# Patient Record
Sex: FEMALE | Race: WHITE | NOT HISPANIC OR LATINO | Employment: OTHER | URBAN - METROPOLITAN AREA
[De-identification: names, ages, dates, MRNs, and addresses within clinical notes are randomized per-mention and may not be internally consistent; named-entity substitution may affect disease eponyms.]

---

## 2017-02-04 ENCOUNTER — HOSPITAL ENCOUNTER (INPATIENT)
Facility: HOSPITAL | Age: 57
LOS: 6 days | Discharge: HOME | End: 2017-02-10
Attending: EMERGENCY MEDICINE | Admitting: HOSPITALIST

## 2017-02-04 ENCOUNTER — APPOINTMENT (OUTPATIENT)
Dept: GENERAL RADIOLOGY | Facility: HOSPITAL | Age: 57
End: 2017-02-04

## 2017-02-04 ENCOUNTER — APPOINTMENT (OUTPATIENT)
Dept: CARDIOLOGY | Facility: HOSPITAL | Age: 57
End: 2017-02-04

## 2017-02-04 DIAGNOSIS — R79.1 ELEVATED INR: ICD-10-CM

## 2017-02-04 DIAGNOSIS — R07.9 CHEST PAIN, UNSPECIFIED TYPE: Primary | ICD-10-CM

## 2017-02-04 DIAGNOSIS — E78.5 HYPERLIPEMIA: ICD-10-CM

## 2017-02-04 DIAGNOSIS — Z95.2 H/O MITRAL VALVE REPLACEMENT: ICD-10-CM

## 2017-02-04 DIAGNOSIS — Z79.01 CHRONIC ANTICOAGULATION: ICD-10-CM

## 2017-02-04 LAB
ALBUMIN SERPL-MCNC: 4 G/DL (ref 3.2–4.8)
ALBUMIN/GLOB SERPL: 1.2 G/DL (ref 1.5–2.5)
ALP SERPL-CCNC: 156 U/L (ref 25–100)
ALT SERPL W P-5'-P-CCNC: 107 U/L (ref 7–40)
AMYLASE SERPL-CCNC: 136 U/L (ref 30–118)
ANION GAP SERPL CALCULATED.3IONS-SCNC: 14 MMOL/L (ref 3–11)
APTT PPP: 45.8 SECONDS (ref 24–31)
ARTICHOKE IGE QN: 56 MG/DL (ref 0–130)
AST SERPL-CCNC: 226 U/L (ref 0–33)
BASOPHILS # BLD AUTO: 0.01 10*3/MM3 (ref 0–0.2)
BASOPHILS NFR BLD AUTO: 0.1 % (ref 0–1)
BH CV ECHO MEAS - AI DEC SLOPE: 286 CM/SEC^2
BH CV ECHO MEAS - AI MAX PG: 55.5 MMHG
BH CV ECHO MEAS - AI MAX VEL: 372.5 CM/SEC
BH CV ECHO MEAS - AI P1/2T: 381.5 MSEC
BH CV ECHO MEAS - AO ROOT AREA (BSA CORRECTED): 1.8
BH CV ECHO MEAS - AO ROOT AREA: 6.6 CM^2
BH CV ECHO MEAS - AO ROOT DIAM: 2.9 CM
BH CV ECHO MEAS - BSA(HAYCOCK): 1.6 M^2
BH CV ECHO MEAS - BSA: 1.6 M^2
BH CV ECHO MEAS - BZI_BMI: 17.6 KILOGRAMS/M^2
BH CV ECHO MEAS - BZI_METRIC_HEIGHT: 172.7 CM
BH CV ECHO MEAS - BZI_METRIC_WEIGHT: 52.6 KG
BH CV ECHO MEAS - CONTRAST EF (2CH): 13.5 ML/M^2
BH CV ECHO MEAS - CONTRAST EF 4CH: 21.7 ML/M^2
BH CV ECHO MEAS - EDV(CUBED): 160.1 ML
BH CV ECHO MEAS - EDV(MOD-SP2): 126 ML
BH CV ECHO MEAS - EDV(MOD-SP4): 143 ML
BH CV ECHO MEAS - EDV(TEICH): 143.1 ML
BH CV ECHO MEAS - EF(CUBED): 16.7 %
BH CV ECHO MEAS - EF(MOD-SP2): 13.5 %
BH CV ECHO MEAS - EF(MOD-SP4): 21.7 %
BH CV ECHO MEAS - EF(TEICH): 13.1 %
BH CV ECHO MEAS - ESV(CUBED): 133.4 ML
BH CV ECHO MEAS - ESV(MOD-SP2): 109 ML
BH CV ECHO MEAS - ESV(MOD-SP4): 112 ML
BH CV ECHO MEAS - ESV(TEICH): 124.4 ML
BH CV ECHO MEAS - FS: 5.9 %
BH CV ECHO MEAS - IVS/LVPW: 1.8
BH CV ECHO MEAS - IVSD: 1.2 CM
BH CV ECHO MEAS - LA DIMENSION: 4.6 CM
BH CV ECHO MEAS - LA/AO: 1.6
BH CV ECHO MEAS - LAT PEAK E' VEL: 6.3 CM/SEC
BH CV ECHO MEAS - LV DIASTOLIC VOL/BSA (35-75): 88.2 ML/M^2
BH CV ECHO MEAS - LV MASS(C)D: 198.4 GRAMS
BH CV ECHO MEAS - LV MASS(C)DI: 122.4 GRAMS/M^2
BH CV ECHO MEAS - LV MAX PG: 4.9 MMHG
BH CV ECHO MEAS - LV MEAN PG: 2 MMHG
BH CV ECHO MEAS - LV SYSTOLIC VOL/BSA (12-30): 69.1 ML/M^2
BH CV ECHO MEAS - LV V1 MAX: 111 CM/SEC
BH CV ECHO MEAS - LV V1 MEAN: 61.7 CM/SEC
BH CV ECHO MEAS - LV V1 VTI: 15.3 CM
BH CV ECHO MEAS - LVIDD: 5.4 CM
BH CV ECHO MEAS - LVIDS: 5 CM
BH CV ECHO MEAS - LVLD AP2: 8.6 CM
BH CV ECHO MEAS - LVLD AP4: 8.4 CM
BH CV ECHO MEAS - LVLS AP2: 8.1 CM
BH CV ECHO MEAS - LVLS AP4: 8.2 CM
BH CV ECHO MEAS - LVOT AREA (M): 1.3 CM^2
BH CV ECHO MEAS - LVOT AREA: 1.3 CM^2
BH CV ECHO MEAS - LVOT DIAM: 1.3 CM
BH CV ECHO MEAS - LVPWD: 1.2 CM
BH CV ECHO MEAS - MED PEAK E' VEL: 4.39 CM/SEC
BH CV ECHO MEAS - MV A MAX VEL: 62.7 CM/SEC
BH CV ECHO MEAS - MV E MAX VEL: 102 CM/SEC
BH CV ECHO MEAS - MV E/A: 1.6
BH CV ECHO MEAS - MV MAX PG: 5.4 MMHG
BH CV ECHO MEAS - MV MEAN PG: 2 MMHG
BH CV ECHO MEAS - MV V2 MAX: 116 CM/SEC
BH CV ECHO MEAS - MV V2 MEAN: 53.3 CM/SEC
BH CV ECHO MEAS - MV V2 VTI: 19.9 CM
BH CV ECHO MEAS - MVA(VTI): 1 CM^2
BH CV ECHO MEAS - PA ACC SLOPE: 423 CM/SEC^2
BH CV ECHO MEAS - PA ACC TIME: 0.12 SEC
BH CV ECHO MEAS - PA PR(ACCEL): 24.6 MMHG
BH CV ECHO MEAS - RAP SYSTOLE: 8 MMHG
BH CV ECHO MEAS - RVDD: 2.3 CM
BH CV ECHO MEAS - RVSP: 32.7 MMHG
BH CV ECHO MEAS - SI(CUBED): 16.4 ML/M^2
BH CV ECHO MEAS - SI(LVOT): 12.5 ML/M^2
BH CV ECHO MEAS - SI(MOD-SP2): 10.5 ML/M^2
BH CV ECHO MEAS - SI(MOD-SP4): 19.1 ML/M^2
BH CV ECHO MEAS - SI(TEICH): 11.6 ML/M^2
BH CV ECHO MEAS - SV(CUBED): 26.7 ML
BH CV ECHO MEAS - SV(LVOT): 20.3 ML
BH CV ECHO MEAS - SV(MOD-SP2): 17 ML
BH CV ECHO MEAS - SV(MOD-SP4): 31 ML
BH CV ECHO MEAS - SV(TEICH): 18.8 ML
BH CV ECHO MEAS - TAPSE (>1.6): 1.8 CM2
BH CV ECHO MEAS - TR MAX VEL: 247.8 CM/SEC
BH CV XLRA - RV BASE: 3.7 CM
BH CV XLRA - RV LENGTH: 6.6 CM
BH CV XLRA - RV MID: 3.9 CM
BH CV XLRA - TDI S': 16.8 CM/SEC
BILIRUB SERPL-MCNC: 0.5 MG/DL (ref 0.3–1.2)
BNP SERPL-MCNC: 2950 PG/ML (ref 0–100)
BUN BLD-MCNC: 32 MG/DL (ref 9–23)
BUN/CREAT SERPL: 17.8 (ref 7–25)
CALCIUM SPEC-SCNC: 9.9 MG/DL (ref 8.7–10.4)
CHLORIDE SERPL-SCNC: 100 MMOL/L (ref 99–109)
CHOLEST SERPL-MCNC: 157 MG/DL (ref 0–200)
CO2 SERPL-SCNC: 21 MMOL/L (ref 20–31)
CREAT BLD-MCNC: 1.8 MG/DL (ref 0.6–1.3)
DEPRECATED RDW RBC AUTO: 52.9 FL (ref 37–54)
E/E' RATIO: 16.3
EOSINOPHIL # BLD AUTO: 0.06 10*3/MM3 (ref 0.1–0.3)
EOSINOPHIL NFR BLD AUTO: 0.8 % (ref 0–3)
ERYTHROCYTE [DISTWIDTH] IN BLOOD BY AUTOMATED COUNT: 14.5 % (ref 11.3–14.5)
GFR SERPL CREATININE-BSD FRML MDRD: 29 ML/MIN/1.73
GLOBULIN UR ELPH-MCNC: 3.3 GM/DL
GLUCOSE BLD-MCNC: 104 MG/DL (ref 70–100)
HBA1C MFR BLD: 5 % (ref 4.8–5.6)
HCT VFR BLD AUTO: 30.7 % (ref 34.5–44)
HDLC SERPL-MCNC: 58 MG/DL (ref 40–60)
HGB BLD-MCNC: 10 G/DL (ref 11.5–15.5)
HOLD SPECIMEN: NORMAL
HOLD SPECIMEN: NORMAL
IMM GRANULOCYTES # BLD: 0.01 10*3/MM3 (ref 0–0.03)
IMM GRANULOCYTES NFR BLD: 0.1 % (ref 0–0.6)
INR PPP: 5.59
LIPASE SERPL-CCNC: 38 U/L (ref 6–51)
LV EF 2D ECHO EST: 18 %
LYMPHOCYTES # BLD AUTO: 2.37 10*3/MM3 (ref 0.6–4.8)
LYMPHOCYTES NFR BLD AUTO: 31.1 % (ref 24–44)
MCH RBC QN AUTO: 32.7 PG (ref 27–31)
MCHC RBC AUTO-ENTMCNC: 32.6 G/DL (ref 32–36)
MCV RBC AUTO: 100.3 FL (ref 80–99)
MONOCYTES # BLD AUTO: 0.56 10*3/MM3 (ref 0–1)
MONOCYTES NFR BLD AUTO: 7.3 % (ref 0–12)
NEUTROPHILS # BLD AUTO: 4.61 10*3/MM3 (ref 1.5–8.3)
NEUTROPHILS NFR BLD AUTO: 60.6 % (ref 41–71)
PLATELET # BLD AUTO: 245 10*3/MM3 (ref 150–450)
PMV BLD AUTO: 10.4 FL (ref 6–12)
POTASSIUM BLD-SCNC: 4.6 MMOL/L (ref 3.5–5.5)
PROT SERPL-MCNC: 7.3 G/DL (ref 5.7–8.2)
PROTHROMBIN TIME: 60.9 SECONDS (ref 9.6–11.5)
RBC # BLD AUTO: 3.06 10*6/MM3 (ref 3.89–5.14)
SODIUM BLD-SCNC: 135 MMOL/L (ref 132–146)
TRIGL SERPL-MCNC: 141 MG/DL (ref 0–150)
TROPONIN I SERPL-MCNC: 0.13 NG/ML (ref 0–0.07)
TROPONIN I SERPL-MCNC: 0.23 NG/ML
WBC NRBC COR # BLD: 7.62 10*3/MM3 (ref 3.5–10.8)
WHOLE BLOOD HOLD SPECIMEN: NORMAL
WHOLE BLOOD HOLD SPECIMEN: NORMAL

## 2017-02-04 PROCEDURE — 93005 ELECTROCARDIOGRAM TRACING: CPT | Performed by: EMERGENCY MEDICINE

## 2017-02-04 PROCEDURE — 25010000002 ONDANSETRON PER 1 MG: Performed by: INTERNAL MEDICINE

## 2017-02-04 PROCEDURE — 85730 THROMBOPLASTIN TIME PARTIAL: CPT | Performed by: EMERGENCY MEDICINE

## 2017-02-04 PROCEDURE — 83690 ASSAY OF LIPASE: CPT | Performed by: EMERGENCY MEDICINE

## 2017-02-04 PROCEDURE — 99232 SBSQ HOSP IP/OBS MODERATE 35: CPT | Performed by: INTERNAL MEDICINE

## 2017-02-04 PROCEDURE — 25010000002 SULFUR HEXAFLUORIDE MICROSPH 60.7-25 MG RECONSTITUTED SUSPENSION

## 2017-02-04 PROCEDURE — 25010000002 ONDANSETRON PER 1 MG: Performed by: EMERGENCY MEDICINE

## 2017-02-04 PROCEDURE — 99284 EMERGENCY DEPT VISIT MOD MDM: CPT

## 2017-02-04 PROCEDURE — 84484 ASSAY OF TROPONIN QUANT: CPT

## 2017-02-04 PROCEDURE — 82150 ASSAY OF AMYLASE: CPT | Performed by: PHYSICIAN ASSISTANT

## 2017-02-04 PROCEDURE — 25010000002 FUROSEMIDE PER 20 MG: Performed by: PHYSICIAN ASSISTANT

## 2017-02-04 PROCEDURE — 99223 1ST HOSP IP/OBS HIGH 75: CPT | Performed by: HOSPITALIST

## 2017-02-04 PROCEDURE — 25010000002 SULFUR HEXAFLUORIDE MICROSPH 60.7-25 MG RECONSTITUTED SUSPENSION: Performed by: FAMILY MEDICINE

## 2017-02-04 PROCEDURE — 94640 AIRWAY INHALATION TREATMENT: CPT

## 2017-02-04 PROCEDURE — 85610 PROTHROMBIN TIME: CPT | Performed by: EMERGENCY MEDICINE

## 2017-02-04 PROCEDURE — 71010 HC CHEST PA OR AP: CPT

## 2017-02-04 PROCEDURE — 25010000002 HYDROMORPHONE PER 4 MG: Performed by: FAMILY MEDICINE

## 2017-02-04 PROCEDURE — 80061 LIPID PANEL: CPT | Performed by: PHYSICIAN ASSISTANT

## 2017-02-04 PROCEDURE — 85025 COMPLETE CBC W/AUTO DIFF WBC: CPT | Performed by: EMERGENCY MEDICINE

## 2017-02-04 PROCEDURE — 83880 ASSAY OF NATRIURETIC PEPTIDE: CPT | Performed by: EMERGENCY MEDICINE

## 2017-02-04 PROCEDURE — C8929 TTE W OR WO FOL WCON,DOPPLER: HCPCS

## 2017-02-04 PROCEDURE — 83036 HEMOGLOBIN GLYCOSYLATED A1C: CPT | Performed by: PHYSICIAN ASSISTANT

## 2017-02-04 PROCEDURE — 93306 TTE W/DOPPLER COMPLETE: CPT | Performed by: INTERNAL MEDICINE

## 2017-02-04 PROCEDURE — 80053 COMPREHEN METABOLIC PANEL: CPT | Performed by: EMERGENCY MEDICINE

## 2017-02-04 PROCEDURE — 84484 ASSAY OF TROPONIN QUANT: CPT | Performed by: EMERGENCY MEDICINE

## 2017-02-04 PROCEDURE — 25010000002 HYDROMORPHONE PER 4 MG: Performed by: EMERGENCY MEDICINE

## 2017-02-04 RX ORDER — CARVEDILOL 3.12 MG/1
3.12 TABLET ORAL 2 TIMES DAILY WITH MEALS
Status: DISCONTINUED | OUTPATIENT
Start: 2017-02-04 | End: 2017-02-06

## 2017-02-04 RX ORDER — CLONAZEPAM 1 MG/1
2 TABLET ORAL 3 TIMES DAILY PRN
Status: DISCONTINUED | OUTPATIENT
Start: 2017-02-04 | End: 2017-02-06

## 2017-02-04 RX ORDER — ATORVASTATIN CALCIUM 40 MG/1
80 TABLET, FILM COATED ORAL NIGHTLY
Status: DISCONTINUED | OUTPATIENT
Start: 2017-02-04 | End: 2017-02-09

## 2017-02-04 RX ORDER — HYDROMORPHONE HYDROCHLORIDE 1 MG/ML
0.5 INJECTION, SOLUTION INTRAMUSCULAR; INTRAVENOUS; SUBCUTANEOUS ONCE
Status: COMPLETED | OUTPATIENT
Start: 2017-02-04 | End: 2017-02-04

## 2017-02-04 RX ORDER — ALUMINA, MAGNESIA, AND SIMETHICONE 2400; 2400; 240 MG/30ML; MG/30ML; MG/30ML
15 SUSPENSION ORAL EVERY 6 HOURS PRN
Status: DISCONTINUED | OUTPATIENT
Start: 2017-02-04 | End: 2017-02-10 | Stop reason: HOSPADM

## 2017-02-04 RX ORDER — CARVEDILOL 6.25 MG/1
6.25 TABLET ORAL 2 TIMES DAILY WITH MEALS
Status: DISCONTINUED | OUTPATIENT
Start: 2017-02-04 | End: 2017-02-04

## 2017-02-04 RX ORDER — ONDANSETRON 2 MG/ML
4 INJECTION INTRAMUSCULAR; INTRAVENOUS EVERY 6 HOURS PRN
Status: DISCONTINUED | OUTPATIENT
Start: 2017-02-04 | End: 2017-02-10 | Stop reason: HOSPADM

## 2017-02-04 RX ORDER — ONDANSETRON 2 MG/ML
4 INJECTION INTRAMUSCULAR; INTRAVENOUS ONCE
Status: COMPLETED | OUTPATIENT
Start: 2017-02-04 | End: 2017-02-04

## 2017-02-04 RX ORDER — HYDROXYCHLOROQUINE SULFATE 200 MG/1
200 TABLET, FILM COATED ORAL
Status: DISCONTINUED | OUTPATIENT
Start: 2017-02-04 | End: 2017-02-10 | Stop reason: HOSPADM

## 2017-02-04 RX ORDER — DIPHENHYDRAMINE HCL 25 MG
25 CAPSULE ORAL NIGHTLY PRN
Status: DISCONTINUED | OUTPATIENT
Start: 2017-02-04 | End: 2017-02-10 | Stop reason: HOSPADM

## 2017-02-04 RX ORDER — TIZANIDINE 4 MG/1
4 TABLET ORAL EVERY 12 HOURS PRN
Status: DISCONTINUED | OUTPATIENT
Start: 2017-02-04 | End: 2017-02-10 | Stop reason: HOSPADM

## 2017-02-04 RX ORDER — PANTOPRAZOLE SODIUM 40 MG/1
40 TABLET, DELAYED RELEASE ORAL
Status: DISCONTINUED | OUTPATIENT
Start: 2017-02-04 | End: 2017-02-10 | Stop reason: HOSPADM

## 2017-02-04 RX ORDER — FUROSEMIDE 10 MG/ML
40 INJECTION INTRAMUSCULAR; INTRAVENOUS 2 TIMES DAILY
Status: DISCONTINUED | OUTPATIENT
Start: 2017-02-04 | End: 2017-02-05

## 2017-02-04 RX ORDER — HYDROMORPHONE HYDROCHLORIDE 1 MG/ML
0.5 INJECTION, SOLUTION INTRAMUSCULAR; INTRAVENOUS; SUBCUTANEOUS EVERY 4 HOURS PRN
Status: DISCONTINUED | OUTPATIENT
Start: 2017-02-04 | End: 2017-02-10 | Stop reason: HOSPADM

## 2017-02-04 RX ORDER — TRAMADOL HYDROCHLORIDE 50 MG/1
50 TABLET ORAL EVERY 6 HOURS PRN
Status: DISCONTINUED | OUTPATIENT
Start: 2017-02-04 | End: 2017-02-10 | Stop reason: HOSPADM

## 2017-02-04 RX ORDER — BUDESONIDE AND FORMOTEROL FUMARATE DIHYDRATE 160; 4.5 UG/1; UG/1
2 AEROSOL RESPIRATORY (INHALATION)
Status: DISCONTINUED | OUTPATIENT
Start: 2017-02-04 | End: 2017-02-10 | Stop reason: HOSPADM

## 2017-02-04 RX ORDER — FUROSEMIDE 40 MG/1
40 TABLET ORAL DAILY
Status: DISCONTINUED | OUTPATIENT
Start: 2017-02-04 | End: 2017-02-04

## 2017-02-04 RX ORDER — CALCIUM CARBONATE 200(500)MG
1 TABLET,CHEWABLE ORAL 3 TIMES DAILY PRN
Status: ON HOLD | COMMUNITY
End: 2017-03-03

## 2017-02-04 RX ORDER — ALUMINA, MAGNESIA, AND SIMETHICONE 2400; 2400; 240 MG/30ML; MG/30ML; MG/30ML
30 SUSPENSION ORAL ONCE
Status: COMPLETED | OUTPATIENT
Start: 2017-02-04 | End: 2017-02-04

## 2017-02-04 RX ORDER — SIMETHICONE 80 MG
80 TABLET,CHEWABLE ORAL ONCE
Status: COMPLETED | OUTPATIENT
Start: 2017-02-04 | End: 2017-02-04

## 2017-02-04 RX ORDER — ASPIRIN 325 MG
325 TABLET, DELAYED RELEASE (ENTERIC COATED) ORAL DAILY
Status: DISCONTINUED | OUTPATIENT
Start: 2017-02-04 | End: 2017-02-10 | Stop reason: HOSPADM

## 2017-02-04 RX ORDER — SODIUM CHLORIDE 0.9 % (FLUSH) 0.9 %
10 SYRINGE (ML) INJECTION AS NEEDED
Status: DISCONTINUED | OUTPATIENT
Start: 2017-02-04 | End: 2017-02-10 | Stop reason: HOSPADM

## 2017-02-04 RX ADMIN — HYDROMORPHONE HYDROCHLORIDE 0.5 MG: 1 INJECTION, SOLUTION INTRAMUSCULAR; INTRAVENOUS; SUBCUTANEOUS at 12:43

## 2017-02-04 RX ADMIN — ONDANSETRON 4 MG: 2 INJECTION INTRAMUSCULAR; INTRAVENOUS at 03:25

## 2017-02-04 RX ADMIN — ONDANSETRON 4 MG: 2 INJECTION INTRAMUSCULAR; INTRAVENOUS at 07:01

## 2017-02-04 RX ADMIN — CLONAZEPAM 2 MG: 1 TABLET ORAL at 21:50

## 2017-02-04 RX ADMIN — HYDROMORPHONE HYDROCHLORIDE 0.5 MG: 1 INJECTION, SOLUTION INTRAMUSCULAR; INTRAVENOUS; SUBCUTANEOUS at 03:10

## 2017-02-04 RX ADMIN — FUROSEMIDE 40 MG: 10 INJECTION, SOLUTION INTRAMUSCULAR; INTRAVENOUS at 18:09

## 2017-02-04 RX ADMIN — HYDROXYCHLOROQUINE SULFATE 200 MG: 200 TABLET, FILM COATED ORAL at 08:47

## 2017-02-04 RX ADMIN — BUDESONIDE AND FORMOTEROL FUMARATE DIHYDRATE 2 PUFF: 160; 4.5 AEROSOL RESPIRATORY (INHALATION) at 08:57

## 2017-02-04 RX ADMIN — LIDOCAINE HYDROCHLORIDE 10 ML: 20 SOLUTION ORAL; TOPICAL at 12:44

## 2017-02-04 RX ADMIN — ONDANSETRON 4 MG: 2 INJECTION INTRAMUSCULAR; INTRAVENOUS at 20:01

## 2017-02-04 RX ADMIN — ATORVASTATIN CALCIUM 80 MG: 40 TABLET, FILM COATED ORAL at 20:01

## 2017-02-04 RX ADMIN — TRAMADOL HYDROCHLORIDE 50 MG: 50 TABLET, COATED ORAL at 06:30

## 2017-02-04 RX ADMIN — FUROSEMIDE 40 MG: 40 TABLET ORAL at 08:47

## 2017-02-04 RX ADMIN — CARVEDILOL 3.12 MG: 6.25 TABLET, FILM COATED ORAL at 08:56

## 2017-02-04 RX ADMIN — BUDESONIDE AND FORMOTEROL FUMARATE DIHYDRATE 2 PUFF: 160; 4.5 AEROSOL RESPIRATORY (INHALATION) at 20:58

## 2017-02-04 RX ADMIN — PANTOPRAZOLE SODIUM 40 MG: 40 TABLET, DELAYED RELEASE ORAL at 11:18

## 2017-02-04 RX ADMIN — SIMETHICONE CHEW TAB 80 MG 80 MG: 80 TABLET ORAL at 11:18

## 2017-02-04 RX ADMIN — ALUMINUM HYDROXIDE, MAGNESIUM HYDROXIDE, AND DIMETHICONE 30 ML: 400; 400; 40 SUSPENSION ORAL at 11:18

## 2017-02-04 RX ADMIN — HYDROMORPHONE HYDROCHLORIDE 0.5 MG: 1 INJECTION, SOLUTION INTRAMUSCULAR; INTRAVENOUS; SUBCUTANEOUS at 20:01

## 2017-02-04 RX ADMIN — CARVEDILOL 3.12 MG: 6.25 TABLET, FILM COATED ORAL at 18:09

## 2017-02-04 RX ADMIN — SULFUR HEXAFLUORIDE 3 ML: KIT at 14:20

## 2017-02-04 RX ADMIN — SERTRALINE 50 MG: 50 TABLET, FILM COATED ORAL at 08:47

## 2017-02-04 RX ADMIN — CLONAZEPAM 2 MG: 1 TABLET ORAL at 14:55

## 2017-02-04 RX ADMIN — CLONAZEPAM 2 MG: 1 TABLET ORAL at 09:08

## 2017-02-04 RX ADMIN — ASPIRIN 325 MG: 325 TABLET, DELAYED RELEASE ORAL at 08:46

## 2017-02-04 NOTE — PLAN OF CARE
Problem: Patient Care Overview (Adult)  Goal: Plan of Care Review    02/04/17 0551   Coping/Psychosocial Response Interventions   Plan Of Care Reviewed With patient   Patient Care Overview   Progress no change   Outcome Evaluation   Outcome Summary/Follow up Plan VSS, awaiting w/u

## 2017-02-04 NOTE — ED NOTES
Pt informed of hospital policy regarding removal and replacing EMS started IVs. Pt refused removal of EMS IV's.     Rylan Lopez, JESUS  02/04/17 1962

## 2017-02-04 NOTE — ED PROVIDER NOTES
Subjective   HPI Comments: Ashely Roldan is a 56 y.o. female with a hx of CABG who presents to the ED with c/o CP and SOA. One hour ago, she began to have severe tightness and pressure in her chest with associated SOA. She took 1 Nitroglycerin with no relief, prompting her to call EMS. On EMS arrival pt was tripoding and tachycardic. EMS gave her 3 NTG and 324 mg ASA without relief of sx. They performed a 12-lead EKG which they called a field STEMI.    Pt has a hx COPD, HTN, HLD, CHF, and AICD placement. She is a smoker.    Patient is a 56 y.o. female presenting with chest pain.   History provided by:  Patient  Chest Pain   Pain quality: pressure and tightness    Pain radiates to:  Does not radiate  Pain severity:  Severe  Onset quality:  Sudden  Duration:  1 hour  Timing:  Constant  Progression:  Improving  Chronicity:  New  Relieved by:  Nothing  Worsened by:  Nothing  Ineffective treatments:  Nitroglycerin and aspirin  Associated symptoms: shortness of breath    Associated symptoms: no diaphoresis, no fever, no headache, no vomiting and no weakness    Risk factors: high cholesterol, hypertension and smoking        Review of Systems   Constitutional: Negative for diaphoresis and fever.   Respiratory: Positive for shortness of breath.    Cardiovascular: Positive for chest pain.   Gastrointestinal: Negative for vomiting.   Neurological: Negative for weakness and headaches.   All other systems reviewed and are negative.      Past Medical History   Diagnosis Date   • Allergic rhinitis 08/01/2014   • Anemia    • Anxiety    • Arthritis    • CAD (coronary artery disease)    • CHF (congestive heart failure)    • CKD (chronic kidney disease)    • Colitis, Clostridium difficile    • COPD (chronic obstructive pulmonary disease)    • Coronary atherosclerosis    • Depression    • Emphysema of lung    • GERD (gastroesophageal reflux disease)    • Heart attack    • Heart murmur    • Hematoma of hip    • Hip fracture    •  Hyperlipidemia    • Hypertension      benign essential   • Ischemic cardiomyopathy 08/01/2014     ef 29% s/p ICD   • Knee injury    • Lung disease    • Mitral valve disorder 03/28/2013   • Mitral valve insufficiency      s/p prosthetic ATS valve replacement   • Osteoporosis    • Postmenopausal      natural   • PVD (peripheral vascular disease)    • Pyelonephritis    • Renal failure    • Renal failure    • Renal failure, acute    • Syncope    • Systemic lupus erythematosus    • TIA (transient ischemic attack) 04/04/2012   • UTI (urinary tract infection)    • Valvular heart disease    • Wrist fracture        Allergies   Allergen Reactions   • Morphine And Related GI Intolerance and Irritability   • Sulfa Antibiotics        Past Surgical History   Procedure Laterality Date   • Endoscopy  10/23/2014     Dr. Brand; retained food in stomach; he dilated her esophagus; 5-30-14 hiatus hernia, gastritis   • Cholecystectomy  2010   • Joint replacement Right 06/25/2015     Dr. Fitzgerald; first surgery 1-29-14; redo 5-4-15   • Coronary artery bypass graft  2011     4 aortic bypasses, abdominal aorta; 2011-mitral valve; 2010-triple bypass   • Cardiac defibrillator placement     • Total hip arthroplasty Right      3 times within 8 months   • Mitral valve replacement       MECHANICAL       Family History   Problem Relation Age of Onset   • Arthritis Mother      rheumatoid   • Heart attack Father    • Alcohol abuse Brother    • Heart disease Other      uncle   • Diabetes Other      uncle-type 2   • Hypertension Other      uncle   • Stroke Other      uncle   • Cancer Other      grandfather-lung    • Heart disease Maternal Uncle    • Lung cancer Paternal Grandfather        Social History     Social History   • Marital status:      Spouse name: N/A   • Number of children: N/A   • Years of education: N/A     Social History Main Topics   • Smoking status: Current Every Day Smoker     Packs/day: 1.00     Types: Cigarettes   •  Smokeless tobacco: None   • Alcohol use Yes      Comment: once a week   • Drug use: No   • Sexual activity: Defer     Other Topics Concern   • None     Social History Narrative    Pt recently  after 30 years. Just moved from [facility] into her son's home in [place].          Objective   Physical Exam   Constitutional: She is oriented to person, place, and time. She appears well-developed. She appears distressed (moderate discomfort).   HENT:   Head: Normocephalic and atraumatic.   Eyes: Conjunctivae are normal. Pupils are equal, round, and reactive to light.   Neck: Normal range of motion. Neck supple.   Cardiovascular: Normal rate and normal heart sounds.    Pulmonary/Chest: Effort normal. Tachypnea noted. She has wheezes (minimal expiratory wheezing). She exhibits no edema.   Midline sternal incision secondary to CABG.    Abdominal: Soft. Bowel sounds are normal.   Musculoskeletal: Normal range of motion. She exhibits no edema.   Neurological: She is alert and oriented to person, place, and time.   Skin: Skin is warm and dry.   Psychiatric: She has a normal mood and affect. Her behavior is normal.   Nursing note and vitals reviewed.      Procedures         ED Course  ED Course   Comment By Time   0243 Dr. Olmstead spoke with Dr. Ramirez who will review EKG. Tripp Krause 02/04 0244   Dr. Olmstead spoke with Dr. Ramirez who reviewed pt's EKG. Pt is not having a STEMI, cath lab is not indicated at this time.  Wally Olmstead DO 02/04 0247   Heparin was not immediately initiated as the patient is currently on Coumadin -CP Tripp Krause 02/04 0316       Recent Results (from the past 24 hour(s))   Light Blue Top    Collection Time: 02/04/17  2:46 AM   Result Value Ref Range    Extra Tube hold for add-on    Comprehensive Metabolic Panel    Collection Time: 02/04/17  2:47 AM   Result Value Ref Range    Glucose 104 (H) 70 - 100 mg/dL    BUN 32 (H) 9 - 23 mg/dL    Creatinine 1.80 (H) 0.60 - 1.30 mg/dL    Sodium 135 132 - 146  mmol/L    Potassium 4.6 3.5 - 5.5 mmol/L    Chloride 100 99 - 109 mmol/L    CO2 21.0 20.0 - 31.0 mmol/L    Calcium 9.9 8.7 - 10.4 mg/dL    Total Protein 7.3 5.7 - 8.2 g/dL    Albumin 4.00 3.20 - 4.80 g/dL    ALT (SGPT) 107 (H) 7 - 40 U/L    AST (SGOT) 226 (H) 0 - 33 U/L    Alkaline Phosphatase 156 (H) 25 - 100 U/L    Total Bilirubin 0.5 0.3 - 1.2 mg/dL    eGFR Non African Amer 29 (L) >60 mL/min/1.73    Globulin 3.3 gm/dL    A/G Ratio 1.2 (L) 1.5 - 2.5 g/dL    BUN/Creatinine Ratio 17.8 7.0 - 25.0    Anion Gap 14.0 (H) 3.0 - 11.0 mmol/L   Lipase    Collection Time: 02/04/17  2:47 AM   Result Value Ref Range    Lipase 38 6 - 51 U/L   BNP    Collection Time: 02/04/17  2:47 AM   Result Value Ref Range    BNP 2950.0 (H) 0.0 - 100.0 pg/mL   Green Top (Gel)    Collection Time: 02/04/17  2:47 AM   Result Value Ref Range    Extra Tube Hold for add-ons.    Lavender Top    Collection Time: 02/04/17  2:47 AM   Result Value Ref Range    Extra Tube hold for add-on    Gold Top - SST    Collection Time: 02/04/17  2:47 AM   Result Value Ref Range    Extra Tube Hold for add-ons.    CBC Auto Differential    Collection Time: 02/04/17  2:47 AM   Result Value Ref Range    WBC 7.62 3.50 - 10.80 10*3/mm3    RBC 3.06 (L) 3.89 - 5.14 10*6/mm3    Hemoglobin 10.0 (L) 11.5 - 15.5 g/dL    Hematocrit 30.7 (L) 34.5 - 44.0 %    .3 (H) 80.0 - 99.0 fL    MCH 32.7 (H) 27.0 - 31.0 pg    MCHC 32.6 32.0 - 36.0 g/dL    RDW 14.5 11.3 - 14.5 %    RDW-SD 52.9 37.0 - 54.0 fl    MPV 10.4 6.0 - 12.0 fL    Platelets 245 150 - 450 10*3/mm3    Neutrophil % 60.6 41.0 - 71.0 %    Lymphocyte % 31.1 24.0 - 44.0 %    Monocyte % 7.3 0.0 - 12.0 %    Eosinophil % 0.8 0.0 - 3.0 %    Basophil % 0.1 0.0 - 1.0 %    Immature Grans % 0.1 0.0 - 0.6 %    Neutrophils, Absolute 4.61 1.50 - 8.30 10*3/mm3    Lymphocytes, Absolute 2.37 0.60 - 4.80 10*3/mm3    Monocytes, Absolute 0.56 0.00 - 1.00 10*3/mm3    Eosinophils, Absolute 0.06 (L) 0.10 - 0.30 10*3/mm3    Basophils,  "Absolute 0.01 0.00 - 0.20 10*3/mm3    Immature Grans, Absolute 0.01 0.00 - 0.03 10*3/mm3   POC Troponin, Rapid    Collection Time: 02/04/17  2:48 AM   Result Value Ref Range    Troponin I 0.13 (H) 0.00 - 0.07 ng/mL   Protime-INR    Collection Time: 02/04/17  3:21 AM   Result Value Ref Range    Protime 60.9 (C) 9.6 - 11.5 Seconds    INR 5.59    aPTT    Collection Time: 02/04/17  3:21 AM   Result Value Ref Range    PTT 45.8 (H) 24.0 - 31.0 seconds   Troponin    Collection Time: 02/04/17  4:55 AM   Result Value Ref Range    Troponin I 0.228 (H) <=0.040 ng/mL     Note: In addition to lab results from this visit, the labs listed above may include labs taken at another facility or during a different encounter within the last 24 hours. Please correlate lab times with ED admission and discharge times for further clarification of the services performed during this visit.    XR Chest 1 View   Final Result   Abnormal   1. Moderate diffuse bilateral interstitial opacities. These may represent any    combination of pulmonary vascular congestion, chronic scarring, and    atypical/viral pneumonia.      THIS DOCUMENT HAS BEEN ELECTRONICALLY SIGNED BY MICHELET DOMINGUEZ MD        Vitals:    02/04/17 0340 02/04/17 0341 02/04/17 0529 02/04/17 0530   BP: 138/87  146/94 134/54   BP Location:   Right arm Left arm   Patient Position:   Lying Lying   Pulse:  106 104    Resp:  20 18    Temp:   96.9 °F (36.1 °C)    TempSrc:   Axillary    SpO2:  97%     Weight:   116 lb 3.2 oz (52.7 kg)    Height:   68\" (172.7 cm)      Medications   sodium chloride 0.9 % flush 10 mL (not administered)   traMADol (ULTRAM) tablet 50 mg (50 mg Oral Given 2/4/17 0630)   albuterol (PROVENTIL) nebulizer solution 0.5% 2.5 mg/0.5mL (not administered)   budesonide-formoterol (SYMBICORT) 160-4.5 MCG/ACT inhaler 2 puff (not administered)   carvedilol (COREG) tablet 3.125 mg (not administered)   clonazePAM (KlonoPIN) tablet 2 mg (not administered)   diphenhydrAMINE (BENADRYL) " capsule 25 mg (not administered)   hydroxychloroquine (PLAQUENIL) tablet 200 mg (not administered)   atorvastatin (LIPITOR) tablet 80 mg (not administered)   sertraline (ZOLOFT) tablet 50 mg (not administered)   tiZANidine (ZANAFLEX) tablet 4 mg (not administered)   Pharmacy to dose warfarin (not administered)   warfarin (COUMADIN) (dosing per levels) (not administered)   aspirin EC tablet 325 mg (not administered)   furosemide (LASIX) tablet 40 mg (not administered)   carvedilol (COREG) tablet 6.25 mg (not administered)   ondansetron (ZOFRAN) injection 4 mg (4 mg Intravenous Given 2/4/17 0701)   HYDROmorphone (DILAUDID) injection 0.5 mg (0.5 mg Intravenous Given 2/4/17 0310)   ondansetron (ZOFRAN) injection 4 mg (4 mg Intravenous Given 2/4/17 0325)     ECG/EMG Results (last 24 hours)     Procedure Component Value Units Date/Time    ECG 12 Lead [[id number]] Collected:  02/04/17 0236     Updated:  02/04/17 0238                      MDM  Number of Diagnoses or Management Options  Chest pain, unspecified type:   Elevated INR:      Amount and/or Complexity of Data Reviewed  Decide to obtain previous medical records or to obtain history from someone other than the patient: yes        Final diagnoses:   Chest pain, unspecified type   Elevated INR       Documentation assistance provided by brigid Krause.  Information recorded by the scribe was done at my direction and has been verified and validated by me.     Tripp Krause  02/04/17 0349       Wally Olmstead DO  02/04/17 0772

## 2017-02-04 NOTE — PROGRESS NOTES
Pharmacy Warfarin Note    Patient is a 57 yo F on whom pharmacy has been consulted to dose warfarin.    Indication:  Mechanical Mitral Valve replacement  Goal INR:  2.5-3.5  Consulting Provider:  Dr. Philip (Hospitalist Group)      Current Bridge Therapy:  None    Labs    Results from last 7 days     Lab Units 02/04/17  0247   SODIUM mmol/L 135   POTASSIUM mmol/L 4.6   CHLORIDE mmol/L 100   TOTAL CO2 mmol/L 21.0   BUN mg/dL 32*   CREATININE mg/dL 1.80*   CALCIUM mg/dL 9.9   BILIRUBIN mg/dL 0.5   ALK PHOS U/L 156*   ALT (SGPT) U/L 107*   AST (SGOT) U/L 226*   GLUCOSE mg/dL 104*     Results from last 7 days     Lab Units 02/04/17  0247   WBC 10*3/mm3 7.62   HEMOGLOBIN g/dL 10.0*   HEMATOCRIT % 30.7*   PLATELETS 10*3/mm3 245       Results from last 7 days     Lab Units 02/04/17  0321   INR  5.59     Other Clinical Information    Current Drug-Drug Interaction With Warfarin  1.  None currently    Date 2/4           INR 5.59           Dose HOLD             Plan  1.  INR supratherapeutic.  Hold warfarin for now.  2.  Daily PT/INR.  Will confirm home dose of warfarin with patient in the AM when able.  3.  Pharmacy will continue to follow and adjust dose based on renal function, drug levels, and clinical status.    Thanks,  Titi Lo, PharmD  02/04/17  4:45 AM

## 2017-02-04 NOTE — PROGRESS NOTES
[facility] Medicine Services    LOS- Day 0  Patient seen, chart reviewed, labs reviewed.  Continue current care.   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  Cp started later in njight , atyupical pain reproducible with lower chest mod tenderness, improved with dilaudid in er, will give low dose dilaudid, gi cocktail and follow cardio recs, trop mild elevated, significant cardiac hiostory , nebs spiriva at home did not help, probably may need angiography, will discuss with cardiology  Carlos A Quiroz MD02/04/1710:11 AM

## 2017-02-04 NOTE — CONSULTS
"[facility]  CARDIOLOGY CONSULTATION NOTE    Ashely Roldan  : 1960  MRN:[id number]  Home Phone:[phone]    Date of Admission:2017  Date of Consultation: 17    PCP: Peter Rdz MD    IDENTIFICATION: A 56 y.o. female resident of [place], [place]    Chief Complaint   Patient presents with   • Chest Pain       PROBLEM LIST:   1. Structural heart disease of mixed etiology:   A. CABG , IDB   B. [place] ICD placement,   C. Echo 2014: Severe global hypokinesis with EF 12%, moderate AI, mechanical mitral valve, moderate to severe TR, RVSP 43mmHg  2. VHD:   A. Mitral valve replacement with ATS mechanical valve , IDB   3. COPD with ongoing tobacco abuse   4. Systemic Lupus Erythematosus  5. Hypertension  6. Hyperlipidemia   7. CKD  8. PVD       ALLERGIES:   Allergies   Allergen Reactions   • Morphine And Related GI Intolerance and Irritability   • Sulfa Antibiotics        HPI: Patient is a pleasant 55 y/o WF with history of SHF s/p ICD placement in , VHD s/p mechanical mitral valve replacement, CAD with prior CABG, and Lupus who is seen in consultation today regarding chest pain. 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 Later that night she experienced severe chest pain and felt like \"a brick was laying on her chest.\" She states she took Nitro with no relief and presented to the ED for further evaluation. She was given Nitro here as well with no relief, and was admitted for further evaluation. She states she now has a \"band-like\" pain around her upper abdomen that goes around to her back, and feels as if \"someone is squeezing a rope around her abdomen.\" She states this does not feel like her prior cardiac pain. She states at the time of her CABG she did not really have pain, but was in heart failure and had a CABG and mitral valve replacement at the time. She does not " know any details about her grafts. She also reports peripheral stents in bilateral legs. She continues to smoke. She sleeps in a recliner for the past 2-3 years, denies PND or lower extremity edema. She states she was told to stop ASA as she is on Coumadin for her MV, and this was making her INR hard to control. Denies syncope, history of stroke or DVT/PE. Currently she is still in pain, as this has never gone away. Troponins are marginal, BNP >2000, EKG with no significant changes. Last known EF was 12% in 2014.     ROS: All systems have been reviewed and are negative with the exception of those mentioned in the HPI and problem list above.    Past Surgical History   Procedure Laterality Date   • Endoscopy  10/23/2014     Dr. Brand; retained food in stomach; he dilated her esophagus; 5-30-14 hiatus hernia, gastritis   • Cholecystectomy  2010   • Joint replacement Right 06/25/2015     Dr. Fitzgerald; first surgery 1-29-14; redo 5-4-15   • Coronary artery bypass graft  2011     4 aortic bypasses, abdominal aorta; 2011-mitral valve; 2010-triple bypass   • Cardiac defibrillator placement     • Total hip arthroplasty Right      3 times within 8 months   • Mitral valve replacement       MECHANICAL       Social History     Social History   • Marital status:      Spouse name: N/A   • Number of children: N/A   • Years of education: N/A     Occupational History   • Not on file.     Social History Main Topics   • Smoking status: Current Every Day Smoker     Packs/day: 1.00     Types: Cigarettes   • Smokeless tobacco: Not on file   • Alcohol use Yes      Comment: once a week   • Drug use: No   • Sexual activity: Defer     Other Topics Concern   • Not on file     Social History Narrative    Pt recently  after 30 years. [place] moved from [place] into her son's home in [place].        Family History   Problem Relation Age of Onset   • Arthritis Mother      rheumatoid   • Heart attack Father    • Alcohol abuse  "Brother    • Heart disease Other      uncle   • Diabetes Other      uncle-type 2   • Hypertension Other      uncle   • Stroke Other      uncle   • Cancer Other      grandfather-lung    • Heart disease Maternal Uncle    • Lung cancer Paternal Grandfather        Objective     Visit Vitals   • /54 (BP Location: Left arm, Patient Position: Lying)   • Pulse 104   • Temp 96.9 °F (36.1 °C) (Axillary)   • Resp 18   • Ht 68\" (172.7 cm)   • Wt 116 lb 3.2 oz (52.7 kg)   • SpO2 97%   • BMI 17.67 kg/m2     No intake or output data in the 24 hours ending 02/04/17 0826    PHYSICAL EXAM:  Constitutional:  Well-nourished, cooperative, in no acute distress.   Head:  Normocephalic, without obvious abnormality, atraumatic.   Neck: No adenopathy, supple, trachea midline, no thyromegaly, no    carotid bruit, no JVD.   Respiratory:   Clear to auscultation bilaterally; respirations regular, even and unlabored. No wheezes, rales or ronchi.    Cardiovascular:  Regular rhythm and normal rate, mechanical valve sounds, no murmur, or rub. Good prosthetic valve sounds.    Pulses: Peripheral pulses are faint   GI:   Soft, non-distended. Bowel sounds heard throughout. No organomegaly or masses. There is epigastric tenderness and \"pleuretic\" epigastric pain with respiration.    Extremities: No edema, clubbing or cyanosis.   Skin: Skin is warm and dry. No bleeding, bruising or rash.   Neurological: Alert, oriented to time, person and place. No focal deficits.     Labs/Diagnostic Data    Results from last 7 days  Lab Units 02/04/17  0247   SODIUM mmol/L 135   POTASSIUM mmol/L 4.6   CHLORIDE mmol/L 100   TOTAL CO2 mmol/L 21.0   BUN mg/dL 32*   CREATININE mg/dL 1.80*   GLUCOSE mg/dL 104*   CALCIUM mg/dL 9.9       Results from last 7 days  Lab Units 02/04/17  0455   TROPONIN I ng/mL 0.228*       Results from last 7 days  Lab Units 02/04/17  0247   WBC 10*3/mm3 7.62   HEMOGLOBIN g/dL 10.0*   HEMATOCRIT % 30.7*   PLATELETS 10*3/mm3 245       Results " from last 7 days  Lab Units 02/04/17  0321   PROTIME Seconds 60.9*   INR  5.59   APTT seconds 45.8*       I personally viewed and interpreted the patient's EKG/Telemetry data    Radiology Data:   CXR 2/4/17:  FINDINGS:     Median sternotomy wires are present. Heart size upper limits of normal. No   airspace consolidation identified. No visible pneumothorax or pleural effusion.   Moderate diffuse bilateral interstitial opacities. These may represent any   combination of pulmonary vascular congestion, chronic scarring, and   atypical/viral pneumonia.     IMPRESSION:  1. Moderate diffuse bilateral interstitial opacities. These may represent any   combination of pulmonary vascular congestion, chronic scarring, and   atypical/viral pneumonia.    Current Medications:      aspirin 325 mg Oral Daily   atorvastatin 80 mg Oral Nightly   budesonide-formoterol 2 puff Inhalation BID - RT   carvedilol 3.125 mg Oral BID With Meals   carvedilol 6.25 mg Oral BID With Meals   furosemide 40 mg Oral Daily   hydroxychloroquine 200 mg Oral Daily With Breakfast   sertraline 50 mg Oral Daily   warfarin (COUMADIN) (dosing per levels)  Does not apply Daily       Pharmacy to dose warfarin        Assessment and Plan:     1. CAD, s/p CABG in 2010 - patient does not know details    - ASA, Lipitor 80mg   - BNP 2950, CXR with increased vascularity   - troponins marginal, EKG with no significant changes   - 14 beat run of VT this am    2. ICM s/p [place] ICD placement in 2011  3. Mechanical mitral valve, on Coumadin   - INR supratherapeutic, Pharmacy following    4. COPD with ongoing tobacco abuse   5. Elevated LFT's, minor.  6. For now, need to treat heart failure that may be causitive of some of her epigastric and abdominal SX via liver capsule distention AS WELL AS obtain old records, check mildly elevated troponins for course, obtain ECHO and OSH records. All discussed in detail with patient.    IYomi MD, personally performed the  services described as documented by the above named individual. I have made any necessary edits and it is both accurate and complete 2/4/2017  11:27 AM    Thank you for allowing me to participate in the care of Ashely CLAU Roldan. Feel free to contact me directly with any further questions or concerns.    Sandhya Lynch PA-C   02/04/17   8:26 AM

## 2017-02-04 NOTE — PROGRESS NOTES
"Adult Nutrition  Assessment/PES    Patient Name:  Ashely Roldan  YOB: 1960  MRN: [id number]  Admit Date:  2/4/2017    Assessment Date:  2/4/2017        Reason for Assessment       02/04/17 1502    Reason for Assessment    Reason For Assessment/Visit identified at risk by screening criteria    Identified At Risk By Screening Criteria unintentional loss of 10 lbs or more in the past 2 mos;BMI   uninted weight loss per nsg screen; pt denied any weight loss.     Time Spent (min) 20    Diagnosis Diagnosis    Cardiac CHF              Nutrition/Diet History       02/04/17 1502    Nutrition/Diet History    Factors Affecting Nutritional Intake Factors    Reported/Observed By Patient    Other Fair appetite, no recent weight loss            Anthropometrics       02/04/17 1503    Anthropometrics    Height 172.7 cm (67.99\")    Weight 52.6 kg (116 lb)    Ideal Body Weight (IBW)    Ideal Body Weight (IBW), Female 64.53    % Ideal Body Weight 81.71    Usual Body Weight (UBW)    Usual Body Weight --   115-120# per pt.     Body Mass Index (BMI)    BMI (kg/m2) 17.68            Labs/Tests/Procedures/Meds       02/04/17 1504    Labs/Tests/Procedures/Meds    Labs/Tests Review Reviewed                Nutrition Prescription Ordered       02/04/17 1504    Nutrition Prescription PO    Current PO Diet Regular            Evaluation of Received Nutrient/Fluid Intake       02/04/17 1504    PO Evaluation    Number of Days PO Intake Evaluated Insufficient Data              Problem/Interventions:        Problem 1       02/04/17 1504    Nutrition Diagnoses Problem 1    Problem 1 No Nutrition Diagnosis at this Time                    Intervention Goal       02/04/17 1504    Intervention Goal    General Nutrition support treatment    PO Establish PO            Nutrition Intervention       02/04/17 1504    Nutrition Intervention    RD/Tech Action Interview for preference;Follow Tx progress            Nutrition Prescription       " 02/04/17 1504    Nutrition Prescription PO    PO Prescription Begin/change diet    Begin/Change Diet to Regular    Common Modifiers Cardiac    New PO Prescription Ordered? Yes            Education/Evaluation       02/04/17 1504    Monitor/Evaluation    Monitor Per protocol        Comments:      Electronically signed by:  Paola Moreira RD  02/04/17 3:04 PM

## 2017-02-04 NOTE — H&P
[facility] Medicine Services  HISTORY AND PHYSICAL    Primary Care Physician: Peter Rdz MD    Subjective     Chief Complaint:  Dyspnea    History of Present Illness:     56 year old female with multiple medical problems with 10/10 chest pain, with no relief from NTG presents to ED. Described pain as brick sitting on her chest, progressing to band around chest and easing up, but not responding to NTG. She noted nausea. Has chronic cough. No vomiting or diarrhea. No pleurisy. Pain initially radiated to back. Nothing to arm or jaw.        Review of Systems   Constitutional: Positive for activity change and fatigue.   HENT: Positive for congestion and rhinorrhea.    Respiratory: Positive for cough.    Cardiovascular: Positive for chest pain and leg swelling.   Gastrointestinal: Positive for nausea.   Genitourinary: Negative.    Musculoskeletal: Negative.    Neurological: Positive for weakness.   Hematological: Negative.    Psychiatric/Behavioral: Positive for agitation.          Past Medical History:   Past Medical History   Diagnosis Date   • Allergic rhinitis 08/01/2014   • Anemia    • Anxiety    • Arthritis    • CAD (coronary artery disease)    • CHF (congestive heart failure)    • CKD (chronic kidney disease)    • Colitis, Clostridium difficile    • COPD (chronic obstructive pulmonary disease)    • Coronary atherosclerosis    • Depression    • Emphysema of lung    • GERD (gastroesophageal reflux disease)    • Heart attack    • Heart murmur    • Hematoma of hip    • Hip fracture    • Hyperlipidemia    • Hypertension      benign essential   • Ischemic cardiomyopathy 08/01/2014     ef 29% s/p ICD   • Knee injury    • Lung disease    • Mitral valve disorder 03/28/2013   • Mitral valve insufficiency      s/p prosthetic ATS valve replacement   • Osteoporosis    • Postmenopausal      natural   • PVD (peripheral vascular disease)    • Pyelonephritis    • Renal failure    • Renal failure   "  • Renal failure, acute    • Syncope    • Systemic lupus erythematosus    • TIA (transient ischemic attack) 04/04/2012   • UTI (urinary tract infection)    • Valvular heart disease    • Wrist fracture        Past Surgical History:  Past Surgical History   Procedure Laterality Date   • Endoscopy  10/23/2014     Dr. Brand; retained food in stomach; he dilated her esophagus; 5-30-14 hiatus hernia, gastritis   • Cholecystectomy  2010   • Joint replacement Right 06/25/2015     Dr. Fitzgerald; first surgery 1-29-14; redo 5-4-15   • Coronary artery bypass graft  2011     4 aortic bypasses, abdominal aorta; 2011-mitral valve; 2010-triple bypass   • Cardiac defibrillator placement     • Total hip arthroplasty Right      3 times within 8 months   • Mitral valve replacement       MECHANICAL       Family History: family history includes Alcohol abuse in her brother; Arthritis in her mother; Cancer in her other; Diabetes in her other; Heart attack in her father; Heart disease in her maternal uncle and other; Hypertension in her other; Lung cancer in her paternal grandfather; Stroke in her other.    Social History:  reports that she has been smoking Cigarettes.  She has been smoking about 0.50 packs per day. She does not have any smokeless tobacco history on file. She reports that she drinks alcohol. She reports that she does not use illicit drugs.    Medications:  Reviewed    (Not in a hospital admission)    Allergies:  Allergies   Allergen Reactions   • Morphine And Related GI Intolerance and Irritability   • Sulfa Antibiotics          Objective     Physical Exam:  Vital Signs:   Visit Vitals   • /87   • Pulse 106   • Temp 97.5 °F (36.4 °C) (Oral)   • Resp 20   • Ht 68.5\" (174 cm)   • Wt 117 lb (53.1 kg)   • SpO2 97%   • BMI 17.53 kg/m2     Physical Exam   Constitutional: She is oriented to person, place, and time. She appears well-developed.   HENT:   Head: Normocephalic.   Nose: Nose normal.   Mouth/Throat: Oropharynx is " clear and moist.   Eyes: Conjunctivae and EOM are normal.   Neck: Normal range of motion. Neck supple. No thyromegaly present.   Cardiovascular: Normal rate, regular rhythm and normal heart sounds.    Decreased dorsalis pedis pulses bilaterally with cool LE B   Pulmonary/Chest: Effort normal and breath sounds normal.   Abdominal: Bowel sounds are normal.   Musculoskeletal: Normal range of motion.   Neurological: She is alert and oriented to person, place, and time.   Skin: Skin is dry.   Psychiatric: She has a normal mood and affect.   Vitals reviewed.            Results Reviewed:    Results from last 7 days  Lab Units 02/04/17  0247   WBC 10*3/mm3 7.62   HEMOGLOBIN g/dL 10.0*   PLATELETS 10*3/mm3 245       Results from last 7 days  Lab Units 02/04/17  0247   SODIUM mmol/L 135   POTASSIUM mmol/L 4.6   TOTAL CO2 mmol/L 21.0   CREATININE mg/dL 1.80*   GLUCOSE mg/dL 104*   CALCIUM mg/dL 9.9       I have personally reviewed and interpreted available lab data, radiology studies and ECG obtained at time of admission.     Assessment / Plan     Assessment/Problem List:   Active Problems:    Hyperlipidemia    Hypertension    Ischemic cardiomyopathy    Systolic heart failure    Chest pain      A/C SHF/Severe SHF, with ICD  Possible USA/ACS  Prosthetic mitral valve  HTN  Chest pain  Probable CKD  Anemia  COPD  -  Anticoagulated. ASA/STATIN as tolerated. Continue BB as tolerated  Consider ACE/ARB  Needs [facility]  Trend troponin levels. Doubt PE, but possible NSTEMI, supsecting a/c SHF.            DVT prophylaxis:  Code Status:  Admission Status: Patient will be admitted to (LEXOBS or LEXINPT)     Junior Philip MD 02/04/17 4:39 AM

## 2017-02-05 ENCOUNTER — APPOINTMENT (OUTPATIENT)
Dept: ULTRASOUND IMAGING | Facility: HOSPITAL | Age: 57
End: 2017-02-05

## 2017-02-05 LAB
ALBUMIN SERPL-MCNC: 3.6 G/DL (ref 3.2–4.8)
ALBUMIN/GLOB SERPL: 1.2 G/DL (ref 1.5–2.5)
ALP SERPL-CCNC: 151 U/L (ref 25–100)
ALT SERPL W P-5'-P-CCNC: 162 U/L (ref 7–40)
ANION GAP SERPL CALCULATED.3IONS-SCNC: 7 MMOL/L (ref 3–11)
AST SERPL-CCNC: 271 U/L (ref 0–33)
BACTERIA UR QL AUTO: ABNORMAL /HPF
BILIRUB SERPL-MCNC: 0.3 MG/DL (ref 0.3–1.2)
BILIRUB UR QL STRIP: NEGATIVE
BUN BLD-MCNC: 37 MG/DL (ref 9–23)
BUN/CREAT SERPL: 14.2 (ref 7–25)
CALCIUM SPEC-SCNC: 9.3 MG/DL (ref 8.7–10.4)
CHLORIDE SERPL-SCNC: 96 MMOL/L (ref 99–109)
CLARITY UR: CLEAR
CO2 SERPL-SCNC: 27 MMOL/L (ref 20–31)
COLOR UR: YELLOW
CREAT BLD-MCNC: 2.6 MG/DL (ref 0.6–1.3)
DEPRECATED RDW RBC AUTO: 56.5 FL (ref 37–54)
ERYTHROCYTE [DISTWIDTH] IN BLOOD BY AUTOMATED COUNT: 14.7 % (ref 11.3–14.5)
GFR SERPL CREATININE-BSD FRML MDRD: 19 ML/MIN/1.73
GLOBULIN UR ELPH-MCNC: 3 GM/DL
GLUCOSE BLD-MCNC: 112 MG/DL (ref 70–100)
GLUCOSE UR STRIP-MCNC: NEGATIVE MG/DL
HCT VFR BLD AUTO: 33.4 % (ref 34.5–44)
HGB BLD-MCNC: 10.3 G/DL (ref 11.5–15.5)
HGB UR QL STRIP.AUTO: NEGATIVE
HYALINE CASTS UR QL AUTO: ABNORMAL /LPF
INR PPP: 4.83
KETONES UR QL STRIP: NEGATIVE
LEUKOCYTE ESTERASE UR QL STRIP.AUTO: ABNORMAL
MCH RBC QN AUTO: 32.5 PG (ref 27–31)
MCHC RBC AUTO-ENTMCNC: 30.8 G/DL (ref 32–36)
MCV RBC AUTO: 105.4 FL (ref 80–99)
NITRITE UR QL STRIP: NEGATIVE
PH UR STRIP.AUTO: <=5 [PH] (ref 5–8)
PLATELET # BLD AUTO: 228 10*3/MM3 (ref 150–450)
PMV BLD AUTO: 11 FL (ref 6–12)
POTASSIUM BLD-SCNC: 5.2 MMOL/L (ref 3.5–5.5)
PROT SERPL-MCNC: 6.6 G/DL (ref 5.7–8.2)
PROT UR QL STRIP: ABNORMAL
PROTHROMBIN TIME: 52.7 SECONDS (ref 9.6–11.5)
RBC # BLD AUTO: 3.17 10*6/MM3 (ref 3.89–5.14)
RBC # UR: ABNORMAL /HPF
REF LAB TEST METHOD: ABNORMAL
SODIUM BLD-SCNC: 130 MMOL/L (ref 132–146)
SP GR UR STRIP: 1.01 (ref 1–1.03)
SQUAMOUS #/AREA URNS HPF: ABNORMAL /HPF
TROPONIN I SERPL-MCNC: 0.06 NG/ML
TROPONIN I SERPL-MCNC: 0.08 NG/ML
UROBILINOGEN UR QL STRIP: ABNORMAL
WBC NRBC COR # BLD: 8.09 10*3/MM3 (ref 3.5–10.8)
WBC UR QL AUTO: ABNORMAL /HPF

## 2017-02-05 PROCEDURE — 25010000002 ONDANSETRON PER 1 MG: Performed by: INTERNAL MEDICINE

## 2017-02-05 PROCEDURE — 25010000002 DOPAMINE PER 40 MG

## 2017-02-05 PROCEDURE — 84484 ASSAY OF TROPONIN QUANT: CPT | Performed by: PHYSICIAN ASSISTANT

## 2017-02-05 PROCEDURE — 99232 SBSQ HOSP IP/OBS MODERATE 35: CPT | Performed by: INTERNAL MEDICINE

## 2017-02-05 PROCEDURE — 94640 AIRWAY INHALATION TREATMENT: CPT

## 2017-02-05 PROCEDURE — 81001 URINALYSIS AUTO W/SCOPE: CPT | Performed by: INTERNAL MEDICINE

## 2017-02-05 PROCEDURE — 99291 CRITICAL CARE FIRST HOUR: CPT | Performed by: INTERNAL MEDICINE

## 2017-02-05 PROCEDURE — 85027 COMPLETE CBC AUTOMATED: CPT | Performed by: PHYSICIAN ASSISTANT

## 2017-02-05 PROCEDURE — 93005 ELECTROCARDIOGRAM TRACING: CPT | Performed by: INTERNAL MEDICINE

## 2017-02-05 PROCEDURE — 25010000002 HYDROMORPHONE PER 4 MG: Performed by: FAMILY MEDICINE

## 2017-02-05 PROCEDURE — 80053 COMPREHEN METABOLIC PANEL: CPT

## 2017-02-05 PROCEDURE — 85610 PROTHROMBIN TIME: CPT

## 2017-02-05 PROCEDURE — 93010 ELECTROCARDIOGRAM REPORT: CPT | Performed by: INTERNAL MEDICINE

## 2017-02-05 RX ORDER — DOPAMINE HYDROCHLORIDE 160 MG/100ML
3 INJECTION, SOLUTION INTRAVENOUS
Status: DISCONTINUED | OUTPATIENT
Start: 2017-02-05 | End: 2017-02-08

## 2017-02-05 RX ORDER — DOPAMINE HYDROCHLORIDE 160 MG/100ML
INJECTION, SOLUTION INTRAVENOUS
Status: COMPLETED
Start: 2017-02-05 | End: 2017-02-05

## 2017-02-05 RX ORDER — DOPAMINE HYDROCHLORIDE 160 MG/100ML
2-20 INJECTION, SOLUTION INTRAVENOUS
Status: DISCONTINUED | OUTPATIENT
Start: 2017-02-05 | End: 2017-02-05

## 2017-02-05 RX ADMIN — CLONAZEPAM 2 MG: 1 TABLET ORAL at 09:19

## 2017-02-05 RX ADMIN — DOPAMINE HYDROCHLORIDE 2 MCG/KG/MIN: 160 INJECTION, SOLUTION INTRAVENOUS at 13:20

## 2017-02-05 RX ADMIN — SERTRALINE 50 MG: 50 TABLET, FILM COATED ORAL at 09:19

## 2017-02-05 RX ADMIN — PANTOPRAZOLE SODIUM 40 MG: 40 TABLET, DELAYED RELEASE ORAL at 05:21

## 2017-02-05 RX ADMIN — BUDESONIDE AND FORMOTEROL FUMARATE DIHYDRATE 2 PUFF: 160; 4.5 AEROSOL RESPIRATORY (INHALATION) at 20:31

## 2017-02-05 RX ADMIN — ASPIRIN 325 MG: 325 TABLET, DELAYED RELEASE ORAL at 09:19

## 2017-02-05 RX ADMIN — HYDROXYCHLOROQUINE SULFATE 200 MG: 200 TABLET, FILM COATED ORAL at 09:19

## 2017-02-05 RX ADMIN — ONDANSETRON 4 MG: 2 INJECTION INTRAMUSCULAR; INTRAVENOUS at 05:21

## 2017-02-05 RX ADMIN — ONDANSETRON 4 MG: 2 INJECTION INTRAMUSCULAR; INTRAVENOUS at 18:52

## 2017-02-05 RX ADMIN — ATORVASTATIN CALCIUM 80 MG: 40 TABLET, FILM COATED ORAL at 20:14

## 2017-02-05 RX ADMIN — CARVEDILOL 3.12 MG: 6.25 TABLET, FILM COATED ORAL at 09:19

## 2017-02-05 RX ADMIN — HYDROMORPHONE HYDROCHLORIDE 0.5 MG: 1 INJECTION, SOLUTION INTRAMUSCULAR; INTRAVENOUS; SUBCUTANEOUS at 18:23

## 2017-02-05 RX ADMIN — BUDESONIDE AND FORMOTEROL FUMARATE DIHYDRATE 2 PUFF: 160; 4.5 AEROSOL RESPIRATORY (INHALATION) at 09:40

## 2017-02-05 RX ADMIN — CLONAZEPAM 2 MG: 1 TABLET ORAL at 20:13

## 2017-02-05 RX ADMIN — TIZANIDINE 4 MG: 4 TABLET ORAL at 10:24

## 2017-02-05 NOTE — PROGRESS NOTES
Intensive Care Follow-up     Hospital:  LOS: 1 day   Ms. Ashely Roldan, 56 y.o. female is followed for:   Systolic heart failure        Subjective   Interval History:  This is a very nice 56-year-old woman with a history of chronic obstructive pulmonary disease, systolic heart failure with previous ICD implantation, prior candidal mitral valve replacement with chronic anticoagulation, chronic renal insufficiency of unknown baseline, and systemic lupus erythematosus who has in the past several months moved to [place] to live with her son. The majority of the details of her past medical history are not forthcoming at this time. She was admitted to the hospital for chest pain and increased troponin levels. She was found to have an elevated BNP and an echocardiogram showed an ejection fraction of approximately 16-18%. She received some diuresis overnight. She had minimal urinary output. Her creatinine has increased and her blood pressure has been in the 80s. I been asked to take her to the intensive care unit for pressor and inotropic support.    She is sleepy at this point but answers all questions appropriately. Her blood pressures currently 80 systolic. She denies any chest pain or shortness of breath. She does not have a recent history of lower extremity edema. I do note that she is over anticoagulated and warfarin is being held.    The patient's relevant past medical, surgical and social history were reviewed and updated in Epic as appropriate.        Objective     Infusions:    DOPamine 2-20 mcg/kg/min Last Rate: 2 mcg/kg/min (02/05/17 1320)   Pharmacy to dose warfarin       Medications:    aspirin 325 mg Oral Daily   atorvastatin 80 mg Oral Nightly   budesonide-formoterol 2 puff Inhalation BID - RT   carvedilol 3.125 mg Oral BID With Meals   hydroxychloroquine 200 mg Oral Daily With Breakfast   pantoprazole 40 mg Oral Q AM   sertraline 50 mg Oral Daily   warfarin (COUMADIN) (dosing per levels)  Does not  "apply Daily       Vital Sign Min/Max for last 24 hours  Temp  Min: 95.8 °F (35.4 °C)  Max: 97.6 °F (36.4 °C)   BP  Min: 72/54  Max: 123/87   Pulse  Min: 61  Max: 89   Resp  Min: 14  Max: 18   SpO2  Min: 91 %  Max: 100 %   Flow (L/min)  Min: 1  Max: 3       Input/Output for last 24 hour shift  02/04 0701 - 02/05 0700  In: 1080 [P.O.:1080]  Out: 200 [Urine:200]      Objective:  General Appearance:  Comfortable, ill-appearing, in no acute distress and not in pain.    Vital signs: (most recent): Blood pressure 110/66, pulse 80, temperature 97.5 °F (36.4 °C), temperature source Oral, resp. rate 16, height 67.99\" (172.7 cm), weight 116 lb (52.6 kg), SpO2 96 %, not currently breastfeeding.  No fever.    Output: Producing urine.    HEENT: Normal HEENT exam.    Lungs:  Normal respiratory rate and normal effort.  She is not in respiratory distress.  Breath sounds clear to auscultation.  No stridor.  There are decreased breath sounds.  No wheezes, rales or rhonchi.    Heart: Normal rate.  Regular rhythm.  S1 normal and S2 normal.  No murmur, gallop or friction rub.   Chest: No chest wall tenderness.  Symmetric chest wall expansion.   Abdomen: Abdomen is soft and non-distended.  Hypoactive bowel sounds.   There is no rebound tenderness.   There is no mass.   Extremities: Normal range of motion.  There is no deformity or dependent edema.    Neurological: Patient is alert and oriented to person, place and time.  Normal strength.    Pupils:  Pupils are equal, round, and reactive to light.  Pupils are equal.   Skin:  Warm and dry.  No rash, ecchymosis, cyanosis or ulceration.               Results from last 7 days  Lab Units 02/05/17  0524 02/04/17  0247   WBC 10*3/mm3 8.09 7.62   HEMOGLOBIN g/dL 10.3* 10.0*   PLATELETS 10*3/mm3 228 245       Results from last 7 days  Lab Units 02/05/17  0524 02/04/17  0247   SODIUM mmol/L 130* 135   POTASSIUM mmol/L 5.2 4.6   TOTAL CO2 mmol/L 27.0 21.0   BUN mg/dL 37* 32*   CREATININE mg/dL 2.60* " 1.80*   GLUCOSE mg/dL 112* 104*     Estimated Creatinine Clearance: 19.8 mL/min (by C-G formula based on Cr of 2.6).          I reviewed the patient's results and images.     Assessment/Plan   Impression      Principal Problem:    Systolic heart failure, chronic  Active Problems:    Emphysema of lung    Hyperlipidemia    Hypertension    Ischemic cardiomyopathy    Chronic renal failure    Lupus (systemic lupus erythematosus)    AICD (automatic cardioverter/defibrillator) present    Chest pain       Plan        We are waiting a urinalysis. If there is active sediment I would elect to treat this with steroids.  She has been moved to the intensive care unit and we will place her on noninvasive hemodynamic monitoring.  Her INR is well above 4 and I feel that it would be a bit risky to place a central venous catheter at this point.  We will start dopamine to maintain her blood pressure and increase her cardiac output and titrate based upon our numbers.  Avoid nephrotoxins.  I have explained this to the patient's family and I discussed the case with Dr. Ramirez.    The patient is critically ill.    Plan of care and goals reviewed with mulitdisciplinary team at daily rounds.   I discussed the patient's findings and my recommendations with patient, family, nursing staff and consulting provider     Time spent Critical care 36 min (It does not include procedure time).    Jose Sal MD, [facility]  Pulmonary and Critical Care Medicine  02/05/17 2:21 PM

## 2017-02-05 NOTE — PROGRESS NOTES
NAL Consult Note    Ashely Roldan  1960  [id number]    Date of Admit:  2/4/2017    Date of Consult: 2/5/2017        Requesting Provider: No ref. provider found  Evaluating Physician: Minesh Nichole MD        Reason for Consultation:  FAHEEM ON CKD.  History of present illness:    Patient is a 56 y.o.  Yr old female WITH H/O CKD ( PER PATIENT ? LUPUS NEPHRITIS WITH KIDNEY BX DONE YRS AGO AT [facility] IN [facility]. MAY HAVE BEEN TX'ED WITH CYTOXAN IN THE PAST ) WHO WE ARE ASKED TO SEE FOR ABNORMAL RENAL FXN WHO WAS ADMITTED WITH SOB AND ? CHF. WAS MOST RECENTLY SEEN BY NEPHROLOGIST IN [place] ( SHE DOES NOT KNOW THE NAME ). CREAT 2.6 2/5/17, 1.8 2/4/17, 1.1 4/2010. SEVERE CHF WITH LVEF 12%. DO NOT HAVE ANY OF OLD RECORDS.     Past Medical History   Diagnosis Date   • Allergic rhinitis 08/01/2014   • Anemia    • Anxiety    • Arthritis    • CAD (coronary artery disease)    • CHF (congestive heart failure)    • CKD (chronic kidney disease)    • Colitis, Clostridium difficile    • COPD (chronic obstructive pulmonary disease)    • Coronary atherosclerosis    • Depression    • Emphysema of lung    • GERD (gastroesophageal reflux disease)    • Heart attack    • Heart murmur    • Hematoma of hip    • Hip fracture    • Hyperlipidemia    • Hypertension      benign essential   • Ischemic cardiomyopathy 08/01/2014     ef 29% s/p ICD   • Knee injury    • Lung disease    • Mitral valve disorder 03/28/2013   • Mitral valve insufficiency      s/p prosthetic ATS valve replacement   • Osteoporosis    • Postmenopausal      natural   • PVD (peripheral vascular disease)    • Pyelonephritis    • Renal failure    • Renal failure    • Renal failure, acute    • Syncope    • Systemic lupus erythematosus    • TIA (transient ischemic attack) 04/04/2012   • UTI (urinary tract infection)    • Valvular heart disease    • Wrist fracture        Past Surgical History   Procedure Laterality Date   • Endoscopy  10/23/2014      Dr. Brand; retained food in stomach; he dilated her esophagus; 5-30-14 hiatus hernia, gastritis   • Cholecystectomy  2010   • Joint replacement Right 06/25/2015     Dr. Fitzgerald; first surgery 1-29-14; redo 5-4-15   • Coronary artery bypass graft  2011     4 aortic bypasses, abdominal aorta; 2011-mitral valve; 2010-triple bypass   • Cardiac defibrillator placement     • Total hip arthroplasty Right      3 times within 8 months   • Mitral valve replacement       MECHANICAL       Social History     Social History   • Marital status:      Spouse name: N/A   • Number of children: N/A   • Years of education: N/A     Social History Main Topics   • Smoking status: Current Every Day Smoker     Packs/day: 1.00     Types: Cigarettes   • Smokeless tobacco: None   • Alcohol use Yes      Comment: once a week   • Drug use: No   • Sexual activity: Defer     Other Topics Concern   • None     Social History Narrative    Pt recently  after 30 years. Just moved from [facility] into her son's home in [place].        family history includes Alcohol abuse in her brother; Arthritis in her mother; Cancer in her other; Diabetes in her other; Heart attack in her father; Heart disease in her maternal uncle and other; Hypertension in her other; Lung cancer in her paternal grandfather; Stroke in her other. NO FH OF RENAL DZ.    Allergies   Allergen Reactions   • Morphine And Related GI Intolerance and Irritability   • Sulfa Antibiotics        Medication:    Current Facility-Administered Medications:   •  albuterol (PROVENTIL) nebulizer solution 0.5% 2.5 mg/0.5mL, 2.5 mg, Nebulization, Q4H PRN, Junior Philip MD  •  aluminum-magnesium hydroxide-simethicone (MAALOX MAX) 400-400-40 MG/5ML suspension 15 mL, 15 mL, Oral, Q6H PRN, Carlos A Quiroz MD  •  aspirin EC tablet 325 mg, 325 mg, Oral, Daily, Junior Philip MD, 325 mg at 02/05/17 0919  •  atorvastatin (LIPITOR) tablet 80 mg, 80 mg, Oral, Nightly, Junior Philip MD, 80 mg  at 02/04/17 2001  •  budesonide-formoterol (SYMBICORT) 160-4.5 MCG/ACT inhaler 2 puff, 2 puff, Inhalation, BID - RT, Junior Philip MD, 2 puff at 02/05/17 0940  •  carvedilol (COREG) tablet 3.125 mg, 3.125 mg, Oral, BID With Meals, Junior Philip MD, 3.125 mg at 02/05/17 0919  •  clonazePAM (KlonoPIN) tablet 2 mg, 2 mg, Oral, TID PRN, Junior Philip MD, 2 mg at 02/05/17 0919  •  diphenhydrAMINE (BENADRYL) capsule 25 mg, 25 mg, Oral, Nightly PRN, Junior Philip MD  •  HYDROmorphone (DILAUDID) injection 0.5 mg, 0.5 mg, Intravenous, Q4H PRN, Carlos A Quiroz MD, 0.5 mg at 02/04/17 2001  •  hydroxychloroquine (PLAQUENIL) tablet 200 mg, 200 mg, Oral, Daily With Breakfast, Junior Philip MD, 200 mg at 02/05/17 0919  •  ondansetron (ZOFRAN) injection 4 mg, 4 mg, Intravenous, Q6H PRN, Hector Fregoso MD, 4 mg at 02/05/17 0521  •  pantoprazole (PROTONIX) EC tablet 40 mg, 40 mg, Oral, Q AM, Carlos A Quiroz MD, 40 mg at 02/05/17 0521  •  Pharmacy to dose warfarin, , Does not apply, Continuous PRN, Junior Philip MD  •  sertraline (ZOLOFT) tablet 50 mg, 50 mg, Oral, Daily, Junior Philip MD, 50 mg at 02/05/17 0919  •  sodium chloride 0.9 % flush 10 mL, 10 mL, Intravenous, PRN, Wally Olmstead DO  •  tiZANidine (ZANAFLEX) tablet 4 mg, 4 mg, Oral, Q12H PRN, Junior Philip MD, 4 mg at 02/05/17 1024  •  traMADol (ULTRAM) tablet 50 mg, 50 mg, Oral, Q6H PRN, Junior Philip MD, 50 mg at 02/04/17 0630  •  warfarin (COUMADIN) (dosing per levels), , Does not apply, Daily, Titi Lo, [facility]    Prescriptions Prior to Admission   Medication Sig Dispense Refill Last Dose   • acetaminophen (TYLENOL) 325 MG tablet Take 650 mg by mouth every 6 (six) hours as needed for mild pain (1-3).   Taking   • albuterol (PROVENTIL HFA;VENTOLIN HFA) 108 (90 BASE) MCG/ACT inhaler Inhale 2 puffs every 6 (six) hours as needed.   Taking   • budesonide-formoterol (SYMBICORT) 160-4.5 MCG/ACT inhaler Inhale 2 puffs 2 (two) times a day. In morning and evening  for 90 days   Taking   • Calcium Carbonate (CALCIUM 600) 1500 MG tablet Take 1 tablet by mouth 2 (two) times a day.   Taking   • calcium carbonate (TUMS) 500 MG chewable tablet Chew 1 tablet 3 (Three) Times a Day As Needed for indigestion or heartburn.      • carvedilol (COREG) 3.125 MG tablet Take 3.125 mg by mouth 2 (two) times a day with meals.   Taking   • clonazePAM (KLONOPIN) 2 MG tablet Take 1 tablet by mouth 3 (three) times a day as needed (Tremors). 270 tablet 0    • diphenhydrAMINE (BENADRYL) 25 MG tablet Take 25 mg by mouth as needed.   Taking   • Ferrous Sulfate (IRON) 325 (65 FE) MG tablet Take  by mouth.   Taking   • furosemide (LASIX) 40 MG tablet Take 40 mg by mouth daily as needed (swelling).   Taking   • hydroxychloroquine (PLAQUENIL) 200 MG tablet Take 200 mg by mouth every morning.   Taking   • nitroglycerin (NITROSTAT) 0.4 MG SL tablet 1 under the tongue as needed for angina, may repeat q5mins for up three doses 30 tablet 3 Taking   • Probiotic Product (PROBIOTIC PO) Take 1 capsule by mouth daily. 20 billion cell; for 30 days   Taking   • rosuvastatin (CRESTOR) 10 MG tablet Take 1 tablet (10 mg total) by mouth nightly. For 90 days 90 tablet 3 Taking   • sertraline (ZOLOFT) 50 MG tablet Take 50 mg by mouth daily. For stress for 90 days   Taking   • tiotropium (SPIRIVA) 18 MCG per inhalation capsule Place 2 puffs (1 capsule total) into inhaler and inhale once daily. For 90 days (Patient taking differently: Place 1 capsule into inhaler and inhale daily as needed. For 90 days) 90 capsule 3 Taking   • TiZANidine (ZANAFLEX) 4 MG capsule Take 4 mg by mouth 2 (two) times a day as needed.   Taking   • traZODone (DESYREL) 50 MG tablet Take 1 tablet (50 mg total) by mouth nightly. For insomnia 90 tablet 3 Taking   • warfarin (COUMADIN) 1 MG tablet Take as directed 180 tablet 3    • warfarin (COUMADIN) 2 MG tablet Take 1 tablet by mouth Daily. 180 tablet 3    • warfarin (COUMADIN) 3 MG tablet Take as  "directed 180 tablet 3    • alendronate (FOSAMAX) 70 MG tablet Take 70 mg by mouth every 7 days. Patient takes on Saturday   Taking   • cholestyramine (QUESTRAN) 4 GM/DOSE powder Take 4 g by mouth 2 (two) times a day. Dissolved in 2 to 6 ounces of water or non carbonated beverage   Taking   • esomeprazole (NexIUM) 40 MG capsule Take 40 mg by mouth daily. For 90 days   Taking   • traMADol (ULTRAM) 50 MG tablet Take 1 tablet by mouth every 6 (six) hours as needed for moderate pain (4-6). 40 tablet 1        Review of Systems:    Constitutional-- No Fever, chills or sweats. No significant change in weight. + FATIGUE. DECREASED ACTIVITY.   Eye-- no diplopia, no conjunctivitis  ENT-- No new hearing or throat complaints.  No epistaxis or oral sores. No odynophagia or dysphagia. No headache, photophobia or neck stiffness. + NASAL CONGESTION.  CV-- + chest pain. NO palpitations. + soa. + edema  Resp-- + SOB/cough. NO Hemoptysis  GI- + nausea. NO vomiting or diarrhea.  No hematochezia, melena, or hematemesis.  -- No dysuria, hematuria, or flank pain. No lower tract obstructive symptoms  Skin-- No rash, warm and dry  Lymph- no painful or swollen lymph nodes in neck/axilla or groin.   Heme- No active bruising or bleeding; no Hx of DVT or PE.  MS-- no swelling or pain in the joints  Neuro-- No acute focal weakness or numbness in the arms or legs.  No seizures. + GEN WEAKNESS.  Psych--No anxiety or depression. + AGITATION.  Endo--No cold or heat intolerance.  No polyuria, polydipsia, or polyphagia    Full review of systems reviewed and negative otherwise for acute complaints    Physical Exam:   Vital Signs   Blood pressure 123/87, pulse 89, temperature 97.6 °F (36.4 °C), temperature source Oral, resp. rate 16, height 67.99\" (172.7 cm), weight 116 lb (52.6 kg), SpO2 99 %, not currently breastfeeding.     GENERAL: Awake and alert, in no acute distress. THIN FRAIL APPEARING.   HEENT: Normocephalic, atraumatic.  PER.  No " conjunctivitis. No icterus. Oropharynx clear without evidence of thrush or exudate. No evidence of peridontal disease.    NECK: Supple without nuchal rigidity. No mass.  LYMPH: No painful cervical, axillary or inguinal lymphadenopathy.  HEART: RRR; No murmur, rubs, gallops. No bruits in neck, abdomen, or groins, TR edema  LUNGS: FEW CRACKLES.  ABDOMEN: Soft, nontender, nondistended. Positive bowel sounds. No rebound or guarding. NO mass or HSM.  JOINTS:  Full range of motion, no redness or tenderness.  EXT:  No cyanosis, clubbing.   :  External genitalia without swelling or lesion  SKIN: Warm and dry without rash  NEURO: Oriented to PPT. No focal neurological deficits. Strength equal bilateral  PSYCHIATRIC: Normal insight and judgement. Cooperative with PE    Laboratory Data    Results from last 7 days  Lab Units 02/05/17  0524 02/04/17  0247   HEMOGLOBIN g/dL 10.3* 10.0*   HEMATOCRIT % 33.4* 30.7*       Results from last 7 days  Lab Units 02/05/17  0524 02/04/17  0247   SODIUM mmol/L 130* 135   POTASSIUM mmol/L 5.2 4.6   CHLORIDE mmol/L 96* 100   TOTAL CO2 mmol/L 27.0 21.0   BUN mg/dL 37* 32*   CREATININE mg/dL 2.60* 1.80*   CALCIUM mg/dL 9.3 9.9   ALBUMIN g/dL 3.60 4.00       Results from last 7 days  Lab Units 02/05/17  0524   GLUCOSE mg/dL 112*           Results from last 7 days  Lab Units 02/05/17  0524   ALK PHOS U/L 151*   BILIRUBIN mg/dL 0.3   ALT (SGPT) U/L 162*   AST (SGOT) U/L 271*     Estimated Creatinine Clearance: 19.8 mL/min (by C-G formula based on Cr of 2.6).    Radiology:          Impression: FAHEEM LIKELY FROM CARDIORENAL SYNDROME AND DIURESIS. CKD BY H/O LUPUS NEPHRITIS.  ANEMIA. SEVERE ICM.      PLAN: Thank you for asking us to see Ashely Rlodan, I recommend the following: RENAL U/S. CHECK UA, CPK, RENAL PANEL AND FE STUDIES. TRY TO GET OLD RENAL BX AND RECORDS.        Minesh Nichole MD  2/5/2017  11:20 AM

## 2017-02-05 NOTE — PLAN OF CARE
Problem: Patient Care Overview (Adult)  Goal: Plan of Care Review  Outcome: Ongoing (interventions implemented as appropriate)    02/05/17 0543   Coping/Psychosocial Response Interventions   Plan Of Care Reviewed With patient   Patient Care Overview   Progress progress toward functional goals as expected   Outcome Evaluation   Outcome Summary/Follow up Plan VSS, c/o pain X1 relieved with pain meds         Problem: Acute Coronary Syndrome (ACS) (Adult)  Goal: Signs and Symptoms of Listed Potential Problems Will be Absent or Manageable (Acute Coronary Syndrome)  Outcome: Ongoing (interventions implemented as appropriate)    02/05/17 0543   Acute Coronary Syndrome (ACS)   Problems Assessed (Acute Coronary Syndrome (ACS)) all   Problems Present (Acute Coronary Syndrome (ACS)) none

## 2017-02-05 NOTE — PROGRESS NOTES
Pharmacy Warfarin Note  Patient is a 57 yo F on whom pharmacy has been consulted to dose warfarin.    Indication:  Mechanical Mitral Valve replacement  Goal INR:  2.5-3.5  Consulting Provider:  Dr. Philip (Hospitalist Group)      Current Bridge Therapy:  None    Labs  Results from last 7 days   Lab Units 02/05/17  0524 02/04/17  0247   SODIUM mmol/L 130* 135   POTASSIUM mmol/L 5.2 4.6   CHLORIDE mmol/L 96* 100   TOTAL CO2 mmol/L 27.0 21.0   BUN mg/dL 37* 32*   CREATININE mg/dL 2.60* 1.80*   CALCIUM mg/dL 9.3 9.9   BILIRUBIN mg/dL 0.3 0.5   ALK PHOS U/L 151* 156*   ALT (SGPT) U/L 162* 107*   AST (SGOT) U/L 271* 226*   GLUCOSE mg/dL 112* 104*   Results from last 7 days   Lab Units 02/05/17  0524 02/04/17  0247   WBC 10*3/mm3 8.09 7.62   HEMOGLOBIN g/dL 10.3* 10.0*   HEMATOCRIT % 33.4* 30.7*   PLATELETS 10*3/mm3 228 245     Results from last 7 days   Lab Units 02/05/17  0524 02/04/17  0321   INR  4.83 5.59     Other Clinical Information    Current Drug-Drug Interaction With Warfarin  1.  None currently    Date 2/4 2/5          INR 5.59 4.83          Dose HOLD Hold planned            Assessment/Plan  1.  INR above goal.  Hold warfarin for now.  2.  Daily PT/INR ordered  3.  Monitor for s/sx of bleeding/clotting.   4.  Pharmacy will continue to follow and adjust dose based on renal function, drug levels, and clinical status.    Fantasma Kim, PharmD  2/5/2017  8:46 AM

## 2017-02-05 NOTE — PROGRESS NOTES
"  [facility]  PROGRESS NOTE    Date of Admission: 2/4/2017  Length of Stay: 1  Primary Care Physician: Peter Rdz MD    Chief Complaint: f/u CAD  Problem List:  1. Structural heart disease of mixed etiology:  A. History of MI December 2009: s/p stent to RCA and LAD, EF 40%  B. CABGx2  March, 2010: SVG to OMB-1, SVG to PDA  C. EF 30% post-operatively, s/p [place] ICD placement, 2011  D. Echo July 2014: Severe global hypokinesis with EF 12%, moderate AI, mechanical mitral valve, moderate to severe TR, RVSP 43mmHg   E. Echo 2/4/17: EF 18%, mild to mod AI, mod TR, grossly normal prosthetic mitral valve  2. VHD:  A. Mitral valve replacement with 27mm ATS mechanical valve March 2010    3. COPD with ongoing tobacco abuse   4. Systemic Lupus Erythematosus  5. Hypertension  6. Hyperlipidemia   7. CKD  8. PVD       Subjective      Patient states she had some \"pressure\" across her upper abdomen, associated with nausea. Urine output decreased. Again, she states this is different than her prior cardiac pain. Her main complaint is weakness rather than SOA.    Objective     Visit Vitals   • BP 99/65 (BP Location: Right arm, Patient Position: Lying)   • Pulse 77   • Temp 97.6 °F (36.4 °C) (Oral)   • Resp 16   • Ht 67.99\" (172.7 cm)   • Wt 116 lb (52.6 kg)   • SpO2 92%   • BMI 17.64 kg/m2       Physical Exam:  GENERAL: Alert, cooperative, in no acute distress.   HEENT: Fundiscopic deferred, otherwise unremarkable.  NECK: No Jugular venous distention, adenopathy, or thyromegaly noted.   HEART: No discrete PMI is noted. Mechanical valve sounds present, no murmur or gallop   LUNGS: Bilateral rales and wheezing. No ronchi.   ABDOMEN: Flat without evidence of organomegaly, masses, or tenderness. No abnormalities to palpation.  NEUROLOGIC: No focal abnormalities involving strength or sensation are noted.   EXTREMITIES: No clubbing, cyanosis, or edema noted.     Results:    Results from last 7 " days  Lab Units 02/05/17  0524 02/04/17  0247   WBC 10*3/mm3 8.09 7.62   HEMOGLOBIN g/dL 10.3* 10.0*   HEMATOCRIT % 33.4* 30.7*   PLATELETS 10*3/mm3 228 245       Results from last 7 days  Lab Units 02/05/17  0524 02/04/17  0247   SODIUM mmol/L 130* 135   POTASSIUM mmol/L 5.2 4.6   CHLORIDE mmol/L 96* 100   TOTAL CO2 mmol/L 27.0 21.0   BUN mg/dL 37* 32*   CREATININE mg/dL 2.60* 1.80*   GLUCOSE mg/dL 112* 104*      Lab Results   Component Value Date    CHOL 157 02/04/2017    TRIG 141 02/04/2017    HDL 58 02/04/2017    LDLDIRECT 56 02/04/2017     (H) 02/05/2017     (H) 02/05/2017       Results from last 7 days  Lab Units 02/04/17  0247   HEMOGLOBIN A1C % 5.00       Results from last 7 days  Lab Units 02/04/17  0247   BNP pg/mL 2950.0*       Results from last 7 days  Lab Units 02/05/17  0524 02/04/17  0321   PROTIME Seconds 52.7* 60.9*   INR  4.83 5.59   APTT seconds  --  45.8*       Results from last 7 days  Lab Units 02/05/17  0524 02/04/17  0455   TROPONIN I ng/mL 0.082* 0.228*       Intake/Output Summary (Last 24 hours) at 02/05/17 0815  Last data filed at 02/04/17 1900   Gross per 24 hour   Intake   1080 ml   Output    200 ml   Net    880 ml       I personally viewed and interpreted the patient's EKG/Telemetry data    Radiology Data:   CXR 2/4/17:  FINDINGS:     Median sternotomy wires are present. Heart size upper limits of normal. No   airspace consolidation identified. No visible pneumothorax or pleural effusion.   Moderate diffuse bilateral interstitial opacities. These may represent any   combination of pulmonary vascular congestion, chronic scarring, and   atypical/viral pneumonia.     IMPRESSION:  1. Moderate diffuse bilateral interstitial opacities. These may represent any   combination of pulmonary vascular congestion, chronic scarring, and   atypical/viral pneumonia.    ECHO 2/4/17:  Interpretation Summary      · Left ventricular function is severely decreased. Estimated EF = 18%.  · The  left ventricular cavity is borderline dilated.  · Left atrial cavity size is mild-to-moderately dilated.  · Mild to moderate aortic valve regurgitation is present.  · Moderate tricuspid valve regurgitation is present.  · Left ventricular wall thickness is consistent with mild concentric hypertrophy.  · Left ventricular wall segments contract abnormally. Refer to wall scoring diagram for more information.  · Right ventricular cavity is borderline dilated.  · There is no evidence of pericardial effusion.  · The prosthetic mitral valve is grossly normal.  · Mild pulmonary hypertension is present.       Current Medications:    aspirin 325 mg Oral Daily   atorvastatin 80 mg Oral Nightly   budesonide-formoterol 2 puff Inhalation BID - RT   carvedilol 3.125 mg Oral BID With Meals   furosemide 40 mg Intravenous BID   hydroxychloroquine 200 mg Oral Daily With Breakfast   pantoprazole 40 mg Oral Q AM   sertraline 50 mg Oral Daily   warfarin (COUMADIN) (dosing per levels)  Does not apply Daily       Pharmacy to dose warfarin        Assessment and Plan:   1. CAD/ ICM   - troponins 0.13-->0.228-->0.08 this am   - ASA, Lipitor 80mg   - Device interrogation showed 2 episodes of VF in August 2016, VT January 25th, ATPx1    2. SHF:    - Echo showed EF 18%, normally functioning mechanical mitral valve   - BNP 3000, CXR with increased vascularity   - Lasix 40mg IV last night    3. FAHEEM on CKD   - consult Nephrology     4. VHD s/p mechanical mitral valve, INR still supratherapeutic this am    The formulation is perplexing.  On review of old records this patient has a history of lupus nephritis.  She now presents with atypical and challenging symptoms that are not primarily related to shortness of air but symptoms that are suggestive of a low cardiac output state.  Since admission, she received 140 mg IV bolus of furosemide with only 200 ml UOP and a rise in serum creatinine from 1.8-2.6.  Clearly, her acute on chronic renal  "insufficiency is not \"overdiuresis\" but probably related to low cardiac output.  From a clinical standpoint, while others been mild elevation of troponin, I suspect that the presence or absence of a small NSTEMI is largely irrelevant at this time.  I will talk to the intensivist about transferring her to the unit for the use of more aggressive monitoring and medical treatment.      Sandhya Lynch PA-C   02/05/17   8:15 AM    "

## 2017-02-06 ENCOUNTER — APPOINTMENT (OUTPATIENT)
Dept: ULTRASOUND IMAGING | Facility: HOSPITAL | Age: 57
End: 2017-02-06

## 2017-02-06 PROBLEM — I95.9 HYPOTENSION: Status: ACTIVE | Noted: 2017-02-06

## 2017-02-06 LAB
ALBUMIN SERPL-MCNC: 3.5 G/DL (ref 3.2–4.8)
ANION GAP SERPL CALCULATED.3IONS-SCNC: 8 MMOL/L (ref 3–11)
BASOPHILS # BLD AUTO: 0.01 10*3/MM3 (ref 0–0.2)
BASOPHILS NFR BLD AUTO: 0.1 % (ref 0–1)
BUN BLD-MCNC: 45 MG/DL (ref 9–23)
BUN/CREAT SERPL: 16.1 (ref 7–25)
CALCIUM SPEC-SCNC: 9.2 MG/DL (ref 8.7–10.4)
CHLORIDE SERPL-SCNC: 96 MMOL/L (ref 99–109)
CK SERPL-CCNC: 195 U/L (ref 26–174)
CO2 SERPL-SCNC: 24 MMOL/L (ref 20–31)
CREAT BLD-MCNC: 2.8 MG/DL (ref 0.6–1.3)
DEPRECATED RDW RBC AUTO: 53.2 FL (ref 37–54)
EOSINOPHIL # BLD AUTO: 0.08 10*3/MM3 (ref 0.1–0.3)
EOSINOPHIL NFR BLD AUTO: 0.9 % (ref 0–3)
ERYTHROCYTE [DISTWIDTH] IN BLOOD BY AUTOMATED COUNT: 14.4 % (ref 11.3–14.5)
FERRITIN SERPL-MCNC: >1650 NG/ML (ref 10–291)
GFR SERPL CREATININE-BSD FRML MDRD: 17 ML/MIN/1.73
GLUCOSE BLD-MCNC: 102 MG/DL (ref 70–100)
HCT VFR BLD AUTO: 31.6 % (ref 34.5–44)
HGB BLD-MCNC: 9.9 G/DL (ref 11.5–15.5)
IMM GRANULOCYTES # BLD: 0.06 10*3/MM3 (ref 0–0.03)
IMM GRANULOCYTES NFR BLD: 0.7 % (ref 0–0.6)
INR PPP: 5.11
IRON 24H UR-MRATE: 65 MCG/DL (ref 50–175)
IRON SATN MFR SERPL: 25 % (ref 15–50)
LYMPHOCYTES # BLD AUTO: 1.3 10*3/MM3 (ref 0.6–4.8)
LYMPHOCYTES NFR BLD AUTO: 14.3 % (ref 24–44)
MCH RBC QN AUTO: 32.1 PG (ref 27–31)
MCHC RBC AUTO-ENTMCNC: 31.3 G/DL (ref 32–36)
MCV RBC AUTO: 102.6 FL (ref 80–99)
MONOCYTES # BLD AUTO: 0.8 10*3/MM3 (ref 0–1)
MONOCYTES NFR BLD AUTO: 8.8 % (ref 0–12)
NEUTROPHILS # BLD AUTO: 6.85 10*3/MM3 (ref 1.5–8.3)
NEUTROPHILS NFR BLD AUTO: 75.2 % (ref 41–71)
PHOSPHATE SERPL-MCNC: 5.1 MG/DL (ref 2.4–5.1)
PLATELET # BLD AUTO: 191 10*3/MM3 (ref 150–450)
PMV BLD AUTO: 10.9 FL (ref 6–12)
POTASSIUM BLD-SCNC: 5.8 MMOL/L (ref 3.5–5.5)
PROTHROMBIN TIME: 55.7 SECONDS (ref 9.6–11.5)
RBC # BLD AUTO: 3.08 10*6/MM3 (ref 3.89–5.14)
SODIUM BLD-SCNC: 128 MMOL/L (ref 132–146)
TIBC SERPL-MCNC: 255 MCG/DL (ref 250–450)
WBC NRBC COR # BLD: 9.1 10*3/MM3 (ref 3.5–10.8)

## 2017-02-06 PROCEDURE — 94640 AIRWAY INHALATION TREATMENT: CPT

## 2017-02-06 PROCEDURE — 94799 UNLISTED PULMONARY SVC/PX: CPT

## 2017-02-06 PROCEDURE — 25010000002 ONDANSETRON PER 1 MG: Performed by: INTERNAL MEDICINE

## 2017-02-06 PROCEDURE — 83550 IRON BINDING TEST: CPT | Performed by: INTERNAL MEDICINE

## 2017-02-06 PROCEDURE — 25010000002 DOPAMINE PER 40 MG: Performed by: INTERNAL MEDICINE

## 2017-02-06 PROCEDURE — 82550 ASSAY OF CK (CPK): CPT | Performed by: INTERNAL MEDICINE

## 2017-02-06 PROCEDURE — 99233 SBSQ HOSP IP/OBS HIGH 50: CPT | Performed by: INTERNAL MEDICINE

## 2017-02-06 PROCEDURE — 82728 ASSAY OF FERRITIN: CPT | Performed by: INTERNAL MEDICINE

## 2017-02-06 PROCEDURE — 85610 PROTHROMBIN TIME: CPT

## 2017-02-06 PROCEDURE — 76775 US EXAM ABDO BACK WALL LIM: CPT

## 2017-02-06 PROCEDURE — 83540 ASSAY OF IRON: CPT | Performed by: INTERNAL MEDICINE

## 2017-02-06 PROCEDURE — 80069 RENAL FUNCTION PANEL: CPT | Performed by: INTERNAL MEDICINE

## 2017-02-06 PROCEDURE — 99232 SBSQ HOSP IP/OBS MODERATE 35: CPT | Performed by: INTERNAL MEDICINE

## 2017-02-06 PROCEDURE — 85025 COMPLETE CBC W/AUTO DIFF WBC: CPT | Performed by: INTERNAL MEDICINE

## 2017-02-06 RX ORDER — PHYTONADIONE 5 MG/1
2.5 TABLET ORAL ONCE
Status: COMPLETED | OUTPATIENT
Start: 2017-02-06 | End: 2017-02-06

## 2017-02-06 RX ORDER — CLONAZEPAM 1 MG/1
1 TABLET ORAL 3 TIMES DAILY PRN
Status: DISCONTINUED | OUTPATIENT
Start: 2017-02-06 | End: 2017-02-10 | Stop reason: HOSPADM

## 2017-02-06 RX ORDER — SODIUM POLYSTYRENE SULFONATE 15 G/60ML
15 SUSPENSION ORAL; RECTAL ONCE
Status: COMPLETED | OUTPATIENT
Start: 2017-02-06 | End: 2017-02-06

## 2017-02-06 RX ADMIN — ASPIRIN 325 MG: 325 TABLET, DELAYED RELEASE ORAL at 09:32

## 2017-02-06 RX ADMIN — SERTRALINE 50 MG: 50 TABLET, FILM COATED ORAL at 09:32

## 2017-02-06 RX ADMIN — BUDESONIDE AND FORMOTEROL FUMARATE DIHYDRATE 2 PUFF: 160; 4.5 AEROSOL RESPIRATORY (INHALATION) at 19:50

## 2017-02-06 RX ADMIN — DOPAMINE HYDROCHLORIDE 10 MCG/KG/MIN: 160 INJECTION, SOLUTION INTRAVENOUS at 04:04

## 2017-02-06 RX ADMIN — ONDANSETRON 4 MG: 2 INJECTION INTRAMUSCULAR; INTRAVENOUS at 18:33

## 2017-02-06 RX ADMIN — SODIUM POLYSTYRENE SULFONATE 15 G: 15 SUSPENSION ORAL; RECTAL at 18:34

## 2017-02-06 RX ADMIN — PANTOPRAZOLE SODIUM 40 MG: 40 TABLET, DELAYED RELEASE ORAL at 05:07

## 2017-02-06 RX ADMIN — ONDANSETRON 4 MG: 2 INJECTION INTRAMUSCULAR; INTRAVENOUS at 12:25

## 2017-02-06 RX ADMIN — PHYTONADIONE 2.5 MG: 5 TABLET ORAL at 14:58

## 2017-02-06 RX ADMIN — BUDESONIDE AND FORMOTEROL FUMARATE DIHYDRATE 2 PUFF: 160; 4.5 AEROSOL RESPIRATORY (INHALATION) at 09:36

## 2017-02-06 RX ADMIN — HYDROXYCHLOROQUINE SULFATE 200 MG: 200 TABLET, FILM COATED ORAL at 09:32

## 2017-02-06 RX ADMIN — CLONAZEPAM 1 MG: 1 TABLET ORAL at 12:45

## 2017-02-06 RX ADMIN — TRAMADOL HYDROCHLORIDE 50 MG: 50 TABLET, COATED ORAL at 12:45

## 2017-02-06 RX ADMIN — ONDANSETRON 4 MG: 2 INJECTION INTRAMUSCULAR; INTRAVENOUS at 06:10

## 2017-02-06 NOTE — PROGRESS NOTES
"   LOS: 2 days    Patient Care Team:  Peter Rdz MD as PCP - General (Internal Medicine)  Idalia Ramirez, RN as Care Coordinator (Population Health)    Reason For Visit:  F/U FAHEEM ON CKD  Subjective           Review of Systems:    Pulm: LESS soa   CV:  No CP      Objective       aspirin 325 mg Oral Daily   atorvastatin 80 mg Oral Nightly   budesonide-formoterol 2 puff Inhalation BID - RT   hydroxychloroquine 200 mg Oral Daily With Breakfast   pantoprazole 40 mg Oral Q AM   phytonadione 2.5 mg Oral Once   sertraline 50 mg Oral Daily   sodium polystyrene 15 g Oral Once   warfarin (COUMADIN) (dosing per levels)  Does not apply Daily       DOPamine 2-20 mcg/kg/min Last Rate: 2 mcg/kg/min (02/06/17 1225)   Pharmacy to dose warfarin           Vital Signs:  Blood pressure 119/72, pulse 92, temperature 98.5 °F (36.9 °C), resp. rate 20, height 67.99\" (172.7 cm), weight 116 lb (52.6 kg), SpO2 95 %, not currently breastfeeding.    Flowsheet Rows         First Filed Value    Admission Height  68.5\" (174 cm) Documented at 02/04/2017 0240    Admission Weight  117 lb (53.1 kg) Documented at 02/04/2017 0240          02/05 0701 - 02/06 0700  In: 231.5 [I.V.:231.5]  Out: 816 [Urine:816]    Physical Exam:    General Appearance: NAD, alert and cooperative, Ox3  Eyes: PER, conjunctivae and sclerae normal, no icterus  Lungs: respirations regular and unlabored, no crepitus, clear to auscultation  Heart/CV: regular rhythm & normal rate, no murmur, no gallop, no rub and no edema  Abdomen: not distended, soft, non-tender, no masses,  bowel sounds present  Skin: No rash, Warm and dry    Radiology: RENAL U/S PENDING.            Labs: . INR 5.11. FE SAT 25%. FERRITIN > 1650.     Results from last 7 days  Lab Units 02/06/17  0348 02/05/17  0524 02/04/17  0247   WBC 10*3/mm3 9.10 8.09 7.62   HEMOGLOBIN g/dL 9.9* 10.3* 10.0*   HEMATOCRIT % 31.6* 33.4* 30.7*   PLATELETS 10*3/mm3 191 228 245       Results from last 7 days  Lab Units " 02/06/17  0348 02/05/17  0524 02/04/17  0247   SODIUM mmol/L 128* 130* 135   POTASSIUM mmol/L 5.8* 5.2 4.6   CHLORIDE mmol/L 96* 96* 100   TOTAL CO2 mmol/L 24.0 27.0 21.0   BUN mg/dL 45* 37* 32*   CREATININE mg/dL 2.80* 2.60* 1.80*   CALCIUM mg/dL 9.2 9.3 9.9   PHOSPHORUS mg/dL 5.1  --   --    ALBUMIN g/dL 3.50 3.60 4.00       Results from last 7 days  Lab Units 02/06/17  0348   GLUCOSE mg/dL 102*         Results from last 7 days  Lab Units 02/05/17  0524   ALK PHOS U/L 151*   BILIRUBIN mg/dL 0.3   ALT (SGPT) U/L 162*   AST (SGOT) U/L 271*             Results from last 7 days  Lab Units 02/05/17  1717   COLOR UA  Yellow   CLARITY UA  Clear   PH, URINE  <=5.0   SPECIFIC GRAVITY, URINE  1.012   GLUCOSE UA  Negative   KETONES UA  Negative   BILIRUBIN UA  Negative   PROTEIN UA  100 mg/dL (2+)*   BLOOD UA  Negative   LEUKOCYTES UA  Trace*   NITRITE UA  Negative       Estimated Creatinine Clearance: 18.4 mL/min (by C-G formula based on Cr of 2.8).      Assessment     Principal Problem:    Systolic heart failure, chronic  Active Problems:    Ischemic cardiomyopathy    Lupus (systemic lupus erythematosus)    H/O mitral valve replacement    AICD (automatic cardioverter/defibrillator) present    FAHEEM (acute kidney injury)    Hypotension            Impression: FAHEEM ON CKD ( ? LUPUS NEPHRITIS TRYING TO GET OLD RENAL BX REPORT FROM [place] ). NONOLIGURIC. GFR WORSE. HYPERKALEMIA. HYPONATREMIA. CARDIORENAL SYNDROME WITH SEVERE ISCHEMIC CM. ANEMIA WITH ADEQUATE FE STORES. URINARY RETENTION.            Recommendations: KAYEXALATE. 1000 ML PO FLUID RESTRICTION. GARCIA CATH.  EVALUATION. LAB AM.      Minesh Nichole MD  02/06/17  1:41 PM

## 2017-02-06 NOTE — PROGRESS NOTES
Discharge Planning Assessment  [facility]     Patient Name: Ashely Roldan  MRN: [id number]  Today's Date: 2/6/2017    Admit Date: 2/4/2017          Discharge Needs Assessment       02/06/17 1527    Living Environment    Lives With alone   Pt resides in [place]    Living Arrangements apartment    Provides Primary Care For no one    Quality Of Family Relationships supportive;helpful;involved    Able to Return to Prior Living Arrangements yes    Living Arrangement Comments Pt's son lives nearby in [place]    Discharge Needs Assessment    Concerns To Be Addressed denies needs/concerns at this time;no discharge needs identified    Readmission Within The Last 30 Days no previous admission in last 30 days    Anticipated Changes Related to Illness none    Equipment Currently Used at Home respiratory supplies   pt has a nebulizer machine , uses PRN    Equipment Needed After Discharge none    Transportation Available car;family or friend will provide    Discharge Contact Information if Applicable [phone]            Discharge Plan       02/06/17 1528    Case Management/Social Work Plan    Plan home    Patient/Family In Agreement With Plan yes    Additional Comments Spoke with pt at bedside, pt plans to return home and denies discharge needs at this time. CM will continue to follow.         Discharge Placement     No information found                Demographic Summary       02/06/17 1525    Referral Information    Referral Source admission list    Contact Information    Permission Granted to Share Information With     Primary Care Physician Information    Name Peter Rdz            Functional Status       02/06/17 1525    Functional Status Current    Current Functional Level Comment see previous charting    Functional Status Prior    Ambulation 0-->independent    Transferring 0-->independent    Toileting 0-->independent    Bathing 0-->independent    Dressing 0-->independent    Eating  0-->independent    Communication 0-->understands/communicates without difficulty    Swallowing 0-->swallows foods/liquids without difficulty    IADL    Medications independent   pt confirms she has prescription drug coverage, pt denies issues obtaining or affording currently prescribed meds    Meal Preparation independent    Housekeeping independent    Laundry independent    Shopping independent    Oral Care independent    Activity Tolerance    Usual Activity Tolerance moderate    Current Activity Tolerance fair    Employment/Financial    Financial Concerns none   pt confirms she has [employer] Medicare, pt denies concerns or disruption in coverage            Psychosocial     None            Abuse/Neglect     None            Legal     None            Substance Abuse     None            Patient Forms     None          Juliane Murillo

## 2017-02-06 NOTE — PLAN OF CARE
"Problem: Patient Care Overview (Adult)  Goal: Plan of Care Review    02/06/17 1818   Coping/Psychosocial Response Interventions   Plan Of Care Reviewed With patient   Patient Care Overview   Progress no change   Outcome Evaluation   Outcome Summary/Follow up Plan Orders to use Dopamine to keep systolic > or = to 110. Able to wean off gtt by this afternoon. Dr. Ramirez at bedside this evening and stated he wanted Dopamine back on for \"renal dose\" at 3mcg/kg/hr and a BMP checked in the AM. 2.5mg PO Vit K given for INR 5.11 and Kayexelate given for K 5.8. UOP 225mls. Decreased Klonipin from 2mg to 1mg. Pt rested comfortably througout day.        Goal: Adult Individualization and Mutuality  Outcome: Ongoing (interventions implemented as appropriate)  Goal: Discharge Needs Assessment  Outcome: Ongoing (interventions implemented as appropriate)    Problem: Acute Coronary Syndrome (ACS) (Adult)  Goal: Signs and Symptoms of Listed Potential Problems Will be Absent or Manageable (Acute Coronary Syndrome)  Outcome: Ongoing (interventions implemented as appropriate)      "

## 2017-02-06 NOTE — PROGRESS NOTES
"[facility]  IP Progress Note      Chief Complaint: Systolic Heart Failure    Subjective: Denies Dyspnea at rest. \"About the same\".    Objective:  Blood pressure 109/57, pulse 90, temperature 98.4 °F (36.9 °C), temperature source Oral, resp. rate 14, height 67.99\" (172.7 cm), weight 116 lb (52.6 kg), SpO2 93 %, not currently breastfeeding.     Intake/Output Summary (Last 24 hours) at 02/06/17 0853  Last data filed at 02/06/17 0600   Gross per 24 hour   Intake  231.5 ml   Output    816 ml   Net -584.5 ml       Physical Exam:  General: No acute distress.   Neck: no JVD.  Chest:No respiratory distress, breath sounds are normal. No wheezes,  Rhonchi. Few rales bases  Cardiovascular: Normal S1 and S2, 2/6 ANASTACIA, no gallop or rub.    Extremities: trace edema.    Results Review:     I reviewed the patient's new clinical results.      Results from last 7 days  Lab Units 02/06/17  0348   WBC 10*3/mm3 9.10   HEMOGLOBIN g/dL 9.9*   HEMATOCRIT % 31.6*   PLATELETS 10*3/mm3 191       Results from last 7 days  Lab Units 02/06/17  0348 02/05/17  0524   SODIUM mmol/L 128* 130*   POTASSIUM mmol/L 5.8* 5.2   CHLORIDE mmol/L 96* 96*   TOTAL CO2 mmol/L 24.0 27.0   BUN mg/dL 45* 37*   CREATININE mg/dL 2.80* 2.60*   CALCIUM mg/dL 9.2 9.3   BILIRUBIN mg/dL  --  0.3   ALK PHOS U/L  --  151*   ALT (SGPT) U/L  --  162*   AST (SGOT) U/L  --  271*   GLUCOSE mg/dL 102* 112*       Results from last 7 days  Lab Units 02/06/17  0348   SODIUM mmol/L 128*   POTASSIUM mmol/L 5.8*   CHLORIDE mmol/L 96*   TOTAL CO2 mmol/L 24.0   BUN mg/dL 45*   CREATININE mg/dL 2.80*   GLUCOSE mg/dL 102*   CALCIUM mg/dL 9.2       Results from last 7 days  Lab Units 02/06/17  0348 02/05/17  0524 02/04/17  0321   INR  5.11 4.83 5.59     Lab Results   Component Value Date    CKTOTAL 195 (H) 02/06/2017    CKMB 1.9 06/28/2015    TROPONINI 0.058 (H) 02/05/2017    TROPONINT <0.010 03/13/2016           Results from last 7 days  Lab Units " 02/04/17  0247   CHOLESTEROL mg/dL 157   TRIGLYCERIDES mg/dL 141   HDL CHOL mg/dL 58       Results from last 7 days  Lab Units 02/04/17  0247   BNP pg/mL 2950.0*       Tele: Sinus Rythym    Interpretation Summary   · Left ventricular function is severely decreased. Estimated EF = 18%.  · The left ventricular cavity is borderline dilated.  · Left atrial cavity size is mild-to-moderately dilated.  · Mild to moderate aortic valve regurgitation is present.  · Moderate tricuspid valve regurgitation is present.  · Left ventricular wall thickness is consistent with mild concentric hypertrophy.  · Left ventricular wall segments contract abnormally. Refer to wall scoring diagram for more information.  · Right ventricular cavity is borderline dilated.  · There is no evidence of pericardial effusion.  · The prosthetic mitral valve is grossly normal.  · Mild pulmonary hypertension is present.         Assessment:  Hospital Problem List     * (Principal)Systolic heart failure, chronic    Overview Addendum 2/6/2017  8:44 AM by MAN Quesada             Ischemic cardiomyopathy    Overview Signed 2/6/2017  8:44 AM by MAN Quesada     A. History of MI December 2009: s/p stent to RCA and LAD, EF 40%  B. CABGx2 March, 2010: SVG to OMB-1, SVG to PDA  C. EF 30% post-operatively, s/p [place] ICD placement, 2011  D. Echo July 2014: Severe global hypokinesis with EF 12%, moderate AI, mechanical mitral valve, moderate to severe TR, RVSP 43mmHg   E. Echo 2/4/17: EF 18%, mild to mod AI, mod TR, grossly normal prosthetic mitral valve         H/O mitral valve replacement    Overview Signed 2/6/2017  8:53 AM by MAN Quesada     VHD:  A. Mitral valve replacement with 27mm ATS mechanical valve March 2010            AICD (automatic cardioverter/defibrillator) present    Hypotension    Overview Signed 2/6/2017  8:45 AM by MAN Quesada     On iv pressors         Lupus (systemic lupus erythematosus)    FAHEEM (acute  kidney injury)    Overview Signed 2/6/2017  8:46 AM by MAN Quesada     Nephrology following: Hyperkalemia and Hyponatremia                 Plan:  Support continues on Dopamine with better BP but still increasing serum creatinine.  Options may be quite limited but include referral to [facility] for LVAD evaluation.        Gabriel Acosta PA-C

## 2017-02-06 NOTE — PAYOR COMM NOTE
"Ashely Roldan (56 y.o. Female) Pending ref# [id number]   Initial Inpatient clinicals     ATT: Celena    Date of Birth Social Security Number Address Home Phone MRN    1960  [id number]  [facility] [address] [phone] [id number]    [employer] Marital Status          Congregational        Admission Date Admission Type Admitting Provider Attending Provider Department, Room/Bed    2/4/17 Emergency Yomi Ramirez MD Jones, Michael R, MD [facility] 2A ICU, N212/1    Discharge Date Discharge Disposition Discharge Destination                      Attending Provider: Yomi Ramirez MD     Allergies:  Morphine And Related, Sulfa Antibiotics    Isolation:  None   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                 Active Insurance as of 2/4/2017     Primary Coverage     Payor Plan Insurance Group Employer/Plan Group    ANTHEM MEDICARE REPLACEMENT ANTHEM MEDICARE ADVANTAGE [id number]     Payor Plan Address Payor Plan Phone Number Effective From Effective To    [address] [phone] 12/1/2016     [place] [address]       Subscriber Name Subscriber Birth Date Member ID       ASHELY ROLDAN 1960 [id number]                 Emergency Contacts      (Rel.) Home Phone Work Phone Mobile Phone    Floyd Roldan (Son) -- -- [phone]    Janina Teran (Other) -- -- [phone]    Hussein Melgar (Daughter) -- -- [phone]               History & Physical      Junior Philip MD at 2/4/2017  4:39 AM              Psychiatric Medicine Services  HISTORY AND PHYSICAL    Primary Care Physician: Peter Rdz MD    Subjective     Chief Complaint:  Dyspnea    History of Present Illness:     56 year old female with multiple medical problems with 10/10 chest pain, with no " relief from NTG presents to ED. Described pain as brick sitting on her chest, progressing to band around chest and easing up, but not responding to NTG. She noted nausea. Has chronic cough. No vomiting or diarrhea. No pleurisy. Pain initially radiated to back. Nothing to arm or jaw.        Review of Systems   Constitutional: Positive for activity change and fatigue.   HENT: Positive for congestion and rhinorrhea.    Respiratory: Positive for cough.    Cardiovascular: Positive for chest pain and leg swelling.   Gastrointestinal: Positive for nausea.   Genitourinary: Negative.    Musculoskeletal: Negative.    Neurological: Positive for weakness.   Hematological: Negative.    Psychiatric/Behavioral: Positive for agitation.          Past Medical History:   Past Medical History   Diagnosis Date   • Allergic rhinitis 08/01/2014   • Anemia    • Anxiety    • Arthritis    • CAD (coronary artery disease)    • CHF (congestive heart failure)    • CKD (chronic kidney disease)    • Colitis, Clostridium difficile    • COPD (chronic obstructive pulmonary disease)    • Coronary atherosclerosis    • Depression    • Emphysema of lung    • GERD (gastroesophageal reflux disease)    • Heart attack    • Heart murmur    • Hematoma of hip    • Hip fracture    • Hyperlipidemia    • Hypertension      benign essential   • Ischemic cardiomyopathy 08/01/2014     ef 29% s/p ICD   • Knee injury    • Lung disease    • Mitral valve disorder 03/28/2013   • Mitral valve insufficiency      s/p prosthetic ATS valve replacement   • Osteoporosis    • Postmenopausal      natural   • PVD (peripheral vascular disease)    • Pyelonephritis    • Renal failure    • Renal failure    • Renal failure, acute    • Syncope    • Systemic lupus erythematosus    • TIA (transient ischemic attack) 04/04/2012   • UTI (urinary tract infection)    • Valvular heart disease    • Wrist fracture        Past Surgical History:  Past Surgical History   Procedure Laterality Date   •  "Endoscopy  10/23/2014     Dr. Brand; retained food in stomach; he dilated her esophagus; 5-30-14 hiatus hernia, gastritis   • Cholecystectomy  2010   • Joint replacement Right 06/25/2015     Dr. Fitzgerald; first surgery 1-29-14; redo 5-4-15   • Coronary artery bypass graft  2011     4 aortic bypasses, abdominal aorta; 2011-mitral valve; 2010-triple bypass   • Cardiac defibrillator placement     • Total hip arthroplasty Right      3 times within 8 months   • Mitral valve replacement       MECHANICAL       Family History: family history includes Alcohol abuse in her brother; Arthritis in her mother; Cancer in her other; Diabetes in her other; Heart attack in her father; Heart disease in her maternal uncle and other; Hypertension in her other; Lung cancer in her paternal grandfather; Stroke in her other.    Social History:  reports that she has been smoking Cigarettes.  She has been smoking about 0.50 packs per day. She does not have any smokeless tobacco history on file. She reports that she drinks alcohol. She reports that she does not use illicit drugs.    Medications:  Reviewed    (Not in a hospital admission)    Allergies:  Allergies   Allergen Reactions   • Morphine And Related GI Intolerance and Irritability   • Sulfa Antibiotics          Objective     Physical Exam:  Vital Signs:   Visit Vitals   • /87   • Pulse 106   • Temp 97.5 °F (36.4 °C) (Oral)   • Resp 20   • Ht 68.5\" (174 cm)   • Wt 117 lb (53.1 kg)   • SpO2 97%   • BMI 17.53 kg/m2     Physical Exam   Constitutional: She is oriented to person, place, and time. She appears well-developed.   HENT:   Head: Normocephalic.   Nose: Nose normal.   Mouth/Throat: Oropharynx is clear and moist.   Eyes: Conjunctivae and EOM are normal.   Neck: Normal range of motion. Neck supple. No thyromegaly present.   Cardiovascular: Normal rate, regular rhythm and normal heart sounds.    Decreased dorsalis pedis pulses bilaterally with cool LE B   Pulmonary/Chest: Effort " normal and breath sounds normal.   Abdominal: Bowel sounds are normal.   Musculoskeletal: Normal range of motion.   Neurological: She is alert and oriented to person, place, and time.   Skin: Skin is dry.   Psychiatric: She has a normal mood and affect.   Vitals reviewed.            Results Reviewed:    Results from last 7 days  Lab Units 02/04/17  0247   WBC 10*3/mm3 7.62   HEMOGLOBIN g/dL 10.0*   PLATELETS 10*3/mm3 245       Results from last 7 days  Lab Units 02/04/17  0247   SODIUM mmol/L 135   POTASSIUM mmol/L 4.6   TOTAL CO2 mmol/L 21.0   CREATININE mg/dL 1.80*   GLUCOSE mg/dL 104*   CALCIUM mg/dL 9.9       I have personally reviewed and interpreted available lab data, radiology studies and ECG obtained at time of admission.     Assessment / Plan     Assessment/Problem List:   Active Problems:    Hyperlipidemia    Hypertension    Ischemic cardiomyopathy    Systolic heart failure    Chest pain      A/C SHF/Severe SHF, with ICD  Possible USA/ACS  Prosthetic mitral valve  HTN  Chest pain  Probable CKD  Anemia  COPD  -  Anticoagulated. ASA/STATIN as tolerated. Continue BB as tolerated  Consider ACE/ARB  Needs [facility]  Trend troponin levels. Doubt PE, but possible NSTEMI, supsecting a/c SHF.            DVT prophylaxis:  Code Status:  Admission Status: Patient will be admitted to (LEXOBS or LEXINPT)     Junior Philip MD 02/04/17 4:39 AM           Electronically signed by Junior Philip MD at 2/4/2017  6:14 AM           Emergency Department Notes      Wally Olmstead DO at 2/4/2017  2:39 AM          Subjective   HPI Comments: Ashely Roldan is a 56 y.o. female with a hx of CABG who presents to the ED with c/o CP and SOA. One hour ago, she began to have severe tightness and pressure in her chest with associated SOA. She took 1 Nitroglycerin with no relief, prompting her to call EMS. On EMS arrival pt was tripoding and tachycardic. EMS gave her 3 NTG and 324 mg ASA without relief of sx. They performed a 12-lead EKG  which they called a field STEMI.    Pt has a hx COPD, HTN, HLD, CHF, and AICD placement. She is a smoker.    Patient is a 56 y.o. female presenting with chest pain.   History provided by:  Patient  Chest Pain   Pain quality: pressure and tightness    Pain radiates to:  Does not radiate  Pain severity:  Severe  Onset quality:  Sudden  Duration:  1 hour  Timing:  Constant  Progression:  Improving  Chronicity:  New  Relieved by:  Nothing  Worsened by:  Nothing  Ineffective treatments:  Nitroglycerin and aspirin  Associated symptoms: shortness of breath    Associated symptoms: no diaphoresis, no fever, no headache, no vomiting and no weakness    Risk factors: high cholesterol, hypertension and smoking        Review of Systems   Constitutional: Negative for diaphoresis and fever.   Respiratory: Positive for shortness of breath.    Cardiovascular: Positive for chest pain.   Gastrointestinal: Negative for vomiting.   Neurological: Negative for weakness and headaches. other systems reviewed and are negative.      Past Medical History   Diagnosis Date   • Allergic rhinitis 08/01/2014   • Anemia    • Anxiety    • Arthritis    • CAD (coronary artery disease)    • CHF (congestive heart failure)    • CKD (chronic kidney disease)    • Colitis, Clostridium difficile    • COPD (chronic obstructive pulmonary disease)    • Coronary atherosclerosis    • Depression    • Emphysema of lung    • GERD (gastroesophageal reflux disease)    • Heart attack    • Heart murmur    • Hematoma of hip    • Hip fracture    • Hyperlipidemia    • Hypertension      benign essential   • Ischemic cardiomyopathy 08/01/2014     ef 29% s/p ICD   • Knee injury    • Lung disease    • Mitral valve disorder 03/28/2013   • Mitral valve insufficiency      s/p prosthetic ATS valve replacement   • Osteoporosis    • Postmenopausal      natural   • PVD (peripheral vascular disease)    • Pyelonephritis    • Renal failure    • Renal failure    • Renal failure, acute    •  Syncope    • Systemic lupus erythematosus    • TIA (transient ischemic attack) 04/04/2012   • UTI (urinary tract infection)    • Valvular heart disease    • Wrist fracture        Allergies   Allergen Reactions   • Morphine And Related GI Intolerance and Irritability   • Sulfa Antibiotics        Past Surgical History   Procedure Laterality Date   • Endoscopy  10/23/2014     Dr. Brand; retained food in stomach; he dilated her esophagus; 5-30-14 hiatus hernia, gastritis   • Cholecystectomy  2010   • Joint replacement Right 06/25/2015     Dr. Fitzgerald; first surgery 1-29-14; redo 5-4-15   • Coronary artery bypass graft  2011     4 aortic bypasses, abdominal aorta; 2011-mitral valve; 2010-triple bypass   • Cardiac defibrillator placement     • Total hip arthroplasty Right      3 times within 8 months   • Mitral valve replacement       MECHANICAL       Family History   Problem Relation Age of Onset   • Arthritis Mother      rheumatoid   • Heart attack Father    • Alcohol abuse Brother    • Heart disease Other      uncle   • Diabetes Other      uncle-type 2   • Hypertension Other      uncle   • Stroke Other      uncle   • Cancer Other      grandfather-lung    • Heart disease Maternal Uncle    • Lung cancer Paternal Grandfather        Social History     Social History   • Marital status:      Spouse name: N/A   • Number of children: N/A   • Years of education: N/A     Social History Main Topics   • Smoking status: Current Every Day Smoker     Packs/day: 1.00     Types: Cigarettes   • Smokeless tobacco: None   • Alcohol use Yes      Comment: once a week   • Drug use: No   • Sexual activity: Defer     Other Topics Concern   • None     Social History Narrative    Pt recently  after 30 years. Just moved from [facility] into her son's home in [place].          Objective   Physical Exam   Constitutional: She is oriented to person, place, and time. She appears well-developed. She appears distressed (moderate  discomfort).   HENT:   Head: Normocephalic and atraumatic.   Eyes: Conjunctivae are normal. Pupils are equal, round, and reactive to light.   Neck: Normal range of motion. Neck supple.   Cardiovascular: Normal rate and normal heart sounds.    Pulmonary/Chest: Effort normal. Tachypnea noted. She has wheezes (minimal expiratory wheezing). She exhibits no edema.   Midline sternal incision secondary to CABG.    Abdominal: Soft. Bowel sounds are normal.   Musculoskeletal: Normal range of motion. She exhibits no edema.   Neurological: She is alert and oriented to person, place, and time.   Skin: Skin is warm and dry.   Psychiatric: She has a normal mood and affect. Her behavior is normal. note and vitals reviewed.      Procedures        ED Course  ED Course   Comment By Time   0243 Dr. Olmstead spoke with Dr. Ramirez who will review EKG. Tripp Krause 02/04 0244   Dr. Olmstead spoke with Dr. Ramirez who reviewed pt's EKG. Pt is not having a STEMI, cath lab is not indicated at this time.  Wally Olmstead,  02/04 0247   Heparin was not immediately initiated as the patient is currently on Coumadin -CP Tripp Krause 02/04 0316       Recent Results (from the past 24 hour(s))   Light Blue Top    Collection Time: 02/04/17  2:46 AM   Result Value Ref Range    Extra Tube hold for add-on    Comprehensive Metabolic Panel    Collection Time: 02/04/17  2:47 AM   Result Value Ref Range    Glucose 104 (H) 70 - 100 mg/dL    BUN 32 (H) 9 - 23 mg/dL    Creatinine 1.80 (H) 0.60 - 1.30 mg/dL    Sodium 135 132 - 146 mmol/L    Potassium 4.6 3.5 - 5.5 mmol/L    Chloride 100 99 - 109 mmol/L    CO2 21.0 20.0 - 31.0 mmol/L    Calcium 9.9 8.7 - 10.4 mg/dL    Total Protein 7.3 5.7 - 8.2 g/dL    Albumin 4.00 3.20 - 4.80 g/dL    ALT (SGPT) 107 (H) 7 - 40 U/L    AST (SGOT) 226 (H) 0 - 33 U/L    Alkaline Phosphatase 156 (H) 25 - 100 U/L    Total Bilirubin 0.5 0.3 - 1.2 mg/dL    eGFR Non African Amer 29 (L) >60 mL/min/1.73    Globulin 3.3 gm/dL    A/G Ratio 1.2 (L) 1.5 -  2.5 g/dL    BUN/Creatinine Ratio 17.8 7.0 - 25.0    Anion Gap 14.0 (H) 3.0 - 11.0 mmol/L   Lipase    Collection Time: 02/04/17  2:47 AM   Result Value Ref Range    Lipase 38 6 - 51 U/L   BNP    Collection Time: 02/04/17  2:47 AM   Result Value Ref Range    BNP 2950.0 (H) 0.0 - 100.0 pg/mL   Green Top (Gel)    Collection Time: 02/04/17  2:47 AM   Result Value Ref Range    Extra Tube Hold for add-ons.    Lavender Top    Collection Time: 02/04/17  2:47 AM   Result Value Ref Range    Extra Tube hold for add-on    Gold Top - SST    Collection Time: 02/04/17  2:47 AM   Result Value Ref Range    Extra Tube Hold for add-ons.    CBC Auto Differential    Collection Time: 02/04/17  2:47 AM   Result Value Ref Range    WBC 7.62 3.50 - 10.80 10*3/mm3    RBC 3.06 (L) 3.89 - 5.14 10*6/mm3    Hemoglobin 10.0 (L) 11.5 - 15.5 g/dL    Hematocrit 30.7 (L) 34.5 - 44.0 %    .3 (H) 80.0 - 99.0 fL    MCH 32.7 (H) 27.0 - 31.0 pg    MCHC 32.6 32.0 - 36.0 g/dL    RDW 14.5 11.3 - 14.5 %    RDW-SD 52.9 37.0 - 54.0 fl    MPV 10.4 6.0 - 12.0 fL    Platelets 245 150 - 450 10*3/mm3    Neutrophil % 60.6 41.0 - 71.0 %    Lymphocyte % 31.1 24.0 - 44.0 %    Monocyte % 7.3 0.0 - 12.0 %    Eosinophil % 0.8 0.0 - 3.0 %    Basophil % 0.1 0.0 - 1.0 %    Immature Grans % 0.1 0.0 - 0.6 %    Neutrophils, Absolute 4.61 1.50 - 8.30 10*3/mm3    Lymphocytes, Absolute 2.37 0.60 - 4.80 10*3/mm3    Monocytes, Absolute 0.56 0.00 - 1.00 10*3/mm3    Eosinophils, Absolute 0.06 (L) 0.10 - 0.30 10*3/mm3    Basophils, Absolute 0.01 0.00 - 0.20 10*3/mm3    Immature Grans, Absolute 0.01 0.00 - 0.03 10*3/mm3   POC Troponin, Rapid    Collection Time: 02/04/17  2:48 AM   Result Value Ref Range    Troponin I 0.13 (H) 0.00 - 0.07 ng/mL   Protime-INR    Collection Time: 02/04/17  3:21 AM   Result Value Ref Range    Protime 60.9 (C) 9.6 - 11.5 Seconds    INR 5.59    aPTT    Collection Time: 02/04/17  3:21 AM   Result Value Ref Range    PTT 45.8 (H) 24.0 - 31.0 seconds  "  Troponin    Collection Time: 02/04/17  4:55 AM   Result Value Ref Range    Troponin I 0.228 (H) <=0.040 ng/mL     Note: In addition to lab results from this visit, the labs listed above may include labs taken at another facility or during a different encounter within the last 24 hours. Please correlate lab times with ED admission and discharge times for further clarification of the services performed during this visit.    XR Chest 1 View   Final Result   Abnormal   1. Moderate diffuse bilateral interstitial opacities. These may represent any    combination of pulmonary vascular congestion, chronic scarring, and    atypical/viral pneumonia.      THIS DOCUMENT HAS BEEN ELECTRONICALLY SIGNED BY MICHELET DOMINGUEZ MD        Vitals:    02/04/17 0340 02/04/17 0341 02/04/17 0529 02/04/17 0530   BP: 138/87  146/94 134/54   BP Location:   Right arm Left arm   Patient Position:   Lying Lying   Pulse:  106 104    Resp:  20 18    Temp:   96.9 °F (36.1 °C)    TempSrc:   Axillary    SpO2:  97%     Weight:   116 lb 3.2 oz (52.7 kg)    Height:   68\" (172.7 cm)      Medications   sodium chloride 0.9 % flush 10 mL (not administered)   traMADol (ULTRAM) tablet 50 mg (50 mg Oral Given 2/4/17 0630)   albuterol (PROVENTIL) nebulizer solution 0.5% 2.5 mg/0.5mL (not administered)   budesonide-formoterol (SYMBICORT) 160-4.5 MCG/ACT inhaler 2 puff (not administered)   carvedilol (COREG) tablet 3.125 mg (not administered)   clonazePAM (KlonoPIN) tablet 2 mg (not administered)   diphenhydrAMINE (BENADRYL) capsule 25 mg (not administered)   hydroxychloroquine (PLAQUENIL) tablet 200 mg (not administered)   atorvastatin (LIPITOR) tablet 80 mg (not administered)   sertraline (ZOLOFT) tablet 50 mg (not administered)   tiZANidine (ZANAFLEX) tablet 4 mg (not administered)   Pharmacy to dose warfarin (not administered)   warfarin (COUMADIN) (dosing per levels) (not administered)   aspirin EC tablet 325 mg (not administered)   furosemide (LASIX) tablet 40 " mg (not administered)   carvedilol (COREG) tablet 6.25 mg (not administered)   ondansetron (ZOFRAN) injection 4 mg (4 mg Intravenous Given 2/4/17 0701)   HYDROmorphone (DILAUDID) injection 0.5 mg (0.5 mg Intravenous Given 2/4/17 0310)   ondansetron (ZOFRAN) injection 4 mg (4 mg Intravenous Given 2/4/17 0325)     ECG/EMG Results (last 24 hours)     Procedure Component Value Units Date/Time    ECG 12 Lead [[id number]] Collected:  02/04/17 0236     Updated:  02/04/17 0238                      MDM  Number of Diagnoses or Management Options  Chest pain, unspecified type:   Elevated INR:      Amount and/or Complexity of Data Reviewed  Decide to obtain previous medical records or to obtain history from someone other than the patient: yes        Final diagnoses:   Chest pain, unspecified type   Elevated INR       Documentation assistance provided by brigid Krause.  Information recorded by the scribe was done at my direction and has been verified and validated by me.    Tripp Krause  02/04/17 0349      Wally Olmstead DO  02/04/17 0705       Electronically signed by Wally Olmstead DO at 2/4/2017  7:05 AM      Rylan Lopez RN at 2/4/2017  4:50 AM          Pt informed of hospital policy regarding removal and replacing EMS started IVs. Pt refused removal of EMS IV's.     Rylan Lopez, RN  02/04/17 0452       Electronically signed by Rylan Lopez RN at 2/4/2017  4:52 AM        Hospital Medications (active)       Dose Frequency Start End    albuterol (PROVENTIL) nebulizer solution 0.5% 2.5 mg/0.5mL 2.5 mg Every 4 Hours PRN 2/4/2017     Sig - Route: Take 0.5 mL by nebulization Every 4 (Four) Hours As Needed for shortness of air. - Nebulization    aluminum-magnesium hydroxide-simethicone (MAALOX MAX) 400-400-40 MG/5ML suspension 15 mL 15 mL Every 6 Hours PRN 2/4/2017     Sig - Route: Take 15 mL by mouth Every 6 (Six) Hours As Needed for indigestion or heartburn. - Oral    aspirin EC tablet 325 mg 325 mg Daily 2/4/2017      Sig - Route: Take 1 tablet by mouth Daily. - Oral    atorvastatin (LIPITOR) tablet 80 mg 80 mg Nightly 2/4/2017     Sig - Route: Take 2 tablets by mouth Every Night. - Oral    budesonide-formoterol (SYMBICORT) 160-4.5 MCG/ACT inhaler 2 puff 2 puff 2 Times Daily - RT 2/4/2017     Sig - Route: Inhale 2 puffs 2 (Two) Times a Day. - Inhalation    carvedilol (COREG) tablet 3.125 mg 3.125 mg 2 Times Daily With Meals 2/4/2017     Sig - Route: Take 1 tablet by mouth 2 (Two) Times a Day With Meals. - Oral    clonazePAM (KlonoPIN) tablet 2 mg 2 mg 3 Times Daily PRN 2/4/2017 2/14/2017    Sig - Route: Take 2 tablets by mouth 3 (Three) Times a Day As Needed (Tremors). - Oral    diphenhydrAMINE (BENADRYL) capsule 25 mg 25 mg Nightly PRN 2/4/2017     Sig - Route: Take 1 capsule by mouth At Night As Needed for itching or sleep. - Oral    DOPamine infusion 1600 mcg/mL 2-20 mcg/kg/min × 52.6 kg Titrated 2/5/2017     Sig - Route: Infuse 105.2-1,052 mcg/min into a venous catheter Dose Adjusted By Provider As Needed. - Intravenous    HYDROmorphone (DILAUDID) injection 0.5 mg 0.5 mg Every 4 Hours PRN 2/4/2017 2/14/2017    Sig - Route: Infuse 0.5 mg into a venous catheter Every 4 (Four) Hours As Needed for severe pain (7-10). - Intravenous    hydroxychloroquine (PLAQUENIL) tablet 200 mg 200 mg Daily With Breakfast 2/4/2017     Sig - Route: Take 1 tablet by mouth Daily With Breakfast. - Oral    ondansetron (ZOFRAN) injection 4 mg 4 mg Every 6 Hours PRN 2/4/2017     Sig - Route: Infuse 2 mL into a venous catheter Every 6 (Six) Hours As Needed for nausea or vomiting. - Intravenous    pantoprazole (PROTONIX) EC tablet 40 mg 40 mg Every Early Morning 2/4/2017     Sig - Route: Take 1 tablet by mouth Every Morning. - Oral    Pharmacy to dose warfarin  Continuous PRN 2/4/2017     Sig - Route: Continuous As Needed for consult. - Does not apply    sertraline (ZOLOFT) tablet 50 mg 50 mg Daily 2/4/2017     Sig - Route: Take 1 tablet by mouth Daily. -  Oral    sodium chloride 0.9 % flush 10 mL 10 mL As Needed 2/4/2017     Sig - Route: Infuse 10 mL into a venous catheter As Needed for line care. - Intravenous    Cosign for Ordering: Accepted by Wally Olmstead DO on 2/4/2017  3:14 AM    tiZANidine (ZANAFLEX) tablet 4 mg 4 mg Every 12 Hours PRN 2/4/2017     Sig - Route: Take 1 tablet by mouth Every 12 (Twelve) Hours As Needed for muscle spasms. - Oral    traMADol (ULTRAM) tablet 50 mg 50 mg Every 6 Hours PRN 2/4/2017     Sig - Route: Take 1 tablet by mouth Every 6 (Six) Hours As Needed for moderate pain (4-6). - Oral    warfarin (COUMADIN) (dosing per levels)  Daily Warfarin 2/4/2017     Sig - Route: Daily. - Does not apply    DOPamine infusion 1600 mcg/mL (Discontinued) 2-20 mcg/kg/min × 52.6 kg Titrated 2/5/2017 2/5/2017    Sig - Route: Infuse 105.2-1,052 mcg/min into a venous catheter Dose Adjusted By Provider As Needed. - Intravenous          Lab Results (last 72 hours)     Procedure Component Value Units Date/Time    CBC & Differential [[id number]] Collected:  02/04/17 0247    Specimen:  Blood Updated:  02/04/17 0304    Narrative:       The following orders were created for panel order CBC & Differential.  Procedure                               Abnormality         Status                     ---------                               -----------         ------                     CBC Auto Differential[[id number]]         Abnormal            Final result                 Please view results for these tests on the individual orders.    CBC Auto Differential [[id number]]  (Abnormal) Collected:  02/04/17 0247    Specimen:  Blood Updated:  02/04/17 0304     WBC 7.62 10*3/mm3      RBC 3.06 (L) 10*6/mm3      Hemoglobin 10.0 (L) g/dL      Hematocrit 30.7 (L) %      .3 (H) fL      MCH 32.7 (H) pg      MCHC 32.6 g/dL      RDW 14.5 %      RDW-SD 52.9 fl      MPV 10.4 fL      Platelets 245 10*3/mm3      Neutrophil % 60.6 %      Lymphocyte % 31.1 %      Monocyte % 7.3 %       Eosinophil % 0.8 %      Basophil % 0.1 %      Immature Grans % 0.1 %      Neutrophils, Absolute 4.61 10*3/mm3      Lymphocytes, Absolute 2.37 10*3/mm3      Monocytes, Absolute 0.56 10*3/mm3      Eosinophils, Absolute 0.06 (L) 10*3/mm3      Basophils, Absolute 0.01 10*3/mm3      Immature Grans, Absolute 0.01 10*3/mm3     POC Troponin, Rapid [[id number]]  (Abnormal) Collected:  02/04/17 0248    Specimen:  Blood Updated:  02/04/17 0305     Troponin I 0.13 (H) ng/mL       Serial Number: [id number]    : [id number]       Comprehensive Metabolic Panel [[id number]]  (Abnormal) Collected:  02/04/17 0247    Specimen:  Blood Updated:  02/04/17 0331     Glucose 104 (H) mg/dL      BUN 32 (H) mg/dL      Creatinine 1.80 (H) mg/dL      Sodium 135 mmol/L      Potassium 4.6 mmol/L      Chloride 100 mmol/L      CO2 21.0 mmol/L      Calcium 9.9 mg/dL      Total Protein 7.3 g/dL      Albumin 4.00 g/dL      ALT (SGPT) 107 (H) U/L      AST (SGOT) 226 (H) U/L      Alkaline Phosphatase 156 (H) U/L      Total Bilirubin 0.5 mg/dL      eGFR Non African Amer 29 (L) mL/min/1.73      Globulin 3.3 gm/dL      A/G Ratio 1.2 (L) g/dL      BUN/Creatinine Ratio 17.8      Anion Gap 14.0 (H) mmol/L     Narrative:       National Kid[employer] Guidelines    Stage                           Description                             GFR                      1                               Normal or High                          90+  2                               Mild decrease                            60-89  3                               Moderate decrease                   30-59  4                               Severe decrease                       15-29  5                               Kidney failure                             <15    Lipase [[id number]]  (Normal) Collected:  02/04/17 0247    Specimen:  Blood Updated:  02/04/17 0331     Lipase 38 U/L     BNP [[id number]]  (Abnormal) Collected:  02/04/17 0247    Specimen:  Blood Updated:  02/04/17 0338      BNP 2950.0 (H) pg/mL     Protime-INR [[id number]]  (Abnormal) Collected:  02/04/17 0321    Specimen:  Blood Updated:  02/04/17 0350     Protime 60.9 (C) Seconds      INR 5.59     Narrative:       Therapeutic Ranges for INR: 2.0-3.0 (PT 20-30)                              2.5-3.5 (PT 25-34)    aPTT [[id number]]  (Abnormal) Collected:  02/04/17 0321    Specimen:  Blood Updated:  02/04/17 0350     PTT 45.8 (H) seconds     Narrative:       PTT = The equivalent PTT values for the therapeutic range of heparin levels at 0.3 to 0.5 U/ml are 45 to 60 seconds.    Troponin [[id number]]  (Abnormal) Collected:  02/04/17 0455    Specimen:  Blood Updated:  02/04/17 0521     Troponin I 0.228 (H) ng/mL     Narrative:       Ultra Troponin I Reference Range:         <=0.039 ng/mL: Negative    0.04-0.779 ng/mL: Indeterminate Range. Suspicious of MI.  Clinical correlation required.       >=0.78  ng/mL: Consistent with myocardial injury.  Clinical correlation required.    [place] Draw [[id number]] Collected:  02/04/17 0246    Specimen:  Blood Updated:  02/04/17 0701    Narrative:       The following orders were created for panel order [place] Draw.  Procedure                               Abnormality         Status                     ---------                               -----------         ------                     Light Blue Top[[id number]]                                    Final result               Green Top (Gel)[[id number]]                                   Final result               Lavender Top[[id number]]                                      Final result               Gold Top - SST[[id number]]                                    Final result               Green Top (No Gel)[[id number]]                                                             Please view results for these tests on the individual orders.    Light Blue Top [[id number]] Collected:  02/04/17 0246    Specimen:  Blood Updated:  02/04/17 0701     Extra Tube hold for add-on        Auto resulted       Green Top (Gel) [[id number]] Collected:  02/04/17 0247    Specimen:  Blood Updated:  02/04/17 0701     Extra Tube Hold for add-ons.       Auto resulted.       Lavender Top [[id number]] Collected:  02/04/17 0247    Specimen:  Blood Updated:  02/04/17 0701     Extra Tube hold for add-on       Auto resulted       Gold Top - SST [[id number]] Collected:  02/04/17 0247    Specimen:  Blood Updated:  02/04/17 0701     Extra Tube Hold for add-ons.       Auto resulted.       Lipid Panel [[id number]]  (Normal) Collected:  02/04/17 0247    Specimen:  Blood Updated:  02/04/17 1005     Total Cholesterol 157 mg/dL      Triglycerides 141 mg/dL      HDL Cholesterol 58 mg/dL      LDL Cholesterol  56 mg/dL     Narrative:       Cholesterol Reference Ranges:   Desirable       < 200 mg/dL   Borderline    200-239 mg/dL   High Risk       > 239 mg/dL    Triglyceride Reference Ranges:   Normal          < 150 mg/dL   Borderline    150-199 mg/dL   High          200-499 mg/dL   Very High       > 499 mg/dL    HDL Reference Ranges:   Low              < 40 mg/dL   High             > 59 mg/dL    LDL Reference Ranges:   Optimal         < 100 mg/dL   Near Optimal  100-129 mg/dL   Borderline    130-159 mg/dL   High          160-189 mg/dL   Very High       > 189 mg/dL    Hemoglobin A1c [[id number]]  (Normal) Collected:  02/04/17 0247    Specimen:  Blood Updated:  02/04/17 1101     Hemoglobin A1C 5.00 %     Narrative:       The American Diabetes Association recommends maintenance of Hemoglobin A1C at 7.0% or lower. Goals for Hemoglobin A1C reduction may need to be modified if hypoglycemia is a problem.    Amylase [[id number]]  (Abnormal) Collected:  02/04/17 0455    Specimen:  Blood Updated:  02/04/17 1135     Amylase 136 (H) U/L     CBC (No Diff) [[id number]]  (Abnormal) Collected:  02/05/17 0524    Specimen:  Blood Updated:  02/05/17 0614     WBC 8.09 10*3/mm3      RBC 3.17 (L) 10*6/mm3      Hemoglobin 10.3 (L) g/dL      Hematocrit 33.4 (L) %       .4 (H) fL      MCH 32.5 (H) pg      MCHC 30.8 (L) g/dL      RDW 14.7 (H) %      RDW-SD 56.5 (H) fl      MPV 11.0 fL      Platelets 228 10*3/mm3     Troponin [[id number]]  (Abnormal) Collected:  02/05/17 0524    Specimen:  Blood Updated:  02/05/17 0644     Troponin I 0.082 (H) ng/mL     Narrative:       Ultra Troponin I Reference Range:         <=0.039 ng/mL: Negative    0.04-0.779 ng/mL: Indeterminate Range. Suspicious of MI.  Clinical correlation required.       >=0.78  ng/mL: Consistent with myocardial injury.  Clinical correlation required.    Comprehensive Metabolic Panel [[id number]]  (Abnormal) Collected:  02/05/17 0524    Specimen:  Blood Updated:  02/05/17 0645     Glucose 112 (H) mg/dL      BUN 37 (H) mg/dL      Creatinine 2.60 (H) mg/dL      Sodium 130 (L) mmol/L      Potassium 5.2 mmol/L      Chloride 96 (L) mmol/L      CO2 27.0 mmol/L      Calcium 9.3 mg/dL      Total Protein 6.6 g/dL      Albumin 3.60 g/dL      ALT (SGPT) 162 (H) U/L      AST (SGOT) 271 (H) U/L      Alkaline Phosphatase 151 (H) U/L      Total Bilirubin 0.3 mg/dL      eGFR Non African Amer 19 (L) mL/min/1.73      Globulin 3.0 gm/dL      A/G Ratio 1.2 (L) g/dL      BUN/Creatinine Ratio 14.2      Anion Gap 7.0 mmol/L     Narrative:       National Kidney Foundation Guidelines    Stage                           Description                             GFR                      1                               Normal or High                          90+  2                               Mild decrease                            60-89  3                               Moderate decrease                   30-59  4                               Severe decrease                       15-29  5                               Kidney failure                             <15    Protime-INR [[id number]]  (Abnormal) Collected:  02/05/17 0524    Specimen:  Blood Updated:  02/05/17 0659     Protime 52.7 (C) Seconds      INR 4.83     Narrative:        Therapeutic Ranges for INR: 2.0-3.0 (PT 20-30)                              2.5-3.5 (PT 25-34)    Urinalysis With / Microscopic If Indicated [[id number]]  (Abnormal) Collected:  02/05/17 1717    Specimen:  Urine from Urine, Clean Catch Updated:  02/05/17 1740     Color, UA Yellow      Appearance, UA Clear      pH, UA <=5.0      Specific Gravity, UA 1.012      Glucose, UA Negative      Ketones, UA Negative      Bilirubin, UA Negative      Blood, UA Negative      Protein,  mg/dL (2+) (A)      Leuk Esterase, UA Trace (A)      Nitrite, UA Negative      Urobilinogen, UA 0.2 E.U./dL     Urinalysis, Microscopic Only [[id number]]  (Abnormal) Collected:  02/05/17 1717    Specimen:  Urine from Urine, Clean Catch Updated:  02/05/17 1740     RBC, UA 0-2 /HPF      WBC, UA 6-12 (A) /HPF      Bacteria, UA None Seen /HPF      Squamous Epithelial Cells, UA 0-2 /HPF      Hyaline Casts, UA 0-6 /LPF      Methodology Automated Microscopy     Troponin [[id number]]  (Abnormal) Collected:  02/05/17 1846    Specimen:  Blood Updated:  02/05/17 1953     Troponin I 0.058 (H) ng/mL     Narrative:       Ultra Troponin I Reference Range:         <=0.039 ng/mL: Negative    0.04-0.779 ng/mL: Indeterminate Range. Suspicious of MI.  Clinical correlation required.       >=0.78  ng/mL: Consistent with myocardial injury.  Clinical correlation required.    CBC & Differential [[id number]] Collected:  02/06/17 0348    Specimen:  Blood Updated:  02/06/17 0419    Narrative:       The following orders were created for panel order CBC & Differential.  Procedure                               Abnormality         Status                     ---------                               -----------         ------                     CBC Auto Differential[[id number]]         Abnormal            Final result                 Please view results for these tests on the individual orders.    CBC Auto Differential [[id number]]  (Abnormal) Collected:  02/06/17 0348    Specimen:  Blood  Updated:  02/06/17 0419     WBC 9.10 10*3/mm3      RBC 3.08 (L) 10*6/mm3      Hemoglobin 9.9 (L) g/dL      Hematocrit 31.6 (L) %      .6 (H) fL      MCH 32.1 (H) pg      MCHC 31.3 (L) g/dL      RDW 14.4 %      RDW-SD 53.2 fl      MPV 10.9 fL      Platelets 191 10*3/mm3      Neutrophil % 75.2 (H) %      Lymphocyte % 14.3 (L) %      Monocyte % 8.8 %      Eosinophil % 0.9 %      Basophil % 0.1 %      Immature Grans % 0.7 (H) %      Neutrophils, Absolute 6.85 10*3/mm3      Lymphocytes, Absolute 1.30 10*3/mm3      Monocytes, Absolute 0.80 10*3/mm3      Eosinophils, Absolute 0.08 (L) 10*3/mm3      Basophils, Absolute 0.01 10*3/mm3      Immature Grans, Absolute 0.06 (H) 10*3/mm3     Protime-INR [[id number]]  (Abnormal) Collected:  02/06/17 0348    Specimen:  Blood Updated:  02/06/17 0448     Protime 55.7 (C) Seconds      INR 5.11     Narrative:       Therapeutic Ranges for INR: 2.0-3.0 (PT 20-30)                              2.5-3.5 (PT 25-34)    Renal Function Panel [[id number]]  (Abnormal) Collected:  02/06/17 0348    Specimen:  Blood Updated:  02/06/17 0555     Glucose 102 (H) mg/dL      BUN 45 (H) mg/dL      Creatinine 2.80 (H) mg/dL      Sodium 128 (L) mmol/L      Potassium 5.8 (H) mmol/L      Chloride 96 (L) mmol/L      CO2 24.0 mmol/L      Calcium 9.2 mg/dL      Albumin 3.50 g/dL      Phosphorus 5.1 mg/dL      Anion Gap 8.0 mmol/L      BUN/Creatinine Ratio 16.1      eGFR Non African Amer 17 (L) mL/min/1.73     Narrative:       National Kidney Foundation Guidelines    Stage                           Description                             GFR                      1                               Normal or High                          90+  2                               Mild decrease                            60-89  3                               Moderate decrease                   30-59  4                               Severe decrease                       15-29  5                               Kidney failure                              <15    CK [[id number]]  (Abnormal) Collected:  02/06/17 0348    Specimen:  Blood Updated:  02/06/17 0555     Creatine Kinase 195 (H) U/L     Iron Profile [[id number]]  (Normal) Collected:  02/06/17 0348    Specimen:  Blood Updated:  02/06/17 0555     Iron 65 mcg/dL      TIBC 255 mcg/dL      Iron Saturation 25 %     Ferritin [[id number]]  (Abnormal) Collected:  02/06/17 0348    Specimen:  Blood Updated:  02/06/17 0844     Ferritin >1650.00 (H) ng/mL           Imaging Results (last 72 hours)     Procedure Component Value Units Date/Time    XR Chest 1 View [[id number]]  (Abnormal) Collected:  02/04/17 0240     Updated:  02/04/17 0357    Narrative:       EXAM:    XR Chest, 1 View.    CLINICAL HISTORY:    56 years old, female; Signs and symptoms; Shortness of breath; Additional   info: Chest pain triage protocol    TECHNIQUE:    Frontal view of the chest.    COMPARISON:    No relevant prior studies available.    FINDINGS:    Median sternotomy wires are present. Heart size upper limits of normal. No   airspace consolidation identified. No visible pneumothorax or pleural effusion.   Moderate diffuse bilateral interstitial opacities. These may represent any   combination of pulmonary vascular congestion, chronic scarring, and   atypical/viral pneumonia.      Impression:       1. Moderate diffuse bilateral interstitial opacities. These may represent any   combination of pulmonary vascular congestion, chronic scarring, and   atypical/viral pneumonia.    THIS DOCUMENT HAS BEEN ELECTRONICALLY SIGNED BY MICHELET DOMINGUEZ MD             Physician Progress Notes (last 72 hours) (Notes from 2/3/2017  9:12 AM through 2/6/2017  9:12 AM)      Carlos A Quiroz MD at 2/4/2017 10:11 AM  Version 1 of 1             [facility] Medicine Services    LOS- Day 0  Patient seen, chart reviewed, labs reviewed.  Continue current care.   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  Cp started later in njight , atyupical pain reproducible with lower chest mod tenderness, improved with dilaudid in er, will give low dose dilaudid, gi cocktail and follow cardio recs, trop mild elevated, significant cardiac hiostory , nebs spiriva at home did not help, probably may need angiography, will discuss with cardiology  Carlos A Quiroz MD02/04/1710:11 AM           Electronically signed by Carlos A Quiroz MD at 2/4/2017 10:13 AM      Yomi Ramirez MD at 2/5/2017  8:15 AM  Version 4 of 4           [facility]  PROGRESS NOTE    Date of Admission: 2/4/2017  Length of Stay: 1  Primary Care Physician: Peter Rdz MD    Chief Complaint: f/u CAD  Problem List:  1. Structural heart disease of mixed etiology:  A. History of MI December 2009: s/p stent to RCA and LAD, EF 40%  B. CABGx2  March, 2010: SVG to OMB-1, SVG to PDA  C. EF 30% post-operatively, s/p [place] ICD placement, 2011  D. Echo July 2014: Severe global hypokinesis with EF 12%, moderate AI, mechanical mitral valve, moderate to severe TR, RVSP 43mmHg   E. Echo 2/4/17: EF 18%, mild to mod AI, mod TR, grossly normal prosthetic mitral valve  2. VHD:  A. Mitral valve replacement with 27mm ATS mechanical valve March 2010    3. COPD with ongoing tobacco abuse   4. Systemic Lupus Erythematosus  5. Hypertension  6. Hyperlipidemia   7. CKD  8. PVD       Subjective      Patient states she had some \"pressure\" across her upper abdomen, associated with nausea. Urine output decreased. Again, she states this is different than her prior cardiac pain. Her main complaint is weakness rather than SOA.    Objective     Visit Vitals   • BP 99/65 (BP Location: Right arm, Patient Position: Lying)   • Pulse 77   • Temp 97.6 °F (36.4 °C) (Oral)   • Resp 16   • Ht 67.99\" (172.7 cm)   • Wt 116 lb (52.6 kg)   • SpO2 92%   • " BMI 17.64 kg/m2       Physical Exam:  GENERAL: Alert, cooperative, in no acute distress.   HEENT: Fundiscopic deferred, otherwise unremarkable.  NECK: No Jugular venous distention, adenopathy, or thyromegaly noted.   HEART: No discrete PMI is noted. Mechanical valve sounds present, no murmur or gallop   LUNGS: Bilateral rales and wheezing. No ronchi.   ABDOMEN: Flat without evidence of organomegaly, masses, or tenderness. No abnormalities to palpation.  NEUROLOGIC: No focal abnormalities involving strength or sensation are noted.   EXTREMITIES: No clubbing, cyanosis, or edema noted.     Results:    Results from last 7 days  Lab Units 02/05/17  0524 02/04/17  0247   WBC 10*3/mm3 8.09 7.62   HEMOGLOBIN g/dL 10.3* 10.0*   HEMATOCRIT % 33.4* 30.7*   PLATELETS 10*3/mm3 228 245       Results from last 7 days  Lab Units 02/05/17  0524 02/04/17  0247   SODIUM mmol/L 130* 135   POTASSIUM mmol/L 5.2 4.6   CHLORIDE mmol/L 96* 100   TOTAL CO2 mmol/L 27.0 21.0   BUN mg/dL 37* 32*   CREATININE mg/dL 2.60* 1.80*   GLUCOSE mg/dL 112* 104*      Lab Results   Component Value Date    CHOL 157 02/04/2017    TRIG 141 02/04/2017    HDL 58 02/04/2017    LDLDIRECT 56 02/04/2017     (H) 02/05/2017     (H) 02/05/2017       Results from last 7 days  Lab Units 02/04/17  0247   HEMOGLOBIN A1C % 5.00       Results from last 7 days  Lab Units 02/04/17  0247   BNP pg/mL 2950.0*       Results from last 7 days  Lab Units 02/05/17  0524 02/04/17  0321   PROTIME Seconds 52.7* 60.9*   INR  4.83 5.59   APTT seconds  --  45.8*       Results from last 7 days  Lab Units 02/05/17  0524 02/04/17  0455   TROPONIN I ng/mL 0.082* 0.228*       Intake/Output Summary (Last 24 hours) at 02/05/17 0815  Last data filed at 02/04/17 1900   Gross per 24 hour   Intake   1080 ml   Output    200 ml   Net    880 ml       I personally viewed and interpreted the patient's EKG/Telemetry data    Radiology Data:   CXR 2/4/17:  FINDINGS:     Median sternotomy wires  are present. Heart size upper limits of normal. No   airspace consolidation identified. No visible pneumothorax or pleural effusion.   Moderate diffuse bilateral interstitial opacities. These may represent any   combination of pulmonary vascular congestion, chronic scarring, and   atypical/viral pneumonia.     IMPRESSION:  1. Moderate diffuse bilateral interstitial opacities. These may represent any   combination of pulmonary vascular congestion, chronic scarring, and   atypical/viral pneumonia.    ECHO 2/4/17:  Interpretation Summary      · Left ventricular function is severely decreased. Estimated EF = 18%.  · The left ventricular cavity is borderline dilated.  · Left atrial cavity size is mild-to-moderately dilated.  · Mild to moderate aortic valve regurgitation is present.  · Moderate tricuspid valve regurgitation is present.  · Left ventricular wall thickness is consistent with mild concentric hypertrophy.  · Left ventricular wall segments contract abnormally. Refer to wall scoring diagram for more information.  · Right ventricular cavity is borderline dilated.  · There is no evidence of pericardial effusion.  · The prosthetic mitral valve is grossly normal.  · Mild pulmonary hypertension is present.       Current Medications:    aspirin 325 mg Oral Daily   atorvastatin 80 mg Oral Nightly   budesonide-formoterol 2 puff Inhalation BID - RT   carvedilol 3.125 mg Oral BID With Meals   furosemide 40 mg Intravenous BID   hydroxychloroquine 200 mg Oral Daily With Breakfast   pantoprazole 40 mg Oral Q AM   sertraline 50 mg Oral Daily   warfarin (COUMADIN) (dosing per levels)  Does not apply Daily       Pharmacy to dose warfarin        Assessment and Plan:   1. CAD/ ICM   - troponins 0.13-->0.228-->0.08 this am   - ASA, Lipitor 80mg   - Device interrogation showed 2 episodes of VF in August 2016, VT January 25th, ATPx1    2. SHF:    - Echo showed EF 18%, normally functioning mechanical mitral valve   - BNP 3000, CXR  "with increased vascularity   - Lasix 40mg IV last night    3. FAHEEM on CKD   - consult Nephrology     4. VHD s/p mechanical mitral valve, INR still supratherapeutic this am    The formulation is perplexing.  On review of old records this patient has a history of lupus nephritis.  She now presents with atypical and challenging symptoms that are not primarily related to shortness of air but symptoms that are suggestive of a low cardiac output state.  Since admission, she received 140 mg IV bolus of furosemide with only 200 ml UOP and a rise in serum creatinine from 1.8-2.6.  Clearly, her acute on chronic renal insufficiency is not \"overdiuresis\" but probably related to low cardiac output.  From a clinical standpoint, while others been mild elevation of troponin, I suspect that the presence or absence of a small NSTEMI is largely irrelevant at this time.  I will talk to the intensivist about transferring her to the unit for the use of more aggressive monitoring and medical treatment.      Sandhya Lynch PA-C   02/05/17   8:15 AM       Electronically signed by Yomi Ramirez MD at 2/5/2017 11:56 AM      Sandhya Lynch PA-C at 2/5/2017  8:15 AM  Version 3 of 4           [facility]  PROGRESS NOTE    Date of Admission: 2/4/2017  Length of Stay: 1  Primary Care Physician: Peter Rdz MD    Chief Complaint: f/u CAD  Problem List:  1. Structural heart disease of mixed etiology:  A. History of MI December 2009: s/p stent to RCA and LAD, EF 40%  B. CABGx2  March, 2010: SVG to OMB-1, SVG to PDA  C. EF 30% post-operatively, s/p Blunt ICD placement, 2011  D. Echo July 2014: Severe global hypokinesis with EF 12%, moderate AI, mechanical mitral valve, moderate to severe TR, RVSP 43mmHg   E. Echo 2/4/17: EF 18%, mild to mod AI, mod TR, grossly normal prosthetic mitral valve  2. VHD:  A. Mitral valve replacement with 27mm ATS mechanical valve March 2010    3. COPD with " "ongoing tobacco abuse   4. Systemic Lupus Erythematosus  5. Hypertension  6. Hyperlipidemia   7. CKD  8. PVD       Subjective      Patient states she had some \"pressure\" across her upper abdomen, associated with nausea. Urine output decreased. Again, she states this is different than her prior cardiac pain.     Objective     Visit Vitals   • BP 99/65 (BP Location: Right arm, Patient Position: Lying)   • Pulse 77   • Temp 97.6 °F (36.4 °C) (Oral)   • Resp 16   • Ht 67.99\" (172.7 cm)   • Wt 116 lb (52.6 kg)   • SpO2 92%   • BMI 17.64 kg/m2       Physical Exam:  GENERAL: Alert, cooperative, in no acute distress.   HEENT: Fundiscopic deferred, otherwise unremarkable.  NECK: No Jugular venous distention, adenopathy, or thyromegaly noted.   HEART: No discrete PMI is noted. Mechanical valve sounds present, no murmur or gallop   LUNGS: Bilateral rales and wheezing. No ronchi.   ABDOMEN: Flat without evidence of organomegaly, masses, or tenderness.  NEUROLOGIC: No focal abnormalities involving strength or sensation are noted.   EXTREMITIES: No clubbing, cyanosis, or edema noted.     Results:    Results from last 7 days  Lab Units 02/05/17  0524 02/04/17  0247   WBC 10*3/mm3 8.09 7.62   HEMOGLOBIN g/dL 10.3* 10.0*   HEMATOCRIT % 33.4* 30.7*   PLATELETS 10*3/mm3 228 245       Results from last 7 days  Lab Units 02/05/17  0524 02/04/17  0247   SODIUM mmol/L 130* 135   POTASSIUM mmol/L 5.2 4.6   CHLORIDE mmol/L 96* 100   TOTAL CO2 mmol/L 27.0 21.0   BUN mg/dL 37* 32*   CREATININE mg/dL 2.60* 1.80*   GLUCOSE mg/dL 112* 104*      Lab Results   Component Value Date    CHOL 157 02/04/2017    TRIG 141 02/04/2017    HDL 58 02/04/2017    LDLDIRECT 56 02/04/2017     (H) 02/05/2017     (H) 02/05/2017       Results from last 7 days  Lab Units 02/04/17  0247   HEMOGLOBIN A1C % 5.00       Results from last 7 days  Lab Units 02/04/17  0247   BNP pg/mL 2950.0*       Results from last 7 days  Lab Units 02/05/17  0524 " 02/04/17  0321   PROTIME Seconds 52.7* 60.9*   INR  4.83 5.59   APTT seconds  --  45.8*       Results from last 7 days  Lab Units 02/05/17  0524 02/04/17  0455   TROPONIN I ng/mL 0.082* 0.228*       Intake/Output Summary (Last 24 hours) at 02/05/17 0815  Last data filed at 02/04/17 1900   Gross per 24 hour   Intake   1080 ml   Output    200 ml   Net    880 ml       I personally viewed and interpreted the patient's EKG/Telemetry data    Radiology Data:   CXR 2/4/17:  FINDINGS:     Median sternotomy wires are present. Heart size upper limits of normal. No   airspace consolidation identified. No visible pneumothorax or pleural effusion.   Moderate diffuse bilateral interstitial opacities. These may represent any   combination of pulmonary vascular congestion, chronic scarring, and   atypical/viral pneumonia.     IMPRESSION:  1. Moderate diffuse bilateral interstitial opacities. These may represent any   combination of pulmonary vascular congestion, chronic scarring, and   atypical/viral pneumonia.    ECHO 2/4/17:  Interpretation Summary      · Left ventricular function is severely decreased. Estimated EF = 18%.  · The left ventricular cavity is borderline dilated.  · Left atrial cavity size is mild-to-moderately dilated.  · Mild to moderate aortic valve regurgitation is present.  · Moderate tricuspid valve regurgitation is present.  · Left ventricular wall thickness is consistent with mild concentric hypertrophy.  · Left ventricular wall segments contract abnormally. Refer to wall scoring diagram for more information.  · Right ventricular cavity is borderline dilated.  · There is no evidence of pericardial effusion.  · The prosthetic mitral valve is grossly normal.  · Mild pulmonary hypertension is present.       Current Medications:    aspirin 325 mg Oral Daily   atorvastatin 80 mg Oral Nightly   budesonide-formoterol 2 puff Inhalation BID - RT   carvedilol 3.125 mg Oral BID With Meals   furosemide 40 mg Intravenous  "BID   hydroxychloroquine 200 mg Oral Daily With Breakfast   pantoprazole 40 mg Oral Q AM   sertraline 50 mg Oral Daily   warfarin (COUMADIN) (dosing per levels)  Does not apply Daily       Pharmacy to dose warfarin        Assessment and Plan:   1. CAD/ ICM   - troponins 0.13-->0.228-->0.08 this am   - ASA, Lipitor 80mg   - Device interrogation showed 2 episodes of VF in August 2016, VT January 25th, ATPx1    2. SHF:    - Echo showed EF 18%, normally functioning mechanical mitral valve   - BNP 3000, CXR with increased vascularity   - Lasix 40mg IV last night    3. FAHEEM on CKD   - consult Nephrology     4. VHD s/p mechanical mitral valve, INR still supratherapeutic this am      Sandhya Lynch PA-C   02/05/17   8:15 AM       Electronically signed by Sandhya Lynch PA-C at 2/5/2017 10:15 AM      Sandhya Lynch PA-C at 2/5/2017  8:15 AM  Version 2 of 4           [facility]  PROGRESS NOTE    Date of Admission: 2/4/2017  Length of Stay: 1  Primary Care Physician: Peter Rdz MD    Chief Complaint: f/u CAD  Problem List:  1. Structural heart disease of mixed etiology:  A. History of MI December 2009: s/p stent to RCA and LAD  B. CABGx2  March, 2010: SVG to OMB-1, SVG to PDA  C. EF 30% post-operatively, s/p [place] ICD placement, 2011  D. Echo July 2014: Severe global hypokinesis with EF 12%, moderate AI, mechanical mitral valve, moderate to severe TR, RVSP 43mmHg   E. Echo 2/4/17: EF 18%, mild to mod AI, mod TR, grossly normal prosthetic mitral valve  2. VHD:  A. Mitral valve replacement with 27mm ATS mechanical valve March 2010    3. COPD with ongoing tobacco abuse   4. Systemic Lupus Erythematosus  5. Hypertension  6. Hyperlipidemia   7. CKD  8. PVD       Subjective      Patient states she had some \"pressure\" across her upper abdomen, associated with nausea. Urine output decreased. Again, she states this is different than her prior cardiac pain. " "    Objective     Visit Vitals   • BP 99/65 (BP Location: Right arm, Patient Position: Lying)   • Pulse 77   • Temp 97.6 °F (36.4 °C) (Oral)   • Resp 16   • Ht 67.99\" (172.7 cm)   • Wt 116 lb (52.6 kg)   • SpO2 92%   • BMI 17.64 kg/m2       Physical Exam:  GENERAL: Alert, cooperative, in no acute distress.   HEENT: Fundiscopic deferred, otherwise unremarkable.  NECK: No Jugular venous distention, adenopathy, or thyromegaly noted.   HEART: No discrete PMI is noted. Mechanical valve sounds present, no murmur or gallop   LUNGS: Bilateral rales and wheezing. No ronchi.   ABDOMEN: Flat without evidence of organomegaly, masses, or tenderness.  NEUROLOGIC: No focal abnormalities involving strength or sensation are noted.   EXTREMITIES: No clubbing, cyanosis, or edema noted.     Results:    Results from last 7 days  Lab Units 02/05/17 0524 02/04/17  0247   WBC 10*3/mm3 8.09 7.62   HEMOGLOBIN g/dL 10.3* 10.0*   HEMATOCRIT % 33.4* 30.7*   PLATELETS 10*3/mm3 228 245       Results from last 7 days  Lab Units 02/05/17 0524 02/04/17  0247   SODIUM mmol/L 130* 135   POTASSIUM mmol/L 5.2 4.6   CHLORIDE mmol/L 96* 100   TOTAL CO2 mmol/L 27.0 21.0   BUN mg/dL 37* 32*   CREATININE mg/dL 2.60* 1.80*   GLUCOSE mg/dL 112* 104*      Lab Results   Component Value Date    CHOL 157 02/04/2017    TRIG 141 02/04/2017    HDL 58 02/04/2017    LDLDIRECT 56 02/04/2017     (H) 02/05/2017     (H) 02/05/2017       Results from last 7 days  Lab Units 02/04/17  0247   HEMOGLOBIN A1C % 5.00       Results from last 7 days  Lab Units 02/04/17  0247   BNP pg/mL 2950.0*       Results from last 7 days  Lab Units 02/05/17 0524 02/04/17  0321   PROTIME Seconds 52.7* 60.9*   INR  4.83 5.59   APTT seconds  --  45.8*       Results from last 7 days  Lab Units 02/05/17 0524 02/04/17  0455   TROPONIN I ng/mL 0.082* 0.228*       Intake/Output Summary (Last 24 hours) at 02/05/17 0815  Last data filed at 02/04/17 1900   Gross per 24 hour   Intake   " 1080 ml   Output    200 ml   Net    880 ml       I personally viewed and interpreted the patient's EKG/Telemetry data    Radiology Data:   CXR 2/4/17:  FINDINGS:     Median sternotomy wires are present. Heart size upper limits of normal. No   airspace consolidation identified. No visible pneumothorax or pleural effusion.   Moderate diffuse bilateral interstitial opacities. These may represent any   combination of pulmonary vascular congestion, chronic scarring, and   atypical/viral pneumonia.     IMPRESSION:  1. Moderate diffuse bilateral interstitial opacities. These may represent any   combination of pulmonary vascular congestion, chronic scarring, and   atypical/viral pneumonia.    ECHO 2/4/17:  Interpretation Summary      · Left ventricular function is severely decreased. Estimated EF = 18%.  · The left ventricular cavity is borderline dilated.  · Left atrial cavity size is mild-to-moderately dilated.  · Mild to moderate aortic valve regurgitation is present.  · Moderate tricuspid valve regurgitation is present.  · Left ventricular wall thickness is consistent with mild concentric hypertrophy.  · Left ventricular wall segments contract abnormally. Refer to wall scoring diagram for more information.  · Right ventricular cavity is borderline dilated.  · There is no evidence of pericardial effusion.  · The prosthetic mitral valve is grossly normal.  · Mild pulmonary hypertension is present.       Current Medications:    aspirin 325 mg Oral Daily   atorvastatin 80 mg Oral Nightly   budesonide-formoterol 2 puff Inhalation BID - RT   carvedilol 3.125 mg Oral BID With Meals   furosemide 40 mg Intravenous BID   hydroxychloroquine 200 mg Oral Daily With Breakfast   pantoprazole 40 mg Oral Q AM   sertraline 50 mg Oral Daily   warfarin (COUMADIN) (dosing per levels)  Does not apply Daily       Pharmacy to dose warfarin        Assessment and Plan:   1. CAD/ ICM   - troponins 0.13-->0.228-->0.08 this am   - ASA, Lipitor  "80mg   - Device interrogation showed 2 episodes of VF in August 2016, VT January 25th, ATPx1    2. SHF:    - Echo showed EF 18%, normally functioning mechanical mitral valve   - BNP 3000, CXR with increased vascularity   - Lasix 40mg IV last night    3. FAHEEM on CKD   - consult Nephrology     4. VHD s/p mechanical mitral valve, INR still supratherapeutic this am      Sandhya Lynch PA-C   02/05/17   8:15 AM       Electronically signed by Sandhya Lynch PA-C at 2/5/2017 10:14 AM      Sandhya Lynch PA-C at 2/5/2017  8:15 AM  Version 1 of 4           [place] [facility]  PROGRESS NOTE    Date of Admission: 2/4/2017  Length of Stay: 1  Primary Care Physician: Peter Rdz MD    Chief Complaint: f/u CAD  Problem List:  1. Structural heart disease of mixed etiology:  A. History of MI December 2009: s/p stent to RCA and LAD  B. CABGx2  March, 2010: SVG to OMB-1, SVG to PDA  C. EF 30% post-operatively, s/p [place] ICD placement, 2011  D. Echo July 2014: Severe global hypokinesis with EF 12%, moderate AI, mechanical mitral valve, moderate to severe TR, RVSP 43mmHg   E. Echo 2/4/17: EF 18%, mild to mod AI, mod TR, grossly normal prosthetic mitral valve  2. VHD:  A. Mitral valve replacement with 27mm ATS mechanical valve March 2010    3. COPD with ongoing tobacco abuse   4. Systemic Lupus Erythematosus  5. Hypertension  6. Hyperlipidemia   7. CKD  8. PVD       Subjective      Patient states she had some \"pressure\" across her upper abdomen, associated with nausea. Urine output decreased. Again, she states this is different than her prior cardiac pain.     Objective     Visit Vitals   • BP 99/65 (BP Location: Right arm, Patient Position: Lying)   • Pulse 77   • Temp 97.6 °F (36.4 °C) (Oral)   • Resp 16   • Ht 67.99\" (172.7 cm)   • Wt 116 lb (52.6 kg)   • SpO2 92%   • BMI 17.64 kg/m2       Physical Exam:  GENERAL: Alert, cooperative, in no acute distress.   HEENT: " Fundiscopic deferred, otherwise unremarkable.  NECK: No Jugular venous distention, adenopathy, or thyromegaly noted.   HEART: No discrete PMI is noted. Mechanical valve sounds present, no murmur or gallop   LUNGS: Bilateral rales and wheezing. No ronchi.   ABDOMEN: Flat without evidence of organomegaly, masses, or tenderness.  NEUROLOGIC: No focal abnormalities involving strength or sensation are noted.   EXTREMITIES: No clubbing, cyanosis, or edema noted.     Results:    Results from last 7 days  Lab Units 02/05/17  0524 02/04/17  0247   WBC 10*3/mm3 8.09 7.62   HEMOGLOBIN g/dL 10.3* 10.0*   HEMATOCRIT % 33.4* 30.7*   PLATELETS 10*3/mm3 228 245       Results from last 7 days  Lab Units 02/05/17  0524 02/04/17  0247   SODIUM mmol/L 130* 135   POTASSIUM mmol/L 5.2 4.6   CHLORIDE mmol/L 96* 100   TOTAL CO2 mmol/L 27.0 21.0   BUN mg/dL 37* 32*   CREATININE mg/dL 2.60* 1.80*   GLUCOSE mg/dL 112* 104*      Lab Results   Component Value Date    CHOL 157 02/04/2017    TRIG 141 02/04/2017    HDL 58 02/04/2017    LDLDIRECT 56 02/04/2017     (H) 02/05/2017     (H) 02/05/2017       Results from last 7 days  Lab Units 02/04/17  0247   HEMOGLOBIN A1C % 5.00       Results from last 7 days  Lab Units 02/04/17  0247   BNP pg/mL 2950.0*       Results from last 7 days  Lab Units 02/05/17  0524 02/04/17  0321   PROTIME Seconds 52.7* 60.9*   INR  4.83 5.59   APTT seconds  --  45.8*       Results from last 7 days  Lab Units 02/05/17  0524 02/04/17  0455   TROPONIN I ng/mL 0.082* 0.228*       Intake/Output Summary (Last 24 hours) at 02/05/17 0815  Last data filed at 02/04/17 1900   Gross per 24 hour   Intake   1080 ml   Output    200 ml   Net    880 ml       I personally viewed and interpreted the patient's EKG/Telemetry data    Radiology Data:   CXR 2/4/17:  FINDINGS:     Median sternotomy wires are present. Heart size upper limits of normal. No   airspace consolidation identified. No visible pneumothorax or pleural  effusion.   Moderate diffuse bilateral interstitial opacities. These may represent any   combination of pulmonary vascular congestion, chronic scarring, and   atypical/viral pneumonia.     IMPRESSION:  1. Moderate diffuse bilateral interstitial opacities. These may represent any   combination of pulmonary vascular congestion, chronic scarring, and   atypical/viral pneumonia.    ECHO 2/4/17:  Interpretation Summary      · Left ventricular function is severely decreased. Estimated EF = 18%.  · The left ventricular cavity is borderline dilated.  · Left atrial cavity size is mild-to-moderately dilated.  · Mild to moderate aortic valve regurgitation is present.  · Moderate tricuspid valve regurgitation is present.  · Left ventricular wall thickness is consistent with mild concentric hypertrophy.  · Left ventricular wall segments contract abnormally. Refer to wall scoring diagram for more information.  · Right ventricular cavity is borderline dilated.  · There is no evidence of pericardial effusion.  · The prosthetic mitral valve is grossly normal.  · Mild pulmonary hypertension is present.       Current Medications:    aspirin 325 mg Oral Daily   atorvastatin 80 mg Oral Nightly   budesonide-formoterol 2 puff Inhalation BID - RT   carvedilol 3.125 mg Oral BID With Meals   furosemide 40 mg Intravenous BID   hydroxychloroquine 200 mg Oral Daily With Breakfast   pantoprazole 40 mg Oral Q AM   sertraline 50 mg Oral Daily   warfarin (COUMADIN) (dosing per levels)  Does not apply Daily       Pharmacy to dose warfarin        Assessment and Plan:   1. CAD/ ICM   - troponins 0.13-->0.228-->0.08 this am   - ASA, Lipitor 80mg   - Device interrogation showed 2 episodes of VF in August 2016, VT January 25th, ATPx1    2. SHF:    - Echo showed EF 18%, normally functioning mechanical mitral valve   - BNP >2000, CXR with increased vascularity   - Lasix 40mg IV last night    3. FAHEEM on CKD   - consult Nephrology     4. VHD s/p mechanical  mitral valve, INR still supratherapeutic this am      Sandhya Lynch PA-C   02/05/17   8:15 AM       Electronically signed by Sandhya Lynch PA-C at 2/5/2017  8:40 AM      Minesh Nichole MD at 2/5/2017 11:19 AM  Version 1 of 1         NAL Consult Note    Ashely Roldan  1960  [id number]    Date of Admit:  2/4/2017    Date of Consult: 2/5/2017        Requesting Provider: No ref. provider found  Evaluating Physician: Minesh Nichole MD        Reason for Consultation:  FAHEEM ON CKD.  History of present illness:    Patient is a 56 y.o.  Yr old female WITH H/O CKD ( PER PATIENT ? LUPUS NEPHRITIS WITH KIDNEY BX DONE YRS AGO AT [facility] IN [facility]. MAY HAVE BEEN TX'ED WITH CYTOXAN IN THE PAST ) WHO WE ARE ASKED TO SEE FOR ABNORMAL RENAL FXN WHO WAS ADMITTED WITH SOB AND ? CHF. WAS MOST RECENTLY SEEN BY NEPHROLOGIST IN [place] ( SHE DOES NOT KNOW THE NAME ). CREAT 2.6 2/5/17, 1.8 2/4/17, 1.1 4/2010. SEVERE CHF WITH LVEF 12%. DO NOT HAVE ANY OF OLD RECORDS.     Past Medical History   Diagnosis Date   • Allergic rhinitis 08/01/2014   • Anemia    • Anxiety    • Arthritis    • CAD (coronary artery disease)    • CHF (congestive heart failure)    • CKD (chronic kidney disease)    • Colitis, Clostridium difficile    • COPD (chronic obstructive pulmonary disease)    • Coronary atherosclerosis    • Depression    • Emphysema of lung    • GERD (gastroesophageal reflux disease)    • Heart attack    • Heart murmur    • Hematoma of hip    • Hip fracture    • Hyperlipidemia    • Hypertension      benign essential   • Ischemic cardiomyopathy 08/01/2014     ef 29% s/p ICD   • Knee injury    • Lung disease    • Mitral valve disorder 03/28/2013   • Mitral valve insufficiency      s/p prosthetic ATS valve replacement   • Osteoporosis    • Postmenopausal      natural   • PVD (peripheral vascular disease)    • Pyelonephritis    • Renal failure    • Renal failure    • Renal failure, acute    •  Syncope    • Systemic lupus erythematosus    • TIA (transient ischemic attack) 04/04/2012   • UTI (urinary tract infection)    • Valvular heart disease    • Wrist fracture        Past Surgical History   Procedure Laterality Date   • Endoscopy  10/23/2014     Dr. Brand; retained food in stomach; he dilated her esophagus; 5-30-14 hiatus hernia, gastritis   • Cholecystectomy  2010   • Joint replacement Right 06/25/2015     Dr. Fitzgerald; first surgery 1-29-14; redo 5-4-15   • Coronary artery bypass graft  2011     4 aortic bypasses, abdominal aorta; 2011-mitral valve; 2010-triple bypass   • Cardiac defibrillator placement     • Total hip arthroplasty Right      3 times within 8 months   • Mitral valve replacement       MECHANICAL       Social History     Social History   • Marital status:      Spouse name: N/A   • Number of children: N/A   • Years of education: N/A     Social History Main Topics   • Smoking status: Current Every Day Smoker     Packs/day: 1.00     Types: Cigarettes   • Smokeless tobacco: None   • Alcohol use Yes      Comment: once a week   • Drug use: No   • Sexual activity: Defer     Other Topics Concern   • None     Social History Narrative    Pt recently  after 30 years. Just moved from [facility] into her son's home in [place].        family history includes Alcohol abuse in her brother; Arthritis in her mother; Cancer in her other; Diabetes in her other; Heart attack in her father; Heart disease in her maternal uncle and other; Hypertension in her other; Lung cancer in her paternal grandfather; Stroke in her other. NO FH OF RENAL DZ.    Allergies   Allergen Reactions   • Morphine And Related GI Intolerance and Irritability   • Sulfa Antibiotics        Medication:    Current Facility-Administered Medications:   •  albuterol (PROVENTIL) nebulizer solution 0.5% 2.5 mg/0.5mL, 2.5 mg, Nebulization, Q4H PRN, Junior Philip MD  •  aluminum-magnesium hydroxide-simethicone (MAALOX MAX)  400-400-40 MG/5ML suspension 15 mL, 15 mL, Oral, Q6H PRN, Carlos A Quiroz MD  •  aspirin EC tablet 325 mg, 325 mg, Oral, Daily, Junior Philip MD, 325 mg at 02/05/17 0919  •  atorvastatin (LIPITOR) tablet 80 mg, 80 mg, Oral, Nightly, Junior Philip MD, 80 mg at 02/04/17 2001  •  budesonide-formoterol (SYMBICORT) 160-4.5 MCG/ACT inhaler 2 puff, 2 puff, Inhalation, BID - RT, Junior Philip MD, 2 puff at 02/05/17 0940  •  carvedilol (COREG) tablet 3.125 mg, 3.125 mg, Oral, BID With Meals, Junior Philip MD, 3.125 mg at 02/05/17 0919  •  clonazePAM (KlonoPIN) tablet 2 mg, 2 mg, Oral, TID PRN, Junior Philip MD, 2 mg at 02/05/17 0919  •  diphenhydrAMINE (BENADRYL) capsule 25 mg, 25 mg, Oral, Nightly PRN, Junior Philip MD  •  HYDROmorphone (DILAUDID) injection 0.5 mg, 0.5 mg, Intravenous, Q4H PRN, Carlos A Quiroz MD, 0.5 mg at 02/04/17 2001  •  hydroxychloroquine (PLAQUENIL) tablet 200 mg, 200 mg, Oral, Daily With Breakfast, Junior Philip MD, 200 mg at 02/05/17 0919  •  ondansetron (ZOFRAN) injection 4 mg, 4 mg, Intravenous, Q6H PRN, Hector Fregoso MD, 4 mg at 02/05/17 0521  •  pantoprazole (PROTONIX) EC tablet 40 mg, 40 mg, Oral, Q AM, Carlos A Quiroz MD, 40 mg at 02/05/17 0521  •  Pharmacy to dose warfarin, , Does not apply, Continuous PRN, Junior Philip MD  •  sertraline (ZOLOFT) tablet 50 mg, 50 mg, Oral, Daily, Junior Philip MD, 50 mg at 02/05/17 0919  •  sodium chloride 0.9 % flush 10 mL, 10 mL, Intravenous, PRN, Wally Olmstead,   •  tiZANidine (ZANAFLEX) tablet 4 mg, 4 mg, Oral, Q12H PRN, Junior Philip MD, 4 mg at 02/05/17 1024  •  traMADol (ULTRAM) tablet 50 mg, 50 mg, Oral, Q6H PRN, Junior Philip MD, 50 mg at 02/04/17 0630  •  warfarin (COUMADIN) (dosing per levels), , Does not apply, Daily, Titi Lo, [facility]    Prescriptions Prior to Admission   Medication Sig Dispense Refill Last Dose   • acetaminophen (TYLENOL) 325 MG tablet Take 650 mg by mouth every 6 (six) hours as needed for mild pain  (1-3).   Taking   • albuterol (PROVENTIL HFA;VENTOLIN HFA) 108 (90 BASE) MCG/ACT inhaler Inhale 2 puffs every 6 (six) hours as needed.   Taking   • budesonide-formoterol (SYMBICORT) 160-4.5 MCG/ACT inhaler Inhale 2 puffs 2 (two) times a day. In morning and evening for 90 days   Taking   • Calcium Carbonate (CALCIUM 600) 1500 MG tablet Take 1 tablet by mouth 2 (two) times a day.   Taking   • calcium carbonate (TUMS) 500 MG chewable tablet Chew 1 tablet 3 (Three) Times a Day As Needed for indigestion or heartburn.      • carvedilol (COREG) 3.125 MG tablet Take 3.125 mg by mouth 2 (two) times a day with meals.   Taking   • clonazePAM (KLONOPIN) 2 MG tablet Take 1 tablet by mouth 3 (three) times a day as needed (Tremors). 270 tablet 0    • diphenhydrAMINE (BENADRYL) 25 MG tablet Take 25 mg by mouth as needed.   Taking   • Ferrous Sulfate (IRON) 325 (65 FE) MG tablet Take  by mouth.   Taking   • furosemide (LASIX) 40 MG tablet Take 40 mg by mouth daily as needed (swelling).   Taking   • hydroxychloroquine (PLAQUENIL) 200 MG tablet Take 200 mg by mouth every morning.   Taking   • nitroglycerin (NITROSTAT) 0.4 MG SL tablet 1 under the tongue as needed for angina, may repeat q5mins for up three doses 30 tablet 3 Taking   • Probiotic Product (PROBIOTIC PO) Take 1 capsule by mouth daily. 20 billion cell; for 30 days   Taking   • rosuvastatin (CRESTOR) 10 MG tablet Take 1 tablet (10 mg total) by mouth nightly. For 90 days 90 tablet 3 Taking   • sertraline (ZOLOFT) 50 MG tablet Take 50 mg by mouth daily. For stress for 90 days   Taking   • tiotropium (SPIRIVA) 18 MCG per inhalation capsule Place 2 puffs (1 capsule total) into inhaler and inhale once daily. For 90 days (Patient taking differently: Place 1 capsule into inhaler and inhale daily as needed. For 90 days) 90 capsule 3 Taking   • TiZANidine (ZANAFLEX) 4 MG capsule Take 4 mg by mouth 2 (two) times a day as needed.   Taking   • traZODone (DESYREL) 50 MG tablet Take 1  tablet (50 mg total) by mouth nightly. For insomnia 90 tablet 3 Taking   • warfarin (COUMADIN) 1 MG tablet Take as directed 180 tablet 3    • warfarin (COUMADIN) 2 MG tablet Take 1 tablet by mouth Daily. 180 tablet 3    • warfarin (COUMADIN) 3 MG tablet Take as directed 180 tablet 3    • alendronate (FOSAMAX) 70 MG tablet Take 70 mg by mouth every 7 days. Patient takes on Saturday   Taking   • cholestyramine (QUESTRAN) 4 GM/DOSE powder Take 4 g by mouth 2 (two) times a day. Dissolved in 2 to 6 ounces of water or non carbonated beverage   Taking   • esomeprazole (NexIUM) 40 MG capsule Take 40 mg by mouth daily. For 90 days   Taking   • traMADol (ULTRAM) 50 MG tablet Take 1 tablet by mouth every 6 (six) hours as needed for moderate pain (4-6). 40 tablet 1        Review of Systems:    Constitutional-- No Fever, chills or sweats. No significant change in weight. + FATIGUE. DECREASED ACTIVITY.   Eye-- no diplopia, no conjunctivitis  ENT-- No new hearing or throat complaints.  No epistaxis or oral sores. No odynophagia or dysphagia. No headache, photophobia or neck stiffness. + NASAL CONGESTION.  CV-- + chest pain. NO palpitations. + soa. + edema  Resp-- + SOB/cough. NO Hemoptysis  GI- + nausea. NO vomiting or diarrhea.  No hematochezia, melena, or hematemesis.  -- No dysuria, hematuria, or flank pain. No lower tract obstructive symptoms  Skin-- No rash, warm and dry  Lymph- no painful or swollen lymph nodes in neck/axilla or groin.   Heme- No active bruising or bleeding; no Hx of DVT or PE.  MS-- no swelling or pain in the joints  Neuro-- No acute focal weakness or numbness in the arms or legs.  No seizures. + GEN WEAKNESS.  Psych--No anxiety or depression. + AGITATION.  Endo--No cold or heat intolerance.  No polyuria, polydipsia, or polyphagia    Full review of systems reviewed and negative otherwise for acute complaints    Physical Exam:   Vital Signs   Blood pressure 123/87, pulse 89, temperature 97.6 °F (36.4 °C),  "temperature source Oral, resp. rate 16, height 67.99\" (172.7 cm), weight 116 lb (52.6 kg), SpO2 99 %, not currently breastfeeding.     GENERAL: Awake and alert, in no acute distress. THIN FRAIL APPEARING.   HEENT: Normocephalic, atraumatic.  PER.  No conjunctivitis. No icterus. Oropharynx clear without evidence of thrush or exudate. No evidence of peridontal disease.    NECK: Supple without nuchal rigidity. No mass.  LYMPH: No painful cervical, axillary or inguinal lymphadenopathy.  HEART: RRR; No murmur, rubs, gallops. No bruits in neck, abdomen, or groins, TR edema  LUNGS: FEW CRACKLES.  ABDOMEN: Soft, nontender, nondistended. Positive bowel sounds. No rebound or guarding. NO mass or HSM.  JOINTS:  Full range of motion, no redness or tenderness.  EXT:  No cyanosis, clubbing.   :  External genitalia without swelling or lesion  SKIN: Warm and dry without rash  NEURO: Oriented to PPT. No focal neurological deficits. Strength equal bilateral  PSYCHIATRIC: Normal insight and judgement. Cooperative with PE    Laboratory Data    Results from last 7 days  Lab Units 02/05/17 0524 02/04/17  0247   HEMOGLOBIN g/dL 10.3* 10.0*   HEMATOCRIT % 33.4* 30.7*       Results from last 7 days  Lab Units 02/05/17 0524 02/04/17  0247   SODIUM mmol/L 130* 135   POTASSIUM mmol/L 5.2 4.6   CHLORIDE mmol/L 96* 100   TOTAL CO2 mmol/L 27.0 21.0   BUN mg/dL 37* 32*   CREATININE mg/dL 2.60* 1.80*   CALCIUM mg/dL 9.3 9.9   ALBUMIN g/dL 3.60 4.00       Results from last 7 days  Lab Units 02/05/17  0524   GLUCOSE mg/dL 112*           Results from last 7 days  Lab Units 02/05/17 0524   ALK PHOS U/L 151*   BILIRUBIN mg/dL 0.3   ALT (SGPT) U/L 162*   AST (SGOT) U/L 271*     Estimated Creatinine Clearance: 19.8 mL/min (by C-G formula based on Cr of 2.6).    Radiology:          Impression: FAHEEM LIKELY FROM CARDIORENAL SYNDROME AND DIURESIS. CKD BY H/O LUPUS NEPHRITIS.  ANEMIA. SEVERE ICM.      PLAN: Thank you for asking us to see Ashely Roldan, " I recommend the following: RENAL U/S. CHECK UA, CPK, RENAL PANEL AND FE STUDIES. TRY TO GET OLD RENAL BX AND RECORDS.        Minesh Nichole MD  2/5/2017  11:20 AM                     Electronically signed by Minesh Nichole MD at 2/5/2017 11:38 AM      Jose Sal MD at 2/5/2017  2:21 PM  Version 1 of 1         Intensive Care Follow-up     Hospital:  LOS: 1 day   Ms. Ashely Roldan, 56 y.o. female is followed for:   Systolic heart failure     Subjective  Subjective   Interval History:is a very nice 56-year-old woman with a history of chronic obstructive pulmonary disease, systolic heart failure with previous ICD implantation, prior candidal mitral valve replacement with chronic anticoagulation, chronic renal insufficiency of unknown baseline, and systemic lupus erythematosus who has in the past several months moved to [place] to live with her son. The majority of the details of her past medical history are not forthcoming at this time. She was admitted to the hospital for chest pain and increased troponin levels. She was found to have an elevated BNP and an echocardiogram showed an ejection fraction of approximately 16-18%. She received some diuresis overnight. She had minimal urinary output. Her creatinine has increased and her blood pressure has been in the 80s. I been asked to take her to the intensive care unit for pressor and inotropic support.    She is sleepy at this point but answers all questions appropriately. Her blood pressures currently 80 systolic. She denies any chest pain or shortness of breath. She does not have a recent history of lower extremity edema. I do note that she is over anticoagulated and warfarin is being held.    The patient's relevant past medical, surgical and social history were reviewed and updated in Epic as appropriate.     Objective  Objective     Infusions:    DOPamine 2-20 mcg/kg/min Last Rate: 2 mcg/kg/min (02/05/17 1320)   Pharmacy to dose  "warfarin       Medications:    aspirin 325 mg Oral Daily   atorvastatin 80 mg Oral Nightly   budesonide-formoterol 2 puff Inhalation BID - RT   carvedilol 3.125 mg Oral BID With Meals   hydroxychloroquine 200 mg Oral Daily With Breakfast   pantoprazole 40 mg Oral Q AM   sertraline 50 mg Oral Daily   warfarin (COUMADIN) (dosing per levels)  Does not apply Daily       Vital Sign Min/Max for last 24 hours  Temp  Min: 95.8 °F (35.4 °C)  Max: 97.6 °F (36.4 °C)   BP  Min: 72/54  Max: 123/87   Pulse  Min: 61  Max: 89   Resp  Min: 14  Max: 18   SpO2  Min: 91 %  Max: 100 %   Flow (L/min)  Min: 1  Max: 3       Input/Output for last 24 hour shift  02/04 0701 - 02/05 0700  In: 1080 [P.O.:1080]  Out: 200 [Urine:200]      Objective:  General Appearance:  Comfortable, ill-appearing, in no acute distress and not in pain.    Vital signs: (most recent): Blood pressure 110/66, pulse 80, temperature 97.5 °F (36.4 °C), temperature source Oral, resp. rate 16, height 67.99\" (172.7 cm), weight 116 lb (52.6 kg), SpO2 96 %, not currently breastfeeding.  No fever.    Output: Producing urine.    HEENT: Normal HEENT exam.    Lungs:  Normal respiratory rate and normal effort.  She is not in respiratory distress.  Breath sounds clear to auscultation.  No stridor.  There are decreased breath sounds.  No wheezes, rales or rhonchi.    Heart: Normal rate.  Regular rhythm.  S1 normal and S2 normal.  No murmur, gallop or friction rub.   Chest: No chest wall tenderness.  Symmetric chest wall expansion.   Abdomen: Abdomen is soft and non-distended.  Hypoactive bowel sounds.   There is no rebound tenderness.   There is no mass.   Extremities: Normal range of motion.  There is no deformity or dependent edema.    Neurological: Patient is alert and oriented to person, place and time.  Normal strength.    Pupils:  Pupils are equal, round, and reactive to light.  Pupils are equal.   Skin:  Warm and dry.  No rash, ecchymosis, cyanosis or ulceration. "               Results from last 7 days  Lab Units 02/05/17  0524 02/04/17  0247   WBC 10*3/mm3 8.09 7.62   HEMOGLOBIN g/dL 10.3* 10.0*   PLATELETS 10*3/mm3 228 245       Results from last 7 days  Lab Units 02/05/17  0524 02/04/17  0247   SODIUM mmol/L 130* 135   POTASSIUM mmol/L 5.2 4.6   TOTAL CO2 mmol/L 27.0 21.0   BUN mg/dL 37* 32*   CREATININE mg/dL 2.60* 1.80*   GLUCOSE mg/dL 112* 104*     Estimated Creatinine Clearance: 19.8 mL/min (by C-G formula based on Cr of 2.6).          I reviewed the patient's results and images.     Assessment&#47;Plan   Impression      Principal Problem:    Systolic heart failure, chronic  Active Problems:    Emphysema of lung    Hyperlipidemia    Hypertension    Ischemic cardiomyopathy    Chronic renal failure    Lupus (systemic lupus erythematosus)    AICD (automatic cardioverter/defibrillator) present    Chest pain       Plan      are waiting a urinalysis. If there is active sediment I would elect to treat this with steroids.  She has been moved to the intensive care unit and we will place her on noninvasive hemodynamic monitoring.  Her INR is well above 4 and I feel that it would be a bit risky to place a central venous catheter at this point.  We will start dopamine to maintain her blood pressure and increase her cardiac output and titrate based upon our numbers.  Avoid nephrotoxins.  I have explained this to the patient's family and I discussed the case with Dr. Ramirez.    The patient is critically ill.    Plan of care and goals reviewed with mulitdisciplinary team at daily rounds.   I discussed the patient's findings and my recommendations with patient, family, nursing staff and consulting provider     Time spent Critical care 36 min (It does not include procedure time).    Jose Sal MD, [facility]  Pulmonary and Critical Care Medicine  02/05/17 2:21 PM        Electronically signed by Jose Sal MD at 2/5/2017  2:28 PM      Minesh Nichole MD at 2/5/2017   "5:08 PM  Version 1 of 1         PVR ON BLADDER SCAN > 600 ML. WILL PLACE A GARCIA AND CONSULT UROLOGY.     Electronically signed by Minesh Nichole MD at 2/5/2017  5:09 PM      MAN Quesada at 2/6/2017  8:39 AM  Version 2 of 2         [facility]  IP Progress Note      Chief Complaint: Systolic Heart Failure    Subjective:Denies Dyspnea    Objective:  Blood pressure 109/57, pulse 90, temperature 98.4 °F (36.9 °C), temperature source Oral, resp. rate 14, height 67.99\" (172.7 cm), weight 116 lb (52.6 kg), SpO2 93 %, not currently breastfeeding.     Intake/Output Summary (Last 24 hours) at 02/06/17 0853  Last data filed at 02/06/17 0600   Gross per 24 hour   Intake  231.5 ml   Output    816 ml   Net -584.5 ml       Physical Exam:  General: No acute distress.   Neck: no JVD.  Chest:No respiratory distress, breath sounds are normal. No wheezes,  Rhonchi. Few rales bases  Cardiovascular: Normal S1 and S2, 2/6 ANASTACIA, no gallop or rub.    Extremities: trace edema.    Results Review:     I reviewed the patient's new clinical results.      Results from last 7 days  Lab Units 02/06/17  0348   WBC 10*3/mm3 9.10   HEMOGLOBIN g/dL 9.9*   HEMATOCRIT % 31.6*   PLATELETS 10*3/mm3 191       Results from last 7 days  Lab Units 02/06/17  0348 02/05/17  0524   SODIUM mmol/L 128* 130*   POTASSIUM mmol/L 5.8* 5.2   CHLORIDE mmol/L 96* 96*   TOTAL CO2 mmol/L 24.0 27.0   BUN mg/dL 45* 37*   CREATININE mg/dL 2.80* 2.60*   CALCIUM mg/dL 9.2 9.3   BILIRUBIN mg/dL  --  0.3   ALK PHOS U/L  --  151*   ALT (SGPT) U/L  --  162*   AST (SGOT) U/L  --  271*   GLUCOSE mg/dL 102* 112*       Results from last 7 days  Lab Units 02/06/17  0348   SODIUM mmol/L 128*   POTASSIUM mmol/L 5.8*   CHLORIDE mmol/L 96*   TOTAL CO2 mmol/L 24.0   BUN mg/dL 45*   CREATININE mg/dL 2.80*   GLUCOSE mg/dL 102*   CALCIUM mg/dL 9.2       Results from last 7 days  Lab Units 02/06/17  0348 02/05/17  0524 02/04/17  0321   INR  " 5.11 4.83 5.59     Lab Results   Component Value Date    CKTOTAL 195 (H) 02/06/2017    CKMB 1.9 06/28/2015    TROPONINI 0.058 (H) 02/05/2017    TROPONINT <0.010 03/13/2016           Results from last 7 days  Lab Units 02/04/17  0247   CHOLESTEROL mg/dL 157   TRIGLYCERIDES mg/dL 141   HDL CHOL mg/dL 58       Results from last 7 days  Lab Units 02/04/17  0247   BNP pg/mL 2950.0*       Tele: Sinus Rythym    Interpretation Summary   · Left ventricular function is severely decreased. Estimated EF = 18%.  · The left ventricular cavity is borderline dilated.  · Left atrial cavity size is mild-to-moderately dilated.  · Mild to moderate aortic valve regurgitation is present.  · Moderate tricuspid valve regurgitation is present.  · Left ventricular wall thickness is consistent with mild concentric hypertrophy.  · Left ventricular wall segments contract abnormally. Refer to wall scoring diagram for more information.  · Right ventricular cavity is borderline dilated.  · There is no evidence of pericardial effusion.  · The prosthetic mitral valve is grossly normal.  · Mild pulmonary hypertension is present.         Assessment:  Hospital Problem List     * (Principal)Systolic heart failure, chronic    Overview Addendum 2/6/2017  8:44 AM by MAN Quesada             Ischemic cardiomyopathy    Overview Signed 2/6/2017  8:44 AM by MAN Quesada     A. History of MI December 2009: s/p stent to RCA and LAD, EF 40%  B. CABGx2 March, 2010: SVG to OMB-1, SVG to PDA  C. EF 30% post-operatively, s/p [place] ICD placement, 2011  D. Echo July 2014: Severe global hypokinesis with EF 12%, moderate AI, mechanical mitral valve, moderate to severe TR, RVSP 43mmHg   E. Echo 2/4/17: EF 18%, mild to mod AI, mod TR, grossly normal prosthetic mitral valve         H/O mitral valve replacement    Overview Signed 2/6/2017  8:53 AM by MAN Quesada     VHD:  A. Mitral valve replacement with 27mm ATS mechanical valve March  "2010            AICD (automatic cardioverter/defibrillator) present    Hypotension    Overview Signed 2/6/2017  8:45 AM by MAN Quesada     On iv pressors         Lupus (systemic lupus erythematosus)    FAHEEM (acute kidney injury)    Overview Signed 2/6/2017  8:46 AM by MAN Quesada     Nephrology following: Hyperkalemia and Hyponatremia                 Plan:  ·       Will Dave STRICKLAND     Electronically signed by MAN Quesada at 2/6/2017  8:53 AM      MAN Quesada at 2/6/2017  8:39 AM  Version 1 of 2         [facility]  IP Progress Note      Chief Complaint: Systolic Heart Failure    Subjective:Denies Dyspnea    Objective:  Blood pressure 109/57, pulse 90, temperature 98.4 °F (36.9 °C), temperature source Oral, resp. rate 14, height 67.99\" (172.7 cm), weight 116 lb (52.6 kg), SpO2 93 %, not currently breastfeeding.     Intake/Output Summary (Last 24 hours) at 02/06/17 0846  Last data filed at 02/06/17 0600   Gross per 24 hour   Intake  231.5 ml   Output    816 ml   Net -584.5 ml       Physical Exam:  General: No acute distress.   Neck: no JVD.  Chest:No respiratory distress, breath sounds are normal. No wheezes,  Rhonchi. Few rales bases  Cardiovascular: Normal S1 and S2, 2/6 ANASTACIA, no gallop or rub.    Extremities: trace edema.    Results Review:     I reviewed the patient's new clinical results.      Results from last 7 days  Lab Units 02/06/17  0348   WBC 10*3/mm3 9.10   HEMOGLOBIN g/dL 9.9*   HEMATOCRIT % 31.6*   PLATELETS 10*3/mm3 191       Results from last 7 days  Lab Units 02/06/17  0348 02/05/17  0524   SODIUM mmol/L 128* 130*   POTASSIUM mmol/L 5.8* 5.2   CHLORIDE mmol/L 96* 96*   TOTAL CO2 mmol/L 24.0 27.0   BUN mg/dL 45* 37*   CREATININE mg/dL 2.80* 2.60*   CALCIUM mg/dL 9.2 9.3   BILIRUBIN mg/dL  --  0.3   ALK PHOS U/L  --  151*   ALT (SGPT) U/L  --  162*   AST (SGOT) U/L  --  271*   GLUCOSE mg/dL 102* 112*       Results from last 7 " days  Lab Units 02/06/17  0348   SODIUM mmol/L 128*   POTASSIUM mmol/L 5.8*   CHLORIDE mmol/L 96*   TOTAL CO2 mmol/L 24.0   BUN mg/dL 45*   CREATININE mg/dL 2.80*   GLUCOSE mg/dL 102*   CALCIUM mg/dL 9.2       Results from last 7 days  Lab Units 02/06/17  0348 02/05/17  0524 02/04/17  0321   INR  5.11 4.83 5.59     Lab Results   Component Value Date    CKTOTAL 195 (H) 02/06/2017    CKMB 1.9 06/28/2015    TROPONINI 0.058 (H) 02/05/2017    TROPONINT <0.010 03/13/2016           Results from last 7 days  Lab Units 02/04/17  0247   CHOLESTEROL mg/dL 157   TRIGLYCERIDES mg/dL 141   HDL CHOL mg/dL 58       Results from last 7 days  Lab Units 02/04/17  0247   BNP pg/mL 2950.0*       Tele: Sinus Rythym    Interpretation Summary   · Left ventricular function is severely decreased. Estimated EF = 18%.  · The left ventricular cavity is borderline dilated.  · Left atrial cavity size is mild-to-moderately dilated.  · Mild to moderate aortic valve regurgitation is present.  · Moderate tricuspid valve regurgitation is present.  · Left ventricular wall thickness is consistent with mild concentric hypertrophy.  · Left ventricular wall segments contract abnormally. Refer to wall scoring diagram for more information.  · Right ventricular cavity is borderline dilated.  · There is no evidence of pericardial effusion.  · The prosthetic mitral valve is grossly normal.  · Mild pulmonary hypertension is present.         Assessment:  Hospital Problem List     * (Principal)Systolic heart failure, chronic    Overview Addendum 2/6/2017  8:44 AM by MAN Quesada             Ischemic cardiomyopathy    Overview Signed 2/6/2017  8:44 AM by MAN Quesada     A. History of MI December 2009: s/p stent to RCA and LAD, EF 40%  B. CABGx2 March, 2010: SVG to OMB-1, SVG to PDA  C. EF 30% post-operatively, s/p [place] ICD placement, 2011  D. Echo July 2014: Severe global hypokinesis with EF 12%, moderate AI, mechanical mitral valve,  moderate to severe TR, RVSP 43mmHg   E. Echo 17: EF 18%, mild to mod AI, mod TR, grossly normal prosthetic mitral valve         AICD (automatic cardioverter/defibrillator) present    Hypotension    Overview Signed 2017  8:45 AM by MAN Quesada     On iv pressors         Lupus (systemic lupus erythematosus)    FAHEEM (acute kidney injury)    Overview Signed 2017  8:46 AM by MAN Quesada     Nephrology following: Hyperkalemia and Hyponatremia                 Plan:  ·       Will Dave STRICKLAND     Electronically signed by MAN Quesada at 2017  8:51 AM           Consult Notes (last 72 hours) (Notes from 2/3/2017  9:12 AM through 2017  9:12 AM)      Yomi Ramirez MD at 2017  8:26 AM  Version 1 of 1     Consult Orders:    1. Inpatient Consult to Cardiology [[id number]] ordered by Junior Philip MD at 17 0615                [facility]  CARDIOLOGY CONSULTATION NOTE    Ashely Roldan  : 1960  MRN:[id number]  Home Phone:[phone]    Date of Admission:2017  Date of Consultation: 17    PCP: Peter Rdz MD    IDENTIFICATION: A 56 y.o. female resident of [place], [place]    Chief Complaint   Patient presents with   • Chest Pain       PROBLEM LIST:   1. Structural heart disease of mixed etiology:   A. CABG , IDB   B. [place] ICD placement,   C. Echo 2014: Severe global hypokinesis with EF 12%, moderate AI, mechanical mitral valve, moderate to severe TR, RVSP 43mmHg  2. VHD:   A. Mitral valve replacement with ATS mechanical valve , IDB   3. COPD with ongoing tobacco abuse   4. Systemic Lupus Erythematosus  5. Hypertension  6. Hyperlipidemia   7. CKD  8. PVD       ALLERGIES:   Allergies   Allergen Reactions   • Morphine And Related GI Intolerance and Irritability   • Sulfa Antibiotics        HPI: Patient is a pleasant 55 y/o WF with history of SHF s/p ICD placement in , VHD s/p mechanical  "mitral valve replacement, CAD with prior CABG, and Lupus who is seen in consultation today regarding chest pain. 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 Later that night she experienced severe chest pain and felt like \"a brick was laying on her chest.\" She states she took Nitro with no relief and presented to the ED for further evaluation. She was given Nitro here as well with no relief, and was admitted for further evaluation. She states she now has a \"band-like\" pain around her upper abdomen that goes around to her back, and feels as if \"someone is squeezing a rope around her abdomen.\" She states this does not feel like her prior cardiac pain. She states at the time of her CABG she did not really have pain, but was in heart failure and had a CABG and mitral valve replacement at the time. She does not know any details about her grafts. She also reports peripheral stents in bilateral legs. She continues to smoke. She sleeps in a recliner for the past 2-3 years, denies PND or lower extremity edema. She states she was told to stop ASA as she is on Coumadin for her MV, and this was making her INR hard to control. Denies syncope, history of stroke or DVT/PE. Currently she is still in pain, as this has never gone away. Troponins are marginal, BNP >2000, EKG with no significant changes. Last known EF was 12% in 2014.     ROS: All systems have been reviewed and are negative with the exception of those mentioned in the HPI and problem list above.    Past Surgical History   Procedure Laterality Date   • Endoscopy  10/23/2014     Dr. Brand; retained food in stomach; he dilated her esophagus; 5-30-14 hiatus hernia, gastritis   • Cholecystectomy  2010   • Joint replacement Right 06/25/2015     Dr. Fitzgerald; first surgery 1-29-14; redo 5-4-15   • Coronary artery bypass graft  2011     4 aortic bypasses, abdominal aorta; 2011-mitral valve; 2010-triple bypass   • " "Cardiac defibrillator placement     • Total hip arthroplasty Right      3 times within 8 months   • Mitral valve replacement       MECHANICAL       Social History     Social History   • Marital status:      Spouse name: N/A   • Number of children: N/A   • Years of education: N/A     Occupational History   • Not on file.     Social History Main Topics   • Smoking status: Current Every Day Smoker     Packs/day: 1.00     Types: Cigarettes   • Smokeless tobacco: Not on file   • Alcohol use Yes      Comment: once a week   • Drug use: No   • Sexual activity: Defer     Other Topics Concern   • Not on file     Social History Narrative    Pt recently  after 30 years. Just moved from [facility] into her son's home in [place].        Family History   Problem Relation Age of Onset   • Arthritis Mother      rheumatoid   • Heart attack Father    • Alcohol abuse Brother    • Heart disease Other      uncle   • Diabetes Other      uncle-type 2   • Hypertension Other      uncle   • Stroke Other      uncle   • Cancer Other      grandfather-lung    • Heart disease Maternal Uncle    • Lung cancer Paternal Grandfather        Objective     Visit Vitals   • /54 (BP Location: Left arm, Patient Position: Lying)   • Pulse 104   • Temp 96.9 °F (36.1 °C) (Axillary)   • Resp 18   • Ht 68\" (172.7 cm)   • Wt 116 lb 3.2 oz (52.7 kg)   • SpO2 97%   • BMI 17.67 kg/m2     No intake or output data in the 24 hours ending 02/04/17 0826    PHYSICAL EXAM:  Constitutional:  Well-nourished, cooperative, in no acute distress.   Head:  Normocephalic, without obvious abnormality, atraumatic.   Neck: No adenopathy, supple, trachea midline, no thyromegaly, no    carotid bruit, no JVD.   Respiratory:   Clear to auscultation bilaterally; respirations regular, even and unlabored. No wheezes, rales or ronchi.    Cardiovascular:  Regular rhythm and normal rate, mechanical valve sounds, no murmur, or rub. Good prosthetic valve sounds.  " "  Pulses: Peripheral pulses are faint   GI:   Soft, non-distended. Bowel sounds heard throughout. No organomegaly or masses. There is epigastric tenderness and \"pleuretic\" epigastric pain with respiration.    Extremities: No edema, clubbing or cyanosis.   Skin: Skin is warm and dry. No bleeding, bruising or rash.   Neurological: Alert, oriented to time, person and place. No focal deficits.     Labs/Diagnostic Data    Results from last 7 days  Lab Units 02/04/17  0247   SODIUM mmol/L 135   POTASSIUM mmol/L 4.6   CHLORIDE mmol/L 100   TOTAL CO2 mmol/L 21.0   BUN mg/dL 32*   CREATININE mg/dL 1.80*   GLUCOSE mg/dL 104*   CALCIUM mg/dL 9.9       Results from last 7 days  Lab Units 02/04/17  0455   TROPONIN I ng/mL 0.228*       Results from last 7 days  Lab Units 02/04/17  0247   WBC 10*3/mm3 7.62   HEMOGLOBIN g/dL 10.0*   HEMATOCRIT % 30.7*   PLATELETS 10*3/mm3 245       Results from last 7 days  Lab Units 02/04/17  0321   PROTIME Seconds 60.9*   INR  5.59   APTT seconds 45.8*       I personally viewed and interpreted the patient's EKG/Telemetry data    Radiology Data:   CXR 2/4/17:  FINDINGS:     Median sternotomy wires are present. Heart size upper limits of normal. No   airspace consolidation identified. No visible pneumothorax or pleural effusion.   Moderate diffuse bilateral interstitial opacities. These may represent any   combination of pulmonary vascular congestion, chronic scarring, and   atypical/viral pneumonia.     IMPRESSION:  1. Moderate diffuse bilateral interstitial opacities. These may represent any   combination of pulmonary vascular congestion, chronic scarring, and   atypical/viral pneumonia.    Current Medications:      aspirin 325 mg Oral Daily   atorvastatin 80 mg Oral Nightly   budesonide-formoterol 2 puff Inhalation BID - RT   carvedilol 3.125 mg Oral BID With Meals   carvedilol 6.25 mg Oral BID With Meals   furosemide 40 mg Oral Daily   hydroxychloroquine 200 mg Oral Daily With Breakfast "   sertraline 50 mg Oral Daily   warfarin (COUMADIN) (dosing per levels)  Does not apply Daily       Pharmacy to dose warfarin        Assessment and Plan:     1. CAD, s/p CABG in 2010 - patient does not know details    - ASA, Lipitor 80mg   - BNP 2950, CXR with increased vascularity   - troponins marginal, EKG with no significant changes   - 14 beat run of VT this am    2. ICM s/p [place] ICD placement in 2011  3. Mechanical mitral valve, on Coumadin   - INR supratherapeutic, Pharmacy following    4. COPD with ongoing tobacco abuse   5. Elevated LFT's, minor.  6. For now, need to treat heart failure that may be causitive of some of her epigastric and abdominal SX via liver capsule distention AS WELL AS obtain old records, check mildly elevated troponins for course, obtain ECHO and OSH records. All discussed in detail with patient.    I, Yomi Ramirez MD, personally performed the services described as documented by the above named individual. I have made any necessary edits and it is both accurate and complete 2/4/2017  11:27 AM    Thank you for allowing me to participate in the care of Ashely Roldan. Feel free to contact me directly with any further questions or concerns.    Sandhya Lynch PA-C   02/04/17   8:26 AM       Electronically signed by Yomi Ramirez MD at 2/4/2017 11:27 AM

## 2017-02-06 NOTE — PROGRESS NOTES
"INTENSIVIST NOTE     Hospital Day: 2      Ms. Ashely Roldan, 56 y.o. female is followed for:   Systolic heart failure       SUBJECTIVE     56-year-old woman with a history of chronic obstructive pulmonary disease, systolic heart failure with previous ICD implantation, prior mitral valve replacement with chronic anticoagulation, chronic renal insufficiency of unknown baseline, and systemic lupus erythematosus who has in the past several months moved to [place] to live with her son. She was admitted to the hospital for chest pain and increased troponin levels. She was found to have an elevated BNP and an echocardiogram showed an ejection fraction of approximately 16-18%. She received some diuresis. She had minimal urinary output. Her creatinine was increased and her blood pressure has been in the 80s.  She was moved to the ICU for inotropic support    Interval history:    She continues to deny any chest pain or shortness of breath.  Quite comfortable in bed on 2 L of oxygen.    The patient's relevant past medical, surgical and social history were reviewed and updated in Epic as appropriate.       OBJECTIVE     Vital Sign Min/Max for last 24 hours  Temp  Min: 97.7 °F (36.5 °C)  Max: 98.5 °F (36.9 °C)   BP  Min: 81/57  Max: 134/76   Pulse  Min: 73  Max: 109   Resp  Min: 10  Max: 20   SpO2  Min: 71 %  Max: 100 %   Flow (L/min)  Min: 2  Max: 4   No Data Recorded      Intake/Output Summary (Last 24 hours) at 02/06/17 1645  Last data filed at 02/06/17 1600   Gross per 24 hour   Intake    896 ml   Output   1041 ml   Net   -145 ml      Flowsheet Rows         First Filed Value    Admission Height  68.5\" (174 cm) Documented at 02/04/2017 0240    Admission Weight  117 lb (53.1 kg) Documented at 02/04/2017 0240         Last 3 weights    02/04/17  0240 02/04/17  0529 02/04/17  1503   Weight: 117 lb (53.1 kg) 116 lb 3.2 oz (52.7 kg) 116 lb (52.6 kg)            Objective:  General Appearance:  In no acute distress.    Vital " "signs: (most recent): Blood pressure 124/76, pulse 81, temperature 98.1 °F (36.7 °C), temperature source Oral, resp. rate 18, height 67.99\" (172.7 cm), weight 116 lb (52.6 kg), SpO2 96 %, not currently breastfeeding.    HEENT: Normal HEENT exam.    Lungs:  Normal respiratory rate and normal effort.  She is not in respiratory distress.  There are rales.  No wheezes or rhonchi.    Heart: Normal rate.  Regular rhythm.  S1 normal and S2 normal.  No murmur, gallop or friction rub. (Good valve click)  Chest: Symmetric chest wall expansion.   Abdomen: Abdomen is soft and non-distended.  Bowel sounds are normal.   There is no abdominal tenderness.   There is no mass. There is no splenomegaly. There is no hepatomegaly.   Extremities: There is no deformity or dependent edema.    Neurological: Patient is alert and oriented to person, place and time.    Pupils:  Pupils are equal, round, and reactive to light.    Skin:  Warm and dry.              I reviewed the patient's new clinical results.  I reviewed the patient's new imaging results/reports including actual images and agree with reports.      Chest X-Ray:  Enlarged cardiac silhouette with bibasilar interstitial prominence    INFUSIONS    DOPamine 2-20 mcg/kg/min Last Rate: Stopped (02/06/17 1512)   Pharmacy to dose warfarin           Results from last 7 days  Lab Units 02/06/17 0348 02/05/17  0524 02/04/17  0247   WBC 10*3/mm3 9.10 8.09 7.62   HEMOGLOBIN g/dL 9.9* 10.3* 10.0*   HEMATOCRIT % 31.6* 33.4* 30.7*   PLATELETS 10*3/mm3 191 228 245       Results from last 7 days  Lab Units 02/06/17  0348 02/05/17  0524 02/04/17  0247   SODIUM mmol/L 128* 130* 135   POTASSIUM mmol/L 5.8* 5.2 4.6   CHLORIDE mmol/L 96* 96* 100   TOTAL CO2 mmol/L 24.0 27.0 21.0   BUN mg/dL 45* 37* 32*   GLUCOSE mg/dL 102* 112* 104*   CREATININE mg/dL 2.80* 2.60* 1.80*   CALCIUM mg/dL 9.2 9.3 9.9                 [facility] Ventilation:      I reviewed the patient's medications.    Assessment/Plan "   ASSESSMENT      Hospital Problem List     * (Principal)Systolic heart failure, chronic    Overview Addendum 2/6/2017  8:44 AM by MAN Quesada             Ischemic cardiomyopathy    Overview Signed 2/6/2017  8:44 AM by MAN Quesada     A. History of MI December 2009: s/p stent to RCA and LAD, EF 40%  B. CABGx2 March, 2010: SVG to OMB-1, SVG to PDA  C. EF 30% post-operatively, s/p [place] ICD placement, 2011  D. Echo July 2014: Severe global hypokinesis with EF 12%, moderate AI, mechanical mitral valve, moderate to severe TR, RVSP 43mmHg   E. Echo 2/4/17: EF 18%, mild to mod AI, mod TR, grossly normal prosthetic mitral valve         Lupus (systemic lupus erythematosus)    H/O mitral valve replacement    Overview Signed 2/6/2017  8:53 AM by MAN Quesada     VHD:  A. Mitral valve replacement with 27mm ATS mechanical valve March 2010            AICD (automatic cardioverter/defibrillator) present    FAHEEM (acute kidney injury)    Overview Signed 2/6/2017  8:46 AM by MAN Quesada     Nephrology following: Hyperkalemia and Hyponatremia         Hypotension    Overview Signed 2/6/2017  8:45 AM by MAN Quesada     On iv pressors                 Weaning dopamine based on noninvasive monitoring.  Creatinine appears to be plateauing.          PLAN     I will give her a very low dose of vitamin K to bring her INR into a more reasonable range.  We'll continue to wean dopamine and attempt to discontinue it.  At this point if we were to get more aggressive it would involve placing a [place] catheter or central line to invasively monitor her and administer inotropic agents.  Obstacles to this are her INR and her insistence that she does not want any invasive procedures done.           I discussed the patient's findings and my recommendations with patient, family and nursing staff     Plan of care and goals reviewed with mulitdisciplinary team at daily rounds.    Don RAPHAEL  MD [facility]  02/06/17 4:45 PM

## 2017-02-07 ENCOUNTER — APPOINTMENT (OUTPATIENT)
Dept: GENERAL RADIOLOGY | Facility: HOSPITAL | Age: 57
End: 2017-02-07

## 2017-02-07 LAB
ALBUMIN SERPL-MCNC: 3.3 G/DL (ref 3.2–4.8)
ANION GAP SERPL CALCULATED.3IONS-SCNC: 7 MMOL/L (ref 3–11)
BASOPHILS # BLD AUTO: 0 10*3/MM3 (ref 0–0.2)
BASOPHILS NFR BLD AUTO: 0 % (ref 0–1)
BUN BLD-MCNC: 45 MG/DL (ref 9–23)
BUN/CREAT SERPL: 18 (ref 7–25)
CALCIUM SPEC-SCNC: 9.1 MG/DL (ref 8.7–10.4)
CHLORIDE SERPL-SCNC: 97 MMOL/L (ref 99–109)
CO2 SERPL-SCNC: 27 MMOL/L (ref 20–31)
CREAT BLD-MCNC: 2.5 MG/DL (ref 0.6–1.3)
DEPRECATED RDW RBC AUTO: 51.6 FL (ref 37–54)
EOSINOPHIL # BLD AUTO: 0.02 10*3/MM3 (ref 0.1–0.3)
EOSINOPHIL NFR BLD AUTO: 0.3 % (ref 0–3)
ERYTHROCYTE [DISTWIDTH] IN BLOOD BY AUTOMATED COUNT: 14.6 % (ref 11.3–14.5)
GFR SERPL CREATININE-BSD FRML MDRD: 20 ML/MIN/1.73
GLUCOSE BLD-MCNC: 183 MG/DL (ref 70–100)
HCT VFR BLD AUTO: 29.5 % (ref 34.5–44)
HGB BLD-MCNC: 9.5 G/DL (ref 11.5–15.5)
IMM GRANULOCYTES # BLD: 0.03 10*3/MM3 (ref 0–0.03)
IMM GRANULOCYTES NFR BLD: 0.4 % (ref 0–0.6)
INR PPP: 3.34
LYMPHOCYTES # BLD AUTO: 1.26 10*3/MM3 (ref 0.6–4.8)
LYMPHOCYTES NFR BLD AUTO: 16.5 % (ref 24–44)
MCH RBC QN AUTO: 32.3 PG (ref 27–31)
MCHC RBC AUTO-ENTMCNC: 32.2 G/DL (ref 32–36)
MCV RBC AUTO: 100.3 FL (ref 80–99)
MONOCYTES # BLD AUTO: 0.72 10*3/MM3 (ref 0–1)
MONOCYTES NFR BLD AUTO: 9.4 % (ref 0–12)
NEUTROPHILS # BLD AUTO: 5.6 10*3/MM3 (ref 1.5–8.3)
NEUTROPHILS NFR BLD AUTO: 73.4 % (ref 41–71)
PHOSPHATE SERPL-MCNC: 4.6 MG/DL (ref 2.4–5.1)
PLATELET # BLD AUTO: 145 10*3/MM3 (ref 150–450)
PMV BLD AUTO: 11.2 FL (ref 6–12)
POTASSIUM BLD-SCNC: 5.1 MMOL/L (ref 3.5–5.5)
PROTHROMBIN TIME: 36.4 SECONDS (ref 9.6–11.5)
RBC # BLD AUTO: 2.94 10*6/MM3 (ref 3.89–5.14)
SODIUM BLD-SCNC: 131 MMOL/L (ref 132–146)
WBC NRBC COR # BLD: 7.63 10*3/MM3 (ref 3.5–10.8)

## 2017-02-07 PROCEDURE — 85025 COMPLETE CBC W/AUTO DIFF WBC: CPT | Performed by: INTERNAL MEDICINE

## 2017-02-07 PROCEDURE — 99233 SBSQ HOSP IP/OBS HIGH 50: CPT | Performed by: INTERNAL MEDICINE

## 2017-02-07 PROCEDURE — 25010000002 DOPAMINE PER 40 MG: Performed by: INTERNAL MEDICINE

## 2017-02-07 PROCEDURE — 99232 SBSQ HOSP IP/OBS MODERATE 35: CPT | Performed by: INTERNAL MEDICINE

## 2017-02-07 PROCEDURE — 80069 RENAL FUNCTION PANEL: CPT | Performed by: INTERNAL MEDICINE

## 2017-02-07 PROCEDURE — 81050 URINALYSIS VOLUME MEASURE: CPT | Performed by: INTERNAL MEDICINE

## 2017-02-07 PROCEDURE — 94640 AIRWAY INHALATION TREATMENT: CPT

## 2017-02-07 PROCEDURE — 71010 HC CHEST PA OR AP: CPT

## 2017-02-07 PROCEDURE — 85610 PROTHROMBIN TIME: CPT

## 2017-02-07 PROCEDURE — 25010000002 ONDANSETRON PER 1 MG: Performed by: INTERNAL MEDICINE

## 2017-02-07 PROCEDURE — 84156 ASSAY OF PROTEIN URINE: CPT | Performed by: INTERNAL MEDICINE

## 2017-02-07 RX ORDER — WARFARIN SODIUM 2 MG/1
2 TABLET ORAL
Status: DISCONTINUED | OUTPATIENT
Start: 2017-02-07 | End: 2017-02-08

## 2017-02-07 RX ORDER — DOPAMINE HYDROCHLORIDE 160 MG/100ML
3 INJECTION, SOLUTION INTRAVENOUS
Status: DISCONTINUED | OUTPATIENT
Start: 2017-02-07 | End: 2017-02-07 | Stop reason: SDUPTHER

## 2017-02-07 RX ADMIN — ASPIRIN 325 MG: 325 TABLET, DELAYED RELEASE ORAL at 10:11

## 2017-02-07 RX ADMIN — TRAMADOL HYDROCHLORIDE 50 MG: 50 TABLET, COATED ORAL at 12:25

## 2017-02-07 RX ADMIN — SERTRALINE 50 MG: 50 TABLET, FILM COATED ORAL at 10:11

## 2017-02-07 RX ADMIN — PANTOPRAZOLE SODIUM 40 MG: 40 TABLET, DELAYED RELEASE ORAL at 05:30

## 2017-02-07 RX ADMIN — BUDESONIDE AND FORMOTEROL FUMARATE DIHYDRATE 2 PUFF: 160; 4.5 AEROSOL RESPIRATORY (INHALATION) at 08:20

## 2017-02-07 RX ADMIN — ONDANSETRON 4 MG: 2 INJECTION INTRAMUSCULAR; INTRAVENOUS at 10:16

## 2017-02-07 RX ADMIN — WARFARIN SODIUM 2 MG: 2 TABLET ORAL at 18:37

## 2017-02-07 RX ADMIN — HYDROXYCHLOROQUINE SULFATE 200 MG: 200 TABLET, FILM COATED ORAL at 10:12

## 2017-02-07 RX ADMIN — DOPAMINE HYDROCHLORIDE 3 MCG/KG/MIN: 160 INJECTION, SOLUTION INTRAVENOUS at 20:41

## 2017-02-07 RX ADMIN — ATORVASTATIN CALCIUM 80 MG: 40 TABLET, FILM COATED ORAL at 20:37

## 2017-02-07 RX ADMIN — CLONAZEPAM 1 MG: 1 TABLET ORAL at 09:21

## 2017-02-07 RX ADMIN — CLONAZEPAM 1 MG: 1 TABLET ORAL at 15:57

## 2017-02-07 NOTE — PROGRESS NOTES
"   LOS: 3 days    Patient Care Team:  Peter Rdz MD as PCP - General (Internal Medicine)  Idalia Ramirez, RN as Care Coordinator (Population Health)    Reason For Visit:  F/U FAHEEM ON CKD  Subjective           Review of Systems:    Pulm: No soa   CV:  No CP      Objective       aspirin 325 mg Oral Daily   atorvastatin 80 mg Oral Nightly   budesonide-formoterol 2 puff Inhalation BID - RT   hydroxychloroquine 200 mg Oral Daily With Breakfast   pantoprazole 40 mg Oral Q AM   sertraline 50 mg Oral Daily   warfarin (COUMADIN) (dosing per levels)  Does not apply Daily       DOPamine 3 mcg/kg/min Last Rate: 3 mcg/kg/min (02/06/17 1740)   Pharmacy to dose warfarin           Vital Signs:  Blood pressure 143/83, pulse 93, temperature 98 °F (36.7 °C), temperature source Oral, resp. rate 16, height 67.99\" (172.7 cm), weight 116 lb (52.6 kg), SpO2 (!) 89 %, not currently breastfeeding.    Flowsheet Rows         First Filed Value    Admission Height  68.5\" (174 cm) Documented at 02/04/2017 0240    Admission Weight  117 lb (53.1 kg) Documented at 02/04/2017 0240          02/06 0701 - 02/07 0700  In: 839.5 [P.O.:670; I.V.:169.5]  Out: 810 [Urine:810]    Physical Exam:    General Appearance: NAD, alert and cooperative, Ox3  Eyes: PER, conjunctivae and sclerae normal, no icterus  Lungs: respirations regular and unlabored, no crepitus, clear to auscultation  Heart/CV: regular rhythm & normal rate, II/VI murmur, no gallop, no rub and no edema  Abdomen: not distended, soft, non-tender, no masses,  bowel sounds present  Skin: No rash, Warm and dry    Radiology:            Labs:    Results from last 7 days  Lab Units 02/07/17  0358 02/06/17  0348 02/05/17  0524   WBC 10*3/mm3 7.63 9.10 8.09   HEMOGLOBIN g/dL 9.5* 9.9* 10.3*   HEMATOCRIT % 29.5* 31.6* 33.4*   PLATELETS 10*3/mm3 145* 191 228       Results from last 7 days  Lab Units 02/07/17  0358 02/06/17  0348 02/05/17  0524 02/04/17  0247   SODIUM mmol/L 131* 128* 130* 135 "   POTASSIUM mmol/L 5.1 5.8* 5.2 4.6   CHLORIDE mmol/L 97* 96* 96* 100   TOTAL CO2 mmol/L 27.0 24.0 27.0 21.0   BUN mg/dL 45* 45* 37* 32*   CREATININE mg/dL 2.50* 2.80* 2.60* 1.80*   CALCIUM mg/dL 9.1 9.2 9.3 9.9   PHOSPHORUS mg/dL 4.6 5.1  --   --    ALBUMIN g/dL 3.30 3.50 3.60 4.00       Results from last 7 days  Lab Units 02/07/17  0358   GLUCOSE mg/dL 183*         Results from last 7 days  Lab Units 02/05/17  0524   ALK PHOS U/L 151*   BILIRUBIN mg/dL 0.3   ALT (SGPT) U/L 162*   AST (SGOT) U/L 271*             Results from last 7 days  Lab Units 02/05/17  1717   COLOR UA  Yellow   CLARITY UA  Clear   PH, URINE  <=5.0   SPECIFIC GRAVITY, URINE  1.012   GLUCOSE UA  Negative   KETONES UA  Negative   BILIRUBIN UA  Negative   PROTEIN UA  100 mg/dL (2+)*   BLOOD UA  Negative   LEUKOCYTES UA  Trace*   NITRITE UA  Negative       Estimated Creatinine Clearance: 20.6 mL/min (by C-G formula based on Cr of 2.5).      Assessment     Principal Problem:    Systolic heart failure, chronic  Active Problems:    Ischemic cardiomyopathy    Lupus (systemic lupus erythematosus)    H/O mitral valve replacement    AICD (automatic cardioverter/defibrillator) present    FAHEEM (acute kidney injury)    Hypotension            Impression: FAHEME SECONDARY TO CARDIORENAL SYNDROME. GFR STARTING TO IMPROVE. CKD FROM BX PROVEN FOCAL PROLIFERATIVE LUPUS NEPHRITIS. URINARY RETENTION. HYPOTENSION BETTER. HYPERKALEMIA BETTER. HYPONATREMIA BETTER. ANEMIA.            Recommendations: STOPPING DOPAMINE OK WITH RENAL. GARCIA PER UROLOGY. LAB AM. 24 HR URINE PROTEIN.      Minesh Nichole MD  02/07/17  9:49 AM

## 2017-02-07 NOTE — CONSULTS
"    Consult    55 yo female admitted to the ICU with CHF and need for inotropic support.  She had c/o \"inability to urinate\" but, when questioned, reports that she simply never had the urge to urinate and was producing low volumes of urine over the past few days a/w this current illness.    FC was anchored yesterday upon transfer to ICU.  At that time, bladder scan volume was reported at 500 ml and she has been relegated to bedrest over the past 2 days. She denies any prior difficulty with urination prior to the last few days and has no significant  Hx.    PMHx/PSHx/meds noted  SHx/FHx noncontributory    ROS - negative , no fever, + constipation    PE - alert female in NAD           vss noted, not currently hypotensive           No CVAT           Abd. - soft and nontender   - FC draining clear urine    Impression - CHF/hypotension/COPD in ICU and at bedrest             Transient inability to void - I suspect primarily due to current illness/bedrest state                                and constipation.                          I suspect she will return to her normal voiding status upon recovery    Rec. - voiding trial when upright/ambulatory and constipation well controlled.  Reconsult as needed.   "

## 2017-02-07 NOTE — CONSULTS
"Pharmacy Consult  -  Warfarin    Ashely Roldan is a  56 y.o. female with history of mechanical mitral valve. Pharmacy consulted to assist with management of warfarin.     Height - 67.99\" (172.7 cm)  Weight - 116 lb (52.6 kg)    Consulting Provider: - Intensivist  Indication: - [facility] mitral valve  Goal INR: -  2.5-3.5  Home Regimen: Variable per patient; usually 2mg alternating with 3.5mg, but sometimes 1mg vs 2mg if home INR elevated.      Warfarin Dosing During Admission:    Date 2/4 2/5 2/6 2/7             INR 5.59 4.83  5.1  3.3             Dose HOLD Hold  Hold; vit k 2.5mg  2 mg                 Labs:      Results from last 7 days     Lab Units 02/07/17 0358 02/06/17 0348 02/05/17 0524 02/04/17  0321 02/04/17  0247   INR  3.34 5.11 4.83 5.59 --    APTT seconds --  --  --  45.8* --    HEMOGLOBIN g/dL 9.5* 9.9* 10.3* --  10.0*   HEMATOCRIT % 29.5* 31.6* 33.4* --  30.7*   PLATELETS 10*3/mm3 145* 191 228 --  245       Results from last 7 days     Lab Units 02/07/17 0358 02/06/17 0348 02/05/17  0524 02/04/17  0247   SODIUM mmol/L 131* 128* 130* 135   POTASSIUM mmol/L 5.1 5.8* 5.2 4.6   CHLORIDE mmol/L 97* 96* 96* 100   TOTAL CO2 mmol/L 27.0 24.0 27.0 21.0   BUN mg/dL 45* 45* 37* 32*   CREATININE mg/dL 2.50* 2.80* 2.60* 1.80*   CALCIUM mg/dL 9.1 9.2 9.3 9.9   BILIRUBIN mg/dL --  --  0.3 0.5   ALK PHOS U/L --  --  151* 156*   ALT (SGPT) U/L --  --  162* 107*   AST (SGOT) U/L --  --  271* 226*   GLUCOSE mg/dL 183* 102* 112* 104*         Assessment/Plan:     Patient has had supratherapeutic INRs since admission. Doses have been held throughout stay thus far. Yesterday vitamin K 2.5 mg PO was administered to bring INR back into therapeutic range.  Home dose of warfarin is variable per patient, who reports she adjusts the dose based on the readout from her home INR testing machine.  She is most often taking an alternating dose of 2mg vs 3.5mg, or 1mg vs 2mg. Given recently elevated LFTs, elevated Scr, and recently " supratherapeutic INR (though now in setting of vitamin K), we will dose at 2mg daily and monitor daily INR closely.        Thank you  Minesh Diaz [facility]  2/7/2017  12:51 PM

## 2017-02-07 NOTE — PROGRESS NOTES
"[facility]  PROGRESS NOTE      Date of Admission: 2/4/2017  Length of Stay: 3  Primary Care Physician: Peter Rdz MD    Chief Complaint: Follow-up cardiorenal    Problem List: Principal Problem:    Systolic heart failure, chronic  Active Problems:    Ischemic cardiomyopathy    Lupus (systemic lupus erythematosus)    H/O mitral valve replacement    AICD (automatic cardioverter/defibrillator) present    FAHEEM (acute kidney injury)    Hypotension      Subjective      HPI: She is now asymptomatic at rest and most recently NORMOTENSIVE off Dopamine briefly.      Objective   Vital Signs:  Temp:  [98 °F (36.7 °C)-98.5 °F (36.9 °C)] 98 °F (36.7 °C)  Heart Rate:  [78-98] 90  Resp:  [16-18] 16  BP: (102-150)/(56-95) 138/89  Arterial Line BP: (109-124)/(65-76) 111/75    Intake/Output Summary (Last 24 hours) at 02/07/17 1036  Last data filed at 02/07/17 1000   Gross per 24 hour   Intake    544 ml   Output   1050 ml   Net   -506 ml     Flowsheet Rows         First Filed Value    Admission Height  68.5\" (174 cm) Documented at 02/04/2017 0240    Admission Weight  117 lb (53.1 kg) Documented at 02/04/2017 0240          Physical Exam:   HEENT: Fundiscopic deferred, otherwise unremarkable.  NECK: No Jugular venous distention, adenopathy, or thyromegaly noted.  HEART: No discrete PMI is noted. The 1st and 2nd heart sounds are normal, and no murmurs, gallops, rubs, clicks noted. Prosthetic valve sounds noted.  LUNGS: Clear to auscultation.  ABDOMEN: Flat without evidence of organomegaly, masses, or tenderness.  GENITOURINARY/RECTAL: Deferred.  NEUROLOGIC: No focal abnormalities involving strength or sensation are noted.   EXTREMITIES: No clubbing, cyanosis, or edema noted. Peripheral pulses are present.        Results Review:     Results from last 7 days  Lab Units 02/07/17  0358 02/06/17  0348 02/05/17  0524 02/04/17  0247   SODIUM mmol/L 131* 128* 130* 135   POTASSIUM mmol/L 5.1 5.8* 5.2 " 4.6   CHLORIDE mmol/L 97* 96* 96* 100   TOTAL CO2 mmol/L 27.0 24.0 27.0 21.0   BUN mg/dL 45* 45* 37* 32*   CREATININE mg/dL 2.50* 2.80* 2.60* 1.80*   GLUCOSE mg/dL 183* 102* 112* 104*   CALCIUM mg/dL 9.1 9.2 9.3 9.9       Results from last 7 days  Lab Units 02/06/17  0348 02/05/17  1846 02/05/17  0524 02/04/17  0455   CK TOTAL U/L 195*  --   --   --    TROPONIN I ng/mL  --  0.058* 0.082* 0.228*       Results from last 7 days  Lab Units 02/07/17  0358 02/06/17 0348 02/05/17  0524 02/04/17  0247   WBC 10*3/mm3 7.63 9.10 8.09 7.62   HEMOGLOBIN g/dL 9.5* 9.9* 10.3* 10.0*   HEMATOCRIT % 29.5* 31.6* 33.4* 30.7*   PLATELETS 10*3/mm3 145* 191 228 245       Results from last 7 days  Lab Units 02/07/17  0358 02/06/17  0348 02/05/17  0524 02/04/17  0321   INR  3.34 5.11 4.83 5.59   APTT seconds  --   --   --  45.8*           Results from last 7 days  Lab Units 02/04/17  0247   CHOLESTEROL mg/dL 157   TRIGLYCERIDES mg/dL 141   HDL CHOL mg/dL 58   LDL CHOL mg/dL 56       Results from last 7 days  Lab Units 02/04/17  0247   BNP pg/mL 2950.0*       Results from last 7 days  Lab Units 02/04/17  0247   HEMOGLOBIN A1C % 5.00           Current Medications:    aspirin 325 mg Oral Daily   atorvastatin 80 mg Oral Nightly   budesonide-formoterol 2 puff Inhalation BID - RT   hydroxychloroquine 200 mg Oral Daily With Breakfast   pantoprazole 40 mg Oral Q AM   sertraline 50 mg Oral Daily   warfarin (COUMADIN) (dosing per levels)  Does not apply Daily       DOPamine 3 mcg/kg/min Last Rate: Stopped (02/07/17 8251)   Pharmacy to dose warfarin           I reviewed the patient's new clinical results.  I personally viewed and interpreted the patient's EKG/Telemetry data    Assessment and Plan:     IMPROVEMENT in serum creatinine, UOP, K, BP on low dose dopamine. Talked with NAL and this is cardiorenal, NOT Lupus Nephritis (although this was documented recently). Another 24 hours Rx needed.      Yomi Ramirez MD  02/07/17  10:36 AM

## 2017-02-07 NOTE — PROGRESS NOTES
Adult Nutrition  Assessment/PES    Patient Name:  Ashely Roldan  YOB: 1960  MRN: [id number]  Admit Date:  2/4/2017    Assessment Date:  2/7/2017   Comments:  RD PO follow up. Boost Breeze and Boost pudding added twice daily. 100% supplement intake will provide 512mL H20/day. RD to continue to follow.           Reason for Assessment       02/07/17 1341    Reason for Assessment    Reason For Assessment/Visit multidisciplinary rounds;follow up protocol    Time Spent (min) 30    Diagnosis Diagnosis   Per notes this adm/MD diagnosis list noted    Cardiac Other (comment);SHF   Ischemic cardiomyopathy    Metabolic/ICU Hyponatremia    Renal FAHEEM   ?CKD ?lupus nephritis   Patient Active Problem List   Diagnosis   • Anxiety   • Arthritis   • COPD (chronic obstructive pulmonary disease)   • Depression   • Emphysema of lung   • Gastritis   • Heart attack   • Heart murmur   • Hyperlipidemia   • Hypertension   • Ischemic cardiomyopathy   • VHD (valvular heart disease)   • Osteoporosis   • PVD (peripheral vascular disease)   • Chronic renal failure   • Allergic rhinitis   • Lupus (systemic lupus erythematosus)   • TIA (transient ischemic attack)   • Chronic coronary artery disease   • Cough   • H/O mitral valve replacement   • Anemia of chronic kidney failure   • Insomnia   • COPD exacerbation   • Tobacco abuse   • Systolic heart failure, chronic   • AICD (automatic cardioverter/defibrillator) present   • FAHEEM (acute kidney injury)   • Noncompliance   • Blunt trauma of multiple sites   • Chronic anticoagulation   • Closed fracture of rib with flail chest   • Supratherapeutic INR   • Fracture of left clavicle   • Chest pain   • Hypotension                Nutrition/Diet History       02/07/17 1351    Nutrition/Diet History    Reported/Observed By RN;MD    Other Pt with minimal po intake, pt and pts RN state pt with nausea r/t dopamine. Pt had been eating 83% x 3 prior. Pt states she does not like milk based  nutrition supplements (Boost,ensure).               Labs/Tests/Procedures/Meds       02/07/17 1355    Labs/Tests/Procedures/Meds    Labs/Tests Review Reviewed;Na+;BUN;Creat    Medication Review Reviewed, pertinent   Dopamine 3mcg/kg/min, warfarin                Nutrition Prescription Ordered       02/07/17 1357    Nutrition Prescription PO    Current PO Diet Regular    Common Modifiers Cardiac;Consistent Carbohydrate    Other Modifiers Other (comment)   1000mL H20 restriction/300mL H20/tray            Evaluation of Received Nutrient/Fluid Intake       02/07/17 1359    PO Evaluation    Number of Meals 3    % PO Intake 0              Problem/Interventions:        Problem 1       02/07/17 1402    Nutrition Diagnoses Problem 1    Problem 1 Inadequate Intake/Infusion    Inadequate Intake Type Oral    Etiology (related to) --   nausea, poor appetite    Signs/Symptoms (evidenced by) PO Intake    Percent (%) intake recorded 0 %    Over number of meals 3            Problem 2       02/07/17 1402    Nutrition Diagnoses Problem 2    Problem 2 Altered Nutrition Related to Labs    Etiology (related to) --   fluid status/clinical status    Signs/Symptoms (evidenced by) --   Na+ 131                  Intervention Goal       02/07/17 1405    Intervention Goal    General Nutrition support treatment;Improved nutrition related lab(s)    PO Increase intake;Meet estimated needs            Nutrition Intervention       02/07/17 1406    Nutrition Intervention    RD/Tech Action Follow Tx progress;Care plan reviewd;Encourage intake;Interview for preference;Supplement provided            Nutrition Prescription       02/07/17 1406    Nutrition Prescription PO    PO Prescription Begin/change supplement    Supplement Boost Breeze;Boost Pudding    Supplement Frequency 2 times a day    New PO Prescription Ordered? Yes            Education/Evaluation       02/07/17 1407    Monitor/Evaluation    Monitor Per protocol;I&O;PO intake;Supplement  intake;Pertinent labs;Weight            Electronically signed by:  Celia Barrientos RDN, LD  02/07/17 2:08 PM

## 2017-02-07 NOTE — PLAN OF CARE
Problem: Patient Care Overview (Adult)  Goal: Plan of Care Review  Outcome: Ongoing (interventions implemented as appropriate)    02/07/17 4399   Coping/Psychosocial Response Interventions   Plan Of Care Reviewed With patient   Patient Care Overview   Progress improving   Outcome Evaluation   Outcome Summary/Follow up Plan VSS, still on 2LNC, UOP 830mls. Started a 24 urine at 1600 today. Per Dr. Ramirez, restarted the Dopamine 3mcg/kg/min. Nausea once relieved with Zofran. Dietitian talked with pt and changed food options that pt would eat. Pt egar to go home.        Goal: Adult Individualization and Mutuality  Outcome: Ongoing (interventions implemented as appropriate)  Goal: Discharge Needs Assessment  Outcome: Ongoing (interventions implemented as appropriate)    Problem: Acute Coronary Syndrome (ACS) (Adult)  Goal: Signs and Symptoms of Listed Potential Problems Will be Absent or Manageable (Acute Coronary Syndrome)  Outcome: Ongoing (interventions implemented as appropriate)    Problem: Cardiac: Heart Failure (Adult)  Goal: Signs and Symptoms of Listed Potential Problems Will be Absent or Manageable (Cardiac: Heart Failure)  Outcome: Ongoing (interventions implemented as appropriate)    Problem: Renal Failure/Kidney Injury, Acute (Adult)  Goal: Signs and Symptoms of Listed Potential Problems Will be Absent or Manageable (Renal Failure/Kidney Injury, Acute)  Outcome: Ongoing (interventions implemented as appropriate)    Problem: Skin Integrity Impairment, Risk/Actual (Adult)  Goal: Identify Related Risk Factors and Signs and Symptoms  Outcome: Outcome(s) achieved Date Met:  02/07/17  Goal: Skin Integrity/Wound Healing  Outcome: Ongoing (interventions implemented as appropriate)

## 2017-02-07 NOTE — PLAN OF CARE
Problem: Patient Care Overview (Adult)  Goal: Plan of Care Review  Outcome: Ongoing (interventions implemented as appropriate)    02/06/17 1818 02/07/17 0431   Coping/Psychosocial Response Interventions   Plan Of Care Reviewed With patient --    Patient Care Overview   Progress no change --    Outcome Evaluation   Outcome Summary/Follow up Plan --  on renal dose of dopamine, urine output increased and adequate this am. vitals stable through out the night. nausea at the beginning of tyhe shift but no more nasuea.        Goal: Adult Individualization and Mutuality  Outcome: Ongoing (interventions implemented as appropriate)  Goal: Discharge Needs Assessment  Outcome: Ongoing (interventions implemented as appropriate)    Problem: Acute Coronary Syndrome (ACS) (Adult)  Goal: Signs and Symptoms of Listed Potential Problems Will be Absent or Manageable (Acute Coronary Syndrome)  Outcome: Ongoing (interventions implemented as appropriate)

## 2017-02-07 NOTE — PROGRESS NOTES
"INTENSIVIST NOTE     Hospital Day: 3      Ms. Ashely Roldan, 56 y.o. female is followed for:   Systolic heart failure       SUBJECTIVE     56-year-old woman with a history of chronic obstructive pulmonary disease, systolic heart failure with previous ICD implantation, prior mitral valve replacement with chronic anticoagulation, chronic renal insufficiency of unknown baseline, and systemic lupus erythematosus who has in the past several months moved to [place] to live with her son. She was admitted to the hospital for chest pain and increased troponin levels. She was found to have an elevated BNP and an echocardiogram showed an ejection fraction of approximately 16-18%. She received some diuresis. She had minimal urinary output. Her creatinine was increased and her blood pressure has been in the 80s.  She was moved to the ICU for inotropic support    Interval history:    No chest pain or shortness of breath.    The patient's relevant past medical, surgical and social history were reviewed and updated in Epic as appropriate.       OBJECTIVE     Vital Sign Min/Max for last 24 hours  Temp  Min: 97.6 °F (36.4 °C)  Max: 98.5 °F (36.9 °C)   BP  Min: 122/74  Max: 150/94   Pulse  Min: 79  Max: 103   Resp  Min: 16  Max: 18   SpO2  Min: 79 %  Max: 99 %   Flow (L/min)  Min: 2  Max: 2   No Data Recorded      Intake/Output Summary (Last 24 hours) at 02/07/17 1727  Last data filed at 02/07/17 1600   Gross per 24 hour   Intake  304.9 ml   Output   1415 ml   Net -1110.1 ml      Flowsheet Rows         First Filed Value    Admission Height  68.5\" (174 cm) Documented at 02/04/2017 0240    Admission Weight  117 lb (53.1 kg) Documented at 02/04/2017 0240         Last 3 weights    02/04/17  0240 02/04/17  0529 02/04/17  1503   Weight: 117 lb (53.1 kg) 116 lb 3.2 oz (52.7 kg) 116 lb (52.6 kg)            Objective:  General Appearance:  In no acute distress.    Vital signs: (most recent): Blood pressure 139/83, pulse 93, temperature " "97.6 °F (36.4 °C), temperature source Axillary, resp. rate 18, height 67.99\" (172.7 cm), weight 116 lb (52.6 kg), SpO2 93 %, not currently breastfeeding.    HEENT: Normal HEENT exam.    Lungs:  Normal respiratory rate and normal effort.  She is not in respiratory distress.  There are rales.  No wheezes or rhonchi.    Heart: Normal rate.  Regular rhythm.  S1 normal and S2 normal.  No murmur, gallop or friction rub. (Good valve click)  Chest: Symmetric chest wall expansion.   Abdomen: Abdomen is soft and non-distended.  Bowel sounds are normal.   There is no abdominal tenderness.   There is no mass. There is no splenomegaly. There is no hepatomegaly.   Extremities: There is no deformity or dependent edema.    Neurological: Patient is alert and oriented to person, place and time.    Pupils:  Pupils are equal, round, and reactive to light.    Skin:  Warm and dry.              I reviewed the patient's new clinical results.  I reviewed the patient's new imaging results/reports including actual images and agree with reports.      Chest X-Ray:  Enlarged cardiac silhouette with bibasilar interstitial prominence NSC    INFUSIONS    DOPamine 3 mcg/kg/min Last Rate: 3 mcg/kg/min (02/07/17 1111)   Pharmacy to dose warfarin           Results from last 7 days  Lab Units 02/07/17  0358 02/06/17  0348 02/05/17  0524   WBC 10*3/mm3 7.63 9.10 8.09   HEMOGLOBIN g/dL 9.5* 9.9* 10.3*   HEMATOCRIT % 29.5* 31.6* 33.4*   PLATELETS 10*3/mm3 145* 191 228       Results from last 7 days  Lab Units 02/07/17  0358 02/06/17  0348 02/05/17  0524   SODIUM mmol/L 131* 128* 130*   POTASSIUM mmol/L 5.1 5.8* 5.2   CHLORIDE mmol/L 97* 96* 96*   TOTAL CO2 mmol/L 27.0 24.0 27.0   BUN mg/dL 45* 45* 37*   GLUCOSE mg/dL 183* 102* 112*   CREATININE mg/dL 2.50* 2.80* 2.60*   CALCIUM mg/dL 9.1 9.2 9.3                 [facility] Ventilation:      I reviewed the patient's medications.    Assessment/Plan   ASSESSMENT      Hospital Problem List     * (Principal)Systolic " heart failure, chronic    Overview Addendum 2/6/2017  8:44 AM by MAN Quesada             Ischemic cardiomyopathy    Overview Signed 2/6/2017  8:44 AM by MAN Quesada     A. History of MI December 2009: s/p stent to RCA and LAD, EF 40%  B. CABGx2 March, 2010: SVG to OMB-1, SVG to PDA  C. EF 30% post-operatively, s/p [place] ICD placement, 2011  D. Echo July 2014: Severe global hypokinesis with EF 12%, moderate AI, mechanical mitral valve, moderate to severe TR, RVSP 43mmHg   E. Echo 2/4/17: EF 18%, mild to mod AI, mod TR, grossly normal prosthetic mitral valve         Lupus (systemic lupus erythematosus)    H/O mitral valve replacement    Overview Signed 2/6/2017  8:53 AM by MNA Quesada     VHD:  A. Mitral valve replacement with 27mm ATS mechanical valve March 2010            AICD (automatic cardioverter/defibrillator) present    FAHEEM (acute kidney injury)    Overview Signed 2/6/2017  8:46 AM by MAN Quesada     Nephrology following: Hyperkalemia and Hyponatremia         Hypotension    Overview Signed 2/6/2017  8:45 AM by MAN Quesada     On iv pressors               Creatinine clearance slightly better and some diuresis on low-dose dopamine.          PLAN     Low-dose dopamine  Monitor I's and O's closely  Follow-up renal parameters             I discussed the patient's findings and my recommendations with patient, family and nursing staff     Plan of care and goals reviewed with mulitdisciplinary team at daily rounds.    Don Bah MD  Pulmonary and Critical Care Medicine  02/07/17 5:27 PM

## 2017-02-08 LAB
ALBUMIN SERPL-MCNC: 3.2 G/DL (ref 3.2–4.8)
ANION GAP SERPL CALCULATED.3IONS-SCNC: 3 MMOL/L (ref 3–11)
BNP SERPL-MCNC: 1922 PG/ML (ref 0–100)
BUN BLD-MCNC: 41 MG/DL (ref 9–23)
BUN/CREAT SERPL: 20.5 (ref 7–25)
CALCIUM SPEC-SCNC: 9 MG/DL (ref 8.7–10.4)
CHLORIDE SERPL-SCNC: 100 MMOL/L (ref 99–109)
CO2 SERPL-SCNC: 33 MMOL/L (ref 20–31)
CREAT BLD-MCNC: 2 MG/DL (ref 0.6–1.3)
GFR SERPL CREATININE-BSD FRML MDRD: 26 ML/MIN/1.73
GLUCOSE BLD-MCNC: 95 MG/DL (ref 70–100)
INR PPP: 1.72
PHOSPHATE SERPL-MCNC: 3.2 MG/DL (ref 2.4–5.1)
POTASSIUM BLD-SCNC: 4.1 MMOL/L (ref 3.5–5.5)
PROTHROMBIN TIME: 18.8 SECONDS (ref 9.6–11.5)
SODIUM BLD-SCNC: 136 MMOL/L (ref 132–146)

## 2017-02-08 PROCEDURE — 94640 AIRWAY INHALATION TREATMENT: CPT

## 2017-02-08 PROCEDURE — 80069 RENAL FUNCTION PANEL: CPT | Performed by: INTERNAL MEDICINE

## 2017-02-08 PROCEDURE — 99232 SBSQ HOSP IP/OBS MODERATE 35: CPT | Performed by: INTERNAL MEDICINE

## 2017-02-08 PROCEDURE — 85610 PROTHROMBIN TIME: CPT

## 2017-02-08 PROCEDURE — 94799 UNLISTED PULMONARY SVC/PX: CPT

## 2017-02-08 PROCEDURE — 83880 ASSAY OF NATRIURETIC PEPTIDE: CPT | Performed by: PHYSICIAN ASSISTANT

## 2017-02-08 PROCEDURE — 94760 N-INVAS EAR/PLS OXIMETRY 1: CPT

## 2017-02-08 RX ORDER — CARVEDILOL 3.12 MG/1
3.12 TABLET ORAL EVERY 12 HOURS SCHEDULED
Status: DISCONTINUED | OUTPATIENT
Start: 2017-02-08 | End: 2017-02-10 | Stop reason: HOSPADM

## 2017-02-08 RX ORDER — WARFARIN SODIUM 5 MG/1
5 TABLET ORAL
Status: DISCONTINUED | OUTPATIENT
Start: 2017-02-08 | End: 2017-02-09

## 2017-02-08 RX ORDER — HYDRALAZINE HYDROCHLORIDE 10 MG/1
10 TABLET, FILM COATED ORAL EVERY 8 HOURS SCHEDULED
Status: DISCONTINUED | OUTPATIENT
Start: 2017-02-08 | End: 2017-02-10 | Stop reason: HOSPADM

## 2017-02-08 RX ORDER — HYDRALAZINE HYDROCHLORIDE 20 MG/ML
10 INJECTION INTRAMUSCULAR; INTRAVENOUS EVERY 8 HOURS
Status: DISCONTINUED | OUTPATIENT
Start: 2017-02-08 | End: 2017-02-08

## 2017-02-08 RX ADMIN — BUDESONIDE AND FORMOTEROL FUMARATE DIHYDRATE 2 PUFF: 160; 4.5 AEROSOL RESPIRATORY (INHALATION) at 08:56

## 2017-02-08 RX ADMIN — ATORVASTATIN CALCIUM 80 MG: 40 TABLET, FILM COATED ORAL at 20:09

## 2017-02-08 RX ADMIN — HYDROXYCHLOROQUINE SULFATE 200 MG: 200 TABLET, FILM COATED ORAL at 10:56

## 2017-02-08 RX ADMIN — BUDESONIDE AND FORMOTEROL FUMARATE DIHYDRATE 2 PUFF: 160; 4.5 AEROSOL RESPIRATORY (INHALATION) at 21:04

## 2017-02-08 RX ADMIN — CLONAZEPAM 1 MG: 1 TABLET ORAL at 16:19

## 2017-02-08 RX ADMIN — CARVEDILOL 3.12 MG: 3.12 TABLET, FILM COATED ORAL at 12:10

## 2017-02-08 RX ADMIN — TRAMADOL HYDROCHLORIDE 50 MG: 50 TABLET, COATED ORAL at 13:58

## 2017-02-08 RX ADMIN — HYDRALAZINE HYDROCHLORIDE 10 MG: 10 TABLET ORAL at 13:59

## 2017-02-08 RX ADMIN — PANTOPRAZOLE SODIUM 40 MG: 40 TABLET, DELAYED RELEASE ORAL at 06:18

## 2017-02-08 RX ADMIN — ASPIRIN 325 MG: 325 TABLET, DELAYED RELEASE ORAL at 08:36

## 2017-02-08 RX ADMIN — WARFARIN SODIUM 5 MG: 5 TABLET ORAL at 17:45

## 2017-02-08 RX ADMIN — SERTRALINE 50 MG: 50 TABLET, FILM COATED ORAL at 08:36

## 2017-02-08 RX ADMIN — CLONAZEPAM 1 MG: 1 TABLET ORAL at 08:49

## 2017-02-08 RX ADMIN — CLONAZEPAM 1 MG: 1 TABLET ORAL at 00:11

## 2017-02-08 NOTE — PLAN OF CARE
Problem: Patient Care Overview (Adult)  Goal: Plan of Care Review  Outcome: Ongoing (interventions implemented as appropriate)    02/07/17 1629 02/07/17 2000 02/08/17 0550   Coping/Psychosocial Response Interventions   Plan Of Care Reviewed With --  patient --    Patient Care Overview   Progress improving --  --    Outcome Evaluation   Outcome Summary/Follow up Plan --  --  Dopamine continues per Dr. Ramirez, VS stable; 24 hour urine over at 1600; no nausea          Problem: Acute Coronary Syndrome (ACS) (Adult)  Goal: Signs and Symptoms of Listed Potential Problems Will be Absent or Manageable (Acute Coronary Syndrome)  Outcome: Ongoing (interventions implemented as appropriate)    02/07/17 1629   Acute Coronary Syndrome (ACS)   Problems Assessed (Acute Coronary Syndrome (ACS)) all   Problems Present (Acute Coronary Syndrome (ACS)) hemodynamic instability         Problem: Cardiac: Heart Failure (Adult)  Goal: Signs and Symptoms of Listed Potential Problems Will be Absent or Manageable (Cardiac: Heart Failure)    02/07/17 1629   Cardiac: Heart Failure   Problems Assessed (Heart Failure) all   Problems Present (Heart Failure) cardiac pump dysfunction;situational response         Problem: Renal Failure/Kidney Injury, Acute (Adult)  Goal: Signs and Symptoms of Listed Potential Problems Will be Absent or Manageable (Renal Failure/Kidney Injury, Acute)  Outcome: Ongoing (interventions implemented as appropriate)    02/07/17 1629   Renal Failure/Kidney Injury, Acute   Problems Assessed (Acute Renal Failure/Kidney Injury) all   Problems Present (Acute Renal Failure/Kidney Injury) fluid imbalance;situational response;hematologic complications         Problem: Skin Integrity Impairment, Risk/Actual (Adult)  Goal: Skin Integrity/Wound Healing  Outcome: Ongoing (interventions implemented as appropriate)    02/08/17 0550   Skin Integrity Impairment, Risk/Actual (Adult)   Skin Integrity/Wound Healing making progress toward outcome

## 2017-02-08 NOTE — PLAN OF CARE
Problem: Patient Care Overview (Adult)  Goal: Plan of Care Review  Outcome: Ongoing (interventions implemented as appropriate)    02/08/17 1534   Coping/Psychosocial Response Interventions   Plan Of Care Reviewed With patient;raven   Patient Care Overview   Progress improving   Outcome Evaluation   Outcome Summary/Follow up Plan Paitnet continues on Dopamine for renal perfusion, finished 24 hour urine. Paitnet up to chair, ordered to telemetry. No acute changes contiinue with current care.        Goal: Adult Individualization and Mutuality  Outcome: Ongoing (interventions implemented as appropriate)  Goal: Discharge Needs Assessment  Outcome: Ongoing (interventions implemented as appropriate)    Problem: Acute Coronary Syndrome (ACS) (Adult)  Goal: Signs and Symptoms of Listed Potential Problems Will be Absent or Manageable (Acute Coronary Syndrome)  Outcome: Ongoing (interventions implemented as appropriate)    Problem: Cardiac: Heart Failure (Adult)  Goal: Signs and Symptoms of Listed Potential Problems Will be Absent or Manageable (Cardiac: Heart Failure)  Outcome: Ongoing (interventions implemented as appropriate)    Problem: Renal Failure/Kidney Injury, Acute (Adult)  Goal: Signs and Symptoms of Listed Potential Problems Will be Absent or Manageable (Renal Failure/Kidney Injury, Acute)  Outcome: Ongoing (interventions implemented as appropriate)    Problem: Skin Integrity Impairment, Risk/Actual (Adult)  Goal: Skin Integrity/Wound Healing  Outcome: Ongoing (interventions implemented as appropriate)

## 2017-02-08 NOTE — PROGRESS NOTES
"INTENSIVIST NOTE     Hospital Day: 4      Ms. Ashely Roldan, 56 y.o. female is followed for:   Systolic heart failure       SUBJECTIVE     56-year-old woman with a history of chronic obstructive pulmonary disease, systolic heart failure with previous ICD implantation, prior mitral valve replacement with chronic anticoagulation, chronic renal insufficiency of unknown baseline, and systemic lupus erythematosus who has in the past several months moved to [place] to live with her son. She was admitted to the hospital for chest pain and increased troponin levels. She was found to have an elevated BNP and an echocardiogram showed an ejection fraction of approximately 16-18%. She received some diuresis. She had minimal urinary output. Her creatinine was increased and her blood pressure has been in the 80s.  She was moved to the ICU for inotropic support    Interval history:    Comfortable.  Denies chest pain or shortness of breath.    The patient's relevant past medical, surgical and social history were reviewed and updated in Epic as appropriate.       OBJECTIVE     Vital Sign Min/Max for last 24 hours  Temp  Min: 97 °F (36.1 °C)  Max: 98.1 °F (36.7 °C)   BP  Min: 119/71  Max: 148/75   Pulse  Min: 80  Max: 96   Resp  Min: 16  Max: 20   SpO2  Min: 89 %  Max: 99 %   Flow (L/min)  Min: 2  Max: 2   No Data Recorded      Intake/Output Summary (Last 24 hours) at 02/08/17 1600  Last data filed at 02/08/17 1200   Gross per 24 hour   Intake  808.2 ml   Output   1970 ml   Net -1161.8 ml      Flowsheet Rows         First Filed Value    Admission Height  68.5\" (174 cm) Documented at 02/04/2017 0240    Admission Weight  117 lb (53.1 kg) Documented at 02/04/2017 0240         Last 3 weights    02/04/17  0240 02/04/17  0529 02/04/17  1503   Weight: 117 lb (53.1 kg) 116 lb 3.2 oz (52.7 kg) 116 lb (52.6 kg)            Objective:  General Appearance:  In no acute distress.    Vital signs: (most recent): Blood pressure 128/80, pulse " "86, temperature 97.8 °F (36.6 °C), temperature source Oral, resp. rate 16, height 67.99\" (172.7 cm), weight 116 lb (52.6 kg), SpO2 95 %, not currently breastfeeding.    HEENT: Normal HEENT exam.    Lungs:  Normal respiratory rate and normal effort.  She is not in respiratory distress.  There are rales.  No wheezes or rhonchi.    Heart: Normal rate.  Regular rhythm.  S1 normal and S2 normal.  No murmur, gallop or friction rub. (Good valve click)  Chest: Symmetric chest wall expansion.   Abdomen: Abdomen is soft and non-distended.  Bowel sounds are normal.   There is no abdominal tenderness.   There is no mass. There is no splenomegaly. There is no hepatomegaly.   Extremities: There is no deformity or dependent edema.    Neurological: Patient is alert and oriented to person, place and time.    Pupils:  Pupils are equal, round, and reactive to light.    Skin:  Warm and dry.              I reviewed the patient's new clinical results.  I reviewed the patient's new imaging results/reports including actual images and agree with reports.      Chest X-Ray:  Enlarged cardiac silhouette with bibasilar interstitial prominence NSC 2/7    INFUSIONS         Results from last 7 days  Lab Units 02/07/17  0358 02/06/17  0348 02/05/17  0524   WBC 10*3/mm3 7.63 9.10 8.09   HEMOGLOBIN g/dL 9.5* 9.9* 10.3*   HEMATOCRIT % 29.5* 31.6* 33.4*   PLATELETS 10*3/mm3 145* 191 228       Results from last 7 days  Lab Units 02/08/17  0414 02/07/17  0358 02/06/17  0348   SODIUM mmol/L 136 131* 128*   POTASSIUM mmol/L 4.1 5.1 5.8*   CHLORIDE mmol/L 100 97* 96*   TOTAL CO2 mmol/L 33.0* 27.0 24.0   BUN mg/dL 41* 45* 45*   GLUCOSE mg/dL 95 183* 102*   CREATININE mg/dL 2.00* 2.50* 2.80*   CALCIUM mg/dL 9.0 9.1 9.2                 [facility] Ventilation:      I reviewed the patient's medications.    Assessment/Plan   ASSESSMENT      Hospital Problem List     * (Principal)Systolic heart failure, chronic    Overview Addendum 2/6/2017  8:44 AM by Minesh Acosta " MAN FRANCIS     I             Ischemic cardiomyopathy    Overview Signed 2/6/2017  8:44 AM by MAN Quesada     A. History of MI December 2009: s/p stent to RCA and LAD, EF 40%  B. CABGx2 March, 2010: SVG to OMB-1, SVG to PDA  C. EF 30% post-operatively, s/p [place] ICD placement, 2011  D. Echo July 2014: Severe global hypokinesis with EF 12%, moderate AI, mechanical mitral valve, moderate to severe TR, RVSP 43mmHg   E. Echo 2/4/17: EF 18%, mild to mod AI, mod TR, grossly normal prosthetic mitral valve         Lupus (systemic lupus erythematosus)    H/O mitral valve replacement    Overview Signed 2/6/2017  8:53 AM by MAN Quesada     VHD:  A. Mitral valve replacement with 27mm ATS mechanical valve March 2010            AICD (automatic cardioverter/defibrillator) present    FAHEEM (acute kidney injury)    Overview Signed 2/6/2017  8:46 AM by MAN Quesada     Nephrology following: Hyperkalemia and Hyponatremia         Hypotension    Overview Signed 2/6/2017  8:45 AM by MAN Quesada     On iv pressors               Renal function improved.          PLAN     Discussed with Dr. Ramirez.  Plans are to discontinue the renal dose dopamine and transfer her to telemetry.  Dr. Ramirez will adjust her warfarin as she has become subtherapeutic.            I discussed the patient's findings and my recommendations with patient, family and nursing staff     Plan of care and goals reviewed with mulitdisciplinary team at daily rounds.    Don Bah MD  Pulmonary and Critical Care Medicine  02/08/17 4:00 PM

## 2017-02-08 NOTE — PROGRESS NOTES
"  [facility] at   PROGRESS NOTE    Date of Admission: 2/4/2017  Length of Stay: 4  Primary Care Physician: Peter Rdz MD    Chief Complaint: f/u SHF   Problem List:   1. Structural heart disease of mixed etiology:  A. History of MI December 2009: s/p stent to RCA and LAD, EF 40%  B. CABGx2 March, 2010: SVG to OMB-1, SVG to PDA  C. EF 30% post-operatively, s/p [place] ICD placement, 2011  D. Echo July 2014: Severe global hypokinesis with EF 12%, moderate AI, mechanical mitral valve, moderate to severe TR, RVSP 43mmHg   E. Echo 2/4/17: EF 18%, mild to mod AI, mod TR, grossly normal prosthetic mitral valve  F. Trivial Troponin elevation  Feb. 2017.  2. VHD:  A. Mitral valve replacement with 27mm ATS mechanical valve March 2010  3. COPD with ongoing tobacco abuse   4. Systemic Lupus Erythematosus  5. Hypertension  6. Hyperlipidemia   7. CKD  8. PVD     Subjective      HPI: She is asymptomatic at rest.  She is still receiving dopamine at approximately 3 mcg/kg/m.  Specifically, she denies any shortness of air at rest or any symptoms suggesting angina.      Objective     Visit Vitals   • /78   • Pulse 90   • Temp 98.1 °F (36.7 °C) (Oral)   • Resp 16   • Ht 67.99\" (172.7 cm)   • Wt 116 lb (52.6 kg)   • SpO2 94%   • BMI 17.64 kg/m2       Physical Exam:  GENERAL: Alert, cooperative, in no acute distress.   HEENT: Fundiscopic deferred, otherwise unremarkable.  NECK: No Jugular venous distention, adenopathy, or thyromegaly noted.   HEART: No discrete PMI is noted. The 1st and 2nd heart sounds are normal, and no murmurs, gallops, rubs, clicks, or other sounds are noted. Normal prosthetic MV slicks.  LUNGS: Clear to auscultation bilaterally. No wheezing, rales or ronchi.  ABDOMEN: Flat without evidence of organomegaly, masses, or tenderness.  NEUROLOGIC: No focal abnormalities involving strength or sensation are noted.   EXTREMITIES: No clubbing, cyanosis, or edema noted. " Peripheral pulses are present.    Results:    Results from last 7 days  Lab Units 02/07/17  0358 02/06/17  0348 02/05/17  0524   WBC 10*3/mm3 7.63 9.10 8.09   HEMOGLOBIN g/dL 9.5* 9.9* 10.3*   HEMATOCRIT % 29.5* 31.6* 33.4*   PLATELETS 10*3/mm3 145* 191 228       Results from last 7 days  Lab Units 02/08/17 0414 02/07/17 0358 02/06/17  0348   SODIUM mmol/L 136 131* 128*   POTASSIUM mmol/L 4.1 5.1 5.8*   CHLORIDE mmol/L 100 97* 96*   TOTAL CO2 mmol/L 33.0* 27.0 24.0   BUN mg/dL 41* 45* 45*   CREATININE mg/dL 2.00* 2.50* 2.80*   GLUCOSE mg/dL 95 183* 102*      Lab Results   Component Value Date    CHOL 157 02/04/2017    TRIG 141 02/04/2017    HDL 58 02/04/2017    LDLDIRECT 56 02/04/2017     (H) 02/05/2017     (H) 02/05/2017       Results from last 7 days  Lab Units 02/04/17  0247   HEMOGLOBIN A1C % 5.00       Results from last 7 days  Lab Units 02/04/17  0247   BNP pg/mL 2950.0*       Results from last 7 days  Lab Units 02/08/17  0414 02/07/17  0358 02/06/17  0348  02/04/17  0321   PROTIME Seconds 18.8* 36.4* 55.7*  < > 60.9*   INR  1.72 3.34 5.11  < > 5.59   APTT seconds  --   --   --   --  45.8*   < > = values in this interval not displayed.    Results from last 7 days  Lab Units 02/06/17 0348 02/05/17  1846 02/05/17  0524 02/04/17  0455   CK TOTAL U/L 195*  --   --   --    TROPONIN I ng/mL  --  0.058* 0.082* 0.228*       Intake/Output Summary (Last 24 hours) at 02/08/17 1040  Last data filed at 02/08/17 0600   Gross per 24 hour   Intake  637.9 ml   Output   1920 ml   Net -1282.1 ml     I personally viewed and interpreted the patient's EKG/Telemetry data    Radiology Data:   CXR 2/7/17:  FINDINGS:   1. There is obstructive lung disease with mild fibrosis and scarring  diffusely.  2. There is a suggestion of a small nodule left apex which should be  evaluated when the patient's condition improves.  3. There is no edema or free fluid. Heart size is upper limits of  normal.       IMPRESSION:  1. A  "single lead ICD pacemaker defibrillator device from the  left is well-positioned.  2. There is obstructive lung disease with diffuse scarring in the chest.  See above other comment and description.  3. 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  4. By comparison, there is no evidence of edema, free fluid, active  disease or significant change when compared to previous studies of 3  days ago.    Current Medications:    aspirin 325 mg Oral Daily   atorvastatin 80 mg Oral Nightly   budesonide-formoterol 2 puff Inhalation BID - RT   hydroxychloroquine 200 mg Oral Daily With Breakfast   pantoprazole 40 mg Oral Q AM   sertraline 50 mg Oral Daily   warfarin 2 mg Oral Daily       DOPamine 3 mcg/kg/min Last Rate: 3 mcg/kg/min (02/07/17 2041)   Pharmacy to dose warfarin         Assessment and Plan:   1. SHF with cardiorenal syndrome    - on Dopamine 3mcg/kg/min for inotropic support with improved BP and kidney function   - urine output much improved as well     2. Mechanical mitral valve   - vit K 2.5mg given on 2/6 for supratherapeutic INR, doses held   - INR subtherapeutic at 1.7 this am   - We will follow    She is much improved with treatment that is included low dose intravenous dobutamine.  Specifically, urine output is improved, potassium has improved, creatinine his fallen, CXR improved and symptoms of heart failure have improved.  We will follow her labs closely, especially in reference to her renal insufficiency, and restart warfarin since her INR just fell in the \"below therapeutic\" range.  We will recheck her LFTs as well since this made the due to right sided failure and contributory to difficulty in controlling ProTime.    She needs to stop smoking and mentioned this again today.    I, Yomi Ramirez MD, personally performed the services described as documented by the above named individual. I have made any necessary edits and it is both accurate and complete 2/8/2017 "  11:12 AM  Sandhya Lynch PA-C   02/08/17   10:40 AM

## 2017-02-08 NOTE — PROGRESS NOTES
"   LOS: 4 days    Patient Care Team:  Peter Rdz MD as PCP - General (Internal Medicine)  Idalia Ramirez, RN as Care Coordinator (Population Health)    Reason For Visit:  F/U FAHEEM ON CKD  Subjective           Review of Systems:    Pulm: No soa   CV:  No CP      Objective       aspirin 325 mg Oral Daily   atorvastatin 80 mg Oral Nightly   budesonide-formoterol 2 puff Inhalation BID - RT   carvedilol 3.125 mg Oral Q12H   hydroxychloroquine 200 mg Oral Daily With Breakfast   pantoprazole 40 mg Oral Q AM   sertraline 50 mg Oral Daily   warfarin 2 mg Oral Daily       DOPamine 3 mcg/kg/min Last Rate: 3 mcg/kg/min (02/07/17 2041)   Pharmacy to dose warfarin           Vital Signs:  Blood pressure 139/78, pulse 90, temperature 98.1 °F (36.7 °C), temperature source Oral, resp. rate 16, height 67.99\" (172.7 cm), weight 116 lb (52.6 kg), SpO2 94 %, not currently breastfeeding.    Flowsheet Rows         First Filed Value    Admission Height  68.5\" (174 cm) Documented at 02/04/2017 0240    Admission Weight  117 lb (53.1 kg) Documented at 02/04/2017 0240          02/07 0701 - 02/08 0700  In: 763 [P.O.:650; I.V.:113]  Out: 2240 [Urine:2240]    Physical Exam:    General Appearance: NAD, alert and cooperative, Ox3  Eyes: PER, conjunctivae and sclerae normal, no icterus  Lungs: respirations regular and unlabored, no crepitus, clear to auscultation  Heart/CV: regular rhythm & normal rate, no murmur, no gallop, no rub and no edema  Abdomen: not distended, soft, non-tender, no masses,  bowel sounds present  Skin: No rash, Warm and dry    Radiology:            Labs:    Results from last 7 days  Lab Units 02/07/17  0358 02/06/17  0348 02/05/17  0524   WBC 10*3/mm3 7.63 9.10 8.09   HEMOGLOBIN g/dL 9.5* 9.9* 10.3*   HEMATOCRIT % 29.5* 31.6* 33.4*   PLATELETS 10*3/mm3 145* 191 228       Results from last 7 days  Lab Units 02/08/17  0414 02/07/17  0358 02/06/17  0348 02/05/17  0524   SODIUM mmol/L 136 131* 128* 130*   POTASSIUM mmol/L 4.1 " 5.1 5.8* 5.2   CHLORIDE mmol/L 100 97* 96* 96*   TOTAL CO2 mmol/L 33.0* 27.0 24.0 27.0   BUN mg/dL 41* 45* 45* 37*   CREATININE mg/dL 2.00* 2.50* 2.80* 2.60*   CALCIUM mg/dL 9.0 9.1 9.2 9.3   PHOSPHORUS mg/dL 3.2 4.6 5.1  --    ALBUMIN g/dL 3.20 3.30 3.50 3.60       Results from last 7 days  Lab Units 02/08/17  0414   GLUCOSE mg/dL 95         Results from last 7 days  Lab Units 02/05/17  0524   ALK PHOS U/L 151*   BILIRUBIN mg/dL 0.3   ALT (SGPT) U/L 162*   AST (SGOT) U/L 271*             Results from last 7 days  Lab Units 02/05/17  1717   COLOR UA  Yellow   CLARITY UA  Clear   PH, URINE  <=5.0   SPECIFIC GRAVITY, URINE  1.012   GLUCOSE UA  Negative   KETONES UA  Negative   BILIRUBIN UA  Negative   PROTEIN UA  100 mg/dL (2+)*   BLOOD UA  Negative   LEUKOCYTES UA  Trace*   NITRITE UA  Negative       Estimated Creatinine Clearance: 25.8 mL/min (by C-G formula based on Cr of 2).      Assessment     Principal Problem:    Systolic heart failure, chronic  Active Problems:    Ischemic cardiomyopathy    Lupus (systemic lupus erythematosus)    H/O mitral valve replacement    AICD (automatic cardioverter/defibrillator) present    FAHEEM (acute kidney injury)    Hypotension            Impression: NONOLIGURIC FAHEEM ON CKD. CARDIORENAL SYNDROME. GFR IMPROVING. BX PROVEN FOCAL PROLIFERATIVE LUPUS NEPHRITIS.            Recommendations: LAB AM. AWAIT 24 HR URINE PROTEIN.      Minesh Nichole MD  02/08/17  11:01 AM

## 2017-02-09 LAB
ALBUMIN SERPL-MCNC: 2.8 G/DL (ref 3.2–4.8)
ALBUMIN/GLOB SERPL: 1.1 G/DL (ref 1.5–2.5)
ALP SERPL-CCNC: 123 U/L (ref 25–100)
ALT SERPL W P-5'-P-CCNC: 338 U/L (ref 7–40)
ANION GAP SERPL CALCULATED.3IONS-SCNC: 3 MMOL/L (ref 3–11)
APTT PPP: 34.7 SECONDS (ref 24–31)
APTT PPP: 75.6 SECONDS (ref 24–31)
AST SERPL-CCNC: 183 U/L (ref 0–33)
BASOPHILS # BLD AUTO: 0.01 10*3/MM3 (ref 0–0.2)
BASOPHILS NFR BLD AUTO: 0.2 % (ref 0–1)
BILIRUB SERPL-MCNC: 0.3 MG/DL (ref 0.3–1.2)
BUN BLD-MCNC: 34 MG/DL (ref 9–23)
BUN/CREAT SERPL: 20 (ref 7–25)
CALCIUM SPEC-SCNC: 8.6 MG/DL (ref 8.7–10.4)
CHLORIDE SERPL-SCNC: 101 MMOL/L (ref 99–109)
CO2 SERPL-SCNC: 32 MMOL/L (ref 20–31)
COLLECT DURATION TIME UR: 24 HRS
CREAT BLD-MCNC: 1.7 MG/DL (ref 0.6–1.3)
DEPRECATED RDW RBC AUTO: 55.3 FL (ref 37–54)
DEPRECATED RDW RBC AUTO: 56.3 FL (ref 37–54)
EOSINOPHIL # BLD AUTO: 0.15 10*3/MM3 (ref 0.1–0.3)
EOSINOPHIL NFR BLD AUTO: 2.8 % (ref 0–3)
ERYTHROCYTE [DISTWIDTH] IN BLOOD BY AUTOMATED COUNT: 15.2 % (ref 11.3–14.5)
ERYTHROCYTE [DISTWIDTH] IN BLOOD BY AUTOMATED COUNT: 15.4 % (ref 11.3–14.5)
GFR SERPL CREATININE-BSD FRML MDRD: 31 ML/MIN/1.73
GLOBULIN UR ELPH-MCNC: 2.5 GM/DL
GLUCOSE BLD-MCNC: 94 MG/DL (ref 70–100)
HCT VFR BLD AUTO: 29.1 % (ref 34.5–44)
HCT VFR BLD AUTO: 29.3 % (ref 34.5–44)
HGB BLD-MCNC: 9.2 G/DL (ref 11.5–15.5)
HGB BLD-MCNC: 9.2 G/DL (ref 11.5–15.5)
IMM GRANULOCYTES # BLD: 0.01 10*3/MM3 (ref 0–0.03)
IMM GRANULOCYTES NFR BLD: 0.2 % (ref 0–0.6)
INR PPP: 1.71
INR PPP: 1.86
LYMPHOCYTES # BLD AUTO: 1.18 10*3/MM3 (ref 0.6–4.8)
LYMPHOCYTES NFR BLD AUTO: 21.9 % (ref 24–44)
MCH RBC QN AUTO: 32.3 PG (ref 27–31)
MCH RBC QN AUTO: 32.9 PG (ref 27–31)
MCHC RBC AUTO-ENTMCNC: 31.4 G/DL (ref 32–36)
MCHC RBC AUTO-ENTMCNC: 31.6 G/DL (ref 32–36)
MCV RBC AUTO: 102.8 FL (ref 80–99)
MCV RBC AUTO: 103.9 FL (ref 80–99)
MONOCYTES # BLD AUTO: 0.53 10*3/MM3 (ref 0–1)
MONOCYTES NFR BLD AUTO: 9.9 % (ref 0–12)
NEUTROPHILS # BLD AUTO: 3.5 10*3/MM3 (ref 1.5–8.3)
NEUTROPHILS NFR BLD AUTO: 65 % (ref 41–71)
PLATELET # BLD AUTO: 128 10*3/MM3 (ref 150–450)
PLATELET # BLD AUTO: 149 10*3/MM3 (ref 150–450)
PMV BLD AUTO: 10.2 FL (ref 6–12)
PMV BLD AUTO: 10.6 FL (ref 6–12)
POTASSIUM BLD-SCNC: 4.6 MMOL/L (ref 3.5–5.5)
PROT 24H UR-MRATE: 900 MG/24HOURS (ref 42–225)
PROT SERPL-MCNC: 5.3 G/DL (ref 5.7–8.2)
PROT UR-MCNC: 50 MG/DL (ref 1–14)
PROTHROMBIN TIME: 18.6 SECONDS (ref 9.6–11.5)
PROTHROMBIN TIME: 20.7 SECONDS (ref 9.6–11.5)
RBC # BLD AUTO: 2.8 10*6/MM3 (ref 3.89–5.14)
RBC # BLD AUTO: 2.85 10*6/MM3 (ref 3.89–5.14)
SODIUM BLD-SCNC: 136 MMOL/L (ref 132–146)
SPECIMEN VOL 24H UR: 1800 ML
WBC NRBC COR # BLD: 5.38 10*3/MM3 (ref 3.5–10.8)
WBC NRBC COR # BLD: 5.41 10*3/MM3 (ref 3.5–10.8)

## 2017-02-09 PROCEDURE — 85730 THROMBOPLASTIN TIME PARTIAL: CPT | Performed by: PHYSICIAN ASSISTANT

## 2017-02-09 PROCEDURE — 25010000002 HEPARIN (PORCINE) PER 1000 UNITS: Performed by: PHYSICIAN ASSISTANT

## 2017-02-09 PROCEDURE — 94640 AIRWAY INHALATION TREATMENT: CPT

## 2017-02-09 PROCEDURE — 85610 PROTHROMBIN TIME: CPT

## 2017-02-09 PROCEDURE — 85730 THROMBOPLASTIN TIME PARTIAL: CPT

## 2017-02-09 PROCEDURE — 80053 COMPREHEN METABOLIC PANEL: CPT | Performed by: PHYSICIAN ASSISTANT

## 2017-02-09 PROCEDURE — 99232 SBSQ HOSP IP/OBS MODERATE 35: CPT | Performed by: PHYSICIAN ASSISTANT

## 2017-02-09 PROCEDURE — 94760 N-INVAS EAR/PLS OXIMETRY 1: CPT

## 2017-02-09 PROCEDURE — 85025 COMPLETE CBC W/AUTO DIFF WBC: CPT | Performed by: PHYSICIAN ASSISTANT

## 2017-02-09 PROCEDURE — 85027 COMPLETE CBC AUTOMATED: CPT | Performed by: PHYSICIAN ASSISTANT

## 2017-02-09 PROCEDURE — 94799 UNLISTED PULMONARY SVC/PX: CPT

## 2017-02-09 PROCEDURE — 85610 PROTHROMBIN TIME: CPT | Performed by: PHYSICIAN ASSISTANT

## 2017-02-09 RX ORDER — ALBUTEROL SULFATE 2.5 MG/3ML
2.5 SOLUTION RESPIRATORY (INHALATION) AS NEEDED
COMMUNITY

## 2017-02-09 RX ORDER — HEPARIN SODIUM 1000 [USP'U]/ML
60 INJECTION, SOLUTION INTRAVENOUS; SUBCUTANEOUS AS NEEDED
Status: DISCONTINUED | OUTPATIENT
Start: 2017-02-09 | End: 2017-02-09

## 2017-02-09 RX ORDER — ROSUVASTATIN CALCIUM 10 MG/1
10 TABLET, COATED ORAL NIGHTLY
Status: DISCONTINUED | OUTPATIENT
Start: 2017-02-09 | End: 2017-02-10 | Stop reason: HOSPADM

## 2017-02-09 RX ORDER — WARFARIN SODIUM 3 MG/1
3 TABLET ORAL
Status: DISCONTINUED | OUTPATIENT
Start: 2017-02-09 | End: 2017-02-10 | Stop reason: HOSPADM

## 2017-02-09 RX ORDER — HEPARIN SODIUM 1000 [USP'U]/ML
60 INJECTION, SOLUTION INTRAVENOUS; SUBCUTANEOUS ONCE
Status: COMPLETED | OUTPATIENT
Start: 2017-02-09 | End: 2017-02-09

## 2017-02-09 RX ORDER — BUTALBITAL, ACETAMINOPHEN AND CAFFEINE 50; 325; 40 MG/1; MG/1; MG/1
2 TABLET ORAL EVERY 4 HOURS PRN
Status: DISCONTINUED | OUTPATIENT
Start: 2017-02-09 | End: 2017-02-10 | Stop reason: HOSPADM

## 2017-02-09 RX ADMIN — BUDESONIDE AND FORMOTEROL FUMARATE DIHYDRATE 2 PUFF: 160; 4.5 AEROSOL RESPIRATORY (INHALATION) at 20:07

## 2017-02-09 RX ADMIN — BUDESONIDE AND FORMOTEROL FUMARATE DIHYDRATE 2 PUFF: 160; 4.5 AEROSOL RESPIRATORY (INHALATION) at 07:46

## 2017-02-09 RX ADMIN — HYDRALAZINE HYDROCHLORIDE 10 MG: 10 TABLET ORAL at 06:39

## 2017-02-09 RX ADMIN — ASPIRIN 325 MG: 325 TABLET, DELAYED RELEASE ORAL at 08:43

## 2017-02-09 RX ADMIN — PANTOPRAZOLE SODIUM 40 MG: 40 TABLET, DELAYED RELEASE ORAL at 06:39

## 2017-02-09 RX ADMIN — CLONAZEPAM 1 MG: 1 TABLET ORAL at 21:30

## 2017-02-09 RX ADMIN — CLONAZEPAM 1 MG: 1 TABLET ORAL at 08:43

## 2017-02-09 RX ADMIN — HYDROXYCHLOROQUINE SULFATE 200 MG: 200 TABLET, FILM COATED ORAL at 07:25

## 2017-02-09 RX ADMIN — SERTRALINE 50 MG: 50 TABLET, FILM COATED ORAL at 08:43

## 2017-02-09 RX ADMIN — CARVEDILOL 3.12 MG: 3.12 TABLET, FILM COATED ORAL at 21:29

## 2017-02-09 RX ADMIN — HEPARIN SODIUM 12 UNITS/KG/HR: 10000 INJECTION, SOLUTION INTRAVENOUS at 11:34

## 2017-02-09 RX ADMIN — HEPARIN SODIUM 3160 UNITS: 1000 INJECTION, SOLUTION INTRAVENOUS; SUBCUTANEOUS at 11:33

## 2017-02-09 RX ADMIN — ROSUVASTATIN CALCIUM 10 MG: 10 TABLET ORAL at 21:29

## 2017-02-09 RX ADMIN — CARVEDILOL 3.12 MG: 3.12 TABLET, FILM COATED ORAL at 08:43

## 2017-02-09 RX ADMIN — CLONAZEPAM 1 MG: 1 TABLET ORAL at 17:09

## 2017-02-09 RX ADMIN — WARFARIN SODIUM 3 MG: 3 TABLET ORAL at 17:09

## 2017-02-09 RX ADMIN — HYDRALAZINE HYDROCHLORIDE 10 MG: 10 TABLET ORAL at 17:23

## 2017-02-09 NOTE — PROGRESS NOTES
Adult Nutrition  Assessment/PES    Patient Name:  Ashely Roldan  YOB: 1960  MRN: [id number]  Admit Date:  2/4/2017    Assessment Date:  2/9/2017   Comments:  RD PO follow up. Appetite and PO intake increasing. RD to continue to follow.           Reason for Assessment       02/09/17 0906    Reason for Assessment    Reason For Assessment/Visit follow up protocol;multidisciplinary rounds    Time Spent (min) 30    Diagnosis Diagnosis   Per notes this adm/MD diagnosis list noted   Patient Active Problem List   Diagnosis   • Anxiety   • Arthritis   • COPD (chronic obstructive pulmonary disease)   • Depression   • Emphysema of lung   • Gastritis   • Heart attack   • Heart murmur   • Hyperlipidemia   • Hypertension   • Ischemic cardiomyopathy   • VHD (valvular heart disease)   • Osteoporosis   • PVD (peripheral vascular disease)   • Chronic renal failure   • Allergic rhinitis   • Lupus (systemic lupus erythematosus)   • TIA (transient ischemic attack)   • Chronic coronary artery disease   • Cough   • H/O mitral valve replacement   • Anemia of chronic kidney failure   • Insomnia   • COPD exacerbation   • Tobacco abuse   • Systolic heart failure, chronic   • AICD (automatic cardioverter/defibrillator) present   • FAHEEM (acute kidney injury)   • Noncompliance   • Blunt trauma of multiple sites   • Chronic anticoagulation   • Closed fracture of rib with flail chest   • Supratherapeutic INR   • Fracture of left clavicle   • Chest pain   • Hypotension                Nutrition/Diet History       02/09/17 0906    Nutrition/Diet History    Reported/Observed By RN;MD    Other Dopamine now off, nausea improved, po intake much improved. Bowel regimen added last BM 2/4. Orders to floor. Pt asleep at time of RD visit              Labs/Tests/Procedures/Meds       02/09/17 0908    Labs/Tests/Procedures/Meds    Labs/Tests Review Reviewed;BUN;Creat        I/O: 864/1040 Last BM 2/4        Nutrition Prescription Ordered        02/09/17 0909    Nutrition Prescription PO    Current PO Diet Regular    Supplement Ensure Clear;Boost Breeze    Supplement Frequency 2 times a day    Common Modifiers Cardiac;Consistent Carbohydrate;Renal    Other Modifiers --   1000mL fluid restriction            Evaluation of Received Nutrient/Fluid Intake       02/09/17 0910    PO Evaluation    Number of Meals 4    % PO Intake 44   75% x last 2 meals              Problem/Interventions:        Problem 1       02/09/17 0912    Nutrition Diagnoses Problem 1    Problem 1 Inadequate Intake/Infusion    Inadequate Intake Type Oral    Etiology (related to) --   nausea, poor appetite    Signs/Symptoms (evidenced by) PO Intake    Percent (%) intake recorded 44 %    Over number of meals 4                    Intervention Goal       02/09/17 0915    Intervention Goal    General Nutrition support treatment    TF/PN Maintain TF/PN            Nutrition Intervention       02/09/17 0915    Nutrition Intervention    RD/Tech Action Follow Tx progress;Care plan reviewd              Education/Evaluation       02/09/17 0916    Monitor/Evaluation    Monitor Per protocol;I&O;PO intake;Supplement intake            Electronically signed by:  Celia Barrientos RDN, JABIER  02/09/17 11:24 AM

## 2017-02-09 NOTE — PLAN OF CARE
Problem: Patient Care Overview (Adult)  Goal: Plan of Care Review  Outcome: Ongoing (interventions implemented as appropriate)    02/09/17 1728   Coping/Psychosocial Response Interventions   Plan Of Care Reviewed With patient   Patient Care Overview   Progress improving   Outcome Evaluation   Outcome Summary/Follow up Plan VSS, no complaints       Goal: Adult Individualization and Mutuality  Outcome: Ongoing (interventions implemented as appropriate)  Goal: Discharge Needs Assessment  Outcome: Ongoing (interventions implemented as appropriate)    Problem: Acute Coronary Syndrome (ACS) (Adult)  Goal: Signs and Symptoms of Listed Potential Problems Will be Absent or Manageable (Acute Coronary Syndrome)  Outcome: Ongoing (interventions implemented as appropriate)    Problem: Cardiac: Heart Failure (Adult)  Goal: Signs and Symptoms of Listed Potential Problems Will be Absent or Manageable (Cardiac: Heart Failure)  Outcome: Ongoing (interventions implemented as appropriate)    Problem: Renal Failure/Kidney Injury, Acute (Adult)  Goal: Signs and Symptoms of Listed Potential Problems Will be Absent or Manageable (Renal Failure/Kidney Injury, Acute)  Outcome: Ongoing (interventions implemented as appropriate)    Problem: Skin Integrity Impairment, Risk/Actual (Adult)  Goal: Skin Integrity/Wound Healing  Outcome: Ongoing (interventions implemented as appropriate)

## 2017-02-09 NOTE — PROGRESS NOTES
"  [facility] at [facility]  PROGRESS NOTE    Date of Admission: 2/4/2017  Length of Stay: 5  Primary Care Physician: Peter Rdz MD    Chief Complaint: f/u SHF   Problem List:   1. Structural heart disease of mixed etiology:  A. History of MI December 2009: s/p stent to RCA and LAD, EF 40%  B. CABGx2 March, 2010: SVG to OMB-1, SVG to PDA  C. EF 30% post-operatively, s/p [place] ICD placement, 2011  D. Echo July 2014: Severe global hypokinesis with EF 12%, moderate AI, mechanical mitral valve, moderate to severe TR, RVSP 43mmHg   E. Echo 2/4/17: EF 18%, mild to mod AI, mod TR, grossly normal prosthetic mitral valve  F. Trivial Troponin elevation Feb. 2017.  2. VHD:  A. Mitral valve replacement with 27mm ATS mechanical valve March 2010  3. COPD with ongoing tobacco abuse   4. Systemic Lupus Erythematosus  5. Hypertension  6. Hyperlipidemia   7. CKD  8. PVD     Subjective      Patient feeling better, no abdominal pain, no chest pain. Breathing is better.       Objective     Visit Vitals   • /78   • Pulse 88   • Temp 98.6 °F (37 °C) (Oral)   • Resp 16   • Ht 67.99\" (172.7 cm)   • Wt 116 lb (52.6 kg)   • SpO2 (!) 87%   • BMI 17.64 kg/m2     Physical Exam:  GENERAL: Alert, cooperative, in no acute distress.   HEENT: Fundiscopic deferred, otherwise unremarkable.  NECK: No Jugular venous distention, adenopathy, or thyromegaly noted.   HEART: No discrete PMI is noted. Normal mechanical valve sounds, no murmurs  LUNGS: Rales in right lower lobe, no wheezing or ronchi. On 2L NC  ABDOMEN: Flat without evidence of organomegaly, masses, or tenderness.  NEUROLOGIC: No focal abnormalities involving strength or sensation are noted.   EXTREMITIES: No clubbing, cyanosis, or edema noted.     Results:    Results from last 7 days  Lab Units 02/09/17  0413 02/07/17  0358 02/06/17  0348   WBC 10*3/mm3 5.41 7.63 9.10   HEMOGLOBIN g/dL 9.2* 9.5* 9.9*   HEMATOCRIT % 29.3* 29.5* 31.6*   PLATELETS 10*3/mm3 " 149* 145* 191       Results from last 7 days  Lab Units 02/09/17  0413 02/08/17  0414 02/07/17  0358   SODIUM mmol/L 136 136 131*   POTASSIUM mmol/L 4.6 4.1 5.1   CHLORIDE mmol/L 101 100 97*   TOTAL CO2 mmol/L 32.0* 33.0* 27.0   BUN mg/dL 34* 41* 45*   CREATININE mg/dL 1.70* 2.00* 2.50*   GLUCOSE mg/dL 94 95 183*      Lab Results   Component Value Date    CHOL 157 02/04/2017    TRIG 141 02/04/2017    HDL 58 02/04/2017    LDLDIRECT 56 02/04/2017     (H) 02/09/2017     (H) 02/09/2017       Results from last 7 days  Lab Units 02/04/17  0247   HEMOGLOBIN A1C % 5.00       Results from last 7 days  Lab Units 02/08/17  1142 02/04/17  0247   BNP pg/mL 1922.0* 2950.0*       Results from last 7 days  Lab Units 02/09/17  0413 02/08/17  0414 02/07/17  0358  02/04/17  0321   PROTIME Seconds 18.6* 18.8* 36.4*  < > 60.9*   INR  1.71 1.72 3.34  < > 5.59   APTT seconds  --   --   --   --  45.8*   < > = values in this interval not displayed.    Results from last 7 days  Lab Units 02/06/17  0348 02/05/17  1846 02/05/17  0524 02/04/17  0455   CK TOTAL U/L 195*  --   --   --    TROPONIN I ng/mL  --  0.058* 0.082* 0.228*       Intake/Output Summary (Last 24 hours) at 02/09/17 0912  Last data filed at 02/09/17 0853   Gross per 24 hour   Intake   1149 ml   Output    890 ml   Net    259 ml     I personally viewed and interpreted the patient's EKG/Telemetry data    Radiology Data:   CXR 2/7/17:  IMPRESSION:  1. A single lead ICD pacemaker defibrillator device from the  left is well-positioned.  2. There is obstructive lung disease with diffuse scarring in the chest.  See above other comment and description.  3. 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  4. By comparison, there is no evidence of edema, free fluid, active  disease or significant change when compared to previous studies of 3  days ago.    Current Medications:    aspirin 325 mg Oral Daily   atorvastatin 80 mg Oral Nightly    budesonide-formoterol 2 puff Inhalation BID - RT   carvedilol 3.125 mg Oral Q12H   hydrALAZINE 10 mg Oral Q8H   hydroxychloroquine 200 mg Oral Daily With Breakfast   pantoprazole 40 mg Oral Q AM   pharmacy consult - MTM  Does not apply Daily   sertraline 50 mg Oral Daily   warfarin 5 mg Oral Daily          Assessment and Plan:     1. SHF with cardiorenal syndrome    - Received inotropic support with low-dose Dopamine, with improved BP and kidney function   - urine output improved as well, although overnight has fallen a little   - BNP 3000-->1900    - started on low dose Coreg, Hydralazine 10mg TID; OVERALL BETTER.     2. Mechanical mitral valve   - vit K 2.5mg given on 2/6 for supratherapeutic INR, doses held   - INR subtherapeutic at 1.7 this am - received Warfarin 5mg last night     3. Elevated LFT's   - AST: 226-->271-->183   - ALT: 107-->162-->338    I, Yomi Ramirez MD, personally performed the services described as documented by the above named individual. I have made any necessary edits and it is both accurate and complete 2/9/2017  5:33 PM    Sandhya Lynch PA-C   02/09/17   9:12 AM

## 2017-02-09 NOTE — PROGRESS NOTES
"[facility] at [facility]  PROGRESS NOTE      Date of Admission: 2/4/2017  Length of Stay: 5  Primary Care Physician: Peter Rdz MD    Chief Complaint: Follow-up SHF.    Problem List: Principal Problem:    Systolic heart failure, chronic  Active Problems:    Ischemic cardiomyopathy    Lupus (systemic lupus erythematosus)    H/O mitral valve replacement    AICD (automatic cardioverter/defibrillator) present    FAHEEM (acute kidney injury)    Hypotension      Subjective      HPI: Postop AM orthopedic surgery.      Objective   Vital Signs:  Temp:  [97.5 °F (36.4 °C)-98.6 °F (37 °C)] 97.5 °F (36.4 °C)  Heart Rate:  [73-92] 81  Resp:  [16-20] 18  BP: (108-130)/(55-78) 130/63    Intake/Output Summary (Last 24 hours) at 02/09/17 1735  Last data filed at 02/09/17 1200   Gross per 24 hour   Intake    800 ml   Output    430 ml   Net    370 ml     Flowsheet Rows         First Filed Value    Admission Height  68.5\" (174 cm) Documented at 02/04/2017 0240    Admission Weight  117 lb (53.1 kg) Documented at 02/04/2017 0240          Physical Exam:   HEENT: Fundiscopic deferred, otherwise unremarkable.  NECK: No Jugular venous distention, adenopathy, or thyromegaly noted.  HEART: No discrete PMI is noted. The 1st and 2nd heart sounds are normal, and no murmurs, gallops, rubs, clicks, or other sounds are noted.  LUNGS: Clear to auscultation.  ABDOMEN: Flat without evidence of organomegaly, masses, or tenderness.  GENITOURINARY/RECTAL: Deferred.  NEUROLOGIC: No focal abnormalities involving strength or sensation are noted.   EXTREMITIES: Postop per orthopedics.        Results Review:     Results from last 7 days  Lab Units 02/09/17  0413 02/08/17  0414 02/07/17  0358 02/06/17  0348 02/05/17  0524 02/04/17  0247   SODIUM mmol/L 136 136 131* 128* 130* 135   POTASSIUM mmol/L 4.6 4.1 5.1 5.8* 5.2 4.6   CHLORIDE mmol/L 101 100 97* 96* 96* 100   TOTAL CO2 mmol/L 32.0* 33.0* 27.0 24.0 27.0 21.0   BUN mg/dL 34* " 41* 45* 45* 37* 32*   CREATININE mg/dL 1.70* 2.00* 2.50* 2.80* 2.60* 1.80*   GLUCOSE mg/dL 94 95 183* 102* 112* 104*   CALCIUM mg/dL 8.6* 9.0 9.1 9.2 9.3 9.9       Results from last 7 days  Lab Units 02/06/17  0348 02/05/17  1846 02/05/17  0524 02/04/17  0455   CK TOTAL U/L 195*  --   --   --    TROPONIN I ng/mL  --  0.058* 0.082* 0.228*       Results from last 7 days  Lab Units 02/09/17  1053 02/09/17  0413 02/07/17  0358 02/06/17  0348 02/05/17  0524 02/04/17  0247   WBC 10*3/mm3 5.38 5.41 7.63 9.10 8.09 7.62   HEMOGLOBIN g/dL 9.2* 9.2* 9.5* 9.9* 10.3* 10.0*   HEMATOCRIT % 29.1* 29.3* 29.5* 31.6* 33.4* 30.7*   PLATELETS 10*3/mm3 128* 149* 145* 191 228 245       Results from last 7 days  Lab Units 02/09/17  1053 02/09/17  0413 02/08/17  0414 02/07/17  0358 02/06/17  0348 02/05/17  0524 02/04/17  0321   INR  1.86 1.71 1.72 3.34 5.11 4.83 5.59   APTT seconds 34.7*  --   --   --   --   --  45.8*           Results from last 7 days  Lab Units 02/04/17  0247   CHOLESTEROL mg/dL 157   TRIGLYCERIDES mg/dL 141   HDL CHOL mg/dL 58   LDL CHOL mg/dL 56       Results from last 7 days  Lab Units 02/08/17  1142   BNP pg/mL 1922.0*       Results from last 7 days  Lab Units 02/04/17  0247   HEMOGLOBIN A1C % 5.00           Current Medications:    aspirin 325 mg Oral Daily   budesonide-formoterol 2 puff Inhalation BID - RT   carvedilol 3.125 mg Oral Q12H   hydrALAZINE 10 mg Oral Q8H   hydroxychloroquine 200 mg Oral Daily With Breakfast   pantoprazole 40 mg Oral Q AM   pharmacy consult - MTM  Does not apply Daily   rosuvastatin 10 mg Oral Nightly   sertraline 50 mg Oral Daily   warfarin 3 mg Oral Daily       heparin 12 Units/kg/hr Last Rate: 12 Units/kg/hr (02/09/17 1134)   Pharmacy to Dose Heparin           I reviewed the patient's new clinical results.  I personally viewed and interpreted the patient's EKG/Telemetry data    Assessment and Plan:     Stable NOS; we'll follow closely. No further cardiac studies or change in medications  now. NO recurrant hypotension.    I, Yomi Ramirez MD, personally performed the services described as documented by the above named individual. I have made any necessary edits and it is both accurate and complete 2/9/2017  5:37 PM      Yomi Ramirez MD  02/09/17  5:35 PM

## 2017-02-09 NOTE — PROGRESS NOTES
Continued Stay Note   [facility]     Patient Name: Ashely Roldan  MRN: [id number]  Today's Date: 2/9/2017    Admit Date: 2/4/2017          Discharge Plan       02/09/17 1406    Case Management/Social Work Plan    Plan Home    Patient/Family In Agreement With Plan yes    Additional Comments Spoke with patient at bedside.  Plan is still to go home.  Patient is still requiring oxygen.  Patient states that she had been on oxygen at home briefly after suffering broken ribs.  This was provided by [employer].  Should patient need oxygen at home she has no preference of which DME company to use except she DOES NOT want [employer].  CM will continue to follow.              Discharge Codes     None        Expected Discharge Date and Time     Expected Discharge Date Expected Discharge Time    Feb 10, 2017             Farzana Rogers RN

## 2017-02-09 NOTE — PROGRESS NOTES
"   LOS: 5 days    Patient Care Team:  Peter Rdz MD as PCP - General (Internal Medicine)  Idalia Ramirez, JESUS as Care Coordinator (Population Health)    Reason For Visit:  F/U FAHEEM ON CKD  Subjective           Review of Systems:    Pulm: No soa   CV:  No CP      Objective       aspirin 325 mg Oral Daily   atorvastatin 80 mg Oral Nightly   budesonide-formoterol 2 puff Inhalation BID - RT   carvedilol 3.125 mg Oral Q12H   hydrALAZINE 10 mg Oral Q8H   hydroxychloroquine 200 mg Oral Daily With Breakfast   pantoprazole 40 mg Oral Q AM   pharmacy consult - MTM  Does not apply Daily   sertraline 50 mg Oral Daily   warfarin 5 mg Oral Daily            Vital Signs:  Blood pressure 129/78, pulse 87, temperature 98.6 °F (37 °C), temperature source Oral, resp. rate 16, height 67.99\" (172.7 cm), weight 116 lb (52.6 kg), SpO2 94 %, not currently breastfeeding.    Flowsheet Rows         First Filed Value    Admission Height  68.5\" (174 cm) Documented at 02/04/2017 0240    Admission Weight  117 lb (53.1 kg) Documented at 02/04/2017 0240          02/08 0701 - 02/09 0700  In: 864 [P.O.:800; I.V.:64]  Out: 1040 [Urine:1040]    Physical Exam:    General Appearance: NAD, alert and cooperative, Ox3  Eyes: PER, conjunctivae and sclerae normal, no icterus  Lungs: FEW CRACKLES  Heart/CV: regular rhythm & normal rate,  no gallop, no rub and no edema  Abdomen: not distended, soft, non-tender, no masses,  bowel sounds present  Skin: No rash, Warm and dry    Radiology:            Labs:    Results from last 7 days  Lab Units 02/09/17  0413 02/07/17  0358 02/06/17  0348   WBC 10*3/mm3 5.41 7.63 9.10   HEMOGLOBIN g/dL 9.2* 9.5* 9.9*   HEMATOCRIT % 29.3* 29.5* 31.6*   PLATELETS 10*3/mm3 149* 145* 191       Results from last 7 days  Lab Units 02/09/17  0413 02/08/17  0414 02/07/17  0358 02/06/17  0348   SODIUM mmol/L 136 136 131* 128*   POTASSIUM mmol/L 4.6 4.1 5.1 5.8*   CHLORIDE mmol/L 101 100 97* 96*   TOTAL CO2 mmol/L 32.0* 33.0* 27.0 24.0 "   BUN mg/dL 34* 41* 45* 45*   CREATININE mg/dL 1.70* 2.00* 2.50* 2.80*   CALCIUM mg/dL 8.6* 9.0 9.1 9.2   PHOSPHORUS mg/dL  --  3.2 4.6 5.1   ALBUMIN g/dL 2.80* 3.20 3.30 3.50       Results from last 7 days  Lab Units 02/09/17  0413   GLUCOSE mg/dL 94         Results from last 7 days  Lab Units 02/09/17  0413   ALK PHOS U/L 123*   BILIRUBIN mg/dL 0.3   ALT (SGPT) U/L 338*   AST (SGOT) U/L 183*             Results from last 7 days  Lab Units 02/05/17  1717   COLOR UA  Yellow   CLARITY UA  Clear   PH, URINE  <=5.0   SPECIFIC GRAVITY, URINE  1.012   GLUCOSE UA  Negative   KETONES UA  Negative   BILIRUBIN UA  Negative   PROTEIN UA  100 mg/dL (2+)*   BLOOD UA  Negative   LEUKOCYTES UA  Trace*   NITRITE UA  Negative       Estimated Creatinine Clearance: 30.3 mL/min (by C-G formula based on Cr of 1.7).      Assessment     Principal Problem:    Systolic heart failure, chronic  Active Problems:    Ischemic cardiomyopathy    Lupus (systemic lupus erythematosus)    H/O mitral valve replacement    AICD (automatic cardioverter/defibrillator) present    FAHEEM (acute kidney injury)    Hypotension            Impression: FAHEEM SECONDARY CARDIORENAL SYNDROME. GFR IMPROVING. CKD SECONDARY TO FOCAL PROL LUPUS NEPHRITIS ( BX 1995. PREVIOUSLY TX'ED WITH CYTOXAN ). URINARY RETENTION.            Recommendations: PRESENT CARE. HOME SOON OK WITH RENAL. F/U APPT NAL 3/27/17 AT 11 AM. 24 HR URINE PROTEIN PENDING.      Minesh Nichole MD  02/09/17  10:25 AM

## 2017-02-09 NOTE — PLAN OF CARE
Problem: Patient Care Overview (Adult)  Goal: Plan of Care Review    02/08/17 1900   Outcome Evaluation   Outcome Summary/Follow up Plan Dopamine infusion discontinued.

## 2017-02-09 NOTE — PLAN OF CARE
Problem: Patient Care Overview (Adult)  Goal: Plan of Care Review  Outcome: Ongoing (interventions implemented as appropriate)    02/09/17 0546   Coping/Psychosocial Response Interventions   Plan Of Care Reviewed With patient   Patient Care Overview   Progress no change   Outcome Evaluation   Outcome Summary/Follow up Plan Patient's VS stable; UO decreased from yesterday;          Problem: Acute Coronary Syndrome (ACS) (Adult)  Goal: Signs and Symptoms of Listed Potential Problems Will be Absent or Manageable (Acute Coronary Syndrome)  Outcome: Ongoing (interventions implemented as appropriate)    02/07/17 1629 02/08/17 1534   Acute Coronary Syndrome (ACS)   Problems Assessed (Acute Coronary Syndrome (ACS)) all --    Problems Present (Acute Coronary Syndrome (ACS)) --  situational response;hemodynamic instability         Problem: Cardiac: Heart Failure (Adult)  Goal: Signs and Symptoms of Listed Potential Problems Will be Absent or Manageable (Cardiac: Heart Failure)  Outcome: Ongoing (interventions implemented as appropriate)    02/07/17 1629   Cardiac: Heart Failure   Problems Assessed (Heart Failure) all   Problems Present (Heart Failure) cardiac pump dysfunction;situational response         Problem: Renal Failure/Kidney Injury, Acute (Adult)  Goal: Signs and Symptoms of Listed Potential Problems Will be Absent or Manageable (Renal Failure/Kidney Injury, Acute)  Outcome: Ongoing (interventions implemented as appropriate)    02/07/17 1629   Renal Failure/Kidney Injury, Acute   Problems Assessed (Acute Renal Failure/Kidney Injury) all   Problems Present (Acute Renal Failure/Kidney Injury) fluid imbalance;situational response;hematologic complications         Problem: Skin Integrity Impairment, Risk/Actual (Adult)  Goal: Skin Integrity/Wound Healing  Outcome: Ongoing (interventions implemented as appropriate)    02/09/17 0546   Skin Integrity Impairment, Risk/Actual (Adult)   Skin Integrity/Wound Healing making  progress toward outcome

## 2017-02-10 VITALS
WEIGHT: 116 LBS | BODY MASS INDEX: 17.58 KG/M2 | HEART RATE: 80 BPM | RESPIRATION RATE: 20 BRPM | HEIGHT: 68 IN | OXYGEN SATURATION: 91 % | SYSTOLIC BLOOD PRESSURE: 125 MMHG | TEMPERATURE: 98 F | DIASTOLIC BLOOD PRESSURE: 80 MMHG

## 2017-02-10 LAB
ALBUMIN SERPL-MCNC: 3 G/DL (ref 3.2–4.8)
ANION GAP SERPL CALCULATED.3IONS-SCNC: 3 MMOL/L (ref 3–11)
APTT PPP: 48.5 SECONDS (ref 24–31)
APTT PPP: 60.6 SECONDS (ref 24–31)
BUN BLD-MCNC: 31 MG/DL (ref 9–23)
BUN/CREAT SERPL: 19.4 (ref 7–25)
CALCIUM SPEC-SCNC: 8.8 MG/DL (ref 8.7–10.4)
CHLORIDE SERPL-SCNC: 103 MMOL/L (ref 99–109)
CO2 SERPL-SCNC: 31 MMOL/L (ref 20–31)
CREAT BLD-MCNC: 1.6 MG/DL (ref 0.6–1.3)
GFR SERPL CREATININE-BSD FRML MDRD: 33 ML/MIN/1.73
GLUCOSE BLD-MCNC: 83 MG/DL (ref 70–100)
INR PPP: 2.15
PHOSPHATE SERPL-MCNC: 3.1 MG/DL (ref 2.4–5.1)
POTASSIUM BLD-SCNC: 4.4 MMOL/L (ref 3.5–5.5)
PROTHROMBIN TIME: 24 SECONDS (ref 9.6–11.5)
SODIUM BLD-SCNC: 137 MMOL/L (ref 132–146)

## 2017-02-10 PROCEDURE — 80069 RENAL FUNCTION PANEL: CPT | Performed by: INTERNAL MEDICINE

## 2017-02-10 PROCEDURE — 94640 AIRWAY INHALATION TREATMENT: CPT

## 2017-02-10 PROCEDURE — 94760 N-INVAS EAR/PLS OXIMETRY 1: CPT

## 2017-02-10 PROCEDURE — 94799 UNLISTED PULMONARY SVC/PX: CPT

## 2017-02-10 PROCEDURE — 85610 PROTHROMBIN TIME: CPT

## 2017-02-10 PROCEDURE — 85730 THROMBOPLASTIN TIME PARTIAL: CPT

## 2017-02-10 PROCEDURE — 99232 SBSQ HOSP IP/OBS MODERATE 35: CPT | Performed by: INTERNAL MEDICINE

## 2017-02-10 RX ORDER — ROSUVASTATIN CALCIUM 10 MG/1
10 TABLET, COATED ORAL NIGHTLY
Qty: 30 TABLET | Refills: 11 | Status: ON HOLD | OUTPATIENT
Start: 2017-02-10 | End: 2018-02-05

## 2017-02-10 RX ORDER — ASPIRIN 81 MG/1
81 TABLET ORAL DAILY
Qty: 30 TABLET | Refills: 11 | Status: SHIPPED | OUTPATIENT
Start: 2017-02-10 | End: 2017-07-18

## 2017-02-10 RX ORDER — HYDRALAZINE HYDROCHLORIDE 25 MG/1
25 TABLET, FILM COATED ORAL 3 TIMES DAILY
Qty: 90 TABLET | Refills: 11 | Status: ON HOLD | OUTPATIENT
Start: 2017-02-10 | End: 2018-02-05

## 2017-02-10 RX ORDER — CARVEDILOL 3.12 MG/1
3.12 TABLET ORAL 2 TIMES DAILY WITH MEALS
Qty: 60 TABLET | Refills: 11 | Status: SHIPPED | OUTPATIENT
Start: 2017-02-10 | End: 2018-02-05 | Stop reason: HOSPADM

## 2017-02-10 RX ADMIN — PANTOPRAZOLE SODIUM 40 MG: 40 TABLET, DELAYED RELEASE ORAL at 05:24

## 2017-02-10 RX ADMIN — BUDESONIDE AND FORMOTEROL FUMARATE DIHYDRATE 2 PUFF: 160; 4.5 AEROSOL RESPIRATORY (INHALATION) at 07:36

## 2017-02-10 RX ADMIN — ASPIRIN 325 MG: 325 TABLET, DELAYED RELEASE ORAL at 08:52

## 2017-02-10 RX ADMIN — HYDRALAZINE HYDROCHLORIDE 10 MG: 10 TABLET ORAL at 05:24

## 2017-02-10 RX ADMIN — SERTRALINE 50 MG: 50 TABLET, FILM COATED ORAL at 08:52

## 2017-02-10 RX ADMIN — HYDRALAZINE HYDROCHLORIDE 10 MG: 10 TABLET ORAL at 13:51

## 2017-02-10 RX ADMIN — HYDROXYCHLOROQUINE SULFATE 200 MG: 200 TABLET, FILM COATED ORAL at 08:52

## 2017-02-10 RX ADMIN — CARVEDILOL 3.12 MG: 3.12 TABLET, FILM COATED ORAL at 08:52

## 2017-02-10 NOTE — PLAN OF CARE
Problem: Patient Care Overview (Adult)  Goal: Plan of Care Review  Outcome: Ongoing (interventions implemented as appropriate)    02/10/17 5480   Outcome Evaluation   Outcome Summary/Follow up Plan Pt seemed to rest well. Klonopin given for tremors / anxiety. VSS.       Goal: Adult Individualization and Mutuality  Outcome: Ongoing (interventions implemented as appropriate)  Goal: Discharge Needs Assessment  Outcome: Ongoing (interventions implemented as appropriate)    Problem: Acute Coronary Syndrome (ACS) (Adult)  Goal: Signs and Symptoms of Listed Potential Problems Will be Absent or Manageable (Acute Coronary Syndrome)  Outcome: Ongoing (interventions implemented as appropriate)    Problem: Cardiac: Heart Failure (Adult)  Goal: Signs and Symptoms of Listed Potential Problems Will be Absent or Manageable (Cardiac: Heart Failure)  Outcome: Ongoing (interventions implemented as appropriate)    Problem: Renal Failure/Kidney Injury, Acute (Adult)  Goal: Signs and Symptoms of Listed Potential Problems Will be Absent or Manageable (Renal Failure/Kidney Injury, Acute)  Outcome: Ongoing (interventions implemented as appropriate)    Problem: Skin Integrity Impairment, Risk/Actual (Adult)  Goal: Skin Integrity/Wound Healing  Outcome: Ongoing (interventions implemented as appropriate)

## 2017-02-10 NOTE — PROGRESS NOTES
"   LOS: 6 days    Patient Care Team:  Peter Rdz MD as PCP - General (Internal Medicine)  Idalia Ramirez, RN as Care Coordinator (Population Health)    Subjective     Feeling better.     Objective     Vital Signs:  Blood pressure 125/80, pulse 80, temperature 98 °F (36.7 °C), temperature source Oral, resp. rate 20, height 67.99\" (172.7 cm), weight 116 lb (52.6 kg), SpO2 91 %, not currently breastfeeding.    Flowsheet Rows         First Filed Value    Admission Height  68.5\" (174 cm) Documented at 02/04/2017 0240    Admission Weight  117 lb (53.1 kg) Documented at 02/04/2017 0240          02/09 0701 - 02/10 0700  In: 739.3 [P.O.:640; I.V.:99.3]  Out: 1051 [Urine:1050]    Physical Exam:    General Appearance:WD WF NAD  Psych:   awake alert   CV:   No edema  Lungs: respirations regular and unlabored  Abdomen:  not distended  :  No mixon  Skin: No rash       Labs:    Results from last 7 days  Lab Units 02/09/17  1053 02/09/17  0413 02/07/17  0358   WBC 10*3/mm3 5.38 5.41 7.63   HEMOGLOBIN g/dL 9.2* 9.2* 9.5*   PLATELETS 10*3/mm3 128* 149* 145*       Results from last 7 days  Lab Units 02/10/17  0139 02/09/17  0413 02/08/17  0414 02/07/17  0358 02/06/17  0348   SODIUM mmol/L 137 136 136 131* 128*   POTASSIUM mmol/L 4.4 4.6 4.1 5.1 5.8*   CHLORIDE mmol/L 103 101 100 97* 96*   TOTAL CO2 mmol/L 31.0 32.0* 33.0* 27.0 24.0   BUN mg/dL 31* 34* 41* 45* 45*   CREATININE mg/dL 1.60* 1.70* 2.00* 2.50* 2.80*   CALCIUM mg/dL 8.8 8.6* 9.0 9.1 9.2   PHOSPHORUS mg/dL 3.1  --  3.2 4.6 5.1   ALBUMIN g/dL 3.00* 2.80* 3.20 3.30 3.50       Results from last 7 days  Lab Units 02/09/17  0413   ALK PHOS U/L 123*   BILIRUBIN mg/dL 0.3   ALT (SGPT) U/L 338*   AST (SGOT) U/L 183*           Results from last 7 days  Lab Units 02/05/17  1717   COLOR UA  Yellow   CLARITY UA  Clear   PH, URINE  <=5.0   SPECIFIC GRAVITY, URINE  1.012   GLUCOSE UA  Negative   KETONES UA  Negative   BILIRUBIN UA  Negative   PROTEIN UA  100 mg/dL (2+)*   BLOOD UA "  Negative   LEUKOCYTES UA  Trace*   NITRITE UA  Negative       Estimated Creatinine Clearance: 32.2 mL/min (by C-G formula based on Cr of 1.6).      A/P:    ARF:  Resolved.     CKD3: cr stable 1.6     HTN:  Stable    GALO: mixon cath out with good UOP.  watch    Dispo: Ok to discharge when ready. F/U  With NAL 3/27/17 @ 11 AM.     Jimmy Levy MD  02/10/17  12:53 PM

## 2017-02-10 NOTE — PROGRESS NOTES
"  [facility] at [facility]  PROGRESS NOTE    Date of Admission: 2/4/2017  Length of Stay: 6  Primary Care Physician: Peter Rdz MD    Chief Complaint: f/u SHF   Problem List:   1. Structural heart disease of mixed etiology:  A. History of MI December 2009: s/p stent to RCA and LAD, EF 40%  B. CABGx2 March, 2010: SVG to OMB-1, SVG to PDA  C. EF 30% post-operatively, s/p [place] ICD placement, 2011  D. Echo July 2014: Severe global hypokinesis with EF 12%, moderate AI, mechanical mitral valve, moderate to severe TR, RVSP 43mmHg   E. Echo 2/4/17: EF 18%, mild to mod AI, mod TR, grossly normal prosthetic mitral valve  F. Trivial Troponin elevation Feb. 2017.  2. VHD:  A. Mitral valve replacement with 27mm ATS mechanical valve March 2010, on chronic coumadin  3. COPD with ongoing tobacco abuse   4. Systemic Lupus Erythematosus  5. Hypertension  6. Hyperlipidemia   7. CKD  8. PVD      Subjective      Patient feels much better, states she is ready to go home. Slept well, no complaints. Strong desire for discharge!      Objective     Visit Vitals   • /79 (BP Location: Right arm, Patient Position: Lying)   • Pulse 70   • Temp 97.8 °F (36.6 °C) (Oral)   • Resp 12   • Ht 67.99\" (172.7 cm)   • Wt 116 lb (52.6 kg)   • SpO2 98%   • BMI 17.64 kg/m2       Physical Exam:  GENERAL: Alert, cooperative, in no acute distress.   HEENT: Fundiscopic deferred, otherwise unremarkable.  NECK: No Jugular venous distention, adenopathy, or thyromegaly noted.  HEART: No discrete PMI is noted. Mechanical valve sounds are normal and no murmur, rub or gallop   LUNGS: Mild wheezing and diminished breath sounds   ABDOMEN: Flat without evidence of organomegaly, masses, or tenderness.  NEUROLOGIC: No focal abnormalities involving strength or sensation are noted.   EXTREMITIES: No clubbing, cyanosis, or edema noted.     Results:    Results from last 7 days  Lab Units 02/09/17  1053 02/09/17  0413 02/07/17  0358   WBC " 10*3/mm3 5.38 5.41 7.63   HEMOGLOBIN g/dL 9.2* 9.2* 9.5*   HEMATOCRIT % 29.1* 29.3* 29.5*   PLATELETS 10*3/mm3 128* 149* 145*       Results from last 7 days  Lab Units 02/10/17  0139 02/09/17  0413 02/08/17  0414   SODIUM mmol/L 137 136 136   POTASSIUM mmol/L 4.4 4.6 4.1   CHLORIDE mmol/L 103 101 100   TOTAL CO2 mmol/L 31.0 32.0* 33.0*   BUN mg/dL 31* 34* 41*   CREATININE mg/dL 1.60* 1.70* 2.00*   GLUCOSE mg/dL 83 94 95      Lab Results   Component Value Date    CHOL 157 02/04/2017    TRIG 141 02/04/2017    HDL 58 02/04/2017    LDLDIRECT 56 02/04/2017     (H) 02/09/2017     (H) 02/09/2017       Results from last 7 days  Lab Units 02/04/17  0247   HEMOGLOBIN A1C % 5.00       Results from last 7 days  Lab Units 02/08/17  1142 02/04/17  0247   BNP pg/mL 1922.0* 2950.0*       Results from last 7 days  Lab Units 02/10/17  0733 02/10/17  0139 02/09/17  1741 02/09/17  1053 02/09/17  0413   PROTIME Seconds  --  24.0*  --  20.7* 18.6*   INR   --  2.15  --  1.86 1.71   APTT seconds 48.5* 60.6* 75.6* 34.7*  --        Results from last 7 days  Lab Units 02/06/17  0348 02/05/17  1846 02/05/17  0524 02/04/17  0455   CK TOTAL U/L 195*  --   --   --    TROPONIN I ng/mL  --  0.058* 0.082* 0.228*       Intake/Output Summary (Last 24 hours) at 02/10/17 0910  Last data filed at 02/10/17 0500   Gross per 24 hour   Intake 439.32 ml   Output   1001 ml   Net -561.68 ml     I personally viewed and interpreted the patient's EKG/Telemetry data    Current Medications:    aspirin 325 mg Oral Daily   budesonide-formoterol 2 puff Inhalation BID - RT   carvedilol 3.125 mg Oral Q12H   hydrALAZINE 10 mg Oral Q8H   hydroxychloroquine 200 mg Oral Daily With Breakfast   pantoprazole 40 mg Oral Q AM   pharmacy consult - MTM  Does not apply Daily   rosuvastatin 10 mg Oral Nightly   sertraline 50 mg Oral Daily   warfarin 3 mg Oral Daily       heparin 10 Units/kg/hr Last Rate: 10 Units/kg/hr (02/09/17 1923)   Pharmacy to Dose Heparin          Assessment and Plan:     1. SHF with cardiorenal syndrome   - Received inotropic support with low-dose Dopamine, with improved BP and kidney function  - urine output improved as well  - BNP 3000-->1900   - started on low dose Coreg, Hydralazine 10mg TID; OVERALL BETTER.      2. Mechanical mitral valve  - vit K 2.5mg given on 2/6 for supratherapeutic INR, doses held  - INR 2.15 this am     3. Elevated LFT's  - AST: 226-->271-->183  - ALT: 107-->162-->338  - switched to home Crestor 10mg    4. She may be a candidate for [facility] referral for LVAD in the future.    I, Yomi Raimrez MD, personally performed the services described as documented by the above named individual. I have made any necessary edits and it is both accurate and complete 2/10/2017  12:13 PM    Sandhya Lynch PA-C   02/10/17   9:10 AM

## 2017-02-14 NOTE — DISCHARGE SUMMARY
"    [facility] at [facility]  DISCHARGE SUMMARY       Date of Admission: 2/4/2017  Date of Discharge:  2/10/2017  Primary Care Physician: Peter Rdz MD    Discharge Diagnoses:  1. Structural heart disease of mixed etiology:  A. History of MI December 2009: s/p stent to RCA and LAD, EF 40%  B. CABGx2 March, 2010: SVG to OMB-1, SVG to PDA  C. EF 30% post-operatively, s/p [place] ICD placement, 2011  D. Echo July 2014: Severe global hypokinesis with EF 12%, moderate AI, mechanical mitral valve, moderate to severe TR, RVSP 43mmHg   E. Echo 2/4/17: EF 18%, mild to mod AI, mod TR, grossly normal prosthetic mitral valve  F. Trivial Troponin elevation Feb. 2017.  2. VHD:  A. Mitral valve replacement with 27mm ATS mechanical valve March 2010, on chronic coumadin  3. COPD with ongoing tobacco abuse   4. Systemic Lupus Erythematosus  5. Hypertension  6. Hyperlipidemia   7. 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 PVD     Hospital Course:   Patient is a 56 y.o. white female with history of SHF s/p ICD, VHD s/p mechanical mitral valve, CAD with prior 2 vessel CABG and Lupus who presented with mid-abdominal pain and some chest pain. 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 Her BNP was found to be significantly elevated at almost 3000, and CXR was consistent with heart failure. She Troponin was marginally elevated at 0.22, EKG with no acute ischemic changes. She was treated with a 1 time dose of Lasix 40mg IV and the following day her Cr was significantly elevated and she suffered FAHEEM. [facility], and she was felt to be suffering from cardiorenal syndrome. She was then transferred to the ICU for inotropic support with low dose Dopamine drip. Her renal function, urine output, as well as blood pressures improved dramatically after 2-3 " days, and she was monitored on the regular floor for another day prior to discharge home. Her chest and abdominal pain had completely resolved with no recurrence. Her warfarin was held on admission due to being supratherapeutic, she was also given vitamin K. Warfarin was re-started and her INR on day of discharge was 2.1. She states she checks her INR at home and calls the results into her cardiologist, however since she has recently moved to [place], she was provided our INR clinic's phone number to call with her results. She was felt to have reached maximum benefit from inpatient hospitalization and was discharged home on 2/10/17 with close outpatient follow up.      Procedures Performed  None    Pertinent Test Results:   ECHO 2/4/17:  Interpretation Summary      · Left ventricular function is severely decreased. Estimated EF = 18%.  · The left ventricular cavity is borderline dilated.  · Left atrial cavity size is mild-to-moderately dilated.  · Mild to moderate aortic valve regurgitation is present.  · Moderate tricuspid valve regurgitation is present.  · Left ventricular wall thickness is consistent with mild concentric hypertrophy.  · Left ventricular wall segments contract abnormally. Refer to wall scoring diagram for more information.  · Right ventricular cavity is borderline dilated.  · There is no evidence of pericardial effusion.  · The prosthetic mitral valve is grossly normal.  · Mild pulmonary hypertension is present.         Lab Results   Component Value Date    WBC 5.38 02/09/2017    HGB 9.2 (L) 02/09/2017    HCT 29.1 (L) 02/09/2017    .9 (H) 02/09/2017     (L) 02/09/2017     Lab Results   Component Value Date    GLUCOSE 83 02/10/2017    BUN 31 (H) 02/10/2017    CREATININE 1.60 (H) 02/10/2017    EGFRIFNONA 33 (L) 02/10/2017    BCR 19.4 02/10/2017    CO2 31.0 02/10/2017    CALCIUM 8.8 02/10/2017    ALBUMIN 3.00 (L) 02/10/2017    LABIL2 1.1 (L) 02/09/2017     (H) 02/09/2017     " (H) 02/09/2017     Lab Results   Component Value Date    HGBA1C 5.00 02/04/2017     Lab Results   Component Value Date    INR 2.15 02/10/2017    INR 1.86 02/09/2017    INR 1.71 02/09/2017    PROTIME 24.0 (H) 02/10/2017    PROTIME 20.7 (H) 02/09/2017    PROTIME 18.6 (H) 02/09/2017     Lipid Panel 2/4/17:   Total Cholesterol 0 - 200 mg/dL 157    Triglycerides 0 - 150 mg/dL 141    HDL Cholesterol 40 - 60 mg/dL 58    LDL Cholesterol  0 - 130 mg/dL 56     Physical Exam on Discharge:  Visit Vitals   • /80 (BP Location: Right arm, Patient Position: Lying)   • Pulse 80   • Temp 98 °F (36.7 °C) (Oral)   • Resp 20   • Ht 67.99\" (172.7 cm)   • Wt 116 lb (52.6 kg)   • SpO2 91%   • BMI 17.64 kg/m2       General Appearance No acute distress   Neck No adenopathy, supple, trachea midline, no thyromegaly, no JVD   Lungs Clear to auscultation,respirations regular, even and unlabored   Heart Regular rhythm and normal rate, mechanical valve sounds present, no murmur, no gallop, no rub, no click   Chest wall No abnormalities observed   Abdomen Normal bowel sounds, no masses, no hepatomegaly, soft   Extremities Moves all extremities well, no edema, no cyanosis, no redness   Neurological Alert and oriented x 3     Discharge Medications   Ashely Roldan   Home Medication Instructions JEWEL:[id number]    Printed on:02/14/17 7943   Medication Information                      acetaminophen (TYLENOL) 325 MG tablet  Take 650 mg by mouth every 6 (six) hours as needed for mild pain (1-3).             albuterol (PROVENTIL HFA;VENTOLIN HFA) 108 (90 BASE) MCG/ACT inhaler  Inhale 2 puffs every 6 (six) hours as needed.             albuterol (PROVENTIL) (2.5 MG/3ML) 0.083% nebulizer solution  Take 2.5 mg by nebulization As Needed for wheezing or shortness of air.             alendronate (FOSAMAX) 70 MG tablet  Take 70 mg by mouth every 7 days. Patient takes on Saturday             aspirin 81 MG EC tablet  Take 1 tablet by mouth " Daily.             budesonide-formoterol (SYMBICORT) 160-4.5 MCG/ACT inhaler  Inhale 2 puffs 2 (two) times a day. In morning and evening for 90 days             Calcium Carbonate (CALCIUM 600) 1500 MG tablet  Take 1 tablet by mouth 2 (two) times a day.             calcium carbonate (TUMS) 500 MG chewable tablet  Chew 1 tablet 3 (Three) Times a Day As Needed for indigestion or heartburn.             carvedilol (COREG) 3.125 MG tablet  Take 1 tablet by mouth 2 (Two) Times a Day With Meals.             clonazePAM (KLONOPIN) 2 MG tablet  Take 1 tablet by mouth 3 (three) times a day as needed (Tremors).             diphenhydrAMINE (BENADRYL) 25 MG tablet  Take 25 mg by mouth as needed.             Ferrous Sulfate (IRON) 325 (65 FE) MG tablet  Take 1 tablet by mouth Daily.             furosemide (LASIX) 40 MG tablet  Take 40 mg by mouth daily as needed (swelling).             hydrALAZINE (APRESOLINE) 25 MG tablet  Take 1 tablet by mouth 3 (Three) Times a Day.             hydroxychloroquine (PLAQUENIL) 200 MG tablet  Take 200 mg by mouth every morning.             nitroglycerin (NITROSTAT) 0.4 MG SL tablet  1 under the tongue as needed for angina, may repeat q5mins for up three doses             Probiotic Product (PROBIOTIC PO)  Take 1 capsule by mouth daily. 20 billion cell; for 30 days             rosuvastatin (CRESTOR) 10 MG tablet  Take 1 tablet by mouth Every Night. For 90 days             sertraline (ZOLOFT) 50 MG tablet  Take 50 mg by mouth daily. For stress for 90 days             tiotropium (SPIRIVA) 18 MCG per inhalation capsule  Place 2 puffs (1 capsule total) into inhaler and inhale once daily. For 90 days             TiZANidine (ZANAFLEX) 4 MG capsule  Take 4 mg by mouth 2 (two) times a day as needed.             traMADol (ULTRAM) 50 MG tablet  Take 1 tablet by mouth every 6 (six) hours as needed for moderate pain (4-6).             traZODone (DESYREL) 50 MG tablet  Take 1 tablet (50 mg total) by mouth  nightly. For insomnia             WARFARIN SODIUM PO  Take  by mouth Daily. Dose is variable - usually 2mg alternating with 3.5mg but sometimes 1mg vs 2mg if home INR elevated                 Discharge Diet: Cardiac    Activity at Discharge: As tolerated     Condition on Discharge: stable    Follow-up Appointments:  Future Appointments  Date Time Provider Department Center   3/10/2017 12:00 PM Peter Rdz MD [facility] None   5/18/2017 2:30 PM Yomi Ramirez MD [facility] None     Referrals and Follow-ups to Schedule     Ambulatory Referral to Anticoagulation Monitoring    As directed                  Test Results Pending at Discharge: none       Sandhya Lynch PA-C  02/14/17  1:55 PM    Time: Discharge 30 min

## 2017-02-28 ENCOUNTER — ANTICOAGULATION VISIT (OUTPATIENT)
Dept: CARDIOLOGY | Facility: CLINIC | Age: 57
End: 2017-02-28

## 2017-02-28 DIAGNOSIS — Z95.2 H/O MITRAL VALVE REPLACEMENT: ICD-10-CM

## 2017-02-28 DIAGNOSIS — Z79.01 CHRONIC ANTICOAGULATION: ICD-10-CM

## 2017-02-28 LAB
INR PPP: 3.4
INR PPP: 3.53

## 2017-03-02 ENCOUNTER — HOSPITAL ENCOUNTER (OUTPATIENT)
Facility: HOSPITAL | Age: 57
Setting detail: OBSERVATION
Discharge: HOME | End: 2017-03-03
Attending: INTERNAL MEDICINE | Admitting: INTERNAL MEDICINE

## 2017-03-02 DIAGNOSIS — I25.5 ISCHEMIC CARDIOMYOPATHY: Primary | ICD-10-CM

## 2017-03-02 DIAGNOSIS — I50.21 ACUTE SYSTOLIC (CONGESTIVE) HEART FAILURE (HCC): ICD-10-CM

## 2017-03-02 LAB
ANION GAP SERPL CALCULATED.3IONS-SCNC: 7 MMOL/L (ref 3–11)
BUN BLD-MCNC: 24 MG/DL (ref 9–23)
BUN/CREAT SERPL: 14.1 (ref 7–25)
CALCIUM SPEC-SCNC: 8.7 MG/DL (ref 8.7–10.4)
CHLORIDE SERPL-SCNC: 102 MMOL/L (ref 99–109)
CO2 SERPL-SCNC: 24 MMOL/L (ref 20–31)
CREAT BLD-MCNC: 1.7 MG/DL (ref 0.6–1.3)
GFR SERPL CREATININE-BSD FRML MDRD: 31 ML/MIN/1.73
GLUCOSE BLD-MCNC: 138 MG/DL (ref 70–100)
INR PPP: 3.53
POTASSIUM BLD-SCNC: 4.2 MMOL/L (ref 3.5–5.5)
PROTHROMBIN TIME: 40 SECONDS (ref 9.6–11.5)
SODIUM BLD-SCNC: 133 MMOL/L (ref 132–146)
TROPONIN I SERPL-MCNC: 0.05 NG/ML

## 2017-03-02 PROCEDURE — 85610 PROTHROMBIN TIME: CPT

## 2017-03-02 PROCEDURE — 96375 TX/PRO/DX INJ NEW DRUG ADDON: CPT

## 2017-03-02 PROCEDURE — 94760 N-INVAS EAR/PLS OXIMETRY 1: CPT

## 2017-03-02 PROCEDURE — 96374 THER/PROPH/DIAG INJ IV PUSH: CPT

## 2017-03-02 PROCEDURE — G0378 HOSPITAL OBSERVATION PER HR: HCPCS

## 2017-03-02 PROCEDURE — 25010000002 FUROSEMIDE PER 20 MG: Performed by: NURSE PRACTITIONER

## 2017-03-02 PROCEDURE — G0379 DIRECT REFER HOSPITAL OBSERV: HCPCS

## 2017-03-02 PROCEDURE — 99220 PR INITIAL OBSERVATION CARE/DAY 70 MINUTES: CPT | Performed by: INTERNAL MEDICINE

## 2017-03-02 PROCEDURE — 80048 BASIC METABOLIC PNL TOTAL CA: CPT | Performed by: NURSE PRACTITIONER

## 2017-03-02 PROCEDURE — 94799 UNLISTED PULMONARY SVC/PX: CPT

## 2017-03-02 PROCEDURE — 94640 AIRWAY INHALATION TREATMENT: CPT

## 2017-03-02 PROCEDURE — 83880 ASSAY OF NATRIURETIC PEPTIDE: CPT | Performed by: NURSE PRACTITIONER

## 2017-03-02 PROCEDURE — 84484 ASSAY OF TROPONIN QUANT: CPT | Performed by: NURSE PRACTITIONER

## 2017-03-02 RX ORDER — HYDRALAZINE HYDROCHLORIDE 25 MG/1
25 TABLET, FILM COATED ORAL 3 TIMES DAILY
Status: DISCONTINUED | OUTPATIENT
Start: 2017-03-02 | End: 2017-03-03 | Stop reason: HOSPADM

## 2017-03-02 RX ORDER — TRAZODONE HYDROCHLORIDE 50 MG/1
50 TABLET ORAL NIGHTLY
Status: DISCONTINUED | OUTPATIENT
Start: 2017-03-02 | End: 2017-03-03 | Stop reason: HOSPADM

## 2017-03-02 RX ORDER — HYDROXYCHLOROQUINE SULFATE 200 MG/1
200 TABLET, FILM COATED ORAL EVERY MORNING
Status: DISCONTINUED | OUTPATIENT
Start: 2017-03-03 | End: 2017-03-03 | Stop reason: HOSPADM

## 2017-03-02 RX ORDER — ASPIRIN 81 MG/1
81 TABLET ORAL DAILY
Status: DISCONTINUED | OUTPATIENT
Start: 2017-03-02 | End: 2017-03-03 | Stop reason: HOSPADM

## 2017-03-02 RX ORDER — FUROSEMIDE 10 MG/ML
40 INJECTION INTRAMUSCULAR; INTRAVENOUS 2 TIMES DAILY
Status: DISCONTINUED | OUTPATIENT
Start: 2017-03-02 | End: 2017-03-03 | Stop reason: HOSPADM

## 2017-03-02 RX ORDER — WARFARIN SODIUM 2 MG/1
2 TABLET ORAL
Status: DISCONTINUED | OUTPATIENT
Start: 2017-03-02 | End: 2017-03-03

## 2017-03-02 RX ORDER — SACCHAROMYCES BOULARDII 250 MG
250 CAPSULE ORAL DAILY
Status: DISCONTINUED | OUTPATIENT
Start: 2017-03-02 | End: 2017-03-03 | Stop reason: HOSPADM

## 2017-03-02 RX ORDER — ALBUTEROL SULFATE 2.5 MG/3ML
2.5 SOLUTION RESPIRATORY (INHALATION) AS NEEDED
Status: DISCONTINUED | OUTPATIENT
Start: 2017-03-02 | End: 2017-03-02 | Stop reason: SDUPTHER

## 2017-03-02 RX ORDER — SODIUM CHLORIDE 0.9 % (FLUSH) 0.9 %
1-10 SYRINGE (ML) INJECTION AS NEEDED
Status: DISCONTINUED | OUTPATIENT
Start: 2017-03-02 | End: 2017-03-03 | Stop reason: HOSPADM

## 2017-03-02 RX ORDER — CARVEDILOL 3.12 MG/1
3.12 TABLET ORAL 2 TIMES DAILY WITH MEALS
Status: DISCONTINUED | OUTPATIENT
Start: 2017-03-02 | End: 2017-03-03 | Stop reason: HOSPADM

## 2017-03-02 RX ORDER — CALCIUM CARBONATE 200(500)MG
1 TABLET,CHEWABLE ORAL 3 TIMES DAILY PRN
Status: DISCONTINUED | OUTPATIENT
Start: 2017-03-02 | End: 2017-03-03 | Stop reason: HOSPADM

## 2017-03-02 RX ORDER — CLONAZEPAM 1 MG/1
2 TABLET ORAL EVERY 8 HOURS SCHEDULED
Status: DISCONTINUED | OUTPATIENT
Start: 2017-03-02 | End: 2017-03-02

## 2017-03-02 RX ORDER — HEPARIN SODIUM 5000 [USP'U]/ML
5000 INJECTION, SOLUTION INTRAVENOUS; SUBCUTANEOUS EVERY 12 HOURS SCHEDULED
Status: DISCONTINUED | OUTPATIENT
Start: 2017-03-02 | End: 2017-03-02

## 2017-03-02 RX ORDER — DIPHENHYDRAMINE HCL 25 MG
25 CAPSULE ORAL EVERY 6 HOURS PRN
Status: DISCONTINUED | OUTPATIENT
Start: 2017-03-02 | End: 2017-03-03 | Stop reason: HOSPADM

## 2017-03-02 RX ORDER — ATORVASTATIN CALCIUM 20 MG/1
20 TABLET, FILM COATED ORAL NIGHTLY
Status: DISCONTINUED | OUTPATIENT
Start: 2017-03-02 | End: 2017-03-03

## 2017-03-02 RX ORDER — CLONAZEPAM 1 MG/1
2 TABLET ORAL ONCE
Status: COMPLETED | OUTPATIENT
Start: 2017-03-02 | End: 2017-03-02

## 2017-03-02 RX ORDER — IBUPROFEN 200 MG
600 CAPSULE ORAL 2 TIMES DAILY
Status: DISCONTINUED | OUTPATIENT
Start: 2017-03-02 | End: 2017-03-03 | Stop reason: HOSPADM

## 2017-03-02 RX ORDER — FERROUS SULFATE 325(65) MG
325 TABLET ORAL DAILY
Status: DISCONTINUED | OUTPATIENT
Start: 2017-03-02 | End: 2017-03-03

## 2017-03-02 RX ORDER — BUDESONIDE AND FORMOTEROL FUMARATE DIHYDRATE 160; 4.5 UG/1; UG/1
2 AEROSOL RESPIRATORY (INHALATION)
Status: DISCONTINUED | OUTPATIENT
Start: 2017-03-02 | End: 2017-03-03 | Stop reason: HOSPADM

## 2017-03-02 RX ORDER — CLONAZEPAM 1 MG/1
2 TABLET ORAL EVERY 8 HOURS SCHEDULED
Status: DISCONTINUED | OUTPATIENT
Start: 2017-03-02 | End: 2017-03-03 | Stop reason: HOSPADM

## 2017-03-02 RX ORDER — ACETAMINOPHEN 325 MG/1
650 TABLET ORAL EVERY 6 HOURS PRN
Status: DISCONTINUED | OUTPATIENT
Start: 2017-03-02 | End: 2017-03-03 | Stop reason: HOSPADM

## 2017-03-02 RX ORDER — BUDESONIDE AND FORMOTEROL FUMARATE DIHYDRATE 160; 4.5 UG/1; UG/1
2 AEROSOL RESPIRATORY (INHALATION)
Status: DISCONTINUED | OUTPATIENT
Start: 2017-03-02 | End: 2017-03-02

## 2017-03-02 RX ADMIN — WARFARIN SODIUM 2 MG: 2 TABLET ORAL at 17:47

## 2017-03-02 RX ADMIN — IPRATROPIUM BROMIDE 0.5 MG: 0.5 SOLUTION RESPIRATORY (INHALATION) at 19:21

## 2017-03-02 RX ADMIN — HYDRALAZINE HYDROCHLORIDE 25 MG: 25 TABLET ORAL at 17:26

## 2017-03-02 RX ADMIN — CLONAZEPAM 2 MG: 1 TABLET ORAL at 17:26

## 2017-03-02 RX ADMIN — BUDESONIDE AND FORMOTEROL FUMARATE DIHYDRATE 2 PUFF: 160; 4.5 AEROSOL RESPIRATORY (INHALATION) at 19:28

## 2017-03-02 RX ADMIN — CLONAZEPAM 2 MG: 1 TABLET ORAL at 21:19

## 2017-03-02 RX ADMIN — Medication 250 MG: at 17:25

## 2017-03-02 RX ADMIN — HYDRALAZINE HYDROCHLORIDE 25 MG: 25 TABLET ORAL at 21:19

## 2017-03-02 RX ADMIN — CALCIUM CARBONATE 625 MG: 1250 TABLET ORAL at 17:26

## 2017-03-02 RX ADMIN — SERTRALINE HYDROCHLORIDE 50 MG: 50 TABLET ORAL at 21:18

## 2017-03-02 RX ADMIN — CARVEDILOL 3.12 MG: 3.12 TABLET, FILM COATED ORAL at 17:28

## 2017-03-02 RX ADMIN — Medication 325 MG: at 17:28

## 2017-03-02 RX ADMIN — TRAZODONE HYDROCHLORIDE 50 MG: 50 TABLET, FILM COATED ORAL at 21:18

## 2017-03-02 RX ADMIN — PHENOL 2 SPRAY: 1.5 LIQUID ORAL at 17:47

## 2017-03-02 RX ADMIN — ATORVASTATIN CALCIUM 20 MG: 20 TABLET, FILM COATED ORAL at 21:18

## 2017-03-02 RX ADMIN — FUROSEMIDE 40 MG: 10 INJECTION, SOLUTION INTRAMUSCULAR; INTRAVENOUS at 17:28

## 2017-03-02 NOTE — CONSULTS
"Pharmacy Consult  -  Warfarin    Ashely Roldan is a  56 y.o. female , pharmacy consulted for warfarin dosing  Height - 68.5\" (174 cm)  Weight - 130 lb 12.8 oz (59.3 kg)    Consulting Provider: - IAIN Tejada  Indication: - Mechanical Mitral Valve  Goal INR: -  2.5-3.5  Home Regimen:   - Usually 2 mg daily alternating with 3.5 mg daily   - Sometimes alternates 1 mg and 2 mg 'if home INR elevated'    Bridge Therapy: No    Drug-Drug Interactions with current regimen:   Sertraline, aspirin    Warfarin Dosing During Admission:    Date  3/2           INR  3.53           Dose  2 mg                Labs:      Results from last 7 days     Lab Units 03/02/17  1631 02/28/17   INR  3.53 3.40     Results from last 7 days     Lab Units 03/02/17  1603   SODIUM mmol/L 133   POTASSIUM mmol/L 4.2   CHLORIDE mmol/L 102   TOTAL CO2 mmol/L 24.0   BUN mg/dL 24*   CREATININE mg/dL 1.70*   CALCIUM mg/dL 8.7   GLUCOSE mg/dL 138*     Current dietary intake: Regular Cardiac, Low Sodium, Renal diet ordered. No intake yet      Assessment/Plan:   Essentially therapeutic INR, up slightly from her check on 2/28 in the anticoagulation clinic (INR 3.4). Will order 2 mg daily and defer dose adjustments to rounding clinical pharmacist tomorrow morning.     Thank you for this consult.  Minesh Gomez IV, PharmD, BCPS  3/2/2017  5:34 PM        "

## 2017-03-02 NOTE — PLAN OF CARE
Problem: Patient Care Overview (Adult)  Goal: Plan of Care Review  Outcome: Ongoing (interventions implemented as appropriate)    03/02/17 1706   Coping/Psychosocial Response Interventions   Plan Of Care Reviewed With patient   Patient Care Overview   Progress no change   Outcome Evaluation   Outcome Summary/Follow up Plan Pt admitted from [facility]. SR on tele, vitals stable. No complaints voiced. Remaihns on o@ at 2 L. Will recheck labs in am. Diurese overnight. No DC plans as of yet.       Goal: Adult Individualization and Mutuality  Outcome: Ongoing (interventions implemented as appropriate)  Goal: Discharge Needs Assessment  Outcome: Ongoing (interventions implemented as appropriate)    Problem: Cardiac: Heart Failure (Adult)  Goal: Signs and Symptoms of Listed Potential Problems Will be Absent or Manageable (Cardiac: Heart Failure)  Outcome: Ongoing (interventions implemented as appropriate)

## 2017-03-02 NOTE — H&P
[facility] at [facility]   History and physical      Patient Care Team:  Peter Rdz MD as PCP - General (Internal Medicine)  Dayo Ramirez RN as Care Coordinator (Population Health)    PROBLEM LIST:   1. Structural heart disease of mixed etiology:   A. CABG 2010, IDB   B. [place] ICD placement, 2011  C. Echo July 2014: Severe global hypokinesis with EF 12%, moderate AI, mechanical mitral valve, moderate to severe TR, RVSP 43mmHg.  D. ECHO 2/4/17 EF 18%, mild to mod AR, mod TR, prosthetic mitral valve grossly normal. RVSP 35-45 mmHg.  2. VHD:   A. Mitral valve replacement with ATS mechanical valve 2010, IDB   3. COPD with ongoing tobacco abuse   4. Systemic Lupus Erythematosus  5. Hypertension  6. Hyperlipidemia   7. CKD  8. PVD   9. Anxiety/Depression  10. Arthritis  11. GERD  12. Surgeries:   Cholecystectomy   CABG   Mechanical MVR   Right total hip replacement      Allergies   Allergen Reactions   • Morphine And Related GI Intolerance and Irritability   • Sulfa Antibiotics            Current Facility-Administered Medications:   •  heparin (porcine) 5000 UNIT/ML injection 5,000 Units, 5,000 Units, Subcutaneous, Q12H, Caroline Adamser, APRN  •  sodium chloride 0.9 % flush 1-10 mL, 1-10 mL, Intravenous, PRN, Caroline Adamser, APRN         Prescriptions Prior to Admission   Medication Sig Dispense Refill Last Dose   • acetaminophen (TYLENOL) 325 MG tablet Take 650 mg by mouth every 6 (six) hours as needed for mild pain (1-3).   Taking   • albuterol (PROVENTIL HFA;VENTOLIN HFA) 108 (90 BASE) MCG/ACT inhaler Inhale 2 puffs every 6 (six) hours as needed.   Taking   • albuterol (PROVENTIL) (2.5 MG/3ML) 0.083% nebulizer solution Take 2.5 mg by nebulization As Needed for wheezing or shortness of air.      • alendronate (FOSAMAX) 70 MG tablet Take 70 mg by mouth every 7 days. Patient takes on Saturday   Taking   • aspirin 81 MG EC tablet Take 1 tablet by mouth Daily. 30 tablet 11    •  budesonide-formoterol (SYMBICORT) 160-4.5 MCG/ACT inhaler Inhale 2 puffs 2 (two) times a day. In morning and evening for 90 days   Taking   • Calcium Carbonate (CALCIUM 600) 1500 MG tablet Take 1 tablet by mouth 2 (two) times a day.   Taking   • calcium carbonate (TUMS) 500 MG chewable tablet Chew 1 tablet 3 (Three) Times a Day As Needed for indigestion or heartburn.      • carvedilol (COREG) 3.125 MG tablet Take 1 tablet by mouth 2 (Two) Times a Day With Meals. 60 tablet 11    • clonazePAM (KLONOPIN) 2 MG tablet Take 1 tablet by mouth 3 (three) times a day as needed (Tremors). 270 tablet 0    • diphenhydrAMINE (BENADRYL) 25 MG tablet Take 25 mg by mouth as needed.   Taking   • Ferrous Sulfate (IRON) 325 (65 FE) MG tablet Take 1 tablet by mouth Daily.   Taking   • furosemide (LASIX) 40 MG tablet Take 40 mg by mouth daily as needed (swelling).   Taking   • hydrALAZINE (APRESOLINE) 25 MG tablet Take 1 tablet by mouth 3 (Three) Times a Day. 90 tablet 11    • hydroxychloroquine (PLAQUENIL) 200 MG tablet Take 200 mg by mouth every morning.   Taking   • nitroglycerin (NITROSTAT) 0.4 MG SL tablet 1 under the tongue as needed for angina, may repeat q5mins for up three doses 30 tablet 3 Taking   • Probiotic Product (PROBIOTIC PO) Take 1 capsule by mouth daily. 20 billion cell; for 30 days   Taking   • rosuvastatin (CRESTOR) 10 MG tablet Take 1 tablet by mouth Every Night. For 90 days 30 tablet 11    • sertraline (ZOLOFT) 50 MG tablet Take 50 mg by mouth daily. For stress for 90 days   Taking   • tiotropium (SPIRIVA) 18 MCG per inhalation capsule Place 2 puffs (1 capsule total) into inhaler and inhale once daily. For 90 days (Patient taking differently: Place 1 capsule into inhaler and inhale daily as needed. For 90 days) 90 capsule 3 Taking   • TiZANidine (ZANAFLEX) 4 MG capsule Take 4 mg by mouth 2 (two) times a day as needed.   Taking   • traMADol (ULTRAM) 50 MG tablet Take 1 tablet by mouth every 6 (six) hours as needed  "for moderate pain (4-6). 40 tablet 1    • traZODone (DESYREL) 50 MG tablet Take 1 tablet (50 mg total) by mouth nightly. For insomnia 90 tablet 3 Taking   • WARFARIN SODIUM PO Take  by mouth Daily. Dose is variable - usually 2mg alternating with 3.5mg but sometimes 1mg vs 2mg if home INR elevated            Subjective .   History of present illness:    Patient is a 56-year-old  female who is transferred to [facility] for higher level of care and treatment of her systolic congestive heart failure.  She has previous history of coronary artery disease and coronary artery bypass grafting as well as previous mitral valve replacement.  She was recently hospitalized at the beginning of February and treated for heart failure exacerbation.  She was discharged home on as needed Lasix.  She notes that she has not taken this since being discharged.  Since discharge her weight is up almost 15 pounds.  She notes that she has not been eating excess salt but \"always have a drink in my hand\".  At the outside facility she was treated with 40 mg of IV Lasix, a DuoNeb inhaler treatment as well as anxiety medications.  She notes over the last week or 2 she has developed significant bilateral lower extremity edema.  Over the last few days this is progressed into some shortness of breath as well.  Last evening she had some mild chest discomfort as well.  Due to her symptoms she presented to the outside facility for further evaluation.  Again, after evaluation there she was transferred here for higher level of care.      Social History     Social History   • Marital status:      Spouse name: N/A   • Number of children: N/A   • Years of education: N/A     Occupational History   • Not on file.     Social History Main Topics   • Smoking status: Current Every Day Smoker     Packs/day: 1.00     Types: Cigarettes   • Smokeless tobacco: Not on file   • Alcohol use Yes      Comment: once a week   • Drug use: No   • " "Sexual activity: Defer     Other Topics Concern   • Not on file     Social History Narrative    Pt recently  after 30 years. Just moved from [facility] into her son's home in [place].      Family History   Problem Relation Age of Onset   • Arthritis Mother      rheumatoid   • Heart attack Father    • Alcohol abuse Brother    • Heart disease Other      uncle   • Diabetes Other      uncle-type 2   • Hypertension Other      uncle   • Stroke Other      uncle   • Cancer Other      grandfather-lung    • Heart disease Maternal Uncle    • Lung cancer Paternal Grandfather          Review of Systems:Pertinent items are noted in HPI, all other systems reviewed and negative       Objective   Vitals:  Pulse 96, temperature 97.9 °F (36.6 °C), temperature source Oral, resp. rate 16, height 68.5\" (174 cm), weight 130 lb 12.8 oz (59.3 kg), SpO2 98 %, not currently breastfeeding.     Physical Exam   Constitutional: She is oriented to person, place, and time. She appears well-developed and well-nourished. No distress.   HENT:   Head: Normocephalic and atraumatic.   Mouth/Throat: Oropharynx is clear and moist.   Eyes: Right eye exhibits no discharge. Left eye exhibits no discharge.   Neck: JVD present. Carotid bruit is not present.   Cardiovascular: Normal rate, regular rhythm, S1 normal and S2 normal.    Murmur (3/6 systolic murmur) heard.  Pulmonary/Chest: Breath sounds normal.   Abdominal: Soft. Bowel sounds are normal. There is no tenderness.   Musculoskeletal: She exhibits edema (2-3+ bilateral lower extremity edema, pitting and radiating to the knee).   Neurological: She is alert and oriented to person, place, and time.   Skin: Skin is warm and dry.            Results Review:  I reviewed the patient's new clinical results.            Invalid input(s): LABALBU, PROT        Results from last 7 days  Lab Units 02/28/17   INR  3.40       0  Lab Value Date/Time   TROPONINI 0.058 (H) 02/05/2017 1846   TROPONINI 0.082 (H) " 02/05/2017 0524   TROPONINI 0.228 (H) 02/04/2017 0455                 An outside facility  Sodium 131, potassium 4.2, BUN of 22, creatinine 2.01, BNP greater than 3500, white count 9.4, hemoglobin 12.3, hematocrit 37.2, platelets 232, INR 4.2.      Tele:  SR with occasional PVC.        Assessment/Plan     1. Acute on chronic systolic congestive heart failure. Patient has not been using her lasix.  2. Ischemic cardiomyopathy, with previous ICD placement. Last EF 18 %  3. Hypertension  4. Chronic kidney disease.  Creatinine of 2 at the outside facility.  5. Mechanical mitral valve replacement, on chronic warfarin therapy.  INR mildly supratherapeutic on admission    Plan:    1. Patient is obviously significantly volume overloaded.  Needs further IV diuresis.  We'll continue with this gently given her noted chronic kidney disease and elevated creatinine.  Dr. Ramirez will follow with the patient beginning tomorrow.  We'll repeat labs and follow renal function.    This is Dr Kaleb Schafer-personally reviewed this patient's case discussed it with the physician extender as well as the patient and her son.  As above she has severe ischemic cardiopathy myopathy with EF of 18%.  The hospital recently and was doing well but did not have any Lasix to take and previously she was told to take Lasix only as needed physician in [place].  She began to have swelling in her lower extremities in a progressive the point of severe shortness of breath and orthopnea.  She presented to Hospital facility [facility] and was transferred appear.  She will receive some Lasix there are BNP was greater than 35,000.  Her creatinine there was 2.  Her previous admissions here was as high as 2.6 and was 1.6 most recently before this presentation.  Her sodium was also low below 131    Physical exam she has mild dyspnea respiratory no acute distress  She has significant JVP to the angle of jaw  2+ pitting sacral edema is present  Lungs have  bilateral diffuse crackles  Significant pitting edema of the lower extremities    The above plan.  She will be diuresed.  If her creatinine rises significantly with diuresis I would suggest putting her on dobutamine therapy  2 if her sodium also continues to drop with diuresis and give her doses Samsca  3 Dr. Ramirez will resume this patient's care in the morning      [facility] IAIN Valles obtained past medical, family history, social history, review of systems and functioned as a scribe for the remainder of the dictation for Dr. Schafer.      IAIN Tejada  03/02/17  4:02 PM    Please note that portions of this note may have been completed with a voice recognition program. Efforts were made to edit the dictations, but occasionally words are mistranscribed.

## 2017-03-03 ENCOUNTER — APPOINTMENT (OUTPATIENT)
Dept: GENERAL RADIOLOGY | Facility: HOSPITAL | Age: 57
End: 2017-03-03

## 2017-03-03 VITALS
OXYGEN SATURATION: 97 % | SYSTOLIC BLOOD PRESSURE: 115 MMHG | HEART RATE: 89 BPM | DIASTOLIC BLOOD PRESSURE: 81 MMHG | BODY MASS INDEX: 19.02 KG/M2 | RESPIRATION RATE: 16 BRPM | TEMPERATURE: 97.7 F | WEIGHT: 128.4 LBS | HEIGHT: 69 IN

## 2017-03-03 LAB
ALBUMIN SERPL-MCNC: 2.8 G/DL (ref 3.2–4.8)
ALBUMIN/GLOB SERPL: 1.1 G/DL (ref 1.5–2.5)
ALP SERPL-CCNC: 108 U/L (ref 25–100)
ALT SERPL W P-5'-P-CCNC: 30 U/L (ref 7–40)
ANION GAP SERPL CALCULATED.3IONS-SCNC: 5 MMOL/L (ref 3–11)
ANION GAP SERPL CALCULATED.3IONS-SCNC: 5 MMOL/L (ref 3–11)
AST SERPL-CCNC: 31 U/L (ref 0–33)
BILIRUB SERPL-MCNC: 0.5 MG/DL (ref 0.3–1.2)
BNP SERPL-MCNC: 3387 PG/ML (ref 0–100)
BUN BLD-MCNC: 25 MG/DL (ref 9–23)
BUN BLD-MCNC: 26 MG/DL (ref 9–23)
BUN/CREAT SERPL: 13.9 (ref 7–25)
BUN/CREAT SERPL: 14.4 (ref 7–25)
CALCIUM SPEC-SCNC: 8.6 MG/DL (ref 8.7–10.4)
CALCIUM SPEC-SCNC: 8.6 MG/DL (ref 8.7–10.4)
CHLORIDE SERPL-SCNC: 103 MMOL/L (ref 99–109)
CHLORIDE SERPL-SCNC: 105 MMOL/L (ref 99–109)
CO2 SERPL-SCNC: 25 MMOL/L (ref 20–31)
CO2 SERPL-SCNC: 26 MMOL/L (ref 20–31)
CREAT BLD-MCNC: 1.8 MG/DL (ref 0.6–1.3)
CREAT BLD-MCNC: 1.8 MG/DL (ref 0.6–1.3)
DEPRECATED RDW RBC AUTO: 64 FL (ref 37–54)
ERYTHROCYTE [DISTWIDTH] IN BLOOD BY AUTOMATED COUNT: 17.4 % (ref 11.3–14.5)
GFR SERPL CREATININE-BSD FRML MDRD: 29 ML/MIN/1.73
GFR SERPL CREATININE-BSD FRML MDRD: 29 ML/MIN/1.73
GLOBULIN UR ELPH-MCNC: 2.6 GM/DL
GLUCOSE BLD-MCNC: 108 MG/DL (ref 70–100)
GLUCOSE BLD-MCNC: 110 MG/DL (ref 70–100)
HCT VFR BLD AUTO: 32.5 % (ref 34.5–44)
HGB BLD-MCNC: 10.1 G/DL (ref 11.5–15.5)
INR PPP: 3.85
MCH RBC QN AUTO: 32.3 PG (ref 27–31)
MCHC RBC AUTO-ENTMCNC: 31.1 G/DL (ref 32–36)
MCV RBC AUTO: 103.8 FL (ref 80–99)
NT-PROBNP SERPL-MCNC: ABNORMAL PG/ML (ref 0–287)
PLATELET # BLD AUTO: 176 10*3/MM3 (ref 150–450)
PMV BLD AUTO: 11.2 FL (ref 6–12)
POTASSIUM BLD-SCNC: 4.3 MMOL/L (ref 3.5–5.5)
POTASSIUM BLD-SCNC: 4.3 MMOL/L (ref 3.5–5.5)
PROT SERPL-MCNC: 5.4 G/DL (ref 5.7–8.2)
PROTHROMBIN TIME: 43.7 SECONDS (ref 9.6–11.5)
RBC # BLD AUTO: 3.13 10*6/MM3 (ref 3.89–5.14)
SODIUM BLD-SCNC: 134 MMOL/L (ref 132–146)
SODIUM BLD-SCNC: 135 MMOL/L (ref 132–146)
WBC NRBC COR # BLD: 3.12 10*3/MM3 (ref 3.5–10.8)

## 2017-03-03 PROCEDURE — 94760 N-INVAS EAR/PLS OXIMETRY 1: CPT

## 2017-03-03 PROCEDURE — 83880 ASSAY OF NATRIURETIC PEPTIDE: CPT | Performed by: NURSE PRACTITIONER

## 2017-03-03 PROCEDURE — 94640 AIRWAY INHALATION TREATMENT: CPT

## 2017-03-03 PROCEDURE — 71010 HC CHEST PA OR AP: CPT

## 2017-03-03 PROCEDURE — 94799 UNLISTED PULMONARY SVC/PX: CPT

## 2017-03-03 PROCEDURE — 85027 COMPLETE CBC AUTOMATED: CPT | Performed by: NURSE PRACTITIONER

## 2017-03-03 PROCEDURE — 93010 ELECTROCARDIOGRAM REPORT: CPT | Performed by: INTERNAL MEDICINE

## 2017-03-03 PROCEDURE — G0378 HOSPITAL OBSERVATION PER HR: HCPCS

## 2017-03-03 PROCEDURE — 25010000002 FUROSEMIDE PER 20 MG: Performed by: NURSE PRACTITIONER

## 2017-03-03 PROCEDURE — 83880 ASSAY OF NATRIURETIC PEPTIDE: CPT | Performed by: PHYSICIAN ASSISTANT

## 2017-03-03 PROCEDURE — 99225 PR SBSQ OBSERVATION CARE/DAY 25 MINUTES: CPT | Performed by: INTERNAL MEDICINE

## 2017-03-03 PROCEDURE — 85610 PROTHROMBIN TIME: CPT

## 2017-03-03 PROCEDURE — 93005 ELECTROCARDIOGRAM TRACING: CPT | Performed by: NURSE PRACTITIONER

## 2017-03-03 PROCEDURE — 80053 COMPREHEN METABOLIC PANEL: CPT | Performed by: NURSE PRACTITIONER

## 2017-03-03 RX ORDER — FERROUS SULFATE 325(65) MG
325 TABLET ORAL EVERY OTHER DAY
Status: DISCONTINUED | OUTPATIENT
Start: 2017-03-04 | End: 2017-03-03 | Stop reason: HOSPADM

## 2017-03-03 RX ORDER — WARFARIN SODIUM 2 MG/1
TABLET ORAL
Qty: 30 TABLET | Refills: 11 | Status: SHIPPED | OUTPATIENT
Start: 2017-03-04 | End: 2017-08-03 | Stop reason: SDUPTHER

## 2017-03-03 RX ORDER — WARFARIN SODIUM 2 MG/1
2 TABLET ORAL
Status: DISCONTINUED | OUTPATIENT
Start: 2017-03-04 | End: 2017-03-03 | Stop reason: HOSPADM

## 2017-03-03 RX ORDER — CLONAZEPAM 2 MG/1
2 TABLET ORAL 3 TIMES DAILY
COMMUNITY
End: 2017-03-10 | Stop reason: SDUPTHER

## 2017-03-03 RX ORDER — WARFARIN SODIUM 1 MG/1
TABLET ORAL
Qty: 30 TABLET | Refills: 11 | Status: SHIPPED | OUTPATIENT
Start: 2017-03-03 | End: 2017-08-03 | Stop reason: SDUPTHER

## 2017-03-03 RX ORDER — ROSUVASTATIN CALCIUM 10 MG/1
10 TABLET, COATED ORAL NIGHTLY
Status: DISCONTINUED | OUTPATIENT
Start: 2017-03-03 | End: 2017-03-03 | Stop reason: HOSPADM

## 2017-03-03 RX ORDER — FUROSEMIDE 40 MG/1
40 TABLET ORAL DAILY
Qty: 30 TABLET | Refills: 11 | Status: ON HOLD | OUTPATIENT
Start: 2017-03-03 | End: 2017-09-09

## 2017-03-03 RX ORDER — SPIRONOLACTONE 25 MG/1
25 TABLET ORAL DAILY
Qty: 30 TABLET | Refills: 11 | Status: SHIPPED | OUTPATIENT
Start: 2017-03-03 | End: 2017-09-06

## 2017-03-03 RX ORDER — WARFARIN SODIUM 1 MG/1
1 TABLET ORAL ONCE
Status: COMPLETED | OUTPATIENT
Start: 2017-03-03 | End: 2017-03-03

## 2017-03-03 RX ADMIN — HYDRALAZINE HYDROCHLORIDE 25 MG: 25 TABLET ORAL at 09:06

## 2017-03-03 RX ADMIN — CARVEDILOL 3.12 MG: 3.12 TABLET, FILM COATED ORAL at 18:39

## 2017-03-03 RX ADMIN — Medication 325 MG: at 09:06

## 2017-03-03 RX ADMIN — ACETAMINOPHEN 650 MG: 325 TABLET, FILM COATED ORAL at 06:16

## 2017-03-03 RX ADMIN — CARVEDILOL 3.12 MG: 3.12 TABLET, FILM COATED ORAL at 09:07

## 2017-03-03 RX ADMIN — ASPIRIN 81 MG: 81 TABLET, COATED ORAL at 09:06

## 2017-03-03 RX ADMIN — FUROSEMIDE 40 MG: 10 INJECTION, SOLUTION INTRAMUSCULAR; INTRAVENOUS at 09:07

## 2017-03-03 RX ADMIN — IPRATROPIUM BROMIDE 0.5 MG: 0.5 SOLUTION RESPIRATORY (INHALATION) at 07:45

## 2017-03-03 RX ADMIN — CALCIUM CARBONATE 625 MG: 1250 TABLET ORAL at 18:37

## 2017-03-03 RX ADMIN — CLONAZEPAM 2 MG: 1 TABLET ORAL at 06:17

## 2017-03-03 RX ADMIN — CALCIUM CARBONATE 625 MG: 1250 TABLET ORAL at 09:06

## 2017-03-03 RX ADMIN — HYDROXYCHLOROQUINE SULFATE 200 MG: 200 TABLET, FILM COATED ORAL at 06:17

## 2017-03-03 RX ADMIN — Medication 250 MG: at 09:06

## 2017-03-03 RX ADMIN — IPRATROPIUM BROMIDE 0.5 MG: 0.5 SOLUTION RESPIRATORY (INHALATION) at 00:06

## 2017-03-03 RX ADMIN — CLONAZEPAM 2 MG: 1 TABLET ORAL at 14:17

## 2017-03-03 RX ADMIN — IPRATROPIUM BROMIDE 0.5 MG: 0.5 SOLUTION RESPIRATORY (INHALATION) at 13:47

## 2017-03-03 RX ADMIN — BUDESONIDE AND FORMOTEROL FUMARATE DIHYDRATE 2 PUFF: 160; 4.5 AEROSOL RESPIRATORY (INHALATION) at 07:45

## 2017-03-03 RX ADMIN — WARFARIN SODIUM 1 MG: 1 TABLET ORAL at 18:37

## 2017-03-03 RX ADMIN — HYDRALAZINE HYDROCHLORIDE 25 MG: 25 TABLET ORAL at 17:41

## 2017-03-03 NOTE — PROGRESS NOTES
Discharge Planning Assessment  [facility]     Patient Name: Ashely Roldan  MRN: [id number]  Today's Date: 3/3/2017    Admit Date: 3/2/2017          Discharge Needs Assessment       03/03/17 1331    Living Environment    Lives With alone    Living Arrangements apartment    Provides Primary Care For no one    Quality Of Family Relationships supportive    Able to Return to Prior Living Arrangements yes    Discharge Needs Assessment    Concerns To Be Addressed no discharge needs identified    Readmission Within The Last 30 Days no previous admission in last 30 days    Anticipated Changes Related to Illness none    Equipment Currently Used at Home none    Equipment Needed After Discharge none    Transportation Available car;family or friend will provide   son will provide transportation            Discharge Plan       03/03/17 1331    Case Management/Social Work Plan    Plan Home    Patient/Family In Agreement With Plan yes    Additional Comments Spoke with pt at bedside. Pt reports she is still independent in ADL's and IADL's but has a walker and cane that she occasionally uses. Pt reports she does not need any other medical equipment. Pt reports she has no discharge needs or concerns at this time.     Final Note    Final Note SW is following        Discharge Placement     No information found        Expected Discharge Date and Time     Expected Discharge Date Expected Discharge Time    Mar 4, 2017               Demographic Summary       03/03/17 1329    Referral Information    Arrived From home or self-care    Primary Care Physician Information    Name Dr. Rdz            Functional Status       03/03/17 1330    Functional Status Prior    Ambulation 0-->independent    Transferring 0-->independent    Toileting 0-->independent    Bathing 0-->independent    Dressing 0-->independent    Eating 0-->independent    Communication 0-->understands/communicates without difficulty    IADL    Medications independent    Meal  Preparation independent    Housekeeping independent    Laundry independent    Shopping independent    Oral Care independent    Cognitive/Perceptual/Developmental    Current Mental Status/Cognitive Functioning no deficits noted    Recent Changes in Mental Status/Cognitive Functioning no changes    Developmental Stage (Eriksson's Stages of Development) Stage 7 (35-65 years/Middle Adulthood) Generativity vs. Stagnation            Psychosocial     None            Abuse/Neglect     None            Legal     None            Substance Abuse     None            Patient Forms     None          RICARDO Kulkarni

## 2017-03-03 NOTE — PROGRESS NOTES
"Pharmacy Consult  -  Warfarin    Ashely Roldan is a  56 y.o. female , pharmacy consulted for warfarin dosing  Height - 68.5\" (174 cm)  Weight - 130 lb 12.8 oz (59.3 kg)    Consulting Provider: - IAIN Tejada  Indication: - Mechanical Mitral Valve  Goal INR: -  2.5-3.5  Home Regimen:   - Usually 2 mg daily alternating with 3.5 mg daily   - Sometimes alternates 1 mg and 2 mg 'if home INR elevated'    Bridge Therapy: No    Drug-Drug Interactions with current regimen:   Sertraline, aspirin    Warfarin Dosing During Admission:    Date  3/2 3/3          INR  3.53 3.85          Dose  2 mg 1mg               Labs:    Results from last 7 days   Lab Units 03/03/17  0505 03/02/17  1631 02/28/17   INR  3.85 3.53 3.40   HEMOGLOBIN g/dL 10.1* --  --    HEMATOCRIT % 32.5* --  --    PLATELETS 10*3/mm3 176 --  --    Results from last 7 days   Lab Units 03/03/17  0505 03/02/17  1603   SODIUM mmol/L 135 133   POTASSIUM mmol/L 4.3 4.2   CHLORIDE mmol/L 105 102   TOTAL CO2 mmol/L 25.0 24.0   BUN mg/dL 26* 24*   CREATININE mg/dL 1.80* 1.70*   CALCIUM mg/dL 8.6* 8.7   GLUCOSE mg/dL 108* 138*     Current dietary intake: Regular Cardiac, Low Sodium, Renal diet ordered. No intake documented      Assessment/Plan:   1. Give warfarin 1mg x1 today, then resume 2mg daily  2. Follow daily pt/INR    Padma Leiva, Pharm.D.  3/3/2017  8:42 AM        "

## 2017-03-03 NOTE — PAYOR COMM NOTE
"Ashely Roldan (56 y.o. Female)   Tracking id: [id number]  Lacie Duque RN, BSN  Phone # [phone]  Fax # [phone]    Date of Birth Social Security Number Address Home Phone MRN    1960  [address]  [facility] [address] [phone] [id number]    Mormon Marital Status          Pentecostal        Admission Date Admission Type Admitting Provider Attending Provider Department, Room/Bed    3/2/17 Elective Yomi Ramirez MD Jones, Michael R, MD [facility] 6A, N621/1    Discharge Date Discharge Disposition Discharge Destination                      Attending Provider: Yomi Ramirez MD     Allergies:  Morphine And Related, Sulfa Antibiotics    Isolation:  None   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)   Code Status:  FULL    Ht:  68.5\" (174 cm)   Wt:  128 lb 6.4 oz (58.2 kg)    Admission Cmt:  None   Principal Problem:  None                Active Insurance as of 3/2/2017     Primary Coverage     Payor Plan Insurance Group Employer/Plan Group    ANTHEM MEDICARE REPLACEMENT [employer] MEDICARE ADVANTAGE [id number]     Payor Plan Address Payor Plan Phone Number Effective From Effective To    [address] [phone] 12/1/2016     [place] [address]       Subscriber Name Subscriber Birth Date Member ID       ASHELY ROLDAN 1960 [id number]                 Emergency Contacts      (Rel.) Home Phone Work Phone Mobile Phone    Floyd Roldan (Son) -- -- [phone]    Janina Teran (Other) -- -- [phone]    MichelleTrey saldivarnatalee (Daughter) -- -- [phone]            Insurance Information                ANTHEM MEDICARE REPLACEMENT/ANTHEM MEDICARE ADVANTAGE Phone: [phone]    Subscriber: RoldanAshely Subscriber#: [id number]    Group#: [id number] Precert#: [id number]             History & Physical      Jaleel Schafer MD at 3/2/2017  3:06 PM          [facility]   History and physical      Patient Care " Team:  Peter Rdz MD as PCP - General (Internal Medicine)  Dayo Ramirez RN as Care Coordinator (Population Health)    PROBLEM LIST:   1. Structural heart disease of mixed etiology:   A. CABG 2010, IDB   B. [place] ICD placement, 2011  C. Echo July 2014: Severe global hypokinesis with EF 12%, moderate AI, mechanical mitral valve, moderate to severe TR, RVSP 43mmHg.  D. ECHO 2/4/17 EF 18%, mild to mod AR, mod TR, prosthetic mitral valve grossly normal. RVSP 35-45 mmHg.  2. VHD:   A. Mitral valve replacement with ATS mechanical valve 2010, IDB   3. COPD with ongoing tobacco abuse   4. Systemic Lupus Erythematosus  5. Hypertension  6. Hyperlipidemia   7. CKD  8. PVD   9. Anxiety/Depression  10. Arthritis  11. GERD  12. Surgeries:   Cholecystectomy   CABG   Mechanical MVR   Right total hip replacement      Allergies   Allergen Reactions   • Morphine And Related GI Intolerance and Irritability   • Sulfa Antibiotics            Current Facility-Administered Medications:   •  heparin (porcine) 5000 UNIT/ML injection 5,000 Units, 5,000 Units, Subcutaneous, Q12H, IAIN Tejada  •  sodium chloride 0.9 % flush 1-10 mL, 1-10 mL, Intravenous, PRN, Caroline Charles APRN         Prescriptions Prior to Admission   Medication Sig Dispense Refill Last Dose   • acetaminophen (TYLENOL) 325 MG tablet Take 650 mg by mouth every 6 (six) hours as needed for mild pain (1-3).   Taking   • albuterol (PROVENTIL HFA;VENTOLIN HFA) 108 (90 BASE) MCG/ACT inhaler Inhale 2 puffs every 6 (six) hours as needed.   Taking   • albuterol (PROVENTIL) (2.5 MG/3ML) 0.083% nebulizer solution Take 2.5 mg by nebulization As Needed for wheezing or shortness of air.      • alendronate (FOSAMAX) 70 MG tablet Take 70 mg by mouth every 7 days. Patient takes on Saturday   Taking   • aspirin 81 MG EC tablet Take 1 tablet by mouth Daily. 30 tablet 11    • budesonide-formoterol (SYMBICORT) 160-4.5 MCG/ACT inhaler Inhale 2 puffs 2 (two) times a day. In  morning and evening for 90 days   Taking   • Calcium Carbonate (CALCIUM 600) 1500 MG tablet Take 1 tablet by mouth 2 (two) times a day.   Taking   • calcium carbonate (TUMS) 500 MG chewable tablet Chew 1 tablet 3 (Three) Times a Day As Needed for indigestion or heartburn.      • carvedilol (COREG) 3.125 MG tablet Take 1 tablet by mouth 2 (Two) Times a Day With Meals. 60 tablet 11    • clonazePAM (KLONOPIN) 2 MG tablet Take 1 tablet by mouth 3 (three) times a day as needed (Tremors). 270 tablet 0    • diphenhydrAMINE (BENADRYL) 25 MG tablet Take 25 mg by mouth as needed.   Taking   • Ferrous Sulfate (IRON) 325 (65 FE) MG tablet Take 1 tablet by mouth Daily.   Taking   • furosemide (LASIX) 40 MG tablet Take 40 mg by mouth daily as needed (swelling).   Taking   • hydrALAZINE (APRESOLINE) 25 MG tablet Take 1 tablet by mouth 3 (Three) Times a Day. 90 tablet 11    • hydroxychloroquine (PLAQUENIL) 200 MG tablet Take 200 mg by mouth every morning.   Taking   • nitroglycerin (NITROSTAT) 0.4 MG SL tablet 1 under the tongue as needed for angina, may repeat q5mins for up three doses 30 tablet 3 Taking   • Probiotic Product (PROBIOTIC PO) Take 1 capsule by mouth daily. 20 billion cell; for 30 days   Taking   • rosuvastatin (CRESTOR) 10 MG tablet Take 1 tablet by mouth Every Night. For 90 days 30 tablet 11    • sertraline (ZOLOFT) 50 MG tablet Take 50 mg by mouth daily. For stress for 90 days   Taking   • tiotropium (SPIRIVA) 18 MCG per inhalation capsule Place 2 puffs (1 capsule total) into inhaler and inhale once daily. For 90 days (Patient taking differently: Place 1 capsule into inhaler and inhale daily as needed. For 90 days) 90 capsule 3 Taking   • TiZANidine (ZANAFLEX) 4 MG capsule Take 4 mg by mouth 2 (two) times a day as needed.   Taking   • traMADol (ULTRAM) 50 MG tablet Take 1 tablet by mouth every 6 (six) hours as needed for moderate pain (4-6). 40 tablet 1    • traZODone (DESYREL) 50 MG tablet Take 1 tablet (50 mg  "total) by mouth nightly. For insomnia 90 tablet 3 Taking   • WARFARIN SODIUM PO Take  by mouth Daily. Dose is variable - usually 2mg alternating with 3.5mg but sometimes 1mg vs 2mg if home INR elevated            Subjective .   History of present illness:    Patient is a 56-year-old  female who is transferred to [facility] for higher level of care and treatment of her systolic congestive heart failure.  She has previous history of coronary artery disease and coronary artery bypass grafting as well as previous mitral valve replacement.  She was recently hospitalized at the beginning of February and treated for heart failure exacerbation.  She was discharged home on as needed Lasix.  She notes that she has not taken this since being discharged.  Since discharge her weight is up almost 15 pounds.  She notes that she has not been eating excess salt but \"always have a drink in my hand\".  At the outside facility she was treated with 40 mg of IV Lasix, a DuoNeb inhaler treatment as well as anxiety medications.  She notes over the last week or 2 she has developed significant bilateral lower extremity edema.  Over the last few days this is progressed into some shortness of breath as well.  Last evening she had some mild chest discomfort as well.  Due to her symptoms she presented to the outside facility for further evaluation.  Again, after evaluation there she was transferred here for higher level of care.      Social History     Social History   • Marital status:      Spouse name: N/A   • Number of children: N/A   • Years of education: N/A     Occupational History   • Not on file.     Social History Main Topics   • Smoking status: Current Every Day Smoker     Packs/day: 1.00     Types: Cigarettes   • Smokeless tobacco: Not on file   • Alcohol use Yes      Comment: once a week   • Drug use: No   • Sexual activity: Defer     Other Topics Concern   • Not on file     Social History Narrative    " "Pt recently  after 30 years. Just moved from [facility] into her son's home in [place].      Family History   Problem Relation Age of Onset   • Arthritis Mother      rheumatoid   • Heart attack Father    • Alcohol abuse Brother    • Heart disease Other      uncle   • Diabetes Other      uncle-type 2   • Hypertension Other      uncle   • Stroke Other      uncle   • Cancer Other      grandfather-lung    • Heart disease Maternal Uncle    • Lung cancer Paternal Grandfather          Review of Systems:Pertinent items are noted in HPI, all other systems reviewed and negative       Objective   Vitals:  Pulse 96, temperature 97.9 °F (36.6 °C), temperature source Oral, resp. rate 16, height 68.5\" (174 cm), weight 130 lb 12.8 oz (59.3 kg), SpO2 98 %, not currently breastfeeding.     Physical Exam   Constitutional: She is oriented to person, place, and time. She appears well-developed and well-nourished. No distress.   HENT:   Head: Normocephalic and atraumatic.   Mouth/Throat: Oropharynx is clear and moist.   Eyes: Right eye exhibits no discharge. Left eye exhibits no discharge.   Neck: JVD present. Carotid bruit is not present.   Cardiovascular: Normal rate, regular rhythm, S1 normal and S2 normal.    Murmur (3/6 systolic murmur) heard.  Pulmonary/Chest: Breath sounds normal.   Abdominal: Soft. Bowel sounds are normal. There is no tenderness.   Musculoskeletal: She exhibits edema (2-3+ bilateral lower extremity edema, pitting and radiating to the knee).   Neurological: She is alert and oriented to person, place, and time.   Skin: Skin is warm and dry.            Results Review:  I reviewed the patient's new clinical results.            Invalid input(s): MARIANA SANTANA        Results from last 7 days  Lab Units 02/28/17   INR  3.40       0  Lab Value Date/Time   TROPONINI 0.058 (H) 02/05/2017 1846   TROPONINI 0.082 (H) 02/05/2017 0524   TROPONINI 0.228 (H) 02/04/2017 0455                 An outside " facility  Sodium 131, potassium 4.2, BUN of 22, creatinine 2.01, BNP greater than 3500, white count 9.4, hemoglobin 12.3, hematocrit 37.2, platelets 232, INR 4.2.      Tele:  SR with occasional PVC.        Assessment/Plan     1. Acute on chronic systolic congestive heart failure. Patient has not been using her lasix.  2. Ischemic cardiomyopathy, with previous ICD placement. Last EF 18 %  3. Hypertension  4. Chronic kidney disease.  Creatinine of 2 at the outside facility.  5. Mechanical mitral valve replacement, on chronic warfarin therapy.  INR mildly supratherapeutic on admission    Plan:    1. Patient is obviously significantly volume overloaded.  Needs further IV diuresis.  We'll continue with this gently given her noted chronic kidney disease and elevated creatinine.  Dr. Ramirez will follow with the patient beginning tomorrow.  We'll repeat labs and follow renal function.    This is Dr Kaleb Schafer-personally reviewed this patient's case discussed it with the physician extender as well as the patient and her son.  As above she has severe ischemic cardiopathy myopathy with EF of 18%.  The hospital recently and was doing well but did not have any Lasix to take and previously she was told to take Lasix only as needed physician in [place].  She began to have swelling in her lower extremities in a progressive the point of severe shortness of breath and orthopnea.  She presented to Hospital facility [facility] and was transferred appear.  She will receive some Lasix there are BNP was greater than 35,000.  Her creatinine there was 2.  Her previous admissions here was as high as 2.6 and was 1.6 most recently before this presentation.  Her sodium was also low below 131    Physical exam she has mild dyspnea respiratory no acute distress  She has significant JVP to the angle of jaw  2+ pitting sacral edema is present  Lungs have bilateral diffuse crackles  Significant pitting edema of the lower extremities    The  "above plan.  She will be diuresed.  If her creatinine rises significantly with diuresis I would suggest putting her on dobutamine therapy  2 if her sodium also continues to drop with diuresis and give her doses Samsca  3 Dr. Ramirez will resume this patient's care in the morning      [facility] IAIN Valles obtained past medical, family history, social history, review of systems and functioned as a scribe for the remainder of the dictation for Dr. Schafer.      IAIN Tejada  03/02/17  4:02 PM    Please note that portions of this note may have been completed with a voice recognition program. Efforts were made to edit the dictations, but occasionally words are mistranscribed.       Electronically signed by Jaleel Schafer MD at 3/2/2017  4:27 PM        Emergency Department Notes     No notes of this type exist for this encounter.        Operative/Procedure Notes (last 72 hours) (Notes from 2/28/2017  2:20 PM through 3/3/2017  2:20 PM)     No notes of this type exist for this encounter.        Physician Progress Notes (last 72 hours) (Notes from 2/28/2017  2:20 PM through 3/3/2017  2:20 PM)     No notes of this type exist for this encounter.           Consult Notes (last 72 hours) (Notes from 2/28/2017  2:20 PM through 3/3/2017  2:20 PM)      Minesh Gomez IV [facility] at 3/2/2017  5:34 PM  Version 1 of 1         Pharmacy Consult  -  Warfarin    Ashely Roldan is a  56 y.o. female , pharmacy consulted for warfarin dosing  Height - 68.5\" (174 cm)  Weight - 130 lb 12.8 oz (59.3 kg)    Consulting Provider: - IAIN Tejada  Indication: - Mechanical Mitral Valve  Goal INR: -  2.5-3.5  Home Regimen:   - Usually 2 mg daily alternating with 3.5 mg daily   - Sometimes alternates 1 mg and 2 mg 'if home INR elevated'    Bridge Therapy: No    Drug-Drug Interactions with current regimen:   Sertraline, aspirin    Warfarin Dosing During Admission:    Date  3/2           INR  3.53           Dose  " 2 mg                Labs:      Results from last 7 days     Lab Units 03/02/17  1631 02/28/17   INR  3.53 3.40     Results from last 7 days     Lab Units 03/02/17  1603   SODIUM mmol/L 133   POTASSIUM mmol/L 4.2   CHLORIDE mmol/L 102   TOTAL CO2 mmol/L 24.0   BUN mg/dL 24*   CREATININE mg/dL 1.70*   CALCIUM mg/dL 8.7   GLUCOSE mg/dL 138*     Current dietary intake: Regular Cardiac, Low Sodium, Renal diet ordered. No intake yet      Assessment/Plan:   Essentially therapeutic INR, up slightly from her check on 2/28 in the anticoagulation clinic (INR 3.4). Will order 2 mg daily and defer dose adjustments to rounding clinical pharmacist tomorrow morning.     Thank you for this consult.  Minesh Gomez IV, PharmD, BCPS  3/2/2017  5:34 PM           Electronically signed by Minesh Gomez IV [facility] at 3/2/2017  5:35 PM

## 2017-03-03 NOTE — PROGRESS NOTES
"  [facility] at [facility]  PROGRESS NOTE    Date of Admission: 3/2/2017  Length of Stay: 1  Primary Care Physician: Peter Rdz MD    Chief Complaint: f/u A/C SHF  Problem List:   1. Structural heart disease of mixed etiology:  A. History of MI December 2009: s/p stent to RCA and LAD, EF 40%  B. CABGx2 March, 2010: SVG to OMB-1, SVG to PDA  C. EF 30% post-operatively, s/p [place] ICD placement, 2011  D. Echo July 2014: Severe global hypokinesis with EF 12%, moderate AI, mechanical mitral valve, moderate to severe TR, RVSP 43mmHg   E. Echo 2/4/17: EF 18%, mild to mod AI, mod TR, grossly normal prosthetic mitral valve  F. Trivial Troponin elevation Feb. 2017.  2. VHD:  A. Mitral valve replacement with 27mm ATS mechanical valve March 2010, on chronic coumadin  3. COPD with ongoing tobacco abuse   4. Systemic Lupus Erythematosus  5. Hypertension  6. Hyperlipidemia   7. 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 PVD     Subjective      Patient states she feels much better, no trouble breathing or getting up to go to the bathroom. No chest pressure.   Had not taken lasix few days prior to admission.  Objective     Visit Vitals   • /81 (BP Location: Right arm, Patient Position: Lying)   • Pulse 83   • Temp 97.2 °F (36.2 °C) (Oral)   • Resp 20   • Ht 68.5\" (174 cm)   • Wt 130 lb 12.8 oz (59.3 kg)   • SpO2 98%   • BMI 19.6 kg/m2       Physical Exam:  GENERAL: Alert, cooperative, in no acute distress.   HEENT: Fundiscopic deferred, otherwise unremarkable.  NECK: No Jugular venous distention, adenopathy, or thyromegaly noted. There are no carotid bruits.  HEART: No discrete PMI is noted. Mechanical mitral valve sounds, soft murmur present, no gallop  (S3 or S4) and no rub. Chest wall \"OK\".  LUNGS: Rales bilaterally, mild wheezing, no ronchi. On 2L NC, 94%  ABDOMEN: Flat without evidence of organomegaly, masses, or tenderness.  NEUROLOGIC: No focal abnormalities involving strength or sensation " are noted.   EXTREMITIES: 1+ edema, no clubbing, cyanosis.    Results:    Results from last 7 days  Lab Units 03/03/17  0505   WBC 10*3/mm3 3.12*   HEMOGLOBIN g/dL 10.1*   HEMATOCRIT % 32.5*   PLATELETS 10*3/mm3 176       Results from last 7 days  Lab Units 03/03/17  0505 03/02/17  1603   SODIUM mmol/L 135 133   POTASSIUM mmol/L 4.3 4.2   CHLORIDE mmol/L 105 102   TOTAL CO2 mmol/L 25.0 24.0   BUN mg/dL 26* 24*   CREATININE mg/dL 1.80* 1.70*   GLUCOSE mg/dL 108* 138*      Lab Results   Component Value Date    CHOL 157 02/04/2017    TRIG 141 02/04/2017    HDL 58 02/04/2017    LDLDIRECT 56 02/04/2017     (H) 02/09/2017     (H) 02/09/2017       Results from last 7 days  Lab Units 03/03/17  0505 03/02/17  1631 02/28/17   PROTIME Seconds 43.7* 40.0*  --    INR  3.85 3.53 3.40       Results from last 7 days  Lab Units 03/02/17  2237   TROPONIN I ng/mL 0.047*       Intake/Output Summary (Last 24 hours) at 03/03/17 0846  Last data filed at 03/03/17 0523   Gross per 24 hour   Intake      0 ml   Output   1200 ml   Net  -1200 ml     I personally viewed and interpreted the patient's EKG/Telemetry data    Current Medications:    aspirin 81 mg Oral Daily   atorvastatin 20 mg Oral Nightly   budesonide-formoterol 2 puff Inhalation BID - RT   calcium carbonate 625 mg Oral BID   carvedilol 3.125 mg Oral BID With Meals   clonazePAM 2 mg Oral Q8H   ferrous sulfate 325 mg Oral Daily   furosemide 40 mg Intravenous BID   hydrALAZINE 25 mg Oral TID   hydroxychloroquine 200 mg Oral QAM   ipratropium 0.5 mg Nebulization Q6H - RT   pharmacy consult - MTM  Does not apply Daily   saccharomyces boulardii 250 mg Oral Daily   sertraline 50 mg Oral Nightly   traZODone 50 mg Oral Nightly   warfarin 1 mg Oral Once   [START ON 3/4/2017] warfarin 2 mg Oral Daily       Pharmacy to dose warfarin        Assessment and Plan:   1. Acute on chronic SHF   - diuresing with IV Lasix 40mg BID   - monitor Creatinine closely   - will obtain CXR today;  had Echocardiogram last month              - for discharge on Lasix, 40 daily and Aldactone 25 Q Day as NEW Rx.    2. ICM s/p ICD   - Echo 2/4/17: EF 18%, mild-mod AI, mod TR, grossly normal prosthetic MV   - ASA, Lipitor 20mg     3. Mechanical mitral valve replacement   - on Coumadin, INR 3.8 today    4. HTN   - well controlled on Coreg 3.125mg BID, and Hydalazine 25mg TID    5. History of tobacco abuse, patient states she quit smoking March 1st; encouraged continued cessation    6. I'll call next week after reviewing lytic lesion left clavicle and need for cath studies.    Disposition: Patient stable and ready for discharge to home on current medications with the addition of Lasix 40mg daily and Aldactone. We will call follow up.     I, Yomi Ramirez MD, personally performed the services described as documented by the above named individual. I have made any necessary edits and it is both accurate and complete 3/3/2017  5:03 PM    Sandhya Lynch PA-C   03/03/17   8:46 AM

## 2017-03-03 NOTE — PLAN OF CARE
Problem: Patient Care Overview (Adult)  Goal: Plan of Care Review  Outcome: Ongoing (interventions implemented as appropriate)    03/03/17 0435   Coping/Psychosocial Response Interventions   Plan Of Care Reviewed With patient   Patient Care Overview   Progress progress toward functional goals as expected   Outcome Evaluation   Outcome Summary/Follow up Plan Pt VSS throughout shift. O2 sat high 90's on 2L. NSR on monitor. Continue to diurese per MD orders. No complaints at this time.         Problem: Cardiac: Heart Failure (Adult)  Intervention: Promote Functional Ability    03/03/17 0435   Musculoskeletal Interventions   Self-Care Promotion independence encouraged   Activity   Activity Type activity adjusted per tolerance   Activity Level of Assistance assistance, 1 person   Coping/Psychosocial Interventions   Environmental Support calm environment promoted         Goal: Signs and Symptoms of Listed Potential Problems Will be Absent or Manageable (Cardiac: Heart Failure)  Outcome: Ongoing (interventions implemented as appropriate)    03/03/17 0435   Cardiac: Heart Failure   Problems Assessed (Heart Failure) all   Problems Present (Heart Failure) cardiac pump dysfunction;fluid/electrolyte imbalance;respiratory compromise

## 2017-03-03 NOTE — PLAN OF CARE
Problem: Patient Care Overview (Adult)  Goal: Plan of Care Review  Outcome: Outcome(s) achieved Date Met:  03/03/17 03/03/17 1817   Coping/Psychosocial Response Interventions   Plan Of Care Reviewed With patient   Patient Care Overview   Progress improving   Outcome Evaluation   Outcome Summary/Follow up Plan Pt VSS. D/C home today.       Goal: Discharge Needs Assessment  Outcome: Outcome(s) achieved Date Met:  03/03/17 03/03/17 1331   Discharge Needs Assessment   Concerns To Be Addressed no discharge needs identified   Readmission Within The Last 30 Days no previous admission in last 30 days   Equipment Needed After Discharge none   Current Health   Anticipated Changes Related to Illness none   Living Environment   Transportation Available car;family or friend will provide  (son will provide transportation)   Self-Care   Equipment Currently Used at Home none         Problem: Cardiac: Heart Failure (Adult)  Goal: Signs and Symptoms of Listed Potential Problems Will be Absent or Manageable (Cardiac: Heart Failure)  Outcome: Outcome(s) achieved Date Met:  03/03/17 03/03/17 1817   Cardiac: Heart Failure   Problems Assessed (Heart Failure) all   Problems Present (Heart Failure) cardiac pump dysfunction;functional decline/self-care deficit;respiratory compromise

## 2017-03-04 LAB — NT-PROBNP SERPL-MCNC: ABNORMAL PG/ML (ref 0–287)

## 2017-03-06 ENCOUNTER — TRANSITIONAL CARE MANAGEMENT TELEPHONE ENCOUNTER (OUTPATIENT)
Dept: INTERNAL MEDICINE | Facility: CLINIC | Age: 57
End: 2017-03-06

## 2017-03-07 ENCOUNTER — ANTICOAGULATION VISIT (OUTPATIENT)
Dept: CARDIOLOGY | Facility: CLINIC | Age: 57
End: 2017-03-07

## 2017-03-07 DIAGNOSIS — Z79.01 CHRONIC ANTICOAGULATION: ICD-10-CM

## 2017-03-07 DIAGNOSIS — Z95.2 H/O MITRAL VALVE REPLACEMENT: ICD-10-CM

## 2017-03-07 NOTE — PATIENT INSTRUCTIONS
Pt says that when she left the hospital last week was put on alt dose of 2 and 1.  Will continue that until Friday and recheck her INR.

## 2017-03-07 NOTE — PATIENT INSTRUCTIONS
I have left several messages for this patient to call me about her INR's, I need her dosage, to have her call to have her results faxed to us from her remote machine.  I have not received a call as of this date 3/7/17.

## 2017-03-10 ENCOUNTER — ANTICOAGULATION VISIT (OUTPATIENT)
Dept: CARDIOLOGY | Facility: CLINIC | Age: 57
End: 2017-03-10

## 2017-03-10 ENCOUNTER — OFFICE VISIT (OUTPATIENT)
Dept: INTERNAL MEDICINE | Facility: CLINIC | Age: 57
End: 2017-03-10

## 2017-03-10 VITALS
SYSTOLIC BLOOD PRESSURE: 120 MMHG | BODY MASS INDEX: 17.08 KG/M2 | HEART RATE: 74 BPM | DIASTOLIC BLOOD PRESSURE: 80 MMHG | RESPIRATION RATE: 16 BRPM | WEIGHT: 114 LBS

## 2017-03-10 DIAGNOSIS — I38 VHD (VALVULAR HEART DISEASE): ICD-10-CM

## 2017-03-10 DIAGNOSIS — I25.5 ISCHEMIC CARDIOMYOPATHY: ICD-10-CM

## 2017-03-10 DIAGNOSIS — Z79.01 CHRONIC ANTICOAGULATION: ICD-10-CM

## 2017-03-10 DIAGNOSIS — F41.9 ANXIETY: Primary | ICD-10-CM

## 2017-03-10 DIAGNOSIS — Z95.2 H/O MITRAL VALVE REPLACEMENT: ICD-10-CM

## 2017-03-10 DIAGNOSIS — N18.2 CHRONIC RENAL FAILURE, STAGE 2 (MILD): ICD-10-CM

## 2017-03-10 DIAGNOSIS — I25.10 CHRONIC CORONARY ARTERY DISEASE: ICD-10-CM

## 2017-03-10 DIAGNOSIS — Z95.810 AICD (AUTOMATIC CARDIOVERTER/DEFIBRILLATOR) PRESENT: ICD-10-CM

## 2017-03-10 LAB — INR PPP: 2.9

## 2017-03-10 PROCEDURE — 99214 OFFICE O/P EST MOD 30 MIN: CPT | Performed by: INTERNAL MEDICINE

## 2017-03-10 RX ORDER — CLONAZEPAM 2 MG/1
2 TABLET ORAL 3 TIMES DAILY
Qty: 90 TABLET | Refills: 2 | Status: SHIPPED | OUTPATIENT
Start: 2017-03-10 | End: 2017-07-18 | Stop reason: SDUPTHER

## 2017-03-10 NOTE — PROGRESS NOTES
Subjective   Ashely Roldan is a 56 y.o. female.     History of Present Illness   For follow up of recent hospitalization for CHF and cardiomyopathy.  She was in hospital for one week in February and one day last week.  She says she is breathing better and is getting stronger.  Her EF was 18%.  She has COPD as well and is still smoking.  She has anxiety which is treated with clonazepam and that is why she is here.  Mild renal insufficiency with creatinine of 1.8.    The following portions of the patient's history were reviewed and updated as appropriate: allergies, current medications, past medical history and problem list.    Review of Systems   Constitutional: Positive for fatigue.   Respiratory: Positive for cough and shortness of breath.    Cardiovascular: Positive for leg swelling.   Gastrointestinal: Negative.  Negative for abdominal distention and abdominal pain.   Genitourinary: Negative.    Psychiatric/Behavioral: The patient is nervous/anxious.        Objective   Physical Exam   Constitutional: She appears well-developed.   Thin.   Neck: Normal range of motion. Neck supple.   Cardiovascular: Normal rate and regular rhythm.  Exam reveals gallop. Exam reveals no friction rub.    Murmur heard.  Systolic murmur and S3.   Pulmonary/Chest: Breath sounds normal. She has no wheezes. She has no rales. She exhibits no tenderness.   Few rhonchi.   Abdominal: Soft. Bowel sounds are normal.   Musculoskeletal: She exhibits edema (trace edema bilaterally.).   Nursing note and vitals reviewed.      Assessment/Plan   Ashely was seen today for chest pain.    Diagnoses and all orders for this visit:    Anxiety  -     clonazePAM (KlonoPIN) 2 MG tablet; Take 1 tablet by mouth 3 (Three) Times a Day.    Ischemic cardiomyopathy    VHD (valvular heart disease)    Chronic coronary artery disease    AICD (automatic cardioverter/defibrillator) present    Chronic renal failure, stage 2 (mild)    Problems seem stable.  She has  appointment with heart failure clinic next week.  Refilled clonazepam.  Same meds otherwise.  Recheck here 3 months.    Transitional Care Management Certification  I certify that the following are true:  1. Communication was made within 2 business days of discharge.  2. Complexity of Medical Decision Making is moderate.  3. Face to face visit occurred within 14 days.    *Note: 17327 is for high complexity patients with a face to face visit within 7 days of discharge.  61465 is for high complexity patients with a face to face on days 8-14 post discharge or medium complexity with face to face visit within 14 days post discharge.

## 2017-03-15 DIAGNOSIS — R77.8 ELEVATED TROPONIN: ICD-10-CM

## 2017-03-15 DIAGNOSIS — I50.23 ACUTE ON CHRONIC SYSTOLIC HEART FAILURE (HCC): Primary | ICD-10-CM

## 2017-03-17 ENCOUNTER — ANTICOAGULATION VISIT (OUTPATIENT)
Dept: CARDIOLOGY | Facility: CLINIC | Age: 57
End: 2017-03-17

## 2017-03-17 DIAGNOSIS — Z79.01 CHRONIC ANTICOAGULATION: ICD-10-CM

## 2017-03-17 DIAGNOSIS — Z95.2 H/O MITRAL VALVE REPLACEMENT: ICD-10-CM

## 2017-03-17 LAB — INR PPP: 2.5

## 2017-03-21 ENCOUNTER — TELEPHONE (OUTPATIENT)
Dept: CARDIOLOGY | Facility: CLINIC | Age: 57
End: 2017-03-21

## 2017-03-21 NOTE — TELEPHONE ENCOUNTER
Pt called to set up for heart cath. She is feeling well and states her edema is better. She is hesitant to undergo further procedures without change in symptoms. Will defer heart cath at present. Pt will keep scheduled f/u in May and call prn should symptoms arise. ARSLAN

## 2017-03-23 NOTE — PROGRESS NOTES
I have reviewed the anticoagulation track, labs, and dose adjustments made by Linda Owen and I agree.    Electronically signed by Sandhya Lynch PA-C 03/23/17 9:28 AM

## 2017-03-23 NOTE — PROGRESS NOTES
I have reviewed the anticoagulation track, labs, and dose adjustments made by Linda Owen and I agree.    Electronically signed by Sandhya Lynch PA-C 03/23/17 9:32 AM

## 2017-03-24 ENCOUNTER — ANTICOAGULATION VISIT (OUTPATIENT)
Dept: CARDIOLOGY | Facility: CLINIC | Age: 57
End: 2017-03-24

## 2017-03-24 DIAGNOSIS — Z79.01 CHRONIC ANTICOAGULATION: ICD-10-CM

## 2017-03-24 DIAGNOSIS — Z95.2 H/O MITRAL VALVE REPLACEMENT: ICD-10-CM

## 2017-03-24 LAB — INR PPP: 2

## 2017-04-03 ENCOUNTER — ANTICOAGULATION VISIT (OUTPATIENT)
Dept: CARDIOLOGY | Facility: CLINIC | Age: 57
End: 2017-04-03

## 2017-04-03 DIAGNOSIS — Z95.2 H/O MITRAL VALVE REPLACEMENT: ICD-10-CM

## 2017-04-03 DIAGNOSIS — Z79.01 CHRONIC ANTICOAGULATION: ICD-10-CM

## 2017-04-03 LAB — INR PPP: 2.2

## 2017-04-03 RX ORDER — FUROSEMIDE 40 MG/1
TABLET ORAL
Qty: 90 TABLET | Refills: 2 | OUTPATIENT
Start: 2017-04-03

## 2017-04-05 NOTE — PROGRESS NOTES
I have reviewed the anticoagulation track, labs, and dose adjustments made by Linda Owen and I agree.    Electronically signed by Sandhya Lynch PA-C 04/05/17 4:17 PM

## 2017-04-17 ENCOUNTER — ANTICOAGULATION VISIT (OUTPATIENT)
Dept: CARDIOLOGY | Facility: CLINIC | Age: 57
End: 2017-04-17

## 2017-04-17 DIAGNOSIS — Z79.01 CHRONIC ANTICOAGULATION: ICD-10-CM

## 2017-04-17 DIAGNOSIS — Z95.2 H/O MITRAL VALVE REPLACEMENT: ICD-10-CM

## 2017-04-17 LAB — INR PPP: 3

## 2017-05-01 ENCOUNTER — ANTICOAGULATION VISIT (OUTPATIENT)
Dept: CARDIOLOGY | Facility: CLINIC | Age: 57
End: 2017-05-01

## 2017-05-01 DIAGNOSIS — Z79.01 CHRONIC ANTICOAGULATION: ICD-10-CM

## 2017-05-01 DIAGNOSIS — Z95.2 H/O MITRAL VALVE REPLACEMENT: ICD-10-CM

## 2017-05-01 LAB — INR PPP: 2.2

## 2017-05-09 ENCOUNTER — ANTICOAGULATION VISIT (OUTPATIENT)
Dept: CARDIOLOGY | Facility: CLINIC | Age: 57
End: 2017-05-09

## 2017-05-09 DIAGNOSIS — Z79.01 CHRONIC ANTICOAGULATION: ICD-10-CM

## 2017-05-09 DIAGNOSIS — Z95.2 H/O MITRAL VALVE REPLACEMENT: ICD-10-CM

## 2017-05-09 LAB — INR PPP: 6.3

## 2017-05-12 ENCOUNTER — ANTICOAGULATION VISIT (OUTPATIENT)
Dept: CARDIOLOGY | Facility: CLINIC | Age: 57
End: 2017-05-12

## 2017-05-12 DIAGNOSIS — Z95.2 H/O MITRAL VALVE REPLACEMENT: ICD-10-CM

## 2017-05-12 DIAGNOSIS — Z79.01 CHRONIC ANTICOAGULATION: ICD-10-CM

## 2017-05-12 LAB — INR PPP: 1.7

## 2017-05-15 ENCOUNTER — ANTICOAGULATION VISIT (OUTPATIENT)
Dept: CARDIOLOGY | Facility: CLINIC | Age: 57
End: 2017-05-15

## 2017-05-15 DIAGNOSIS — Z79.01 CHRONIC ANTICOAGULATION: ICD-10-CM

## 2017-05-15 DIAGNOSIS — Z95.2 H/O MITRAL VALVE REPLACEMENT: ICD-10-CM

## 2017-05-15 LAB — INR PPP: 2.2

## 2017-05-19 ENCOUNTER — ANTICOAGULATION VISIT (OUTPATIENT)
Dept: CARDIOLOGY | Facility: CLINIC | Age: 57
End: 2017-05-19

## 2017-05-19 DIAGNOSIS — Z79.01 CHRONIC ANTICOAGULATION: ICD-10-CM

## 2017-05-19 DIAGNOSIS — Z95.2 H/O MITRAL VALVE REPLACEMENT: ICD-10-CM

## 2017-05-19 LAB — INR PPP: 2.4

## 2017-05-26 ENCOUNTER — ANTICOAGULATION VISIT (OUTPATIENT)
Dept: CARDIOLOGY | Facility: CLINIC | Age: 57
End: 2017-05-26

## 2017-05-26 DIAGNOSIS — Z79.01 CHRONIC ANTICOAGULATION: ICD-10-CM

## 2017-05-26 DIAGNOSIS — Z95.2 H/O MITRAL VALVE REPLACEMENT: ICD-10-CM

## 2017-05-26 LAB — INR PPP: 3.7

## 2017-06-26 ENCOUNTER — ANTICOAGULATION VISIT (OUTPATIENT)
Dept: CARDIOLOGY | Facility: CLINIC | Age: 57
End: 2017-06-26

## 2017-06-26 DIAGNOSIS — Z79.01 CHRONIC ANTICOAGULATION: ICD-10-CM

## 2017-06-26 DIAGNOSIS — Z95.2 H/O MITRAL VALVE REPLACEMENT: ICD-10-CM

## 2017-06-26 LAB — INR PPP: 4

## 2017-06-29 NOTE — PROGRESS NOTES
I have reviewed the anticoagulation track, labs, and dose adjustments made by Linda Owen and I agree.    Electronically signed by Sandhya Lynch PA-C 06/29/17 9:15 AM

## 2017-07-18 ENCOUNTER — OFFICE VISIT (OUTPATIENT)
Dept: INTERNAL MEDICINE | Facility: CLINIC | Age: 57
End: 2017-07-18

## 2017-07-18 VITALS
SYSTOLIC BLOOD PRESSURE: 102 MMHG | HEART RATE: 72 BPM | DIASTOLIC BLOOD PRESSURE: 84 MMHG | WEIGHT: 111 LBS | RESPIRATION RATE: 18 BRPM | BODY MASS INDEX: 16.63 KG/M2

## 2017-07-18 DIAGNOSIS — I25.10 CHRONIC CORONARY ARTERY DISEASE: Primary | ICD-10-CM

## 2017-07-18 DIAGNOSIS — E78.2 MIXED HYPERLIPIDEMIA: ICD-10-CM

## 2017-07-18 DIAGNOSIS — F41.9 ANXIETY: ICD-10-CM

## 2017-07-18 DIAGNOSIS — I25.5 ISCHEMIC CARDIOMYOPATHY: ICD-10-CM

## 2017-07-18 DIAGNOSIS — J43.1 PANLOBULAR EMPHYSEMA (HCC): ICD-10-CM

## 2017-07-18 DIAGNOSIS — I10 ESSENTIAL HYPERTENSION: ICD-10-CM

## 2017-07-18 DIAGNOSIS — N18.2 CHRONIC RENAL FAILURE, STAGE 2 (MILD): ICD-10-CM

## 2017-07-18 PROBLEM — R07.89 CHEST WALL PAIN: Status: ACTIVE | Noted: 2017-07-18

## 2017-07-18 PROBLEM — R07.89 CHEST WALL PAIN: Status: RESOLVED | Noted: 2017-07-18 | Resolved: 2017-07-18

## 2017-07-18 LAB
ALBUMIN SERPL-MCNC: 4.1 G/DL (ref 3.2–4.8)
ALBUMIN/GLOB SERPL: 1.2 G/DL (ref 1.5–2.5)
ALP SERPL-CCNC: 188 U/L (ref 25–100)
ALT SERPL W P-5'-P-CCNC: 11 U/L (ref 7–40)
ANION GAP SERPL CALCULATED.3IONS-SCNC: 12 MMOL/L (ref 3–11)
AST SERPL-CCNC: 28 U/L (ref 0–33)
BILIRUB SERPL-MCNC: 0.5 MG/DL (ref 0.3–1.2)
BNP SERPL-MCNC: 339 PG/ML (ref 0–100)
BUN BLD-MCNC: 36 MG/DL (ref 9–23)
BUN/CREAT SERPL: 20 (ref 7–25)
CALCIUM SPEC-SCNC: 9.8 MG/DL (ref 8.7–10.4)
CHLORIDE SERPL-SCNC: 101 MMOL/L (ref 99–109)
CO2 SERPL-SCNC: 20 MMOL/L (ref 20–31)
CREAT BLD-MCNC: 1.8 MG/DL (ref 0.6–1.3)
DEPRECATED RDW RBC AUTO: 45.9 FL (ref 37–54)
ERYTHROCYTE [DISTWIDTH] IN BLOOD BY AUTOMATED COUNT: 13 % (ref 11.3–14.5)
GFR SERPL CREATININE-BSD FRML MDRD: 29 ML/MIN/1.73
GLOBULIN UR ELPH-MCNC: 3.4 GM/DL
GLUCOSE BLD-MCNC: 105 MG/DL (ref 70–100)
HCT VFR BLD AUTO: 34.4 % (ref 34.5–44)
HGB BLD-MCNC: 11.8 G/DL (ref 11.5–15.5)
MCH RBC QN AUTO: 32.8 PG (ref 27–31)
MCHC RBC AUTO-ENTMCNC: 34.3 G/DL (ref 32–36)
MCV RBC AUTO: 95.6 FL (ref 80–99)
PLATELET # BLD AUTO: 195 10*3/MM3 (ref 150–450)
PMV BLD AUTO: 11.6 FL (ref 6–12)
POTASSIUM BLD-SCNC: 4 MMOL/L (ref 3.5–5.5)
PROT SERPL-MCNC: 7.5 G/DL (ref 5.7–8.2)
RBC # BLD AUTO: 3.6 10*6/MM3 (ref 3.89–5.14)
SODIUM BLD-SCNC: 133 MMOL/L (ref 132–146)
WBC NRBC COR # BLD: 4.09 10*3/MM3 (ref 3.5–10.8)

## 2017-07-18 PROCEDURE — 99214 OFFICE O/P EST MOD 30 MIN: CPT | Performed by: INTERNAL MEDICINE

## 2017-07-18 PROCEDURE — 80053 COMPREHEN METABOLIC PANEL: CPT | Performed by: INTERNAL MEDICINE

## 2017-07-18 PROCEDURE — 36415 COLL VENOUS BLD VENIPUNCTURE: CPT | Performed by: INTERNAL MEDICINE

## 2017-07-18 PROCEDURE — 85027 COMPLETE CBC AUTOMATED: CPT | Performed by: INTERNAL MEDICINE

## 2017-07-18 PROCEDURE — 83880 ASSAY OF NATRIURETIC PEPTIDE: CPT | Performed by: INTERNAL MEDICINE

## 2017-07-18 RX ORDER — TIZANIDINE HYDROCHLORIDE 4 MG/1
4 CAPSULE, GELATIN COATED ORAL 2 TIMES DAILY
Qty: 60 CAPSULE | Refills: 3 | Status: SHIPPED | OUTPATIENT
Start: 2017-07-18 | End: 2017-09-06

## 2017-07-18 RX ORDER — CLONAZEPAM 2 MG/1
2 TABLET ORAL 3 TIMES DAILY
Qty: 90 TABLET | Refills: 2 | Status: SHIPPED | OUTPATIENT
Start: 2017-07-18 | End: 2017-10-16 | Stop reason: SDUPTHER

## 2017-07-18 NOTE — PROGRESS NOTES
Subjective   Ashely Roldan is a 56 y.o. female.     History of Present Illness   For follow up of  CAD no chest pain sob or palpitations.  Congestive cardiomyopathy no sob or edema.  Mitral valve replacement seems to be stable.  Anxiety on clonazepam and does well but ran out.  Mild renal insufficiency.      The following portions of the patient's history were reviewed and updated as appropriate: allergies, current medications, past medical history and problem list.    Review of Systems   Constitutional: Negative.    Respiratory: Negative.  Negative for cough, chest tightness, shortness of breath and wheezing.    Cardiovascular: Negative.  Negative for chest pain, palpitations and leg swelling.   Gastrointestinal: Negative.  Negative for abdominal distention and abdominal pain.   Genitourinary: Negative.        Objective   Physical Exam   Constitutional: She appears well-developed and well-nourished.   Neck: Normal range of motion. Neck supple.   Cardiovascular: Normal rate and regular rhythm.  Exam reveals no gallop and no friction rub.    No murmur heard.  Good mechanical valve sounds.   Pulmonary/Chest: Effort normal and breath sounds normal. No respiratory distress. She has no wheezes. She has no rales.   Abdominal: Soft. Bowel sounds are normal. She exhibits no distension.   Musculoskeletal: She exhibits no edema.   Nursing note and vitals reviewed.      Assessment/Plan   Ashely was seen today for lupus.    Diagnoses and all orders for this visit:    Chronic coronary artery disease    Mixed hyperlipidemia  -     Comprehensive Metabolic Panel    Ischemic cardiomyopathy  -     BNP    Essential hypertension    Panlobular emphysema    Chronic renal failure, stage 2 (mild)  -     CBC (No Diff)    Anxiety  -     clonazePAM (KlonoPIN) 2 MG tablet; Take 1 tablet by mouth 3 (Three) Times a Day.    Other orders  -     TiZANidine (ZANAFLEX) 4 MG capsule; Take 1 capsule by mouth 2 (Two) Times a Day. As needed.    All  problems seem stable.  She does not like to come to the doctor.  Will do labs today.  Told her to make an appointment with Dr. Ramirez.  Recheck here 3 months.

## 2017-07-26 ENCOUNTER — TELEPHONE (OUTPATIENT)
Dept: CARDIOLOGY | Facility: CLINIC | Age: 57
End: 2017-07-26

## 2017-07-26 NOTE — TELEPHONE ENCOUNTER
Called about late INR and she says that she has been doing it but forgets to call it in.   Stressed to need to always call it in so we can get the results.   AH

## 2017-08-02 ENCOUNTER — TELEPHONE (OUTPATIENT)
Dept: CARDIOLOGY | Facility: CLINIC | Age: 57
End: 2017-08-02

## 2017-08-03 RX ORDER — WARFARIN SODIUM 1 MG/1
TABLET ORAL
Qty: 30 TABLET | Refills: 0 | Status: SHIPPED | OUTPATIENT
Start: 2017-08-03 | End: 2017-08-04 | Stop reason: SDUPTHER

## 2017-08-03 RX ORDER — WARFARIN SODIUM 2 MG/1
TABLET ORAL
Qty: 30 TABLET | Refills: 2 | Status: SHIPPED | OUTPATIENT
Start: 2017-08-03 | End: 2017-08-04 | Stop reason: SDUPTHER

## 2017-08-04 ENCOUNTER — TELEPHONE (OUTPATIENT)
Dept: INTERNAL MEDICINE | Facility: CLINIC | Age: 57
End: 2017-08-04

## 2017-08-04 RX ORDER — WARFARIN SODIUM 1 MG/1
TABLET ORAL
Qty: 30 TABLET | Refills: 0 | Status: SHIPPED | OUTPATIENT
Start: 2017-08-04 | End: 2017-09-06

## 2017-08-04 RX ORDER — WARFARIN SODIUM 2 MG/1
TABLET ORAL
Qty: 30 TABLET | Refills: 2 | Status: SHIPPED | OUTPATIENT
Start: 2017-08-04 | End: 2017-09-06

## 2017-08-04 NOTE — TELEPHONE ENCOUNTER
----- Message from Hafsa Dunlap sent at 8/4/2017  1:16 PM EDT -----  Contact: SELF  MARCIO DAVIDSON CALLING FOR A REFILL FOR CUMADIN 1 MG AND 2 MG. SHE USES THE [facility] ON [address] IN [place].

## 2017-09-05 ENCOUNTER — HOSPITAL ENCOUNTER (INPATIENT)
Facility: HOSPITAL | Age: 57
LOS: 4 days | Discharge: HOME | End: 2017-09-09
Attending: EMERGENCY MEDICINE | Admitting: FAMILY MEDICINE

## 2017-09-05 ENCOUNTER — APPOINTMENT (OUTPATIENT)
Dept: GENERAL RADIOLOGY | Facility: HOSPITAL | Age: 57
End: 2017-09-05

## 2017-09-05 DIAGNOSIS — J40 BRONCHITIS: ICD-10-CM

## 2017-09-05 DIAGNOSIS — I50.21 ACUTE SYSTOLIC CONGESTIVE HEART FAILURE (HCC): Primary | ICD-10-CM

## 2017-09-05 DIAGNOSIS — D84.9 IMMUNOCOMPROMISED (HCC): ICD-10-CM

## 2017-09-05 DIAGNOSIS — I50.21 ACUTE SYSTOLIC (CONGESTIVE) HEART FAILURE (HCC): ICD-10-CM

## 2017-09-05 DIAGNOSIS — J44.1 COPD EXACERBATION (HCC): ICD-10-CM

## 2017-09-05 PROBLEM — J96.01 ACUTE RESPIRATORY FAILURE WITH HYPOXIA (HCC): Status: ACTIVE | Noted: 2017-09-05

## 2017-09-05 PROBLEM — N39.0 UTI (URINARY TRACT INFECTION): Status: ACTIVE | Noted: 2017-09-05

## 2017-09-05 PROBLEM — J96.21 ACUTE ON CHRONIC RESPIRATORY FAILURE WITH HYPOXIA (HCC): Status: ACTIVE | Noted: 2017-09-05

## 2017-09-05 PROBLEM — N18.4 CKD (CHRONIC KIDNEY DISEASE), STAGE IV (HCC): Chronic | Status: ACTIVE | Noted: 2017-09-05

## 2017-09-05 LAB
ALBUMIN SERPL-MCNC: 4.6 G/DL (ref 3.2–4.8)
ALBUMIN/GLOB SERPL: 1.2 G/DL (ref 1.5–2.5)
ALP SERPL-CCNC: 198 U/L (ref 25–100)
ALT SERPL W P-5'-P-CCNC: 15 U/L (ref 7–40)
AMPHET+METHAMPHET UR QL: NEGATIVE
AMPHETAMINES UR QL: NEGATIVE
ANION GAP SERPL CALCULATED.3IONS-SCNC: 5 MMOL/L (ref 3–11)
AST SERPL-CCNC: 31 U/L (ref 0–33)
BACTERIA UR QL AUTO: ABNORMAL /HPF
BARBITURATES UR QL SCN: NEGATIVE
BASOPHILS # BLD AUTO: 0 10*3/MM3 (ref 0–0.2)
BASOPHILS NFR BLD AUTO: 0 % (ref 0–1)
BENZODIAZ UR QL SCN: NEGATIVE
BILIRUB SERPL-MCNC: 0.3 MG/DL (ref 0.3–1.2)
BILIRUB UR QL STRIP: NEGATIVE
BNP SERPL-MCNC: 1514 PG/ML (ref 0–100)
BUN BLD-MCNC: 40 MG/DL (ref 9–23)
BUN/CREAT SERPL: 21.1 (ref 7–25)
BUPRENORPHINE SERPL-MCNC: NEGATIVE NG/ML
CALCIUM SPEC-SCNC: 9.9 MG/DL (ref 8.7–10.4)
CANNABINOIDS SERPL QL: NEGATIVE
CHLORIDE SERPL-SCNC: 101 MMOL/L (ref 99–109)
CLARITY UR: ABNORMAL
CO2 SERPL-SCNC: 28 MMOL/L (ref 20–31)
COCAINE UR QL: NEGATIVE
COLOR UR: YELLOW
CREAT BLD-MCNC: 1.9 MG/DL (ref 0.6–1.3)
D-LACTATE SERPL-SCNC: 0.8 MMOL/L (ref 0.5–2)
DEPRECATED RDW RBC AUTO: 46.5 FL (ref 37–54)
EOSINOPHIL # BLD AUTO: 0.03 10*3/MM3 (ref 0–0.3)
EOSINOPHIL NFR BLD AUTO: 0.5 % (ref 0–3)
ERYTHROCYTE [DISTWIDTH] IN BLOOD BY AUTOMATED COUNT: 12.9 % (ref 11.3–14.5)
GFR SERPL CREATININE-BSD FRML MDRD: 27 ML/MIN/1.73
GLOBULIN UR ELPH-MCNC: 3.7 GM/DL
GLUCOSE BLD-MCNC: 100 MG/DL (ref 70–100)
GLUCOSE UR STRIP-MCNC: NEGATIVE MG/DL
HCT VFR BLD AUTO: 34.7 % (ref 34.5–44)
HGB BLD-MCNC: 11.7 G/DL (ref 11.5–15.5)
HGB UR QL STRIP.AUTO: ABNORMAL
HOLD SPECIMEN: NORMAL
HOLD SPECIMEN: NORMAL
HYALINE CASTS UR QL AUTO: ABNORMAL /LPF
IMM GRANULOCYTES # BLD: 0.01 10*3/MM3 (ref 0–0.03)
IMM GRANULOCYTES NFR BLD: 0.2 % (ref 0–0.6)
INR PPP: 5.2
KETONES UR QL STRIP: NEGATIVE
LEUKOCYTE ESTERASE UR QL STRIP.AUTO: ABNORMAL
LYMPHOCYTES # BLD AUTO: 0.96 10*3/MM3 (ref 0.6–4.8)
LYMPHOCYTES NFR BLD AUTO: 15.5 % (ref 24–44)
MCH RBC QN AUTO: 32.9 PG (ref 27–31)
MCHC RBC AUTO-ENTMCNC: 33.7 G/DL (ref 32–36)
MCV RBC AUTO: 97.5 FL (ref 80–99)
METHADONE UR QL SCN: NEGATIVE
MONOCYTES # BLD AUTO: 0.23 10*3/MM3 (ref 0–1)
MONOCYTES NFR BLD AUTO: 3.7 % (ref 0–12)
NEUTROPHILS # BLD AUTO: 4.96 10*3/MM3 (ref 1.5–8.3)
NEUTROPHILS NFR BLD AUTO: 80.1 % (ref 41–71)
NITRITE UR QL STRIP: NEGATIVE
OPIATES UR QL: NEGATIVE
OXYCODONE UR QL SCN: NEGATIVE
PCP UR QL SCN: NEGATIVE
PH UR STRIP.AUTO: 5.5 [PH] (ref 5–8)
PLATELET # BLD AUTO: 130 10*3/MM3 (ref 150–450)
PMV BLD AUTO: 11.4 FL (ref 6–12)
POTASSIUM BLD-SCNC: 4.2 MMOL/L (ref 3.5–5.5)
PROPOXYPH UR QL: NEGATIVE
PROT SERPL-MCNC: 8.3 G/DL (ref 5.7–8.2)
PROT UR QL STRIP: ABNORMAL
PROTHROMBIN TIME: 59.6 SECONDS (ref 9.6–11.5)
RBC # BLD AUTO: 3.56 10*6/MM3 (ref 3.89–5.14)
RBC # UR: ABNORMAL /HPF
REF LAB TEST METHOD: ABNORMAL
SODIUM BLD-SCNC: 134 MMOL/L (ref 132–146)
SP GR UR STRIP: 1.01 (ref 1–1.03)
SQUAMOUS #/AREA URNS HPF: ABNORMAL /HPF
TRICYCLICS UR QL SCN: NEGATIVE
TROPONIN I SERPL-MCNC: 0.04 NG/ML (ref 0–0.07)
UROBILINOGEN UR QL STRIP: ABNORMAL
WBC NRBC COR # BLD: 6.19 10*3/MM3 (ref 3.5–10.8)
WBC UR QL AUTO: ABNORMAL /HPF
WHOLE BLOOD HOLD SPECIMEN: NORMAL
WHOLE BLOOD HOLD SPECIMEN: NORMAL

## 2017-09-05 PROCEDURE — 94799 UNLISTED PULMONARY SVC/PX: CPT

## 2017-09-05 PROCEDURE — 83735 ASSAY OF MAGNESIUM: CPT | Performed by: NURSE PRACTITIONER

## 2017-09-05 PROCEDURE — 93005 ELECTROCARDIOGRAM TRACING: CPT

## 2017-09-05 PROCEDURE — 80053 COMPREHEN METABOLIC PANEL: CPT | Performed by: EMERGENCY MEDICINE

## 2017-09-05 PROCEDURE — 99285 EMERGENCY DEPT VISIT HI MDM: CPT

## 2017-09-05 PROCEDURE — 81001 URINALYSIS AUTO W/SCOPE: CPT | Performed by: NURSE PRACTITIONER

## 2017-09-05 PROCEDURE — 94760 N-INVAS EAR/PLS OXIMETRY 1: CPT

## 2017-09-05 PROCEDURE — 25010000002 AZITHROMYCIN: Performed by: EMERGENCY MEDICINE

## 2017-09-05 PROCEDURE — 85025 COMPLETE CBC W/AUTO DIFF WBC: CPT | Performed by: EMERGENCY MEDICINE

## 2017-09-05 PROCEDURE — 25010000002 METHYLPREDNISOLONE PER 125 MG: Performed by: EMERGENCY MEDICINE

## 2017-09-05 PROCEDURE — 83605 ASSAY OF LACTIC ACID: CPT | Performed by: EMERGENCY MEDICINE

## 2017-09-05 PROCEDURE — 71020 HC CHEST PA AND LATERAL: CPT

## 2017-09-05 PROCEDURE — 93005 ELECTROCARDIOGRAM TRACING: CPT | Performed by: NURSE PRACTITIONER

## 2017-09-05 PROCEDURE — 25010000002 CEFTRIAXONE PER 250 MG: Performed by: EMERGENCY MEDICINE

## 2017-09-05 PROCEDURE — 83880 ASSAY OF NATRIURETIC PEPTIDE: CPT | Performed by: EMERGENCY MEDICINE

## 2017-09-05 PROCEDURE — 99223 1ST HOSP IP/OBS HIGH 75: CPT | Performed by: FAMILY MEDICINE

## 2017-09-05 PROCEDURE — 25010000002 FUROSEMIDE PER 20 MG: Performed by: EMERGENCY MEDICINE

## 2017-09-05 PROCEDURE — 84484 ASSAY OF TROPONIN QUANT: CPT

## 2017-09-05 PROCEDURE — 85610 PROTHROMBIN TIME: CPT | Performed by: EMERGENCY MEDICINE

## 2017-09-05 PROCEDURE — 87186 SC STD MICRODIL/AGAR DIL: CPT | Performed by: NURSE PRACTITIONER

## 2017-09-05 PROCEDURE — 87077 CULTURE AEROBIC IDENTIFY: CPT | Performed by: NURSE PRACTITIONER

## 2017-09-05 PROCEDURE — 85379 FIBRIN DEGRADATION QUANT: CPT | Performed by: NURSE PRACTITIONER

## 2017-09-05 PROCEDURE — 87040 BLOOD CULTURE FOR BACTERIA: CPT | Performed by: EMERGENCY MEDICINE

## 2017-09-05 PROCEDURE — 80306 DRUG TEST PRSMV INSTRMNT: CPT | Performed by: NURSE PRACTITIONER

## 2017-09-05 PROCEDURE — 94640 AIRWAY INHALATION TREATMENT: CPT

## 2017-09-05 PROCEDURE — 87086 URINE CULTURE/COLONY COUNT: CPT | Performed by: NURSE PRACTITIONER

## 2017-09-05 RX ORDER — MAGNESIUM SULFATE 1 G/100ML
1 INJECTION INTRAVENOUS AS NEEDED
Status: DISCONTINUED | OUTPATIENT
Start: 2017-09-05 | End: 2017-09-06 | Stop reason: DRUGHIGH

## 2017-09-05 RX ORDER — MAGNESIUM SULFATE HEPTAHYDRATE 40 MG/ML
4 INJECTION, SOLUTION INTRAVENOUS AS NEEDED
Status: DISCONTINUED | OUTPATIENT
Start: 2017-09-05 | End: 2017-09-06 | Stop reason: DRUGHIGH

## 2017-09-05 RX ORDER — NICOTINE 21 MG/24HR
1 PATCH, TRANSDERMAL 24 HOURS TRANSDERMAL
Status: DISCONTINUED | OUTPATIENT
Start: 2017-09-06 | End: 2017-09-09 | Stop reason: HOSPADM

## 2017-09-05 RX ORDER — SODIUM CHLORIDE 0.9 % (FLUSH) 0.9 %
10 SYRINGE (ML) INJECTION AS NEEDED
Status: DISCONTINUED | OUTPATIENT
Start: 2017-09-05 | End: 2017-09-09 | Stop reason: HOSPADM

## 2017-09-05 RX ORDER — CEFTRIAXONE SODIUM 1 G/50ML
1 INJECTION, SOLUTION INTRAVENOUS EVERY 24 HOURS
Status: DISCONTINUED | OUTPATIENT
Start: 2017-09-06 | End: 2017-09-06

## 2017-09-05 RX ORDER — METHYLPREDNISOLONE SODIUM SUCCINATE 125 MG/2ML
125 INJECTION, POWDER, LYOPHILIZED, FOR SOLUTION INTRAMUSCULAR; INTRAVENOUS ONCE
Status: COMPLETED | OUTPATIENT
Start: 2017-09-05 | End: 2017-09-05

## 2017-09-05 RX ORDER — SENNA AND DOCUSATE SODIUM 50; 8.6 MG/1; MG/1
2 TABLET, FILM COATED ORAL NIGHTLY PRN
Status: DISCONTINUED | OUTPATIENT
Start: 2017-09-05 | End: 2017-09-09 | Stop reason: HOSPADM

## 2017-09-05 RX ORDER — BUDESONIDE AND FORMOTEROL FUMARATE DIHYDRATE 160; 4.5 UG/1; UG/1
2 AEROSOL RESPIRATORY (INHALATION)
Status: DISCONTINUED | OUTPATIENT
Start: 2017-09-05 | End: 2017-09-09 | Stop reason: HOSPADM

## 2017-09-05 RX ORDER — HYDRALAZINE HYDROCHLORIDE 25 MG/1
25 TABLET, FILM COATED ORAL EVERY 8 HOURS SCHEDULED
Status: DISCONTINUED | OUTPATIENT
Start: 2017-09-06 | End: 2017-09-09 | Stop reason: HOSPADM

## 2017-09-05 RX ORDER — FUROSEMIDE 10 MG/ML
80 INJECTION INTRAMUSCULAR; INTRAVENOUS ONCE
Status: COMPLETED | OUTPATIENT
Start: 2017-09-05 | End: 2017-09-05

## 2017-09-05 RX ORDER — POTASSIUM CHLORIDE 7.45 MG/ML
10 INJECTION INTRAVENOUS
Status: DISCONTINUED | OUTPATIENT
Start: 2017-09-05 | End: 2017-09-06 | Stop reason: ALTCHOICE

## 2017-09-05 RX ORDER — SPIRONOLACTONE 25 MG/1
25 TABLET ORAL DAILY
Status: DISCONTINUED | OUTPATIENT
Start: 2017-09-06 | End: 2017-09-09 | Stop reason: HOSPADM

## 2017-09-05 RX ORDER — HYDROCODONE BITARTRATE AND ACETAMINOPHEN 5; 325 MG/1; MG/1
1 TABLET ORAL ONCE
Status: COMPLETED | OUTPATIENT
Start: 2017-09-05 | End: 2017-09-05

## 2017-09-05 RX ORDER — CARVEDILOL 3.12 MG/1
3.12 TABLET ORAL 2 TIMES DAILY WITH MEALS
Status: DISCONTINUED | OUTPATIENT
Start: 2017-09-05 | End: 2017-09-09 | Stop reason: HOSPADM

## 2017-09-05 RX ORDER — ACETAMINOPHEN 325 MG/1
650 TABLET ORAL EVERY 4 HOURS PRN
Status: DISCONTINUED | OUTPATIENT
Start: 2017-09-05 | End: 2017-09-09 | Stop reason: HOSPADM

## 2017-09-05 RX ORDER — FERROUS SULFATE 325(65) MG
325 TABLET ORAL EVERY OTHER DAY
Status: DISCONTINUED | OUTPATIENT
Start: 2017-09-06 | End: 2017-09-09 | Stop reason: HOSPADM

## 2017-09-05 RX ORDER — FERROUS SULFATE 325(65) MG
325 TABLET ORAL EVERY OTHER DAY
Status: DISCONTINUED | OUTPATIENT
Start: 2017-09-06 | End: 2017-09-05

## 2017-09-05 RX ORDER — ALBUTEROL SULFATE 2.5 MG/3ML
2.5 SOLUTION RESPIRATORY (INHALATION) 4 TIMES DAILY PRN
Status: DISCONTINUED | OUTPATIENT
Start: 2017-09-05 | End: 2017-09-09 | Stop reason: HOSPADM

## 2017-09-05 RX ORDER — ALBUTEROL SULFATE 2.5 MG/3ML
2.5 SOLUTION RESPIRATORY (INHALATION) ONCE
Status: COMPLETED | OUTPATIENT
Start: 2017-09-05 | End: 2017-09-05

## 2017-09-05 RX ORDER — DOXYCYCLINE HYCLATE 100 MG/1
100 CAPSULE ORAL EVERY 12 HOURS SCHEDULED
Status: DISCONTINUED | OUTPATIENT
Start: 2017-09-06 | End: 2017-09-09 | Stop reason: HOSPADM

## 2017-09-05 RX ORDER — CEFTRIAXONE SODIUM 1 G/50ML
1 INJECTION, SOLUTION INTRAVENOUS ONCE
Status: COMPLETED | OUTPATIENT
Start: 2017-09-05 | End: 2017-09-05

## 2017-09-05 RX ORDER — CLONAZEPAM 1 MG/1
2 TABLET ORAL EVERY 8 HOURS PRN
Status: DISCONTINUED | OUTPATIENT
Start: 2017-09-05 | End: 2017-09-09 | Stop reason: HOSPADM

## 2017-09-05 RX ORDER — METHYLPREDNISOLONE SODIUM SUCCINATE 125 MG/2ML
60 INJECTION, POWDER, LYOPHILIZED, FOR SOLUTION INTRAMUSCULAR; INTRAVENOUS 2 TIMES DAILY
Status: DISCONTINUED | OUTPATIENT
Start: 2017-09-06 | End: 2017-09-08

## 2017-09-05 RX ORDER — LIDOCAINE 50 MG/G
3 PATCH TOPICAL DAILY PRN
Status: DISCONTINUED | OUTPATIENT
Start: 2017-09-05 | End: 2017-09-09 | Stop reason: HOSPADM

## 2017-09-05 RX ORDER — MAGNESIUM SULFATE HEPTAHYDRATE 40 MG/ML
2 INJECTION, SOLUTION INTRAVENOUS AS NEEDED
Status: DISCONTINUED | OUTPATIENT
Start: 2017-09-05 | End: 2017-09-09 | Stop reason: HOSPADM

## 2017-09-05 RX ORDER — DOCUSATE SODIUM 100 MG/1
100 CAPSULE, LIQUID FILLED ORAL 2 TIMES DAILY PRN
Status: DISCONTINUED | OUTPATIENT
Start: 2017-09-05 | End: 2017-09-09 | Stop reason: HOSPADM

## 2017-09-05 RX ORDER — SODIUM CHLORIDE 0.9 % (FLUSH) 0.9 %
1-10 SYRINGE (ML) INJECTION AS NEEDED
Status: DISCONTINUED | OUTPATIENT
Start: 2017-09-05 | End: 2017-09-09 | Stop reason: HOSPADM

## 2017-09-05 RX ORDER — POTASSIUM CHLORIDE 750 MG/1
40 CAPSULE, EXTENDED RELEASE ORAL AS NEEDED
Status: DISCONTINUED | OUTPATIENT
Start: 2017-09-05 | End: 2017-09-06 | Stop reason: ALTCHOICE

## 2017-09-05 RX ORDER — ROSUVASTATIN CALCIUM 10 MG/1
10 TABLET, COATED ORAL NIGHTLY
Status: DISCONTINUED | OUTPATIENT
Start: 2017-09-05 | End: 2017-09-09 | Stop reason: HOSPADM

## 2017-09-05 RX ORDER — PROMETHAZINE HYDROCHLORIDE AND CODEINE PHOSPHATE 6.25; 1 MG/5ML; MG/5ML
5 SYRUP ORAL EVERY 6 HOURS PRN
Status: DISCONTINUED | OUTPATIENT
Start: 2017-09-05 | End: 2017-09-09 | Stop reason: HOSPADM

## 2017-09-05 RX ORDER — ACETAMINOPHEN 325 MG/1
650 TABLET ORAL ONCE
Status: COMPLETED | OUTPATIENT
Start: 2017-09-05 | End: 2017-09-05

## 2017-09-05 RX ORDER — POTASSIUM CHLORIDE 1.5 G/1.77G
40 POWDER, FOR SOLUTION ORAL AS NEEDED
Status: DISCONTINUED | OUTPATIENT
Start: 2017-09-05 | End: 2017-09-06 | Stop reason: ALTCHOICE

## 2017-09-05 RX ORDER — IPRATROPIUM BROMIDE AND ALBUTEROL SULFATE 2.5; .5 MG/3ML; MG/3ML
3 SOLUTION RESPIRATORY (INHALATION) ONCE
Status: COMPLETED | OUTPATIENT
Start: 2017-09-05 | End: 2017-09-05

## 2017-09-05 RX ORDER — HYDROXYCHLOROQUINE SULFATE 200 MG/1
200 TABLET, FILM COATED ORAL EVERY MORNING
Status: DISCONTINUED | OUTPATIENT
Start: 2017-09-06 | End: 2017-09-09 | Stop reason: HOSPADM

## 2017-09-05 RX ORDER — FUROSEMIDE 10 MG/ML
40 INJECTION INTRAMUSCULAR; INTRAVENOUS 2 TIMES DAILY
Status: DISCONTINUED | OUTPATIENT
Start: 2017-09-06 | End: 2017-09-07

## 2017-09-05 RX ORDER — IPRATROPIUM BROMIDE AND ALBUTEROL SULFATE 2.5; .5 MG/3ML; MG/3ML
3 SOLUTION RESPIRATORY (INHALATION)
Status: DISCONTINUED | OUTPATIENT
Start: 2017-09-05 | End: 2017-09-09 | Stop reason: HOSPADM

## 2017-09-05 RX ORDER — BISACODYL 5 MG/1
5 TABLET, DELAYED RELEASE ORAL DAILY PRN
Status: DISCONTINUED | OUTPATIENT
Start: 2017-09-05 | End: 2017-09-09 | Stop reason: HOSPADM

## 2017-09-05 RX ORDER — SACCHAROMYCES BOULARDII 250 MG
250 CAPSULE ORAL 2 TIMES DAILY
Status: DISCONTINUED | OUTPATIENT
Start: 2017-09-06 | End: 2017-09-09 | Stop reason: HOSPADM

## 2017-09-05 RX ORDER — TIZANIDINE 4 MG/1
4 TABLET ORAL 2 TIMES DAILY PRN
Status: DISCONTINUED | OUTPATIENT
Start: 2017-09-05 | End: 2017-09-09 | Stop reason: HOSPADM

## 2017-09-05 RX ORDER — GUAIFENESIN 600 MG/1
600 TABLET, EXTENDED RELEASE ORAL 2 TIMES DAILY
Status: DISCONTINUED | OUTPATIENT
Start: 2017-09-06 | End: 2017-09-09 | Stop reason: HOSPADM

## 2017-09-05 RX ADMIN — METHYLPREDNISOLONE SODIUM SUCCINATE 125 MG: 125 INJECTION, POWDER, FOR SOLUTION INTRAMUSCULAR; INTRAVENOUS at 19:31

## 2017-09-05 RX ADMIN — AZITHROMYCIN 500 MG: 500 INJECTION, POWDER, LYOPHILIZED, FOR SOLUTION INTRAVENOUS at 22:15

## 2017-09-05 RX ADMIN — FUROSEMIDE 80 MG: 10 INJECTION, SOLUTION INTRAMUSCULAR; INTRAVENOUS at 20:42

## 2017-09-05 RX ADMIN — CEFTRIAXONE SODIUM 1 G: 1 INJECTION, SOLUTION INTRAVENOUS at 21:40

## 2017-09-05 RX ADMIN — HYDROCODONE BITARTRATE AND ACETAMINOPHEN 1 TABLET: 5; 325 TABLET ORAL at 21:44

## 2017-09-05 RX ADMIN — ALBUTEROL SULFATE 2.5 MG: 2.5 SOLUTION RESPIRATORY (INHALATION) at 22:09

## 2017-09-05 RX ADMIN — IPRATROPIUM BROMIDE AND ALBUTEROL SULFATE 3 ML: .5; 3 SOLUTION RESPIRATORY (INHALATION) at 19:32

## 2017-09-05 RX ADMIN — ACETAMINOPHEN 650 MG: 325 TABLET, FILM COATED ORAL at 19:28

## 2017-09-05 NOTE — ED PROVIDER NOTES
Subjective   HPI Comments: Ashely Roldan is a 56 y.o.female with hx of CHF who presents to the ED with complaints of SOA with onset 3 days ago. She reports that four days ago she got caught out in the rain. She notes the next day she developed congestion in her nose and head, coughing, and constant pain in her left chest area radiating to her upper neck alongside her SOA. Family reports that she has been sleeping in a recliner secondary to her SOA. She also complains of fevers, but denies any other complaints at this time. She notes that she is on coumadin.    Patient is a 56 y.o. female presenting with shortness of breath.   History provided by:  Patient and relative  Shortness of Breath   Severity:  Moderate  Onset quality:  Sudden  Timing:  Constant  Progression:  Worsening  Chronicity:  New  Relieved by:  None tried  Worsened by:  Nothing  Ineffective treatments:  None tried  Associated symptoms: chest pain, cough, fever and neck pain    Associated symptoms: no sore throat and no sputum production        Review of Systems   Constitutional: Positive for fever.   HENT: Positive for congestion. Negative for sore throat.    Respiratory: Positive for cough and shortness of breath. Negative for sputum production.    Cardiovascular: Positive for chest pain.   Musculoskeletal: Positive for neck pain.   All other systems reviewed and are negative.      Past Medical History:   Diagnosis Date   • Acute respiratory failure with hypoxia 9/5/2017   • Allergic rhinitis 08/01/2014   • Anemia    • Anxiety    • Arthritis    • CAD (coronary artery disease)    • CHF (congestive heart failure)    • CKD (chronic kidney disease)    • CKD (chronic kidney disease), stage IV 9/5/2017   • Colitis, Clostridium difficile    • COPD (chronic obstructive pulmonary disease)    • Coronary atherosclerosis    • Depression    • Emphysema of lung    • GERD (gastroesophageal reflux disease)    • Heart attack    • Heart murmur    • Hematoma of hip     • Hip fracture    • Hyperlipidemia    • Hypertension     benign essential   • Ischemic cardiomyopathy 08/01/2014    ef 29% s/p ICD   • Knee injury    • Lung disease    • Mitral valve disorder 03/28/2013   • Mitral valve insufficiency     s/p prosthetic ATS valve replacement   • Osteoporosis    • Postmenopausal     natural   • PVD (peripheral vascular disease)    • Pyelonephritis    • Renal failure    • Renal failure    • Renal failure, acute    • Syncope    • Systemic lupus erythematosus    • TIA (transient ischemic attack) 04/04/2012   • UTI (urinary tract infection)    • Valvular heart disease    • Wrist fracture        Allergies   Allergen Reactions   • Morphine And Related GI Intolerance and Irritability   • Sulfa Antibiotics        Past Surgical History:   Procedure Laterality Date   • CARDIAC DEFIBRILLATOR PLACEMENT     • CHOLECYSTECTOMY  2010   • CORONARY ARTERY BYPASS GRAFT  2011    4 aortic bypasses, abdominal aorta; 2011-mitral valve; 2010-triple bypass   • ENDOSCOPY  10/23/2014    Dr. Brand; retained food in stomach; he dilated her esophagus; 5-30-14 hiatus hernia, gastritis   • JOINT REPLACEMENT Right 06/25/2015    Dr. Fitzgerald; first surgery 1-29-14; redo 5-4-15   • MITRAL VALVE REPLACEMENT      MECHANICAL   • TOTAL HIP ARTHROPLASTY Right     3 times within 8 months       Family History   Problem Relation Age of Onset   • Arthritis Mother      rheumatoid   • Heart attack Father    • Alcohol abuse Brother    • Heart disease Other      uncle   • Diabetes Other      uncle-type 2   • Hypertension Other      uncle   • Stroke Other      uncle   • Cancer Other      grandfather-lung    • Heart disease Maternal Uncle    • Lung cancer Paternal Grandfather        Social History     Social History   • Marital status:      Spouse name: N/A   • Number of children: N/A   • Years of education: N/A     Social History Main Topics   • Smoking status: Current Every Day Smoker     Packs/day: 0.50     Types:  Cigarettes   • Smokeless tobacco: None      Comment: Down to 0.5 PPD from 1 PPD now consistently. Started as a teenager.   • Alcohol use Yes      Comment: about once a week, reports she is a sports fan and rarely drinks more up to 2-3 drinks   • Drug use: No   • Sexual activity: Defer     Other Topics Concern   • None     Social History Narrative    Ms. Roldan is a 56 year old white female. Pt recently  after 30 years. Just moved from [facility] into her son's home in [place].          Objective   Physical Exam   Constitutional: She is oriented to person, place, and time. She appears well-developed and well-nourished. No distress.   HENT:   Head: Normocephalic and atraumatic.   Nose: Nose normal.   Eyes: Conjunctivae are normal.   Neck: Normal range of motion. Neck supple.   Cardiovascular: Normal rate, regular rhythm, normal heart sounds and intact distal pulses.    No murmur heard.  Pulmonary/Chest: Effort normal. She has wheezes.   Severe decreased air movement. Severe wheezing.    Musculoskeletal: Normal range of motion.   Neurological: She is alert and oriented to person, place, and time.   Skin: Skin is warm and dry.   Psychiatric: She has a normal mood and affect. Her behavior is normal.   Nursing note and vitals reviewed.      Procedures         ED Course  ED Course   Comment By Time   Labs are starting to come back.  Her BNP is significantly elevated.  Last month it was 300.  I have reviewed her chest x-ray and I don't see evidence of edema or infiltrate.  She is immunocompromised and has a productive cough and upper respiratory symptoms, we will therefore treat with IV antibiotics as well as IV Lasix.  I anticipate admission to the hospital Jesus Liu MD 09/05 2000   Mrs. Altman is sleeping. Jesus Liu MD 09/05 2014   INR is greater than 5, we'll avoid ABG Jesus Liu MD 09/05 2018   She is now calling out saying she needs something stronger than Tylenol. Jesus Liu MD  09/05 2024   I spoke with Mrs. Roldan about findings.  Repeat lung exam at this point shows better air movement but she still wheezing quite a bit.  We'll give additional albuterol.  Will seek admission to the hospital.  So far she has not produced any urine. Jesus Liu MD 09/05 2146   I spoke with Dr. Shabazz who will admit Jesus Liu MD 09/05 2220                     MDM  Number of Diagnoses or Management Options  new and requires workup  new and requires workup     Amount and/or Complexity of Data Reviewed  Clinical lab tests: ordered and reviewed  Tests in the radiology section of CPT®: ordered and reviewed  Review and summarize past medical records: yes  Discuss the patient with other providers: yes  Independent visualization of images, tracings, or specimens: yes    Patient Progress  Patient progress: improved      Final diagnoses:   Acute systolic congestive heart failure   Bronchitis   COPD exacerbation   Immunocompromised       Documentation assistance provided by brigid Palmer.  Information recorded by the brigid was done at my direction and has been verified and validated by me.     Estela Chaves  09/05/17 1908       Estela Chaves  09/05/17 2147       Jesus Liu MD  09/06/17 0106

## 2017-09-06 PROBLEM — R77.8 ELEVATED TROPONIN: Status: ACTIVE | Noted: 2017-09-06

## 2017-09-06 LAB
D DIMER PPP FEU-MCNC: 0.25 MG/L (FEU) (ref 0–0.5)
INR PPP: 4.86
INR PPP: 5.04
MAGNESIUM SERPL-MCNC: 2 MG/DL (ref 1.3–2.7)
PROTHROMBIN TIME: 55.6 SECONDS (ref 9.6–11.5)
PROTHROMBIN TIME: 57.8 SECONDS (ref 9.6–11.5)
TROPONIN I SERPL-MCNC: 0.03 NG/ML
TROPONIN I SERPL-MCNC: 0.04 NG/ML

## 2017-09-06 PROCEDURE — 25010000002 FUROSEMIDE PER 20 MG: Performed by: NURSE PRACTITIONER

## 2017-09-06 PROCEDURE — 84484 ASSAY OF TROPONIN QUANT: CPT | Performed by: NURSE PRACTITIONER

## 2017-09-06 PROCEDURE — 84484 ASSAY OF TROPONIN QUANT: CPT | Performed by: FAMILY MEDICINE

## 2017-09-06 PROCEDURE — 25010000002 CEFTRIAXONE PER 250 MG: Performed by: NURSE PRACTITIONER

## 2017-09-06 PROCEDURE — 94799 UNLISTED PULMONARY SVC/PX: CPT

## 2017-09-06 PROCEDURE — 94760 N-INVAS EAR/PLS OXIMETRY 1: CPT

## 2017-09-06 PROCEDURE — 85610 PROTHROMBIN TIME: CPT | Performed by: NURSE PRACTITIONER

## 2017-09-06 PROCEDURE — 99233 SBSQ HOSP IP/OBS HIGH 50: CPT | Performed by: FAMILY MEDICINE

## 2017-09-06 PROCEDURE — 25010000002 METHYLPREDNISOLONE PER 125 MG: Performed by: NURSE PRACTITIONER

## 2017-09-06 RX ORDER — MAGNESIUM SULFATE HEPTAHYDRATE 40 MG/ML
2 INJECTION, SOLUTION INTRAVENOUS AS NEEDED
Status: DISCONTINUED | OUTPATIENT
Start: 2017-09-06 | End: 2017-09-09 | Stop reason: HOSPADM

## 2017-09-06 RX ORDER — WARFARIN SODIUM 2 MG/1
2 TABLET ORAL SEE ADMIN INSTRUCTIONS
COMMUNITY
End: 2017-12-11 | Stop reason: SDUPTHER

## 2017-09-06 RX ORDER — MAGNESIUM SULFATE 1 G/100ML
1 INJECTION INTRAVENOUS AS NEEDED
Status: DISCONTINUED | OUTPATIENT
Start: 2017-09-06 | End: 2017-09-09 | Stop reason: HOSPADM

## 2017-09-06 RX ORDER — WARFARIN SODIUM 3 MG/1
3 TABLET ORAL
COMMUNITY
End: 2017-12-11 | Stop reason: SDUPTHER

## 2017-09-06 RX ORDER — MAGNESIUM SULFATE HEPTAHYDRATE 40 MG/ML
4 INJECTION, SOLUTION INTRAVENOUS AS NEEDED
Status: DISCONTINUED | OUTPATIENT
Start: 2017-09-06 | End: 2017-09-09 | Stop reason: HOSPADM

## 2017-09-06 RX ORDER — CEFUROXIME AXETIL 250 MG/1
500 TABLET ORAL EVERY 12 HOURS SCHEDULED
Status: DISCONTINUED | OUTPATIENT
Start: 2017-09-07 | End: 2017-09-08

## 2017-09-06 RX ORDER — MONTELUKAST SODIUM 10 MG/1
10 TABLET ORAL EVERY EVENING
Status: DISCONTINUED | OUTPATIENT
Start: 2017-09-06 | End: 2017-09-09 | Stop reason: HOSPADM

## 2017-09-06 RX ORDER — HYDROCODONE BITARTRATE AND ACETAMINOPHEN 5; 325 MG/1; MG/1
1 TABLET ORAL EVERY 6 HOURS PRN
Status: DISCONTINUED | OUTPATIENT
Start: 2017-09-06 | End: 2017-09-09 | Stop reason: HOSPADM

## 2017-09-06 RX ORDER — DEXTROMETHORPHAN POLISTIREX 30 MG/5ML
30 SUSPENSION ORAL EVERY 12 HOURS PRN
Status: DISCONTINUED | OUTPATIENT
Start: 2017-09-06 | End: 2017-09-09 | Stop reason: HOSPADM

## 2017-09-06 RX ORDER — CETIRIZINE HYDROCHLORIDE 10 MG/1
10 TABLET ORAL NIGHTLY
Status: DISCONTINUED | OUTPATIENT
Start: 2017-09-06 | End: 2017-09-09 | Stop reason: HOSPADM

## 2017-09-06 RX ADMIN — DOXYCYCLINE HYCLATE 100 MG: 100 CAPSULE ORAL at 17:30

## 2017-09-06 RX ADMIN — IPRATROPIUM BROMIDE AND ALBUTEROL SULFATE 3 ML: .5; 3 SOLUTION RESPIRATORY (INHALATION) at 12:28

## 2017-09-06 RX ADMIN — HYDRALAZINE HYDROCHLORIDE 25 MG: 25 TABLET ORAL at 03:49

## 2017-09-06 RX ADMIN — IPRATROPIUM BROMIDE AND ALBUTEROL SULFATE 3 ML: .5; 3 SOLUTION RESPIRATORY (INHALATION) at 19:22

## 2017-09-06 RX ADMIN — CARVEDILOL 3.12 MG: 3.12 TABLET, FILM COATED ORAL at 17:34

## 2017-09-06 RX ADMIN — ROSUVASTATIN CALCIUM 10 MG: 10 TABLET ORAL at 21:18

## 2017-09-06 RX ADMIN — CEFTRIAXONE SODIUM 1 G: 1 INJECTION, SOLUTION INTRAVENOUS at 21:17

## 2017-09-06 RX ADMIN — METHYLPREDNISOLONE SODIUM SUCCINATE 60 MG: 125 INJECTION, POWDER, FOR SOLUTION INTRAMUSCULAR; INTRAVENOUS at 17:30

## 2017-09-06 RX ADMIN — HYDROXYCHLOROQUINE SULFATE 200 MG: 200 TABLET, FILM COATED ORAL at 09:57

## 2017-09-06 RX ADMIN — GUAIFENESIN 600 MG: 600 TABLET, EXTENDED RELEASE ORAL at 09:57

## 2017-09-06 RX ADMIN — IPRATROPIUM BROMIDE AND ALBUTEROL SULFATE 3 ML: .5; 3 SOLUTION RESPIRATORY (INHALATION) at 15:46

## 2017-09-06 RX ADMIN — CETIRIZINE HYDROCHLORIDE 10 MG: 10 TABLET, FILM COATED ORAL at 03:49

## 2017-09-06 RX ADMIN — CETIRIZINE HYDROCHLORIDE 10 MG: 10 TABLET, FILM COATED ORAL at 21:18

## 2017-09-06 RX ADMIN — IPRATROPIUM BROMIDE AND ALBUTEROL SULFATE 3 ML: .5; 3 SOLUTION RESPIRATORY (INHALATION) at 23:23

## 2017-09-06 RX ADMIN — Medication 325 MG: at 12:46

## 2017-09-06 RX ADMIN — PROMETHAZINE HYDROCHLORIDE AND CODEINE PHOSPHATE 5 ML: 6.25; 1 SOLUTION ORAL at 17:30

## 2017-09-06 RX ADMIN — MONTELUKAST SODIUM 10 MG: 10 TABLET, FILM COATED ORAL at 17:30

## 2017-09-06 RX ADMIN — DOXYCYCLINE HYCLATE 100 MG: 100 CAPSULE ORAL at 06:49

## 2017-09-06 RX ADMIN — CARVEDILOL 3.12 MG: 3.12 TABLET, FILM COATED ORAL at 03:49

## 2017-09-06 RX ADMIN — Medication 250 MG: at 09:57

## 2017-09-06 RX ADMIN — ROSUVASTATIN CALCIUM 10 MG: 10 TABLET ORAL at 06:49

## 2017-09-06 RX ADMIN — GUAIFENESIN 600 MG: 600 TABLET, EXTENDED RELEASE ORAL at 21:18

## 2017-09-06 RX ADMIN — CLONAZEPAM 2 MG: 1 TABLET ORAL at 21:56

## 2017-09-06 RX ADMIN — HYDROCODONE BITARTRATE AND ACETAMINOPHEN 1 TABLET: 5; 325 TABLET ORAL at 12:46

## 2017-09-06 RX ADMIN — CARVEDILOL 3.12 MG: 3.12 TABLET, FILM COATED ORAL at 10:02

## 2017-09-06 RX ADMIN — BUDESONIDE AND FORMOTEROL FUMARATE DIHYDRATE 2 PUFF: 160; 4.5 AEROSOL RESPIRATORY (INHALATION) at 19:22

## 2017-09-06 RX ADMIN — IPRATROPIUM BROMIDE AND ALBUTEROL SULFATE 3 ML: .5; 3 SOLUTION RESPIRATORY (INHALATION) at 06:55

## 2017-09-06 RX ADMIN — Medication 250 MG: at 17:31

## 2017-09-06 RX ADMIN — PROMETHAZINE HYDROCHLORIDE AND CODEINE PHOSPHATE 5 ML: 6.25; 1 SOLUTION ORAL at 10:02

## 2017-09-06 RX ADMIN — SPIRONOLACTONE 25 MG: 25 TABLET, FILM COATED ORAL at 09:57

## 2017-09-06 RX ADMIN — FUROSEMIDE 40 MG: 10 INJECTION, SOLUTION INTRAMUSCULAR; INTRAVENOUS at 17:30

## 2017-09-06 RX ADMIN — FUROSEMIDE 40 MG: 10 INJECTION, SOLUTION INTRAMUSCULAR; INTRAVENOUS at 09:52

## 2017-09-06 RX ADMIN — HYDRALAZINE HYDROCHLORIDE 25 MG: 25 TABLET ORAL at 21:18

## 2017-09-06 RX ADMIN — HYDROCODONE BITARTRATE AND ACETAMINOPHEN 1 TABLET: 5; 325 TABLET ORAL at 21:18

## 2017-09-06 RX ADMIN — PROMETHAZINE HYDROCHLORIDE AND CODEINE PHOSPHATE 5 ML: 6.25; 1 SOLUTION ORAL at 00:41

## 2017-09-06 RX ADMIN — METHYLPREDNISOLONE SODIUM SUCCINATE 60 MG: 125 INJECTION, POWDER, FOR SOLUTION INTRAMUSCULAR; INTRAVENOUS at 09:50

## 2017-09-06 NOTE — PROGRESS NOTES
"Pharmacy Consult  -  Warfarin    Ashely Roldan is a  56 y.o. female   Height - 69\" (175.3 cm)  Weight - 111 lb (50.3 kg)    Consulting Provider: - IAIN Quintanilla  Indication: - Mechanical Mitral Valve  Goal INR: -  2.5-3.5  Home Regimen:   - warfarin 2 mg every other day alternating with              - warfarin 1 mg every other day    Bridge Therapy: No         Drug-Drug Interactions with current regimen:   none    Warfarin Dosing During Admission:    Date             INR             Dose                  Labs:      Results from last 7 days     Lab Units 09/05/17 1817 09/05/17 1816   INR  --  5.20   HEMOGLOBIN g/dL 11.7 --    HEMATOCRIT % 34.7 --    PLATELETS 10*3/mm3 130* --        Results from last 7 days     Lab Units 09/05/17 1816   SODIUM mmol/L 134   POTASSIUM mmol/L 4.2   CHLORIDE mmol/L 101   CO2 mmol/L 28.0   BUN mg/dL 40*   CREATININE mg/dL 1.90*   CALCIUM mg/dL 9.9   BILIRUBIN mg/dL 0.3   ALK PHOS U/L 198*   ALT (SGPT) U/L 15   AST (SGOT) U/L 31   GLUCOSE mg/dL 100     Assessment/Plan:   1. We will hold warfarin for now due to Ms. Roldan having a SUPRAtherapeutic INR.  2. Daily PT-INR labs have been ordered.  3. Pharmacy will adjust Ms. Roldan's warfarin dose as needed based on daily INR result.       Thank you  Kartik Malone [facility]  9/6/2017  12:16 AM      "

## 2017-09-06 NOTE — PROGRESS NOTES
Discharge Planning Assessment  [facility]     Patient Name: Ashely Roldan  MRN: [id number]  Today's Date: 9/6/2017    Admit Date: 9/5/2017          Discharge Needs Assessment       09/06/17 1016    Living Environment    Lives With alone    Living Arrangements apartment    Transportation Available family or friend will provide    Living Environment    Provides Primary Care For no one    Quality Of Family Relationships supportive    Able to Return to Prior Living Arrangements yes    Discharge Needs Assessment    Concerns To Be Addressed denies needs/concerns at this time    Readmission Within The Last 30 Days no previous admission in last 30 days    Equipment Currently Used at Home nebulizer    Discharge Disposition still a patient    Discharge Contact Information if Applicable [phone]            Discharge Plan       09/06/17 1018    Case Management/Social Work Plan    Plan Home    Patient/Family In Agreement With Plan yes    Additional Comments Spoke with pt. at . Pt lives alone in [place] Pt. is independent with ADL's and mobility. Pt has a nebulizer and no other DME or HH. Hier PCP is Peter Rdz and medications are covered by insurance. Plan for discharge is to return home. Pt to transport home with family, when medically ready. Case Management will follow and assist with any discharge planning needs.        Discharge Placement     No information found                Demographic Summary       09/06/17 1013    Referral Information    Arrived From home or self-care    Reason For Consult discharge planning    Contact Information    Permission Granted to Share Information With family/designee    Comments Floyd Roldan (Son) Mobile Phone: [phone]    Primary Care Physician Information    Name STEPHANI PETER MARTINEZ            Functional Status       09/06/17 1015    Functional Status Prior    Ambulation 0-->independent    Transferring 0-->independent    Toileting 0-->independent    Bathing  0-->independent    Dressing 0-->independent    Eating 0-->independent    Communication 0-->understands/communicates without difficulty    Swallowing 0-->swallows foods/liquids without difficulty    IADL    Medications independent    Meal Preparation independent    Housekeeping independent    Laundry independent    Shopping independent    Oral Care independent    Employment/Financial    Employment/Finance Comments Healthcare and Medication coverage: [employer]            Psychosocial     None            Abuse/Neglect     None            Legal     None            Substance Abuse     None            Patient Forms     None          Leanne Petersen RN

## 2017-09-06 NOTE — ED NOTES
albuterol (PROVENTIL) nebulizer solution 0.083% 2.5 mg/3mL [250] (Order [id number])     RT bedside     Fantasma Vega  09/05/17 0531

## 2017-09-06 NOTE — PLAN OF CARE
Problem: Cardiac: Heart Failure (Adult)  Goal: Signs and Symptoms of Listed Potential Problems Will be Absent or Manageable (Cardiac: Heart Failure)  Outcome: Ongoing (interventions implemented as appropriate)    09/05/17 0430   Cardiac: Heart Failure   Problems Assessed (Heart Failure) respiratory compromise;situational response;sleep-disordered breathing   Problems Present (Heart Failure) respiratory compromise;situational response;sleep-disordered breathing

## 2017-09-06 NOTE — PLAN OF CARE
Problem: Respiratory Insufficiency (Adult)  Intervention: Provide Oxygenation/Ventilation/Perfusion Support  Will continue to treat and monitor patient

## 2017-09-07 LAB
ANION GAP SERPL CALCULATED.3IONS-SCNC: 10 MMOL/L (ref 3–11)
BUN BLD-MCNC: 67 MG/DL (ref 9–23)
BUN/CREAT SERPL: 30.5 (ref 7–25)
CALCIUM SPEC-SCNC: 9.2 MG/DL (ref 8.7–10.4)
CHLORIDE SERPL-SCNC: 96 MMOL/L (ref 99–109)
CO2 SERPL-SCNC: 27 MMOL/L (ref 20–31)
CREAT BLD-MCNC: 2.2 MG/DL (ref 0.6–1.3)
GFR SERPL CREATININE-BSD FRML MDRD: 23 ML/MIN/1.73
GLUCOSE BLD-MCNC: 132 MG/DL (ref 70–100)
INR PPP: 4.75
POTASSIUM BLD-SCNC: 4.3 MMOL/L (ref 3.5–5.5)
PROTHROMBIN TIME: 54.3 SECONDS (ref 9.6–11.5)
SODIUM BLD-SCNC: 133 MMOL/L (ref 132–146)

## 2017-09-07 PROCEDURE — 25010000002 METHYLPREDNISOLONE PER 125 MG: Performed by: NURSE PRACTITIONER

## 2017-09-07 PROCEDURE — 80048 BASIC METABOLIC PNL TOTAL CA: CPT | Performed by: FAMILY MEDICINE

## 2017-09-07 PROCEDURE — 94799 UNLISTED PULMONARY SVC/PX: CPT

## 2017-09-07 PROCEDURE — 25010000002 FUROSEMIDE PER 20 MG: Performed by: NURSE PRACTITIONER

## 2017-09-07 PROCEDURE — 94640 AIRWAY INHALATION TREATMENT: CPT

## 2017-09-07 PROCEDURE — 85610 PROTHROMBIN TIME: CPT | Performed by: NURSE PRACTITIONER

## 2017-09-07 PROCEDURE — 94760 N-INVAS EAR/PLS OXIMETRY 1: CPT

## 2017-09-07 PROCEDURE — 99233 SBSQ HOSP IP/OBS HIGH 50: CPT | Performed by: INTERNAL MEDICINE

## 2017-09-07 RX ORDER — FUROSEMIDE 40 MG/1
40 TABLET ORAL DAILY
Status: DISCONTINUED | OUTPATIENT
Start: 2017-09-08 | End: 2017-09-08

## 2017-09-07 RX ADMIN — IPRATROPIUM BROMIDE AND ALBUTEROL SULFATE 3 ML: .5; 3 SOLUTION RESPIRATORY (INHALATION) at 12:36

## 2017-09-07 RX ADMIN — HYDRALAZINE HYDROCHLORIDE 25 MG: 25 TABLET ORAL at 20:53

## 2017-09-07 RX ADMIN — HYDROCODONE BITARTRATE AND ACETAMINOPHEN 1 TABLET: 5; 325 TABLET ORAL at 06:12

## 2017-09-07 RX ADMIN — FUROSEMIDE 40 MG: 10 INJECTION, SOLUTION INTRAMUSCULAR; INTRAVENOUS at 08:23

## 2017-09-07 RX ADMIN — CETIRIZINE HYDROCHLORIDE 10 MG: 10 TABLET, FILM COATED ORAL at 20:49

## 2017-09-07 RX ADMIN — ROSUVASTATIN CALCIUM 10 MG: 10 TABLET ORAL at 20:49

## 2017-09-07 RX ADMIN — HYDROXYCHLOROQUINE SULFATE 200 MG: 200 TABLET, FILM COATED ORAL at 08:41

## 2017-09-07 RX ADMIN — Medication 250 MG: at 18:28

## 2017-09-07 RX ADMIN — CARVEDILOL 3.12 MG: 3.12 TABLET, FILM COATED ORAL at 18:27

## 2017-09-07 RX ADMIN — DOXYCYCLINE HYCLATE 100 MG: 100 CAPSULE ORAL at 06:07

## 2017-09-07 RX ADMIN — CLONAZEPAM 2 MG: 1 TABLET ORAL at 06:07

## 2017-09-07 RX ADMIN — GUAIFENESIN 600 MG: 600 TABLET, EXTENDED RELEASE ORAL at 08:22

## 2017-09-07 RX ADMIN — MONTELUKAST SODIUM 10 MG: 10 TABLET, FILM COATED ORAL at 18:27

## 2017-09-07 RX ADMIN — METHYLPREDNISOLONE SODIUM SUCCINATE 60 MG: 125 INJECTION, POWDER, FOR SOLUTION INTRAMUSCULAR; INTRAVENOUS at 08:22

## 2017-09-07 RX ADMIN — HYDROCODONE BITARTRATE AND ACETAMINOPHEN 1 TABLET: 5; 325 TABLET ORAL at 12:51

## 2017-09-07 RX ADMIN — SPIRONOLACTONE 25 MG: 25 TABLET, FILM COATED ORAL at 08:21

## 2017-09-07 RX ADMIN — CEFUROXIME AXETIL 500 MG: 250 TABLET ORAL at 08:20

## 2017-09-07 RX ADMIN — CEFUROXIME AXETIL 500 MG: 250 TABLET ORAL at 20:48

## 2017-09-07 RX ADMIN — FUROSEMIDE 40 MG: 10 INJECTION, SOLUTION INTRAMUSCULAR; INTRAVENOUS at 18:27

## 2017-09-07 RX ADMIN — BUDESONIDE AND FORMOTEROL FUMARATE DIHYDRATE 2 PUFF: 160; 4.5 AEROSOL RESPIRATORY (INHALATION) at 07:59

## 2017-09-07 RX ADMIN — DOXYCYCLINE HYCLATE 100 MG: 100 CAPSULE ORAL at 18:27

## 2017-09-07 RX ADMIN — CARVEDILOL 3.12 MG: 3.12 TABLET, FILM COATED ORAL at 08:21

## 2017-09-07 RX ADMIN — METHYLPREDNISOLONE SODIUM SUCCINATE 60 MG: 125 INJECTION, POWDER, FOR SOLUTION INTRAMUSCULAR; INTRAVENOUS at 18:26

## 2017-09-07 RX ADMIN — Medication 250 MG: at 08:21

## 2017-09-07 RX ADMIN — BUDESONIDE AND FORMOTEROL FUMARATE DIHYDRATE 2 PUFF: 160; 4.5 AEROSOL RESPIRATORY (INHALATION) at 18:46

## 2017-09-07 RX ADMIN — HYDROCODONE BITARTRATE AND ACETAMINOPHEN 1 TABLET: 5; 325 TABLET ORAL at 20:49

## 2017-09-07 RX ADMIN — HYDRALAZINE HYDROCHLORIDE 25 MG: 25 TABLET ORAL at 06:07

## 2017-09-07 RX ADMIN — IPRATROPIUM BROMIDE AND ALBUTEROL SULFATE 3 ML: .5; 3 SOLUTION RESPIRATORY (INHALATION) at 07:58

## 2017-09-07 RX ADMIN — GUAIFENESIN 600 MG: 600 TABLET, EXTENDED RELEASE ORAL at 20:48

## 2017-09-07 RX ADMIN — DEXTROMETHORPHAN 30 MG: 30 SUSPENSION, EXTENDED RELEASE ORAL at 10:12

## 2017-09-07 RX ADMIN — HYDRALAZINE HYDROCHLORIDE 25 MG: 25 TABLET ORAL at 16:14

## 2017-09-07 RX ADMIN — IPRATROPIUM BROMIDE AND ALBUTEROL SULFATE 3 ML: .5; 3 SOLUTION RESPIRATORY (INHALATION) at 18:46

## 2017-09-07 RX ADMIN — IPRATROPIUM BROMIDE AND ALBUTEROL SULFATE 3 ML: .5; 3 SOLUTION RESPIRATORY (INHALATION) at 16:25

## 2017-09-07 NOTE — PROGRESS NOTES
"    [facility] Medicine Services  PROGRESS NOTE      Date of Admission: 2017  Length of Stay: 1  Primary Care Physician: Peter Rdz MD    Patient Name: Ashely Roldan  : 1960  MRN: [id number]    Subjective     CC:  dyspnea    History of Present Illness :   Pt seen ~11:30am. SOA at rest improved since initial presentation after solumedrol, nebs, IV Lasix.  Still with orthopnea worse than baseline and JAIMES.  Sats 84% on RA while at bedside, 2L O2 placed and improved to 92%. C/o pleuritic chest pain, worse with coughing spells.     Review of Systems   Constitutional: Positive for fatigue.   Respiratory: Positive for chest tightness, shortness of breath and wheezing.    Cardiovascular: Negative for chest pain and leg swelling.   Genitourinary: Negative for difficulty urinating, dysuria, frequency and urgency.   All other systems reviewed and are negative.   :    Otherwise complete ROS is negative except as mentioned in the HPI.      Objective     Vital Signs: /63  Pulse 63  Temp 97.8 °F (36.6 °C) (Oral)   Resp 16  Ht 69\" (175.3 cm)  Wt 111 lb (50.3 kg)  SpO2 94%  BMI 16.39 kg/m2     Physical Exam :  Constitutional: no acute distress, awake, alert  HENT: NCAT, mucous membranes moist  Respiratory: wheezes throughout with fine crackles at bilateral dependent bases, respiratory effort normal   Cardiovascular: RRR, mechanical MV click  Gastrointestinal: Positive bowel sounds, soft, nontender, nondistended  Musculoskeletal: No bilateral ankle edema  Psychiatric: oriented x 3, appropriate affect, cooperative  Neurologic: Strength symmetric in all extremities, CN grossly in tact to confrontation, speech is clear  Derm: no rashes        Results Reviewed:  I have personally reviewed current lab, radiology, and data and agree.      Results from last 7 days  Lab Units 17  0550 17  0017 17  1817 17  1816   WBC 10*3/mm3  --   --  6.19  --    HEMOGLOBIN " g/dL  --   --  11.7  --    HEMATOCRIT %  --   --  34.7  --    PLATELETS 10*3/mm3  --   --  130*  --    INR  4.86 5.04  --  5.20       Results from last 7 days  Lab Units 09/06/17  0550 09/06/17  0010 09/05/17  1816   SODIUM mmol/L  --   --  134   POTASSIUM mmol/L  --   --  4.2   CHLORIDE mmol/L  --   --  101   CO2 mmol/L  --   --  28.0   BUN mg/dL  --   --  40*   CREATININE mg/dL  --   --  1.90*   GLUCOSE mg/dL  --   --  100   CALCIUM mg/dL  --   --  9.9   ALT (SGPT) U/L  --   --  15   AST (SGOT) U/L  --   --  31   TROPONIN I ng/mL 0.028 0.044*  --      BNP   Date Value Ref Range Status   09/05/2017 1514.0 (H) 0.0 - 100.0 pg/mL Final     No results found for: PHART  Blood Culture   Date Value Ref Range Status   09/05/2017 No growth at less than 24 hours  Preliminary   09/05/2017 No growth at less than 24 hours  Preliminary       Imaging Results (last 24 hours)     ** No results found for the last 24 hours. **            I have reviewed the medications.      Assessment / Plan     Assessment & Plan :  Hospital Problem List     * (Principal)Acute on chronic respiratory failure with hypoxia    Essential hypertension (Chronic)    Overview Signed 10/19/2015  8:56 AM by Digna Santana     benign essential         Ischemic cardiomyopathy (Chronic)    Overview Signed 2/6/2017  8:44 AM by MAN Quesada     A. History of MI December 2009: s/p stent to RCA and LAD, EF 40%  B. CABGx2 March, 2010: SVG to OMB-1, SVG to PDA  C. EF 30% post-operatively, s/p [place] ICD placement, 2011  D. Echo July 2014: Severe global hypokinesis with EF 12%, moderate AI, mechanical mitral valve, moderate to severe TR, RVSP 43mmHg   E. Echo 2/4/17: EF 18%, mild to mod AI, mod TR, grossly normal prosthetic mitral valve         PVD (peripheral vascular disease) (Chronic)    Lupus (systemic lupus erythematosus) (Chronic)    Chronic coronary artery disease (Chronic)    COPD exacerbation    Tobacco abuse (Chronic)    Acute systolic heart failure  and valvular disease    Overview Addendum 2/6/2017  8:44 AM by MAN Quesada             AICD (automatic cardioverter/defibrillator) present (Chronic)    Supratherapeutic INR    CKD (chronic kidney disease), stage IV (Chronic)    UTI (urinary tract infection)    Elevated troponin               Brief Hospital Course to date:  Ashely Roldan is a 56 y.o. female with known COPD, CKD III-IV, AICD, HTN, ischemic CM, SLE, PVD,ongoing tobacco abuse, severe systolic heart failure (EF 18% in Feb 2017), valvular heart disease s/p MMV on coumadin who presents to [facility] ED with worsening dyspnea, orthopnea and generally ill feeling for the 4 days.  Evaluation significant for wheezes and hypoxia to mid 80's% on RA consistent with component of COPD exac with also elevated BNP and symptoms of A/C systolic CHF.  Also incidental finding of UTI but asymptomatic:      Plan:    - initiatedon CTX for UTI, Doxy for COPD exac.  Uncomplicated female UTI only req's ~3 day course abx, since pt nontoxic will transition to PO Ceftin to start tomorrow and treat for remaining 2 days.  Doxy course x 7 days for COPD.  - continue solumedrol and scheduled nebs   - wean O2 as tolerates  - continue BID IV Lasix for now but likely can transition back to home 40mg PO daily if resp status improved tomorrow.  BMP in am as watch creatinine given more aggressive IV lasix currently.  - supratheraputic INR, no overt bleeding.  Pharmacy dosing.        Disposition: I expect the patient to be discharged ~2 days.        Luci Clancy MD  09/06/17  9:34 PM

## 2017-09-07 NOTE — PROGRESS NOTES
Continued Stay Note  [facility]     Patient Name: Ashely Roldan  MRN: [id number]  Today's Date: 9/7/2017    Admit Date: 9/5/2017          Discharge Plan     Consent obtained for the participation in the [facility] Transitions Program. Stacie Beltrán RN                Discharge Codes     None        Expected Discharge Date and Time     Expected Discharge Date Expected Discharge Time    Sep 9, 2017             Stacie Beltrán RN

## 2017-09-07 NOTE — PLAN OF CARE
Problem: Cardiac: Heart Failure (Adult)  Goal: Signs and Symptoms of Listed Potential Problems Will be Absent or Manageable (Cardiac: Heart Failure)  Outcome: Ongoing (interventions implemented as appropriate)    Problem: Respiratory Insufficiency (Adult)  Goal: Identify Related Risk Factors and Signs and Symptoms  Outcome: Ongoing (interventions implemented as appropriate)  Goal: Acid/Base Balance  Outcome: Ongoing (interventions implemented as appropriate)  Goal: Effective Ventilation  Outcome: Ongoing (interventions implemented as appropriate)    Problem: Patient Care Overview (Adult)  Goal: Plan of Care Review  Outcome: Ongoing (interventions implemented as appropriate)  Goal: Adult Individualization and Mutuality  Outcome: Ongoing (interventions implemented as appropriate)  Goal: Discharge Needs Assessment  Outcome: Ongoing (interventions implemented as appropriate)

## 2017-09-07 NOTE — PROGRESS NOTES
"    [facility] Medicine Services  PROGRESS NOTE      Date of Admission: 2017  Length of Stay: 2  Primary Care Physician: Peter Rdz MD    Patient Name: Ashely Roldan  : 1960  MRN: [id number]    Subjective     CC:  dyspnea    Shortness of Breath   Associated symptoms include wheezing.   dyspnea    Review of Systems   Respiratory: Positive for shortness of breath and wheezing.    Genitourinary: Negative for difficulty urinating, dysuria, frequency and urgency.   All other systems reviewed and are negative.      Otherwise complete ROS is negative except as mentioned in the HPI.      Objective     Vital Signs: /63  Pulse 78  Temp 97.3 °F (36.3 °C) (Oral)   Resp 16  Ht 69\" (175.3 cm)  Wt 118 lb 8 oz (53.8 kg)  SpO2 96%  BMI 17.5 kg/m2     Physical Exam :  Constitutional: no acute distress, awake, alert  HENT: NCAT, mucous membranes moist  Respiratory:expiratory wheezes throughout; no crackles today, normal effort   Cardiovascular: RRR, mechanical MV click  Gastrointestinal: Positive bowel sounds, soft, nontender, nondistended  Musculoskeletal: No bilateral ankle edema  Psychiatric: oriented x 3, appropriate affect, cooperative  Neurologic: Strength symmetric in all extremities, CN grossly in tact to confrontation, speech is clear  Derm: no rashes  No cce legs        Results Reviewed:  I have personally reviewed current lab, radiology, and data and agree.      Results from last 7 days  Lab Units 177   WBC 10*3/mm3  --   --   --  6.19   HEMOGLOBIN g/dL  --   --   --  11.7   HEMATOCRIT %  --   --   --  34.7   PLATELETS 10*3/mm3  --   --   --  130*   INR  4.75 4.86 5.04  --        Results from last 7 days  Lab Units 1750 17  0010 17  1816   SODIUM mmol/L 133  --   --  134   POTASSIUM mmol/L 4.3  --   --  4.2   CHLORIDE mmol/L 96*  --   --  101   CO2 mmol/L 27.0  --   --  " 28.0   BUN mg/dL 67*  --   --  40*   CREATININE mg/dL 2.20*  --   --  1.90*   GLUCOSE mg/dL 132*  --   --  100   CALCIUM mg/dL 9.2  --   --  9.9   ALT (SGPT) U/L  --   --   --  15   AST (SGOT) U/L  --   --   --  31   TROPONIN I ng/mL  --  0.028 0.044*  --      BNP   Date Value Ref Range Status   09/05/2017 1514.0 (H) 0.0 - 100.0 pg/mL Final     No results found for: PHART  Blood Culture   Date Value Ref Range Status   09/05/2017 No growth at less than 24 hours  Preliminary   09/05/2017 No growth at less than 24 hours  Preliminary       Imaging Results (last 24 hours)     ** No results found for the last 24 hours. **            I have reviewed the medications.      Assessment / Plan     Assessment & Plan :  Hospital Problem List     * (Principal)Acute on chronic respiratory failure with hypoxia    Essential hypertension (Chronic)    Overview Signed 10/19/2015  8:56 AM by Digna Santana     benign essential         Ischemic cardiomyopathy (Chronic)    Overview Signed 2/6/2017  8:44 AM by MAN Quesada. History of MI December 2009: s/p stent to RCA and LAD, EF 40%  B. CABGx2 March, 2010: SVG to OMB-1, SVG to PDA  C. EF 30% post-operatively, s/p [place] ICD placement, 2011  D. Echo July 2014: Severe global hypokinesis with EF 12%, moderate AI, mechanical mitral valve, moderate to severe TR, RVSP 43mmHg   E. Echo 2/4/17: EF 18%, mild to mod AI, mod TR, grossly normal prosthetic mitral valve         PVD (peripheral vascular disease) (Chronic)    Lupus (systemic lupus erythematosus) (Chronic)    Chronic coronary artery disease (Chronic)    COPD exacerbation    Tobacco abuse (Chronic)    Acute systolic heart failure and valvular disease    Overview Addendum 2/6/2017  8:44 AM by MAN Quesada             AICD (automatic cardioverter/defibrillator) present (Chronic)    Supratherapeutic INR    CKD (chronic kidney disease), stage IV (Chronic)    UTI (urinary tract infection)    Elevated troponin                Brief Hospital Course to date:  Ashely Roldan is a 56 y.o. female with known COPD, CKD III-IV (baseline cr 1.8-2.0), AICD, HTN, ischemic CM, SLE, PVD,ongoing tobacco abuse, severe systolic heart failure (EF 18% in Feb 2017), valvular heart disease s/p MMV on coumadin who presents to [facility] ED with worsening dyspnea, orthopnea and generally ill feeling for the 4 days.  Evaluation significant for wheezes and hypoxia to mid 80's% on RA consistent with component of COPD exac with also elevated BNP and symptoms of A/C systolic CHF.  Also incidental finding of UTI but asymptomatic:    *Acute Hypoxic Resp Failure(multifactorial)  *COPD w/ acute exacerbation and bronchospasm  *AoC SHF (ischemic CM; EF 18% February 2017)  *Valvular HD (hx [facility] mitral valve)  *Hx CAD  *Ongoing tobacco abuse  *Hx Lupus    Plan:  -continue q4h duonebs  -continue solumedrol 60mg iv bid (still wheezing)  -continue doxy (for bronchiti,s) and ceftin (uti); day #3 abx  -follow sputum and urine cultures  -change to home lasix 40mg po (s/p 4 doses iv lasix 40mg)  -continue aldactone & b-blocker  -wean oxygen as tolerates  -watch renal function (baseline cr 1.8-2.0)  -cbc, bmp,mag daily inr (pharmacy dosing/monitoring coumadin; on hold currently)    -needs future f/u w/ dr. Ramirez/cards (last seen 3/2017); consideration future addition ace-arb  -set up outpatient renal f/u    Disposition: I expect the patient to be discharged ~2 days.        Saul Sandra MD  09/07/17  7:21 PM

## 2017-09-07 NOTE — PAYOR COMM NOTE
"Ashely Roldan (56 y.o. Female)     Date of Birth Social Security Number Address Home Phone MRN    1960  [id number]  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)   Code Status:  Conditional    Ht:  69\" (175.3 cm)   Wt:  118 lb 8 oz (53.8 kg)    Admission Cmt:  None   Principal Problem:  Acute on chronic respiratory failure with hypoxia [J96.21]                 Active Insurance as of 9/5/2017     Primary Coverage     Payor Plan Insurance Group Employer/Plan Group    Critical access hospital MEDICARE REPLACEMENT Critical access hospital MEDICARE ADVANTAGE [id number]     Payor Plan Address Payor Plan Phone Number Effective From Effective To    [address] [phone] 12/1/2016     [place] [address]       Subscriber Name Subscriber Birth Date Member ID       ASHELY ROLDAN 1960 [id number]                 Emergency Contacts      (Rel.) Home Phone Work Phone Mobile Phone    Floyd Roldan (Son) -- -- [phone]    Janina Teran (Other) -- -- [phone]    Hussein Melgar (Daughter) -- -- [phone]               History & Physical      Juliane Shabazz MD at 9/5/2017 10:33 PM              [facility] Medicine Services  HISTORY AND PHYSICAL    Primary Care Physician: Peter Rdz MD   Cardiologist: Dr. Yomi Ramirez (prior Dr. Anais Morrison in [place])  Pulmonologist: none  Nephrologist: none    Subjective     Chief Complaint: shortness of breath    History of Present Illness: " "    Mrs. Roldan presented to [facility] today for shortness of breath. Onset 3 days ago. Sudden onset. Moderate. Constant. Nothing makes it better or worse. Also c/o of associated fever, congestion, cough, and neck/chest pain. She thinks it was triggered by taking her elderly neighbor out 4 days ago when she went to pay her bills and hair and clothing got wet. She states her throat felt like \"shredded\" meat when she was watching the [place] game and also had sneezing and itching eyes but did not improvement with benadryl. She reports she feels so congested she cannot lay back in her recliner or bed x3 nights which has triggered her appearance to the ED.    Mrs. Roldan is being admitted to [facility] for associated CHF exacerbation and COPD exacerbation / AECB.    Review of Systems   Constitutional: Positive for activity change, appetite change, chills, fatigue and fever. Negative for diaphoresis.        She reports her baseline temp is 97.5 and got up to 100 yesterday.   HENT: Positive for congestion, rhinorrhea and sore throat. Negative for ear pain and trouble swallowing.         Reports her ears are popping occasionally or sounding like soda fizzing in her left. Feels drainage down her neck.   Eyes: Positive for discharge and itching.   Respiratory: Positive for cough, shortness of breath and wheezing. Negative for apnea.         Reports acute SOA at rest last few days, better since in ED and just with activity now. Reports cannot get cough to produce, stuck in throat/chest. Reports chest wall discomfort/pressure with deep coughing.   Cardiovascular: Positive for palpitations. Negative for chest pain and leg swelling.        Reports has noticed a few palpitations without good sleep.   Gastrointestinal: Positive for diarrhea and nausea. Negative for abdominal pain, constipation and vomiting.        Diarrhea x2 days - reports she is sensitive to this with any OTC meds and was taking benadryl recently, hasn't had diarrhea " "today. Nausea mainly with excessive coughing.   Genitourinary: Negative for difficulty urinating and dysuria.        Reports urine looks \"chalky\".   Musculoskeletal: Positive for arthralgias. Negative for joint swelling.        On chronic plaquenil. Reports her pain often moves around from hands to feet etc.   Skin: Negative for rash and wound.   Allergic/Immunologic: Positive for environmental allergies and immunocompromised state.   Neurological: Positive for weakness. Negative for dizziness and syncope.        Reports last vertigo about a year ago.   Psychiatric/Behavioral: Negative for agitation, behavioral problems, confusion and hallucinations. The patient is nervous/anxious.         Denies memory loss.      Otherwise complete ROS performed and negative except as mentioned in the HPI.    Past Medical History:   Diagnosis Date   • Acute respiratory failure with hypoxia 9/5/2017   • Allergic rhinitis 08/01/2014   • Anemia    • Anxiety    • Arthritis    • CAD (coronary artery disease)    • CHF (congestive heart failure)    • CKD (chronic kidney disease)    • CKD (chronic kidney disease), stage IV 9/5/2017   • Colitis, Clostridium difficile    • COPD (chronic obstructive pulmonary disease)    • Coronary atherosclerosis    • Depression    • Emphysema of lung    • GERD (gastroesophageal reflux disease)    • Heart attack    • Heart murmur    • Hematoma of hip    • Hip fracture    • Hyperlipidemia    • Hypertension     benign essential   • Ischemic cardiomyopathy 08/01/2014    ef 29% s/p ICD   • Knee injury    • Lung disease    • Mitral valve disorder 03/28/2013   • Mitral valve insufficiency     s/p prosthetic ATS valve replacement   • Osteoporosis    • Postmenopausal     natural   • PVD (peripheral vascular disease)    • Pyelonephritis    • Renal failure    • Renal failure    • Renal failure, acute    • Syncope    • Systemic lupus erythematosus    • TIA (transient ischemic attack) 04/04/2012   • UTI (urinary tract " infection)    • Valvular heart disease    • Wrist fracture        Past Surgical History:   Procedure Laterality Date   • CARDIAC DEFIBRILLATOR PLACEMENT     • CHOLECYSTECTOMY  2010   • CORONARY ARTERY BYPASS GRAFT  2011    4 aortic bypasses, abdominal aorta; 2011-mitral valve; 2010-triple bypass   • ENDOSCOPY  10/23/2014    Dr. Brand; retained food in stomach; he dilated her esophagus; 5-30-14 hiatus hernia, gastritis   • JOINT REPLACEMENT Right 06/25/2015    Dr. Fitzgerald; first surgery 1-29-14; redo 5-4-15   • MITRAL VALVE REPLACEMENT      MECHANICAL   • TOTAL HIP ARTHROPLASTY Right     3 times within 8 months       Family History   Problem Relation Age of Onset   • Arthritis Mother      rheumatoid   • Heart attack Father    • Alcohol abuse Brother    • Heart disease Other      uncle   • Diabetes Other      uncle-type 2   • Hypertension Other      uncle   • Stroke Other      uncle   • Cancer Other      grandfather-lung    • Heart disease Maternal Uncle    • Lung cancer Paternal Grandfather        Social History     Social History   • Marital status:      Spouse name: N/A   • Number of children: N/A   • Years of education: N/A     Occupational History   • Not on file.     Social History Main Topics   • Smoking status: Current Every Day Smoker     Packs/day: 0.50     Types: Cigarettes   • Smokeless tobacco: Not on file      Comment: Down to 0.5 PPD from 1 PPD now consistently. Started as a teenager.   • Alcohol use Yes      Comment: about once a week, reports she is a sports fan and rarely drinks more up to 2-3 drinks   • Drug use: No   • Sexual activity: Defer     Other Topics Concern   • Not on file     Social History Narrative    Ms. Roldan is a 56 year old white female. Pt recently  after 30 years. Just moved from [facility] into her son's home in [place].        Medications:    Lab Results (last 24 hours)     Procedure Component Value Units Date/Time    Comprehensive Metabolic Panel  [[id number]]  (Abnormal) Collected:  09/05/17 1816    Specimen:  Blood Updated:  09/05/17 1849     Glucose 100 mg/dL      BUN 40 (H) mg/dL      Creatinine 1.90 (H) mg/dL      Sodium 134 mmol/L      Potassium 4.2 mmol/L      Chloride 101 mmol/L      CO2 28.0 mmol/L      Calcium 9.9 mg/dL      Total Protein 8.3 (H) g/dL      Albumin 4.60 g/dL      ALT (SGPT) 15 U/L      AST (SGOT) 31 U/L      Alkaline Phosphatase 198 (H) U/L      Total Bilirubin 0.3 mg/dL      eGFR Non African Amer 27 (L) mL/min/1.73      Globulin 3.7 gm/dL      A/G Ratio 1.2 (L) g/dL      BUN/Creatinine Ratio 21.1     Anion Gap 5.0 mmol/L     Narrative:       National Kidney Foundation Guidelines    Stage     Description        GFR  1         Normal or High     90+  2         Mild decrease      60-89  3         Moderate decrease  30-59  4         Severe decrease    15-29  5         Kidney failure     <15    Protime-INR [[id number]]  (Abnormal) Collected:  09/05/17 1816    Specimen:  Blood Updated:  09/05/17 2011     Protime 59.6 (C) Seconds      INR 5.20    Narrative:       Therapeutic Ranges for INR: 2.0-3.0 (PT 20-30)                              2.5-3.5 (PT 25-34)    Magnesium [[id number]] Collected:  09/05/17 1816    Specimen:  Blood Updated:  09/05/17 2350    CBC & Differential [[id number]] Collected:  09/05/17 1817    Specimen:  Blood Updated:  09/05/17 2140    Narrative:       The following orders were created for panel order CBC & Differential.  Procedure                               Abnormality         Status                     ---------                               -----------         ------                     CBC Auto Differential[[id number]]        Abnormal            Final result                 Please view results for these tests on the individual orders.    BNP [[id number]]  (Abnormal) Collected:  09/05/17 1817    Specimen:  Blood Updated:  09/05/17 1856     BNP 1514.0 (H) pg/mL     CBC Auto Differential [[id number]]  (Abnormal)  Collected:  09/05/17 1817    Specimen:  Blood Updated:  09/05/17 2140     WBC 6.19 10*3/mm3      RBC 3.56 (L) 10*6/mm3      Hemoglobin 11.7 g/dL      Hematocrit 34.7 %      MCV 97.5 fL      MCH 32.9 (H) pg      MCHC 33.7 g/dL      RDW 12.9 %      RDW-SD 46.5 fl      MPV 11.4 fL      Platelets 130 (L) 10*3/mm3      Neutrophil % 80.1 (H) %      Lymphocyte % 15.5 (L) %      Monocyte % 3.7 %      Eosinophil % 0.5 %      Basophil % 0.0 %      Immature Grans % 0.2 %      Neutrophils, Absolute 4.96 10*3/mm3      Lymphocytes, Absolute 0.96 10*3/mm3      Monocytes, Absolute 0.23 10*3/mm3      Eosinophils, Absolute 0.03 10*3/mm3      Basophils, Absolute 0.00 10*3/mm3      Immature Grans, Absolute 0.01 10*3/mm3     Lactic Acid, Plasma [[id number]]  (Normal) Collected:  09/05/17 1817    Specimen:  Blood Updated:  09/05/17 1842     Lactate 0.8 mmol/L       Falsely depressed results may occur on samples drawn from patients receiving N-Acetylcysteine (NAC) or Metamizole.       POC Troponin, Rapid [[id number]]  (Normal) Collected:  09/05/17 1817    Specimen:  Blood Updated:  09/05/17 1835     Troponin I 0.04 ng/mL       Serial Number: [id number]:  [id number]       Blood Culture [[id number]] Collected:  09/05/17 2129    Specimen:  Blood from Hand, Right Updated:  09/05/17 2141    Blood Culture [[id number]] Collected:  09/05/17 2129    Specimen:  Blood from Arm, Right Updated:  09/05/17 2141    Urinalysis With / Culture If Indicated [[id number]]  (Abnormal) Collected:  09/05/17 2248    Specimen:  Urine from Urine, Clean Catch Updated:  09/05/17 2328     Color, UA Yellow     Appearance, UA Turbid (A)     pH, UA 5.5     Specific Gravity, UA 1.012     Glucose, UA Negative     Ketones, UA Negative     Bilirubin, UA Negative     Blood, UA Trace (A)     Protein,  mg/dL (2+) (A)     Leuk Esterase, UA Large (3+) (A)     Nitrite, UA Negative     Urobilinogen, UA 0.2 E.U./dL    Urine Drug Screen [[id number]]  (Normal) Collected:  09/05/17  2248    Specimen:  Urine from Urine, Clean Catch Updated:  09/05/17 2335     THC, Screen, Urine Negative     Phencyclidine (PCP), Urine Negative     Cocaine Screen, Urine Negative     Methamphetamine, Urine Negative     Opiate Screen Negative     Amphetamine Screen, Urine Negative     Benzodiazepine Screen, Urine Negative     Tricyclic Antidepressants Screen Negative     Methadone Screen, Urine Negative     Barbiturates Screen, Urine Negative     Oxycodone Screen, Urine Negative     Propoxyphene Screen Negative     Buprenorphine, Screen, Urine Negative    Narrative:       Cutoff For Drugs Screened:    Amphetamines               500 ng/ml  Barbiturates               200 ng/ml  Benzodiazepines            150 ng/ml  Cocaine                    150 ng/ml  Methadone                  200 ng/ml  Opiates                    100 ng/ml  Phencyclidine               25 ng/ml  THC                            50 ng/ml  Methamphetamine            500 ng/ml  Tricyclic Antidepressants  300 ng/ml  Oxycodone                  100 ng/ml  Propoxyphene               300 ng/ml  Buprenorphine               10 ng/ml    The normal value for all drugs tested is negative. This report includes unconfirmed screening results, with the cutoff values listed, to be used for medical treatment purposes only.  Unconfirmed results must not be used for non-medical purposes such as employment or legal testing.  Clinical consideration should be applied to any drug of abuse test, particularly when unconfirmed results are used.      Urinalysis, Microscopic Only [[id number]]  (Abnormal) Collected:  09/05/17 2248    Specimen:  Urine from Urine, Clean Catch Updated:  09/05/17 2328     RBC, UA 3-6 (A) /HPF      WBC, UA Too Numerous to Count (A) /HPF      Bacteria, UA 3+ (A) /HPF      Squamous Epithelial Cells, UA 0-2 /HPF      Hyaline Casts, UA 0-6 /LPF      Methodology Manual Light Microscopy    Urine Culture [[id number]] Collected:  09/05/17 2248    Specimen:  Urine  "from Urine, Clean Catch Updated:  09/05/17 2303        ECG  Vent. rate 85 BPM  PA interval 136 ms  QRS duration 98 ms  QT/QTc 390/464 ms  P-R-T axes 25 -25 153  Normal sinus rhythm  ST & T wave abnormality, consider lateral ischemia  Prolonged QT  Abnormal ECG  When compared with ECG of 03-MAR-2017 05:49,  T wave inversion more evident in Lateral leads     XR Chest, 2 Views     CLINICAL HISTORY:    56 years old, female; Pain and signs and symptoms; Shortness of breath; Chest   pain; Left-sided chest pain; Additional info: SOA. Pt has HX of left sided rib   FX, pt attributes left sided chest pain to old rib FX     TECHNIQUE:    Frontal and lateral views of the chest.     COMPARISON:    CR - XR CHEST 1 VW 3/3/2017 11:01:41 AM     FINDINGS:       Lungs:  Unremarkable.  No consolidation.       Pleural space:  Unremarkable.  No pneumothorax.       Heart:  Unremarkable.  No cardiomegaly.       Mediastinum:  Unremarkable.       Bones/joints:  Status post sternotomy. Cardiac valve replacement.       Other findings:  Left-sided pacing device in place.     IMPRESSION:       No acute findings.    Allergies:  Allergies   Allergen Reactions   • Morphine And Related GI Intolerance and Irritability   • Sulfa Antibiotics          Objective     Physical Exam:  Vital Signs: /68  Pulse 88  Temp 98 °F (36.7 °C) (Oral)   Resp 20  Ht 69\" (175.3 cm)  Wt 111 lb (50.3 kg)  SpO2 92%  BMI 16.39 kg/m2  Physical Exam   Constitutional: She is oriented to person, place, and time. No distress.   Thin, frail, mild tachypnea when talks more than a few sentences.   HENT:   Head: Normocephalic and atraumatic.   Right Ear: No swelling or tenderness. Tympanic membrane is not erythematous and not bulging.   Left Ear: No swelling or tenderness. Tympanic membrane is not erythematous and not bulging.   Mouth/Throat: Oropharynx is clear and moist. No oropharyngeal exudate.   Moderate cerumen bilaterally.   Eyes: Conjunctivae and EOM are normal. " Pupils are equal, round, and reactive to light. Right eye exhibits no discharge. Left eye exhibits no discharge. No scleral icterus.   Neck: No JVD present. No tracheal deviation present.   Cardiovascular: Normal rate, regular rhythm and intact distal pulses.    Murmur heard.  Pulses:       Radial pulses are 2+ on the right side, and 2+ on the left side.        Dorsalis pedis pulses are 2+ on the right side, and 2+ on the left side.   No edema.   Pulmonary/Chest: Effort normal and breath sounds normal. She exhibits tenderness.   Very diminished lung sounds all lobes.   Abdominal: Soft. Bowel sounds are normal. She exhibits no distension. There is no tenderness. There is no guarding.   Genitourinary:   Genitourinary Comments: Bladder non-distended, non-tender.   Musculoskeletal: She exhibits no edema or deformity.   Neurological: She is alert and oriented to person, place, and time. No cranial nerve deficit. Coordination normal.   Skin: Skin is warm and dry. No rash noted. She is not diaphoretic. No erythema. No pallor.   Psychiatric: She has a normal mood and affect. Her behavior is normal. Judgment and thought content normal.   Calm, relaxed, cooperative, engages, pleasant.       Results Reviewed:    Lab Results (last 24 hours)     Procedure Component Value Units Date/Time    Comprehensive Metabolic Panel [[id number]]  (Abnormal) Collected:  09/05/17 1816    Specimen:  Blood Updated:  09/05/17 1849     Glucose 100 mg/dL      BUN 40 (H) mg/dL      Creatinine 1.90 (H) mg/dL      Sodium 134 mmol/L      Potassium 4.2 mmol/L      Chloride 101 mmol/L      CO2 28.0 mmol/L      Calcium 9.9 mg/dL      Total Protein 8.3 (H) g/dL      Albumin 4.60 g/dL      ALT (SGPT) 15 U/L      AST (SGOT) 31 U/L      Alkaline Phosphatase 198 (H) U/L      Total Bilirubin 0.3 mg/dL      eGFR Non African Amer 27 (L) mL/min/1.73      Globulin 3.7 gm/dL      A/G Ratio 1.2 (L) g/dL      BUN/Creatinine Ratio 21.1     Anion Gap 5.0 mmol/L      Narrative:       National Kidney Foundation Guidelines    Stage     Description        GFR  1         Normal or High     90+  2         Mild decrease      60-89  3         Moderate decrease  30-59  4         Severe decrease    15-29  5         Kidney failure     <15    Protime-INR [[id number]]  (Abnormal) Collected:  09/05/17 1816    Specimen:  Blood Updated:  09/05/17 2011     Protime 59.6 (C) Seconds      INR 5.20    Narrative:       Therapeutic Ranges for INR: 2.0-3.0 (PT 20-30)                              2.5-3.5 (PT 25-34)    Magnesium [[id number]] Collected:  09/05/17 1816    Specimen:  Blood Updated:  09/05/17 2350    CBC & Differential [[id number]] Collected:  09/05/17 1817    Specimen:  Blood Updated:  09/05/17 2140    Narrative:       The following orders were created for panel order CBC & Differential.  Procedure                               Abnormality         Status                     ---------                               -----------         ------                     CBC Auto Differential[[id number]]        Abnormal            Final result                 Please view results for these tests on the individual orders.    BNP [[id number]]  (Abnormal) Collected:  09/05/17 1817    Specimen:  Blood Updated:  09/05/17 1856     BNP 1514.0 (H) pg/mL     CBC Auto Differential [[id number]]  (Abnormal) Collected:  09/05/17 1817    Specimen:  Blood Updated:  09/05/17 2140     WBC 6.19 10*3/mm3      RBC 3.56 (L) 10*6/mm3      Hemoglobin 11.7 g/dL      Hematocrit 34.7 %      MCV 97.5 fL      MCH 32.9 (H) pg      MCHC 33.7 g/dL      RDW 12.9 %      RDW-SD 46.5 fl      MPV 11.4 fL      Platelets 130 (L) 10*3/mm3      Neutrophil % 80.1 (H) %      Lymphocyte % 15.5 (L) %      Monocyte % 3.7 %      Eosinophil % 0.5 %      Basophil % 0.0 %      Immature Grans % 0.2 %      Neutrophils, Absolute 4.96 10*3/mm3      Lymphocytes, Absolute 0.96 10*3/mm3      Monocytes, Absolute 0.23 10*3/mm3      Eosinophils, Absolute 0.03  10*3/mm3      Basophils, Absolute 0.00 10*3/mm3      Immature Grans, Absolute 0.01 10*3/mm3     Lactic Acid, Plasma [[id number]]  (Normal) Collected:  09/05/17 1817    Specimen:  Blood Updated:  09/05/17 1842     Lactate 0.8 mmol/L       Falsely depressed results may occur on samples drawn from patients receiving N-Acetylcysteine (NAC) or Metamizole.       POC Troponin, Rapid [[id number]]  (Normal) Collected:  09/05/17 1817    Specimen:  Blood Updated:  09/05/17 1835     Troponin I 0.04 ng/mL       Serial Number: [id number]:  [id number]       Blood Culture [[id number]] Collected:  09/05/17 2129    Specimen:  Blood from Hand, Right Updated:  09/05/17 2141    Blood Culture [[id number]] Collected:  09/05/17 2129    Specimen:  Blood from Arm, Right Updated:  09/05/17 2141    Urinalysis With / Culture If Indicated [[id number]]  (Abnormal) Collected:  09/05/17 2248    Specimen:  Urine from Urine, Clean Catch Updated:  09/05/17 2328     Color, UA Yellow     Appearance, UA Turbid (A)     pH, UA 5.5     Specific Gravity, UA 1.012     Glucose, UA Negative     Ketones, UA Negative     Bilirubin, UA Negative     Blood, UA Trace (A)     Protein,  mg/dL (2+) (A)     Leuk Esterase, UA Large (3+) (A)     Nitrite, UA Negative     Urobilinogen, UA 0.2 E.U./dL    Urine Drug Screen [[id number]]  (Normal) Collected:  09/05/17 2248    Specimen:  Urine from Urine, Clean Catch Updated:  09/05/17 2335     THC, Screen, Urine Negative     Phencyclidine (PCP), Urine Negative     Cocaine Screen, Urine Negative     Methamphetamine, Urine Negative     Opiate Screen Negative     Amphetamine Screen, Urine Negative     Benzodiazepine Screen, Urine Negative     Tricyclic Antidepressants Screen Negative     Methadone Screen, Urine Negative     Barbiturates Screen, Urine Negative     Oxycodone Screen, Urine Negative     Propoxyphene Screen Negative     Buprenorphine, Screen, Urine Negative    Narrative:       Cutoff For Drugs  Screened:    Amphetamines               500 ng/ml  Barbiturates               200 ng/ml  Benzodiazepines            150 ng/ml  Cocaine                    150 ng/ml  Methadone                  200 ng/ml  Opiates                    100 ng/ml  Phencyclidine               25 ng/ml  THC                            50 ng/ml  Methamphetamine            500 ng/ml  Tricyclic Antidepressants  300 ng/ml  Oxycodone                  100 ng/ml  Propoxyphene               300 ng/ml  Buprenorphine               10 ng/ml    The normal value for all drugs tested is negative. This report includes unconfirmed screening results, with the cutoff values listed, to be used for medical treatment purposes only.  Unconfirmed results must not be used for non-medical purposes such as employment or legal testing.  Clinical consideration should be applied to any drug of abuse test, particularly when unconfirmed results are used.      Urinalysis, Microscopic Only [[id number]]  (Abnormal) Collected:  09/05/17 2248    Specimen:  Urine from Urine, Clean Catch Updated:  09/05/17 2328     RBC, UA 3-6 (A) /HPF      WBC, UA Too Numerous to Count (A) /HPF      Bacteria, UA 3+ (A) /HPF      Squamous Epithelial Cells, UA 0-2 /HPF      Hyaline Casts, UA 0-6 /LPF      Methodology Manual Light Microscopy    Urine Culture [[id number]] Collected:  09/05/17 2248    Specimen:  Urine from Urine, Clean Catch Updated:  09/05/17 2303        ECG  Vent. rate 85 BPM  AZ interval 136 ms  QRS duration 98 ms  QT/QTc 390/464 ms  P-R-T axes 25 -25 153  Normal sinus rhythm  ST & T wave abnormality, consider lateral ischemia  Prolonged QT  Abnormal ECG  When compared with ECG of 03-MAR-2017 05:49,  T wave inversion more evident in Lateral leads     XR Chest, 2 Views     CLINICAL HISTORY:    56 years old, female; Pain and signs and symptoms; Shortness of breath; Chest   pain; Left-sided chest pain; Additional info: SOA. Pt has HX of left sided rib   FX, pt attributes left sided  chest pain to old rib FX     TECHNIQUE:    Frontal and lateral views of the chest.     COMPARISON:    CR - XR CHEST 1 VW 3/3/2017 11:01:41 AM     FINDINGS:       Lungs:  Unremarkable.  No consolidation.       Pleural space:  Unremarkable.  No pneumothorax.       Heart:  Unremarkable.  No cardiomegaly.       Mediastinum:  Unremarkable.       Bones/joints:  Status post sternotomy. Cardiac valve replacement.       Other findings:  Left-sided pacing device in place.     IMPRESSION:       No acute findings.    I have personally reviewed and interpreted available lab data, radiology studies and ECG obtained at time of admission.     Assessment / Plan     Problem List:   Hospital Problem List     * (Principal)Acute on chronic respiratory failure with hypoxia    Essential hypertension (Chronic)    Overview Signed 10/19/2015  8:56 AM by Digna Santana     benign essential         Ischemic cardiomyopathy (Chronic)    Overview Signed 2/6/2017  8:44 AM by MAN Quesada. History of MI December 2009: s/p stent to RCA and LAD, EF 40%  B. CABGx2 March, 2010: SVG to OMB-1, SVG to PDA  C. EF 30% post-operatively, s/p Adah ICD placement, 2011  D. Echo July 2014: Severe global hypokinesis with EF 12%, moderate AI, mechanical mitral valve, moderate to severe TR, RVSP 43mmHg   E. Echo 2/4/17: EF 18%, mild to mod AI, mod TR, grossly normal prosthetic mitral valve         PVD (peripheral vascular disease) (Chronic)    Lupus (systemic lupus erythematosus) (Chronic)    Chronic coronary artery disease (Chronic)    COPD exacerbation    Tobacco abuse (Chronic)    Acute systolic heart failure and valvular disease    Overview Addendum 2/6/2017  8:44 AM by MAN Quesada             AICD (automatic cardioverter/defibrillator) present (Chronic)    Supratherapeutic INR    CKD (chronic kidney disease), stage IV (Chronic)    UTI (urinary tract infection)          Assessment / Plan:    1. Acute on chronic respiratory  "failure with hypoxia:  - R/t CHF and COPD exac / AECB, renal disease at baseline.  - Deferred ABG r/t supratherapeutic INR. Last ABG on file x1 8/2015 without hypercapnia then and no known CO2 retention. NPPV PRN if so, and probable GAB component, and diuresis effect.  - \"Borderline\" GAB per pt.  - Diuretics and abx.  - Will check d dimer - pt should be covered as supratherapeutic on coumadin and reports compliance. If elevated, consider VQ scan if needed (avoid contract r/t renal) - lower suspicion as symptoms correlate to allergies/URI and fluid excess.  - Last hospitalization 3/2017 for heart failure.  - Good work cutting back on cigarettes for multiple comorbidities - down to 1/2 PPD or less daily consistently per report. Nicotine patch PRN.    2. Acute on chronic systolic heart failure and valvular heart disease / ischemic cardiomyopathy / coronary artery disease / hypertension:  - Continue BB.  - Defer ACEI/ARB and suspect r/t renal.  - Continue statin.  - Hydralazine as well for HTN.  - Status AICD - otherwise DNR status reported so may need additional teaching/clarifications.  - Chronic coumadin for mechanical mitral valve - goal 2.5-3.5, supratherapeutic today, pharmacy to dose.  - PVD as well.    3. Exacerbation of chronic obstructive pulmonary disease/bronchitis / tobacco abuse:  - Solumedrol, abx r/t immunocompromised (continue rocephin, change azithromycin to doxy r/t prolonged QTc). Continue home regiment but increase nebs component. Add guaifenesin.  - Chronic high dose klonopin; one small norco in April as only narcotic. PAPI reviewed.  - Pleurisy / rib pain, CXR negative for fractures (has hx of them a year ago). Dextromethorphan PRN for cough suppression first, then phenergan-codeine q6h PRN - if upsets stomach can change to low dose norco PRN. Bowel program.  - Nicotine patch.  - Needs outpatient pulmonologist.    4. Urinary tract infection / diarrhea:  - Present on admission.  - Rocephin/doxy " for respiratory will cover and renal friendly.  - Await C&S.  - Note pt c/o 2 days of diarrhea - check c diff as has hx of it, no recent abx.    4. Chronic kidney disease IIIb-IV:  - At baseline, Cr 1.7-1.9, hx AKIs.  - SLE.  - Needs outpatient nephrologist.    5. Systemic lupus erythematous:  - Continue plaquenil.  - Needs new specialists since moved here from [place] last year.      DVT prophylaxis: Teds, scuds, already anticoagulated on coumadin with supra therapeutic INR and pharmacy to dose.    Code Status: DNR, full support, prefers no intubation for distress. Otherwise no restrictions. Son Floyd Roldan as healthcare surrogate. Consider pall med consult if needed for GOC or symptom management r/t multiple advanced co morbidities.    Admission Status: Patient will be admitted to INPATIENT status due to the need for care which can only be reasonably provided in an hospital setting such as aggressive/expedited ancillary services and/or consultation services, the necessity for IV medications, close physician monitoring and/or the possible need for procedures.  In such, I feel patient’s risk for adverse outcomes and need for care warrant INPATIENT evaluation and predict the patient’s care encounter to likely last beyond 2 midnights.     IAIN Quintanilla 09/05/17 11:53 PM      Brief Attending Admission Note     I have seen and examined the patient, performing an independent face-to-face diagnostic evaluation with plan of care reviewed and developed with the advanced practice clinician IAIN Cortez.      Brief Summary Statement/HPI:   Ms. Roldan is a very pleasant 56 year old  woman with known COPD and severe systolic heart failure (EF 18% in Feb 2017) and valvular heart disease s/p MMV on coumadin who presents to [facility] ED today with worsening dyspnea for the last 4 days.  She states that her symptoms started with sore throat, rhinorrhea, sinus congestion and dry cough followed by  worsening dyspnea and a productive yellowish cough.  She has been sitting in a tripod position at home to breathe.  She chronically sleeps in a recliner. She also has PMH significant for CKD III-IV, AICD, HTN, ischemic CM, SLE, PVD and ongoing tobacco abuse.  She received solumedrol 125 mg, duonebs, lasix 80 mg, azithromycin 500 mg and rocephin 1gm in the ED.       Attending Physical Exam:  Constitutional: Mild dyspnea, sitting up, awake, alert, thin and chronically ill appearing  Eyes: PERRLA, sclerae anicteric, no conjunctival injection  HENT: NCAT, mucous membranes moist  Neck: supple, no thyromegaly, no lymphadenopathy, trachea midline  Respiratory: Wheezing and tight throughout  Cardiovascular: RRR, no murmurs, rubs, or gallops, palpable pedal pulses bilaterally  Gastrointestinal: Positive bowel sounds, soft, nontender, nondistended  Musculoskeletal: No bilateral ankle edema, no clubbing or cyanosis to bilateral lower extremities  Psychiatric: oriented x 3, appropriate affect, cooperative  Neurologic: Strength symmetric in all extremities, Cranial Nerves grossly intact to confrontation, speech clear  Derm: no rashes    Brief Assessment/Plan :  Acute on chronic hypoxic respiratory failure secondary to AECOPD:  --Rocephin and doxy for bronchitis presentation (changed from azith due to history of prolonged QT)  --Duonebs and steroids  --Feels better but still dyspneic  --Nicotine replacement and cessation counseling  --No investigation of possible PE due to supratherapeutic INR    UTI:  --Rocephin for now, await culture    Systolic heart failure, EF 18%  --Despite elevated BNP appears well compensated  --Hold further IV lasix  --Daily weights  --I/O    Supratherapeutic INR:  --Hold coumadin for now,  --Pharmacy to dose    See above for further detailed assessment and plan developed with APC which I have reviewed and/or edited.      I believe this patient meets .SOURAVINPT: Patient with acute on chronic respiratory  failure secondary to AECOPD as well as UTI requiring antibiotics, steroids, nebs and aggressive supportive care.  Also with severe systolic HF at baseline - multiple comorbidities complicate care.      Juliane Shabazz MD  09/06/17  2:04 AM            Electronically signed by Juliane Shabazz MD at 9/6/2017  2:19 AM           Emergency Department Notes      Jesus Liu MD at 9/5/2017  6:48 PM          Subjective   HPI Comments: Ashely Roldan is a 56 y.o.female with hx of CHF who presents to the ED with complaints of SOA with onset 3 days ago. She reports that four days ago she got caught out in the rain. She notes the next day she developed congestion in her nose and head, coughing, and constant pain in her left chest area radiating to her upper neck alongside her SOA. Family reports that she has been sleeping in a recliner secondary to her SOA. She also complains of fevers, but denies any other complaints at this time. She notes that she is on coumadin.    Patient is a 56 y.o. female presenting with shortness of breath.   History provided by:  Patient and relative  Shortness of Breath   Severity:  Moderate  Onset quality:  Sudden  Timing:  Constant  Progression:  Worsening  Chronicity:  New  Relieved by:  None tried  Worsened by:  Nothing  Ineffective treatments:  None tried  Associated symptoms: chest pain, cough, fever and neck pain    Associated symptoms: no sore throat and no sputum production        Review of Systems   Constitutional: Positive for fever.   HENT: Positive for congestion. Negative for sore throat.    Respiratory: Positive for cough and shortness of breath. Negative for sputum production.    Cardiovascular: Positive for chest pain.   Musculoskeletal: Positive for neck pain.   All other systems reviewed and are negative.      Past Medical History:   Diagnosis Date   • Acute respiratory failure with hypoxia 9/5/2017   • Allergic rhinitis 08/01/2014   • Anemia    • Anxiety    • Arthritis     • CAD (coronary artery disease)    • CHF (congestive heart failure)    • CKD (chronic kidney disease)    • CKD (chronic kidney disease), stage IV 9/5/2017   • Colitis, Clostridium difficile    • COPD (chronic obstructive pulmonary disease)    • Coronary atherosclerosis    • Depression    • Emphysema of lung    • GERD (gastroesophageal reflux disease)    • Heart attack    • Heart murmur    • Hematoma of hip    • Hip fracture    • Hyperlipidemia    • Hypertension     benign essential   • Ischemic cardiomyopathy 08/01/2014    ef 29% s/p ICD   • Knee injury    • Lung disease    • Mitral valve disorder 03/28/2013   • Mitral valve insufficiency     s/p prosthetic ATS valve replacement   • Osteoporosis    • Postmenopausal     natural   • PVD (peripheral vascular disease)    • Pyelonephritis    • Renal failure    • Renal failure    • Renal failure, acute    • Syncope    • Systemic lupus erythematosus    • TIA (transient ischemic attack) 04/04/2012   • UTI (urinary tract infection)    • Valvular heart disease    • Wrist fracture        Allergies   Allergen Reactions   • Morphine And Related GI Intolerance and Irritability   • Sulfa Antibiotics        Past Surgical History:   Procedure Laterality Date   • CARDIAC DEFIBRILLATOR PLACEMENT     • CHOLECYSTECTOMY  2010   • CORONARY ARTERY BYPASS GRAFT  2011    4 aortic bypasses, abdominal aorta; 2011-mitral valve; 2010-triple bypass   • ENDOSCOPY  10/23/2014    Dr. Brand; retained food in stomach; he dilated her esophagus; 5-30-14 hiatus hernia, gastritis   • JOINT REPLACEMENT Right 06/25/2015    Dr. Fitzgerald; first surgery 1-29-14; redo 5-4-15   • MITRAL VALVE REPLACEMENT      MECHANICAL   • TOTAL HIP ARTHROPLASTY Right     3 times within 8 months       Family History   Problem Relation Age of Onset   • Arthritis Mother      rheumatoid   • Heart attack Father    • Alcohol abuse Brother    • Heart disease Other      uncle   • Diabetes Other      uncle-type 2   • Hypertension Other       uncle   • Stroke Other      uncle   • Cancer Other      grandfather-lung    • Heart disease Maternal Uncle    • Lung cancer Paternal Grandfather        Social History     Social History   • Marital status:      Spouse name: N/A   • Number of children: N/A   • Years of education: N/A     Social History Main Topics   • Smoking status: Current Every Day Smoker     Packs/day: 0.50     Types: Cigarettes   • Smokeless tobacco: None      Comment: Down to 0.5 PPD from 1 PPD now consistently. Started as a teenager.   • Alcohol use Yes      Comment: about once a week, reports she is a sports fan and rarely drinks more up to 2-3 drinks   • Drug use: No   • Sexual activity: Defer     Other Topics Concern   • None     Social History Narrative    Ms. Roldan is a 56 year old white female. Pt recently  after 30 years. Just moved from [facility] into her son's home in [place].          Objective   Physical Exam   Constitutional: She is oriented to person, place, and time. She appears well-developed and well-nourished. No distress.   HENT:   Head: Normocephalic and atraumatic.   Nose: Nose normal.   Eyes: Conjunctivae are normal.   Neck: Normal range of motion. Neck supple.   Cardiovascular: Normal rate, regular rhythm, normal heart sounds and intact distal pulses.    No murmur heard.  Pulmonary/Chest: Effort normal. She has wheezes.   Severe decreased air movement. Severe wheezing.    Musculoskeletal: Normal range of motion.   Neurological: She is alert and oriented to person, place, and time.   Skin: Skin is warm and dry.   Psychiatric: She has a normal mood and affect. Her behavior is normal.   Nursing note and vitals reviewed.      Procedures        ED Course  ED Course   Comment By Time   Labs are starting to come back.  Her BNP is significantly elevated.  Last month it was 300.  I have reviewed her chest x-ray and I don't see evidence of edema or infiltrate.  She is immunocompromised and has a  productive cough and upper respiratory symptoms, we will therefore treat with IV antibiotics as well as IV Lasix.  I anticipate admission to the hospital Jesus Liu MD 09/05 2000   Mrs. Altman is sleeping. Jesus Liu MD 09/05 2014   INR is greater than 5, we'll avoid ABG Jesus Liu MD 09/05 2018   She is now calling out saying she needs something stronger than Tylenol. Jesus Liu MD 09/05 2024   I spoke with Mrs. Roldan about findings.  Repeat lung exam at this point shows better air movement but she still wheezing quite a bit.  We'll give additional albuterol.  Will seek admission to the hospital.  So far she has not produced any urine. Jesus Liu MD 09/05 2146   I spoke with Dr. Shabazz who will admit Jesus Liu MD 09/05 2220                     MDM  Number of Diagnoses or Management Options  new and requires workup  new and requires workup     Amount and/or Complexity of Data Reviewed  Clinical lab tests: ordered and reviewed  Tests in the radiology section of CPT®:  ordered and reviewed  Review and summarize past medical records: yes  Discuss the patient with other providers: yes  Independent visualization of images, tracings, or specimens: yes    Patient Progress  Patient progress: improved      Final diagnoses:   Acute systolic congestive heart failure   Bronchitis   COPD exacerbation   Immunocompromised       Documentation assistance provided by brigid Palmer.  Information recorded by the brigid was done at my direction and has been verified and validated by me.     Estela Chaves  09/05/17 1908       Estela Chaves  09/05/17 2147       Jesus Liu MD  09/06/17 0106       Electronically signed by Jesus Liu MD at 9/6/2017  1:06 AM      Fantasma Vega at 9/5/2017 10:10 PM          albuterol (PROVENTIL) nebulizer solution 0.083% 2.5 mg/3mL [250] (Order [id number])     RT bedside     Fantasma Vega  09/05/17 2215       Electronically signed by Fantasma MARTINEZ  [place] at 9/5/2017 10:15 PM        Vital Signs (last 72 hrs)       09/04 0700  -  09/05 0659 09/05 0700  -  09/06 0659 09/06 0700  -  09/07 0659 09/07 0700  -  09/07 1006   Most Recent    Temp (°F)     98    97.3 -  97.8       97.3 (36.3)    Heart Rate   64 -  88    63 -  77      72     72    Resp   14 -  20    13 -  18      18     18    BP   100/54 -  147/85    100/74 -  123/63       123/63    SpO2 (%)   90 -  96    90 -  97      97     97          Hospital Medications (all)       Dose Frequency Start End    acetaminophen (TYLENOL) tablet 650 mg 650 mg Once 9/5/2017 9/5/2017    Sig - Route: Take 2 tablets by mouth 1 (One) Time. - Oral    acetaminophen (TYLENOL) tablet 650 mg 650 mg Every 4 Hours PRN 9/5/2017     Sig - Route: Take 2 tablets by mouth Every 4 (Four) Hours As Needed for Mild Pain . - Oral    albuterol (PROVENTIL) nebulizer solution 0.083% 2.5 mg/3mL 2.5 mg Once 9/5/2017 9/5/2017    Sig - Route: Take 2.5 mg by nebulization 1 (One) Time. - Nebulization    albuterol (PROVENTIL) nebulizer solution 0.083% 2.5 mg/3mL 2.5 mg 4 Times Daily PRN 9/5/2017     Sig - Route: Take 2.5 mg by nebulization 4 (Four) Times a Day As Needed for Wheezing or Shortness of Air. - Nebulization    AZITHROMYCIN 500 MG/250 ML 0.9% NS IVPB (MBP) 500 mg Once 9/5/2017 9/6/2017    Sig - Route: Infuse 250 mL into a venous catheter 1 (One) Time. - Intravenous    bisacodyl (DULCOLAX) EC tablet 5 mg 5 mg Daily PRN 9/5/2017     Sig - Route: Take 1 tablet by mouth Daily As Needed for Constipation. - Oral    budesonide-formoterol (SYMBICORT) 160-4.5 MCG/ACT inhaler 2 puff 2 puff 2 Times Daily - RT 9/5/2017     Sig - Route: Inhale 2 puffs 2 (Two) Times a Day. - Inhalation    carvedilol (COREG) tablet 3.125 mg 3.125 mg 2 Times Daily With Meals 9/5/2017 2/10/2018    Sig - Route: Take 1 tablet by mouth 2 (Two) Times a Day With Meals. - Oral    cefTRIAXone (ROCEPHIN) IVPB 1 g 1 g Once 9/5/2017 9/5/2017    Sig - Route: Infuse 50 mL into a venous  catheter 1 (One) Time. - Intravenous    cefuroxime (CEFTIN) tablet 500 mg 500 mg Every 12 Hours Scheduled 9/7/2017 9/9/2017    Sig - Route: Take 2 tablets by mouth Every 12 (Twelve) Hours. - Oral    cetirizine (zyrTEC) tablet 10 mg 10 mg Nightly 9/6/2017     Sig - Route: Take 1 tablet by mouth Every Night. - Oral    clonazePAM (KlonoPIN) tablet 2 mg 2 mg Every 8 Hours PRN 9/5/2017     Sig - Route: Take 2 tablets by mouth Every 8 (Eight) Hours As Needed for Anxiety. - Oral    dextromethorphan polistirex ER (DELSYM) 30 MG/5ML oral suspension 30 mg 30 mg Every 12 Hours PRN 9/6/2017     Sig - Route: Take 30 mg by mouth Every 12 (Twelve) Hours As Needed (need for cough suppression additionally; try first to reduce sedation or med side effects). - Oral    docusate sodium (COLACE) capsule 100 mg 100 mg 2 Times Daily PRN 9/5/2017     Sig - Route: Take 1 capsule by mouth 2 (Two) Times a Day As Needed for Constipation. - Oral    doxycycline (VIBRAMYCIN) capsule 100 mg 100 mg Every 12 Hours Scheduled 9/6/2017 9/13/2017    Sig - Route: Take 1 capsule by mouth Every 12 (Twelve) Hours. - Oral    ferrous sulfate tablet 325 mg 325 mg Every Other Day 9/6/2017     Sig - Route: Take 1 tablet by mouth Every Other Day. - Oral    Notes to Pharmacy: Please dose somewhere like lunch time to avoid absorption of doxy BID    furosemide (LASIX) injection 40 mg 40 mg 2 Times Daily 9/6/2017     Sig - Route: Infuse 4 mL into a venous catheter 2 (Two) Times a Day. - Intravenous    furosemide (LASIX) injection 80 mg 80 mg Once 9/5/2017 9/5/2017    Sig - Route: Infuse 8 mL into a venous catheter 1 (One) Time. - Intravenous    guaiFENesin (MUCINEX) 12 hr tablet 600 mg 600 mg 2 Times Daily 9/6/2017     Sig - Route: Take 1 tablet by mouth 2 (Two) Times a Day. - Oral    hydrALAZINE (APRESOLINE) tablet 25 mg 25 mg Every 8 Hours Scheduled 9/6/2017 2/10/2018    Sig - Route: Take 1 tablet by mouth Every 8 (Eight) Hours. - Oral    HYDROcodone-acetaminophen  "(NORCO) 5-325 MG per tablet 1 tablet 1 tablet Once 9/5/2017 9/5/2017    Sig - Route: Take 1 tablet by mouth 1 (One) Time. - Oral    HYDROcodone-acetaminophen (NORCO) 5-325 MG per tablet 1 tablet 1 tablet Every 6 Hours PRN 9/6/2017 9/16/2017    Sig - Route: Take 1 tablet by mouth Every 6 (Six) Hours As Needed for Moderate Pain . - Oral    hydroxychloroquine (PLAQUENIL) tablet 200 mg 200 mg Every Morning 9/6/2017     Sig - Route: Take 1 tablet by mouth Every Morning. - Oral    ipratropium-albuterol (DUO-NEB) nebulizer solution 3 mL 3 mL Once 9/5/2017 9/5/2017    Sig - Route: Take 3 mL by nebulization 1 (One) Time. - Nebulization    ipratropium-albuterol (DUO-NEB) nebulizer solution 3 mL 3 mL Every 4 Hours - RT 9/5/2017     Sig - Route: Take 3 mL by nebulization Every 4 (Four) Hours. - Nebulization    lidocaine (LIDODERM) 5 % 3 patch 3 patch Daily PRN 9/5/2017     Sig - Route: Place 3 patches on the skin Daily As Needed for Mild Pain  (apply 1-3 patches to painful areas, ex rib pain). - Transdermal    Magnesium Sulfate 2 gram infusion- Mg 1.6 - 1.9 mg/dL 2 g As Needed 9/5/2017     Sig - Route: Infuse 50 mL into a venous catheter As Needed (Mg 1.6 - 1.9 mg/dL). - Intravenous    Linked Group 1:  \"Or\" Linked Group Details        Magnesium Sulfate 2 gram infusion- Mg 1.6 - 1.9 mg/dL 2 g As Needed 9/6/2017     Sig - Route: Infuse 50 mL into a venous catheter As Needed (Mg 1.6 - 1.9 mg/dL). - Intravenous    Linked Group 2:  \"Or\" Linked Group Details        magnesium sulfate 3 gram infusion (1gm x 3) - Mg 1.1 - 1.5 mg/dL 1 g As Needed 9/6/2017     Sig - Route: Infuse 100 mL into a venous catheter As Needed (Mg 1.1 - 1.5 mg/dL). - Intravenous    Linked Group 2:  \"Or\" Linked Group Details        magnesium sulfate 4 gram infusion- Mg less than or equal to 1 mg/dL 4 g As Needed 9/6/2017     Sig - Route: Infuse 100 mL into a venous catheter As Needed (Mg less than or equal to 1 mg/dL). - Intravenous    Linked Group 2:  \"Or\" Linked " Group Details        melatonin sublingual tablet 5 mg 5 mg Nightly PRN 9/5/2017     Sig - Route: Place 1 tablet under the tongue At Night As Needed (sleep). - Sublingual    methylPREDNISolone sodium succinate (SOLU-Medrol) injection 125 mg 125 mg Once 9/5/2017 9/5/2017    Sig - Route: Infuse 2 mL into a venous catheter 1 (One) Time. - Intravenous    methylPREDNISolone sodium succinate (SOLU-Medrol) injection 60 mg 60 mg 2 Times Daily 9/6/2017     Sig - Route: Infuse 0.96 mL into a venous catheter 2 (Two) Times a Day. - Intravenous    montelukast (SINGULAIR) tablet 10 mg 10 mg Every Evening 9/6/2017     Sig - Route: Take 1 tablet by mouth Every Evening. - Oral    nicotine (NICODERM CQ) 14 MG/24HR patch 1 patch 1 patch Every 24 Hours Scheduled 9/6/2017     Sig - Route: Place 1 patch on the skin Daily. - Transdermal    Pharmacy Consult - [facility]  Daily 9/7/2017     Sig - Route: Daily. - Does not apply    Pharmacy to dose warfarin  Continuous PRN 9/5/2017     Sig - Route: Continuous As Needed for Consult (to keep INR ). - Does not apply    promethazine-codeine (PHENERGAN with CODEINE) 6.25-10 MG/5ML syrup 5 mL 5 mL Every 6 Hours PRN 9/5/2017     Sig - Route: Take 5 mL by mouth Every 6 (Six) Hours As Needed for Cough. - Oral    rosuvastatin (CRESTOR) tablet 10 mg 10 mg Nightly 9/5/2017 2/10/2018    Sig - Route: Take 1 tablet by mouth Every Night. - Oral    saccharomyces boulardii (FLORASTOR) capsule 250 mg 250 mg 2 Times Daily 9/6/2017     Sig - Route: Take 1 capsule by mouth 2 (Two) Times a Day. - Oral    sennosides-docusate sodium (SENOKOT-S) 8.6-50 MG tablet 2 tablet 2 tablet Nightly PRN 9/5/2017     Sig - Route: Take 2 tablets by mouth At Night As Needed for Constipation. - Oral    sodium chloride 0.9 % flush 1-10 mL 1-10 mL As Needed 9/5/2017     Sig - Route: Infuse 1-10 mL into a venous catheter As Needed for Line Care. - Intravenous    sodium chloride 0.9 % flush 10 mL 10 mL As Needed 9/5/2017     Sig - Route: Infuse  "10 mL into a venous catheter As Needed for Line Care. - Intravenous    Cosign for Ordering: Accepted by Jesus Liu MD on 9/5/2017 10:09 PM    spironolactone (ALDACTONE) tablet 25 mg 25 mg Daily 9/6/2017 3/4/2018    Sig - Route: Take 1 tablet by mouth Daily. - Oral    tiZANidine (ZANAFLEX) tablet 4 mg 4 mg 2 Times Daily PRN 9/5/2017     Sig - Route: Take 1 tablet by mouth 2 (Two) Times a Day As Needed for Muscle Spasms. - Oral    warfarin (COUMADIN) (dosing per levels)  Daily Warfarin 9/6/2017     Sig - Route: Daily. - Does not apply    cefTRIAXone (ROCEPHIN) IVPB 1 g (Discontinued) 1 g Every 24 Hours 9/6/2017 9/6/2017    Sig - Route: Infuse 50 mL into a venous catheter Daily. - Intravenous    ferrous sulfate tablet 325 mg (Discontinued) 325 mg Every Other Day 9/6/2017 9/5/2017    Sig - Route: Take 1 tablet by mouth Every Other Day. - Oral    magnesium sulfate 3 gram infusion (1gm x 3) - Mg 1.1 - 1.5 mg/dL (Discontinued) 1 g As Needed 9/5/2017 9/6/2017    Sig - Route: Infuse 100 mL into a venous catheter As Needed (Mg 1.1 - 1.5 mg/dL). - Intravenous    Reason for Discontinue: Dose adjustment    Linked Group 1:  \"Or\" Linked Group Details        magnesium sulfate 4 gram infusion- Mg less than or equal to 1 mg/dL (Discontinued) 4 g As Needed 9/5/2017 9/6/2017    Sig - Route: Infuse 100 mL into a venous catheter As Needed (Mg less than or equal to 1 mg/dL). - Intravenous    Reason for Discontinue: Dose adjustment    Linked Group 1:  \"Or\" Linked Group Details        potassium chloride (KLOR-CON) packet 40 mEq (Discontinued) 40 mEq As Needed 9/5/2017 9/6/2017    Sig - Route: Take 40 mEq by mouth As Needed (potassium replacement, see admin instructions). - Oral    Reason for Discontinue: Discontinued by another clinician    Linked Group 3:  \"Or\" Linked Group Details        potassium chloride (MICRO-K) CR capsule 40 mEq (Discontinued) 40 mEq As Needed 9/5/2017 9/6/2017    Sig - Route: Take 4 capsules by mouth As Needed " "(potassium replacement.  see admin instructions). - Oral    Reason for Discontinue: Discontinued by another clinician    Linked Group 3:  \"Or\" Linked Group Details        potassium chloride 10 mEq in 100 mL IVPB (Discontinued) 10 mEq Every 1 Hour PRN 9/5/2017 9/6/2017    Sig - Route: Infuse 100 mL into a venous catheter Every 1 (One) Hour As Needed (potassium protocol PERIPHERAL - see admin instructions). - Intravenous    Reason for Discontinue: Discontinued by another clinician    Linked Group 3:  \"Or\" Linked Group Details              Lab Results (last 72 hours)     Procedure Component Value Units Date/Time    POC Troponin, Rapid [[id number]]  (Normal) Collected:  09/05/17 1817    Specimen:  Blood Updated:  09/05/17 1835     Troponin I 0.04 ng/mL       Serial Number: [id number]:  [id number]       Lactic Acid, Plasma [[id number]]  (Normal) Collected:  09/05/17 1817    Specimen:  Blood Updated:  09/05/17 1842     Lactate 0.8 mmol/L       Falsely depressed results may occur on samples drawn from patients receiving N-Acetylcysteine (NAC) or Metamizole.       Comprehensive Metabolic Panel [[id number]]  (Abnormal) Collected:  09/05/17 1816    Specimen:  Blood Updated:  09/05/17 1849     Glucose 100 mg/dL      BUN 40 (H) mg/dL      Creatinine 1.90 (H) mg/dL      Sodium 134 mmol/L      Potassium 4.2 mmol/L      Chloride 101 mmol/L      CO2 28.0 mmol/L      Calcium 9.9 mg/dL      Total Protein 8.3 (H) g/dL      Albumin 4.60 g/dL      ALT (SGPT) 15 U/L      AST (SGOT) 31 U/L      Alkaline Phosphatase 198 (H) U/L      Total Bilirubin 0.3 mg/dL      eGFR Non African Amer 27 (L) mL/min/1.73      Globulin 3.7 gm/dL      A/G Ratio 1.2 (L) g/dL      BUN/Creatinine Ratio 21.1     Anion Gap 5.0 mmol/L     Narrative:       National Kid[employer] Guidelines    Stage     Description        GFR  1         Normal or High     90+  2         Mild decrease      60-89  3         Moderate decrease  30-59  4         Severe decrease    " 15-29  5         Kidney failure     <15    BNP [[id number]]  (Abnormal) Collected:  09/05/17 1817    Specimen:  Blood Updated:  09/05/17 1856     BNP 1514.0 (H) pg/mL     Light Blue Top [[id number]] Collected:  09/05/17 1816    Specimen:  Blood Updated:  09/05/17 2001     Extra Tube hold for add-on      Auto resulted       Green Top (Gel) [[id number]] Collected:  09/05/17 1816    Specimen:  Blood Updated:  09/05/17 2001     Extra Tube Hold for add-ons.      Auto resulted.       Gold Top - SST [[id number]] Collected:  09/05/17 1816    Specimen:  Blood Updated:  09/05/17 2001     Extra Tube Hold for add-ons.      Auto resulted.       Protime-INR [[id number]]  (Abnormal) Collected:  09/05/17 1816    Specimen:  Blood Updated:  09/05/17 2011     Protime 59.6 (C) Seconds      INR 5.20    Narrative:       Therapeutic Ranges for INR: 2.0-3.0 (PT 20-30)                              2.5-3.5 (PT 25-34)    CBC & Differential [[id number]] Collected:  09/05/17 1817    Specimen:  Blood Updated:  09/05/17 2140    Narrative:       The following orders were created for panel order CBC & Differential.  Procedure                               Abnormality         Status                     ---------                               -----------         ------                     CBC Auto Differential[[id number]]        Abnormal            Final result                 Please view results for these tests on the individual orders.    CBC Auto Differential [[id number]]  (Abnormal) Collected:  09/05/17 1817    Specimen:  Blood Updated:  09/05/17 2140     WBC 6.19 10*3/mm3      RBC 3.56 (L) 10*6/mm3      Hemoglobin 11.7 g/dL      Hematocrit 34.7 %      MCV 97.5 fL      MCH 32.9 (H) pg      MCHC 33.7 g/dL      RDW 12.9 %      RDW-SD 46.5 fl      MPV 11.4 fL      Platelets 130 (L) 10*3/mm3      Neutrophil % 80.1 (H) %      Lymphocyte % 15.5 (L) %      Monocyte % 3.7 %      Eosinophil % 0.5 %      Basophil % 0.0 %      Immature Grans % 0.2 %       Neutrophils, Absolute 4.96 10*3/mm3      Lymphocytes, Absolute 0.96 10*3/mm3      Monocytes, Absolute 0.23 10*3/mm3      Eosinophils, Absolute 0.03 10*3/mm3      Basophils, Absolute 0.00 10*3/mm3      Immature Grans, Absolute 0.01 10*3/mm3     [place] Draw [[id number]] Collected:  09/05/17 1816    Specimen:  Blood Updated:  09/05/17 2201    Narrative:       The following orders were created for panel order [place] Draw.  Procedure                               Abnormality         Status                     ---------                               -----------         ------                     Light Blue Top[[id number]]                                   Final result               Green Top (Gel)[[id number]]                                  Final result               Lavender Top[[id number]]                                     Final result               Gold Top - SST[[id number]]                                   Final result               Green Top (No Gel)[[id number]]                                                            Please view results for these tests on the individual orders.    Lavender Top [[id number]] Collected:  09/05/17 1817    Specimen:  Blood Updated:  09/05/17 2201     Extra Tube hold for add-on      Auto resulted       Urinalysis With / Culture If Indicated [[id number]]  (Abnormal) Collected:  09/05/17 2248    Specimen:  Urine from Urine, Clean Catch Updated:  09/05/17 2328     Color, UA Yellow     Appearance, UA Turbid (A)     pH, UA 5.5     Specific Gravity, UA 1.012     Glucose, UA Negative     Ketones, UA Negative     Bilirubin, UA Negative     Blood, UA Trace (A)     Protein,  mg/dL (2+) (A)     Leuk Esterase, UA Large (3+) (A)     Nitrite, UA Negative     Urobilinogen, UA 0.2 E.U./dL    Urinalysis, Microscopic Only [[id number]]  (Abnormal) Collected:  09/05/17 2248    Specimen:  Urine from Urine, Clean Catch Updated:  09/05/17 2328     RBC, UA 3-6 (A) /HPF      WBC, UA Too Numerous to Count (A)  /HPF      Bacteria, UA 3+ (A) /HPF      Squamous Epithelial Cells, UA 0-2 /HPF      Hyaline Casts, UA 0-6 /LPF      Methodology Manual Light Microscopy    Urine Drug Screen [[id number]]  (Normal) Collected:  09/05/17 2248    Specimen:  Urine from Urine, Clean Catch Updated:  09/05/17 2335     THC, Screen, Urine Negative     Phencyclidine (PCP), Urine Negative     Cocaine Screen, Urine Negative     Methamphetamine, Urine Negative     Opiate Screen Negative     Amphetamine Screen, Urine Negative     Benzodiazepine Screen, Urine Negative     Tricyclic Antidepressants Screen Negative     Methadone Screen, Urine Negative     Barbiturates Screen, Urine Negative     Oxycodone Screen, Urine Negative     Propoxyphene Screen Negative     Buprenorphine, Screen, Urine Negative    Narrative:       Cutoff For Drugs Screened:    Amphetamines               500 ng/ml  Barbiturates               200 ng/ml  Benzodiazepines            150 ng/ml  Cocaine                    150 ng/ml  Methadone                  200 ng/ml  Opiates                    100 ng/ml  Phencyclidine               25 ng/ml  THC                            50 ng/ml  Methamphetamine            500 ng/ml  Tricyclic Antidepressants  300 ng/ml  Oxycodone                  100 ng/ml  Propoxyphene               300 ng/ml  Buprenorphine               10 ng/ml    The normal value for all drugs tested is negative. This report includes unconfirmed screening results, with the cutoff values listed, to be used for medical treatment purposes only.  Unconfirmed results must not be used for non-medical purposes such as employment or legal testing.  Clinical consideration should be applied to any drug of abuse test, particularly when unconfirmed results are used.      Magnesium [[id number]]  (Normal) Collected:  09/05/17 1816    Specimen:  Blood Updated:  09/06/17 0013     Magnesium 2.0 mg/dL     D-dimer, Quantitative [[id number]]  (Normal) Collected:  09/05/17 2336    Specimen:  Blood  Updated:  09/06/17 0036     D-Dimer, Quantitative 0.25 mg/L (FEU)     Narrative:       Negative predictive value for exclusion of venous thromboembolism: < or = 0.5 mg/L (FEU)    Troponin [[id number]]  (Abnormal) Collected:  09/06/17 0010    Specimen:  Blood Updated:  09/06/17 0045     Troponin I 0.044 (H) ng/mL     Protime-INR [[id number]]  (Abnormal) Collected:  09/06/17 0017    Specimen:  Blood Updated:  09/06/17 0056     Protime 57.8 (C) Seconds       Confirmed/called        INR 5.04    Narrative:       Therapeutic Ranges for INR: 2.0-3.0 (PT 20-30)                              2.5-3.5 (PT 25-34)    Protime-INR [[id number]]  (Abnormal) Collected:  09/06/17 0550    Specimen:  Blood Updated:  09/06/17 0702     Protime 55.6 (C) Seconds      INR 4.86    Narrative:       Therapeutic Ranges for INR: 2.0-3.0 (PT 20-30)                              2.5-3.5 (PT 25-34)    Troponin [[id number]]  (Normal) Collected:  09/06/17 0550    Specimen:  Blood Updated:  09/06/17 0708     Troponin I 0.028 ng/mL     Blood Culture [[id number]]  (Normal) Collected:  09/05/17 2129    Specimen:  Blood from Hand, Right Updated:  09/06/17 2201     Blood Culture No growth at 24 hours    Blood Culture [[id number]]  (Normal) Collected:  09/05/17 2129    Specimen:  Blood from Arm, Right Updated:  09/06/17 2201     Blood Culture No growth at 24 hours    Protime-INR [[id number]]  (Abnormal) Collected:  09/07/17 0447    Specimen:  Blood Updated:  09/07/17 0610     Protime 54.3 (C) Seconds       Verified by repeat analysis.         INR 4.75    Narrative:       Therapeutic Ranges for INR: 2.0-3.0 (PT 20-30)                              2.5-3.5 (PT 25-34)    Basic Metabolic Panel [[id number]]  (Abnormal) Collected:  09/07/17 0447    Specimen:  Blood Updated:  09/07/17 0651     Glucose 132 (H) mg/dL      BUN 67 (H) mg/dL      Creatinine 2.20 (H) mg/dL      Sodium 133 mmol/L      Potassium 4.3 mmol/L      Chloride 96 (L) mmol/L      CO2 27.0 mmol/L      Calcium  9.2 mg/dL      eGFR Non African Amer 23 (L) mL/min/1.73      BUN/Creatinine Ratio 30.5 (H)     Anion Gap 10.0 mmol/L     Narrative:       National Kidney Foundation Guidelines    Stage     Description        GFR  1         Normal or High     90+  2         Mild decrease      60-89  3         Moderate decrease  30-59  4         Severe decrease    15-29  5         Kidney failure     <15    Urine Culture [[id number]]  (Abnormal) Collected:  09/05/17 2248    Specimen:  Urine from Urine, Clean Catch Updated:  09/07/17 0830     Urine Culture >100,000 CFU/mL Gram Negative Bacilli (A)          Imaging Results (last 72 hours)     Procedure Component Value Units Date/Time    XR Chest 2 View [[id number]]  (Abnormal) Collected:  09/05/17 1821     Updated:  09/05/17 2032    Narrative:       EXAM:    XR Chest, 2 Views    CLINICAL HISTORY:    56 years old, female; Pain and signs and symptoms; Shortness of breath; Chest   pain; Left-sided chest pain; Additional info: SOA. Pt has HX of left sided rib   FX, pt attributes left sided chest pain to old rib FX    TECHNIQUE:    Frontal and lateral views of the chest.    COMPARISON:    CR - XR CHEST 1 VW 3/3/2017 11:01:41 AM    FINDINGS:      Lungs:  Unremarkable.  No consolidation.      Pleural space:  Unremarkable.  No pneumothorax.      Heart:  Unremarkable.  No cardiomegaly.      Mediastinum:  Unremarkable.      Bones/joints:  Status post sternotomy. Cardiac valve replacement.      Other findings:  Left-sided pacing device in place.      Impression:           No acute findings.      THIS DOCUMENT HAS BEEN ELECTRONICALLY SIGNED BY AKANKSHA MARINO MD          ECG/EMG Results (last 72 hours)     Procedure Component Value Units Date/Time    ECG 12 Lead [[id number]] Collected:  09/06/17 0020     Updated:  09/06/17 0130    Narrative:       Test Reason : EKG/QTc monitoring, prolonged and got a dose of azithromycin  Blood Pressure : **/** mmHG  Vent. Rate : 075 BPM     Atrial Rate : 075 BPM     P-R  Int : 146 ms          QRS Dur : 108 ms      QT Int : 424 ms       P-R-T Axes : 042 -26 202 degrees     QTc Int : 473 ms    Sinus rhythm with premature ventricular complexes or fusion complexes  ST & T wave abnormality, consider inferior ischemia  ST & T wave abnormality, consider anterolateral ischemia  Prolonged QT  Abnormal ECG  When compared with ECG of 05-SEP-2017 18:06, (Unconfirmed)  fusion complexes are now present  premature ventricular complexes are now present  Confirmed by EFRAIN GRANGER MD ([id number]) on 2017 1:30:22 AM    Referred By:  DR. GARCIA           Confirmed By:EFRAIN GRANGER MD    ECG 12 Lead [[id number]] Collected:  17     Updated:  17    Narrative:       Test Reason : SOA Protocol  Blood Pressure : **/** mmHG  Vent. Rate : 085 BPM     Atrial Rate : 085 BPM     P-R Int : 136 ms          QRS Dur : 098 ms      QT Int : 390 ms       P-R-T Axes : 025 -25 153 degrees     QTc Int : 464 ms    Normal sinus rhythm  ST & T wave abnormality, consider lateral ischemia  Prolonged QT  Abnormal ECG  When compared with ECG of 03-MAR-2017 05:49,  T wave inversion more evident in Lateral leads  Confirmed by EFRAIN GRANGER MD ([id number]) on 2017 1:31:38 AM    Referred By:  ED MD           Confirmed By:EFRAIN GRANGER MD             Physician Progress Notes (last 72 hours) (Notes from 2017 10:06 AM through 2017 10:06 AM)      Luci Clancy MD at 2017  9:34 PM  Version 1 of 1             [facility]  PROGRESS NOTE      Date of Admission: 2017  Length of Stay: 1  Primary Care Physician: Peter Rdz MD    Patient Name: Ashely Roldan  : 1960  MRN: [id number]    Subjective     CC:  dyspnea    History of Present Illness :   Pt seen ~11:30am. SOA at rest improved since initial presentation after solumedrol, nebs, IV Lasix.  Still with orthopnea worse than baseline and JAIMES.  Sats 84% on RA while at bedside, 2L O2 placed and improved to  "92%. C/o pleuritic chest pain, worse with coughing spells.     Review of Systems   Constitutional: Positive for fatigue.   Respiratory: Positive for chest tightness, shortness of breath and wheezing.    Cardiovascular: Negative for chest pain and leg swelling.   Genitourinary: Negative for difficulty urinating, dysuria, frequency and urgency.   All other systems reviewed and are negative.   :    Otherwise complete ROS is negative except as mentioned in the HPI.      Objective     Vital Signs: /63  Pulse 63  Temp 97.8 °F (36.6 °C) (Oral)   Resp 16  Ht 69\" (175.3 cm)  Wt 111 lb (50.3 kg)  SpO2 94%  BMI 16.39 kg/m2     Physical Exam :  Constitutional: no acute distress, awake, alert  HENT: NCAT, mucous membranes moist  Respiratory: wheezes throughout with fine crackles at bilateral dependent bases, respiratory effort normal   Cardiovascular: RRR, mechanical MV click  Gastrointestinal: Positive bowel sounds, soft, nontender, nondistended  Musculoskeletal: No bilateral ankle edema  Psychiatric: oriented x 3, appropriate affect, cooperative  Neurologic: Strength symmetric in all extremities, CN grossly in tact to confrontation, speech is clear  Derm: no rashes        Results Reviewed:  I have personally reviewed current lab, radiology, and data and agree.      Results from last 7 days  Lab Units 09/06/17  0550 09/06/17  0017 09/05/17 1817 09/05/17  1816   WBC 10*3/mm3  --   --  6.19  --    HEMOGLOBIN g/dL  --   --  11.7  --    HEMATOCRIT %  --   --  34.7  --    PLATELETS 10*3/mm3  --   --  130*  --    INR  4.86 5.04  --  5.20       Results from last 7 days  Lab Units 09/06/17  0550 09/06/17  0010 09/05/17  1816   SODIUM mmol/L  --   --  134   POTASSIUM mmol/L  --   --  4.2   CHLORIDE mmol/L  --   --  101   CO2 mmol/L  --   --  28.0   BUN mg/dL  --   --  40*   CREATININE mg/dL  --   --  1.90*   GLUCOSE mg/dL  --   --  100   CALCIUM mg/dL  --   --  9.9   ALT (SGPT) U/L  --   --  15   AST (SGOT) U/L  --   --  " 31   TROPONIN I ng/mL 0.028 0.044*  --      BNP   Date Value Ref Range Status   09/05/2017 1514.0 (H) 0.0 - 100.0 pg/mL Final     No results found for: PHART  Blood Culture   Date Value Ref Range Status   09/05/2017 No growth at less than 24 hours  Preliminary   09/05/2017 No growth at less than 24 hours  Preliminary       Imaging Results (last 24 hours)     ** No results found for the last 24 hours. **            I have reviewed the medications.      Assessment / Plan     Assessment & Plan :  Hospital Problem List     * (Principal)Acute on chronic respiratory failure with hypoxia    Essential hypertension (Chronic)    Overview Signed 10/19/2015  8:56 AM by Digna Santana     benign essential         Ischemic cardiomyopathy (Chronic)    Overview Signed 2/6/2017  8:44 AM by MAN Quesada. History of MI December 2009: s/p stent to RCA and LAD, EF 40%  B. CABGx2 March, 2010: SVG to OMB-1, SVG to PDA  C. EF 30% post-operatively, s/p [place] ICD placement, 2011  D. Echo July 2014: Severe global hypokinesis with EF 12%, moderate AI, mechanical mitral valve, moderate to severe TR, RVSP 43mmHg   E. Echo 2/4/17: EF 18%, mild to mod AI, mod TR, grossly normal prosthetic mitral valve         PVD (peripheral vascular disease) (Chronic)    Lupus (systemic lupus erythematosus) (Chronic)    Chronic coronary artery disease (Chronic)    COPD exacerbation    Tobacco abuse (Chronic)    Acute systolic heart failure and valvular disease    Overview Addendum 2/6/2017  8:44 AM by MAN Quesada             AICD (automatic cardioverter/defibrillator) present (Chronic)    Supratherapeutic INR    CKD (chronic kidney disease), stage IV (Chronic)    UTI (urinary tract infection)    Elevated troponin               Brief Hospital Course to date:  Ashely Roldan is a 56 y.o. female with known COPD, CKD III-IV, AICD, HTN, ischemic CM, SLE, PVD,ongoing tobacco abuse, severe systolic heart failure (EF 18% in Feb 2017),  valvular heart disease s/p MMV on coumadin who presents to [facility] ED with worsening dyspnea, orthopnea and generally ill feeling for the 4 days.  Evaluation significant for wheezes and hypoxia to mid 80's% on RA consistent with component of COPD exac with also elevated BNP and symptoms of A/C systolic CHF.  Also incidental finding of UTI but asymptomatic:      Plan:    - initiatedon CTX for UTI, Doxy for COPD exac.  Uncomplicated female UTI only req's ~3 day course abx, since pt nontoxic will transition to PO Ceftin to start tomorrow and treat for remaining 2 days.  Doxy course x 7 days for COPD.  - continue solumedrol and scheduled nebs   - wean O2 as tolerates  - continue BID IV Lasix for now but likely can transition back to home 40mg PO daily if resp status improved tomorrow.  BMP in am as watch creatinine given more aggressive IV lasix currently.  - supratheraputic INR, no overt bleeding.  Pharmacy dosing.        Disposition: I expect the patient to be discharged ~2 days.        Luci Clancy MD  09/06/17  9:34 PM           Electronically signed by Luci Clancy MD at 9/6/2017  9:55 PM        Consult Notes (last 72 hours) (Notes from 9/4/2017 10:06 AM through 9/7/2017 10:06 AM)     No notes of this type exist for this encounter.

## 2017-09-07 NOTE — PROGRESS NOTES
"Pharmacy Consult  -  Warfarin    Ashely Roldan is a  56 y.o. female   Height - 69\" (175.3 cm)  Weight - 118 lb 8 oz (53.8 kg)    Consulting Provider: - IAIN Quintanilla  Indication: - Mechanical Mitral Valve  Goal INR: -  2.5-3.5  Home Regimen:   - warfarin 2 mg x two days, then 3 mg x 1 day, then repeat    Bridge Therapy: No         Drug-Drug Interactions with current regimen:   none    Warfarin Dosing During Admission:    Date  9/5 9/6 9/7         INR  5.2 5.04 4.75         Dose  hold hold hold              Labs:      Results from last 7 days  Lab Units 09/07/17  0447 09/06/17  0550 09/06/17  0017 09/05/17  1817 09/05/17  1816   INR  4.75 4.86 5.04  --  5.20   HEMOGLOBIN g/dL  --   --   --  11.7  --    HEMATOCRIT %  --   --   --  34.7  --    PLATELETS 10*3/mm3  --   --   --  130*  --        Results from last 7 days  Lab Units 09/07/17 0447 09/05/17  1816   SODIUM mmol/L 133 134   POTASSIUM mmol/L 4.3 4.2   CHLORIDE mmol/L 96* 101   CO2 mmol/L 27.0 28.0   BUN mg/dL 67* 40*   CREATININE mg/dL 2.20* 1.90*   CALCIUM mg/dL 9.2 9.9   BILIRUBIN mg/dL  --  0.3   ALK PHOS U/L  --  198*   ALT (SGPT) U/L  --  15   AST (SGOT) U/L  --  31   GLUCOSE mg/dL 132* 100       Diet: regular diet ordered    Assessment/Plan:   1. We will hold warfarin for now due to Ms. Roldan having a SUPRAtherapeutic INR.  2. Daily PT-INR labs have been ordered.  3. Pharmacy will adjust Ms. Roldan's warfarin dose as needed based on daily INR result.       Thank you for this consult,  Makenzie Bryant, PharmD  09/07/17  9:47 AM        "

## 2017-09-07 NOTE — PROGRESS NOTES
"Adult Nutrition  Assessment/PES    Patient Name:  Ashely Roldan  YOB: 1960  MRN: [id number]  Admit Date:  9/5/2017    Assessment Date:  9/7/2017     Pt does not meet criteria for nutrition risk at this time. RD will continue to follow per protocol.         Reason for Assessment       09/07/17 1428    Reason for Assessment    Identified At Risk By Screening Criteria BMI    Time Spent (min) 10                Anthropometrics       09/07/17 1428    Anthropometrics    Height Method Stated    Height 175.3 cm (69\")    Weight Method Standing scale    Weight 53.8 kg (118 lb 8 oz)    Ideal Body Weight (IBW)    Ideal Body Weight (IBW), Female 66.83    % Ideal Body Weight 80.59    Usual Body Weight (UBW)    Usual Body Weight 53.3 kg (117 lb 8 oz)    % Usual Body Weight 100.85    Body Mass Index (BMI)    BMI (kg/m2) 17.54                        Problem/Interventions:                    Nutrition Intervention       09/07/17 1429    Nutrition Intervention    RD/Tech Action Follow Tx progress;Advise alternate selection;Interview for preference;Menu provided;Menu adjusted              Education/Evaluation       09/07/17 1429    Monitor/Evaluation    Monitor Per protocol            Electronically signed by:  Kaley Gallego RD  09/07/17 2:29 PM  "

## 2017-09-08 ENCOUNTER — APPOINTMENT (OUTPATIENT)
Dept: ULTRASOUND IMAGING | Facility: HOSPITAL | Age: 57
End: 2017-09-08

## 2017-09-08 LAB
ANION GAP SERPL CALCULATED.3IONS-SCNC: 13 MMOL/L (ref 3–11)
BACTERIA SPEC AEROBE CULT: ABNORMAL
BUN BLD-MCNC: 75 MG/DL (ref 9–23)
BUN/CREAT SERPL: 32.6 (ref 7–25)
CALCIUM SPEC-SCNC: 9.3 MG/DL (ref 8.7–10.4)
CHLORIDE SERPL-SCNC: 96 MMOL/L (ref 99–109)
CO2 SERPL-SCNC: 26 MMOL/L (ref 20–31)
CREAT BLD-MCNC: 2.3 MG/DL (ref 0.6–1.3)
CREAT UR-MCNC: 36.1 MG/DL
GFR SERPL CREATININE-BSD FRML MDRD: 22 ML/MIN/1.73
GLUCOSE BLD-MCNC: 135 MG/DL (ref 70–100)
INR PPP: 2.19
MAGNESIUM SERPL-MCNC: 1.9 MG/DL (ref 1.3–2.7)
POTASSIUM BLD-SCNC: 3.5 MMOL/L (ref 3.5–5.5)
PROT UR-MCNC: 9 MG/DL (ref 1–14)
PROTHROMBIN TIME: 24.4 SECONDS (ref 9.6–11.5)
SODIUM BLD-SCNC: 135 MMOL/L (ref 132–146)

## 2017-09-08 PROCEDURE — 80048 BASIC METABOLIC PNL TOTAL CA: CPT | Performed by: INTERNAL MEDICINE

## 2017-09-08 PROCEDURE — 25010000002 METHYLPREDNISOLONE PER 125 MG: Performed by: NURSE PRACTITIONER

## 2017-09-08 PROCEDURE — 94799 UNLISTED PULMONARY SVC/PX: CPT

## 2017-09-08 PROCEDURE — 82570 ASSAY OF URINE CREATININE: CPT | Performed by: INTERNAL MEDICINE

## 2017-09-08 PROCEDURE — 83735 ASSAY OF MAGNESIUM: CPT | Performed by: INTERNAL MEDICINE

## 2017-09-08 PROCEDURE — 85610 PROTHROMBIN TIME: CPT | Performed by: NURSE PRACTITIONER

## 2017-09-08 PROCEDURE — 25010000002 METHYLPREDNISOLONE PER 40 MG: Performed by: INTERNAL MEDICINE

## 2017-09-08 PROCEDURE — 76775 US EXAM ABDO BACK WALL LIM: CPT

## 2017-09-08 PROCEDURE — 94640 AIRWAY INHALATION TREATMENT: CPT

## 2017-09-08 PROCEDURE — 99233 SBSQ HOSP IP/OBS HIGH 50: CPT | Performed by: INTERNAL MEDICINE

## 2017-09-08 PROCEDURE — 84156 ASSAY OF PROTEIN URINE: CPT | Performed by: INTERNAL MEDICINE

## 2017-09-08 PROCEDURE — 25010000002 ONDANSETRON PER 1 MG: Performed by: INTERNAL MEDICINE

## 2017-09-08 PROCEDURE — 94760 N-INVAS EAR/PLS OXIMETRY 1: CPT

## 2017-09-08 RX ORDER — METHYLPREDNISOLONE SODIUM SUCCINATE 40 MG/ML
40 INJECTION, POWDER, LYOPHILIZED, FOR SOLUTION INTRAMUSCULAR; INTRAVENOUS 2 TIMES DAILY
Status: DISCONTINUED | OUTPATIENT
Start: 2017-09-08 | End: 2017-09-09 | Stop reason: HOSPADM

## 2017-09-08 RX ORDER — FUROSEMIDE 40 MG/1
40 TABLET ORAL DAILY
Status: DISCONTINUED | OUTPATIENT
Start: 2017-09-09 | End: 2017-09-08

## 2017-09-08 RX ORDER — ONDANSETRON 2 MG/ML
4 INJECTION INTRAMUSCULAR; INTRAVENOUS EVERY 6 HOURS PRN
Status: DISCONTINUED | OUTPATIENT
Start: 2017-09-08 | End: 2017-09-09 | Stop reason: HOSPADM

## 2017-09-08 RX ORDER — CEFUROXIME AXETIL 250 MG/1
250 TABLET ORAL EVERY 12 HOURS SCHEDULED
Status: DISCONTINUED | OUTPATIENT
Start: 2017-09-08 | End: 2017-09-09 | Stop reason: HOSPADM

## 2017-09-08 RX ORDER — WARFARIN SODIUM 2 MG/1
2 TABLET ORAL
Status: DISCONTINUED | OUTPATIENT
Start: 2017-09-08 | End: 2017-09-09 | Stop reason: HOSPADM

## 2017-09-08 RX ORDER — FUROSEMIDE 20 MG/1
20 TABLET ORAL DAILY
Status: DISCONTINUED | OUTPATIENT
Start: 2017-09-09 | End: 2017-09-09 | Stop reason: HOSPADM

## 2017-09-08 RX ORDER — POTASSIUM CHLORIDE 750 MG/1
20 CAPSULE, EXTENDED RELEASE ORAL ONCE
Status: COMPLETED | OUTPATIENT
Start: 2017-09-08 | End: 2017-09-08

## 2017-09-08 RX ORDER — ASPIRIN 81 MG/1
81 TABLET, CHEWABLE ORAL DAILY
Status: DISCONTINUED | OUTPATIENT
Start: 2017-09-08 | End: 2017-09-09 | Stop reason: HOSPADM

## 2017-09-08 RX ORDER — CEFUROXIME AXETIL 250 MG/1
250 TABLET ORAL EVERY 12 HOURS SCHEDULED
Status: DISCONTINUED | OUTPATIENT
Start: 2017-09-08 | End: 2017-09-08

## 2017-09-08 RX ADMIN — HYDROCODONE BITARTRATE AND ACETAMINOPHEN 1 TABLET: 5; 325 TABLET ORAL at 23:03

## 2017-09-08 RX ADMIN — CEFUROXIME AXETIL 500 MG: 250 TABLET ORAL at 08:20

## 2017-09-08 RX ADMIN — CARVEDILOL 3.12 MG: 3.12 TABLET, FILM COATED ORAL at 08:23

## 2017-09-08 RX ADMIN — WARFARIN SODIUM 2 MG: 2 TABLET ORAL at 11:53

## 2017-09-08 RX ADMIN — HYDRALAZINE HYDROCHLORIDE 25 MG: 25 TABLET ORAL at 14:37

## 2017-09-08 RX ADMIN — METHYLPREDNISOLONE SODIUM SUCCINATE 40 MG: 40 INJECTION, POWDER, FOR SOLUTION INTRAMUSCULAR; INTRAVENOUS at 18:33

## 2017-09-08 RX ADMIN — PROMETHAZINE HYDROCHLORIDE AND CODEINE PHOSPHATE 5 ML: 6.25; 1 SOLUTION ORAL at 00:09

## 2017-09-08 RX ADMIN — HYDRALAZINE HYDROCHLORIDE 25 MG: 25 TABLET ORAL at 05:18

## 2017-09-08 RX ADMIN — Medication 325 MG: at 11:53

## 2017-09-08 RX ADMIN — GUAIFENESIN 600 MG: 600 TABLET, EXTENDED RELEASE ORAL at 08:20

## 2017-09-08 RX ADMIN — ONDANSETRON 4 MG: 2 INJECTION INTRAMUSCULAR; INTRAVENOUS at 11:53

## 2017-09-08 RX ADMIN — CARVEDILOL 3.12 MG: 3.12 TABLET, FILM COATED ORAL at 18:33

## 2017-09-08 RX ADMIN — CLONAZEPAM 2 MG: 1 TABLET ORAL at 14:37

## 2017-09-08 RX ADMIN — ONDANSETRON 4 MG: 2 INJECTION INTRAMUSCULAR; INTRAVENOUS at 20:55

## 2017-09-08 RX ADMIN — MONTELUKAST SODIUM 10 MG: 10 TABLET, FILM COATED ORAL at 18:33

## 2017-09-08 RX ADMIN — IPRATROPIUM BROMIDE AND ALBUTEROL SULFATE 3 ML: .5; 3 SOLUTION RESPIRATORY (INHALATION) at 16:38

## 2017-09-08 RX ADMIN — IPRATROPIUM BROMIDE AND ALBUTEROL SULFATE 3 ML: .5; 3 SOLUTION RESPIRATORY (INHALATION) at 09:24

## 2017-09-08 RX ADMIN — CLONAZEPAM 2 MG: 1 TABLET ORAL at 05:18

## 2017-09-08 RX ADMIN — Medication 250 MG: at 18:33

## 2017-09-08 RX ADMIN — CETIRIZINE HYDROCHLORIDE 10 MG: 10 TABLET, FILM COATED ORAL at 20:50

## 2017-09-08 RX ADMIN — ROSUVASTATIN CALCIUM 10 MG: 10 TABLET ORAL at 20:50

## 2017-09-08 RX ADMIN — MAGNESIUM SULFATE HEPTAHYDRATE 4 G: 40 INJECTION, SOLUTION INTRAVENOUS at 08:25

## 2017-09-08 RX ADMIN — PROMETHAZINE HYDROCHLORIDE AND CODEINE PHOSPHATE 5 ML: 6.25; 1 SOLUTION ORAL at 08:20

## 2017-09-08 RX ADMIN — HYDRALAZINE HYDROCHLORIDE 25 MG: 25 TABLET ORAL at 21:45

## 2017-09-08 RX ADMIN — IPRATROPIUM BROMIDE AND ALBUTEROL SULFATE 3 ML: .5; 3 SOLUTION RESPIRATORY (INHALATION) at 18:54

## 2017-09-08 RX ADMIN — DOXYCYCLINE HYCLATE 100 MG: 100 CAPSULE ORAL at 18:33

## 2017-09-08 RX ADMIN — DOXYCYCLINE HYCLATE 100 MG: 100 CAPSULE ORAL at 05:18

## 2017-09-08 RX ADMIN — DEXTROMETHORPHAN 30 MG: 30 SUSPENSION, EXTENDED RELEASE ORAL at 08:22

## 2017-09-08 RX ADMIN — BUDESONIDE AND FORMOTEROL FUMARATE DIHYDRATE 2 PUFF: 160; 4.5 AEROSOL RESPIRATORY (INHALATION) at 18:55

## 2017-09-08 RX ADMIN — SPIRONOLACTONE 25 MG: 25 TABLET, FILM COATED ORAL at 08:20

## 2017-09-08 RX ADMIN — ASPIRIN 81 MG 81 MG: 81 TABLET ORAL at 08:23

## 2017-09-08 RX ADMIN — IPRATROPIUM BROMIDE AND ALBUTEROL SULFATE 3 ML: .5; 3 SOLUTION RESPIRATORY (INHALATION) at 13:16

## 2017-09-08 RX ADMIN — Medication 250 MG: at 08:20

## 2017-09-08 RX ADMIN — PROMETHAZINE HYDROCHLORIDE AND CODEINE PHOSPHATE 5 ML: 6.25; 1 SOLUTION ORAL at 15:15

## 2017-09-08 RX ADMIN — POTASSIUM CHLORIDE 20 MEQ: 750 CAPSULE, EXTENDED RELEASE ORAL at 14:37

## 2017-09-08 RX ADMIN — HYDROCODONE BITARTRATE AND ACETAMINOPHEN 1 TABLET: 5; 325 TABLET ORAL at 14:37

## 2017-09-08 RX ADMIN — HYDROXYCHLOROQUINE SULFATE 200 MG: 200 TABLET, FILM COATED ORAL at 14:37

## 2017-09-08 RX ADMIN — CEFUROXIME AXETIL 250 MG: 250 TABLET ORAL at 20:49

## 2017-09-08 RX ADMIN — GUAIFENESIN 600 MG: 600 TABLET, EXTENDED RELEASE ORAL at 20:50

## 2017-09-08 RX ADMIN — METHYLPREDNISOLONE SODIUM SUCCINATE 60 MG: 125 INJECTION, POWDER, FOR SOLUTION INTRAMUSCULAR; INTRAVENOUS at 08:24

## 2017-09-08 NOTE — PROGRESS NOTES
Continued Stay Note  [facility]     Patient Name: Ashely Roldan  MRN: [id number]  Today's Date: 9/8/2017    Admit Date: 9/5/2017          Discharge Plan       09/08/17 1236    Case Management/Social Work Plan    Plan discharge plan    Patient/Family In Agreement With Plan yes    Additional Comments CM spoke with pt and plan is home when medically ready for discharge. Pt states son, Floyd,  will help her and provide transportation for her. Pt denies discharge needs. CM will cont to follow.              Discharge Codes     None        Expected Discharge Date and Time     Expected Discharge Date Expected Discharge Time    Sep 9, 2017             Marsha Lou RN

## 2017-09-08 NOTE — PROGRESS NOTES
"Pharmacy Consult  -  Warfarin    Ashely Roldan is a  56 y.o. female   Height - 69\" (175.3 cm)  Weight - 118 lb 8 oz (53.8 kg)    Consulting Provider: - IAIN Quintnailla  Indication: - Mechanical Mitral Valve  Goal INR: -  2.5-3.5  Home Regimen:   - warfarin 2 mg x two days, then 3 mg x 1 day, then repeat    Bridge Therapy: No         Drug-Drug Interactions with current regimen:   Aspirin - increased bleeding risk   Doxycycline may increase INR    Warfarin Dosing During Admission:    Date  9/5 9/6 9/7 9/8        INR  5.2 5.04 4.75 2.19        Dose  hold hold hold 2 mg             Labs:      Results from last 7 days  Lab Units 09/08/17  0515 09/07/17 0447 09/06/17  0550 09/06/17  0017 09/05/17 1817 09/05/17  1816   INR  2.19 4.75 4.86 5.04  --  5.20   HEMOGLOBIN g/dL  --   --   --   --  11.7  --    HEMATOCRIT %  --   --   --   --  34.7  --    PLATELETS 10*3/mm3  --   --   --   --  130*  --        Results from last 7 days  Lab Units 09/08/17  0515 09/07/17 0447 09/05/17 1816   SODIUM mmol/L 135 133 134   POTASSIUM mmol/L 3.5 4.3 4.2   CHLORIDE mmol/L 96* 96* 101   CO2 mmol/L 26.0 27.0 28.0   BUN mg/dL 75* 67* 40*   CREATININE mg/dL 2.30* 2.20* 1.90*   CALCIUM mg/dL 9.3 9.2 9.9   BILIRUBIN mg/dL  --   --  0.3   ALK PHOS U/L  --   --  198*   ALT (SGPT) U/L  --   --  15   AST (SGOT) U/L  --   --  31   GLUCOSE mg/dL 135* 132* 100       Diet: regular diet ordered    Assessment/Plan:   INR decreased significantly overnight after holding warfarin for 3 days for supratherapeutic INR.  It is now slightly subtherapeutic for her indication.     Will reinitiate warfarin at 2 mg daily for now and continue to monitor daily INR.    Pharmacy will continue to follow and adjust plan as needed.     Thank you for this consult,  Makenzie Marefat, PharmD  09/08/17  9:47 AM        "

## 2017-09-08 NOTE — PROGRESS NOTES
"    [facility] Medicine Services  PROGRESS NOTE      Date of Admission: 2017  Length of Stay: 3  Primary Care Physician: Peter Rzd MD    Patient Name: Ashely Roldan  : 1960  MRN: [id number]    Subjective     CC:  dyspnea    Shortness of Breath   Pertinent negatives include no wheezing.   Breathing improved today, no fever, no productive breathing today  Review of Systems   Respiratory: Positive for shortness of breath. Negative for wheezing.    Genitourinary: Negative for difficulty urinating, dysuria, frequency and urgency.   All other systems reviewed and are negative.      Otherwise complete ROS is negative except as mentioned in the HPI.      Objective     Vital Signs: /76 (BP Location: Left arm, Patient Position: Lying)  Pulse 78  Temp 97.7 °F (36.5 °C) (Oral)   Resp 18  Ht 69\" (175.3 cm)  Wt 118 lb 8 oz (53.8 kg)  SpO2 98%  BMI 17.5 kg/m2     Physical Exam :  Constitutional: no acute distress, awake, alert  HENT: NCAT, mucous membranes moist  Respiratory:mild end-expiratory wheezes throughout; no crackles today, normal effort   Cardiovascular: RRR, mechanical MV click  Gastrointestinal: Positive bowel sounds, soft, nontender, nondistended  Musculoskeletal: No bilateral ankle edema  Psychiatric: oriented x 3, appropriate affect, cooperative  Neurologic: Strength symmetric in all extremities, CN grossly in tact to confrontation, speech is clear  Derm: no rashes  No cce legs        Results Reviewed:  I have personally reviewed current lab, radiology, and data and agree.      Results from last 7 days  Lab Units 17  0515 17  0447 17  0550  17  1817   WBC 10*3/mm3  --   --   --   --  6.19   HEMOGLOBIN g/dL  --   --   --   --  11.7   HEMATOCRIT %  --   --   --   --  34.7   PLATELETS 10*3/mm3  --   --   --   --  130*   INR  2.19 4.75 4.86  < >  --    < > = values in this interval not displayed.    Results from last 7 days  Lab Units " 09/08/17  0515 09/07/17  0447 09/06/17  0550 09/06/17  0010 09/05/17  1816   SODIUM mmol/L 135 133  --   --  134   POTASSIUM mmol/L 3.5 4.3  --   --  4.2   CHLORIDE mmol/L 96* 96*  --   --  101   CO2 mmol/L 26.0 27.0  --   --  28.0   BUN mg/dL 75* 67*  --   --  40*   CREATININE mg/dL 2.30* 2.20*  --   --  1.90*   GLUCOSE mg/dL 135* 132*  --   --  100   CALCIUM mg/dL 9.3 9.2  --   --  9.9   ALT (SGPT) U/L  --   --   --   --  15   AST (SGOT) U/L  --   --   --   --  31   TROPONIN I ng/mL  --   --  0.028 0.044*  --      BNP   Date Value Ref Range Status   09/05/2017 1514.0 (H) 0.0 - 100.0 pg/mL Final     No results found for: PHART  Blood Culture   Date Value Ref Range Status   09/05/2017 No growth at less than 24 hours  Preliminary   09/05/2017 No growth at less than 24 hours  Preliminary       Imaging Results (last 24 hours)     Procedure Component Value Units Date/Time    US Renal Limited [[id number]] Collected:  09/08/17 1626     Updated:  09/08/17 1626    Narrative:       EXAMINATION: US RENAL LIMITED - 09/08/2017     INDICATION: I50.21-Acute systolic (congestive) heart failure;  J40-Bronchitis, not specified as acute or chronic; J44.1-Chronic  obstructive pulmonary disease with (acute) exacerbation;  D84.9-Immunodeficiency, unspecified.      TECHNIQUE: Ultrasound of the kidneys and urinary bladder.     COMPARISON: Ultrasound dated 02/06/2017.     FINDINGS: Right kidney measures 8.3 cm in length containing multiple  subcentimeter cystic lesions, hypoechoic to predominantly anechoic  echogenicity without complex features consistent with renal cortical  cysts, similar to prior comparison. No hydronephrosis or contour  deforming mass. No obvious calculi.      Left kidney measures 8.0 cm in length without contour deforming mass,  obvious calculi, or hydronephrosis.     Urinary bladder is moderately distended without focal abnormality.       Impression:       1. No sonographic evidence for acute abnormality in the  bilateral  kidneys or urinary bladder.  2. Multiple subcentimeter renal cortical cysts within the right kidney  similar to prior comparison of 06/20/2017.     DICTATED:     09/08/2017  EDITED:         09/08/2017                   I have reviewed the medications.      Assessment / Plan     Assessment & Plan :  Hospital Problem List     * (Principal)Acute on chronic respiratory failure with hypoxia    Essential hypertension (Chronic)    Overview Signed 10/19/2015  8:56 AM by Digna Santana     benign essential         Ischemic cardiomyopathy (Chronic)    Overview Signed 2/6/2017  8:44 AM by MAN Quesada     A. History of MI December 2009: s/p stent to RCA and LAD, EF 40%  B. CABGx2 March, 2010: SVG to OMB-1, SVG to PDA  C. EF 30% post-operatively, s/p Richardson ICD placement, 2011  D. Echo July 2014: Severe global hypokinesis with EF 12%, moderate AI, mechanical mitral valve, moderate to severe TR, RVSP 43mmHg   E. Echo 2/4/17: EF 18%, mild to mod AI, mod TR, grossly normal prosthetic mitral valve         PVD (peripheral vascular disease) (Chronic)    Lupus (systemic lupus erythematosus) (Chronic)    Chronic coronary artery disease (Chronic)    COPD exacerbation    Tobacco abuse (Chronic)    Acute systolic heart failure and valvular disease    Overview Addendum 2/6/2017  8:44 AM by MAN Quesada             AICD (automatic cardioverter/defibrillator) present (Chronic)    Supratherapeutic INR    CKD (chronic kidney disease), stage IV (Chronic)    UTI (urinary tract infection)    Elevated troponin           ----------------------------------------------------    Brief Hospital Course to date:  Ashely Roldan is a 56 y.o. female with known COPD, CKD III-IV (baseline cr 1.8-2.0), AICD, HTN, ischemic CM, SLE, PVD,ongoing tobacco abuse, severe systolic heart failure (EF 18% in Feb 2017), valvular heart disease s/p MMV on coumadin who presents to [facility] ED with worsening dyspnea, orthopnea and generally  "ill feeling for the 4 days.  Evaluation significant for wheezes and hypoxia to mid 80's% on RA consistent with component of COPD exac with also elevated BNP and symptoms of A/C systolic CHF.  Also incidental finding of UTI but asymptomatic:    *Acute Hypoxic Resp Failure(multifactorial)  *COPD w/ acute exacerbation and bronchospasm  *AoC SHF (ischemic CM; EF 18% February 2017)  *Valvular HD (hx [facility] mitral valve)  *uti/cystitis (ecoli), poa  *AoCKI (baseline cr appears to be 1.8-2.0 per prior labs)  *Hx CAD  *Ongoing tobacco abuse  *Hx Lupus    Plan:  *creatinine up to 2.3 today (2.2 yesterday, 1.9 prior), likely due to diuresis  *breathing feels \"much better\" today  ------------------------------------------  -continue q4h duonebs  -decrease solumedrol from 60mg bid --> 40mg bid (likely d/c on prednisone taper)  -continue doxy (for bronchitis) total 7 days  -finishes ceftin for  and ceftin (uti); day #4/5 (last dose 9/9/17) as long   -hold lasix today (received multiple iv lasix doses), restart home lasix 20mg tomorrow if renal fxn stable  -spoke to dr. Steve (nephrology) will see tomorrow, as needs long term follow up as well and is much appreciated  -continue aldactone & b-blocker  -wean oxygen as tolerates  -bmp,daily inr (pharmacy dosing/monitoring coumadin; on hold currently)    -needs future f/u w/ dr. Ramirez/cards (last seen 3/2017); consideration future addition ace-arb  -set up outpatient nephrology at discharge as well    *if renal fxn stable and continues clinical improvement possible d/c home tomorrow +/- supplemental oxygen    Disposition: I expect the patient to be discharged 1.        Saul Sandra MD  09/08/17  4:53 PM        "

## 2017-09-09 VITALS
WEIGHT: 122.38 LBS | BODY MASS INDEX: 18.13 KG/M2 | SYSTOLIC BLOOD PRESSURE: 117 MMHG | TEMPERATURE: 98.2 F | DIASTOLIC BLOOD PRESSURE: 79 MMHG | RESPIRATION RATE: 18 BRPM | HEART RATE: 76 BPM | HEIGHT: 69 IN | OXYGEN SATURATION: 90 %

## 2017-09-09 LAB
ANION GAP SERPL CALCULATED.3IONS-SCNC: 5 MMOL/L (ref 3–11)
BASOPHILS # BLD AUTO: 0 10*3/MM3 (ref 0–0.2)
BASOPHILS NFR BLD AUTO: 0 % (ref 0–1)
BUN BLD-MCNC: 89 MG/DL (ref 9–23)
BUN/CREAT SERPL: 38.7 (ref 7–25)
CALCIUM SPEC-SCNC: 9 MG/DL (ref 8.7–10.4)
CHLORIDE SERPL-SCNC: 101 MMOL/L (ref 99–109)
CO2 SERPL-SCNC: 28 MMOL/L (ref 20–31)
CREAT BLD-MCNC: 2.3 MG/DL (ref 0.6–1.3)
DEPRECATED RDW RBC AUTO: 45.8 FL (ref 37–54)
EOSINOPHIL # BLD AUTO: 0 10*3/MM3 (ref 0–0.3)
EOSINOPHIL NFR BLD AUTO: 0 % (ref 0–3)
ERYTHROCYTE [DISTWIDTH] IN BLOOD BY AUTOMATED COUNT: 12.8 % (ref 11.3–14.5)
FERRITIN SERPL-MCNC: 637 NG/ML (ref 10–291)
GFR SERPL CREATININE-BSD FRML MDRD: 22 ML/MIN/1.73
GLUCOSE BLD-MCNC: 122 MG/DL (ref 70–100)
HCT VFR BLD AUTO: 32.6 % (ref 34.5–44)
HGB BLD-MCNC: 10.4 G/DL (ref 11.5–15.5)
IMM GRANULOCYTES # BLD: 0.04 10*3/MM3 (ref 0–0.03)
IMM GRANULOCYTES NFR BLD: 0.5 % (ref 0–0.6)
INR PPP: 1.67
IRON 24H UR-MRATE: 67 MCG/DL (ref 50–175)
IRON SATN MFR SERPL: 28 % (ref 15–50)
LYMPHOCYTES # BLD AUTO: 0.63 10*3/MM3 (ref 0.6–4.8)
LYMPHOCYTES NFR BLD AUTO: 8.2 % (ref 24–44)
MAGNESIUM SERPL-MCNC: 2.6 MG/DL (ref 1.3–2.7)
MCH RBC QN AUTO: 31.7 PG (ref 27–31)
MCHC RBC AUTO-ENTMCNC: 31.9 G/DL (ref 32–36)
MCV RBC AUTO: 99.4 FL (ref 80–99)
MONOCYTES # BLD AUTO: 0.26 10*3/MM3 (ref 0–1)
MONOCYTES NFR BLD AUTO: 3.4 % (ref 0–12)
NEUTROPHILS # BLD AUTO: 6.76 10*3/MM3 (ref 1.5–8.3)
NEUTROPHILS NFR BLD AUTO: 87.9 % (ref 41–71)
PLATELET # BLD AUTO: 134 10*3/MM3 (ref 150–450)
PMV BLD AUTO: 10.7 FL (ref 6–12)
POTASSIUM BLD-SCNC: 4.4 MMOL/L (ref 3.5–5.5)
PROTHROMBIN TIME: 18.5 SECONDS (ref 9.6–11.5)
RBC # BLD AUTO: 3.28 10*6/MM3 (ref 3.89–5.14)
SODIUM BLD-SCNC: 134 MMOL/L (ref 132–146)
TIBC SERPL-MCNC: 238 MCG/DL (ref 250–450)
URATE SERPL-MCNC: 10.4 MG/DL (ref 3.1–7.8)
WBC NRBC COR # BLD: 7.69 10*3/MM3 (ref 3.5–10.8)

## 2017-09-09 PROCEDURE — 86256 FLUORESCENT ANTIBODY TITER: CPT | Performed by: INTERNAL MEDICINE

## 2017-09-09 PROCEDURE — 99239 HOSP IP/OBS DSCHRG MGMT >30: CPT | Performed by: NURSE PRACTITIONER

## 2017-09-09 PROCEDURE — 94640 AIRWAY INHALATION TREATMENT: CPT

## 2017-09-09 PROCEDURE — 86038 ANTINUCLEAR ANTIBODIES: CPT | Performed by: INTERNAL MEDICINE

## 2017-09-09 PROCEDURE — 82728 ASSAY OF FERRITIN: CPT | Performed by: INTERNAL MEDICINE

## 2017-09-09 PROCEDURE — 85025 COMPLETE CBC W/AUTO DIFF WBC: CPT | Performed by: INTERNAL MEDICINE

## 2017-09-09 PROCEDURE — 83550 IRON BINDING TEST: CPT | Performed by: INTERNAL MEDICINE

## 2017-09-09 PROCEDURE — 86162 COMPLEMENT TOTAL (CH50): CPT | Performed by: INTERNAL MEDICINE

## 2017-09-09 PROCEDURE — 85610 PROTHROMBIN TIME: CPT | Performed by: NURSE PRACTITIONER

## 2017-09-09 PROCEDURE — 86160 COMPLEMENT ANTIGEN: CPT | Performed by: INTERNAL MEDICINE

## 2017-09-09 PROCEDURE — 86225 DNA ANTIBODY NATIVE: CPT | Performed by: INTERNAL MEDICINE

## 2017-09-09 PROCEDURE — 83540 ASSAY OF IRON: CPT | Performed by: INTERNAL MEDICINE

## 2017-09-09 PROCEDURE — 25010000002 METHYLPREDNISOLONE PER 40 MG: Performed by: INTERNAL MEDICINE

## 2017-09-09 PROCEDURE — 94799 UNLISTED PULMONARY SVC/PX: CPT

## 2017-09-09 PROCEDURE — 83520 IMMUNOASSAY QUANT NOS NONAB: CPT | Performed by: INTERNAL MEDICINE

## 2017-09-09 PROCEDURE — 84550 ASSAY OF BLOOD/URIC ACID: CPT | Performed by: INTERNAL MEDICINE

## 2017-09-09 PROCEDURE — 83735 ASSAY OF MAGNESIUM: CPT | Performed by: INTERNAL MEDICINE

## 2017-09-09 PROCEDURE — 80048 BASIC METABOLIC PNL TOTAL CA: CPT | Performed by: INTERNAL MEDICINE

## 2017-09-09 RX ORDER — DOXYCYCLINE HYCLATE 100 MG/1
100 CAPSULE ORAL EVERY 12 HOURS SCHEDULED
Qty: 8 CAPSULE | Refills: 0 | Status: SHIPPED | OUTPATIENT
Start: 2017-09-09 | End: 2017-09-13

## 2017-09-09 RX ORDER — CEFUROXIME AXETIL 250 MG/1
250 TABLET ORAL EVERY 12 HOURS SCHEDULED
Qty: 1 TABLET | Refills: 0 | Status: SHIPPED | OUTPATIENT
Start: 2017-09-09 | End: 2017-09-10

## 2017-09-09 RX ORDER — HYDROXYCHLOROQUINE SULFATE 200 MG/1
200 TABLET, FILM COATED ORAL EVERY MORNING
Qty: 15 TABLET | Refills: 0 | Status: ON HOLD | OUTPATIENT
Start: 2017-09-09 | End: 2018-02-05

## 2017-09-09 RX ORDER — WARFARIN SODIUM 2 MG/1
2 TABLET ORAL
Status: COMPLETED | OUTPATIENT
Start: 2017-09-09 | End: 2017-09-09

## 2017-09-09 RX ORDER — FUROSEMIDE 40 MG/1
20 TABLET ORAL DAILY
Qty: 30 TABLET | Refills: 11 | Status: SHIPPED | OUTPATIENT
Start: 2017-09-09 | End: 2018-02-05 | Stop reason: HOSPADM

## 2017-09-09 RX ORDER — PREDNISONE 20 MG/1
20 TABLET ORAL DAILY
Qty: 6 TABLET | Refills: 0 | Status: SHIPPED | OUTPATIENT
Start: 2017-09-09 | End: 2017-10-16

## 2017-09-09 RX ADMIN — HYDROXYCHLOROQUINE SULFATE 200 MG: 200 TABLET, FILM COATED ORAL at 09:25

## 2017-09-09 RX ADMIN — SPIRONOLACTONE 25 MG: 25 TABLET, FILM COATED ORAL at 09:25

## 2017-09-09 RX ADMIN — CEFUROXIME AXETIL 250 MG: 250 TABLET ORAL at 09:25

## 2017-09-09 RX ADMIN — Medication 250 MG: at 09:25

## 2017-09-09 RX ADMIN — WARFARIN SODIUM 2 MG: 2 TABLET ORAL at 12:47

## 2017-09-09 RX ADMIN — HYDRALAZINE HYDROCHLORIDE 25 MG: 25 TABLET ORAL at 05:39

## 2017-09-09 RX ADMIN — DEXTROMETHORPHAN 30 MG: 30 SUSPENSION, EXTENDED RELEASE ORAL at 09:24

## 2017-09-09 RX ADMIN — HYDROCODONE BITARTRATE AND ACETAMINOPHEN 1 TABLET: 5; 325 TABLET ORAL at 12:47

## 2017-09-09 RX ADMIN — BUDESONIDE AND FORMOTEROL FUMARATE DIHYDRATE 2 PUFF: 160; 4.5 AEROSOL RESPIRATORY (INHALATION) at 06:59

## 2017-09-09 RX ADMIN — CARVEDILOL 3.12 MG: 3.12 TABLET, FILM COATED ORAL at 09:25

## 2017-09-09 RX ADMIN — FUROSEMIDE 20 MG: 20 TABLET ORAL at 09:25

## 2017-09-09 RX ADMIN — GUAIFENESIN 600 MG: 600 TABLET, EXTENDED RELEASE ORAL at 09:25

## 2017-09-09 RX ADMIN — METHYLPREDNISOLONE SODIUM SUCCINATE 40 MG: 40 INJECTION, POWDER, FOR SOLUTION INTRAMUSCULAR; INTRAVENOUS at 09:23

## 2017-09-09 RX ADMIN — ASPIRIN 81 MG 81 MG: 81 TABLET ORAL at 09:25

## 2017-09-09 RX ADMIN — IPRATROPIUM BROMIDE AND ALBUTEROL SULFATE 3 ML: .5; 3 SOLUTION RESPIRATORY (INHALATION) at 06:59

## 2017-09-09 RX ADMIN — DOXYCYCLINE HYCLATE 100 MG: 100 CAPSULE ORAL at 05:39

## 2017-09-09 NOTE — PROGRESS NOTES
Continued Stay Note  [facility]     Patient Name: Ashely Roldan  MRN: [id number]  Today's Date: 9/9/2017    Admit Date: 9/5/2017          Discharge Plan       09/09/17 1239    Case Management/Social Work Plan    Plan Home     Patient/Family In Agreement With Plan yes    Additional Comments APRN has consulted CM to arrange home oxygen with exertion at 2L NC. Met with patient in the room, and she does not have a preference for a provider. Referral called to [facility] with [employer], and order and clinical faxed. [facility] will deliver a portable tank to patient's room prior to discharge and the remaining system to patient's home. CM will continue to follow.               Discharge Codes     None        Expected Discharge Date and Time     Expected Discharge Date Expected Discharge Time    Sep 9, 2017             Nina Meadows

## 2017-09-09 NOTE — SIGNIFICANT NOTE
09/09/17 0927   Oxygen Therapy   SpO2 (!) 85 %  (ambulatory room air )   Pulse Oximetry Type Continuous   O2 Device room air

## 2017-09-09 NOTE — DISCHARGE PLACEMENT REQUEST
"Ashely Roldan (56 y.o. Female)     From [place],   [phone]      Date of Birth Social Security Number Address Home Phone MRN    1960  [address]  [place] [id number] [phone] [id number]    Yazidism Marital Status          Episcopalian        Admission Date Admission Type Admitting Provider Attending Provider Department, Room/Bed    17 Emergency Saul Sandra MD [place], Christopher R, MD [facility] 5G, S560/1    Discharge Date Discharge Disposition Discharge Destination         Home or Self Care             Attending Provider: Saul Sandra MD     Allergies:  Morphine And Related, Sulfa Antibiotics    Isolation:  None   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)   Code Status:  Conditional    Ht:  69\" (175.3 cm)   Wt:  122 lb 6 oz (55.5 kg)    Admission Cmt:  None   Principal Problem:  Acute on chronic respiratory failure with hypoxia [J96.21]                 Active Insurance as of 2017     Primary Coverage     Payor Plan Insurance Group Employer/Plan Group    [employer] MEDICARE REPLACEMENT ANTHE[employer] MEDICARE ADVANTAGE [id number]     Payor Plan Address Payor Plan Phone Number Effective From Effective To    [address] [phone] 2016     [place] [address]       Subscriber Name Subscriber Birth Date Member ID       ASHELY ROLDAN 1960 [id number]                 Emergency Contacts      (Rel.) Home Phone Work Phone Mobile Phone    Floyd Roldan (Son) -- -- [phone]    Janina Teran (Other) -- -- [phone]    Hussein Melgar (Daughter) -- -- [phone]          [place] [address] [facility] [address]  Phone:  [phone]  Fax:   Date Ordered: Sep 9, 2017         Patient:  Ashely Roldan MRN:  [id number]   [address]  [facility] [address] :  1960  SSN:    Phone: [phone] Sex:  F     Weight: 122 lb 6 oz (55.5 kg)         Ht " "Readings from Last 1 Encounters:   09/07/17 69\" (175.3 cm)         Oxygen Therapy                   (Order ID: [id number])    Diagnosis:  COPD exacerbation (J44.1 [ICD-10-CM] 491.21 [ICD-9-CM])  Acute systolic (congestive) heart failure (I50.21 [ICD-10-CM] 428.21,428.0 [ICD-9-CM])   Quantity:  1     Delivery Modality: Nasal Cannula  Liters Per Minute: 2  Duration: With Exertion  Equipment:  Oxygen Concentrator &  &  Portable Gaseous Oxygen System & Portable Oxygen Contents Gaseous &  Conserving Regulator  The face to face evaluation was performed on: 9/9/2017  Length of Need (99 Months = Lifetime): 6 Months            Verbal Order Mode: Telephone with readback   Authorizing Provider: IAIN Hines  Authorizing Provider's NPI: [id number]     Order Entered By: Nina Meadows 9/9/2017 12:37 PM     Electronically signed by:           Nina Meadows  Signed Case Management Significant Event Date of Service: 9/9/2017 11:57 AM         []Hide copied text  []Hover for attribution information       09/09/17 0926   Oxygen Therapy   SpO2 90 %  (sitting room air)   Pulse Oximetry Type Continuous   O2 Device room air                          Nina Meadows  Signed Case Management Significant Event Date of Service: 9/9/2017 11:59 AM         []Hide copied text  []Hover for attribution information       09/09/17 0927   Oxygen Therapy   SpO2 (!) 85 %  (ambulatory room air )   Pulse Oximetry Type Continuous   O2 Device room air                          Nina Meadows  Signed Case Management Significant Event Date of Service: 9/9/2017 11:59 AM         []Hide copied text  []Hover for attribution information       09/09/17 0931   Oxygen Therapy   SpO2 90 %   Pulse Oximetry Type Continuous   O2 Device nasal cannula   Flow (L/min) 1                                   History & Physical      Juliane Sahbazz MD at 9/5/2017 10:33 PM              [facility] " "[facility] Medicine Services  HISTORY AND PHYSICAL    Primary Care Physician: Peter Rdz MD   Cardiologist: Dr. Yomi Ramirez (prior Dr. Anais Morrison in [place])  Pulmonologist: none  Nephrologist: none    Subjective     Chief Complaint: shortness of breath    History of Present Illness:     Mrs. Roldan presented to [facility] today for shortness of breath. Onset 3 days ago. Sudden onset. Moderate. Constant. Nothing makes it better or worse. Also c/o of associated fever, congestion, cough, and neck/chest pain. She thinks it was triggered by taking her elderly neighbor out 4 days ago when she went to pay her bills and hair and clothing got wet. She states her throat felt like \"shredded\" meat when she was watching the [place] game and also had sneezing and itching eyes but did not improvement with benadryl. She reports she feels so congested she cannot lay back in her recliner or bed x3 nights which has triggered her appearance to the ED.    Mrs. Roldan is being admitted to [facility] for associated CHF exacerbation and COPD exacerbation / AECB.    Review of Systems   Constitutional: Positive for activity change, appetite change, chills, fatigue and fever. Negative for diaphoresis.        She reports her baseline temp is 97.5 and got up to 100 yesterday.   HENT: Positive for congestion, rhinorrhea and sore throat. Negative for ear pain and trouble swallowing.         Reports her ears are popping occasionally or sounding like soda fizzing in her left. Feels drainage down her neck.   Eyes: Positive for discharge and itching.   Respiratory: Positive for cough, shortness of breath and wheezing. Negative for apnea.         Reports acute SOA at rest last few days, better since in ED and just with activity now. Reports cannot get cough to produce, stuck in throat/chest. Reports chest wall discomfort/pressure with deep coughing.   Cardiovascular: Positive for palpitations. Negative for chest pain and leg " "swelling.        Reports has noticed a few palpitations without good sleep.   Gastrointestinal: Positive for diarrhea and nausea. Negative for abdominal pain, constipation and vomiting.        Diarrhea x2 days - reports she is sensitive to this with any OTC meds and was taking benadryl recently, hasn't had diarrhea today. Nausea mainly with excessive coughing.   Genitourinary: Negative for difficulty urinating and dysuria.        Reports urine looks \"chalky\".   Musculoskeletal: Positive for arthralgias. Negative for joint swelling.        On chronic plaquenil. Reports her pain often moves around from hands to feet etc.   Skin: Negative for rash and wound.   Allergic/Immunologic: Positive for environmental allergies and immunocompromised state.   Neurological: Positive for weakness. Negative for dizziness and syncope.        Reports last vertigo about a year ago.   Psychiatric/Behavioral: Negative for agitation, behavioral problems, confusion and hallucinations. The patient is nervous/anxious.         Denies memory loss.      Otherwise complete ROS performed and negative except as mentioned in the HPI.    Past Medical History:   Diagnosis Date   • Acute respiratory failure with hypoxia 9/5/2017   • Allergic rhinitis 08/01/2014   • Anemia    • Anxiety    • Arthritis    • CAD (coronary artery disease)    • CHF (congestive heart failure)    • CKD (chronic kidney disease)    • CKD (chronic kidney disease), stage IV 9/5/2017   • Colitis, Clostridium difficile    • COPD (chronic obstructive pulmonary disease)    • Coronary atherosclerosis    • Depression    • Emphysema of lung    • GERD (gastroesophageal reflux disease)    • Heart attack    • Heart murmur    • Hematoma of hip    • Hip fracture    • Hyperlipidemia    • Hypertension     benign essential   • Ischemic cardiomyopathy 08/01/2014    ef 29% s/p ICD   • Knee injury    • Lung disease    • Mitral valve disorder 03/28/2013   • Mitral valve insufficiency     s/p " prosthetic ATS valve replacement   • Osteoporosis    • Postmenopausal     natural   • PVD (peripheral vascular disease)    • Pyelonephritis    • Renal failure    • Renal failure    • Renal failure, acute    • Syncope    • Systemic lupus erythematosus    • TIA (transient ischemic attack) 04/04/2012   • UTI (urinary tract infection)    • Valvular heart disease    • Wrist fracture        Past Surgical History:   Procedure Laterality Date   • CARDIAC DEFIBRILLATOR PLACEMENT     • CHOLECYSTECTOMY  2010   • CORONARY ARTERY BYPASS GRAFT  2011 4 aortic bypasses, abdominal aorta; 2011-mitral valve; 2010-triple bypass   • ENDOSCOPY  10/23/2014    Dr. Brand; retained food in stomach; he dilated her esophagus; 5-30-14 hiatus hernia, gastritis   • JOINT REPLACEMENT Right 06/25/2015    Dr. Fitzgerald; first surgery 1-29-14; redo 5-4-15   • MITRAL VALVE REPLACEMENT      MECHANICAL   • TOTAL HIP ARTHROPLASTY Right     3 times within 8 months       Family History   Problem Relation Age of Onset   • Arthritis Mother      rheumatoid   • Heart attack Father    • Alcohol abuse Brother    • Heart disease Other      uncle   • Diabetes Other      uncle-type 2   • Hypertension Other      uncle   • Stroke Other      uncle   • Cancer Other      grandfather-lung    • Heart disease Maternal Uncle    • Lung cancer Paternal Grandfather        Social History     Social History   • Marital status:      Spouse name: N/A   • Number of children: N/A   • Years of education: N/A     Occupational History   • Not on file.     Social History Main Topics   • Smoking status: Current Every Day Smoker     Packs/day: 0.50     Types: Cigarettes   • Smokeless tobacco: Not on file      Comment: Down to 0.5 PPD from 1 PPD now consistently. Started as a teenager.   • Alcohol use Yes      Comment: about once a week, reports she is a sports fan and rarely drinks more up to 2-3 drinks   • Drug use: No   • Sexual activity: Defer     Other Topics Concern   • Not  on file     Social History Narrative    Ms. Roldan is a 56 year old white female. Pt recently  after 30 years. Just moved from [facility] into her son's home in [place].        Medications:    Lab Results (last 24 hours)     Procedure Component Value Units Date/Time    Comprehensive Metabolic Panel [[id number]]  (Abnormal) Collected:  09/05/17 1816    Specimen:  Blood Updated:  09/05/17 1849     Glucose 100 mg/dL      BUN 40 (H) mg/dL      Creatinine 1.90 (H) mg/dL      Sodium 134 mmol/L      Potassium 4.2 mmol/L      Chloride 101 mmol/L      CO2 28.0 mmol/L      Calcium 9.9 mg/dL      Total Protein 8.3 (H) g/dL      Albumin 4.60 g/dL      ALT (SGPT) 15 U/L      AST (SGOT) 31 U/L      Alkaline Phosphatase 198 (H) U/L      Total Bilirubin 0.3 mg/dL      eGFR Non African Amer 27 (L) mL/min/1.73      Globulin 3.7 gm/dL      A/G Ratio 1.2 (L) g/dL      BUN/Creatinine Ratio 21.1     Anion Gap 5.0 mmol/L     Narrative:       National Kidney Foundation Guidelines    Stage     Description        GFR  1         Normal or High     90+  2         Mild decrease      60-89  3         Moderate decrease  30-59  4         Severe decrease    15-29  5         Kidney failure     <15    Protime-INR [[id number]]  (Abnormal) Collected:  09/05/17 1816    Specimen:  Blood Updated:  09/05/17 2011     Protime 59.6 (C) Seconds      INR 5.20    Narrative:       Therapeutic Ranges for INR: 2.0-3.0 (PT 20-30)                              2.5-3.5 (PT 25-34)    Magnesium [[id number]] Collected:  09/05/17 1816    Specimen:  Blood Updated:  09/05/17 2350    CBC & Differential [[id number]] Collected:  09/05/17 1817    Specimen:  Blood Updated:  09/05/17 2140    Narrative:       The following orders were created for panel order CBC & Differential.  Procedure                               Abnormality         Status                     ---------                               -----------         ------                     CBC Auto  Differential[[id number]]        Abnormal            Final result                 Please view results for these tests on the individual orders.    BNP [[id number]]  (Abnormal) Collected:  09/05/17 1817    Specimen:  Blood Updated:  09/05/17 1856     BNP 1514.0 (H) pg/mL     CBC Auto Differential [[id number]]  (Abnormal) Collected:  09/05/17 1817    Specimen:  Blood Updated:  09/05/17 2140     WBC 6.19 10*3/mm3      RBC 3.56 (L) 10*6/mm3      Hemoglobin 11.7 g/dL      Hematocrit 34.7 %      MCV 97.5 fL      MCH 32.9 (H) pg      MCHC 33.7 g/dL      RDW 12.9 %      RDW-SD 46.5 fl      MPV 11.4 fL      Platelets 130 (L) 10*3/mm3      Neutrophil % 80.1 (H) %      Lymphocyte % 15.5 (L) %      Monocyte % 3.7 %      Eosinophil % 0.5 %      Basophil % 0.0 %      Immature Grans % 0.2 %      Neutrophils, Absolute 4.96 10*3/mm3      Lymphocytes, Absolute 0.96 10*3/mm3      Monocytes, Absolute 0.23 10*3/mm3      Eosinophils, Absolute 0.03 10*3/mm3      Basophils, Absolute 0.00 10*3/mm3      Immature Grans, Absolute 0.01 10*3/mm3     Lactic Acid, Plasma [[id number]]  (Normal) Collected:  09/05/17 1817    Specimen:  Blood Updated:  09/05/17 1842     Lactate 0.8 mmol/L       Falsely depressed results may occur on samples drawn from patients receiving N-Acetylcysteine (NAC) or Metamizole.       POC Troponin, Rapid [[id number]]  (Normal) Collected:  09/05/17 1817    Specimen:  Blood Updated:  09/05/17 1835     Troponin I 0.04 ng/mL       Serial Number: [id number]:  [id number]       Blood Culture [[id number]] Collected:  09/05/17 2129    Specimen:  Blood from Hand, Right Updated:  09/05/17 2141    Blood Culture [[id number]] Collected:  09/05/17 2129    Specimen:  Blood from Arm, Right Updated:  09/05/17 2141    Urinalysis With / Culture If Indicated [[id number]]  (Abnormal) Collected:  09/05/17 2248    Specimen:  Urine from Urine, Clean Catch Updated:  09/05/17 2329     Color, UA Yellow     Appearance, UA Turbid (A)     pH, UA 5.5      Specific Gravity, UA 1.012     Glucose, UA Negative     Ketones, UA Negative     Bilirubin, UA Negative     Blood, UA Trace (A)     Protein,  mg/dL (2+) (A)     Leuk Esterase, UA Large (3+) (A)     Nitrite, UA Negative     Urobilinogen, UA 0.2 E.U./dL    Urine Drug Screen [[id number]]  (Normal) Collected:  09/05/17 2248    Specimen:  Urine from Urine, Clean Catch Updated:  09/05/17 2335     THC, Screen, Urine Negative     Phencyclidine (PCP), Urine Negative     Cocaine Screen, Urine Negative     Methamphetamine, Urine Negative     Opiate Screen Negative     Amphetamine Screen, Urine Negative     Benzodiazepine Screen, Urine Negative     Tricyclic Antidepressants Screen Negative     Methadone Screen, Urine Negative     Barbiturates Screen, Urine Negative     Oxycodone Screen, Urine Negative     Propoxyphene Screen Negative     Buprenorphine, Screen, Urine Negative    Narrative:       Cutoff For Drugs Screened:    Amphetamines               500 ng/ml  Barbiturates               200 ng/ml  Benzodiazepines            150 ng/ml  Cocaine                    150 ng/ml  Methadone                  200 ng/ml  Opiates                    100 ng/ml  Phencyclidine               25 ng/ml  THC                            50 ng/ml  Methamphetamine            500 ng/ml  Tricyclic Antidepressants  300 ng/ml  Oxycodone                  100 ng/ml  Propoxyphene               300 ng/ml  Buprenorphine               10 ng/ml    The normal value for all drugs tested is negative. This report includes unconfirmed screening results, with the cutoff values listed, to be used for medical treatment purposes only.  Unconfirmed results must not be used for non-medical purposes such as employment or legal testing.  Clinical consideration should be applied to any drug of abuse test, particularly when unconfirmed results are used.      Urinalysis, Microscopic Only [[id number]]  (Abnormal) Collected:  09/05/17 2248    Specimen:  Urine from Urine,  "Clean Catch Updated:  09/05/17 2328     RBC, UA 3-6 (A) /HPF      WBC, UA Too Numerous to Count (A) /HPF      Bacteria, UA 3+ (A) /HPF      Squamous Epithelial Cells, UA 0-2 /HPF      Hyaline Casts, UA 0-6 /LPF      Methodology Manual Light Microscopy    Urine Culture [[id number]] Collected:  09/05/17 2248    Specimen:  Urine from Urine, Clean Catch Updated:  09/05/17 2303        ECG  Vent. rate 85 BPM  MD interval 136 ms  QRS duration 98 ms  QT/QTc 390/464 ms  P-R-T axes 25 -25 153  Normal sinus rhythm  ST & T wave abnormality, consider lateral ischemia  Prolonged QT  Abnormal ECG  When compared with ECG of 03-MAR-2017 05:49,  T wave inversion more evident in Lateral leads     XR Chest, 2 Views     CLINICAL HISTORY:    56 years old, female; Pain and signs and symptoms; Shortness of breath; Chest   pain; Left-sided chest pain; Additional info: SOA. Pt has HX of left sided rib   FX, pt attributes left sided chest pain to old rib FX     TECHNIQUE:    Frontal and lateral views of the chest.     COMPARISON:    CR - XR CHEST 1 VW 3/3/2017 11:01:41 AM     FINDINGS:       Lungs:  Unremarkable.  No consolidation.       Pleural space:  Unremarkable.  No pneumothorax.       Heart:  Unremarkable.  No cardiomegaly.       Mediastinum:  Unremarkable.       Bones/joints:  Status post sternotomy. Cardiac valve replacement.       Other findings:  Left-sided pacing device in place.     IMPRESSION:       No acute findings.    Allergies:  Allergies   Allergen Reactions   • Morphine And Related GI Intolerance and Irritability   • Sulfa Antibiotics          Objective     Physical Exam:  Vital Signs: /68  Pulse 88  Temp 98 °F (36.7 °C) (Oral)   Resp 20  Ht 69\" (175.3 cm)  Wt 111 lb (50.3 kg)  SpO2 92%  BMI 16.39 kg/m2  Physical Exam   Constitutional: She is oriented to person, place, and time. No distress.   Thin, frail, mild tachypnea when talks more than a few sentences.   HENT:   Head: Normocephalic and atraumatic.   Right " Ear: No swelling or tenderness. Tympanic membrane is not erythematous and not bulging.   Left Ear: No swelling or tenderness. Tympanic membrane is not erythematous and not bulging.   Mouth/Throat: Oropharynx is clear and moist. No oropharyngeal exudate.   Moderate cerumen bilaterally.   Eyes: Conjunctivae and EOM are normal. Pupils are equal, round, and reactive to light. Right eye exhibits no discharge. Left eye exhibits no discharge. No scleral icterus.   Neck: No JVD present. No tracheal deviation present.   Cardiovascular: Normal rate, regular rhythm and intact distal pulses.    Murmur heard.  Pulses:       Radial pulses are 2+ on the right side, and 2+ on the left side.        Dorsalis pedis pulses are 2+ on the right side, and 2+ on the left side.   No edema.   Pulmonary/Chest: Effort normal and breath sounds normal. She exhibits tenderness.   Very diminished lung sounds all lobes.   Abdominal: Soft. Bowel sounds are normal. She exhibits no distension. There is no tenderness. There is no guarding.   Genitourinary:   Genitourinary Comments: Bladder non-distended, non-tender.   Musculoskeletal: She exhibits no edema or deformity.   Neurological: She is alert and oriented to person, place, and time. No cranial nerve deficit. Coordination normal.   Skin: Skin is warm and dry. No rash noted. She is not diaphoretic. No erythema. No pallor.   Psychiatric: She has a normal mood and affect. Her behavior is normal. Judgment and thought content normal.   Calm, relaxed, cooperative, engages, pleasant.       Results Reviewed:    Lab Results (last 24 hours)     Procedure Component Value Units Date/Time    Comprehensive Metabolic Panel [[id number]]  (Abnormal) Collected:  09/05/17 1816    Specimen:  Blood Updated:  09/05/17 1849     Glucose 100 mg/dL      BUN 40 (H) mg/dL      Creatinine 1.90 (H) mg/dL      Sodium 134 mmol/L      Potassium 4.2 mmol/L      Chloride 101 mmol/L      CO2 28.0 mmol/L      Calcium 9.9 mg/dL       Total Protein 8.3 (H) g/dL      Albumin 4.60 g/dL      ALT (SGPT) 15 U/L      AST (SGOT) 31 U/L      Alkaline Phosphatase 198 (H) U/L      Total Bilirubin 0.3 mg/dL      eGFR Non African Amer 27 (L) mL/min/1.73      Globulin 3.7 gm/dL      A/G Ratio 1.2 (L) g/dL      BUN/Creatinine Ratio 21.1     Anion Gap 5.0 mmol/L     Narrative:       National Kidney Foundation Guidelines    Stage     Description        GFR  1         Normal or High     90+  2         Mild decrease      60-89  3         Moderate decrease  30-59  4         Severe decrease    15-29  5         Kidney failure     <15    Protime-INR [[id number]]  (Abnormal) Collected:  09/05/17 1816    Specimen:  Blood Updated:  09/05/17 2011     Protime 59.6 (C) Seconds      INR 5.20    Narrative:       Therapeutic Ranges for INR: 2.0-3.0 (PT 20-30)                              2.5-3.5 (PT 25-34)    Magnesium [[id number]] Collected:  09/05/17 1816    Specimen:  Blood Updated:  09/05/17 2350    CBC & Differential [[id number]] Collected:  09/05/17 1817    Specimen:  Blood Updated:  09/05/17 2140    Narrative:       The following orders were created for panel order CBC & Differential.  Procedure                               Abnormality         Status                     ---------                               -----------         ------                     CBC Auto Differential[[id number]]        Abnormal            Final result                 Please view results for these tests on the individual orders.    BNP [[id number]]  (Abnormal) Collected:  09/05/17 1817    Specimen:  Blood Updated:  09/05/17 1856     BNP 1514.0 (H) pg/mL     CBC Auto Differential [[id number]]  (Abnormal) Collected:  09/05/17 1817    Specimen:  Blood Updated:  09/05/17 2140     WBC 6.19 10*3/mm3      RBC 3.56 (L) 10*6/mm3      Hemoglobin 11.7 g/dL      Hematocrit 34.7 %      MCV 97.5 fL      MCH 32.9 (H) pg      MCHC 33.7 g/dL      RDW 12.9 %      RDW-SD 46.5 fl      MPV 11.4 fL      Platelets 130 (L)  10*3/mm3      Neutrophil % 80.1 (H) %      Lymphocyte % 15.5 (L) %      Monocyte % 3.7 %      Eosinophil % 0.5 %      Basophil % 0.0 %      Immature Grans % 0.2 %      Neutrophils, Absolute 4.96 10*3/mm3      Lymphocytes, Absolute 0.96 10*3/mm3      Monocytes, Absolute 0.23 10*3/mm3      Eosinophils, Absolute 0.03 10*3/mm3      Basophils, Absolute 0.00 10*3/mm3      Immature Grans, Absolute 0.01 10*3/mm3     Lactic Acid, Plasma [[id number]]  (Normal) Collected:  09/05/17 1817    Specimen:  Blood Updated:  09/05/17 1842     Lactate 0.8 mmol/L       Falsely depressed results may occur on samples drawn from patients receiving N-Acetylcysteine (NAC) or Metamizole.       POC Troponin, Rapid [[id number]]  (Normal) Collected:  09/05/17 1817    Specimen:  Blood Updated:  09/05/17 1835     Troponin I 0.04 ng/mL       Serial Number: [id number]:  [id number]       Blood Culture [[id number]] Collected:  09/05/17 2129    Specimen:  Blood from Hand, Right Updated:  09/05/17 2141    Blood Culture [[id number]] Collected:  09/05/17 2129    Specimen:  Blood from Arm, Right Updated:  09/05/17 2141    Urinalysis With / Culture If Indicated [[id number]]  (Abnormal) Collected:  09/05/17 2248    Specimen:  Urine from Urine, Clean Catch Updated:  09/05/17 2328     Color, UA Yellow     Appearance, UA Turbid (A)     pH, UA 5.5     Specific Gravity, UA 1.012     Glucose, UA Negative     Ketones, UA Negative     Bilirubin, UA Negative     Blood, UA Trace (A)     Protein,  mg/dL (2+) (A)     Leuk Esterase, UA Large (3+) (A)     Nitrite, UA Negative     Urobilinogen, UA 0.2 E.U./dL    Urine Drug Screen [[id number]]  (Normal) Collected:  09/05/17 2248    Specimen:  Urine from Urine, Clean Catch Updated:  09/05/17 2335     THC, Screen, Urine Negative     Phencyclidine (PCP), Urine Negative     Cocaine Screen, Urine Negative     Methamphetamine, Urine Negative     Opiate Screen Negative     Amphetamine Screen, Urine Negative     Benzodiazepine  Screen, Urine Negative     Tricyclic Antidepressants Screen Negative     Methadone Screen, Urine Negative     Barbiturates Screen, Urine Negative     Oxycodone Screen, Urine Negative     Propoxyphene Screen Negative     Buprenorphine, Screen, Urine Negative    Narrative:       Cutoff For Drugs Screened:    Amphetamines               500 ng/ml  Barbiturates               200 ng/ml  Benzodiazepines            150 ng/ml  Cocaine                    150 ng/ml  Methadone                  200 ng/ml  Opiates                    100 ng/ml  Phencyclidine               25 ng/ml  THC                            50 ng/ml  Methamphetamine            500 ng/ml  Tricyclic Antidepressants  300 ng/ml  Oxycodone                  100 ng/ml  Propoxyphene               300 ng/ml  Buprenorphine               10 ng/ml    The normal value for all drugs tested is negative. This report includes unconfirmed screening results, with the cutoff values listed, to be used for medical treatment purposes only.  Unconfirmed results must not be used for non-medical purposes such as employment or legal testing.  Clinical consideration should be applied to any drug of abuse test, particularly when unconfirmed results are used.      Urinalysis, Microscopic Only [[id number]]  (Abnormal) Collected:  09/05/17 2248    Specimen:  Urine from Urine, Clean Catch Updated:  09/05/17 2328     RBC, UA 3-6 (A) /HPF      WBC, UA Too Numerous to Count (A) /HPF      Bacteria, UA 3+ (A) /HPF      Squamous Epithelial Cells, UA 0-2 /HPF      Hyaline Casts, UA 0-6 /LPF      Methodology Manual Light Microscopy    Urine Culture [[id number]] Collected:  09/05/17 2248    Specimen:  Urine from Urine, Clean Catch Updated:  09/05/17 2303        ECG  Vent. rate 85 BPM  MI interval 136 ms  QRS duration 98 ms  QT/QTc 390/464 ms  P-R-T axes 25 -25 153  Normal sinus rhythm  ST & T wave abnormality, consider lateral ischemia  Prolonged QT  Abnormal ECG  When compared with ECG of 03-MAR-2017  05:49,  T wave inversion more evident in Lateral leads     XR Chest, 2 Views     CLINICAL HISTORY:    56 years old, female; Pain and signs and symptoms; Shortness of breath; Chest   pain; Left-sided chest pain; Additional info: SOA. Pt has HX of left sided rib   FX, pt attributes left sided chest pain to old rib FX     TECHNIQUE:    Frontal and lateral views of the chest.     COMPARISON:    CR - XR CHEST 1 VW 3/3/2017 11:01:41 AM     FINDINGS:       Lungs:  Unremarkable.  No consolidation.       Pleural space:  Unremarkable.  No pneumothorax.       Heart:  Unremarkable.  No cardiomegaly.       Mediastinum:  Unremarkable.       Bones/joints:  Status post sternotomy. Cardiac valve replacement.       Other findings:  Left-sided pacing device in place.     IMPRESSION:       No acute findings.    I have personally reviewed and interpreted available lab data, radiology studies and ECG obtained at time of admission.     Assessment / Plan     Problem List:   Hospital Problem List     * (Principal)Acute on chronic respiratory failure with hypoxia    Essential hypertension (Chronic)    Overview Signed 10/19/2015  8:56 AM by Digna Santana     benign essential         Ischemic cardiomyopathy (Chronic)    Overview Signed 2/6/2017  8:44 AM by MAN Quesada. History of MI December 2009: s/p stent to RCA and LAD, EF 40%  B. CABGx2 March, 2010: SVG to OMB-1, SVG to PDA  C. EF 30% post-operatively, s/p [place] ICD placement, 2011  D. Echo July 2014: Severe global hypokinesis with EF 12%, moderate AI, mechanical mitral valve, moderate to severe TR, RVSP 43mmHg   E. Echo 2/4/17: EF 18%, mild to mod AI, mod TR, grossly normal prosthetic mitral valve         PVD (peripheral vascular disease) (Chronic)    Lupus (systemic lupus erythematosus) (Chronic)    Chronic coronary artery disease (Chronic)    COPD exacerbation    Tobacco abuse (Chronic)    Acute systolic heart failure and valvular disease    Overview Addendum 2/6/2017  " 8:44 AM by MAN Quesada             AICD (automatic cardioverter/defibrillator) present (Chronic)    Supratherapeutic INR    CKD (chronic kidney disease), stage IV (Chronic)    UTI (urinary tract infection)          Assessment / Plan:    1. Acute on chronic respiratory failure with hypoxia:  - R/t CHF and COPD exac / AECB, renal disease at baseline.  - Deferred ABG r/t supratherapeutic INR. Last ABG on file x1 8/2015 without hypercapnia then and no known CO2 retention. NPPV PRN if so, and probable GAB component, and diuresis effect.  - \"Borderline\" GAB per pt.  - Diuretics and abx.  - Will check d dimer - pt should be covered as supratherapeutic on coumadin and reports compliance. If elevated, consider VQ scan if needed (avoid contract r/t renal) - lower suspicion as symptoms correlate to allergies/URI and fluid excess.  - Last hospitalization 3/2017 for heart failure.  - Good work cutting back on cigarettes for multiple comorbidities - down to 1/2 PPD or less daily consistently per report. Nicotine patch PRN.    2. Acute on chronic systolic heart failure and valvular heart disease / ischemic cardiomyopathy / coronary artery disease / hypertension:  - Continue BB.  - Defer ACEI/ARB and suspect r/t renal.  - Continue statin.  - Hydralazine as well for HTN.  - Status AICD - otherwise DNR status reported so may need additional teaching/clarifications.  - Chronic coumadin for mechanical mitral valve - goal 2.5-3.5, supratherapeutic today, pharmacy to dose.  - PVD as well.    3. Exacerbation of chronic obstructive pulmonary disease/bronchitis / tobacco abuse:  - Solumedrol, abx r/t immunocompromised (continue rocephin, change azithromycin to doxy r/t prolonged QTc). Continue home regiment but increase nebs component. Add guaifenesin.  - Chronic high dose klonopin; one small norco in April as only narcotic. PAPI reviewed.  - Pleurisy / rib pain, CXR negative for fractures (has hx of them a year ago). " Dextromethorphan PRN for cough suppression first, then phenergan-codeine q6h PRN - if upsets stomach can change to low dose norco PRN. Bowel program.  - Nicotine patch.  - Needs outpatient pulmonologist.    4. Urinary tract infection / diarrhea:  - Present on admission.  - Rocephin/doxy for respiratory will cover and renal friendly.  - Await C&S.  - Note pt c/o 2 days of diarrhea - check c diff as has hx of it, no recent abx.    4. Chronic kidney disease IIIb-IV:  - At baseline, Cr 1.7-1.9, hx AKIs.  - SLE.  - Needs outpatient nephrologist.    5. Systemic lupus erythematous:  - Continue plaquenil.  - Needs new specialists since moved here from [place] last year.      DVT prophylaxis: amos Rodas, already anticoagulated on coumadin with supra therapeutic INR and pharmacy to dose.    Code Status: DNR, full support, prefers no intubation for distress. Otherwise no restrictions. Son Floyd Roldan as healthcare surrogate. Consider [facility] consult if needed for GOC or symptom management r/t multiple advanced co morbidities.    Admission Status: Patient will be admitted to INPATIENT status due to the need for care which can only be reasonably provided in an hospital setting such as aggressive/expedited ancillary services and/or consultation services, the necessity for IV medications, close physician monitoring and/or the possible need for procedures.  In such, I feel patient’s risk for adverse outcomes and need for care warrant INPATIENT evaluation and predict the patient’s care encounter to likely last beyond 2 midnights.     IAIN Quintanilla 09/05/17 11:53 PM      Brief Attending Admission Note     I have seen and examined the patient, performing an independent face-to-face diagnostic evaluation with plan of care reviewed and developed with the advanced practice clinician IAIN Cortez.      Brief Summary Statement/HPI:   Ms. Roldan is a very pleasant 56 year old  woman with known COPD and  severe systolic heart failure (EF 18% in Feb 2017) and valvular heart disease s/p MMV on coumadin who presents to [facility] ED today with worsening dyspnea for the last 4 days.  She states that her symptoms started with sore throat, rhinorrhea, sinus congestion and dry cough followed by worsening dyspnea and a productive yellowish cough.  She has been sitting in a tripod position at home to breathe.  She chronically sleeps in a recliner. She also has PMH significant for CKD III-IV, AICD, HTN, ischemic CM, SLE, PVD and ongoing tobacco abuse.  She received solumedrol 125 mg, duonebs, lasix 80 mg, azithromycin 500 mg and rocephin 1gm in the ED.       Attending Physical Exam:  Constitutional: Mild dyspnea, sitting up, awake, alert, thin and chronically ill appearing  Eyes: PERRLA, sclerae anicteric, no conjunctival injection  HENT: NCAT, mucous membranes moist  Neck: supple, no thyromegaly, no lymphadenopathy, trachea midline  Respiratory: Wheezing and tight throughout  Cardiovascular: RRR, no murmurs, rubs, or gallops, palpable pedal pulses bilaterally  Gastrointestinal: Positive bowel sounds, soft, nontender, nondistended  Musculoskeletal: No bilateral ankle edema, no clubbing or cyanosis to bilateral lower extremities  Psychiatric: oriented x 3, appropriate affect, cooperative  Neurologic: Strength symmetric in all extremities, Cranial Nerves grossly intact to confrontation, speech clear  Derm: no rashes    Brief Assessment/Plan :  Acute on chronic hypoxic respiratory failure secondary to AECOPD:  --Rocephin and doxy for bronchitis presentation (changed from azith due to history of prolonged QT)  --Duonebs and steroids  --Feels better but still dyspneic  --Nicotine replacement and cessation counseling  --No investigation of possible PE due to supratherapeutic INR    UTI:  --Rocephin for now, await culture    Systolic heart failure, EF 18%  --Despite elevated BNP appears well compensated  --Hold further IV lasix  --Daily  weights  --I/O    Supratherapeutic INR:  --Hold coumadin for now,  --Pharmacy to dose    See above for further detailed assessment and plan developed with APC which I have reviewed and/or edited.      I believe this patient meets .LEXINPT: Patient with acute on chronic respiratory failure secondary to AECOPD as well as UTI requiring antibiotics, steroids, nebs and aggressive supportive care.  Also with severe systolic HF at baseline - multiple comorbidities complicate care.      Juliane Shabazz MD  09/06/17  2:04 AM            Electronically signed by Juliane Shabazz MD at 9/6/2017  2:19 AM

## 2017-09-09 NOTE — SIGNIFICANT NOTE
09/09/17 0926   Oxygen Therapy   SpO2 90 %  (sitting room air)   Pulse Oximetry Type Continuous   O2 Device room air

## 2017-09-09 NOTE — CONSULTS
"NAL Consult Note    Ashely Roldan  1960  [id number]    Date of Admit:  9/5/2017    Date of Consult: 9/9/2017        Requesting Provider: No ref. provider found  Evaluating Physician: Scott Reina MD        Reason for Consultation: CKD    History of present illness:    Patient is a 56 y.o.  Yr old female for shortness of breath. Onset 3 days ago. Sudden onset of  Moderate but Constant. Nothing makes it better or worse. Also c/o of associated fever, congestion, cough, and neck/chest pain. She thinks it was triggered by taking her elderly neighbor out 4 days ago when she went to pay her bills and hair and clothing got wet. She states her throat felt like \"shredded\" meat when she was watching the [place] game and also had sneezing and itching eyes but did not improvement with benadryl. She reports she feels so congested she cannot lay back in her recliner or bed x3 nights which has triggered her appearance to the ED. She does have a history of chronic kidney disease and she was following with one of the nephrologist in [place] close to [place].  She did mention that her kidney function was less than 50% could be lower.    Past Medical History:   Diagnosis Date   • Acute respiratory failure with hypoxia 9/5/2017   • Allergic rhinitis 08/01/2014   • Anemia    • Anxiety    • Arthritis    • CAD (coronary artery disease)    • CHF (congestive heart failure)    • CKD (chronic kidney disease)    • CKD (chronic kidney disease), stage IV 9/5/2017   • Colitis, Clostridium difficile    • COPD (chronic obstructive pulmonary disease)    • Coronary atherosclerosis    • Depression    • Emphysema of lung    • GERD (gastroesophageal reflux disease)    • Heart attack    • Heart murmur    • Hematoma of hip    • Hip fracture    • Hyperlipidemia    • Hypertension     benign essential   • Ischemic cardiomyopathy 08/01/2014    ef 29% s/p ICD   • Knee injury    • Lung disease    • Mitral valve disorder 03/28/2013   • Mitral " valve insufficiency     s/p prosthetic ATS valve replacement   • Osteoporosis    • Postmenopausal     natural   • PVD (peripheral vascular disease)    • Pyelonephritis    • Renal failure    • Renal failure    • Renal failure, acute    • Syncope    • Systemic lupus erythematosus    • TIA (transient ischemic attack) 04/04/2012   • UTI (urinary tract infection)    • Valvular heart disease    • Wrist fracture        Past Surgical History:   Procedure Laterality Date   • CARDIAC DEFIBRILLATOR PLACEMENT     • CHOLECYSTECTOMY  2010   • CORONARY ARTERY BYPASS GRAFT  2011    4 aortic bypasses, abdominal aorta; 2011-mitral valve; 2010-triple bypass   • ENDOSCOPY  10/23/2014    Dr. Brand; retained food in stomach; he dilated her esophagus; 5-30-14 hiatus hernia, gastritis   • JOINT REPLACEMENT Right 06/25/2015    Dr. Fitzgerald; first surgery 1-29-14; redo 5-4-15   • MITRAL VALVE REPLACEMENT      MECHANICAL   • TOTAL HIP ARTHROPLASTY Right     3 times within 8 months       Social History     Social History   • Marital status:      Spouse name: N/A   • Number of children: N/A   • Years of education: N/A     Social History Main Topics   • Smoking status: Current Every Day Smoker     Packs/day: 0.50     Types: Cigarettes   • Smokeless tobacco: None      Comment: Down to 0.5 PPD from 1 PPD now consistently. Started as a teenager.   • Alcohol use Yes      Comment: about once a week, reports she is a sports fan and rarely drinks more up to 2-3 drinks   • Drug use: No   • Sexual activity: Defer     Other Topics Concern   • None     Social History Narrative    Ms. Roldan is a 56 year old white female. Pt recently  after 30 years. Just moved from [facility] into her son's home in [place].        family history includes Alcohol abuse in her brother; Arthritis in her mother; Cancer in her other; Diabetes in her other; Heart attack in her father; Heart disease in her maternal uncle and other; Hypertension in her  other; Lung cancer in her paternal grandfather; Stroke in her other.    Allergies   Allergen Reactions   • Morphine And Related GI Intolerance and Irritability   • Sulfa Antibiotics        Medication:    Current Facility-Administered Medications:   •  acetaminophen (TYLENOL) tablet 650 mg, 650 mg, Oral, Q4H PRN, IAIN Quintanilla  •  albuterol (PROVENTIL) nebulizer solution 0.083% 2.5 mg/3mL, 2.5 mg, Nebulization, 4x Daily PRN, IAIN Quintanilla  •  aspirin chewable tablet 81 mg, 81 mg, Oral, Daily, Saul Sandra MD, 81 mg at 09/09/17 0925  •  bisacodyl (DULCOLAX) EC tablet 5 mg, 5 mg, Oral, Daily PRN, IAIN Quintanilla  •  budesonide-formoterol (SYMBICORT) 160-4.5 MCG/ACT inhaler 2 puff, 2 puff, Inhalation, BID - RT, IAIN Quintanilla, 2 puff at 09/09/17 0659  •  carvedilol (COREG) tablet 3.125 mg, 3.125 mg, Oral, BID With Meals, IAIN Quintanilla, 3.125 mg at 09/09/17 0925  •  cefuroxime (CEFTIN) tablet 250 mg, 250 mg, Oral, Q12H, Saul Sandra MD, 250 mg at 09/09/17 0925  •  cetirizine (zyrTEC) tablet 10 mg, 10 mg, Oral, Nightly, IAIN Quintanilla, 10 mg at 09/08/17 2050  •  clonazePAM (KlonoPIN) tablet 2 mg, 2 mg, Oral, Q8H PRN, IAIN Quintanilla, 2 mg at 09/08/17 1437  •  dextromethorphan polistirex ER (DELSYM) 30 MG/5ML oral suspension 30 mg, 30 mg, Oral, Q12H PRN, IAIN Quintanilla, 30 mg at 09/09/17 0924  •  docusate sodium (COLACE) capsule 100 mg, 100 mg, Oral, BID PRN, IAIN Quintanilla  •  doxycycline (VIBRAMYCIN) capsule 100 mg, 100 mg, Oral, Q12H, Luci Clancy MD, 100 mg at 09/09/17 0539  •  ferrous sulfate tablet 325 mg, 325 mg, Oral, Every Other Day, IAIN Quintanilla, 325 mg at 09/08/17 1153  •  furosemide (LASIX) tablet 20 mg, 20 mg, Oral, Daily, Saul Sandra MD, 20 mg at 09/09/17 0925  •  guaiFENesin (MUCINEX) 12 hr tablet 600 mg, 600 mg, Oral, BID, IAIN Quintanilla, 600 mg at 09/09/17 0925  •   hydrALAZINE (APRESOLINE) tablet 25 mg, 25 mg, Oral, Q8H, Ami Pelaez APRN, 25 mg at 09/09/17 0539  •  HYDROcodone-acetaminophen (NORCO) 5-325 MG per tablet 1 tablet, 1 tablet, Oral, Q6H PRN, Luci Clancy MD, 1 tablet at 09/08/17 2303  •  hydroxychloroquine (PLAQUENIL) tablet 200 mg, 200 mg, Oral, QAM, Ami Pelaez APRN, 200 mg at 09/09/17 0925  •  influenza vac split quad (FLUZONE/FLUARIX) injection 0.5 mL, 0.5 mL, Intramuscular, During Hospitalization, Saul Sandra MD  •  ipratropium-albuterol (DUO-NEB) nebulizer solution 3 mL, 3 mL, Nebulization, Q4H - RT, IAIN Quintanilla, 3 mL at 09/09/17 0659  •  lidocaine (LIDODERM) 5 % 3 patch, 3 patch, Transdermal, Daily PRN, IAIN Quintanilla  •  [DISCONTINUED] magnesium sulfate 4 gram infusion- Mg less than or equal to 1 mg/dL, 4 g, Intravenous, PRN **OR** [DISCONTINUED] magnesium sulfate 3 gram infusion (1gm x 3) - Mg 1.1 - 1.5 mg/dL, 1 g, Intravenous, PRN **OR** Magnesium Sulfate 2 gram infusion- Mg 1.6 - 1.9 mg/dL, 2 g, Intravenous, PRN, Ami Pelaez APRN  •  magnesium sulfate 4 gram infusion- Mg less than or equal to 1 mg/dL, 4 g, Intravenous, PRN, Last Rate: 25 mL/hr at 09/08/17 0825, 4 g at 09/08/17 0825 **OR** magnesium sulfate 3 gram infusion (1gm x 3) - Mg 1.1 - 1.5 mg/dL, 1 g, Intravenous, PRN **OR** Magnesium Sulfate 2 gram infusion- Mg 1.6 - 1.9 mg/dL, 2 g, Intravenous, PRN, Ami Pelaez APRN  •  melatonin sublingual tablet 5 mg, 5 mg, Sublingual, Nightly PRN, IAIN Quintanilla  •  methylPREDNISolone sodium succinate (SOLU-Medrol) injection 40 mg, 40 mg, Intravenous, BID, Saul Sandra MD, 40 mg at 09/09/17 0923  •  montelukast (SINGULAIR) tablet 10 mg, 10 mg, Oral, Q PM, IAIN Quintanilla, 10 mg at 09/08/17 1833  •  nicotine (NICODERM CQ) 14 MG/24HR patch 1 patch, 1 patch, Transdermal, Q24H, IAIN Quintanilla  •  ondansetron (ZOFRAN) injection 4 mg, 4 mg, Intravenous, Q6H  PRN, Saul Sandra MD, 4 mg at 09/08/17 2055  •  Pharmacy Consult - [facility], , Does not apply, Daily, Makenzie Bryant [facility]  •  Pharmacy to dose warfarin, , Does not apply, Continuous PRN, Ami Pelaez APRN  •  promethazine-codeine (PHENERGAN with CODEINE) 6.25-10 MG/5ML syrup 5 mL, 5 mL, Oral, Q6H PRN, Ami Pelaez APRN, 5 mL at 09/08/17 1515  •  rosuvastatin (CRESTOR) tablet 10 mg, 10 mg, Oral, Nightly, Ami Pelaez APRN, 10 mg at 09/08/17 2050  •  saccharomyces boulardii (FLORASTOR) capsule 250 mg, 250 mg, Oral, BID, Ami Pelaez APRN, 250 mg at 09/09/17 0925  •  sennosides-docusate sodium (SENOKOT-S) 8.6-50 MG tablet 2 tablet, 2 tablet, Oral, Nightly PRN, Ami Pelaez APRN  •  sodium chloride 0.9 % flush 1-10 mL, 1-10 mL, Intravenous, PRN, Ami Pelaez APRN  •  sodium chloride 0.9 % flush 10 mL, 10 mL, Intravenous, PRN, Jesus Liu MD  •  spironolactone (ALDACTONE) tablet 25 mg, 25 mg, Oral, Daily, Ami Pelaez APRN, 25 mg at 09/09/17 0925  •  tiZANidine (ZANAFLEX) tablet 4 mg, 4 mg, Oral, BID PRN, Ami Pelaez APRN  •  warfarin (COUMADIN) tablet 2 mg, 2 mg, Oral, Daily, Makenize Bryant [facility], 2 mg at 09/08/17 1153  •  warfarin (COUMADIN) tablet 2 mg, 2 mg, Oral, Once, Anais Goss [facility]    Prescriptions Prior to Admission   Medication Sig Dispense Refill Last Dose   • albuterol (PROVENTIL) (2.5 MG/3ML) 0.083% nebulizer solution Take 2.5 mg by nebulization As Needed for wheezing or shortness of air.   Taking   • budesonide-formoterol (SYMBICORT) 160-4.5 MCG/ACT inhaler Inhale 1-2 puffs 2 (Two) Times a Day. In morning and evening for 90 days    Taking   • Calcium Carb-Cholecalciferol (CALCIUM + D3) 600-200 MG-UNIT tablet Take 1 tablet by mouth Daily.      • carvedilol (COREG) 3.125 MG tablet Take 1 tablet by mouth 2 (Two) Times a Day With Meals. 60 tablet 11 Taking   • clonazePAM (KlonoPIN) 2 MG tablet Take 1 tablet by mouth 3 (Three) Times a  Day. 90 tablet 2    • diphenhydrAMINE (BENADRYL) 25 MG tablet Take 25 mg by mouth Every 8 (Eight) Hours As Needed.   Taking   • Ferrous Sulfate (IRON) 325 (65 FE) MG tablet Take 325 mg by mouth Daily.   Taking   • furosemide (LASIX) 40 MG tablet Take 1 tablet by mouth Daily. 30 tablet 11 Taking   • hydrALAZINE (APRESOLINE) 25 MG tablet Take 1 tablet by mouth 3 (Three) Times a Day. 90 tablet 11 Taking   • nitroglycerin (NITROSTAT) 0.4 MG SL tablet 1 under the tongue as needed for angina, may repeat q5mins for up three doses 30 tablet 3 Taking   • Probiotic Product (PROBIOTIC PO) Take 1 capsule by mouth daily. 20 billion cell; for 30 days   Taking   • rosuvastatin (CRESTOR) 10 MG tablet Take 1 tablet by mouth Every Night. For 90 days 30 tablet 11 Taking   • tiotropium (SPIRIVA) 18 MCG per inhalation capsule Place 2 puffs (1 capsule total) into inhaler and inhale once daily. For 90 days 90 capsule 3 Taking   • warfarin (COUMADIN) 2 MG tablet Take 2 mg by mouth See Admin Instructions. 2 mg daily x 2 days, then 3 mg x 1 day, then repeat      • warfarin (COUMADIN) 3 MG tablet Take 3 mg by mouth Every 72 (Seventy-Two) Hours.          Review of Systems:    Constitutional-- No Fever, chills or sweats. No significant change in weight  Eye-- no diplopia, no conjunctivitis  ENT-- No new hearing or throat complaints.  No epistaxis or oral sores. No odynophagia or dysphagia. No headache, photophobia or neck stiffness.  CV-- No chest pain, palpitations, soa,  Trace edema  Resp-- Some shortness of breath but better now  GI- No nausea, vomiting, or diarrhea.  No hematochezia, melena, or hematemesis.  -- No dysuria, hematuria, or flank pain. No lower tract obstructive symptoms  Skin-- No rash, warm and dry  Lymph- no painful or swollen lymph nodes in neck/axilla or groin.   Heme- No active bruising or bleeding; no Hx of DVT or PE.  MS-- no swelling or pain in the joints  Neuro-- No acute focal weakness or numbness in the arms or  "legs.  No seizures.  Psych--No anxiety or depression  Endo--No cold or heat intolerance.  No polyuria, polydipsia, or polyphagia    Full review of systems reviewed and negative otherwise for acute complaints    Physical Exam:   Vital Signs   Blood pressure 117/79, pulse 76, temperature 98.2 °F (36.8 °C), temperature source Oral, resp. rate 18, height 69\" (175.3 cm), weight 122 lb 6 oz (55.5 kg), SpO2 90 %, not currently breastfeeding.     GENERAL: Awake and alert, in no acute distress.   HEENT: Normocephalic, atraumatic.  PER.  No conjunctivitis. No icterus. Oropharynx clear without evidence of thrush or exudate. No evidence of peridontal disease.    NECK: Supple without nuchal rigidity. No mass.  LYMPH: No painful cervical, axillary or inguinal lymphadenopathy.  HEART: RRR; No murmur, rubs, gallops. No bruits in neck, abdomen, or groins, trace edema  LUNGS: Normal respiratory effort. Nonlabored. No dullness.  No crepitus.  Clear to auscultation bilaterally without wheezing, rales, rhonchi.  ABDOMEN: Soft, nontender, nondistended. Positive bowel sounds. No rebound or guarding. NO mass or HSM.  JOINTS:  Full range of motion, no redness or tenderness.  EXT:  No cyanosis, clubbing or edema.  :  External genitalia without swelling or lesion  SKIN: Warm and dry without rash  NEURO: Oriented to PPT. No focal neurological deficits. Strength equal bilateral  PSYCHIATRIC: Normal insight and judgement. Cooperative with PE    Laboratory Data    Results from last 7 days  Lab Units 09/09/17  0611 09/05/17  1817   HEMOGLOBIN g/dL 10.4* 11.7   HEMATOCRIT % 32.6* 34.7       Results from last 7 days  Lab Units 09/09/17  0611 09/08/17  0515 09/07/17  0447 09/05/17  1816   SODIUM mmol/L 134 135 133 134   POTASSIUM mmol/L 4.4 3.5 4.3 4.2   CHLORIDE mmol/L 101 96* 96* 101   CO2 mmol/L 28.0 26.0 27.0 28.0   BUN mg/dL 89* 75* 67* 40*   CREATININE mg/dL 2.30* 2.30* 2.20* 1.90*   CALCIUM mg/dL 9.0 9.3 9.2 9.9   MAGNESIUM mg/dL 2.6 1.9  -- "  2.0   ALBUMIN g/dL  --   --   --  4.60       Results from last 7 days  Lab Units 09/09/17  0611   GLUCOSE mg/dL 122*       Results from last 7 days  Lab Units 09/05/17  2248   COLOR UA  Yellow   CLARITY UA  Turbid*   PH, URINE  5.5   SPECIFIC GRAVITY, URINE  1.012   GLUCOSE UA  Negative   KETONES UA  Negative   BILIRUBIN UA  Negative   PROTEIN UA  100 mg/dL (2+)*   BLOOD UA  Trace*   LEUKOCYTES UA  Large (3+)*   NITRITE UA  Negative       Results from last 7 days  Lab Units 09/05/17  1816   ALK PHOS U/L 198*   BILIRUBIN mg/dL 0.3   ALT (SGPT) U/L 15   AST (SGOT) U/L 31     Estimated Creatinine Clearance: 23.6 mL/min (by C-G formula based on Cr of 2.3).    Radiology:      Renal Imaging:    I personally read  the radiographic studies    Impression:   Acute kidney injury-likely related to diuresis recently done for fluid overload.  Chronic kidney disease possibly from hypertension versus lupus though she does not have much proteinuria  Lupus-I will order a bunch of labs to see if this acute loop was does not look like the  Proteinuria-mild proteinuria  Hypertension blood pressures are running low   volume overload/CHF-she is on diuretic now    PLAN: Thank you for asking us to see Ashely Roldan, I recommend the following:   In the face of acute kidney injury at this time I will request to stop Aldactone as outpatient once her kidney function is better we can always give it a try again.  Continue Lasix at the current dose  See us in the clinic in 2 weeks.  Labs  She wants to go home if she is otherwise medically stable is okay to let her go without Aldactone  Thank you for involving us in the care of this patient    Scott Reina MD  9/9/2017  11:34 AM

## 2017-09-09 NOTE — PLAN OF CARE
Problem: Cardiac: Heart Failure (Adult)  Intervention: Promote Functional Ability    09/09/17 0200   Activity   Activity Type activity adjusted per tolerance       Intervention: Gradually Correct Positive Fluid Balance    09/09/17 0241   Nutrition Interventions   Fluid/Electrolyte Management fluids restricted         Goal: Signs and Symptoms of Listed Potential Problems Will be Absent or Manageable (Cardiac: Heart Failure)  Outcome: Ongoing (interventions implemented as appropriate)    Problem: Respiratory Insufficiency (Adult)  Goal: Identify Related Risk Factors and Signs and Symptoms  Outcome: Ongoing (interventions implemented as appropriate)  Goal: Acid/Base Balance  Outcome: Ongoing (interventions implemented as appropriate)  Goal: Effective Ventilation  Outcome: Ongoing (interventions implemented as appropriate)    Problem: Patient Care Overview (Adult)  Goal: Plan of Care Review  Outcome: Ongoing (interventions implemented as appropriate)  Goal: Adult Individualization and Mutuality  Outcome: Ongoing (interventions implemented as appropriate)  Goal: Discharge Needs Assessment  Outcome: Ongoing (interventions implemented as appropriate)    Problem: Chronic Kidney Disease/End Stage Renal Disease (Adult)  Intervention: Monitor/Manage Fluid Electrolyte/Acid Base Balance    09/08/17 2026 09/09/17 0241   Positioning   Positioning semi-Fowlers --    Nutrition Interventions   Fluid/Electrolyte Management --  fluids restricted         Goal: Signs and Symptoms of Listed Potential Problems Will be Absent or Manageable (Chronic Kidney Disease/End Stage Renal Disease)  Outcome: Ongoing (interventions implemented as appropriate)

## 2017-09-09 NOTE — SIGNIFICANT NOTE
09/09/17 0931   Oxygen Therapy   SpO2 90 %   Pulse Oximetry Type Continuous   O2 Device nasal cannula   Flow (L/min) 1

## 2017-09-09 NOTE — DISCHARGE SUMMARY
HOSPITAL MEDICINE DISCHARGE SUMMARY    Date of Admission: 9/5/2017  Date of Discharge:  9/9/2017    Discharge Diagnoses:  Active Hospital Problems (** Indicates Principal Problem)    Diagnosis Date Noted   • **Acute on chronic respiratory failure with hypoxia [J96.21] 09/05/2017   • Elevated troponin [R79.89] 09/06/2017   • CKD (chronic kidney disease), stage IV [N18.4] 09/05/2017   • UTI (urinary tract infection) [N39.0] 09/05/2017   • Supratherapeutic INR [R79.1] 07/16/2016   • Acute systolic heart failure and valvular disease [I50.21] 03/14/2016     I         • AICD (automatic cardioverter/defibrillator) present [Z95.810] 03/14/2016   • Tobacco abuse [Z72.0] 03/14/2016   • COPD exacerbation [J44.1] 03/13/2016   • Chronic coronary artery disease [I25.10] 12/14/2015   • PVD (peripheral vascular disease) [I73.9]    • Essential hypertension [I10]      benign essential     • Lupus (systemic lupus erythematosus) [M32.9]    • Ischemic cardiomyopathy [I25.5] 08/01/2014     A. History of MI December 2009: s/p stent to RCA and LAD, EF 40%  B. CABGx2 March, 2010: SVG to OMB-1, SVG to PDA  C. EF 30% post-operatively, s/p Hardy ICD placement, 2011  D. Echo July 2014: Severe global hypokinesis with EF 12%, moderate AI, mechanical mitral valve, moderate to severe TR, RVSP 43mmHg   E. Echo 2/4/17: EF 18%, mild to mod AI, mod TR, grossly normal prosthetic mitral valve        Resolved Hospital Problems    Diagnosis Date Noted Date Resolved   No resolved problems to display.       Consults:   Consults     No orders found from 8/7/2017 to 9/6/2017.          Presenting Problem/History of Present Illness  Acute on chronic respiratory failure with hypoxia [J96.21]  Patient is a 56 y.o. female with known COPD, CKD III-IV (baseline cr 1.8-2.0), AICD, HTN, ischemic CM, SLE, PVD,ongoing tobacco abuse, severe systolic heart failure (EF 18% in Feb 2017), valvular heart disease s/p MMV on coumadin who presented to [facility] ED with worsening  "dyspnea, orthopnea and generally ill feeling for the 4 days.  Evaluation significant for wheezes and hypoxia to mid 80's% on RA consistent with component of COPD exac with also elevated BNP and symptoms of A/C systolic CHF.  Also incidental finding of UTI but asymptomatic:  Mrs. Roldan was admitted to [facility] for associated CHF exacerbation and COPD exacerbation.     Discharge Day HPI:  Sitting up on side of bed, NAD. Dressed in regular clothing, very eager to go home and watch [email or web address]. States \"roll tide\" when asked about going home.  Breathing has significantly improved, does not want to use home oxygen as she doesn't feel SOA, but agreeable to use. Has had a small amount of clear productive sputum. Denies f/c, n/v/d, soa or cp.     Hospital Course  Patient was treated with Doxyxycline, steroids, and nebs for COPD exacerbation. Remained on supplemental oxygen throughout hospitalization and will require oxygen at home at time of discharge due to resting oxygen saturation of 84%. Denies feeling SOA and states that breathing has significantly improved since arrival. Will finish 7 day treatment course of oral Doxy and steroid taper after discharge. Treated for UTI, asymptomatic at time of discharge.  Consulted Nephrology as patient does not follow with anyone outpt. Dr Reina evaluated patient and will follow outpt, stopped Aldactone for now due to renal function and cont current dose of Lasix.   Will be discharged home today with portable oxygen tank and home oxygen delivered by [facility].   Needs to follow up with PCP in 1 week, as well as call PCP on Monday, 9-11-17 with INR level, as she monitors on home device. Needs to follow up with Dr Ramirez in 1 month. Needs to follow up with Dr Reina as previously mentioned in 2 weeks.     Procedures Performed         Consults:   Consults     No orders found from 8/7/2017 to 9/6/2017.          Pertinent Test Results:   Lab Results (last 7 days)     Procedure Component " Value Units Date/Time    POC Troponin, Rapid [[id number]]  (Normal) Collected:  09/05/17 1817    Specimen:  Blood Updated:  09/05/17 1835     Troponin I 0.04 ng/mL       Serial Number: [id number]:  [id number]       Lactic Acid, Plasma [[id number]]  (Normal) Collected:  09/05/17 1817    Specimen:  Blood Updated:  09/05/17 1842     Lactate 0.8 mmol/L       Falsely depressed results may occur on samples drawn from patients receiving N-Acetylcysteine (NAC) or Metamizole.       Comprehensive Metabolic Panel [[id number]]  (Abnormal) Collected:  09/05/17 1816    Specimen:  Blood Updated:  09/05/17 1849     Glucose 100 mg/dL      BUN 40 (H) mg/dL      Creatinine 1.90 (H) mg/dL      Sodium 134 mmol/L      Potassium 4.2 mmol/L      Chloride 101 mmol/L      CO2 28.0 mmol/L      Calcium 9.9 mg/dL      Total Protein 8.3 (H) g/dL      Albumin 4.60 g/dL      ALT (SGPT) 15 U/L      AST (SGOT) 31 U/L      Alkaline Phosphatase 198 (H) U/L      Total Bilirubin 0.3 mg/dL      eGFR Non African Amer 27 (L) mL/min/1.73      Globulin 3.7 gm/dL      A/G Ratio 1.2 (L) g/dL      BUN/Creatinine Ratio 21.1     Anion Gap 5.0 mmol/L     Narrative:       National Kidney Foundation Guidelines    Stage     Description        GFR  1         Normal or High     90+  2         Mild decrease      60-89  3         Moderate decrease  30-59  4         Severe decrease    15-29  5         Kidney failure     <15    BNP [[id number]]  (Abnormal) Collected:  09/05/17 1817    Specimen:  Blood Updated:  09/05/17 1856     BNP 1514.0 (H) pg/mL     Light Blue Top [[id number]] Collected:  09/05/17 1816    Specimen:  Blood Updated:  09/05/17 2001     Extra Tube hold for add-on      Auto resulted       Green Top (Gel) [[id number]] Collected:  09/05/17 1816    Specimen:  Blood Updated:  09/05/17 2001     Extra Tube Hold for add-ons.      Auto resulted.       Gold Top - SST [[id number]] Collected:  09/05/17 1816    Specimen:  Blood Updated:  09/05/17 2001     Extra Tube Hold  for add-ons.      Auto resulted.       Protime-INR [[id number]]  (Abnormal) Collected:  09/05/17 1816    Specimen:  Blood Updated:  09/05/17 2011     Protime 59.6 (C) Seconds      INR 5.20    Narrative:       Therapeutic Ranges for INR: 2.0-3.0 (PT 20-30)                              2.5-3.5 (PT 25-34)    CBC & Differential [[id number]] Collected:  09/05/17 1817    Specimen:  Blood Updated:  09/05/17 2140    Narrative:       The following orders were created for panel order CBC & Differential.  Procedure                               Abnormality         Status                     ---------                               -----------         ------                     CBC Auto Differential[[id number]]        Abnormal            Final result                 Please view results for these tests on the individual orders.    CBC Auto Differential [[id number]]  (Abnormal) Collected:  09/05/17 1817    Specimen:  Blood Updated:  09/05/17 2140     WBC 6.19 10*3/mm3      RBC 3.56 (L) 10*6/mm3      Hemoglobin 11.7 g/dL      Hematocrit 34.7 %      MCV 97.5 fL      MCH 32.9 (H) pg      MCHC 33.7 g/dL      RDW 12.9 %      RDW-SD 46.5 fl      MPV 11.4 fL      Platelets 130 (L) 10*3/mm3      Neutrophil % 80.1 (H) %      Lymphocyte % 15.5 (L) %      Monocyte % 3.7 %      Eosinophil % 0.5 %      Basophil % 0.0 %      Immature Grans % 0.2 %      Neutrophils, Absolute 4.96 10*3/mm3      Lymphocytes, Absolute 0.96 10*3/mm3      Monocytes, Absolute 0.23 10*3/mm3      Eosinophils, Absolute 0.03 10*3/mm3      Basophils, Absolute 0.00 10*3/mm3      Immature Grans, Absolute 0.01 10*3/mm3     Arthur Draw [[id number]] Collected:  09/05/17 1816    Specimen:  Blood Updated:  09/05/17 2201    Narrative:       The following orders were created for panel order Arthur Draw.  Procedure                               Abnormality         Status                     ---------                               -----------         ------                     Light  Blue Top[[id number]]                                   Final result               Green Top (Gel)[[id number]]                                  Final result               Lavender Top[[id number]]                                     Final result               Gold Top - SST[[id number]]                                   Final result               Green Top (No Gel)[[id number]]                                                            Please view results for these tests on the individual orders.    Lavender Top [[id number]] Collected:  09/05/17 1817    Specimen:  Blood Updated:  09/05/17 2201     Extra Tube hold for add-on      Auto resulted       Urinalysis With / Culture If Indicated [[id number]]  (Abnormal) Collected:  09/05/17 2248    Specimen:  Urine from Urine, Clean Catch Updated:  09/05/17 2328     Color, UA Yellow     Appearance, UA Turbid (A)     pH, UA 5.5     Specific Gravity, UA 1.012     Glucose, UA Negative     Ketones, UA Negative     Bilirubin, UA Negative     Blood, UA Trace (A)     Protein,  mg/dL (2+) (A)     Leuk Esterase, UA Large (3+) (A)     Nitrite, UA Negative     Urobilinogen, UA 0.2 E.U./dL    Urinalysis, Microscopic Only [[id number]]  (Abnormal) Collected:  09/05/17 2248    Specimen:  Urine from Urine, Clean Catch Updated:  09/05/17 2328     RBC, UA 3-6 (A) /HPF      WBC, UA Too Numerous to Count (A) /HPF      Bacteria, UA 3+ (A) /HPF      Squamous Epithelial Cells, UA 0-2 /HPF      Hyaline Casts, UA 0-6 /LPF      Methodology Manual Light Microscopy    Urine Drug Screen [[id number]]  (Normal) Collected:  09/05/17 2248    Specimen:  Urine from Urine, Clean Catch Updated:  09/05/17 2335     THC, Screen, Urine Negative     Phencyclidine (PCP), Urine Negative     Cocaine Screen, Urine Negative     Methamphetamine, Urine Negative     Opiate Screen Negative     Amphetamine Screen, Urine Negative     Benzodiazepine Screen, Urine Negative     Tricyclic Antidepressants Screen Negative     Methadone  Screen, Urine Negative     Barbiturates Screen, Urine Negative     Oxycodone Screen, Urine Negative     Propoxyphene Screen Negative     Buprenorphine, Screen, Urine Negative    Narrative:       Cutoff For Drugs Screened:    Amphetamines               500 ng/ml  Barbiturates               200 ng/ml  Benzodiazepines            150 ng/ml  Cocaine                    150 ng/ml  Methadone                  200 ng/ml  Opiates                    100 ng/ml  Phencyclidine               25 ng/ml  THC                            50 ng/ml  Methamphetamine            500 ng/ml  Tricyclic Antidepressants  300 ng/ml  Oxycodone                  100 ng/ml  Propoxyphene               300 ng/ml  Buprenorphine               10 ng/ml    The normal value for all drugs tested is negative. This report includes unconfirmed screening results, with the cutoff values listed, to be used for medical treatment purposes only.  Unconfirmed results must not be used for non-medical purposes such as employment or legal testing.  Clinical consideration should be applied to any drug of abuse test, particularly when unconfirmed results are used.      Magnesium [[id number]]  (Normal) Collected:  09/05/17 1816    Specimen:  Blood Updated:  09/06/17 0013     Magnesium 2.0 mg/dL     D-dimer, Quantitative [[id number]]  (Normal) Collected:  09/05/17 2336    Specimen:  Blood Updated:  09/06/17 0036     D-Dimer, Quantitative 0.25 mg/L (FEU)     Narrative:       Negative predictive value for exclusion of venous thromboembolism: < or = 0.5 mg/L (FEU)    Troponin [[id number]]  (Abnormal) Collected:  09/06/17 0010    Specimen:  Blood Updated:  09/06/17 0045     Troponin I 0.044 (H) ng/mL     Protime-INR [[id number]]  (Abnormal) Collected:  09/06/17 0017    Specimen:  Blood Updated:  09/06/17 0056     Protime 57.8 (C) Seconds       Confirmed/called        INR 5.04    Narrative:       Therapeutic Ranges for INR: 2.0-3.0 (PT 20-30)                              2.5-3.5 (PT  25-34)    Protime-INR [[id number]]  (Abnormal) Collected:  09/06/17 0550    Specimen:  Blood Updated:  09/06/17 0702     Protime 55.6 (C) Seconds      INR 4.86    Narrative:       Therapeutic Ranges for INR: 2.0-3.0 (PT 20-30)                              2.5-3.5 (PT 25-34)    Troponin [[id number]]  (Normal) Collected:  09/06/17 0550    Specimen:  Blood Updated:  09/06/17 0708     Troponin I 0.028 ng/mL     Protime-INR [[id number]]  (Abnormal) Collected:  09/07/17 0447    Specimen:  Blood Updated:  09/07/17 0610     Protime 54.3 (C) Seconds       Verified by repeat analysis.         INR 4.75    Narrative:       Therapeutic Ranges for INR: 2.0-3.0 (PT 20-30)                              2.5-3.5 (PT 25-34)    Basic Metabolic Panel [[id number]]  (Abnormal) Collected:  09/07/17 0447    Specimen:  Blood Updated:  09/07/17 0651     Glucose 132 (H) mg/dL      BUN 67 (H) mg/dL      Creatinine 2.20 (H) mg/dL      Sodium 133 mmol/L      Potassium 4.3 mmol/L      Chloride 96 (L) mmol/L      CO2 27.0 mmol/L      Calcium 9.2 mg/dL      eGFR Non African Amer 23 (L) mL/min/1.73      BUN/Creatinine Ratio 30.5 (H)     Anion Gap 10.0 mmol/L     Narrative:       National Kidney Foundation Guidelines    Stage     Description        GFR  1         Normal or High     90+  2         Mild decrease      60-89  3         Moderate decrease  30-59  4         Severe decrease    15-29  5         Kidney failure     <15    Basic Metabolic Panel [[id number]]  (Abnormal) Collected:  09/08/17 0515    Specimen:  Blood Updated:  09/08/17 0644     Glucose 135 (H) mg/dL      BUN 75 (H) mg/dL      Creatinine 2.30 (H) mg/dL      Sodium 135 mmol/L      Potassium 3.5 mmol/L      Chloride 96 (L) mmol/L      CO2 26.0 mmol/L      Calcium 9.3 mg/dL      eGFR Non African Amer 22 (L) mL/min/1.73      BUN/Creatinine Ratio 32.6 (H)     Anion Gap 13.0 (H) mmol/L     Narrative:       National Kidney Foundation Guidelines    Stage     Description        GFR  1          Normal or High     90+  2         Mild decrease      60-89  3         Moderate decrease  30-59  4         Severe decrease    15-29  5         Kidney failure     <15    Magnesium [[id number]]  (Normal) Collected:  09/08/17 0515    Specimen:  Blood Updated:  09/08/17 0644     Magnesium 1.9 mg/dL     Protime-INR [[id number]]  (Abnormal) Collected:  09/08/17 0515    Specimen:  Blood Updated:  09/08/17 0734     Protime 24.4 (H) Seconds       Verified by repeat analysis.         INR 2.19    Narrative:       Therapeutic Ranges for INR: 2.0-3.0 (PT 20-30)                              2.5-3.5 (PT 25-34)    Urine Culture [[id number]]  (Abnormal)  (Susceptibility) Collected:  09/05/17 2248    Specimen:  Urine from Urine, Clean Catch Updated:  09/08/17 1328     Urine Culture >100,000 CFU/mL Escherichia coli (A)    Susceptibility      Escherichia coli     IVAN     Amikacin <=16 ug/ml Susceptible     Ampicillin >16 ug/ml Resistant     Ampicillin + Sulbactam >16/8 ug/ml Resistant     Aztreonam <=8 ug/ml Susceptible     Cefepime <=8 ug/ml Susceptible     Cefotaxime <=2 ug/ml Susceptible     Ceftriaxone <=8 ug/ml Susceptible     Cefuroxime sodium <=4 ug/ml Susceptible     Cephalothin >16 ug/ml Resistant     Ertapenem <=1 ug/ml Susceptible     Gentamicin >8 ug/ml Resistant     Levofloxacin <=2 ug/ml Susceptible     Meropenem <=1 ug/ml Susceptible     Nitrofurantoin <=32 ug/ml Susceptible     Piperacillin + Tazobactam <=16 ug/ml Susceptible     Tetracycline <=4 ug/ml Susceptible     Tobramycin >8 ug/ml Resistant     Trimethoprim + Sulfamethoxazole <=2/38 ug/ml Susceptible                    Creatinine, Urine, Random [[id number]] Collected:  09/08/17 1356    Specimen:  Urine from Urine, Clean Catch Updated:  09/08/17 1438     Creatinine, Urine 36.1 mg/dL     Protein, Urine, Random [[id number]]  (Normal) Collected:  09/08/17 1356    Specimen:  Urine from Urine, Clean Catch Updated:  09/08/17 1438     Total Protein, Urine 9.0 mg/dL     Blood  Culture [[id number]]  (Normal) Collected:  09/05/17 2129    Specimen:  Blood from Hand, Right Updated:  09/08/17 2201     Blood Culture No growth at 3 days    Blood Culture [[id number]]  (Normal) Collected:  09/05/17 2129    Specimen:  Blood from Arm, Right Updated:  09/08/17 2201     Blood Culture No growth at 3 days    C3 Complement [[id number]] Collected:  09/09/17 0611    Specimen:  Blood Updated:  09/09/17 0623    ANCA Panel [[id number]] Collected:  09/09/17 0611    Specimen:  Blood Updated:  09/09/17 0623    Nuclear Antigen Antibody, IFA [[id number]] Collected:  09/09/17 0611    Specimen:  Blood Updated:  09/09/17 0623    Complement, Total [[id number]] Collected:  09/09/17 0611    Specimen:  Blood Updated:  09/09/17 0623    C4 Complement [[id number]] Collected:  09/09/17 0611    Specimen:  Blood Updated:  09/09/17 0623    Anti-DNA Antibody, Double-stranded [[id number]] Collected:  09/09/17 0611    Specimen:  Blood Updated:  09/09/17 0623    CBC & Differential [[id number]] Collected:  09/09/17 0611    Specimen:  Blood Updated:  09/09/17 0652    Narrative:       The following orders were created for panel order CBC & Differential.  Procedure                               Abnormality         Status                     ---------                               -----------         ------                     CBC Auto Differential[[id number]]        Abnormal            Final result                 Please view results for these tests on the individual orders.    CBC Auto Differential [[id number]]  (Abnormal) Collected:  09/09/17 0611    Specimen:  Blood Updated:  09/09/17 0652     WBC 7.69 10*3/mm3      RBC 3.28 (L) 10*6/mm3      Hemoglobin 10.4 (L) g/dL      Hematocrit 32.6 (L) %      MCV 99.4 (H) fL      MCH 31.7 (H) pg      MCHC 31.9 (L) g/dL      RDW 12.8 %      RDW-SD 45.8 fl      MPV 10.7 fL      Platelets 134 (L) 10*3/mm3      Neutrophil % 87.9 (H) %      Lymphocyte % 8.2 (L) %      Monocyte % 3.4 %      Eosinophil % 0.0 %       Basophil % 0.0 %      Immature Grans % 0.5 %      Neutrophils, Absolute 6.76 10*3/mm3      Lymphocytes, Absolute 0.63 10*3/mm3      Monocytes, Absolute 0.26 10*3/mm3      Eosinophils, Absolute 0.00 10*3/mm3      Basophils, Absolute 0.00 10*3/mm3      Immature Grans, Absolute 0.04 (H) 10*3/mm3     Ferritin [[id number]]  (Abnormal) Collected:  09/09/17 0611    Specimen:  Blood Updated:  09/09/17 0727     Ferritin 637.00 (H) ng/mL     Iron Profile [[id number]]  (Abnormal) Collected:  09/09/17 0611    Specimen:  Blood Updated:  09/09/17 0727     Iron 67 mcg/dL      TIBC 238 (L) mcg/dL      Iron Saturation 28 %     Uric Acid [[id number]]  (Abnormal) Collected:  09/09/17 0611    Specimen:  Blood Updated:  09/09/17 0727     Uric Acid 10.4 (H) mg/dL       Falsely depressed results may occur on samples drawn from patients receiving N-Acetylcysteine (NAC) or Metamizole.       Magnesium [[id number]]  (Normal) Collected:  09/09/17 0611    Specimen:  Blood Updated:  09/09/17 0727     Magnesium 2.6 mg/dL     Basic Metabolic Panel [[id number]]  (Abnormal) Collected:  09/09/17 0611    Specimen:  Blood Updated:  09/09/17 0733     Glucose 122 (H) mg/dL      BUN 89 (H) mg/dL      Creatinine 2.30 (H) mg/dL      Sodium 134 mmol/L      Potassium 4.4 mmol/L      Chloride 101 mmol/L      CO2 28.0 mmol/L      Calcium 9.0 mg/dL      eGFR Non African Amer 22 (L) mL/min/1.73      BUN/Creatinine Ratio 38.7 (H)     Anion Gap 5.0 mmol/L     Narrative:       National Kidney Foundation Guidelines    Stage     Description        GFR  1         Normal or High     90+  2         Mild decrease      60-89  3         Moderate decrease  30-59  4         Severe decrease    15-29  5         Kidney failure     <15    Protime-INR [[id number]]  (Abnormal) Collected:  09/09/17 0611    Specimen:  Blood Updated:  09/09/17 0750     Protime 18.5 (H) Seconds      INR 1.67    Narrative:       Therapeutic Ranges for INR: 2.0-3.0 (PT 20-30)                               2.5-3.5 (PT 25-34)        Imaging Results (all)     Procedure Component Value Units Date/Time    XR Chest 2 View [[id number]]  (Abnormal) Collected:  09/05/17 1821     Updated:  09/05/17 2032    Narrative:       EXAM:    XR Chest, 2 Views    CLINICAL HISTORY:    56 years old, female; Pain and signs and symptoms; Shortness of breath; Chest   pain; Left-sided chest pain; Additional info: SOA. Pt has HX of left sided rib   FX, pt attributes left sided chest pain to old rib FX    TECHNIQUE:    Frontal and lateral views of the chest.    COMPARISON:    CR - XR CHEST 1 VW 3/3/2017 11:01:41 AM    FINDINGS:      Lungs:  Unremarkable.  No consolidation.      Pleural space:  Unremarkable.  No pneumothorax.      Heart:  Unremarkable.  No cardiomegaly.      Mediastinum:  Unremarkable.      Bones/joints:  Status post sternotomy. Cardiac valve replacement.      Other findings:  Left-sided pacing device in place.      Impression:           No acute findings.      THIS DOCUMENT HAS BEEN ELECTRONICALLY SIGNED BY AKANKSHA MARINO MD     Renal Limited [[id number]] Collected:  09/08/17 1626     Updated:  09/09/17 1130    Narrative:       EXAMINATION: US RENAL LIMITED - 09/08/2017     INDICATION: I50.21-Acute systolic (congestive) heart failure;  J40-Bronchitis, not specified as acute or chronic; J44.1-Chronic  obstructive pulmonary disease with (acute) exacerbation;  D84.9-Immunodeficiency, unspecified.      TECHNIQUE: Ultrasound of the kidneys and urinary bladder.     COMPARISON: Ultrasound dated 02/06/2017.     FINDINGS: Right kidney measures 8.3 cm in length containing multiple  subcentimeter cystic lesions, hypoechoic to predominantly anechoic  echogenicity without complex features consistent with renal cortical  cysts, similar to prior comparison. No hydronephrosis or contour  deforming mass. No obvious calculi.      Left kidney measures 8.0 cm in length without contour deforming mass,  obvious calculi, or hydronephrosis.     Urinary  "bladder is moderately distended without focal abnormality.       Impression:       1. No sonographic evidence for acute abnormality in the bilateral  kidneys or urinary bladder.  2. Multiple subcentimeter renal cortical cysts within the right kidney  similar to prior comparison of 06/20/2017.     DICTATED:     09/08/2017  EDITED:         09/08/2017         This report was finalized on 9/9/2017 11:28 AM by Dr. Cuate Vick.               Physical Exam on Discharge:    /79 (BP Location: Left arm, Patient Position: Lying)  Pulse 76  Temp 98.2 °F (36.8 °C) (Oral)   Resp 18  Ht 69\" (175.3 cm)  Wt 122 lb 6 oz (55.5 kg)  SpO2 90%  BMI 18.07 kg/m2  Gen-no acute distress, sitting up on side of bed, 2LNC in place   CV-RRR, S1 S2 normal, + mechanical valve click, no r/g  Resp-CTAB, no wheezes; normal effort   Abd-soft, NT, ND, +BS  Ext-no edema  Neuro-A&Ox3, no focal deficits, speech clear   Psych-appropriate mood      Discharge Disposition  Home or Self Care    Discharge Medications   Ashely Roldan   Home Medication Instructions JEWEL:[id number]    Printed on:09/09/17 1326   Medication Information                      albuterol (PROVENTIL) (2.5 MG/3ML) 0.083% nebulizer solution  Take 2.5 mg by nebulization As Needed for wheezing or shortness of air.             budesonide-formoterol (SYMBICORT) 160-4.5 MCG/ACT inhaler  Inhale 1-2 puffs 2 (Two) Times a Day. In morning and evening for 90 days              Calcium Carb-Cholecalciferol (CALCIUM + D3) 600-200 MG-UNIT tablet  Take 1 tablet by mouth Daily.             carvedilol (COREG) 3.125 MG tablet  Take 1 tablet by mouth 2 (Two) Times a Day With Meals.             cefuroxime (CEFTIN) 250 MG tablet  Take 1 tablet by mouth Every 12 (Twelve) Hours for 1 dose. Indications: Urinary Tract Infection             clonazePAM (KlonoPIN) 2 MG tablet  Take 1 tablet by mouth 3 (Three) Times a Day.             diphenhydrAMINE (BENADRYL) 25 MG tablet  Take 25 mg by mouth " Every 8 (Eight) Hours As Needed.             doxycycline (VIBRAMYCIN) 100 MG capsule  Take 1 capsule by mouth Every 12 (Twelve) Hours for 4 days. Indications: Upper Respiratory Tract Infection             Ferrous Sulfate (IRON) 325 (65 FE) MG tablet  Take 325 mg by mouth Daily.             furosemide (LASIX) 40 MG tablet  Take 0.5 tablets by mouth Daily.             hydrALAZINE (APRESOLINE) 25 MG tablet  Take 1 tablet by mouth 3 (Three) Times a Day.             hydroxychloroquine (PLAQUENIL) 200 MG tablet  Take 1 tablet by mouth Every Morning.             nitroglycerin (NITROSTAT) 0.4 MG SL tablet  1 under the tongue as needed for angina, may repeat q5mins for up three doses             predniSONE (DELTASONE) 20 MG tablet  Take 1 tablet by mouth Daily. Take 40 mg daily x 1 day; take 30 mg x 2 days, take 20 mg x 2 days, then 10 mg x 2 days             Probiotic Product (PROBIOTIC PO)  Take 1 capsule by mouth daily. 20 billion cell; for 30 days             rosuvastatin (CRESTOR) 10 MG tablet  Take 1 tablet by mouth Every Night. For 90 days             tiotropium (SPIRIVA) 18 MCG per inhalation capsule  Place 2 puffs (1 capsule total) into inhaler and inhale once daily. For 90 days             warfarin (COUMADIN) 2 MG tablet  Take 2 mg by mouth See Admin Instructions. 2 mg daily x 2 days, then 3 mg x 1 day, then repeat             warfarin (COUMADIN) 3 MG tablet  Take 3 mg by mouth Every 72 (Seventy-Two) Hours.                 Discharge Diet   Diet Instructions     Diet: Regular, Cardiac, Renal; Thin       Discharge Diet:   Regular  Cardiac  Renal      Fluid Consistency:  Thin                 Activity at Discharge      Follow-up Appointments  Future Appointments  Date Time Provider Department Center   10/19/2017 1:00 PM Peter Rdz MD [facility] None     Additional Instructions for the Follow-ups that You Need to Schedule     Additional Discharge Follow-Up (Specify Provider)    As directed    To:  Nuno    Follow Up:  2 Weeks       Additional Discharge Follow-Up (Specify Provider)    As directed    To:  James   Follow Up:  1 Month       Discharge Follow-up with PCP    As directed    Follow Up Details:  1 week                 Test Results Pending at Discharge   Order Current Status    ANCA Panel In process    Anti-DNA Antibody, Double-stranded In process    C3 Complement In process    C4 Complement In process    Complement, Total In process    Nuclear Antigen Antibody, IFA In process    Blood Culture Preliminary result    Blood Culture Preliminary result          CC to: Peter Rdz MD    Time: 45 min   Attending Attestation      I supervised care of the patient on day of discharge with direct care provided by the advanced care provider (APC).    Saul Sandra MD

## 2017-09-09 NOTE — PROGRESS NOTES
"Pharmacy Consult  -  Warfarin    Ashely Roldan is a  56 y.o. female   Height - 69\" (175.3 cm)  Weight - 118 lb 8 oz (53.8 kg)    Consulting Provider: - IAIN Quintanilla  Indication: - Mechanical Mitral Valve  Goal INR: -  2.5-3.5  Home Regimen:   - warfarin 2 mg x two days, then 3 mg x 1 day, then repeat    Bridge Therapy: No         Drug-Drug Interactions with current regimen:   none    Warfarin Dosing During Admission:    Date  9/5 9/6 9/7 9/8 9/9       INR  5.2 5.04 4.75 2.19 1.67       Dose  hold hold hold 2 mg 4 mg           Labs:      Lab Results   Component Value Date    WBC 7.69 09/09/2017    HGB 10.4 (L) 09/09/2017    HCT 32.6 (L) 09/09/2017    MCV 99.4 (H) 09/09/2017     (L) 09/09/2017       Lab Results   Component Value Date    GLUCOSE 122 (H) 09/09/2017    BUN 89 (H) 09/09/2017    CREATININE 2.30 (H) 09/09/2017    EGFRIFNONA 22 (L) 09/09/2017    BCR 38.7 (H) 09/09/2017    K 4.4 09/09/2017    CO2 28.0 09/09/2017    CALCIUM 9.0 09/09/2017    ALBUMIN 4.60 09/05/2017    LABIL2 1.2 (L) 09/05/2017    AST 31 09/05/2017    ALT 15 09/05/2017       Diet: regular diet ordered    Assessment/Plan:   INR continued to decrease significantly today after holding warfarin for 3 days for supratherapeutic INR.      Warfarin restarted at 2mg daily yesterday.  Will give extra 2mg dose today for subtherapeutic INR.    Pharmacy will continue to follow and adjust plan as needed.     Thank you,  Anais Goss, PharmD  9/9/2017  11:34 AM      "

## 2017-09-10 LAB
BACTERIA SPEC AEROBE CULT: NORMAL
BACTERIA SPEC AEROBE CULT: NORMAL
C3 SERPL-MCNC: 96 MG/DL (ref 82–167)
C4 SERPL-MCNC: 20 MG/DL (ref 14–44)

## 2017-09-11 LAB
ANA SER QL IA: NEGATIVE
C-ANCA TITR SER IF: NORMAL TITER
DSDNA AB SER-ACNC: 1 IU/ML (ref 0–9)
MYELOPEROXIDASE AB SER-ACNC: <9 U/ML (ref 0–9)
P-ANCA ATYPICAL TITR SER IF: NORMAL TITER
P-ANCA TITR SER IF: NORMAL TITER
PROTEINASE3 AB SER IA-ACNC: <3.5 U/ML (ref 0–3.5)

## 2017-09-12 ENCOUNTER — TELEPHONE (OUTPATIENT)
Dept: INTERNAL MEDICINE | Facility: CLINIC | Age: 57
End: 2017-09-12

## 2017-09-12 ENCOUNTER — TRANSITIONAL CARE MANAGEMENT TELEPHONE ENCOUNTER (OUTPATIENT)
Dept: INTERNAL MEDICINE | Facility: CLINIC | Age: 57
End: 2017-09-12

## 2017-09-12 LAB — CH50 SERPL-ACNC: >60 U/ML (ref 42–60)

## 2017-09-12 NOTE — TELEPHONE ENCOUNTER
----- Message from Ling Holguin sent at 9/12/2017  3:37 PM EDT -----  Pt called wanting to know if she can have a strong cough medicine. She can be reached at [phone]      [facility]

## 2017-10-03 ENCOUNTER — TELEPHONE (OUTPATIENT)
Dept: INTERNAL MEDICINE | Facility: CLINIC | Age: 57
End: 2017-10-03

## 2017-10-03 NOTE — TELEPHONE ENCOUNTER
NOTIFIED PEREZ WE HAVE NOT RECEIVED ANY FORM. HE WILL CHECK WITH PT TO MAKE SURE SHE STILL NEEDS AND CONTACT US BACK

## 2017-10-03 NOTE — TELEPHONE ENCOUNTER
----- Message from Linda Ramirez sent at 10/2/2017  2:33 PM EDT -----  MARK AGUIRRE NGUSFYCY-[phone]    CALLED TO CHECK  ON FAX FOR KNEE AND SHOULDER BRACES

## 2017-10-09 NOTE — TELEPHONE ENCOUNTER
Patient's pharmacy called in regards to requesting the status on patient's brace for back and shoulder. Pharmacy has requested a call back when available to confirm whether we have received brace or not.     Call back number for pharmacy: [phone] ask noreen Singletary.     Thank you.

## 2017-10-16 ENCOUNTER — OFFICE VISIT (OUTPATIENT)
Dept: INTERNAL MEDICINE | Facility: CLINIC | Age: 57
End: 2017-10-16

## 2017-10-16 VITALS
HEART RATE: 88 BPM | WEIGHT: 125 LBS | RESPIRATION RATE: 19 BRPM | DIASTOLIC BLOOD PRESSURE: 76 MMHG | SYSTOLIC BLOOD PRESSURE: 122 MMHG | BODY MASS INDEX: 18.46 KG/M2

## 2017-10-16 DIAGNOSIS — J44.1 COPD EXACERBATION (HCC): ICD-10-CM

## 2017-10-16 DIAGNOSIS — I73.9 PVD (PERIPHERAL VASCULAR DISEASE) (HCC): Chronic | ICD-10-CM

## 2017-10-16 DIAGNOSIS — Z79.01 CHRONIC ANTICOAGULATION: ICD-10-CM

## 2017-10-16 DIAGNOSIS — F41.9 ANXIETY: ICD-10-CM

## 2017-10-16 DIAGNOSIS — J43.8 OTHER EMPHYSEMA (HCC): ICD-10-CM

## 2017-10-16 DIAGNOSIS — I25.10 CHRONIC CORONARY ARTERY DISEASE: Chronic | ICD-10-CM

## 2017-10-16 DIAGNOSIS — I25.5 ISCHEMIC CARDIOMYOPATHY: Chronic | ICD-10-CM

## 2017-10-16 DIAGNOSIS — I10 ESSENTIAL HYPERTENSION: Chronic | ICD-10-CM

## 2017-10-16 DIAGNOSIS — Z23 NEED FOR STREPTOCOCCUS PNEUMONIAE VACCINATION: ICD-10-CM

## 2017-10-16 DIAGNOSIS — J43.1 PANLOBULAR EMPHYSEMA (HCC): ICD-10-CM

## 2017-10-16 DIAGNOSIS — N18.2 CHRONIC RENAL FAILURE, STAGE 2 (MILD): ICD-10-CM

## 2017-10-16 DIAGNOSIS — Z23 NEED FOR INFLUENZA VACCINATION: Primary | ICD-10-CM

## 2017-10-16 LAB
ALBUMIN SERPL-MCNC: 4 G/DL (ref 3.2–4.8)
ALBUMIN/GLOB SERPL: 1.5 G/DL (ref 1.5–2.5)
ALP SERPL-CCNC: 155 U/L (ref 25–100)
ALT SERPL W P-5'-P-CCNC: 10 U/L (ref 7–40)
ANION GAP SERPL CALCULATED.3IONS-SCNC: 7 MMOL/L (ref 3–11)
AST SERPL-CCNC: 21 U/L (ref 0–33)
BILIRUB SERPL-MCNC: 0.3 MG/DL (ref 0.3–1.2)
BUN BLD-MCNC: 31 MG/DL (ref 9–23)
BUN/CREAT SERPL: 20.7 (ref 7–25)
CALCIUM SPEC-SCNC: 9.1 MG/DL (ref 8.7–10.4)
CHLORIDE SERPL-SCNC: 105 MMOL/L (ref 99–109)
CO2 SERPL-SCNC: 29 MMOL/L (ref 20–31)
CREAT BLD-MCNC: 1.5 MG/DL (ref 0.6–1.3)
GFR SERPL CREATININE-BSD FRML MDRD: 36 ML/MIN/1.73
GLOBULIN UR ELPH-MCNC: 2.7 GM/DL
GLUCOSE BLD-MCNC: 67 MG/DL (ref 70–100)
POTASSIUM BLD-SCNC: 4.5 MMOL/L (ref 3.5–5.5)
PROT SERPL-MCNC: 6.7 G/DL (ref 5.7–8.2)
SODIUM BLD-SCNC: 141 MMOL/L (ref 132–146)

## 2017-10-16 PROCEDURE — 80053 COMPREHEN METABOLIC PANEL: CPT | Performed by: INTERNAL MEDICINE

## 2017-10-16 PROCEDURE — 90670 PCV13 VACCINE IM: CPT | Performed by: INTERNAL MEDICINE

## 2017-10-16 PROCEDURE — 90686 IIV4 VACC NO PRSV 0.5 ML IM: CPT | Performed by: INTERNAL MEDICINE

## 2017-10-16 PROCEDURE — G0009 ADMIN PNEUMOCOCCAL VACCINE: HCPCS | Performed by: INTERNAL MEDICINE

## 2017-10-16 PROCEDURE — 36415 COLL VENOUS BLD VENIPUNCTURE: CPT | Performed by: INTERNAL MEDICINE

## 2017-10-16 PROCEDURE — 99214 OFFICE O/P EST MOD 30 MIN: CPT | Performed by: INTERNAL MEDICINE

## 2017-10-16 PROCEDURE — G0008 ADMIN INFLUENZA VIRUS VAC: HCPCS | Performed by: INTERNAL MEDICINE

## 2017-10-16 RX ORDER — CLONAZEPAM 2 MG/1
2 TABLET ORAL 3 TIMES DAILY
Qty: 90 TABLET | Refills: 2 | Status: SHIPPED | OUTPATIENT
Start: 2017-10-16

## 2017-10-16 NOTE — PROGRESS NOTES
Subjective   Ashely Rodlan is a 56 y.o. female.     History of Present Illness   For follow up of  CAD and ischemic cardiomyopathy.  She has been feeling well with no significant sob or chest pain or palpitations.  She check her own protime.  COPD is about the same, no worse.  Renal insufficiency is about the same.  Anxiety is stable on clonazepam.  Hyperlipidemia on meds, no muscle aches.      The following portions of the patient's history were reviewed and updated as appropriate: allergies, current medications, past medical history and problem list.    Review of Systems   Constitutional: Positive for fatigue. Negative for fever.   HENT: Negative for congestion.    Eyes: Negative.    Respiratory: Positive for shortness of breath (on exertion but not bad now.). Negative for cough, chest tightness and wheezing.    Cardiovascular: Negative.  Negative for chest pain, palpitations and leg swelling.   Gastrointestinal: Negative.  Negative for abdominal distention and abdominal pain.   Genitourinary: Negative.        Objective   Physical Exam   Constitutional: She appears well-developed and well-nourished.   Neck: Normal range of motion. Neck supple. No thyromegaly present.   Cardiovascular: Normal rate, regular rhythm and normal heart sounds.  Exam reveals no gallop and no friction rub.    No murmur heard.  Pulmonary/Chest: Effort normal and breath sounds normal. No respiratory distress. She has no wheezes. She has no rales. She exhibits no tenderness.   Abdominal: Soft. Bowel sounds are normal. She exhibits no distension and no mass. There is no tenderness.   Musculoskeletal: She exhibits no edema.   Nursing note and vitals reviewed.      Assessment/Plan   Ashely was seen today for hypertension.    Diagnoses and all orders for this visit:    Need for influenza vaccination  -     Flu Vaccine Quad PF 3YR+    Chronic coronary artery disease    Essential hypertension    Ischemic cardiomyopathy    PVD (peripheral  vascular disease)    COPD exacerbation    Other emphysema    Panlobular emphysema    Chronic renal failure, stage 2 (mild)  -     Comprehensive Metabolic Panel    Chronic anticoagulation    Anxiety  -     clonazePAM (KlonoPIN) 2 MG tablet; Take 1 tablet by mouth 3 (Three) Times a Day.    Need for Streptococcus pneumoniae vaccination  -     Pneumococcal Conjugate Vaccine 13-Valent All    All problems seem very stable  Will check labs especially for her renal function.  Continue same meds.  Recheck here 3 months.

## 2017-10-26 ENCOUNTER — HOSPITAL ENCOUNTER (EMERGENCY)
Facility: HOSPITAL | Age: 57
Discharge: HOME | End: 2017-10-27
Attending: EMERGENCY MEDICINE | Admitting: EMERGENCY MEDICINE

## 2017-10-26 DIAGNOSIS — S80.01XA CONTUSION OF RIGHT KNEE, INITIAL ENCOUNTER: Primary | ICD-10-CM

## 2017-10-26 DIAGNOSIS — M25.561 ACUTE PAIN OF RIGHT KNEE: ICD-10-CM
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
## 2017-10-26 PROCEDURE — 99283 EMERGENCY DEPT VISIT LOW MDM: CPT

## 2017-10-27 ENCOUNTER — APPOINTMENT (OUTPATIENT)
Dept: GENERAL RADIOLOGY | Facility: HOSPITAL | Age: 57
End: 2017-10-27

## 2017-10-27 VITALS
SYSTOLIC BLOOD PRESSURE: 168 MMHG | WEIGHT: 125 LBS | BODY MASS INDEX: 18.94 KG/M2 | RESPIRATION RATE: 18 BRPM | TEMPERATURE: 98.8 F | HEIGHT: 68 IN | OXYGEN SATURATION: 94 % | DIASTOLIC BLOOD PRESSURE: 88 MMHG | HEART RATE: 92 BPM

## 2017-10-27 PROCEDURE — 73560 X-RAY EXAM OF KNEE 1 OR 2: CPT

## 2017-10-27 RX ORDER — HYDROCODONE BITARTRATE AND ACETAMINOPHEN 5; 325 MG/1; MG/1
1 TABLET ORAL ONCE
Status: COMPLETED | OUTPATIENT
Start: 2017-10-27 | End: 2017-10-27

## 2017-10-27 RX ORDER — HYDROCODONE BITARTRATE AND ACETAMINOPHEN 5; 325 MG/1; MG/1
1 TABLET ORAL EVERY 6 HOURS PRN
Qty: 8 TABLET | Refills: 0 | Status: SHIPPED | OUTPATIENT
Start: 2017-10-27 | End: 2018-02-14 | Stop reason: HOSPADM

## 2017-10-27 RX ADMIN — HYDROCODONE BITARTRATE AND ACETAMINOPHEN 1 TABLET: 5; 325 TABLET ORAL at 01:05

## 2017-10-27 NOTE — ED PROVIDER NOTES
"Subjective   HPI Comments: Ms. Ashely Roldan is a 56 year old female on anticoagulation therapy who presents to the ED after a fall. The patient states that she was sitting on her cough this evening when she slipped and fell trying to plug in her TV. The patient reports that she scraped her left arm and hit her right knee against her table. The patient reports severe pain in her right knee, and states that it \"shoots\" down her to her right foot. The patient's son notes that the patient has a hx of previous falls. The patient denies any other acute sx at this time.      History provided by:  Patient and relative  Fall   Mechanism of injury: fall    Injury location:  Leg  Leg injury location:  R knee  Incident location:  Home  Arrived directly from scene: yes    Fall:     Fall occurred: slipped from chair.    Impact surface:  Furniture    Entrapped after fall: no    Protective equipment: none    Suspicion of alcohol use: no    Suspicion of drug use: no    Tetanus status:  Unknown  Prior to arrival data:     Bystander interventions:  None    Blood loss:  None    Loss of consciousness: no      Amnesic to event: no      Airway interventions:  None    Breathing interventions:  None    IV access status:  None    IO access:  None    Fluids administered:  None    Cardiac interventions:  None    Medications administered:  None    Immobilization:  None    Airway condition since incident:  Stable    Breathing condition since incident:  Stable    Circulation condition since incident:  Stable    Mental status condition since incident:  Stable    Disability condition since incident:  Stable  Risk factors: anticoagulation therapy        Review of Systems   Musculoskeletal: Positive for arthralgias (R knee pain).   Skin: Positive for wound.   All other systems reviewed and are negative.      Past Medical History:   Diagnosis Date   • Acute respiratory failure with hypoxia 9/5/2017   • Allergic rhinitis 08/01/2014   • Anemia    • " Anxiety    • Arthritis    • CAD (coronary artery disease)    • CHF (congestive heart failure)    • CKD (chronic kidney disease)    • CKD (chronic kidney disease), stage IV 9/5/2017   • Colitis, Clostridium difficile    • COPD (chronic obstructive pulmonary disease)    • Coronary atherosclerosis    • Depression    • Emphysema of lung    • GERD (gastroesophageal reflux disease)    • Heart attack    • Heart murmur    • Hematoma of hip    • Hip fracture    • Hyperlipidemia    • Hypertension     benign essential   • Ischemic cardiomyopathy 08/01/2014    ef 29% s/p ICD   • Knee injury    • Lung disease    • Mitral valve disorder 03/28/2013   • Mitral valve insufficiency     s/p prosthetic ATS valve replacement   • Osteoporosis    • Postmenopausal     natural   • PVD (peripheral vascular disease)    • Pyelonephritis    • Renal failure    • Renal failure    • Renal failure, acute    • Syncope    • Systemic lupus erythematosus    • TIA (transient ischemic attack) 04/04/2012   • UTI (urinary tract infection)    • Valvular heart disease    • Wrist fracture        Allergies   Allergen Reactions   • Morphine And Related GI Intolerance and Irritability   • Sulfa Antibiotics        Past Surgical History:   Procedure Laterality Date   • CARDIAC DEFIBRILLATOR PLACEMENT     • CHOLECYSTECTOMY  2010   • CORONARY ARTERY BYPASS GRAFT  2011    4 aortic bypasses, abdominal aorta; 2011-mitral valve; 2010-triple bypass   • ENDOSCOPY  10/23/2014    Dr. Brand; retained food in stomach; he dilated her esophagus; 5-30-14 hiatus hernia, gastritis   • JOINT REPLACEMENT Right 06/25/2015    Dr. Fitzgerald; first surgery 1-29-14; redo 5-4-15   • MITRAL VALVE REPLACEMENT      MECHANICAL   • TOTAL HIP ARTHROPLASTY Right     3 times within 8 months       Family History   Problem Relation Age of Onset   • Arthritis Mother      rheumatoid   • Heart attack Father    • Alcohol abuse Brother    • Heart disease Other      uncle   • Diabetes Other      uncle-type  2   • Hypertension Other      uncle   • Stroke Other      uncle   • Cancer Other      grandfather-lung    • Heart disease Maternal Uncle    • Lung cancer Paternal Grandfather        Social History     Social History   • Marital status:      Spouse name: N/A   • Number of children: N/A   • Years of education: N/A     Social History Main Topics   • Smoking status: Current Every Day Smoker     Packs/day: 0.50     Types: Cigarettes   • Smokeless tobacco: Never Used      Comment: Down to 0.5 PPD from 1 PPD now consistently. Started as a teenager.   • Alcohol use No   • Drug use: No   • Sexual activity: Defer     Other Topics Concern   • None     Social History Narrative    Ms. Roldan is a 56 year old white female. Pt recently  after 30 years. Just moved from [facility] into her son's home in [place].          Objective   Physical Exam   Constitutional: She is oriented to person, place, and time. She appears well-developed and well-nourished. No distress.   HENT:   Head: Normocephalic and atraumatic.   Eyes: Conjunctivae are normal. No scleral icterus.   Neck: Normal range of motion. Neck supple.   Cardiovascular: Regular rhythm and normal heart sounds.  Tachycardia present.  Exam reveals no gallop and no friction rub.    No murmur heard.  Pulmonary/Chest: Effort normal and breath sounds normal. No respiratory distress. She has no wheezes. She has no rales.   Abdominal: Soft. Bowel sounds are normal. There is no tenderness. There is no guarding.   Musculoskeletal: Normal range of motion. She exhibits tenderness.        Right knee: She exhibits no swelling. Tenderness found.   No obvious redness or swelling of right knee. There is TTP over lateral right knee and slightly over anterior and proximal tibia. No TTP over patella or medial/posterior right knee.   Neurological: She is alert and oriented to person, place, and time.   Skin: Skin is warm and dry. She is not diaphoretic. No erythema.  "  Psychiatric: She has a normal mood and affect. Her behavior is normal.   Nursing note and vitals reviewed.      Procedures         ED Course  ED Course       No results found for this or any previous visit (from the past 24 hour(s)).  Note: In addition to lab results from this visit, the labs listed above may include labs taken at another facility or during a different encounter within the last 24 hours. Please correlate lab times with ED admission and discharge times for further clarification of the services performed during this visit.    XR Knee 1 or 2 View Right   Final Result      1. No acute findings.      2. Non-acute findings are described above.         THIS DOCUMENT HAS BEEN ELECTRONICALLY SIGNED BY DARREN DICKERSON MD        Vitals:    10/26/17 2244   BP: (!) 190/90   BP Location: Left arm   Patient Position: Sitting   Pulse: 110   Resp: 22   Temp: 98.8 °F (37.1 °C)   TempSrc: Oral   SpO2: 92%   Weight: 125 lb (56.7 kg)   Height: 68\" (172.7 cm)     Medications   HYDROcodone-acetaminophen (NORCO) 5-325 MG per tablet 1 tablet (1 tablet Oral Given 10/27/17 0105)     ECG/EMG Results (last 24 hours)     ** No results found for the last 24 hours. **                      MDM  Number of Diagnoses or Management Options  Acute pain of right knee: new and requires workup  Contusion of right knee, initial encounter: new and requires workup  Fall from chair, initial encounter: new and requires workup  Diagnosis management comments: No acute abnormalities identified on x-ray of the right knee.  No significant exam abnormalities present.    Patient will be discharged with the advised to keep the right knee elevated, apply ice, and decrease weightbearing.    We'll prescribe a short course of Lortab to help with pain.       Amount and/or Complexity of Data Reviewed  Tests in the radiology section of CPT®: ordered and reviewed  Review and summarize past medical records: yes  Independent visualization of images, tracings, or " specimens: yes    Patient Progress  Patient progress: stable      Final diagnoses:   Acute pain of right knee   Fall from chair, initial encounter   Contusion of right knee, initial encounter       Documentation assistance provided by brigid Sánchez.  Information recorded by the scribe was done at my direction and has been verified and validated by me.     Jose Sánchez  10/27/17 0106       Hailey Carter MD  10/27/17 0131

## 2017-10-27 NOTE — DISCHARGE INSTRUCTIONS
Recommend patient keep leg elevated and apply ice.     If pain continues advise outpatient orthopedic followup.

## 2017-11-02 ENCOUNTER — TELEPHONE (OUTPATIENT)
Dept: PHARMACY | Facility: HOSPITAL | Age: 57
End: 2017-11-02

## 2017-11-06 ENCOUNTER — PATIENT OUTREACH (OUTPATIENT)
Dept: CASE MANAGEMENT | Facility: OTHER | Age: 57
End: 2017-11-06

## 2017-11-08 ENCOUNTER — TELEPHONE (OUTPATIENT)
Dept: CARDIOLOGY | Facility: CLINIC | Age: 57
End: 2017-11-08

## 2017-11-29 ENCOUNTER — EPISODE CHANGES (OUTPATIENT)
Dept: CASE MANAGEMENT | Facility: OTHER | Age: 57
End: 2017-11-29

## 2017-12-11 ENCOUNTER — TELEPHONE (OUTPATIENT)
Dept: INTERNAL MEDICINE | Facility: CLINIC | Age: 57
End: 2017-12-11

## 2017-12-11 RX ORDER — WARFARIN SODIUM 3 MG/1
3 TABLET ORAL
Qty: 30 TABLET | Refills: 5 | Status: ON HOLD | OUTPATIENT
Start: 2017-12-11 | End: 2018-02-05

## 2017-12-11 RX ORDER — WARFARIN SODIUM 2 MG/1
2 TABLET ORAL SEE ADMIN INSTRUCTIONS
Qty: 30 TABLET | Refills: 5 | Status: ON HOLD | OUTPATIENT
Start: 2017-12-11 | End: 2018-02-05

## 2018-01-04 ENCOUNTER — TELEPHONE (OUTPATIENT)
Dept: INTERNAL MEDICINE | Facility: CLINIC | Age: 58
End: 2018-01-04

## 2018-01-04 NOTE — TELEPHONE ENCOUNTER
SPOKE WITH SHELL, PT HAS NOT HAD PT/INR. WE SENT IN 1 MONTH REFILL TO PHARMACY. NOTIFIED SHELL NO MORE REFILLS WILL BE SENT IN. MADE SURE SHE HAD NO REFILLS AT PHARMACY ALSO UNTIL PT SEE'S CARDIOLOGY.

## 2018-01-04 NOTE — TELEPHONE ENCOUNTER
----- Message from Carmen Bello MA sent at 1/4/2018  3:13 PM EST -----  Mari with Cardiology called wanting to speak with Arlene.  Wants to know why we have been ordering coumadin on pt when we are not the ones managing her PT/INR.  Please call back.    Mari: [phone]

## 2018-01-08 ENCOUNTER — TELEPHONE (OUTPATIENT)
Dept: CARDIOLOGY | Facility: CLINIC | Age: 58
End: 2018-01-08

## 2018-01-09 NOTE — TELEPHONE ENCOUNTER
No we are not following her INR.  Last one I see is from November in the ER at [facility].

## 2018-01-21 ENCOUNTER — HOSPITAL ENCOUNTER (EMERGENCY)
Facility: HOSPITAL | Age: 58
Discharge: HOME | End: 2018-01-22
Attending: EMERGENCY MEDICINE | Admitting: EMERGENCY MEDICINE

## 2018-01-21 ENCOUNTER — APPOINTMENT (OUTPATIENT)
Dept: GENERAL RADIOLOGY | Facility: HOSPITAL | Age: 58
End: 2018-01-21

## 2018-01-21 DIAGNOSIS — N39.0 URINARY TRACT INFECTION WITHOUT HEMATURIA, SITE UNSPECIFIED: Primary | ICD-10-CM

## 2018-01-21 DIAGNOSIS — N18.4 CKD (CHRONIC KIDNEY DISEASE), STAGE IV (HCC): Chronic | ICD-10-CM

## 2018-01-21 DIAGNOSIS — Z86.79 HISTORY OF CONGESTIVE HEART FAILURE: ICD-10-CM

## 2018-01-21 LAB
ALBUMIN SERPL-MCNC: 4.1 G/DL (ref 3.2–4.8)
ALBUMIN/GLOB SERPL: 1.2 G/DL (ref 1.5–2.5)
ALP SERPL-CCNC: 147 U/L (ref 25–100)
ALT SERPL W P-5'-P-CCNC: 17 U/L (ref 7–40)
ANION GAP SERPL CALCULATED.3IONS-SCNC: 5 MMOL/L (ref 3–11)
AST SERPL-CCNC: 31 U/L (ref 0–33)
BASOPHILS # BLD AUTO: 0.01 10*3/MM3 (ref 0–0.2)
BASOPHILS NFR BLD AUTO: 0.1 % (ref 0–1)
BILIRUB SERPL-MCNC: 0.3 MG/DL (ref 0.3–1.2)
BILIRUB UR QL STRIP: NEGATIVE
BUN BLD-MCNC: 44 MG/DL (ref 9–23)
BUN/CREAT SERPL: 25.9 (ref 7–25)
CALCIUM SPEC-SCNC: 9.9 MG/DL (ref 8.7–10.4)
CHLORIDE SERPL-SCNC: 105 MMOL/L (ref 99–109)
CLARITY UR: CLEAR
CO2 SERPL-SCNC: 26 MMOL/L (ref 20–31)
COLOR UR: YELLOW
CREAT BLD-MCNC: 1.7 MG/DL (ref 0.6–1.3)
DEPRECATED RDW RBC AUTO: 49.3 FL (ref 37–54)
EOSINOPHIL # BLD AUTO: 0.08 10*3/MM3 (ref 0–0.3)
EOSINOPHIL NFR BLD AUTO: 1.2 % (ref 0–3)
ERYTHROCYTE [DISTWIDTH] IN BLOOD BY AUTOMATED COUNT: 13.7 % (ref 11.3–14.5)
GFR SERPL CREATININE-BSD FRML MDRD: 31 ML/MIN/1.73
GLOBULIN UR ELPH-MCNC: 3.5 GM/DL
GLUCOSE BLD-MCNC: 107 MG/DL (ref 70–100)
GLUCOSE BLDC GLUCOMTR-MCNC: 117 MG/DL (ref 70–130)
GLUCOSE UR STRIP-MCNC: NEGATIVE MG/DL
HCT VFR BLD AUTO: 32.3 % (ref 34.5–44)
HGB BLD-MCNC: 10.3 G/DL (ref 11.5–15.5)
HGB UR QL STRIP.AUTO: ABNORMAL
HOLD SPECIMEN: NORMAL
HOLD SPECIMEN: NORMAL
IMM GRANULOCYTES # BLD: 0.01 10*3/MM3 (ref 0–0.03)
IMM GRANULOCYTES NFR BLD: 0.1 % (ref 0–0.6)
KETONES UR QL STRIP: NEGATIVE
LEUKOCYTE ESTERASE UR QL STRIP.AUTO: ABNORMAL
LYMPHOCYTES # BLD AUTO: 1.61 10*3/MM3 (ref 0.6–4.8)
LYMPHOCYTES NFR BLD AUTO: 23.9 % (ref 24–44)
MAGNESIUM SERPL-MCNC: 2.2 MG/DL (ref 1.3–2.7)
MCH RBC QN AUTO: 31.5 PG (ref 27–31)
MCHC RBC AUTO-ENTMCNC: 31.9 G/DL (ref 32–36)
MCV RBC AUTO: 98.8 FL (ref 80–99)
MONOCYTES # BLD AUTO: 0.4 10*3/MM3 (ref 0–1)
MONOCYTES NFR BLD AUTO: 5.9 % (ref 0–12)
NEUTROPHILS # BLD AUTO: 4.62 10*3/MM3 (ref 1.5–8.3)
NEUTROPHILS NFR BLD AUTO: 68.8 % (ref 41–71)
NITRITE UR QL STRIP: NEGATIVE
PH UR STRIP.AUTO: 7 [PH] (ref 5–8)
PLATELET # BLD AUTO: 183 10*3/MM3 (ref 150–450)
PMV BLD AUTO: 10.9 FL (ref 6–12)
POTASSIUM BLD-SCNC: 4.9 MMOL/L (ref 3.5–5.5)
PROT SERPL-MCNC: 7.6 G/DL (ref 5.7–8.2)
PROT UR QL STRIP: ABNORMAL
RBC # BLD AUTO: 3.27 10*6/MM3 (ref 3.89–5.14)
SODIUM BLD-SCNC: 136 MMOL/L (ref 132–146)
SP GR UR STRIP: 1.01 (ref 1–1.03)
TROPONIN I SERPL-MCNC: 0.02 NG/ML (ref 0–0.07)
UROBILINOGEN UR QL STRIP: ABNORMAL
WBC NRBC COR # BLD: 6.73 10*3/MM3 (ref 3.5–10.8)
WHOLE BLOOD HOLD SPECIMEN: NORMAL
WHOLE BLOOD HOLD SPECIMEN: NORMAL

## 2018-01-21 PROCEDURE — 80053 COMPREHEN METABOLIC PANEL: CPT | Performed by: EMERGENCY MEDICINE

## 2018-01-21 PROCEDURE — 96365 THER/PROPH/DIAG IV INF INIT: CPT

## 2018-01-21 PROCEDURE — 83735 ASSAY OF MAGNESIUM: CPT | Performed by: EMERGENCY MEDICINE

## 2018-01-21 PROCEDURE — 87086 URINE CULTURE/COLONY COUNT: CPT | Performed by: EMERGENCY MEDICINE

## 2018-01-21 PROCEDURE — 84484 ASSAY OF TROPONIN QUANT: CPT

## 2018-01-21 PROCEDURE — 71045 X-RAY EXAM CHEST 1 VIEW: CPT

## 2018-01-21 PROCEDURE — 99285 EMERGENCY DEPT VISIT HI MDM: CPT

## 2018-01-21 PROCEDURE — 82962 GLUCOSE BLOOD TEST: CPT

## 2018-01-21 PROCEDURE — 83880 ASSAY OF NATRIURETIC PEPTIDE: CPT | Performed by: NURSE PRACTITIONER

## 2018-01-21 PROCEDURE — 81001 URINALYSIS AUTO W/SCOPE: CPT | Performed by: EMERGENCY MEDICINE

## 2018-01-21 PROCEDURE — 87186 SC STD MICRODIL/AGAR DIL: CPT | Performed by: EMERGENCY MEDICINE

## 2018-01-21 PROCEDURE — 93005 ELECTROCARDIOGRAM TRACING: CPT

## 2018-01-21 PROCEDURE — 99284 EMERGENCY DEPT VISIT MOD MDM: CPT

## 2018-01-21 PROCEDURE — 85610 PROTHROMBIN TIME: CPT | Performed by: NURSE PRACTITIONER

## 2018-01-21 PROCEDURE — 87077 CULTURE AEROBIC IDENTIFY: CPT | Performed by: EMERGENCY MEDICINE

## 2018-01-21 PROCEDURE — 85025 COMPLETE CBC W/AUTO DIFF WBC: CPT | Performed by: EMERGENCY MEDICINE

## 2018-01-21 RX ORDER — CEFTRIAXONE SODIUM 1 G/50ML
1 INJECTION, SOLUTION INTRAVENOUS ONCE
Status: COMPLETED | OUTPATIENT
Start: 2018-01-21 | End: 2018-01-22

## 2018-01-21 RX ORDER — SODIUM CHLORIDE 0.9 % (FLUSH) 0.9 %
10 SYRINGE (ML) INJECTION AS NEEDED
Status: DISCONTINUED | OUTPATIENT
Start: 2018-01-21 | End: 2018-01-22 | Stop reason: HOSPADM

## 2018-01-22 VITALS
TEMPERATURE: 97.9 F | HEART RATE: 87 BPM | WEIGHT: 125 LBS | SYSTOLIC BLOOD PRESSURE: 118 MMHG | HEIGHT: 69 IN | OXYGEN SATURATION: 92 % | RESPIRATION RATE: 16 BRPM | DIASTOLIC BLOOD PRESSURE: 70 MMHG | BODY MASS INDEX: 18.51 KG/M2

## 2018-01-22 LAB
BACTERIA UR QL AUTO: ABNORMAL /HPF
BNP SERPL-MCNC: 1131 PG/ML (ref 0–100)
INR PPP: 1.51
PROTHROMBIN TIME: 16.7 SECONDS (ref 9.6–11.5)
RBC # UR: ABNORMAL /HPF
REF LAB TEST METHOD: ABNORMAL
SQUAMOUS #/AREA URNS HPF: ABNORMAL /HPF
WBC UR QL AUTO: ABNORMAL /HPF

## 2018-01-22 PROCEDURE — 96365 THER/PROPH/DIAG IV INF INIT: CPT

## 2018-01-22 PROCEDURE — 25010000002 CEFTRIAXONE PER 250 MG: Performed by: NURSE PRACTITIONER

## 2018-01-22 RX ORDER — CEFDINIR 300 MG/1
300 CAPSULE ORAL
Qty: 14 CAPSULE | Refills: 0 | Status: SHIPPED | OUTPATIENT
Start: 2018-01-22 | End: 2018-02-05 | Stop reason: HOSPADM

## 2018-01-22 RX ORDER — HYDROCODONE BITARTRATE AND ACETAMINOPHEN 5; 325 MG/1; MG/1
1 TABLET ORAL ONCE
Status: COMPLETED | OUTPATIENT
Start: 2018-01-22 | End: 2018-01-22

## 2018-01-22 RX ORDER — CEFDINIR 300 MG/1
300 CAPSULE ORAL
Qty: 14 CAPSULE | Refills: 0 | Status: SHIPPED | OUTPATIENT
Start: 2018-01-22 | End: 2018-01-22

## 2018-01-22 RX ORDER — ONDANSETRON 2 MG/ML
4 INJECTION INTRAMUSCULAR; INTRAVENOUS ONCE
Status: DISCONTINUED | OUTPATIENT
Start: 2018-01-22 | End: 2018-01-22

## 2018-01-22 RX ORDER — ONDANSETRON 4 MG/1
4 TABLET, ORALLY DISINTEGRATING ORAL ONCE
Status: COMPLETED | OUTPATIENT
Start: 2018-01-22 | End: 2018-01-22

## 2018-01-22 RX ADMIN — HYDROCODONE BITARTRATE AND ACETAMINOPHEN 1 TABLET: 5; 325 TABLET ORAL at 00:39

## 2018-01-22 RX ADMIN — CEFTRIAXONE SODIUM 1 G: 1 INJECTION, SOLUTION INTRAVENOUS at 00:38

## 2018-01-22 RX ADMIN — SODIUM CHLORIDE 500 ML: 9 INJECTION, SOLUTION INTRAVENOUS at 00:35

## 2018-01-22 RX ADMIN — ONDANSETRON 4 MG: 4 TABLET, ORALLY DISINTEGRATING ORAL at 00:39

## 2018-01-23 ENCOUNTER — APPOINTMENT (OUTPATIENT)
Dept: CT IMAGING | Facility: HOSPITAL | Age: 58
End: 2018-01-23

## 2018-01-23 ENCOUNTER — HOSPITAL ENCOUNTER (INPATIENT)
Facility: HOSPITAL | Age: 58
LOS: 13 days | Discharge: LEFT BEFORE DISCHARGE | End: 2018-02-05
Attending: EMERGENCY MEDICINE | Admitting: INTERNAL MEDICINE

## 2018-01-23 ENCOUNTER — APPOINTMENT (OUTPATIENT)
Dept: GENERAL RADIOLOGY | Facility: HOSPITAL | Age: 58
End: 2018-01-23

## 2018-01-23 DIAGNOSIS — E87.5 HYPERKALEMIA: ICD-10-CM

## 2018-01-23 DIAGNOSIS — N18.9 ACUTE KIDNEY INJURY SUPERIMPOSED ON CHRONIC KIDNEY DISEASE (HCC): Primary | ICD-10-CM

## 2018-01-23 DIAGNOSIS — N18.4 CKD (CHRONIC KIDNEY DISEASE), STAGE IV (HCC): Chronic | ICD-10-CM

## 2018-01-23 DIAGNOSIS — R79.1 SUPRATHERAPEUTIC INR: ICD-10-CM

## 2018-01-23 DIAGNOSIS — N17.9 AKI (ACUTE KIDNEY INJURY) (HCC): ICD-10-CM

## 2018-01-23 DIAGNOSIS — A09 ACUTE INFECTIVE GASTROENTERITIS: ICD-10-CM

## 2018-01-23 DIAGNOSIS — E78.5 HYPERLIPEMIA: ICD-10-CM

## 2018-01-23 DIAGNOSIS — E86.9 VOLUME DEPLETION: ICD-10-CM

## 2018-01-23 DIAGNOSIS — N17.9 ACUTE KIDNEY INJURY SUPERIMPOSED ON CHRONIC KIDNEY DISEASE (HCC): Primary | ICD-10-CM

## 2018-01-23 DIAGNOSIS — I50.21 ACUTE SYSTOLIC HEART FAILURE (HCC): ICD-10-CM

## 2018-01-23 DIAGNOSIS — J44.1 COPD EXACERBATION (HCC): ICD-10-CM

## 2018-01-23 DIAGNOSIS — Z74.09 IMPAIRED FUNCTIONAL MOBILITY, BALANCE, GAIT, AND ENDURANCE: ICD-10-CM

## 2018-01-23 DIAGNOSIS — I50.21 ACUTE SYSTOLIC CONGESTIVE HEART FAILURE (HCC): ICD-10-CM

## 2018-01-23 DIAGNOSIS — J96.21 ACUTE ON CHRONIC RESPIRATORY FAILURE WITH HYPOXIA (HCC): ICD-10-CM

## 2018-01-23 PROBLEM — K52.9 COLITIS: Status: ACTIVE | Noted: 2018-01-23

## 2018-01-23 PROBLEM — R11.2 NAUSEA AND VOMITING: Status: ACTIVE | Noted: 2018-01-23

## 2018-01-23 PROBLEM — E87.20 LACTIC ACIDOSIS: Status: ACTIVE | Noted: 2018-01-23

## 2018-01-23 PROBLEM — R19.7 DIARRHEA: Status: ACTIVE | Noted: 2018-01-23

## 2018-01-23 LAB
ANION GAP SERPL CALCULATED.3IONS-SCNC: 13 MMOL/L (ref 3–11)
BUN BLD-MCNC: 86 MG/DL (ref 9–23)
BUN/CREAT SERPL: 21.5 (ref 7–25)
CALCIUM SPEC-SCNC: 8.8 MG/DL (ref 8.7–10.4)
CHLORIDE SERPL-SCNC: 96 MMOL/L (ref 99–109)
CO2 SERPL-SCNC: 21 MMOL/L (ref 20–31)
CREAT BLD-MCNC: 4 MG/DL (ref 0.6–1.3)
D-LACTATE SERPL-SCNC: 2.7 MMOL/L (ref 0.5–2)
D-LACTATE SERPL-SCNC: 3.5 MMOL/L (ref 0.5–2)
DEPRECATED RDW RBC AUTO: 49 FL (ref 37–54)
DEVELOPER EXPIRATION DATE: 1020
DEVELOPER LOT NUMBER: ABNORMAL
ERYTHROCYTE [DISTWIDTH] IN BLOOD BY AUTOMATED COUNT: 13.6 % (ref 11.3–14.5)
EXPIRATION DATE: 520
FECAL OCCULT BLOOD SCREEN, POC: POSITIVE
GFR SERPL CREATININE-BSD FRML MDRD: 11 ML/MIN/1.73
GLUCOSE BLD-MCNC: 79 MG/DL (ref 70–100)
GLUCOSE BLDC GLUCOMTR-MCNC: 132 MG/DL (ref 70–130)
GLUCOSE BLDC GLUCOMTR-MCNC: 144 MG/DL (ref 70–130)
GLUCOSE BLDC GLUCOMTR-MCNC: 38 MG/DL (ref 70–130)
GLUCOSE BLDC GLUCOMTR-MCNC: 47 MG/DL (ref 70–130)
GLUCOSE BLDC GLUCOMTR-MCNC: 59 MG/DL (ref 70–130)
HCT VFR BLD AUTO: 34.1 % (ref 34.5–44)
HGB BLD-MCNC: 10.8 G/DL (ref 11.5–15.5)
HOLD SPECIMEN: NORMAL
INR PPP: 4.55
Lab: ABNORMAL
MCH RBC QN AUTO: 31.8 PG (ref 27–31)
MCHC RBC AUTO-ENTMCNC: 31.7 G/DL (ref 32–36)
MCV RBC AUTO: 100.3 FL (ref 80–99)
NEGATIVE CONTROL: NEGATIVE
PLATELET # BLD AUTO: 143 10*3/MM3 (ref 150–450)
PMV BLD AUTO: 11.3 FL (ref 6–12)
POSITIVE CONTROL: POSITIVE
POTASSIUM BLD-SCNC: 5.2 MMOL/L (ref 3.5–5.5)
POTASSIUM BLD-SCNC: 6.8 MMOL/L (ref 3.5–5.5)
PROTHROMBIN TIME: 52 SECONDS (ref 9.6–11.5)
RBC # BLD AUTO: 3.4 10*6/MM3 (ref 3.89–5.14)
SODIUM BLD-SCNC: 130 MMOL/L (ref 132–146)
WBC NRBC COR # BLD: 11.55 10*3/MM3 (ref 3.5–10.8)

## 2018-01-23 PROCEDURE — A9270 NON-COVERED ITEM OR SERVICE: HCPCS | Performed by: HOSPITALIST

## 2018-01-23 PROCEDURE — 82270 OCCULT BLOOD FECES: CPT | Performed by: EMERGENCY MEDICINE

## 2018-01-23 PROCEDURE — 25010000002 DIPHENHYDRAMINE PER 50 MG: Performed by: EMERGENCY MEDICINE

## 2018-01-23 PROCEDURE — 84132 ASSAY OF SERUM POTASSIUM: CPT | Performed by: NURSE PRACTITIONER

## 2018-01-23 PROCEDURE — 63710000001 ROSUVASTATIN 10 MG TABLET: Performed by: HOSPITALIST

## 2018-01-23 PROCEDURE — 83605 ASSAY OF LACTIC ACID: CPT | Performed by: EMERGENCY MEDICINE

## 2018-01-23 PROCEDURE — 87040 BLOOD CULTURE FOR BACTERIA: CPT | Performed by: EMERGENCY MEDICINE

## 2018-01-23 PROCEDURE — 70450 CT HEAD/BRAIN W/O DYE: CPT

## 2018-01-23 PROCEDURE — 25010000002 ONDANSETRON PER 1 MG: Performed by: EMERGENCY MEDICINE

## 2018-01-23 PROCEDURE — 85027 COMPLETE CBC AUTOMATED: CPT | Performed by: EMERGENCY MEDICINE

## 2018-01-23 PROCEDURE — 71045 X-RAY EXAM CHEST 1 VIEW: CPT

## 2018-01-23 PROCEDURE — 93005 ELECTROCARDIOGRAM TRACING: CPT | Performed by: EMERGENCY MEDICINE

## 2018-01-23 PROCEDURE — 25010000002 METOCLOPRAMIDE PER 10 MG: Performed by: EMERGENCY MEDICINE

## 2018-01-23 PROCEDURE — 63710000001 INSULIN REGULAR HUMAN PER 5 UNITS: Performed by: EMERGENCY MEDICINE

## 2018-01-23 PROCEDURE — 99223 1ST HOSP IP/OBS HIGH 75: CPT | Performed by: HOSPITALIST

## 2018-01-23 PROCEDURE — 80048 BASIC METABOLIC PNL TOTAL CA: CPT | Performed by: EMERGENCY MEDICINE

## 2018-01-23 PROCEDURE — 63710000001 ONDANSETRON PER 8 MG: Performed by: HOSPITALIST

## 2018-01-23 PROCEDURE — 85610 PROTHROMBIN TIME: CPT | Performed by: EMERGENCY MEDICINE

## 2018-01-23 PROCEDURE — 74176 CT ABD & PELVIS W/O CONTRAST: CPT

## 2018-01-23 PROCEDURE — 99284 EMERGENCY DEPT VISIT MOD MDM: CPT

## 2018-01-23 PROCEDURE — 25810000003 DEXTROSE-NACL PER 500 ML: Performed by: NURSE PRACTITIONER

## 2018-01-23 PROCEDURE — 25010000002 PIPERACILLIN SOD-TAZOBACTAM PER 1 G: Performed by: EMERGENCY MEDICINE

## 2018-01-23 PROCEDURE — 63710000001 CLONAZEPAM 1 MG TABLET: Performed by: HOSPITALIST

## 2018-01-23 PROCEDURE — 82962 GLUCOSE BLOOD TEST: CPT

## 2018-01-23 RX ORDER — ROSUVASTATIN CALCIUM 10 MG/1
10 TABLET, COATED ORAL NIGHTLY
Status: DISCONTINUED | OUTPATIENT
Start: 2018-01-23 | End: 2018-01-26

## 2018-01-23 RX ORDER — DEXTROSE AND SODIUM CHLORIDE 5; .9 G/100ML; G/100ML
100 INJECTION, SOLUTION INTRAVENOUS CONTINUOUS
Status: ACTIVE | OUTPATIENT
Start: 2018-01-23 | End: 2018-01-25

## 2018-01-23 RX ORDER — CARVEDILOL 3.12 MG/1
3.12 TABLET ORAL 2 TIMES DAILY WITH MEALS
Status: DISCONTINUED | OUTPATIENT
Start: 2018-01-24 | End: 2018-01-31

## 2018-01-23 RX ORDER — HYDROXYCHLOROQUINE SULFATE 200 MG/1
200 TABLET, FILM COATED ORAL DAILY
Status: DISCONTINUED | OUTPATIENT
Start: 2018-01-24 | End: 2018-02-05 | Stop reason: HOSPADM

## 2018-01-23 RX ORDER — HYDRALAZINE HYDROCHLORIDE 25 MG/1
25 TABLET, FILM COATED ORAL 3 TIMES DAILY
Status: DISCONTINUED | OUTPATIENT
Start: 2018-01-23 | End: 2018-01-24

## 2018-01-23 RX ORDER — CLONAZEPAM 1 MG/1
2 TABLET ORAL 3 TIMES DAILY
Status: DISCONTINUED | OUTPATIENT
Start: 2018-01-23 | End: 2018-01-24

## 2018-01-23 RX ORDER — BUDESONIDE AND FORMOTEROL FUMARATE DIHYDRATE 160; 4.5 UG/1; UG/1
2 AEROSOL RESPIRATORY (INHALATION)
Status: DISCONTINUED | OUTPATIENT
Start: 2018-01-24 | End: 2018-02-05 | Stop reason: HOSPADM

## 2018-01-23 RX ORDER — ONDANSETRON 2 MG/ML
4 INJECTION INTRAMUSCULAR; INTRAVENOUS ONCE
Status: COMPLETED | OUTPATIENT
Start: 2018-01-23 | End: 2018-01-23

## 2018-01-23 RX ORDER — NICOTINE 21 MG/24HR
1 PATCH, TRANSDERMAL 24 HOURS TRANSDERMAL EVERY 24 HOURS
Status: DISCONTINUED | OUTPATIENT
Start: 2018-01-24 | End: 2018-02-05 | Stop reason: HOSPADM

## 2018-01-23 RX ORDER — DIPHENHYDRAMINE HYDROCHLORIDE 50 MG/ML
25 INJECTION INTRAMUSCULAR; INTRAVENOUS ONCE
Status: COMPLETED | OUTPATIENT
Start: 2018-01-23 | End: 2018-01-23

## 2018-01-23 RX ORDER — ONDANSETRON 2 MG/ML
4 INJECTION INTRAMUSCULAR; INTRAVENOUS EVERY 6 HOURS PRN
Status: DISCONTINUED | OUTPATIENT
Start: 2018-01-23 | End: 2018-02-05 | Stop reason: HOSPADM

## 2018-01-23 RX ORDER — SODIUM CHLORIDE 0.9 % (FLUSH) 0.9 %
1-10 SYRINGE (ML) INJECTION AS NEEDED
Status: DISCONTINUED | OUTPATIENT
Start: 2018-01-23 | End: 2018-02-05 | Stop reason: HOSPADM

## 2018-01-23 RX ORDER — ONDANSETRON 4 MG/1
4 TABLET, FILM COATED ORAL EVERY 6 HOURS PRN
Status: DISCONTINUED | OUTPATIENT
Start: 2018-01-23 | End: 2018-02-05 | Stop reason: HOSPADM

## 2018-01-23 RX ORDER — SODIUM CHLORIDE 9 MG/ML
125 INJECTION, SOLUTION INTRAVENOUS CONTINUOUS
Status: CANCELLED | OUTPATIENT
Start: 2018-01-23 | End: 2018-01-24

## 2018-01-23 RX ORDER — METOCLOPRAMIDE HYDROCHLORIDE 5 MG/ML
10 INJECTION INTRAMUSCULAR; INTRAVENOUS ONCE
Status: COMPLETED | OUTPATIENT
Start: 2018-01-23 | End: 2018-01-23

## 2018-01-23 RX ORDER — DEXTROSE MONOHYDRATE 25 G/50ML
25 INJECTION, SOLUTION INTRAVENOUS ONCE
Status: COMPLETED | OUTPATIENT
Start: 2018-01-23 | End: 2018-01-23

## 2018-01-23 RX ADMIN — DIPHENHYDRAMINE HYDROCHLORIDE 25 MG: 50 INJECTION INTRAMUSCULAR; INTRAVENOUS at 17:23

## 2018-01-23 RX ADMIN — CLONAZEPAM 2 MG: 1 TABLET ORAL at 23:14

## 2018-01-23 RX ADMIN — SODIUM CHLORIDE 1000 ML: 9 INJECTION, SOLUTION INTRAVENOUS at 17:22

## 2018-01-23 RX ADMIN — SODIUM CHLORIDE 1000 ML: 9 INJECTION, SOLUTION INTRAVENOUS at 16:15

## 2018-01-23 RX ADMIN — DEXTROSE AND SODIUM CHLORIDE 100 ML/HR: 5; 900 INJECTION, SOLUTION INTRAVENOUS at 19:47

## 2018-01-23 RX ADMIN — INSULIN HUMAN 10 UNITS: 100 INJECTION, SOLUTION PARENTERAL at 16:14

## 2018-01-23 RX ADMIN — ONDANSETRON 4 MG: 4 TABLET, FILM COATED ORAL at 23:18

## 2018-01-23 RX ADMIN — ROSUVASTATIN CALCIUM 10 MG: 10 TABLET, FILM COATED ORAL at 23:14

## 2018-01-23 RX ADMIN — TAZOBACTAM SODIUM AND PIPERACILLIN SODIUM 3.38 G: 375; 3 INJECTION, SOLUTION INTRAVENOUS at 16:19

## 2018-01-23 RX ADMIN — METOCLOPRAMIDE 10 MG: 5 INJECTION, SOLUTION INTRAMUSCULAR; INTRAVENOUS at 17:26

## 2018-01-23 RX ADMIN — ONDANSETRON 4 MG: 2 INJECTION INTRAMUSCULAR; INTRAVENOUS at 15:36

## 2018-01-23 RX ADMIN — DEXTROSE MONOHYDRATE 25 G: 25 INJECTION, SOLUTION INTRAVENOUS at 16:29

## 2018-01-24 LAB
ANION GAP SERPL CALCULATED.3IONS-SCNC: 6 MMOL/L (ref 3–11)
BACTERIA SPEC AEROBE CULT: ABNORMAL
BACTERIA SPEC AEROBE CULT: ABNORMAL
BASOPHILS # BLD AUTO: 0.01 10*3/MM3 (ref 0–0.2)
BASOPHILS NFR BLD AUTO: 0.1 % (ref 0–1)
BUN BLD-MCNC: 87 MG/DL (ref 9–23)
BUN/CREAT SERPL: 21.8 (ref 7–25)
C DIFF TOX GENS STL QL NAA+PROBE: NOT DETECTED
CALCIUM SPEC-SCNC: 7.5 MG/DL (ref 8.7–10.4)
CHLORIDE SERPL-SCNC: 109 MMOL/L (ref 99–109)
CO2 SERPL-SCNC: 19 MMOL/L (ref 20–31)
CREAT BLD-MCNC: 4 MG/DL (ref 0.6–1.3)
DEPRECATED RDW RBC AUTO: 51.2 FL (ref 37–54)
EOSINOPHIL # BLD AUTO: 0.03 10*3/MM3 (ref 0–0.3)
EOSINOPHIL NFR BLD AUTO: 0.4 % (ref 0–3)
ERYTHROCYTE [DISTWIDTH] IN BLOOD BY AUTOMATED COUNT: 14 % (ref 11.3–14.5)
GFR SERPL CREATININE-BSD FRML MDRD: 11 ML/MIN/1.73
GLUCOSE BLD-MCNC: 103 MG/DL (ref 70–100)
HCT VFR BLD AUTO: 29.9 % (ref 34.5–44)
HGB BLD-MCNC: 9.4 G/DL (ref 11.5–15.5)
IMM GRANULOCYTES # BLD: 0.07 10*3/MM3 (ref 0–0.03)
IMM GRANULOCYTES NFR BLD: 0.8 % (ref 0–0.6)
INR PPP: 5.66
LYMPHOCYTES # BLD AUTO: 1.72 10*3/MM3 (ref 0.6–4.8)
LYMPHOCYTES NFR BLD AUTO: 20.5 % (ref 24–44)
MCH RBC QN AUTO: 31.9 PG (ref 27–31)
MCHC RBC AUTO-ENTMCNC: 31.4 G/DL (ref 32–36)
MCV RBC AUTO: 101.4 FL (ref 80–99)
MONOCYTES # BLD AUTO: 0.8 10*3/MM3 (ref 0–1)
MONOCYTES NFR BLD AUTO: 9.5 % (ref 0–12)
NEUTROPHILS # BLD AUTO: 5.76 10*3/MM3 (ref 1.5–8.3)
NEUTROPHILS NFR BLD AUTO: 68.7 % (ref 41–71)
PLATELET # BLD AUTO: 112 10*3/MM3 (ref 150–450)
PMV BLD AUTO: 11.5 FL (ref 6–12)
POTASSIUM BLD-SCNC: 5.9 MMOL/L (ref 3.5–5.5)
PROTHROMBIN TIME: 65.1 SECONDS (ref 9.6–11.5)
RBC # BLD AUTO: 2.95 10*6/MM3 (ref 3.89–5.14)
SODIUM BLD-SCNC: 134 MMOL/L (ref 132–146)
WBC NRBC COR # BLD: 8.39 10*3/MM3 (ref 3.5–10.8)

## 2018-01-24 PROCEDURE — 63710000001 CARVEDILOL 3.125 MG TABLET: Performed by: HOSPITALIST

## 2018-01-24 PROCEDURE — 63710000001 ACETAMINOPHEN 325 MG TABLET: Performed by: NURSE PRACTITIONER

## 2018-01-24 PROCEDURE — 87046 STOOL CULTR AEROBIC BACT EA: CPT | Performed by: NURSE PRACTITIONER

## 2018-01-24 PROCEDURE — 99233 SBSQ HOSP IP/OBS HIGH 50: CPT | Performed by: INTERNAL MEDICINE

## 2018-01-24 PROCEDURE — 94799 UNLISTED PULMONARY SVC/PX: CPT

## 2018-01-24 PROCEDURE — A9270 NON-COVERED ITEM OR SERVICE: HCPCS | Performed by: HOSPITALIST

## 2018-01-24 PROCEDURE — 94760 N-INVAS EAR/PLS OXIMETRY 1: CPT

## 2018-01-24 PROCEDURE — 94640 AIRWAY INHALATION TREATMENT: CPT

## 2018-01-24 PROCEDURE — 85610 PROTHROMBIN TIME: CPT

## 2018-01-24 PROCEDURE — 85025 COMPLETE CBC W/AUTO DIFF WBC: CPT | Performed by: HOSPITALIST

## 2018-01-24 PROCEDURE — 63710000001 HYDROXYCHLOROQUINE 200 MG TABLET: Performed by: HOSPITALIST

## 2018-01-24 PROCEDURE — 25010000002 PIPERACILLIN SOD-TAZOBACTAM PER 1 G

## 2018-01-24 PROCEDURE — 25810000003 DEXTROSE-NACL PER 500 ML: Performed by: INTERNAL MEDICINE

## 2018-01-24 PROCEDURE — 63710000001 CLONAZEPAM 1 MG TABLET: Performed by: HOSPITALIST

## 2018-01-24 PROCEDURE — 63710000001 BUDESONIDE-FORMOTEROL 160-4.5 MCG/ACT AEROSOL 6 G INHALER: Performed by: HOSPITALIST

## 2018-01-24 PROCEDURE — 87493 C DIFF AMPLIFIED PROBE: CPT | Performed by: NURSE PRACTITIONER

## 2018-01-24 PROCEDURE — 87045 FECES CULTURE AEROBIC BACT: CPT | Performed by: NURSE PRACTITIONER

## 2018-01-24 PROCEDURE — A9270 NON-COVERED ITEM OR SERVICE: HCPCS | Performed by: NURSE PRACTITIONER

## 2018-01-24 PROCEDURE — 80048 BASIC METABOLIC PNL TOTAL CA: CPT | Performed by: HOSPITALIST

## 2018-01-24 RX ORDER — SODIUM POLYSTYRENE SULFONATE 15 G/60ML
15 SUSPENSION ORAL; RECTAL ONCE
Status: COMPLETED | OUTPATIENT
Start: 2018-01-24 | End: 2018-01-24

## 2018-01-24 RX ORDER — CLONAZEPAM 1 MG/1
1 TABLET ORAL 3 TIMES DAILY PRN
Status: DISCONTINUED | OUTPATIENT
Start: 2018-01-24 | End: 2018-01-26

## 2018-01-24 RX ORDER — ACETAMINOPHEN 325 MG/1
650 TABLET ORAL EVERY 4 HOURS PRN
Status: DISCONTINUED | OUTPATIENT
Start: 2018-01-24 | End: 2018-01-26

## 2018-01-24 RX ADMIN — CARVEDILOL 3.12 MG: 3.12 TABLET, FILM COATED ORAL at 08:31

## 2018-01-24 RX ADMIN — BUDESONIDE AND FORMOTEROL FUMARATE DIHYDRATE 2 PUFF: 160; 4.5 AEROSOL RESPIRATORY (INHALATION) at 08:51

## 2018-01-24 RX ADMIN — SODIUM POLYSTYRENE SULFONATE 15 G: 15 SUSPENSION ORAL; RECTAL at 13:11

## 2018-01-24 RX ADMIN — TAZOBACTAM SODIUM AND PIPERACILLIN SODIUM 3.38 G: 375; 3 INJECTION, SOLUTION INTRAVENOUS at 13:12

## 2018-01-24 RX ADMIN — CARVEDILOL 3.12 MG: 3.12 TABLET, FILM COATED ORAL at 18:49

## 2018-01-24 RX ADMIN — CLONAZEPAM 2 MG: 1 TABLET ORAL at 20:11

## 2018-01-24 RX ADMIN — ROSUVASTATIN CALCIUM 10 MG: 10 TABLET, FILM COATED ORAL at 20:11

## 2018-01-24 RX ADMIN — ACETAMINOPHEN 650 MG: 325 TABLET, FILM COATED ORAL at 04:16

## 2018-01-24 RX ADMIN — DEXTROSE AND SODIUM CHLORIDE 100 ML/HR: 5; 900 INJECTION, SOLUTION INTRAVENOUS at 21:20

## 2018-01-24 RX ADMIN — HYDROXYCHLOROQUINE SULFATE 200 MG: 200 TABLET, FILM COATED ORAL at 08:31

## 2018-01-24 RX ADMIN — CLONAZEPAM 2 MG: 1 TABLET ORAL at 08:31

## 2018-01-24 RX ADMIN — TAZOBACTAM SODIUM AND PIPERACILLIN SODIUM 3.38 G: 375; 3 INJECTION, SOLUTION INTRAVENOUS at 20:11

## 2018-01-24 RX ADMIN — BUDESONIDE AND FORMOTEROL FUMARATE DIHYDRATE 2 PUFF: 160; 4.5 AEROSOL RESPIRATORY (INHALATION) at 21:10

## 2018-01-24 RX ADMIN — CLONAZEPAM 2 MG: 1 TABLET ORAL at 16:36

## 2018-01-24 RX ADMIN — DEXTROSE AND SODIUM CHLORIDE 100 ML/HR: 5; 900 INJECTION, SOLUTION INTRAVENOUS at 16:36

## 2018-01-24 RX ADMIN — TAZOBACTAM SODIUM AND PIPERACILLIN SODIUM 3.38 G: 375; 3 INJECTION, SOLUTION INTRAVENOUS at 04:16

## 2018-01-25 LAB
ANION GAP SERPL CALCULATED.3IONS-SCNC: 8 MMOL/L (ref 3–11)
BUN BLD-MCNC: 83 MG/DL (ref 9–23)
BUN/CREAT SERPL: 19.8 (ref 7–25)
CALCIUM SPEC-SCNC: 7.4 MG/DL (ref 8.7–10.4)
CHLORIDE SERPL-SCNC: 108 MMOL/L (ref 99–109)
CO2 SERPL-SCNC: 20 MMOL/L (ref 20–31)
CREAT BLD-MCNC: 4.2 MG/DL (ref 0.6–1.3)
DEPRECATED RDW RBC AUTO: 53.1 FL (ref 37–54)
ERYTHROCYTE [DISTWIDTH] IN BLOOD BY AUTOMATED COUNT: 14.4 % (ref 11.3–14.5)
FERRITIN SERPL-MCNC: >8250 NG/ML (ref 10–291)
FOLATE SERPL-MCNC: 16.52 NG/ML (ref 3.2–20)
GFR SERPL CREATININE-BSD FRML MDRD: 11 ML/MIN/1.73
GLUCOSE BLD-MCNC: 120 MG/DL (ref 70–100)
HCT VFR BLD AUTO: 27.8 % (ref 34.5–44)
HGB BLD-MCNC: 8.6 G/DL (ref 11.5–15.5)
INR PPP: 8.03
IRON 24H UR-MRATE: 61 MCG/DL (ref 50–175)
IRON SATN MFR SERPL: 27 % (ref 15–50)
MCH RBC QN AUTO: 31.7 PG (ref 27–31)
MCHC RBC AUTO-ENTMCNC: 30.9 G/DL (ref 32–36)
MCV RBC AUTO: 102.6 FL (ref 80–99)
PLATELET # BLD AUTO: 87 10*3/MM3 (ref 150–450)
PMV BLD AUTO: 12 FL (ref 6–12)
POTASSIUM BLD-SCNC: 4.6 MMOL/L (ref 3.5–5.5)
PROTHROMBIN TIME: 93.3 SECONDS (ref 9.6–11.5)
RBC # BLD AUTO: 2.71 10*6/MM3 (ref 3.89–5.14)
RETICS/RBC NFR AUTO: 1.91 % (ref 0.5–1.5)
SODIUM BLD-SCNC: 136 MMOL/L (ref 132–146)
TIBC SERPL-MCNC: 230 MCG/DL (ref 250–450)
VIT B12 BLD-MCNC: 1463 PG/ML (ref 211–911)
WBC NRBC COR # BLD: 7.24 10*3/MM3 (ref 3.5–10.8)

## 2018-01-25 PROCEDURE — 85610 PROTHROMBIN TIME: CPT

## 2018-01-25 PROCEDURE — 25010000002 PIPERACILLIN SOD-TAZOBACTAM PER 1 G

## 2018-01-25 PROCEDURE — 82607 VITAMIN B-12: CPT | Performed by: NURSE PRACTITIONER

## 2018-01-25 PROCEDURE — 85027 COMPLETE CBC AUTOMATED: CPT | Performed by: INTERNAL MEDICINE

## 2018-01-25 PROCEDURE — 83540 ASSAY OF IRON: CPT | Performed by: NURSE PRACTITIONER

## 2018-01-25 PROCEDURE — 94640 AIRWAY INHALATION TREATMENT: CPT

## 2018-01-25 PROCEDURE — 82728 ASSAY OF FERRITIN: CPT | Performed by: NURSE PRACTITIONER

## 2018-01-25 PROCEDURE — 94799 UNLISTED PULMONARY SVC/PX: CPT

## 2018-01-25 PROCEDURE — 80048 BASIC METABOLIC PNL TOTAL CA: CPT | Performed by: INTERNAL MEDICINE

## 2018-01-25 PROCEDURE — 83550 IRON BINDING TEST: CPT | Performed by: NURSE PRACTITIONER

## 2018-01-25 PROCEDURE — 25810000003 DEXTROSE-NACL PER 500 ML: Performed by: INTERNAL MEDICINE

## 2018-01-25 PROCEDURE — 99233 SBSQ HOSP IP/OBS HIGH 50: CPT | Performed by: INTERNAL MEDICINE

## 2018-01-25 PROCEDURE — 85045 AUTOMATED RETICULOCYTE COUNT: CPT | Performed by: NURSE PRACTITIONER

## 2018-01-25 PROCEDURE — 82746 ASSAY OF FOLIC ACID SERUM: CPT | Performed by: NURSE PRACTITIONER

## 2018-01-25 PROCEDURE — 94760 N-INVAS EAR/PLS OXIMETRY 1: CPT

## 2018-01-25 RX ADMIN — CARVEDILOL 3.12 MG: 3.12 TABLET, FILM COATED ORAL at 18:19

## 2018-01-25 RX ADMIN — TAZOBACTAM SODIUM AND PIPERACILLIN SODIUM 3.38 G: 375; 3 INJECTION, SOLUTION INTRAVENOUS at 17:56

## 2018-01-25 RX ADMIN — NICOTINE 1 PATCH: 21 PATCH, EXTENDED RELEASE TRANSDERMAL at 08:36

## 2018-01-25 RX ADMIN — BUDESONIDE AND FORMOTEROL FUMARATE DIHYDRATE 2 PUFF: 160; 4.5 AEROSOL RESPIRATORY (INHALATION) at 08:51

## 2018-01-25 RX ADMIN — SODIUM CHLORIDE 500 ML: 9 INJECTION, SOLUTION INTRAVENOUS at 20:24

## 2018-01-25 RX ADMIN — BUDESONIDE AND FORMOTEROL FUMARATE DIHYDRATE 2 PUFF: 160; 4.5 AEROSOL RESPIRATORY (INHALATION) at 20:13

## 2018-01-25 RX ADMIN — HYDROXYCHLOROQUINE SULFATE 200 MG: 200 TABLET, FILM COATED ORAL at 08:36

## 2018-01-25 RX ADMIN — DEXTROSE AND SODIUM CHLORIDE 100 ML/HR: 5; 900 INJECTION, SOLUTION INTRAVENOUS at 08:49

## 2018-01-25 RX ADMIN — ROSUVASTATIN CALCIUM 10 MG: 10 TABLET, FILM COATED ORAL at 20:24

## 2018-01-25 RX ADMIN — TAZOBACTAM SODIUM AND PIPERACILLIN SODIUM 3.38 G: 375; 3 INJECTION, SOLUTION INTRAVENOUS at 05:04

## 2018-01-26 PROBLEM — S36.119A LIVER INJURY: Status: ACTIVE | Noted: 2018-01-26

## 2018-01-26 LAB
ALBUMIN SERPL-MCNC: 2.8 G/DL (ref 3.2–4.8)
ALP SERPL-CCNC: 130 U/L (ref 25–100)
ALT SERPL W P-5'-P-CCNC: 2293 U/L (ref 7–40)
ANION GAP SERPL CALCULATED.3IONS-SCNC: 10 MMOL/L (ref 3–11)
APTT PPP: 57.4 SECONDS (ref 24–31)
AST SERPL-CCNC: 2729 U/L (ref 0–33)
BACTERIA SPEC AEROBE CULT: ABNORMAL
BACTERIA SPEC AEROBE CULT: ABNORMAL
BASOPHILS # BLD AUTO: 0.01 10*3/MM3 (ref 0–0.2)
BASOPHILS NFR BLD AUTO: 0.2 % (ref 0–1)
BILIRUB CONJ SERPL-MCNC: 0.2 MG/DL (ref 0–0.2)
BILIRUB INDIRECT SERPL-MCNC: 0.2 MG/DL (ref 0.1–1.1)
BILIRUB SERPL-MCNC: 0.4 MG/DL (ref 0.3–1.2)
BUN BLD-MCNC: 76 MG/DL (ref 9–23)
BUN/CREAT SERPL: 18.1 (ref 7–25)
CALCIUM SPEC-SCNC: 8 MG/DL (ref 8.7–10.4)
CHLORIDE SERPL-SCNC: 110 MMOL/L (ref 99–109)
CO2 SERPL-SCNC: 18 MMOL/L (ref 20–31)
CREAT BLD-MCNC: 4.2 MG/DL (ref 0.6–1.3)
CREAT UR-MCNC: 121.2 MG/DL
D DIMER PPP FEU-MCNC: 3.53 MG/L (FEU) (ref 0–0.5)
D-LACTATE SERPL-SCNC: 1 MMOL/L (ref 0.5–2)
DEPRECATED RDW RBC AUTO: 52.9 FL (ref 37–54)
EOSINOPHIL # BLD AUTO: 0.05 10*3/MM3 (ref 0–0.3)
EOSINOPHIL NFR BLD AUTO: 0.8 % (ref 0–3)
EOSINOPHIL SPEC QL MICRO: 0 % EOS/100 CELLS (ref 0–0)
ERYTHROCYTE [DISTWIDTH] IN BLOOD BY AUTOMATED COUNT: 14.3 % (ref 11.3–14.5)
FIBRINOGEN PPP-MCNC: 317 MG/DL (ref 200–400)
GFR SERPL CREATININE-BSD FRML MDRD: 11 ML/MIN/1.73
GLUCOSE BLD-MCNC: 88 MG/DL (ref 70–100)
HCT VFR BLD AUTO: 26.5 % (ref 34.5–44)
HEMOCCULT STL QL: POSITIVE
HEMOCCULT STL QL: POSITIVE
HGB BLD-MCNC: 8.2 G/DL (ref 11.5–15.5)
IMM GRANULOCYTES # BLD: 0.03 10*3/MM3 (ref 0–0.03)
IMM GRANULOCYTES NFR BLD: 0.5 % (ref 0–0.6)
INR PPP: ABNORMAL
LYMPHOCYTES # BLD AUTO: 1 10*3/MM3 (ref 0.6–4.8)
LYMPHOCYTES NFR BLD AUTO: 15.5 % (ref 24–44)
MCH RBC QN AUTO: 31.3 PG (ref 27–31)
MCHC RBC AUTO-ENTMCNC: 30.9 G/DL (ref 32–36)
MCV RBC AUTO: 101.1 FL (ref 80–99)
MONOCYTES # BLD AUTO: 0.63 10*3/MM3 (ref 0–1)
MONOCYTES NFR BLD AUTO: 9.8 % (ref 0–12)
NEUTROPHILS # BLD AUTO: 4.72 10*3/MM3 (ref 1.5–8.3)
NEUTROPHILS NFR BLD AUTO: 73.2 % (ref 41–71)
PLATELET # BLD AUTO: 70 10*3/MM3 (ref 150–450)
PMV BLD AUTO: 12.1 FL (ref 6–12)
POTASSIUM BLD-SCNC: 4.3 MMOL/L (ref 3.5–5.5)
PROT SERPL-MCNC: 5.2 G/DL (ref 5.7–8.2)
PROT UR-MCNC: 58 MG/DL (ref 1–14)
PROTHROMBIN TIME: >100 SECONDS (ref 9.6–11.5)
RBC # BLD AUTO: 2.62 10*6/MM3 (ref 3.89–5.14)
SODIUM BLD-SCNC: 138 MMOL/L (ref 132–146)
SODIUM UR-SCNC: 10 MMOL/L (ref 30–90)
UUN 24H UR-MCNC: 508 MG/DL
WBC NRBC COR # BLD: 6.44 10*3/MM3 (ref 3.5–10.8)

## 2018-01-26 PROCEDURE — 85379 FIBRIN DEGRADATION QUANT: CPT | Performed by: INTERNAL MEDICINE

## 2018-01-26 PROCEDURE — 84156 ASSAY OF PROTEIN URINE: CPT | Performed by: INTERNAL MEDICINE

## 2018-01-26 PROCEDURE — 94760 N-INVAS EAR/PLS OXIMETRY 1: CPT

## 2018-01-26 PROCEDURE — 80076 HEPATIC FUNCTION PANEL: CPT | Performed by: INTERNAL MEDICINE

## 2018-01-26 PROCEDURE — 82272 OCCULT BLD FECES 1-3 TESTS: CPT | Performed by: NURSE PRACTITIONER

## 2018-01-26 PROCEDURE — 85384 FIBRINOGEN ACTIVITY: CPT | Performed by: INTERNAL MEDICINE

## 2018-01-26 PROCEDURE — 84300 ASSAY OF URINE SODIUM: CPT | Performed by: INTERNAL MEDICINE

## 2018-01-26 PROCEDURE — 85730 THROMBOPLASTIN TIME PARTIAL: CPT | Performed by: INTERNAL MEDICINE

## 2018-01-26 PROCEDURE — 84155 ASSAY OF PROTEIN SERUM: CPT | Performed by: INTERNAL MEDICINE

## 2018-01-26 PROCEDURE — 84540 ASSAY OF URINE/UREA-N: CPT | Performed by: INTERNAL MEDICINE

## 2018-01-26 PROCEDURE — 80048 BASIC METABOLIC PNL TOTAL CA: CPT | Performed by: INTERNAL MEDICINE

## 2018-01-26 PROCEDURE — 83520 IMMUNOASSAY QUANT NOS NONAB: CPT | Performed by: INTERNAL MEDICINE

## 2018-01-26 PROCEDURE — 25010000002 VITAMIN K1 PER 1 MG: Performed by: INTERNAL MEDICINE

## 2018-01-26 PROCEDURE — 86160 COMPLEMENT ANTIGEN: CPT | Performed by: INTERNAL MEDICINE

## 2018-01-26 PROCEDURE — 85610 PROTHROMBIN TIME: CPT

## 2018-01-26 PROCEDURE — 83605 ASSAY OF LACTIC ACID: CPT | Performed by: INTERNAL MEDICINE

## 2018-01-26 PROCEDURE — 94799 UNLISTED PULMONARY SVC/PX: CPT

## 2018-01-26 PROCEDURE — 99233 SBSQ HOSP IP/OBS HIGH 50: CPT | Performed by: INTERNAL MEDICINE

## 2018-01-26 PROCEDURE — 86225 DNA ANTIBODY NATIVE: CPT | Performed by: INTERNAL MEDICINE

## 2018-01-26 PROCEDURE — 82570 ASSAY OF URINE CREATININE: CPT | Performed by: INTERNAL MEDICINE

## 2018-01-26 PROCEDURE — 84165 PROTEIN E-PHORESIS SERUM: CPT | Performed by: INTERNAL MEDICINE

## 2018-01-26 PROCEDURE — 85025 COMPLETE CBC W/AUTO DIFF WBC: CPT | Performed by: INTERNAL MEDICINE

## 2018-01-26 PROCEDURE — 25010000002 PIPERACILLIN SOD-TAZOBACTAM PER 1 G

## 2018-01-26 PROCEDURE — 87205 SMEAR GRAM STAIN: CPT | Performed by: INTERNAL MEDICINE

## 2018-01-26 PROCEDURE — 63510000001 EPOETIN ALFA PER 1000 UNITS: Performed by: INTERNAL MEDICINE

## 2018-01-26 PROCEDURE — 94640 AIRWAY INHALATION TREATMENT: CPT

## 2018-01-26 PROCEDURE — 85397 CLOTTING FUNCT ACTIVITY: CPT | Performed by: INTERNAL MEDICINE

## 2018-01-26 PROCEDURE — 85060 BLOOD SMEAR INTERPRETATION: CPT | Performed by: INTERNAL MEDICINE

## 2018-01-26 PROCEDURE — 84166 PROTEIN E-PHORESIS/URINE/CSF: CPT | Performed by: INTERNAL MEDICINE

## 2018-01-26 RX ORDER — LORAZEPAM 0.5 MG/1
0.5 TABLET ORAL EVERY 6 HOURS PRN
Status: DISCONTINUED | OUTPATIENT
Start: 2018-01-26 | End: 2018-02-04

## 2018-01-26 RX ADMIN — PHYTONADIONE 5 MG: 10 INJECTION, EMULSION INTRAMUSCULAR; INTRAVENOUS; SUBCUTANEOUS at 23:52

## 2018-01-26 RX ADMIN — SODIUM CHLORIDE 500 ML: 9 INJECTION, SOLUTION INTRAVENOUS at 21:39

## 2018-01-26 RX ADMIN — BUDESONIDE AND FORMOTEROL FUMARATE DIHYDRATE 2 PUFF: 160; 4.5 AEROSOL RESPIRATORY (INHALATION) at 21:35

## 2018-01-26 RX ADMIN — HYDROXYCHLOROQUINE SULFATE 200 MG: 200 TABLET, FILM COATED ORAL at 09:00

## 2018-01-26 RX ADMIN — BUDESONIDE AND FORMOTEROL FUMARATE DIHYDRATE 2 PUFF: 160; 4.5 AEROSOL RESPIRATORY (INHALATION) at 09:19

## 2018-01-26 RX ADMIN — EPOETIN ALFA 20000 UNITS: 20000 SOLUTION INTRAVENOUS; SUBCUTANEOUS at 12:46

## 2018-01-26 RX ADMIN — PHYTONADIONE 5 MG: 10 INJECTION, EMULSION INTRAMUSCULAR; INTRAVENOUS; SUBCUTANEOUS at 11:11

## 2018-01-26 RX ADMIN — SODIUM CHLORIDE 500 ML: 9 INJECTION, SOLUTION INTRAVENOUS at 02:09

## 2018-01-26 RX ADMIN — NICOTINE 1 PATCH: 21 PATCH, EXTENDED RELEASE TRANSDERMAL at 08:51

## 2018-01-26 RX ADMIN — PHYTONADIONE 5 MG: 10 INJECTION, EMULSION INTRAMUSCULAR; INTRAVENOUS; SUBCUTANEOUS at 17:19

## 2018-01-26 RX ADMIN — SODIUM CHLORIDE 500 ML: 9 INJECTION, SOLUTION INTRAVENOUS at 09:00

## 2018-01-26 RX ADMIN — TAZOBACTAM SODIUM AND PIPERACILLIN SODIUM 3.38 G: 375; 3 INJECTION, SOLUTION INTRAVENOUS at 05:15

## 2018-01-26 RX ADMIN — TAZOBACTAM SODIUM AND PIPERACILLIN SODIUM 3.38 G: 375; 3 INJECTION, SOLUTION INTRAVENOUS at 18:00

## 2018-01-26 RX ADMIN — SODIUM CHLORIDE 500 ML: 9 INJECTION, SOLUTION INTRAVENOUS at 15:03

## 2018-01-27 LAB
ALBUMIN SERPL-MCNC: 2.7 G/DL (ref 3.2–4.8)
ALBUMIN/GLOB SERPL: 1 G/DL (ref 1.5–2.5)
ALP SERPL-CCNC: 146 U/L (ref 25–100)
ALT SERPL W P-5'-P-CCNC: 1934 U/L (ref 7–40)
ANION GAP SERPL CALCULATED.3IONS-SCNC: 12 MMOL/L (ref 3–11)
APTT PPP: 41.5 SECONDS (ref 24–31)
AST SERPL-CCNC: 1567 U/L (ref 0–33)
BASOPHILS # BLD AUTO: 0.01 10*3/MM3 (ref 0–0.2)
BASOPHILS NFR BLD AUTO: 0.1 % (ref 0–1)
BILIRUB SERPL-MCNC: 0.7 MG/DL (ref 0.3–1.2)
BUN BLD-MCNC: 75 MG/DL (ref 9–23)
BUN/CREAT SERPL: 20.8 (ref 7–25)
C3 SERPL-MCNC: 45 MG/DL (ref 82–167)
C4 SERPL-MCNC: 9 MG/DL (ref 14–44)
CALCIUM SPEC-SCNC: 8.3 MG/DL (ref 8.7–10.4)
CHLORIDE SERPL-SCNC: 116 MMOL/L (ref 99–109)
CO2 SERPL-SCNC: 14 MMOL/L (ref 20–31)
CREAT BLD-MCNC: 3.6 MG/DL (ref 0.6–1.3)
CYTOLOGIST CVX/VAG CYTO: NORMAL
DEPRECATED RDW RBC AUTO: 51.5 FL (ref 37–54)
EOSINOPHIL # BLD AUTO: 0.01 10*3/MM3 (ref 0–0.3)
EOSINOPHIL NFR BLD AUTO: 0.1 % (ref 0–3)
ERYTHROCYTE [DISTWIDTH] IN BLOOD BY AUTOMATED COUNT: 14.3 % (ref 11.3–14.5)
GFR SERPL CREATININE-BSD FRML MDRD: 13 ML/MIN/1.73
GLOBULIN UR ELPH-MCNC: 2.7 GM/DL
GLUCOSE BLD-MCNC: 78 MG/DL (ref 70–100)
GLUCOSE BLDC GLUCOMTR-MCNC: 114 MG/DL (ref 70–130)
HAV IGM SERPL QL IA: NORMAL
HBV CORE IGM SERPL QL IA: NORMAL
HBV SURFACE AG SERPL QL IA: NORMAL
HCT VFR BLD AUTO: 29.1 % (ref 34.5–44)
HCV AB SER DONR QL: NORMAL
HGB BLD-MCNC: 9.4 G/DL (ref 11.5–15.5)
IMM GRANULOCYTES # BLD: 0.07 10*3/MM3 (ref 0–0.03)
IMM GRANULOCYTES NFR BLD: 1 % (ref 0–0.6)
INR PPP: 2.64
INR PPP: 2.71
INTERPRETATION: ABNORMAL
LYMPHOCYTES # BLD AUTO: 1.18 10*3/MM3 (ref 0.6–4.8)
LYMPHOCYTES NFR BLD AUTO: 16.1 % (ref 24–44)
MCH RBC QN AUTO: 32.2 PG (ref 27–31)
MCHC RBC AUTO-ENTMCNC: 32.3 G/DL (ref 32–36)
MCV RBC AUTO: 99.7 FL (ref 80–99)
MONOCYTES # BLD AUTO: 0.67 10*3/MM3 (ref 0–1)
MONOCYTES NFR BLD AUTO: 9.1 % (ref 0–12)
NEUTROPHILS # BLD AUTO: 5.41 10*3/MM3 (ref 1.5–8.3)
NEUTROPHILS NFR BLD AUTO: 73.6 % (ref 41–71)
PATH INTERP BLD-IMP: NORMAL
PHOSPHATE SERPL-MCNC: 5.6 MG/DL (ref 2.4–5.1)
PLATELET # BLD AUTO: 77 10*3/MM3 (ref 150–450)
PMV BLD AUTO: 10.8 FL (ref 6–12)
POTASSIUM BLD-SCNC: 4.4 MMOL/L (ref 3.5–5.5)
PROT SERPL-MCNC: 5.4 G/DL (ref 5.7–8.2)
PROTHROMBIN TIME: 29.7 SECONDS (ref 9.6–11.5)
PROTHROMBIN TIME: 30.5 SECONDS (ref 9.6–11.5)
PTT 1:1 10MIN: 27.9 SECONDS (ref 24–31)
PTT 1:1 30MIN: 29.4 SECONDS (ref 24–31)
PTT 1:1 60MIN: 29.8 SECONDS (ref 24–31)
PTT 1:1 IMMED: 27.9 SECONDS (ref 24–31)
RBC # BLD AUTO: 2.92 10*6/MM3 (ref 3.89–5.14)
SODIUM BLD-SCNC: 142 MMOL/L (ref 132–146)
WBC NRBC COR # BLD: 7.35 10*3/MM3 (ref 3.5–10.8)

## 2018-01-27 PROCEDURE — 85730 THROMBOPLASTIN TIME PARTIAL: CPT | Performed by: INTERNAL MEDICINE

## 2018-01-27 PROCEDURE — 80053 COMPREHEN METABOLIC PANEL: CPT | Performed by: INTERNAL MEDICINE

## 2018-01-27 PROCEDURE — 80074 ACUTE HEPATITIS PANEL: CPT | Performed by: INTERNAL MEDICINE

## 2018-01-27 PROCEDURE — 99232 SBSQ HOSP IP/OBS MODERATE 35: CPT | Performed by: INTERNAL MEDICINE

## 2018-01-27 PROCEDURE — 84100 ASSAY OF PHOSPHORUS: CPT | Performed by: INTERNAL MEDICINE

## 2018-01-27 PROCEDURE — 94640 AIRWAY INHALATION TREATMENT: CPT

## 2018-01-27 PROCEDURE — 82962 GLUCOSE BLOOD TEST: CPT

## 2018-01-27 PROCEDURE — 85732 THROMBOPLASTIN TIME PARTIAL: CPT | Performed by: INTERNAL MEDICINE

## 2018-01-27 PROCEDURE — 85025 COMPLETE CBC W/AUTO DIFF WBC: CPT | Performed by: INTERNAL MEDICINE

## 2018-01-27 PROCEDURE — 94799 UNLISTED PULMONARY SVC/PX: CPT

## 2018-01-27 PROCEDURE — 85610 PROTHROMBIN TIME: CPT

## 2018-01-27 PROCEDURE — 94760 N-INVAS EAR/PLS OXIMETRY 1: CPT

## 2018-01-27 RX ADMIN — HYDROXYCHLOROQUINE SULFATE 200 MG: 200 TABLET, FILM COATED ORAL at 08:11

## 2018-01-27 RX ADMIN — NICOTINE 1 PATCH: 21 PATCH, EXTENDED RELEASE TRANSDERMAL at 08:13

## 2018-01-27 RX ADMIN — BUDESONIDE AND FORMOTEROL FUMARATE DIHYDRATE 2 PUFF: 160; 4.5 AEROSOL RESPIRATORY (INHALATION) at 09:06

## 2018-01-27 RX ADMIN — Medication 125 ML/HR: at 23:31

## 2018-01-27 RX ADMIN — SODIUM CHLORIDE 500 ML: 9 INJECTION, SOLUTION INTRAVENOUS at 08:14

## 2018-01-27 RX ADMIN — CARVEDILOL 3.12 MG: 3.12 TABLET, FILM COATED ORAL at 08:11

## 2018-01-27 RX ADMIN — CARVEDILOL 3.12 MG: 3.12 TABLET, FILM COATED ORAL at 23:30

## 2018-01-27 RX ADMIN — SODIUM CHLORIDE 500 ML: 9 INJECTION, SOLUTION INTRAVENOUS at 03:01

## 2018-01-27 RX ADMIN — Medication 125 ML/HR: at 13:56

## 2018-01-28 LAB
ALBUMIN SERPL-MCNC: 2.6 G/DL (ref 3.2–4.8)
ALP SERPL-CCNC: 139 U/L (ref 25–100)
ALT SERPL W P-5'-P-CCNC: 1490 U/L (ref 7–40)
ANION GAP SERPL CALCULATED.3IONS-SCNC: 7 MMOL/L (ref 3–11)
APTT PPP: 35.7 SECONDS (ref 55–70)
APTT PPP: 51.8 SECONDS (ref 55–70)
AST SERPL-CCNC: 883 U/L (ref 0–33)
BACTERIA SPEC AEROBE CULT: NORMAL
BACTERIA SPEC AEROBE CULT: NORMAL
BASOPHILS # BLD AUTO: 0.01 10*3/MM3 (ref 0–0.2)
BASOPHILS NFR BLD AUTO: 0.1 % (ref 0–1)
BILIRUB CONJ SERPL-MCNC: 0.2 MG/DL (ref 0–0.2)
BILIRUB INDIRECT SERPL-MCNC: 0.3 MG/DL (ref 0.1–1.1)
BILIRUB SERPL-MCNC: 0.5 MG/DL (ref 0.3–1.2)
BUN BLD-MCNC: 76 MG/DL (ref 9–23)
BUN/CREAT SERPL: 20.5 (ref 7–25)
CALCIUM SPEC-SCNC: 8 MG/DL (ref 8.7–10.4)
CHLORIDE SERPL-SCNC: 115 MMOL/L (ref 99–109)
CO2 SERPL-SCNC: 19 MMOL/L (ref 20–31)
CREAT BLD-MCNC: 3.7 MG/DL (ref 0.6–1.3)
DEPRECATED RDW RBC AUTO: 52 FL (ref 37–54)
EOSINOPHIL # BLD AUTO: 0.08 10*3/MM3 (ref 0–0.3)
EOSINOPHIL NFR BLD AUTO: 1.1 % (ref 0–3)
ERYTHROCYTE [DISTWIDTH] IN BLOOD BY AUTOMATED COUNT: 14.6 % (ref 11.3–14.5)
GFR SERPL CREATININE-BSD FRML MDRD: 13 ML/MIN/1.73
GLUCOSE BLD-MCNC: 136 MG/DL (ref 70–100)
HCT VFR BLD AUTO: 26.6 % (ref 34.5–44)
HGB BLD-MCNC: 8.4 G/DL (ref 11.5–15.5)
IMM GRANULOCYTES # BLD: 0.03 10*3/MM3 (ref 0–0.03)
IMM GRANULOCYTES NFR BLD: 0.4 % (ref 0–0.6)
INR PPP: 1.72
LYMPHOCYTES # BLD AUTO: 1.37 10*3/MM3 (ref 0.6–4.8)
LYMPHOCYTES NFR BLD AUTO: 19.6 % (ref 24–44)
MCH RBC QN AUTO: 31.3 PG (ref 27–31)
MCHC RBC AUTO-ENTMCNC: 31.6 G/DL (ref 32–36)
MCV RBC AUTO: 99.3 FL (ref 80–99)
MONOCYTES # BLD AUTO: 0.91 10*3/MM3 (ref 0–1)
MONOCYTES NFR BLD AUTO: 13 % (ref 0–12)
NEUTROPHILS # BLD AUTO: 4.6 10*3/MM3 (ref 1.5–8.3)
NEUTROPHILS NFR BLD AUTO: 65.8 % (ref 41–71)
PLATELET # BLD AUTO: 91 10*3/MM3 (ref 150–450)
PMV BLD AUTO: 10.9 FL (ref 6–12)
POTASSIUM BLD-SCNC: 4.3 MMOL/L (ref 3.5–5.5)
PROT SERPL-MCNC: 4.9 G/DL (ref 5.7–8.2)
PROTHROMBIN TIME: 19.1 SECONDS (ref 9.6–11.5)
RBC # BLD AUTO: 2.68 10*6/MM3 (ref 3.89–5.14)
SODIUM BLD-SCNC: 141 MMOL/L (ref 132–146)
WBC NRBC COR # BLD: 7 10*3/MM3 (ref 3.5–10.8)

## 2018-01-28 PROCEDURE — 80048 BASIC METABOLIC PNL TOTAL CA: CPT | Performed by: INTERNAL MEDICINE

## 2018-01-28 PROCEDURE — 25010000002 HEPARIN (PORCINE) PER 1000 UNITS: Performed by: INTERNAL MEDICINE

## 2018-01-28 PROCEDURE — 94799 UNLISTED PULMONARY SVC/PX: CPT

## 2018-01-28 PROCEDURE — 99232 SBSQ HOSP IP/OBS MODERATE 35: CPT | Performed by: INTERNAL MEDICINE

## 2018-01-28 PROCEDURE — 85610 PROTHROMBIN TIME: CPT

## 2018-01-28 PROCEDURE — 85730 THROMBOPLASTIN TIME PARTIAL: CPT

## 2018-01-28 PROCEDURE — 94640 AIRWAY INHALATION TREATMENT: CPT

## 2018-01-28 PROCEDURE — 85025 COMPLETE CBC W/AUTO DIFF WBC: CPT | Performed by: INTERNAL MEDICINE

## 2018-01-28 PROCEDURE — 80076 HEPATIC FUNCTION PANEL: CPT | Performed by: INTERNAL MEDICINE

## 2018-01-28 PROCEDURE — 25010000002 HEPARIN (PORCINE) PER 1000 UNITS

## 2018-01-28 PROCEDURE — 94760 N-INVAS EAR/PLS OXIMETRY 1: CPT

## 2018-01-28 RX ORDER — WARFARIN SODIUM 1 MG/1
1 TABLET ORAL
Status: DISCONTINUED | OUTPATIENT
Start: 2018-01-28 | End: 2018-01-30

## 2018-01-28 RX ORDER — ACETAMINOPHEN 325 MG/1
650 TABLET ORAL EVERY 6 HOURS PRN
Status: DISCONTINUED | OUTPATIENT
Start: 2018-01-28 | End: 2018-02-05 | Stop reason: HOSPADM

## 2018-01-28 RX ORDER — HEPARIN SODIUM 1000 [USP'U]/ML
1000 INJECTION, SOLUTION INTRAVENOUS; SUBCUTANEOUS ONCE
Status: COMPLETED | OUTPATIENT
Start: 2018-01-28 | End: 2018-01-28

## 2018-01-28 RX ADMIN — WARFARIN SODIUM 1 MG: 1 TABLET ORAL at 17:31

## 2018-01-28 RX ADMIN — NICOTINE 1 PATCH: 21 PATCH, EXTENDED RELEASE TRANSDERMAL at 09:31

## 2018-01-28 RX ADMIN — LORAZEPAM 0.5 MG: 0.5 TABLET ORAL at 00:38

## 2018-01-28 RX ADMIN — Medication 75 ML/HR: at 14:27

## 2018-01-28 RX ADMIN — BUDESONIDE AND FORMOTEROL FUMARATE DIHYDRATE 2 PUFF: 160; 4.5 AEROSOL RESPIRATORY (INHALATION) at 08:19

## 2018-01-28 RX ADMIN — CARVEDILOL 3.12 MG: 3.12 TABLET, FILM COATED ORAL at 17:31

## 2018-01-28 RX ADMIN — CARVEDILOL 3.12 MG: 3.12 TABLET, FILM COATED ORAL at 09:29

## 2018-01-28 RX ADMIN — ACETAMINOPHEN 650 MG: 325 TABLET, FILM COATED ORAL at 03:42

## 2018-01-28 RX ADMIN — HYDROXYCHLOROQUINE SULFATE 200 MG: 200 TABLET, FILM COATED ORAL at 09:29

## 2018-01-28 RX ADMIN — ACETAMINOPHEN 650 MG: 325 TABLET, FILM COATED ORAL at 09:22

## 2018-01-28 RX ADMIN — HEPARIN SODIUM 1000 UNITS: 1000 INJECTION, SOLUTION INTRAVENOUS; SUBCUTANEOUS at 17:37

## 2018-01-28 RX ADMIN — HEPARIN SODIUM 12 UNITS/KG/HR: 10000 INJECTION, SOLUTION INTRAVENOUS at 12:39

## 2018-01-29 ENCOUNTER — APPOINTMENT (OUTPATIENT)
Dept: GENERAL RADIOLOGY | Facility: HOSPITAL | Age: 58
End: 2018-01-29

## 2018-01-29 LAB
ALBUMIN SERPL-MCNC: 2.6 G/DL (ref 2.9–4.4)
ALBUMIN SERPL-MCNC: 2.9 G/DL (ref 3.2–4.8)
ALBUMIN/GLOB SERPL: 1 G/DL (ref 1.5–2.5)
ALBUMIN/GLOB SERPL: 1.1 {RATIO} (ref 0.7–1.7)
ALP SERPL-CCNC: 146 U/L (ref 25–100)
ALPHA1 GLOB FLD ELPH-MCNC: 0.3 G/DL (ref 0–0.4)
ALPHA2 GLOB SERPL ELPH-MCNC: 0.3 G/DL (ref 0.4–1)
ALT SERPL W P-5'-P-CCNC: 1211 U/L (ref 7–40)
ANION GAP SERPL CALCULATED.3IONS-SCNC: 8 MMOL/L (ref 3–11)
APTT PPP: 61.8 SECONDS (ref 55–70)
APTT PPP: 67.8 SECONDS (ref 55–70)
APTT PPP: 73.2 SECONDS (ref 55–70)
AST SERPL-CCNC: 486 U/L (ref 0–33)
B-GLOBULIN SERPL ELPH-MCNC: 0.7 G/DL (ref 0.7–1.3)
BILIRUB SERPL-MCNC: 0.7 MG/DL (ref 0.3–1.2)
BUN BLD-MCNC: 73 MG/DL (ref 9–23)
BUN/CREAT SERPL: 22.1 (ref 7–25)
CALCIUM SPEC-SCNC: 8.4 MG/DL (ref 8.7–10.4)
CHLORIDE SERPL-SCNC: 107 MMOL/L (ref 99–109)
CO2 SERPL-SCNC: 20 MMOL/L (ref 20–31)
CREAT BLD-MCNC: 3.3 MG/DL (ref 0.6–1.3)
DEPRECATED RDW RBC AUTO: 49.8 FL (ref 37–54)
DSDNA AB SER-ACNC: 1 IU/ML (ref 0–9)
ERYTHROCYTE [DISTWIDTH] IN BLOOD BY AUTOMATED COUNT: 14.6 % (ref 11.3–14.5)
GAMMA GLOB SERPL ELPH-MCNC: 1 G/DL (ref 0.4–1.8)
GFR SERPL CREATININE-BSD FRML MDRD: 14 ML/MIN/1.73
GLOBULIN SER CALC-MCNC: 2.3 G/DL (ref 2.2–3.9)
GLOBULIN UR ELPH-MCNC: 2.8 GM/DL
GLUCOSE BLD-MCNC: 126 MG/DL (ref 70–100)
HCT VFR BLD AUTO: 27.6 % (ref 34.5–44)
HGB BLD-MCNC: 9.1 G/DL (ref 11.5–15.5)
INR PPP: 1.52
LABORATORY COMMENT REPORT: NORMAL
Lab: ABNORMAL
M-SPIKE: ABNORMAL G/DL
MCH RBC QN AUTO: 32 PG (ref 27–31)
MCHC RBC AUTO-ENTMCNC: 33 G/DL (ref 32–36)
MCV RBC AUTO: 97.2 FL (ref 80–99)
PLATELET # BLD AUTO: 94 10*3/MM3 (ref 150–450)
PMV BLD AUTO: 11.4 FL (ref 6–12)
POTASSIUM BLD-SCNC: 3.6 MMOL/L (ref 3.5–5.5)
PROT PATTERN SERPL ELPH-IMP: ABNORMAL
PROT SERPL-MCNC: 4.9 G/DL (ref 6–8.5)
PROT SERPL-MCNC: 5.7 G/DL (ref 5.7–8.2)
PROTHROMBIN TIME: 16.8 SECONDS (ref 9.6–11.5)
RBC # BLD AUTO: 2.84 10*6/MM3 (ref 3.89–5.14)
SODIUM BLD-SCNC: 135 MMOL/L (ref 132–146)
VWF CP ACT/NOR PPP CHRO: 57.2 %
WBC NRBC COR # BLD: 9.15 10*3/MM3 (ref 3.5–10.8)

## 2018-01-29 PROCEDURE — 80053 COMPREHEN METABOLIC PANEL: CPT | Performed by: INTERNAL MEDICINE

## 2018-01-29 PROCEDURE — 99232 SBSQ HOSP IP/OBS MODERATE 35: CPT | Performed by: INTERNAL MEDICINE

## 2018-01-29 PROCEDURE — 71045 X-RAY EXAM CHEST 1 VIEW: CPT

## 2018-01-29 PROCEDURE — 25010000002 HEPARIN (PORCINE) PER 1000 UNITS: Performed by: INTERNAL MEDICINE

## 2018-01-29 PROCEDURE — 85730 THROMBOPLASTIN TIME PARTIAL: CPT | Performed by: INTERNAL MEDICINE

## 2018-01-29 PROCEDURE — 85610 PROTHROMBIN TIME: CPT

## 2018-01-29 PROCEDURE — 94799 UNLISTED PULMONARY SVC/PX: CPT

## 2018-01-29 PROCEDURE — 85027 COMPLETE CBC AUTOMATED: CPT | Performed by: INTERNAL MEDICINE

## 2018-01-29 PROCEDURE — 85730 THROMBOPLASTIN TIME PARTIAL: CPT

## 2018-01-29 PROCEDURE — 94760 N-INVAS EAR/PLS OXIMETRY 1: CPT

## 2018-01-29 PROCEDURE — 97162 PT EVAL MOD COMPLEX 30 MIN: CPT

## 2018-01-29 PROCEDURE — 94640 AIRWAY INHALATION TREATMENT: CPT

## 2018-01-29 PROCEDURE — 86147 CARDIOLIPIN ANTIBODY EA IG: CPT | Performed by: INTERNAL MEDICINE

## 2018-01-29 RX ORDER — LOPERAMIDE HYDROCHLORIDE 2 MG/1
2 CAPSULE ORAL 4 TIMES DAILY PRN
Status: DISCONTINUED | OUTPATIENT
Start: 2018-01-29 | End: 2018-02-05 | Stop reason: HOSPADM

## 2018-01-29 RX ORDER — SODIUM CHLORIDE 9 MG/ML
50 INJECTION, SOLUTION INTRAVENOUS CONTINUOUS
Status: DISCONTINUED | OUTPATIENT
Start: 2018-01-29 | End: 2018-01-30

## 2018-01-29 RX ORDER — FAMOTIDINE 20 MG/1
20 TABLET, FILM COATED ORAL DAILY
Status: DISCONTINUED | OUTPATIENT
Start: 2018-01-29 | End: 2018-02-05 | Stop reason: HOSPADM

## 2018-01-29 RX ADMIN — HEPARIN SODIUM 13 UNITS/KG/HR: 10000 INJECTION, SOLUTION INTRAVENOUS at 13:53

## 2018-01-29 RX ADMIN — Medication 75 ML/HR: at 03:36

## 2018-01-29 RX ADMIN — CARVEDILOL 3.12 MG: 3.12 TABLET, FILM COATED ORAL at 17:22

## 2018-01-29 RX ADMIN — BUDESONIDE AND FORMOTEROL FUMARATE DIHYDRATE 2 PUFF: 160; 4.5 AEROSOL RESPIRATORY (INHALATION) at 20:11

## 2018-01-29 RX ADMIN — ACETAMINOPHEN 650 MG: 325 TABLET, FILM COATED ORAL at 02:10

## 2018-01-29 RX ADMIN — HYDROXYCHLOROQUINE SULFATE 200 MG: 200 TABLET, FILM COATED ORAL at 08:12

## 2018-01-29 RX ADMIN — SODIUM CHLORIDE 50 ML/HR: 9 INJECTION, SOLUTION INTRAVENOUS at 10:50

## 2018-01-29 RX ADMIN — CARVEDILOL 3.12 MG: 3.12 TABLET, FILM COATED ORAL at 08:12

## 2018-01-29 RX ADMIN — WARFARIN SODIUM 1 MG: 1 TABLET ORAL at 17:22

## 2018-01-29 RX ADMIN — LOPERAMIDE HYDROCHLORIDE 2 MG: 2 CAPSULE ORAL at 15:30

## 2018-01-29 RX ADMIN — BUDESONIDE AND FORMOTEROL FUMARATE DIHYDRATE 2 PUFF: 160; 4.5 AEROSOL RESPIRATORY (INHALATION) at 08:56

## 2018-01-29 RX ADMIN — FAMOTIDINE 20 MG: 20 TABLET, FILM COATED ORAL at 10:50

## 2018-01-30 LAB
ALBUMIN 24H MFR UR ELPH: 42.2 %
ALPHA1 GLOB 24H MFR UR ELPH: 3.4 %
ALPHA2 GLOB 24H MFR UR ELPH: 10 %
ANION GAP SERPL CALCULATED.3IONS-SCNC: 5 MMOL/L (ref 3–11)
APTT PPP: 49.9 SECONDS (ref 55–70)
APTT PPP: 71.3 SECONDS (ref 55–70)
B-GLOBULIN MFR UR ELPH: 20.9 %
BASOPHILS # BLD AUTO: 0.01 10*3/MM3 (ref 0–0.2)
BASOPHILS NFR BLD AUTO: 0.1 % (ref 0–1)
BUN BLD-MCNC: 65 MG/DL (ref 9–23)
BUN/CREAT SERPL: 22.4 (ref 7–25)
CALCIUM SPEC-SCNC: 8.6 MG/DL (ref 8.7–10.4)
CARDIOLIPIN IGG SER IA-ACNC: <9 GPL U/ML (ref 0–14)
CARDIOLIPIN IGM SER IA-ACNC: <9 MPL U/ML (ref 0–12)
CHLORIDE SERPL-SCNC: 110 MMOL/L (ref 99–109)
CO2 SERPL-SCNC: 19 MMOL/L (ref 20–31)
CREAT BLD-MCNC: 2.9 MG/DL (ref 0.6–1.3)
DEPRECATED RDW RBC AUTO: 51.2 FL (ref 37–54)
EOSINOPHIL # BLD AUTO: 0.02 10*3/MM3 (ref 0–0.3)
EOSINOPHIL NFR BLD AUTO: 0.2 % (ref 0–3)
ERYTHROCYTE [DISTWIDTH] IN BLOOD BY AUTOMATED COUNT: 15 % (ref 11.3–14.5)
GAMMA GLOB 24H MFR UR ELPH: 23.5 %
GFR SERPL CREATININE-BSD FRML MDRD: 17 ML/MIN/1.73
GLUCOSE BLD-MCNC: 95 MG/DL (ref 70–100)
HCT VFR BLD AUTO: 28 % (ref 34.5–44)
HGB BLD-MCNC: 9.2 G/DL (ref 11.5–15.5)
IMM GRANULOCYTES # BLD: 0.04 10*3/MM3 (ref 0–0.03)
IMM GRANULOCYTES NFR BLD: 0.5 % (ref 0–0.6)
INR PPP: 1.48
LYMPHOCYTES # BLD AUTO: 1.74 10*3/MM3 (ref 0.6–4.8)
LYMPHOCYTES NFR BLD AUTO: 20.3 % (ref 24–44)
Lab: ABNORMAL
M PROTEIN MFR UR ELPH: ABNORMAL %
MCH RBC QN AUTO: 32.4 PG (ref 27–31)
MCHC RBC AUTO-ENTMCNC: 32.9 G/DL (ref 32–36)
MCV RBC AUTO: 98.6 FL (ref 80–99)
MONOCYTES # BLD AUTO: 1.42 10*3/MM3 (ref 0–1)
MONOCYTES NFR BLD AUTO: 16.5 % (ref 0–12)
NEUTROPHILS # BLD AUTO: 5.36 10*3/MM3 (ref 1.5–8.3)
NEUTROPHILS NFR BLD AUTO: 62.4 % (ref 41–71)
PLATELET # BLD AUTO: 84 10*3/MM3 (ref 150–450)
PMV BLD AUTO: 11.8 FL (ref 6–12)
POTASSIUM BLD-SCNC: 3.9 MMOL/L (ref 3.5–5.5)
PROT 24H UR-MRATE: 956 MG/24 HR (ref 30–150)
PROT UR-MCNC: 78 MG/DL
PROTHROMBIN TIME: 16.3 SECONDS (ref 9.6–11.5)
RBC # BLD AUTO: 2.84 10*6/MM3 (ref 3.89–5.14)
SODIUM BLD-SCNC: 134 MMOL/L (ref 132–146)
WBC NRBC COR # BLD: 8.59 10*3/MM3 (ref 3.5–10.8)

## 2018-01-30 PROCEDURE — 25010000002 ONDANSETRON PER 1 MG: Performed by: HOSPITALIST

## 2018-01-30 PROCEDURE — 25010000002 FUROSEMIDE PER 20 MG: Performed by: INTERNAL MEDICINE

## 2018-01-30 PROCEDURE — 85730 THROMBOPLASTIN TIME PARTIAL: CPT

## 2018-01-30 PROCEDURE — 85610 PROTHROMBIN TIME: CPT

## 2018-01-30 PROCEDURE — 85730 THROMBOPLASTIN TIME PARTIAL: CPT | Performed by: INTERNAL MEDICINE

## 2018-01-30 PROCEDURE — 94640 AIRWAY INHALATION TREATMENT: CPT

## 2018-01-30 PROCEDURE — 80048 BASIC METABOLIC PNL TOTAL CA: CPT | Performed by: INTERNAL MEDICINE

## 2018-01-30 PROCEDURE — 94799 UNLISTED PULMONARY SVC/PX: CPT

## 2018-01-30 PROCEDURE — 99232 SBSQ HOSP IP/OBS MODERATE 35: CPT | Performed by: INTERNAL MEDICINE

## 2018-01-30 PROCEDURE — 86147 CARDIOLIPIN ANTIBODY EA IG: CPT | Performed by: INTERNAL MEDICINE

## 2018-01-30 PROCEDURE — 85025 COMPLETE CBC W/AUTO DIFF WBC: CPT

## 2018-01-30 PROCEDURE — 25010000002 HEPARIN (PORCINE) PER 1000 UNITS

## 2018-01-30 RX ORDER — FUROSEMIDE 10 MG/ML
40 INJECTION INTRAMUSCULAR; INTRAVENOUS ONCE
Status: COMPLETED | OUTPATIENT
Start: 2018-01-30 | End: 2018-01-30

## 2018-01-30 RX ORDER — WARFARIN SODIUM 2 MG/1
2 TABLET ORAL
Status: DISCONTINUED | OUTPATIENT
Start: 2018-01-30 | End: 2018-02-02

## 2018-01-30 RX ADMIN — LORAZEPAM 0.5 MG: 0.5 TABLET ORAL at 10:12

## 2018-01-30 RX ADMIN — SODIUM CHLORIDE 50 ML/HR: 9 INJECTION, SOLUTION INTRAVENOUS at 03:35

## 2018-01-30 RX ADMIN — BUDESONIDE AND FORMOTEROL FUMARATE DIHYDRATE 2 PUFF: 160; 4.5 AEROSOL RESPIRATORY (INHALATION) at 21:30

## 2018-01-30 RX ADMIN — WARFARIN SODIUM 2 MG: 2 TABLET ORAL at 17:39

## 2018-01-30 RX ADMIN — HYDROXYCHLOROQUINE SULFATE 200 MG: 200 TABLET, FILM COATED ORAL at 08:26

## 2018-01-30 RX ADMIN — NICOTINE 1 PATCH: 21 PATCH, EXTENDED RELEASE TRANSDERMAL at 08:26

## 2018-01-30 RX ADMIN — FUROSEMIDE 40 MG: 10 INJECTION, SOLUTION INTRAMUSCULAR; INTRAVENOUS at 14:20

## 2018-01-30 RX ADMIN — LORAZEPAM 0.5 MG: 0.5 TABLET ORAL at 17:39

## 2018-01-30 RX ADMIN — FAMOTIDINE 20 MG: 20 TABLET, FILM COATED ORAL at 08:26

## 2018-01-30 RX ADMIN — CARVEDILOL 3.12 MG: 3.12 TABLET, FILM COATED ORAL at 17:39

## 2018-01-30 RX ADMIN — CARVEDILOL 3.12 MG: 3.12 TABLET, FILM COATED ORAL at 08:26

## 2018-01-30 RX ADMIN — HEPARIN SODIUM 13 UNITS/KG/HR: 10000 INJECTION, SOLUTION INTRAVENOUS at 18:38

## 2018-01-30 RX ADMIN — BUDESONIDE AND FORMOTEROL FUMARATE DIHYDRATE 2 PUFF: 160; 4.5 AEROSOL RESPIRATORY (INHALATION) at 08:35

## 2018-01-30 RX ADMIN — LORAZEPAM 0.5 MG: 0.5 TABLET ORAL at 03:32

## 2018-01-30 RX ADMIN — ONDANSETRON 4 MG: 2 INJECTION INTRAMUSCULAR; INTRAVENOUS at 03:32

## 2018-01-31 LAB
ALBUMIN SERPL-MCNC: 3.2 G/DL (ref 3.2–4.8)
ALBUMIN/GLOB SERPL: 1 G/DL (ref 1.5–2.5)
ALP SERPL-CCNC: 152 U/L (ref 25–100)
ALT SERPL W P-5'-P-CCNC: 822 U/L (ref 7–40)
ANION GAP SERPL CALCULATED.3IONS-SCNC: 11 MMOL/L (ref 3–11)
APTT PPP: 69.3 SECONDS (ref 55–70)
APTT PPP: 79.3 SECONDS (ref 55–70)
AST SERPL-CCNC: 191 U/L (ref 0–33)
BILIRUB SERPL-MCNC: 1 MG/DL (ref 0.3–1.2)
BUN BLD-MCNC: 61 MG/DL (ref 9–23)
BUN/CREAT SERPL: 21.8 (ref 7–25)
CALCIUM SPEC-SCNC: 8.8 MG/DL (ref 8.7–10.4)
CHLORIDE SERPL-SCNC: 104 MMOL/L (ref 99–109)
CO2 SERPL-SCNC: 16 MMOL/L (ref 20–31)
CREAT BLD-MCNC: 2.8 MG/DL (ref 0.6–1.3)
DEPRECATED RDW RBC AUTO: 55.3 FL (ref 37–54)
ERYTHROCYTE [DISTWIDTH] IN BLOOD BY AUTOMATED COUNT: 17.1 % (ref 11.3–14.5)
GFR SERPL CREATININE-BSD FRML MDRD: 17 ML/MIN/1.73
GLOBULIN UR ELPH-MCNC: 3.3 GM/DL
GLUCOSE BLD-MCNC: 113 MG/DL (ref 70–100)
HCT VFR BLD AUTO: 33.2 % (ref 34.5–44)
HGB BLD-MCNC: 10.7 G/DL (ref 11.5–15.5)
INR PPP: 1.51
MCH RBC QN AUTO: 33 PG (ref 27–31)
MCHC RBC AUTO-ENTMCNC: 32.2 G/DL (ref 32–36)
MCV RBC AUTO: 102.5 FL (ref 80–99)
PLATELET # BLD AUTO: 105 10*3/MM3 (ref 150–450)
PMV BLD AUTO: 12.5 FL (ref 6–12)
POTASSIUM BLD-SCNC: 4.5 MMOL/L (ref 3.5–5.5)
PROT SERPL-MCNC: 6.5 G/DL (ref 5.7–8.2)
PROTHROMBIN TIME: 16.7 SECONDS (ref 9.6–11.5)
RBC # BLD AUTO: 3.24 10*6/MM3 (ref 3.89–5.14)
SODIUM BLD-SCNC: 131 MMOL/L (ref 132–146)
WBC NRBC COR # BLD: 11.42 10*3/MM3 (ref 3.5–10.8)

## 2018-01-31 PROCEDURE — 85730 THROMBOPLASTIN TIME PARTIAL: CPT

## 2018-01-31 PROCEDURE — 85730 THROMBOPLASTIN TIME PARTIAL: CPT | Performed by: INTERNAL MEDICINE

## 2018-01-31 PROCEDURE — 85027 COMPLETE CBC AUTOMATED: CPT | Performed by: INTERNAL MEDICINE

## 2018-01-31 PROCEDURE — 94799 UNLISTED PULMONARY SVC/PX: CPT

## 2018-01-31 PROCEDURE — 94640 AIRWAY INHALATION TREATMENT: CPT

## 2018-01-31 PROCEDURE — 85610 PROTHROMBIN TIME: CPT

## 2018-01-31 PROCEDURE — 97110 THERAPEUTIC EXERCISES: CPT

## 2018-01-31 PROCEDURE — 99233 SBSQ HOSP IP/OBS HIGH 50: CPT | Performed by: HOSPITALIST

## 2018-01-31 PROCEDURE — 97116 GAIT TRAINING THERAPY: CPT

## 2018-01-31 PROCEDURE — 80053 COMPREHEN METABOLIC PANEL: CPT | Performed by: INTERNAL MEDICINE

## 2018-01-31 RX ORDER — SODIUM BICARBONATE 650 MG/1
1300 TABLET ORAL 2 TIMES DAILY
Status: DISCONTINUED | OUTPATIENT
Start: 2018-01-31 | End: 2018-02-05 | Stop reason: HOSPADM

## 2018-01-31 RX ORDER — CARVEDILOL 12.5 MG/1
12.5 TABLET ORAL 2 TIMES DAILY WITH MEALS
Status: DISCONTINUED | OUTPATIENT
Start: 2018-01-31 | End: 2018-02-05 | Stop reason: HOSPADM

## 2018-01-31 RX ADMIN — CARVEDILOL 12.5 MG: 12.5 TABLET, FILM COATED ORAL at 17:36

## 2018-01-31 RX ADMIN — SODIUM BICARBONATE 650 MG TABLET 1300 MG: at 12:03

## 2018-01-31 RX ADMIN — FAMOTIDINE 20 MG: 20 TABLET, FILM COATED ORAL at 09:07

## 2018-01-31 RX ADMIN — LORAZEPAM 0.5 MG: 0.5 TABLET ORAL at 09:06

## 2018-01-31 RX ADMIN — LORAZEPAM 0.5 MG: 0.5 TABLET ORAL at 21:04

## 2018-01-31 RX ADMIN — SODIUM BICARBONATE 650 MG TABLET 1300 MG: at 21:04

## 2018-01-31 RX ADMIN — BUDESONIDE AND FORMOTEROL FUMARATE DIHYDRATE 2 PUFF: 160; 4.5 AEROSOL RESPIRATORY (INHALATION) at 07:53

## 2018-01-31 RX ADMIN — WARFARIN SODIUM 2 MG: 2 TABLET ORAL at 17:36

## 2018-01-31 RX ADMIN — HYDROXYCHLOROQUINE SULFATE 200 MG: 200 TABLET, FILM COATED ORAL at 09:07

## 2018-01-31 RX ADMIN — CARVEDILOL 3.12 MG: 3.12 TABLET, FILM COATED ORAL at 09:06

## 2018-01-31 RX ADMIN — NICOTINE 1 PATCH: 21 PATCH, EXTENDED RELEASE TRANSDERMAL at 09:07

## 2018-01-31 RX ADMIN — ACETAMINOPHEN 650 MG: 325 TABLET, FILM COATED ORAL at 17:36

## 2018-02-01 ENCOUNTER — APPOINTMENT (OUTPATIENT)
Dept: GENERAL RADIOLOGY | Facility: HOSPITAL | Age: 58
End: 2018-02-01

## 2018-02-01 LAB
ANION GAP SERPL CALCULATED.3IONS-SCNC: 7 MMOL/L (ref 3–11)
APTT PPP: 63.2 SECONDS (ref 55–70)
APTT PPP: 76.2 SECONDS (ref 55–70)
BNP SERPL-MCNC: 3350 PG/ML (ref 0–100)
BUN BLD-MCNC: 67 MG/DL (ref 9–23)
BUN/CREAT SERPL: 25.8 (ref 7–25)
CALCIUM SPEC-SCNC: 8.9 MG/DL (ref 8.7–10.4)
CHLORIDE SERPL-SCNC: 101 MMOL/L (ref 99–109)
CO2 SERPL-SCNC: 23 MMOL/L (ref 20–31)
CREAT BLD-MCNC: 2.6 MG/DL (ref 0.6–1.3)
GFR SERPL CREATININE-BSD FRML MDRD: 19 ML/MIN/1.73
GLUCOSE BLD-MCNC: 93 MG/DL (ref 70–100)
INR PPP: 1.51
MAGNESIUM SERPL-MCNC: 2 MG/DL (ref 1.3–2.7)
POTASSIUM BLD-SCNC: 4.1 MMOL/L (ref 3.5–5.5)
PROTHROMBIN TIME: 16.7 SECONDS (ref 9.6–11.5)
SODIUM BLD-SCNC: 131 MMOL/L (ref 132–146)
VWF CP INHIB PPP-ACNC: <2 U/ML

## 2018-02-01 PROCEDURE — 71045 X-RAY EXAM CHEST 1 VIEW: CPT

## 2018-02-01 PROCEDURE — 94640 AIRWAY INHALATION TREATMENT: CPT

## 2018-02-01 PROCEDURE — 94799 UNLISTED PULMONARY SVC/PX: CPT

## 2018-02-01 PROCEDURE — 83735 ASSAY OF MAGNESIUM: CPT | Performed by: HOSPITALIST

## 2018-02-01 PROCEDURE — 85007 BL SMEAR W/DIFF WBC COUNT: CPT

## 2018-02-01 PROCEDURE — 83880 ASSAY OF NATRIURETIC PEPTIDE: CPT | Performed by: INTERNAL MEDICINE

## 2018-02-01 PROCEDURE — 80048 BASIC METABOLIC PNL TOTAL CA: CPT | Performed by: HOSPITALIST

## 2018-02-01 PROCEDURE — 25010000002 FUROSEMIDE PER 20 MG: Performed by: HOSPITALIST

## 2018-02-01 PROCEDURE — 25010000002 HEPARIN (PORCINE) PER 1000 UNITS

## 2018-02-01 PROCEDURE — 85730 THROMBOPLASTIN TIME PARTIAL: CPT

## 2018-02-01 PROCEDURE — 94760 N-INVAS EAR/PLS OXIMETRY 1: CPT

## 2018-02-01 PROCEDURE — 85610 PROTHROMBIN TIME: CPT

## 2018-02-01 PROCEDURE — 99233 SBSQ HOSP IP/OBS HIGH 50: CPT | Performed by: HOSPITALIST

## 2018-02-01 PROCEDURE — 85025 COMPLETE CBC W/AUTO DIFF WBC: CPT

## 2018-02-01 PROCEDURE — 99223 1ST HOSP IP/OBS HIGH 75: CPT | Performed by: INTERNAL MEDICINE

## 2018-02-01 RX ORDER — FUROSEMIDE 10 MG/ML
20 INJECTION INTRAMUSCULAR; INTRAVENOUS ONCE
Status: COMPLETED | OUTPATIENT
Start: 2018-02-01 | End: 2018-02-01

## 2018-02-01 RX ORDER — FUROSEMIDE 20 MG/1
20 TABLET ORAL DAILY
Status: DISCONTINUED | OUTPATIENT
Start: 2018-02-01 | End: 2018-02-01

## 2018-02-01 RX ORDER — HYDRALAZINE HYDROCHLORIDE 10 MG/1
10 TABLET, FILM COATED ORAL EVERY 8 HOURS SCHEDULED
Status: DISCONTINUED | OUTPATIENT
Start: 2018-02-01 | End: 2018-02-05 | Stop reason: HOSPADM

## 2018-02-01 RX ORDER — FUROSEMIDE 20 MG/1
20 TABLET ORAL DAILY
Status: DISCONTINUED | OUTPATIENT
Start: 2018-02-02 | End: 2018-02-03

## 2018-02-01 RX ADMIN — HYDRALAZINE HYDROCHLORIDE 10 MG: 10 TABLET ORAL at 20:37

## 2018-02-01 RX ADMIN — SODIUM BICARBONATE 650 MG TABLET 1300 MG: at 10:28

## 2018-02-01 RX ADMIN — BUDESONIDE AND FORMOTEROL FUMARATE DIHYDRATE 2 PUFF: 160; 4.5 AEROSOL RESPIRATORY (INHALATION) at 20:30

## 2018-02-01 RX ADMIN — FAMOTIDINE 20 MG: 20 TABLET, FILM COATED ORAL at 08:30

## 2018-02-01 RX ADMIN — WARFARIN SODIUM 2 MG: 2 TABLET ORAL at 18:03

## 2018-02-01 RX ADMIN — CARVEDILOL 12.5 MG: 12.5 TABLET, FILM COATED ORAL at 08:30

## 2018-02-01 RX ADMIN — Medication 5 MG: at 20:37

## 2018-02-01 RX ADMIN — LORAZEPAM 0.5 MG: 0.5 TABLET ORAL at 08:30

## 2018-02-01 RX ADMIN — CARVEDILOL 12.5 MG: 12.5 TABLET, FILM COATED ORAL at 18:03

## 2018-02-01 RX ADMIN — SODIUM BICARBONATE 650 MG TABLET 1300 MG: at 20:37

## 2018-02-01 RX ADMIN — HYDROXYCHLOROQUINE SULFATE 200 MG: 200 TABLET, FILM COATED ORAL at 08:30

## 2018-02-01 RX ADMIN — HEPARIN SODIUM 10 UNITS/KG/HR: 10000 INJECTION, SOLUTION INTRAVENOUS at 04:05

## 2018-02-01 RX ADMIN — FUROSEMIDE 20 MG: 10 INJECTION, SOLUTION INTRAMUSCULAR; INTRAVENOUS at 10:32

## 2018-02-01 RX ADMIN — NICOTINE 1 PATCH: 21 PATCH, EXTENDED RELEASE TRANSDERMAL at 08:33

## 2018-02-01 RX ADMIN — BUDESONIDE AND FORMOTEROL FUMARATE DIHYDRATE 2 PUFF: 160; 4.5 AEROSOL RESPIRATORY (INHALATION) at 08:45

## 2018-02-01 NOTE — PLAN OF CARE
Problem: Patient Care Overview (Adult)  Goal: Plan of Care Review  Outcome: Ongoing (interventions implemented as appropriate)   02/01/18 0213   Coping/Psychosocial Response Interventions   Plan Of Care Reviewed With patient   Patient Care Overview   Progress progress towards functional goals is fair     Goal: Adult Individualization and Mutuality  Outcome: Ongoing (interventions implemented as appropriate)    Goal: Discharge Needs Assessment  Outcome: Ongoing (interventions implemented as appropriate)   01/24/18 1340 01/29/18 1330 02/01/18 0213   Discharge Needs Assessment   Concerns To Be Addressed --  --  discharge planning concerns;decision making concerns;home safety concerns   Readmission Within The Last 30 Days --  --  no previous admission in last 30 days   Equipment Needed After Discharge --  --  none   Discharge Facility/Level Of Care Needs --  --  nursing facility, skilled   Current Discharge Risk lives alone --  --    Discharge Disposition still a patient --  --    Current Health   Anticipated Changes Related to Illness --  --  inability to care for self   Living Environment   Transportation Available car;family or friend will provide --  --    Self-Care   Equipment Currently Used at Home --  cane, straight;walker, rolling  (pt was not currently using her walker) --       01/24/18 1340 01/29/18 1330 02/01/18 0213   Discharge Needs Assessment   Concerns To Be Addressed --  --  discharge planning concerns;decision making concerns;home safety concerns   Readmission Within The Last 30 Days --  --  no previous admission in last 30 days   Equipment Needed After Discharge --  --  none   Discharge Facility/Level Of Care Needs --  --  nursing facility, skilled   Current Discharge Risk lives alone --  --    Discharge Disposition still a patient --  --    Current Health   Anticipated Changes Related to Illness --  --  inability to care for self   Living Environment   Transportation Available car;family or friend  will provide --  --    Self-Care   Equipment Currently Used at Home --  cane, straight;walker, rolling  (pt was not currently using her walker) --        Problem: Fall Risk (Adult)  Goal: Absence of Falls  Outcome: Ongoing (interventions implemented as appropriate)   02/01/18 0213   Fall Risk (Adult)   Absence of Falls making progress toward outcome       Problem: Confusion, Acute (Adult)  Goal: Cognitive/Functional Impairments Minimized  Outcome: Ongoing (interventions implemented as appropriate)   02/01/18 0213   Confusion, Acute (Adult)   Cognitive/Functional Impairments Minimized making progress toward outcome     Goal: Safety  Outcome: Ongoing (interventions implemented as appropriate)   02/01/18 0213   Confusion, Acute (Adult)   Safety making progress toward outcome       Problem: Skin Integrity Impairment, Risk/Actual (Adult)  Goal: Skin Integrity/Wound Healing  Outcome: Ongoing (interventions implemented as appropriate)   02/01/18 0213   Skin Integrity Impairment, Risk/Actual (Adult)   Skin Integrity/Wound Healing making progress toward outcome

## 2018-02-01 NOTE — H&P
Cardiology Consult/H&P     Ashely Roldan  1960  [phone]      02/01/18    DATE OF ADMISSION: 1/23/2018  [address]    Peter Rdz MD  [address] [address] / [place] [id number]    Chief Complaint: VT, ICM    Patient Active Problem List   Diagnosis   • Anxiety   • Arthritis   • Panlobular emphysema   • Reactive depression   • Gastritis   • Heart attack   • Heart murmur   • Mixed hyperlipidemia   • Essential hypertension   • Ischemic cardiomyopathy   • VHD (valvular heart disease)   • Osteoporosis   • PVD (peripheral vascular disease)   • Chronic renal failure, stage 2 (mild)   • Allergic rhinitis   • Lupus (systemic lupus erythematosus)   • TIA (transient ischemic attack)   • Chronic coronary artery disease   • Cough   • H/O mitral valve replacement   • Anemia of chronic kidney failure   • Insomnia   • COPD exacerbation   • Tobacco abuse   • Acute systolic heart failure and valvular disease   • AICD (automatic cardioverter/defibrillator) present   • FAHEEM (acute kidney injury)   • Noncompliance   • Blunt trauma of multiple sites   • Chronic anticoagulation   • Closed fracture of rib with flail chest   • Supratherapeutic INR   • Fracture of left clavicle   • Chest pain   • Hypotension   • Acute systolic (congestive) heart failure   • Acute on chronic respiratory failure with hypoxia   • CKD (chronic kidney disease), stage IV   • UTI (urinary tract infection)   • Elevated troponin   • Hyperkalemia   • Colitis   • Nausea and vomiting   • Diarrhea   • Acute kidney injury superimposed on chronic kidney disease   • Lactic acidosis   • Liver injury       History of Present Illness:   Patient is a 57 year old WF with a past medical history notable for CAD, ischemic cardiomyopathy s/p ICD implant with most recent EF 18%, hypertension, PVD, COPD, CKD stage II, chronic anticoagulation, anxiety, hyperlipidemia, and lupus who was admitted to [facility] ED 1/23/18 with c/o N/V/D, fatigue who  was found to have FAHEEM, hyperkalemia and colitis per CT abdomen.  We are asked to see this patient in regards to episodes of NSVT.  Patient has known ischemic cardiomyopathy and is s/p PCI in 2009 and CABG in 2010 with reduced EF postoperatively who is now s/p BSC ICD implant in 2011 for primary prevention.  Her last echocardiogram in February of 2017 showed severely reduced EF of 18%.  She states she normally follows with Dr. Ramirez for her care.  She had a few episodes of what appeared to be NSVT per telemetry around 11am and again around noon today, otherwise has been in normal sinus rhythm.  Patient states she might have noticed a little shortness of breath but denies any c/p CP, dizziness, or lightheadedness.  She denies any previous shocks from her device.  She notes chronic shortness of breath and fatigue at home.       Allergies   Allergen Reactions   • Morphine And Related GI Intolerance and Irritability   • Sulfa Antibiotics        Prior to Admission Medications     Prescriptions Last Dose Informant Patient Reported? Taking?    albuterol (PROVENTIL) (2.5 MG/3ML) 0.083% nebulizer solution  Self Yes No    Take 2.5 mg by nebulization As Needed for wheezing or shortness of air.    budesonide-formoterol (SYMBICORT) 160-4.5 MCG/ACT inhaler  Self Yes No    Inhale 1-2 puffs 2 (Two) Times a Day. In morning and evening for 90 days     Calcium Carb-Cholecalciferol (CALCIUM + D3) 600-200 MG-UNIT tablet  Self Yes No    Take 1 tablet by mouth Daily.    carvedilol (COREG) 3.125 MG tablet  Pharmacy No No    Take 1 tablet by mouth 2 (Two) Times a Day With Meals.    cefdinir (OMNICEF) 300 MG capsule   No No    Take 1 capsule by mouth 2 (Two) Times a Day.    clonazePAM (KlonoPIN) 2 MG tablet   No No    Take 1 tablet by mouth 3 (Three) Times a Day.    diphenhydrAMINE (BENADRYL) 25 MG tablet  Self Yes No    Take 25 mg by mouth Every 8 (Eight) Hours As Needed.    Ferrous Sulfate (IRON) 325 (65 FE) MG tablet  Self Yes No    Take  325 mg by mouth Daily.    furosemide (LASIX) 40 MG tablet   No No    Take 0.5 tablets by mouth Daily.    hydrALAZINE (APRESOLINE) 25 MG tablet   No No    Take 1 tablet by mouth 3 (Three) Times a Day.    HYDROcodone-acetaminophen (NORCO) 5-325 MG per tablet   No No    Take 1 tablet by mouth Every 6 (Six) Hours As Needed for Severe Pain  for up to 8 doses.    hydroxychloroquine (PLAQUENIL) 200 MG tablet   No No    Take 1 tablet by mouth Every Morning.    nitroglycerin (NITROSTAT) 0.4 MG SL tablet  Self No No    1 under the tongue as needed for angina, may repeat q5mins for up three doses    Probiotic Product (PROBIOTIC PO)  Self Yes No    Take 1 capsule by mouth daily. 20 billion cell; for 30 days    rosuvastatin (CRESTOR) 10 MG tablet   No No    Take 1 tablet by mouth Every Night. For 90 days    tiotropium (SPIRIVA) 18 MCG per inhalation capsule  Self No No    Place 2 puffs (1 capsule total) into inhaler and inhale once daily. For 90 days    warfarin (COUMADIN) 2 MG tablet   No No    Take 1 tablet by mouth See Admin Instructions. 2 mg daily x 2 days, then 3 mg x 1 day, then repeat    warfarin (COUMADIN) 3 MG tablet   No No    Take 1 tablet by mouth Every 72 (Seventy-Two) Hours.            Current Facility-Administered Medications:   •  acetaminophen (TYLENOL) tablet 650 mg, 650 mg, Oral, Q6H PRN, Alanna Dayanara, APRN, 650 mg at 01/31/18 1736  •  albuterol (PROVENTIL) nebulizer solution 0.5% 2.5 mg/0.5mL, 2.5 mg, Nebulization, PRN, Mary FRANCIS MD  •  budesonide-formoterol (SYMBICORT) 160-4.5 MCG/ACT inhaler 2 puff, 2 puff, Inhalation, BID - RT, Mary FRANCIS MD, 2 puff at 02/01/18 0845  •  carvedilol (COREG) tablet 12.5 mg, 12.5 mg, Oral, BID With Meals, Scott Reina MD, 12.5 mg at 02/01/18 0830  •  epoetin bob (EPOGEN,PROCRIT) injection 20,000 Units, 20,000 Units, Subcutaneous, Weekly, Scott Reina MD, 20,000 Units at 01/26/18 1246  •  famotidine (PEPCID) tablet 20 mg, 20 mg, Oral, Daily, Trini Brown,  MD, 20 mg at 02/01/18 0830  •  [START ON 2/2/2018] furosemide (LASIX) tablet 20 mg, 20 mg, Oral, Daily, Scott Reina MD  •  heparin 86088 units/250 mL (100 units/mL) in 0.45 % NaCl infusion, 8 Units/kg/hr, Intravenous, Titrated, Manjit Lynn RP, Last Rate: 5.62 mL/hr at 02/01/18 1028, 8 Units/kg/hr at 02/01/18 1028  •  hydroxychloroquine (PLAQUENIL) tablet 200 mg, 200 mg, Oral, Daily, Mary FRANCIS MD, 200 mg at 02/01/18 0830  •  loperamide (IMODIUM) capsule 2 mg, 2 mg, Oral, 4x Daily PRN, Trini Fitzgerald MD, 2 mg at 01/29/18 1530  •  LORazepam (ATIVAN) tablet 0.5 mg, 0.5 mg, Oral, Q6H PRN, Trini Fitzgerald MD, 0.5 mg at 02/01/18 0830  •  nicotine (NICODERM CQ) 21 MG/24HR patch 1 patch, 1 patch, Transdermal, Q24H, Mary FRANCIS MD, 1 patch at 02/01/18 0833  •  ondansetron (ZOFRAN) tablet 4 mg, 4 mg, Oral, Q6H PRN, 4 mg at 01/23/18 2318 **OR** ondansetron (ZOFRAN) injection 4 mg, 4 mg, Intravenous, Q6H PRN, Mary FRANCIS MD, 4 mg at 01/30/18 0332  •  Pharmacy to Dose Heparin, , Does not apply, Continuous PRN, Trini Fitzgerald MD  •  Pharmacy to dose warfarin, , Does not apply, Continuous PRN, Manjit Lynn [facility]  •  sodium bicarbonate tablet 1,300 mg, 1,300 mg, Oral, BID, Scott Reina MD, 1,300 mg at 02/01/18 1028  •  sodium chloride 0.9 % flush 1-10 mL, 1-10 mL, Intravenous, PRN, Mary FRANCIS MD  •  warfarin (COUMADIN) tablet 2 mg, 2 mg, Oral, Daily, Manjit Lynn [facility], 2 mg at 01/31/18 5487    Social History     Social History   • Marital status:      Spouse name: N/A   • Number of children: N/A   • Years of education: N/A     Social History Main Topics   • Smoking status: Current Every Day Smoker     Packs/day: 0.50     Types: Cigarettes   • Smokeless tobacco: Never Used      Comment: Down to 0.5 PPD from 1 PPD now consistently. Started as a teenager.   • Alcohol use No   • Drug use: No   • Sexual activity: Defer     Other Topics Concern   • None     Social History Narrative    Ms. Roldan is  a 56 year old white female. Pt recently  after 30 years. Just moved from [facility] into her son's home in [place].        Family History   Problem Relation Age of Onset   • Arthritis Mother      rheumatoid   • Heart attack Father    • Alcohol abuse Brother    • Heart disease Other      uncle   • Diabetes Other      uncle-type 2   • Hypertension Other      uncle   • Stroke Other      uncle   • Cancer Other      grandfather-lung    • Heart disease Maternal Uncle    • Lung cancer Paternal Grandfather        REVIEW OF SYSTEMS:   CONST:  + fatigue, weakness, no weight loss, fever, chills  HEENT:  No visual loss, blurred vision, double vision, yellow sclerae.                   No hearing loss, congestion, sore throat.   SKIN:      No rashes, urticaria, ulcers, sores.     RESP:     + JAIMES, hemoptysis, + cough, - sputum.   GI:           + diarrhea, no anorexia, nausea, vomiting. No abdominal pain, melena.   :         No burning on urination, hematuria or increased frequency.  ENDO:    No diaphoresis, cold or heat intolerance. No polyuria or polydipsia.   NEURO:  No headache, dizziness, syncope, paralysis, ataxia, or parasthesias.                  No change in bowel or bladder control. No history of CVA/TIA  MUSC:    No muscle, back pain, joint pain or stiffness.   HEME:    No anemia, bleeding, bruising. No history of DVT/PE.  PSYCH:  No history of depression, anxiety    Vitals:    02/01/18 0832 02/01/18 0845 02/01/18 1028 02/01/18 1212   BP: 151/75   128/79   BP Location:       Patient Position:       Pulse: 72 71 63 55   Resp: 16 16 18 12   Temp:       TempSrc:       SpO2: 92%  94% 94%   Weight:       Height:             Vital Sign Min/Max for last 24 hours  Temp  Min: 97.4 °F (36.3 °C)  Max: 98 °F (36.7 °C)   BP  Min: 128/79  Max: 151/75   Pulse  Min: 55  Max: 73   Resp  Min: 12  Max: 18   SpO2  Min: 87 %  Max: 98 %   Flow (L/min)  Min: 2  Max: 2      Intake/Output Summary (Last 24 hours) at 02/01/18  1332  Last data filed at 02/01/18 0941   Gross per 24 hour   Intake              660 ml   Output             1125 ml   Net             -465 ml             Physical Exam:  GEN: Drowsy,well- developed, no acute distress  HEENT: Normocephalic, Atraumatic, PERRLA, moist mucous membranes  NECK: supple, NO JVD, no thyromegaly, no lymphadenopathy  CARD: S1S2, RRR no murmur, gallop, rub, + valvular click  LUNGS: Fine bibasilar crackles, normal respiratory effort  ABDOMEN: Soft, nontender, normal bowel sounds  EXTREMITIES: 1+ pitting edema bilaterally, no gross deformities,  No clubbing, cyanosis  SKIN: Warm, dry  NEURO: No focal deficits  PSYCHIATRIC: Drowsy     Data:     Results from last 7 days  Lab Units 01/31/18  0606 01/30/18  0211 01/29/18  0708   WBC 10*3/mm3 11.42* 8.59 9.15   HEMOGLOBIN g/dL 10.7* 9.2* 9.1*   HEMATOCRIT % 33.2* 28.0* 27.6*   PLATELETS 10*3/mm3 105* 84* 94*       Results from last 7 days  Lab Units 02/01/18  0625 01/31/18  0606 01/30/18  0211   SODIUM mmol/L 131* 131* 134   POTASSIUM mmol/L 4.1 4.5 3.9   CHLORIDE mmol/L 101 104 110*   CO2 mmol/L 23.0 16.0* 19.0*   BUN mg/dL 67* 61* 65*   CREATININE mg/dL 2.60* 2.80* 2.90*   GLUCOSE mg/dL 93 113* 95     Magnesium 2/1/18: 2.0                   Results from last 7 days  Lab Units 02/01/18  0625  01/31/18  0606  01/30/18  0211   PROTIME Seconds 16.7*  --  16.7*  --  16.3*   INR  1.51  --  1.51  --  1.48   APTT seconds 76.2*  < > 79.3*  < > 49.9*   < > = values in this interval not displayed.                Intake/Output Summary (Last 24 hours) at 02/01/18 1332  Last data filed at 02/01/18 0941   Gross per 24 hour   Intake              660 ml   Output             1125 ml   Net             -465 ml       Chest X-Ray:  Imaging Results (last 24 hours)     Procedure Component Value Units Date/Time    XR Chest 1 View [[id number]] Collected:  02/01/18 0819     Updated:  02/01/18 0855    Narrative:       EXAMINATION: XR CHEST 1 VW-      INDICATION: Dyspnea, on  exertion.      COMPARISON: Chest x-ray 01/29/2018.     FINDINGS: Cardiac size is enlarged. Pulmonary vascularity within normal  limits. Persistent bibasilar opacifications and effusions similar to  prior with small to moderate right pleural effusion and trace left. No  new parenchymal disease.  Left chest wall pacing device with lead  intact.       Impression:       Persistent bibasilar opacifications and effusions from prior  with small moderate right and trace left pleural effusions.     D:  02/01/2018  E:  02/01/2018     This report was finalized on 2/1/2018 8:53 AM by Dr. Cuate Vick.             Telemetry: NSR, HR 55-73  EKG: None for review today  Device Interrogation: [facility] ICD device with normal function, 6.5 years battery life, stable thresholds and impendence, VT 1/18/18 resolved with ATP, multiple episodes of NSVT    Assessment and Plan:   1. Ischemic cardiomyopathy/NSVT:  - S/p PCI in 2009 and CABG x 2 in 2010 with reduced EF post-op, s/p BSC ICD 2011 with most recent EF 18% per echocardiogram 02/2017  - Device interrogation showing episodes of NSVT, 1 episode of VT that broke with ATP earlier this month  - Will repeat echocardiogram to reassess LVEF though limited on pursuing further ischemic work up with left heart catheterization given current kidney function  - On BBL, no ACEI/Entresto due to acute on chronic kidney disease  - Continue gentle diuresis in setting of improving FAHEEM    2. Valvular heart disease:  - S/p mitral valve replacement with mechanical valve 03/2010  - On chronic coumadin therapy, was supratherapeutic upon admission and is s/p vitamin K administration, currently bridging with heparin with pharmacy to dose accordingly  - Repeat echocardiogram    3. Colitis:  - Per primary team    4. FAHEEM on CKD:  - Nephrology following      Scribed for Jaleel Schafer MD by Katherine Nobles, APRN. 2/1/2018  1:32 PM   The patient has had no significant sustained ventricular tachycardia and is  never been defibrillated by her AICD.  With the interrogation from [employer] the longest episode was 16 seconds.  She is asymptomatic with this and her electrolytes are normal.  This point time I would leave her on the same medications.  Her blood pressure is more stable now And with her severe LV dysfunction I would like to start her back on her hydralazine 10 mg 3 times daily for afterload reduction and titrate as tolerated.  She looks a little short of breath at rest and she has bilateral crackles as well as pitting sacral edema.  I agree with resuming her diuretics and forcefully she'll have to run higher creatinine to keep her out of heart failure

## 2018-02-01 NOTE — PROGRESS NOTES
"Pharmacy Consult  -  Warfarin    Ashely Roldan is a  57 y.o. female   Height - 175.3 cm (69\")  Weight - 72.7 kg (160 lb 4.8 oz)    Consulting Provider: - Mari TIMMONS  Indication: mechanical heart valve  Goal INR: 2.5-3.5  Home Regimen:   -  2 mg daily for 2 days,    - then 3 mg x 1 day, then repeat    Bridge Therapy: Heparin drip    Drug-Drug Interactions with current regimen:   Heparin drip--bridge therapy---increases bleeding risk    Warfarin Dosing During Admission:    Date  1/23 1/24 1/25 1/26 1/27 1/28 1/29 1/30 1/31   INR  4.55 5.66 8.03 PT >100 2.64 1.72 1.52 1.48 1.51   Dose  HOLD HOLD HOLD HOLD HOLD per MD 1 mg 1 mg 2 mg 2 mg     Date  2/1           INR  1.51           Dose  (2 mg)             Total of 15 mg IV vitamin K received on 1/26.    Education Provided: Discussed warfarin with patient and how it is normally monitored     Labs:  Results from last 7 days   Lab Units 02/01/18  0625 01/31/18  1355 01/31/18  0606 01/30/18  1815 01/30/18  0211 01/29/18  1815 01/29/18  0708  01/28/18  0503 01/27/18  0607  01/26/18  0412   INR  1.51 --  1.51 --  1.48 --  1.52 --  1.72 2.71  2.64 --  --    APTT seconds 76.2* 69.3 79.3* 71.3* 49.9* 73.2* 67.8 < > --  41.5* < > --    HEMOGLOBIN g/dL --  --  10.7* --  9.2* --  9.1* --  8.4* 9.4* --  8.2*   HEMATOCRIT % --  --  33.2* --  28.0* --  27.6* --  26.6* 29.1* --  26.5*   PLATELETS 10*3/mm3 --  --  105* --  84* --  94* --  91* 77* --  70*   < > = values in this interval not displayed.   Results from last 7 days   Lab Units 02/01/18  0625 01/31/18  0606 01/30/18  0211 01/29/18  0708 01/28/18  0503   SODIUM mmol/L 131* 131* 134 135 141   POTASSIUM mmol/L 4.1 4.5 3.9 3.6 4.3   CHLORIDE mmol/L 101 104 110* 107 115*   CO2 mmol/L 23.0 16.0* 19.0* 20.0 19.0*   BUN mg/dL 67* 61* 65* 73* 76*   CREATININE mg/dL 2.60* 2.80* 2.90* 3.30* 3.70*   CALCIUM mg/dL 8.9 8.8 8.6* 8.4* 8.0*   BILIRUBIN mg/dL --  1.0 --  0.7 0.5   ALK PHOS U/L --  152* --  146* 139*   ALT (SGPT) " U/L --  822* --  1211* 1490*   AST (SGOT) U/L --  191* --  486* 883*   GLUCOSE mg/dL 93 113* 95 126* 136*     Diet Order   Procedures   • Diet Regular; Renal   Ate 0% of documented meals yesterday.     Assessment/Plan:   -INR was SUPRAtherapeutic on admission at 4.55 and continued to increase to a level that was undetectable. Patient also experienced acute liver injury with extremely elevated LFTs. LFTs are still quite elevated, but trending down.  She also received a total of 15 mg of IV Vitamin K on 1/26 (same day INR was undetectable).   -Warfarin was re-started at a low dose of 1 mg PO daily on 1/28. Patient being bridged with heparin drip.  -INR remains subtherapeutic and unchanged since yesterday at 1.51. We are likely still seeing effects of vitamin K administration.   -In light of recent supratherapeutic INR, hepatic dysfunction affecting drug metabolism, and lack of oral intake will continue with dose of warfarin 2 mg today.      Pharmacy will continue to follow closely, and make adjustments as needed. Thank you for this consult.  Manjit Lynn, PharmD  Pharmacy Resident  2/1/2018  8:53 AM

## 2018-02-01 NOTE — PROGRESS NOTES
Continued Stay Note  [facility]     Patient Name: Ashely Roldan  MRN: [id number]  Today's Date: 2/1/2018    Admit Date: 1/23/2018          Discharge Plan       02/01/18 1126    Case Management/Social Work Plan    Plan Home with [facility]    Patient/Family In Agreement With Plan yes    Additional Comments Discussed patient with Dr. Philip and met with patient at bedside. Patient's confusion is much improved today. She does not want to go to inpatient rehab, but is agreeable to going home with [facility] at discharge. Called referral to Angel at [facility]. She states her son Floyd will be staying with her at her home and he works nights so he will be home during the day when she is awake. Patient states she already has home O2 through [employer] and wears it PRN. Darlene Byrd RN [phone]              Discharge Codes     None        Expected Discharge Date and Time     Expected Discharge Date Expected Discharge Time    Feb 5, 2018             Darlene Byrd RN

## 2018-02-01 NOTE — PLAN OF CARE
Problem: Patient Care Overview (Adult)  Goal: Plan of Care Review  Outcome: Ongoing (interventions implemented as appropriate)   01/31/18 1906   Coping/Psychosocial Response Interventions   Plan Of Care Reviewed With patient   Patient Care Overview   Progress improving   Outcome Evaluation   Outcome Summary/Follow up Plan Pt remains confused through the day but has rested well. Later this evening, woke up A&O x4. Up with minimal assist today. Able to ambulate 150 ft in carmen. VSS. Plan remains home vs. Rehab. 1995 Kidney Biopsy records received from PCP office today, on chart.

## 2018-02-01 NOTE — PROGRESS NOTES
"NAL Progress Note  Ashely Roldan  [id number]  1960 1/23/2018    Reason for visit:  FAHEEM on CKD    ROS:    Pulm:  No SOA  CV:  No CP  Nausea vomiting better    I&O:    Intake/Output Summary (Last 24 hours) at 02/01/18 1042  Last data filed at 02/01/18 0941   Gross per 24 hour   Intake              900 ml   Output             1125 ml   Net             -225 ml       PE:   Blood pressure 151/75, pulse 63, temperature 97.7 °F (36.5 °C), temperature source Oral, resp. rate 18, height 175.3 cm (69\"), weight 72.7 kg (160 lb 4.8 oz), SpO2 94 %, not currently breastfeeding.    Constitutional: No acute distress  HENT: No signs of trauma nausea or face or head  Head: Normocephalic and atraumatic.   Mouth/Throat: Mucous membranes are moist  Eyes: Pupils are equal, round, and reactive to light.   Neck: Neck supple. No JVD present.   Cardiovascular: Normal rate, regular rhythm and normal heart sounds.  Exam reveals no gallop and no friction rub.    No murmur heard.  Pulmonary/Chest: Effort normal and breath sounds normal. She has no wheezes. She has no rales.   Abdominal: Soft. Bowel sounds are normal. She exhibits no distension. There is no tenderness.   Musculoskeletal: She exhibits no edema or tenderness.   Lymphadenopathy:     She has no cervical adenopathy.   Skin: Skin is warm and dry. No rash noted.     Medications:    Current Facility-Administered Medications:   •  acetaminophen (TYLENOL) tablet 650 mg, 650 mg, Oral, Q6H PRN, Alanna Dayanara, APRN, 650 mg at 01/31/18 1736  •  albuterol (PROVENTIL) nebulizer solution 0.5% 2.5 mg/0.5mL, 2.5 mg, Nebulization, PRN, Mary FRANCIS MD  •  budesonide-formoterol (SYMBICORT) 160-4.5 MCG/ACT inhaler 2 puff, 2 puff, Inhalation, BID - RT, Mary FRANCIS MD, 2 puff at 02/01/18 0845  •  carvedilol (COREG) tablet 12.5 mg, 12.5 mg, Oral, BID With Meals, Scott Reina MD, 12.5 mg at 02/01/18 0339  •  epoetin bob (EPOGEN,PROCRIT) injection 20,000 Units, 20,000 Units, " Subcutaneous, Weekly, Scott Reina MD, 20,000 Units at 01/26/18 1246  •  famotidine (PEPCID) tablet 20 mg, 20 mg, Oral, Daily, Trini Fitzgerald MD, 20 mg at 02/01/18 0830  •  furosemide (LASIX) tablet 20 mg, 20 mg, Oral, Daily, Scott Reina MD  •  heparin 27601 units/250 mL (100 units/mL) in 0.45 % NaCl infusion, 8 Units/kg/hr, Intravenous, Titrated, Manjit Lynn [facility], Last Rate: 5.62 mL/hr at 02/01/18 1028, 8 Units/kg/hr at 02/01/18 1028  •  hydroxychloroquine (PLAQUENIL) tablet 200 mg, 200 mg, Oral, Daily, Mary FRANCIS MD, 200 mg at 02/01/18 0830  •  loperamide (IMODIUM) capsule 2 mg, 2 mg, Oral, 4x Daily PRN, Trini Fitzgerald MD, 2 mg at 01/29/18 1530  •  LORazepam (ATIVAN) tablet 0.5 mg, 0.5 mg, Oral, Q6H PRN, Trini Fitzgerald MD, 0.5 mg at 02/01/18 0830  •  nicotine (NICODERM CQ) 21 MG/24HR patch 1 patch, 1 patch, Transdermal, Q24H, Mary FRANCIS MD, 1 patch at 02/01/18 0833  •  ondansetron (ZOFRAN) tablet 4 mg, 4 mg, Oral, Q6H PRN, 4 mg at 01/23/18 2318 **OR** ondansetron (ZOFRAN) injection 4 mg, 4 mg, Intravenous, Q6H PRN, Mary FRANCIS MD, 4 mg at 01/30/18 0332  •  Pharmacy to Dose Heparin, , Does not apply, Continuous PRN, Trini Fitzgerald MD  •  Pharmacy to dose warfarin, , Does not apply, Continuous PRN, Manjit Lynn, [facility]  •  sodium bicarbonate tablet 1,300 mg, 1,300 mg, Oral, BID, Scott Reina MD, 1,300 mg at 02/01/18 1028  •  sodium chloride 0.9 % flush 1-10 mL, 1-10 mL, Intravenous, PRN, Mary FRANCIS MD  •  warfarin (COUMADIN) tablet 2 mg, 2 mg, Oral, Daily, Manjit Lynn [facility], 2 mg at 01/31/18 1736    Meds reviewed and doses adjusted for renal function    Labs:    Results from last 7 days  Lab Units 01/31/18  0606 01/30/18  0211 01/29/18  0708   WBC 10*3/mm3 11.42* 8.59 9.15   HEMOGLOBIN g/dL 10.7* 9.2* 9.1*   HEMATOCRIT % 33.2* 28.0* 27.6*   PLATELETS 10*3/mm3 105* 84* 94*       Results from last 7 days  Lab Units 02/01/18  0625 01/31/18  0606 01/30/18  0211 01/29/18  0708  01/27/18  0607    SODIUM mmol/L 131* 131* 134 135  < > 142   POTASSIUM mmol/L 4.1 4.5 3.9 3.6  < > 4.4   CHLORIDE mmol/L 101 104 110* 107  < > 116*   CO2 mmol/L 23.0 16.0* 19.0* 20.0  < > 14.0*   BUN mg/dL 67* 61* 65* 73*  < > 75*   CREATININE mg/dL 2.60* 2.80* 2.90* 3.30*  < > 3.60*   GLUCOSE mg/dL 93 113* 95 126*  < > 78   CALCIUM mg/dL 8.9 8.8 8.6* 8.4*  < > 8.3*   PHOSPHORUS mg/dL  --   --   --   --   --  5.6*   < > = values in this interval not displayed.    Results from last 7 days  Lab Units 01/31/18  0606  01/28/18  0503   ALK PHOS U/L 152*  < > 139*   BILIRUBIN mg/dL 1.0  < > 0.5   BILIRUBIN DIRECT mg/dL  --   --  0.2   ALT (SGPT) U/L 822*  < > 1490*   AST (SGOT) U/L 191*  < > 883*   < > = values in this interval not displayed.  Invalid input(s): UCOL, UCLR, UPH, USG, UPRO, UBLD, UNITR, ELEU, URBC, UFC, UBACT    Estimated Creatinine Clearance: 27.1 mL/min (by C-G formula based on Cr of 2.6).    Radiology:  Imaging Results (last 72 hours)     Procedure Component Value Units Date/Time    CT Head Without Contrast [[id number]] Collected:  01/23/18 1605     Updated:  01/23/18 1655    Narrative:       EXAMINATION: CT HEAD WO CONTRAST- 01/23/2018     INDICATION: Headache, SAH suspected      TECHNIQUE: Axial CT of the head without intravenous contrast  administration     The radiation dose reduction device was turned on for each scan per the  ALARA (As Low as Reasonably Achievable) protocol.     COMPARISON: 07/16/2016     FINDINGS: Midline structures are symmetric without evidence of mass,  mass effect or midline shift. No intra-axial hemorrhage or extra-axial  fluid collection. Ventricles and sulci within normal limits. Basal  cisterns are patent. Globes and orbits are unremarkable. Visualized  paranasal sinuses and mastoid air cells are grossly clear and  well-pneumatized. Calvarium intact.       Impression:       No acute intracranial abnormality.     D:  01/23/2018  E:  01/23/2018        This report was finalized on 1/23/2018  4:53 PM by Dr. Cuate Vick.       CT Abdomen Pelvis Without Contrast [[id number]] Collected:  01/23/18 1611     Updated:  01/24/18 0929    Narrative:       EXAMINATION: CT ABDOMEN AND PELVIS WO CONTRAST-      INDICATION: Abdominal pain, gastroenteritis or colitis suspected.      TECHNIQUE: CT abdomen and pelvis without intravenous contrast.     The radiation dose reduction device was turned on for each scan per the  ALARA (As Low as Reasonably Achievable) protocol.     COMPARISON: None.     FINDINGS: Lung bases demonstrate subsegmental atelectasis and/or  scarring within the right lung base. No focal consolidation. Liver  without focal lesion. Gallbladder surgically absent. Pancreas and spleen  within normal limits. Splenule at the splenic column. Adrenal glands  without distinct nodule. Kidneys without hydronephrosis or hydroureter.  No bulky retroperitoneal adenopathy. Atherosclerotic nonaneurysmal  abdominal aorta. GI tract evaluation demonstrates small hiatal hernia.  There is limited visualization of the right hemipelvis due to right hip  arthroplasty however, within the visualized portions there is mural  thickening and pericolonic stranding associated with redundant sigmoid  colon and adjacent free fluid in the pelvis consistent with colitis. No  diverticula are identified to suggest diverticulitis. Pelvic viscera  otherwise unremarkable and partially visualized due to significant beam  hardening artifact from the arthroplasty. Multilevel degenerative  changes of the spine without aggressive osseous lesion. No soft tissue  body wall findings of concern.       Impression:       There is questionable mural thickening of the redundant  sigmoid within the pelvis with adjacent small volume likely reactive  free fluid. No loculated intraabdominal fluid collection. No free air.  This is most consistent with colitis. No diverticula are identified to  suggest diverticulitis as an underlying etiology.     D:   01/23/2018  E:  01/24/2018     This report was finalized on 1/24/2018 9:27 AM by Dr. Cuate Vick.       XR Chest 1 View [[id number]] Collected:  01/23/18 1700     Updated:  01/24/18 0930    Narrative:       EXAMINATION: XR CHEST 1 VW- 01/23/2018     INDICATION: hypoxemia; N17.9-Acute kidney failure, unspecified;  N18.9-Chronic kidney disease, unspecified; I30-Gxzthxliar  gastroenteritis and colitis, unspecified; E86.9-Volume depletion,  unspecified; R79.1-Abnormal coagulation profile; E87.5-Hyperkalemia      COMPARISON: Chest x-ray 01/21/2018     FINDINGS: Cardiac size enlarged. Pulmonary vascularity within normal  limits. No focal consolidation. Left chest wall pacing device with lead  intact. No pneumothorax or pleural effusion.           Impression:       Chronic lung changes without acute cardiopulmonary process.     D:  01/23/2018  E:  01/24/2018     This report was finalized on 1/24/2018 9:27 AM by Dr. Cuate Vick.             Renal Imaging:  reviewed      IMPRESSION:  1. 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 Baseline creatinine per previously obtained labs appears to be in the 1.5-2.0 range. At admission to [facility] creatinine was documented to be 4.0 with a continuing elevation to 4.2 today. She has been hypotensive overnight. Cr is stable at 4.2- bun slowly declining2. Hypotension - Patient with SBP in the 90s. Hydralazine has been held and klonopin orders only PRN due to lethargy. Goal MAP >60mmHg. IV hydration ordered.    2. Low plt and high protime; Need to evaluate for ttp - send millan t 13   3. Anemia - + Occult blood in stool. Transfuse for Hgb < 8.0.      4. Lupus - Patient currently taking plaquenil. Low c3c4 but dsdna low     5. Hypocalcemia - PTH and ionized calcium to be drawn.     6. Nephrolithiasis - History of. Last episode 2015               RECOMMENDATIONS:  Continue feel better  She is still on IV heparin  Waiting for past medical record  from primary care including kidney biopsy  I will add Lasix since she is developing edema  Encourage by mouth intake okay for discharge from nephrology             Scott Reina MD  2/1/2018  10:42 AM

## 2018-02-01 NOTE — PROGRESS NOTES
[facility] Medicine Services  PROGRESS NOTE    Patient Name: Ashely Roldan  : 1960  MRN: [id number]    Date of Admission: 2018  Length of Stay: 9  Primary Care Physician: Peter Rdz MD    Subjective   Subjective     CC:  F/U diarrhea    HPI:  Much more awake. Feels much better, but notes increased edema. No f/c. No n/v. Dry cough only.    Review of Systems  Gen- No fevers, chills  CV- No chest pain, palpitations  Resp- No cough, dyspnea  GI- improved diarrhea, no abd pain    Otherwise ROS is negative except as mentioned in the HPI.    Objective   Objective     Vital Signs:   Temp:  [97.4 °F (36.3 °C)-98 °F (36.7 °C)] 97.7 °F (36.5 °C)  Heart Rate:  [60-75] 71  Resp:  [14-18] 16  BP: (130-151)/(66-91) 151/75        Physical Exam:    Sleepy but arouses. Alert once awake.  OP clear, MMM  PERRL  Neck supple  No LAD  RRR  Bibasilar rales, decreased at bases  +BS, ND, NT  BRADLEY  No c/c, but edema noted in B ankles  No rashes, but dry skin  No change     Results Reviewed:  I have personally reviewed current lab, radiology, and data and agree.      Results from last 7 days  Lab Units 18  0618  0708   WBC 10*3/mm3  --  11.42* 8.59 9.15   HEMOGLOBIN g/dL  --  10.7* 9.2* 9.1*   HEMATOCRIT %  --  33.2* 28.0* 27.6*   PLATELETS 10*3/mm3  --  105* 84* 94*   INR  1.51 1.51 1.48 1.52       Results from last 7 days  Lab Units 18  0618  02118  0708 18  0503   SODIUM mmol/L 131* 131* 134 135 141   POTASSIUM mmol/L 4.1 4.5 3.9 3.6 4.3   CHLORIDE mmol/L 101 104 110* 107 115*   CO2 mmol/L 23.0 16.0* 19.0* 20.0 19.0*   BUN mg/dL 67* 61* 65* 73* 76*   CREATININE mg/dL 2.60* 2.80* 2.90* 3.30* 3.70*   GLUCOSE mg/dL 93 113* 95 126* 136*   CALCIUM mg/dL 8.9 8.8 8.6* 8.4* 8.0*   ALT (SGPT) U/L  --  822*  --  1211* 1490*   AST (SGOT) U/L  --  191*  --  486* 883*     Estimated Creatinine Clearance: 27.1  mL/min (by C-G formula based on Cr of 2.6).  No results found for: BNP  No results found for: PHART    Microbiology Results Abnormal     Procedure Component Value - Date/Time    Blood Culture - Blood, [[id number]]  (Normal) Collected:  01/23/18 1645    Lab Status:  Final result Specimen:  Blood from Arm, Left Updated:  01/28/18 1716     Blood Culture No growth at 5 days    Blood Culture - Blood, [[id number]]  (Normal) Collected:  01/23/18 1645    Lab Status:  Final result Specimen:  Blood from Arm, Right Updated:  01/28/18 1716     Blood Culture No growth at 5 days    Stool Culture - Stool, Per Rectum [[id number]]  (Abnormal) Collected:  01/24/18 1037    Lab Status:  Final result Specimen:  Stool from Per Rectum Updated:  01/26/18 1039     Stool Culture No Salmonella, Shigella, Campylobacter, E. coli O157, or Vibrio isolated      Light growth (2+) Yeast isolated (A)      No normal enteric coliforms present       Narrative:         Not cultured for Yersinia.    Not cultured for Yersinia.    Eosinophil Smear - Urine, Urine, Clean Catch [[id number]]  (Normal) Collected:  01/26/18 0226    Lab Status:  Final result Specimen:  Urine from Urine, Clean Catch Updated:  01/26/18 0323     Eosinophil Smear 0 % EOS/100 Cells     Narrative:       No eosinophil seen    Clostridium Difficile Toxin - Stool, Per Rectum [[id number]] Collected:  01/24/18 1037    Lab Status:  Final result Specimen:  Stool from Per Rectum Updated:  01/24/18 1223    Narrative:       The following orders were created for panel order Clostridium Difficile Toxin - Stool, Per Rectum.  Procedure                               Abnormality         Status                     ---------                               -----------         ------                     Clostridium Difficile To...[[id number]]  Normal              Final result                 Please view results for these tests on the individual orders.    Clostridium Difficile Toxin, PCR - Stool, Per Rectum  [[id number]]  (Normal) Collected:  01/24/18 1037    Lab Status:  Final result Specimen:  Stool from Per Rectum Updated:  01/24/18 1223     C. Difficile Toxins by PCR Not Detected    Narrative:         Performance characteristics of test not established for patients <2 years of age.  Negative for Toxigenic C. Difficile          Imaging Results (last 24 hours)     Procedure Component Value Units Date/Time    XR Chest 1 View [[id number]] Collected:  02/01/18 0819     Updated:  02/01/18 0855    Narrative:       EXAMINATION: XR CHEST 1 VW-      INDICATION: Dyspnea, on exertion.      COMPARISON: Chest x-ray 01/29/2018.     FINDINGS: Cardiac size is enlarged. Pulmonary vascularity within normal  limits. Persistent bibasilar opacifications and effusions similar to  prior with small to moderate right pleural effusion and trace left. No  new parenchymal disease.  Left chest wall pacing device with lead  intact.       Impression:       Persistent bibasilar opacifications and effusions from prior  with small moderate right and trace left pleural effusions.     D:  02/01/2018  E:  02/01/2018     This report was finalized on 2/1/2018 8:53 AM by Dr. Cuate Vick.           Results for orders placed during the hospital encounter of 02/04/17   Adult Transthoracic Echo Complete With Contrast    Narrative · Left ventricular function is severely decreased. Estimated EF = 18%.  · The left ventricular cavity is borderline dilated.  · Left atrial cavity size is mild-to-moderately dilated.  · Mild to moderate aortic valve regurgitation is present.  · Moderate tricuspid valve regurgitation is present.  · Left ventricular wall thickness is consistent with mild concentric   hypertrophy.  · Left ventricular wall segments contract abnormally. Refer to wall   scoring diagram for more information.  · Right ventricular cavity is borderline dilated.  · There is no evidence of pericardial effusion.  · The prosthetic mitral valve is grossly normal.  ·  Mild pulmonary hypertension is present.          I have reviewed the medications.    Assessment/Plan   Assessment / Plan     Hospital Problem List     Anxiety    Essential hypertension (Chronic)    Overview Signed 10/19/2015  8:56 AM by Dgina Santana     benign essential         Ischemic cardiomyopathy (Chronic)    Overview Signed 2/6/2017  8:44 AM by MAN Quesada     A. History of MI December 2009: s/p stent to RCA and LAD, EF 40%  B. CABGx2 March, 2010: SVG to OMB-1, SVG to PDA  C. EF 30% post-operatively, s/p [place] ICD placement, 2011  D. Echo July 2014: Severe global hypokinesis with EF 12%, moderate AI, mechanical mitral valve, moderate to severe TR, RVSP 43mmHg   E. Echo 2/4/17: EF 18%, mild to mod AI, mod TR, grossly normal prosthetic mitral valve         Chronic renal failure, stage 2 (mild)    Lupus (systemic lupus erythematosus) (Chronic)    H/O mitral valve replacement    Overview Signed 2/6/2017  8:53 AM by MAN Quesada     VHD:  A. Mitral valve replacement with 27mm ATS mechanical valve March 2010            Anemia of chronic kidney failure    Tobacco abuse (Chronic)    AICD (automatic cardioverter/defibrillator) present (Chronic)    FAHEEM (acute kidney injury)    Overview Signed 2/6/2017  8:46 AM by MAN Quesada     Nephrology following: Hyperkalemia and Hyponatremia         Chronic anticoagulation    Overview Signed 7/16/2016 10:32 PM by IAIN Gabriel     Coumadin (Mechanical AVR)         Supratherapeutic INR    UTI (urinary tract infection)    Hyperkalemia    Colitis    Nausea and vomiting    Diarrhea    Acute kidney injury superimposed on chronic kidney disease    Lactic acidosis    Liver injury             Brief Hospital Course to date:  Ashely Roldan is a 57 y.o. female with systolic CHF/ICM s/p ICD and mechanical mitral valve who presented with N/V/D, abdominal pain and found to have FAHEEM, hyperkalemia and CT A/P revealed colitis.          Plan:    FAHEEM  Hyperkalemia  -Improving  -ATN, likely due to hypotension, hypovolemia, possibly lupus nephritis with low C3, C4.  Renal biopsy from last year confirmed lupus.  -Nephrology following    Coagulopathy  Supratherapeutic INR  Thrombocytopenia  -PT >100 several days into admission; now improved after vitamin K  -Likely multifactorial due to warfarin use in setting of acute liver injury, FAHEEM, diarrhea  -PLASMIC score = 2, which puts her at low risk for TTP; no schistocytes on peripheral smear  -Monitor platelet count while on heparin, but stable around 100    Acute liver injury  -Suspect ischemic due to hypotension  -Acute hepatitis panel negative  -LFTs improving    E. Coli UTI  -Completed 5 days of antibiotics  -Other infectious workup negative; d/c'd Zosyn 1/26    Lupus  -On Plaquenil  -Anti-DS DNA WNL  -Previously had negative DAWN and DS DNA in September 2017  -Anticardiolipin negative    Encephalopathy  -Improved  -Delirium vs. Metabolic encephalopathy vs. Medication induced  -Hold clonazepam given her significant liver injury; ativan PRN withdrawal    Chronic systolic CHF  Mechanical mitral valve  -Lasix today with increased edema, and B effusions  -severe CM on ECHO      DVT Prophylaxis:  Heparin drip    CODE STATUS: Full Code    Disposition: I expect the patient to be discharged TBD.    Trini Fitzgerald MD  02/01/18  10:16 AM

## 2018-02-02 ENCOUNTER — APPOINTMENT (OUTPATIENT)
Dept: CARDIOLOGY | Facility: HOSPITAL | Age: 58
End: 2018-02-02
Attending: NURSE PRACTITIONER

## 2018-02-02 LAB
ANION GAP SERPL CALCULATED.3IONS-SCNC: 13 MMOL/L (ref 3–11)
APTT PPP: 45.3 SECONDS (ref 55–70)
APTT PPP: 53.7 SECONDS (ref 55–70)
APTT PPP: 77.4 SECONDS (ref 55–70)
BASOPHILS # BLD AUTO: 0 10*3/MM3 (ref 0–0.2)
BASOPHILS NFR BLD AUTO: 0 % (ref 0–1)
BUN BLD-MCNC: 62 MG/DL (ref 9–23)
BUN/CREAT SERPL: 28.2 (ref 7–25)
CALCIUM SPEC-SCNC: 8.3 MG/DL (ref 8.7–10.4)
CHLORIDE SERPL-SCNC: 96 MMOL/L (ref 99–109)
CO2 SERPL-SCNC: 20 MMOL/L (ref 20–31)
CREAT BLD-MCNC: 2.2 MG/DL (ref 0.6–1.3)
DEPRECATED RDW RBC AUTO: 55.6 FL (ref 37–54)
EOSINOPHIL # BLD AUTO: 0.04 10*3/MM3 (ref 0–0.3)
EOSINOPHIL NFR BLD AUTO: 0.6 % (ref 0–3)
ERYTHROCYTE [DISTWIDTH] IN BLOOD BY AUTOMATED COUNT: 18.5 % (ref 11.3–14.5)
GFR SERPL CREATININE-BSD FRML MDRD: 23 ML/MIN/1.73
GLUCOSE BLD-MCNC: 100 MG/DL (ref 70–100)
HCT VFR BLD AUTO: 28.4 % (ref 34.5–44)
HGB BLD-MCNC: 9 G/DL (ref 11.5–15.5)
IMM GRANULOCYTES # BLD: 0.04 10*3/MM3 (ref 0–0.03)
IMM GRANULOCYTES NFR BLD: 0.6 % (ref 0–0.6)
INR PPP: 1.63
LYMPHOCYTES # BLD AUTO: 1.36 10*3/MM3 (ref 0.6–4.8)
LYMPHOCYTES NFR BLD AUTO: 19 % (ref 24–44)
MACROCYTES BLD QL SMEAR: NORMAL
MCH RBC QN AUTO: 32.1 PG (ref 27–31)
MCHC RBC AUTO-ENTMCNC: 31.7 G/DL (ref 32–36)
MCV RBC AUTO: 101.4 FL (ref 80–99)
MONOCYTES # BLD AUTO: 0.83 10*3/MM3 (ref 0–1)
MONOCYTES NFR BLD AUTO: 11.6 % (ref 0–12)
NEUTROPHILS # BLD AUTO: 4.88 10*3/MM3 (ref 1.5–8.3)
NEUTROPHILS NFR BLD AUTO: 68.2 % (ref 41–71)
PLAT MORPH BLD: NORMAL
PLATELET # BLD AUTO: 92 10*3/MM3 (ref 150–450)
PMV BLD AUTO: 12.3 FL (ref 6–12)
POTASSIUM BLD-SCNC: 3.4 MMOL/L (ref 3.5–5.5)
PROTHROMBIN TIME: 18 SECONDS (ref 9.6–11.5)
RBC # BLD AUTO: 2.8 10*6/MM3 (ref 3.89–5.14)
SODIUM BLD-SCNC: 129 MMOL/L (ref 132–146)
WBC MORPH BLD: NORMAL
WBC NRBC COR # BLD: 7.15 10*3/MM3 (ref 3.5–10.8)

## 2018-02-02 PROCEDURE — 93306 TTE W/DOPPLER COMPLETE: CPT | Performed by: INTERNAL MEDICINE

## 2018-02-02 PROCEDURE — 25010000002 SULFUR HEXAFLUORIDE MICROSPH 60.7-25 MG RECONSTITUTED SUSPENSION: Performed by: INTERNAL MEDICINE

## 2018-02-02 PROCEDURE — 25010000002 HEPARIN (PORCINE) PER 1000 UNITS

## 2018-02-02 PROCEDURE — 85730 THROMBOPLASTIN TIME PARTIAL: CPT

## 2018-02-02 PROCEDURE — 85610 PROTHROMBIN TIME: CPT

## 2018-02-02 PROCEDURE — 63510000001 EPOETIN ALFA PER 1000 UNITS: Performed by: INTERNAL MEDICINE

## 2018-02-02 PROCEDURE — 25010000002 FUROSEMIDE PER 20 MG: Performed by: INTERNAL MEDICINE

## 2018-02-02 PROCEDURE — 97116 GAIT TRAINING THERAPY: CPT

## 2018-02-02 PROCEDURE — 99233 SBSQ HOSP IP/OBS HIGH 50: CPT | Performed by: INTERNAL MEDICINE

## 2018-02-02 PROCEDURE — 80048 BASIC METABOLIC PNL TOTAL CA: CPT | Performed by: INTERNAL MEDICINE

## 2018-02-02 PROCEDURE — 94799 UNLISTED PULMONARY SVC/PX: CPT

## 2018-02-02 PROCEDURE — 93306 TTE W/DOPPLER COMPLETE: CPT

## 2018-02-02 PROCEDURE — 97110 THERAPEUTIC EXERCISES: CPT

## 2018-02-02 PROCEDURE — 99232 SBSQ HOSP IP/OBS MODERATE 35: CPT | Performed by: INTERNAL MEDICINE

## 2018-02-02 RX ORDER — WARFARIN SODIUM 3 MG/1
3 TABLET ORAL
Status: COMPLETED | OUTPATIENT
Start: 2018-02-02 | End: 2018-02-02

## 2018-02-02 RX ORDER — ASPIRIN 81 MG/1
81 TABLET ORAL DAILY
Status: DISCONTINUED | OUTPATIENT
Start: 2018-02-02 | End: 2018-02-05 | Stop reason: HOSPADM

## 2018-02-02 RX ORDER — WARFARIN SODIUM 2 MG/1
2 TABLET ORAL
Status: DISCONTINUED | OUTPATIENT
Start: 2018-02-03 | End: 2018-02-03

## 2018-02-02 RX ORDER — FUROSEMIDE 10 MG/ML
20 INJECTION INTRAMUSCULAR; INTRAVENOUS ONCE
Status: COMPLETED | OUTPATIENT
Start: 2018-02-02 | End: 2018-02-02

## 2018-02-02 RX ORDER — ROSUVASTATIN CALCIUM 10 MG/1
10 TABLET, COATED ORAL NIGHTLY
Status: DISCONTINUED | OUTPATIENT
Start: 2018-02-02 | End: 2018-02-05 | Stop reason: HOSPADM

## 2018-02-02 RX ADMIN — ROSUVASTATIN CALCIUM 10 MG: 10 TABLET ORAL at 20:35

## 2018-02-02 RX ADMIN — HEPARIN SODIUM 11 UNITS/KG/HR: 10000 INJECTION, SOLUTION INTRAVENOUS at 17:12

## 2018-02-02 RX ADMIN — SODIUM BICARBONATE 650 MG TABLET 1300 MG: at 20:35

## 2018-02-02 RX ADMIN — WARFARIN SODIUM 3 MG: 3 TABLET ORAL at 17:11

## 2018-02-02 RX ADMIN — HYDRALAZINE HYDROCHLORIDE 10 MG: 10 TABLET ORAL at 05:35

## 2018-02-02 RX ADMIN — FUROSEMIDE 20 MG: 20 TABLET ORAL at 08:20

## 2018-02-02 RX ADMIN — HYDROXYCHLOROQUINE SULFATE 200 MG: 200 TABLET, FILM COATED ORAL at 08:20

## 2018-02-02 RX ADMIN — FUROSEMIDE 20 MG: 10 INJECTION, SOLUTION INTRAMUSCULAR; INTRAVENOUS at 11:18

## 2018-02-02 RX ADMIN — CARVEDILOL 12.5 MG: 12.5 TABLET, FILM COATED ORAL at 08:20

## 2018-02-02 RX ADMIN — EPOETIN ALFA 20000 UNITS: 20000 SOLUTION INTRAVENOUS; SUBCUTANEOUS at 08:20

## 2018-02-02 RX ADMIN — CARVEDILOL 12.5 MG: 12.5 TABLET, FILM COATED ORAL at 17:11

## 2018-02-02 RX ADMIN — HYDRALAZINE HYDROCHLORIDE 10 MG: 10 TABLET ORAL at 20:36

## 2018-02-02 RX ADMIN — SODIUM BICARBONATE 650 MG TABLET 1300 MG: at 08:19

## 2018-02-02 RX ADMIN — BUDESONIDE AND FORMOTEROL FUMARATE DIHYDRATE 2 PUFF: 160; 4.5 AEROSOL RESPIRATORY (INHALATION) at 19:24

## 2018-02-02 RX ADMIN — NICOTINE 1 PATCH: 21 PATCH, EXTENDED RELEASE TRANSDERMAL at 08:20

## 2018-02-02 RX ADMIN — HYDRALAZINE HYDROCHLORIDE 10 MG: 10 TABLET ORAL at 13:43

## 2018-02-02 RX ADMIN — BUDESONIDE AND FORMOTEROL FUMARATE DIHYDRATE 2 PUFF: 160; 4.5 AEROSOL RESPIRATORY (INHALATION) at 08:46

## 2018-02-02 RX ADMIN — LORAZEPAM 0.5 MG: 0.5 TABLET ORAL at 20:36

## 2018-02-02 RX ADMIN — ASPIRIN 81 MG: 81 TABLET, COATED ORAL at 13:43

## 2018-02-02 RX ADMIN — FAMOTIDINE 20 MG: 20 TABLET, FILM COATED ORAL at 08:20

## 2018-02-02 RX ADMIN — SULFUR HEXAFLUORIDE 3 ML: KIT at 11:00

## 2018-02-02 RX ADMIN — Medication 5 MG: at 20:36

## 2018-02-02 NOTE — PROGRESS NOTES
"Pharmacy Consult  -  Warfarin    Ashely Roldan is a  57 y.o. female   Height - 175.3 cm (69\")  Weight - 71.7 kg (158 lb)    Consulting Provider: - Mari TIMMONS  Indication: mechanical heart valve  Goal INR: 2.5-3.5  Home Regimen:   -  2 mg daily for 2 days,    - then 3 mg x 1 day, then repeat    Bridge Therapy: Heparin drip    Drug-Drug Interactions with current regimen:   Heparin drip--bridge therapy---increases bleeding risk    Warfarin Dosing During Admission:    Date  1/23 1/24 1/25 1/26 1/27 1/28 1/29 1/30 1/31   INR  4.55 5.66 8.03 PT >100 2.64 1.72 1.52 1.48 1.51   Dose  HOLD HOLD HOLD HOLD HOLD per MD 1 mg 1 mg 2 mg 2 mg     Date  2/1 2/2          INR  1.51 1.63          Dose  2 mg (3 mg)            Total of 15 mg IV vitamin K received on 1/26.    Education Provided: Discussed warfarin with patient and how it is normally monitored     Labs:  Results from last 7 days   Lab Units 02/02/18  0621 02/01/18  2321 02/01/18  1505 02/01/18  0625 01/31/18  1355 01/31/18  0606 01/30/18  1815 01/30/18  0211  01/29/18  0708  01/28/18  0503 01/27/18  0607   INR  1.63 --  --  1.51 --  1.51 --  1.48 --  1.52 --  1.72 2.71  2.64   APTT seconds 45.3* --  63.2 76.2* 69.3 79.3* 71.3* 49.9* < > 67.8 < > --  41.5*   HEMOGLOBIN g/dL --  9.0* --  --  --  10.7* --  9.2* --  9.1* --  8.4* 9.4*   HEMATOCRIT % --  28.4* --  --  --  33.2* --  28.0* --  27.6* --  26.6* 29.1*   PLATELETS 10*3/mm3 --  92* --  --  --  105* --  84* --  94* --  91* 77*   < > = values in this interval not displayed.   Results from last 7 days   Lab Units 02/01/18  0625 01/31/18  0606 01/30/18  0211 01/29/18  0708 01/28/18  0503   SODIUM mmol/L 131* 131* 134 135 141   POTASSIUM mmol/L 4.1 4.5 3.9 3.6 4.3   CHLORIDE mmol/L 101 104 110* 107 115*   CO2 mmol/L 23.0 16.0* 19.0* 20.0 19.0*   BUN mg/dL 67* 61* 65* 73* 76*   CREATININE mg/dL 2.60* 2.80* 2.90* 3.30* 3.70*   CALCIUM mg/dL 8.9 8.8 8.6* 8.4* 8.0*   BILIRUBIN mg/dL --  1.0 --  0.7 0.5   ALK PHOS " U/L --  152* --  146* 139*   ALT (SGPT) U/L --  822* --  1211* 1490*   AST (SGOT) U/L --  191* --  486* 883*   GLUCOSE mg/dL 93 113* 95 126* 136*     Diet Order   Procedures   • Diet Regular; Renal   Ate 0-25% of documented meals yesterday.     Assessment/Plan:   -INR was SUPRAtherapeutic on admission at 4.55 and continued to increase to a level that was undetectable. Patient also experienced acute liver injury with elevated LFTs. She received a total of 15 mg of IV Vitamin K on 1/26 (same day INR was undetectable).   -Warfarin was re-started at a low dose of 1 mg PO daily on 1/28. Patient being bridged with heparin drip.  -INR remains subtherapeutic at 1.63 for goal of 2.5-3.5.   -In light of recent supratherapeutic INR, hepatic dysfunction affecting drug metabolism, and lack of oral intake will continue with dose of warfarin 3 mg today.      Pharmacy will continue to follow closely, and make adjustments as needed. Thank you for this consult.  Manjit Lynn, PharmD  Pharmacy Resident  2/2/2018  7:42 AM

## 2018-02-02 NOTE — THERAPY TREATMENT NOTE
Acute Care - Physical Therapy Treatment Note  [facility]     Patient Name: Ashely Roldan  : 1960  MRN: [id number]  Today's Date: 2018  Onset of Illness/Injury or Date of Surgery Date: 18  Date of Referral to PT: 18  Referring Physician: MD Amilcar    Admit Date: 2018    Visit Dx:    ICD-10-CM ICD-9-CM   1. Acute kidney injury superimposed on chronic kidney disease N17.9 866.00    N18.9 585.9   2. Acute infective gastroenteritis A09 009.0   3. Volume depletion E86.9 276.50   4. Supratherapeutic INR R79.1 790.92   5. Hyperkalemia E87.5 276.7   6. Impaired functional mobility, balance, gait, and endurance Z74.09 V49.89   7. FAHEEM (acute kidney injury) N17.9 584.9   8. COPD exacerbation J44.1 491.21   9. Acute systolic (congestive) heart failure I50.21 428.21     428.0   10. CKD (chronic kidney disease), stage IV N18.4 585.4     Patient Active Problem List   Diagnosis   • Anxiety   • Arthritis   • Panlobular emphysema   • Reactive depression   • Gastritis   • Heart attack   • Heart murmur   • Mixed hyperlipidemia   • Essential hypertension   • Ischemic cardiomyopathy   • VHD (valvular heart disease)   • Osteoporosis   • PVD (peripheral vascular disease)   • Chronic renal failure, stage 2 (mild)   • Allergic rhinitis   • Lupus (systemic lupus erythematosus)   • TIA (transient ischemic attack)   • Chronic coronary artery disease   • Cough   • H/O mitral valve replacement   • Anemia of chronic kidney failure   • Insomnia   • COPD exacerbation   • Tobacco abuse   • Acute systolic heart failure and valvular disease   • AICD (automatic cardioverter/defibrillator) present   • FAHEEM (acute kidney injury)   • Noncompliance   • Blunt trauma of multiple sites   • Chronic anticoagulation   • Closed fracture of rib with flail chest   • Supratherapeutic INR   • Fracture of left clavicle   • Chest pain   • Hypotension   • Acute systolic (congestive) heart failure   • Acute on chronic respiratory failure  with hypoxia   • CKD (chronic kidney disease), stage IV   • UTI (urinary tract infection)   • Elevated troponin   • Hyperkalemia   • Colitis   • Nausea and vomiting   • Diarrhea   • Acute kidney injury superimposed on chronic kidney disease   • Lactic acidosis   • Liver injury               Adult Rehabilitation Note       02/02/18 0945 01/31/18 0930       Rehab Assessment/Intervention    Discipline physical therapy assistant  -UD physical therapy assistant  -UD     Document Type therapy note (daily note)  -UD therapy note (daily note)  -UD     Subjective Information agree to therapy;complains of;weakness  -UD agree to therapy;complains of;weakness;fatigue  -UD     Patient Effort, Rehab Treatment good  -UD good  -UD     Symptoms Noted During/After Treatment fatigue;shortness of breath  -UD fatigue  -UD     Precautions/Limitations fall precautions;oxygen therapy device and L/min  -UD fall precautions;oxygen therapy device and L/min  -UD     Recorded by [UD] Rhiannon Montalvo PTA [UD] Rhiannon Montalvo PTA     Vital Signs    O2 Delivery Post Treatment supplemental O2  -UD supplemental O2  -UD     Pre Patient Position Supine  -UD Sitting  -UD     Intra Patient Position Standing  -UD Standing  -UD     Post Patient Position Sitting  -UD Sitting  -UD     Recorded by [UD] Rhiannon Montalvo PTA [UD] Rhiannon Montalvo PTA     Pain Assessment    Pain Assessment No/denies pain  -UD No/denies pain  -UD     Recorded by [UD] Rhiannon Montalvo PTA [UD] Rhiannon Montalvo PTA     Cognitive Assessment/Intervention    Orientation Status oriented x 4  -UD oriented x 4  -UD     Follows Commands/Answers Questions 100% of the time  -% of the time  -UD     Personal Safety Interventions fall prevention program maintained  -UD fall prevention program maintained  -UD     Recorded by [UD] Rhiannon Montalvo PTA [UD] Rhiannon Montalvo PTA     Bed Mobility, Assessment/Treatment    Bed Mob, Supine to Sit, [place] contact guard assist  -UD not tested   up in chair  -UD      Recorded by [UD] Rhiannon Montalvo PTA [UD] Rhiannon Montalvo PTA     Transfer Assessment/Treatment    Transfers, Sit-Stand [place] contact guard assist  -UD contact guard assist;1 person + 1 person to manage equipment  -UD     Transfers, Stand-Sit [place] contact guard assist  -UD contact guard assist;1 person + 1 person to manage equipment  -UD     Transfers, Sit-Stand-Sit, Assist Device rolling walker  -UD rolling walker  -UD     Recorded by [UD] Rhiannon Montalvo PTA [UD] Rhiannon Montalvo PTA     Gait Assessment/Treatment    Gait, [place] Level minimum assist (75% patient effort);1 person + 1 person to manage equipment  -UD minimum assist (75% patient effort);1 person + 1 person to manage equipment  -UD     Gait, Assistive Device rolling walker  -UD rolling walker  -UD     Gait, Distance (Feet) 250   several rest breaks  -  -UD     Gait, Gait Deviations anni decreased  -UD anni decreased;step length decreased  -UD     Gait, Safety Issues supplemental O2;step length decreased  -UD supplemental O2  -UD     Gait, Impairments strength decreased  -UD strength decreased  -UD     Gait, Comment --   cues for walker   -UD      Recorded by [UD] Rhiannon Montalvo PTA [UD] Rhiannon Montalvo PTA     Therapy Exercises    Bilateral Lower Extremities AROM:;10 reps;sitting  -UD AROM:;10 reps;sitting  -UD     Bilateral Upper Extremity AROM:;10 reps;sitting  -UD AROM:;10 reps;sitting  -UD     Recorded by [UD] Rhiannon Montalvo PTA [UD] Rhiannon Montalvo PTA     Positioning and Restraints    Pre-Treatment Position in bed  -UD sitting in chair/recliner  -UD     Post Treatment Position chair  -UD chair  -UD     In Chair notified nsg;reclined;sitting;call light within reach;exit alarm on;legs elevated  -UD notified nsg;reclined;sitting;call light within reach;exit alarm on;legs elevated  -UD     Recorded by [UD] Rhiannon Montalvo PTA [UD] Rhiannon Montalvo PTA       User Key  (r) = Recorded By, (t) = Taken By, (c) = Cosigned By    Initials  Name Effective Dates    ZEE Montalvo PTA 06/22/15 -                 IP PT Goals       02/02/18 1052 01/31/18 1012 01/29/18 1420    Bed Mobility PT LTG    Bed Mobility PT LTG, Date Established   01/29/18  -EH    Bed Mobility PT LTG, Time to Achieve   2 wks  -EH    Bed Mobility PT LTG, Activity Type   supine to sit/sit to supine  -EH    Bed Mobility PT LTG, [place] Level   independent  -EH    Bed Mobility PT LTG, Outcome goal ongoing  -UD goal ongoing  -UD     Transfer Training PT LTG    Transfer Training PT LTG, Date Established   01/29/18  -EH    Transfer Training PT LTG, Time to Achieve   2 wks  -EH    Transfer Training PT LTG, Activity Type   sit to stand/stand to sit  -EH    Transfer Training PT LTG, [place] Level   conditional independence  -EH    Transfer Training PT LTG, Assist Device   walker, rolling  -EH    Transfer Training PT LTG, Outcome goal ongoing  -UD goal ongoing  -UD     Gait Training PT LTG    Gait Training Goal PT LTG, Date Established   01/29/18  -EH    Gait Training Goal PT LTG, Time to Achieve   2 wks  -EH    Gait Training Goal PT LTG, [place] Level   contact guard assist  -EH    Gait Training Goal PT LTG, Assist Device   walker, rolling  -EH    Gait Training Goal PT LTG, Distance to Achieve   150  -EH    Gait Training Goal PT LTG, Outcome  goal met  -UD       User Key  (r) = Recorded By, (t) = Taken By, (c) = Cosigned By    Initials Name Provider Type    ZEE Montalvo PTA Physical Therapy Assistant     Emilia Lopez, PT Physical Therapist          Physical Therapy Education     Title: PT OT SLP Therapies (Done)     Topic: Physical Therapy (Done)     Point: Mobility training (Done)    Learning Progress Summary    Learner Readiness Method Response Comment Documented by Status   Patient Acceptance BRITTNEY,YUAN IMKENR   02/02/18 1052 Done    Acceptance YUAN LUGO NR   01/31/18 1012 Done    Acceptance E MATILDA FUNES   01/29/18 1420 Done               Point: Home exercise program  (Done)    Learning Progress Summary    Learner Readiness Method Response Comment Documented by Status   Patient Acceptance E,D DU,NR   02/02/18 1052 Done    Acceptance ED DU,[facility] 01/31/18 1012 Done               Point: Body mechanics (Done)    Learning Progress Summary    Learner Readiness Method Response Comment Documented by Status   Patient Acceptance E,D DU,NR   02/02/18 1052 Done    Acceptance ED DU,[facility] 01/31/18 1012 Done    Acceptance E VU,[facility] 01/29/18 1420 Done               Point: Precautions (Done)    Learning Progress Summary    Learner Readiness Method Response Comment Documented by Status   Patient Acceptance E,D DU,NR   02/02/18 1052 Done    Acceptance E,D DU,[facility] 01/31/18 1012 Done    Acceptance E ,[facility] 01/29/18 1420 Done                      User Key     Initials Effective Dates Name Provider Type Discipline     06/22/15 -  Rhiannon Montalvo, PTA Physical Therapy Assistant PT     06/19/15 -  Emilia Lopez, PT Physical Therapist PT                    PT Recommendation and Plan  Anticipated Discharge Disposition: skilled nursing facility (Vs [facility])  Planned Therapy Interventions: balance training, bed mobility training, gait training, strengthening, transfer training, stretching, ROM (Range of Motion)  PT Frequency: daily  Plan of Care Review  Plan Of Care Reviewed With: patient  Progress: improving  Outcome Summary/Follow up Plan: pt alert,follows commands.still needs minim. assist for transfers and gait.ambulat 250 ft today.sat up in chair          Outcome Measures       02/02/18 0945 01/31/18 0930       How much help from another person do you currently need...    Turning from your back to your side while in flat bed without using bedrails? 4  -UD 4  -UD     Moving from lying on back to sitting on the side of a flat bed without bedrails? 3  -UD 3  -UD     Moving to and from a bed to a chair (including a wheelchair)? 3  -UD 3  -UD     Standing up from a chair using your arms  (e.g., wheelchair, bedside chair)? 3  -UD 3  -UD     Climbing 3-5 steps with a railing? 2  -UD 2  -UD     To walk in hospital room? 3  -UD 3  -UD     AM-PAC 6 Clicks Score 18  -UD 18  -UD       User Key  (r) = Recorded By, (t) = Taken By, (c) = Cosigned By    Initials Name Provider Type    ZEE Montalvo PTA Physical Therapy Assistant           Time Calculation:         PT Charges       02/02/18 1054          Time Calculation    PT Received On 02/02/18  -UD      PT Goal Re-Cert Due Date 02/08/18  -UD      Time Calculation- PT    Total Timed Code Minutes- PT 24 minute(s)  -UD        User Key  (r) = Recorded By, (t) = Taken By, (c) = Cosigned By    Initials Name Provider Type    ZEE Montalvo PTA Physical Therapy Assistant          Therapy Charges for Today     Code Description Service Date Service Provider Modifiers Qty    93624849563 HC PT THER PROC EA 15 MIN 2/2/2018 Rhiannon Montalvo, PTA GP 1    44427477476 HC GAIT TRAINING EA 15 MIN 2/2/2018 Rhiannon Montalvo, PTA GP 1    27901485139 HC PT THER SUPP EA 15 MIN 2/2/2018 Rhiannon Montalvo, PTA GP 1          PT G-Codes  Outcome Measure Options: AM-PAC 6 Clicks Basic Mobility (PT)    Rhiannon Montalvo PTA  2/2/2018

## 2018-02-02 NOTE — PROGRESS NOTES
[facility] Medicine Services  PROGRESS NOTE    Patient Name: Ashely Roldan  : 1960  MRN: [id number]    Date of Admission: 2018  Length of Stay: 10  Primary Care Physician: Peter Rdz MD    Subjective   Subjective     CC: anxiousness, edema    HPI: Sitting up in bed being bathed by nurses. Has a myriad of complaints mostly revolving around anxiety and LE edema.     Review of Systems  Gen- No fevers, chills  CV- No chest pain, palpitations  Resp- No cough, dyspnea  GI- No N/V/D, abd pain    Otherwise ROS is negative except as mentioned in the HPI.    Objective   Objective     Vital Signs:   Temp:  [97.7 °F (36.5 °C)-98.8 °F (37.1 °C)] 97.7 °F (36.5 °C)  Heart Rate:  [64-73] 73  Resp:  [15-20] 18  BP: (118-145)/(73-85) 145/78        Physical Exam:  Constitutional: No acute distress, awake, alert, sitting up in bed  HENT: NCAT, mucous membranes moist  Respiratory: Clear to auscultation bilaterally, respiratory effort normal   Cardiovascular: RRR, loud mechanical click  Gastrointestinal: Positive bowel sounds, soft, nontender, nondistended  Musculoskeletal: Tight LE edema bilaterally  Psychiatric: Appropriate affect, cooperative  Neurologic: Oriented x 3, strength symmetric in all extremities, Cranial Nerves grossly intact to confrontation, speech clear  Skin: No rashes    Results Reviewed:  I have personally reviewed current lab, radiology, and data and agree.      Results from last 7 days  Lab Units 18  0618  23218  0618  0211   WBC 10*3/mm3  --  7.15  --  11.42* 8.59   HEMOGLOBIN g/dL  --  9.0*  --  10.7* 9.2*   HEMATOCRIT %  --  28.4*  --  33.2* 28.0*   PLATELETS 10*3/mm3  --  92*  --  105* 84*   INR  1.63  --  1.51 1.51 1.48       Results from last 7 days  Lab Units 18  061825 18  0606  18  0708 18  0503   SODIUM mmol/L 129* 131* 131*  < > 135 141   POTASSIUM mmol/L 3.4* 4.1 4.5  <  > 3.6 4.3   CHLORIDE mmol/L 96* 101 104  < > 107 115*   CO2 mmol/L 20.0 23.0 16.0*  < > 20.0 19.0*   BUN mg/dL 62* 67* 61*  < > 73* 76*   CREATININE mg/dL 2.20* 2.60* 2.80*  < > 3.30* 3.70*   GLUCOSE mg/dL 100 93 113*  < > 126* 136*   CALCIUM mg/dL 8.3* 8.9 8.8  < > 8.4* 8.0*   ALT (SGPT) U/L  --   --  822*  --  1211* 1490*   AST (SGOT) U/L  --   --  191*  --  486* 883*   < > = values in this interval not displayed.  Estimated Creatinine Clearance: 31.5 mL/min (by C-G formula based on Cr of 2.2).  BNP   Date Value Ref Range Status   02/01/2018 3350.0 (H) 0.0 - 100.0 pg/mL Final     Comment:     Results may be falsely decreased if patient taking Biotin.     No results found for: PHART    Microbiology Results Abnormal     Procedure Component Value - Date/Time    Blood Culture - Blood, [[id number]]  (Normal) Collected:  01/23/18 1645    Lab Status:  Final result Specimen:  Blood from Arm, Left Updated:  01/28/18 1716     Blood Culture No growth at 5 days    Blood Culture - Blood, [[id number]]  (Normal) Collected:  01/23/18 1645    Lab Status:  Final result Specimen:  Blood from Arm, Right Updated:  01/28/18 1716     Blood Culture No growth at 5 days    Stool Culture - Stool, Per Rectum [[id number]]  (Abnormal) Collected:  01/24/18 1037    Lab Status:  Final result Specimen:  Stool from Per Rectum Updated:  01/26/18 1039     Stool Culture No Salmonella, Shigella, Campylobacter, E. coli O157, or Vibrio isolated      Light growth (2+) Yeast isolated (A)      No normal enteric coliforms present       Narrative:         Not cultured for Yersinia.    Not cultured for Yersinia.    Eosinophil Smear - Urine, Urine, Clean Catch [[id number]]  (Normal) Collected:  01/26/18 0226    Lab Status:  Final result Specimen:  Urine from Urine, Clean Catch Updated:  01/26/18 0323     Eosinophil Smear 0 % EOS/100 Cells     Narrative:       No eosinophil seen    Clostridium Difficile Toxin - Stool, Per Rectum [[id number]] Collected:  01/24/18 1037     Lab Status:  Final result Specimen:  Stool from Per Rectum Updated:  01/24/18 1223    Narrative:       The following orders were created for panel order Clostridium Difficile Toxin - Stool, Per Rectum.  Procedure                               Abnormality         Status                     ---------                               -----------         ------                     Clostridium Difficile To...[[id number]]  Normal              Final result                 Please view results for these tests on the individual orders.    Clostridium Difficile Toxin, PCR - Stool, Per Rectum [[id number]]  (Normal) Collected:  01/24/18 1037    Lab Status:  Final result Specimen:  Stool from Per Rectum Updated:  01/24/18 1223     C. Difficile Toxins by PCR Not Detected    Narrative:         Performance characteristics of test not established for patients <2 years of age.  Negative for Toxigenic C. Difficile          Personally reviewed cxr with bibasilar effusions. Agree with interpretation.    Results for orders placed during the hospital encounter of 02/04/17   Adult Transthoracic Echo Complete With Contrast    Narrative · Left ventricular function is severely decreased. Estimated EF = 18%.  · The left ventricular cavity is borderline dilated.  · Left atrial cavity size is mild-to-moderately dilated.  · Mild to moderate aortic valve regurgitation is present.  · Moderate tricuspid valve regurgitation is present.  · Left ventricular wall thickness is consistent with mild concentric   hypertrophy.  · Left ventricular wall segments contract abnormally. Refer to wall   scoring diagram for more information.  · Right ventricular cavity is borderline dilated.  · There is no evidence of pericardial effusion.  · The prosthetic mitral valve is grossly normal.  · Mild pulmonary hypertension is present.          I have reviewed the medications.    Assessment/Plan   Assessment / Plan     Hospital Problem List     Anxiety    Essential  hypertension (Chronic)    Overview Signed 10/19/2015  8:56 AM by Digna Santana     benign essential         Ischemic cardiomyopathy (Chronic)    Overview Signed 2/6/2017  8:44 AM by MAN Quesada. History of MI December 2009: s/p stent to RCA and LAD, EF 40%  B. CABGx2 March, 2010: SVG to OMB-1, SVG to PDA  C. EF 30% post-operatively, s/p [place] ICD placement, 2011  D. Echo July 2014: Severe global hypokinesis with EF 12%, moderate AI, mechanical mitral valve, moderate to severe TR, RVSP 43mmHg   E. Echo 2/4/17: EF 18%, mild to mod AI, mod TR, grossly normal prosthetic mitral valve         Chronic renal failure, stage 2 (mild)    Lupus (systemic lupus erythematosus) (Chronic)    H/O mitral valve replacement    Overview Signed 2/6/2017  8:53 AM by MAN Quesada     VHD:  A. Mitral valve replacement with 27mm ATS mechanical valve March 2010            Anemia of chronic kidney failure    Tobacco abuse (Chronic)    AICD (automatic cardioverter/defibrillator) present (Chronic)    FAHEEM (acute kidney injury)    Overview Signed 2/6/2017  8:46 AM by MAN Quesada     Nephrology following: Hyperkalemia and Hyponatremia         Chronic anticoagulation    Overview Signed 7/16/2016 10:32 PM by IAIN Gabriel     Coumadin (Mechanical AVR)         Supratherapeutic INR    UTI (urinary tract infection)    Hyperkalemia    Colitis    Nausea and vomiting    Diarrhea    Acute kidney injury superimposed on chronic kidney disease    Lactic acidosis    Liver injury             Brief Hospital Course to date:  Ashely Roldan is a 57 y.o. female with systolic CHF/ICM s/p ICD and mechanical mitral valve who presented with N/V/D, abdominal pain and found to have FAHEEM, hyperkalemia and CT A/P revealed colitis.     Assessment & Plan:  FAHEEM  Hyperkalemia  -Improving. Nearing baseline of 2.0.  -ATN, likely due to hypotension, hypovolemia,   -Has underlying lupus nephritis with low C3, C4.  Renal biopsy from  last year confirmed lupus.  -Nephrology following. Lasix today for edema. Monitor creatine closely while receiving lasix.     Acute liver injury  -Suspect ischemic due to hypotension  -Acute hepatitis panel negative  -LFTs improving     E. Coli UTI  -Completed 5 days of antibiotics  -Other infectious workup negative; d/c'd Zosyn 1/26     Lupus  -On Plaquenil  -Anti-DS DNA WNL  -Previously had negative DAWN and DS DNA in September 2017  -Anticardiolipin negative     Encephalopathy  -Resolved.  -Delirium vs. Metabolic encephalopathy vs. Medication induced     Chronic systolic CHF  Mechanical mitral valve  -Lasix today with increased edema, and B effusions  -Remains on IV heparin bridging to coumadin.  -Severe CM on ECHO    DVT Prophylaxis: IV heparin.    CODE STATUS: Full Code    Disposition: I expect the patient to be discharged TBD.    Darlene Mcpherson II,   02/02/18  1:21 PM

## 2018-02-02 NOTE — PROGRESS NOTES
Continued Stay Note  [facility]     Patient Name: Ashely Roldan  MRN: [id number]  Today's Date: 2/2/2018    Admit Date: 1/23/2018          Discharge Plan       02/02/18 1445    Case Management/Social Work Plan    Plan Home with [facility]    Patient/Family In Agreement With Plan yes    Additional Comments Met with patient at bedside and she still plans to go home at discharge with [facility]. She states that she already has a wheelchair and a rolling walker at home, but is interested in a bedside commode. CM called and faxed referral to Susy with [employer], as patient's home PRN oxygen is also through [employer]. BSC will be delivered to patient's room. CM will continue to follow. Darlene Byrd RN [phone]              Discharge Codes     None        Expected Discharge Date and Time     Expected Discharge Date Expected Discharge Time    Feb 5, 2018             Darlene Byrd RN

## 2018-02-02 NOTE — NURSING NOTE
Patient Name:  Ashely Roldan  :  1960  DOS:  18    Hospital Problem List     Anxiety    Essential hypertension (Chronic)    Overview Signed 10/19/2015  8:56 AM by Digna Santana     benign essential         Ischemic cardiomyopathy (Chronic)    Overview Signed 2017  8:44 AM by MAN Quesada     A. History of MI 2009: s/p stent to RCA and LAD, EF 40%  B. CABGx2 : SVG to OMB-1, SVG to PDA  C. EF 30% post-operatively, s/p [place] ICD placement,   D. Echo 2014: Severe global hypokinesis with EF 12%, moderate AI, mechanical mitral valve, moderate to severe TR, RVSP 43mmHg   E. Echo 17: EF 18%, mild to mod AI, mod TR, grossly normal prosthetic mitral valve         Chronic renal failure, stage 2 (mild)    Lupus (systemic lupus erythematosus) (Chronic)    H/O mitral valve replacement    Overview Signed 2017  8:53 AM by MAN Quesada     VHD:  A. Mitral valve replacement with 27mm ATS mechanical valve 2010            Anemia of chronic kidney failure    Tobacco abuse (Chronic)    AICD (automatic cardioverter/defibrillator) present (Chronic)    FAHEEM (acute kidney injury)    Overview Signed 2017  8:46 AM by MAN Quesada     Nephrology following: Hyperkalemia and Hyponatremia         Chronic anticoagulation    Overview Signed 2016 10:32 PM by IAIN Gabriel     Coumadin (Mechanical AVR)         Supratherapeutic INR    UTI (urinary tract infection)    Hyperkalemia    Colitis    Nausea and vomiting    Diarrhea    Acute kidney injury superimposed on chronic kidney disease    Lactic acidosis    Liver injury         Medical records have been reviewed. History of ICM with EF around 18%.  Patient is a good candidate for the [facility].  Education provided.  Education time 10 min.  Patient to be scheduled follow up 1 week post discharge.    Echo Results:pending    NYHA Class:III    Heart Failure Quality  Measures    ACE I, ARB, ARNI (if EF <40%)     If no contraindications:  FAHEEM / CKD / Renal Stenosis    Evidence-based Beta Blocker (EF<40%)    (Bisoprolol, Carvedilol, Metoprolol Succinate)  Yes      Aldosterone Antagonist (EF <40%)  If no contraindications:  FAHEEM / CKD / Renal Stenosis    Heart Failure Education    Signs / symptoms, Daily weight monitoring and Role of [facility] and when to call    If EF < 35%  ICD present

## 2018-02-02 NOTE — DISCHARGE PLACEMENT REQUEST
"Darlene Byrd RN  Case Management  P: [phone]      Ashely Roldan (57 y.o. 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)   Code Status:  FULL    Ht:  175.3 cm (69\")   Wt:  71.7 kg (158 lb)    Admission Cmt:  None   Principal Problem:  None                Active Insurance as of 2018     Primary Coverage     Payor Plan Insurance Group Employer/Plan Group    ANTH MEDICARE REPLACEMENT ANTH MEDICARE ADVANTAGE [id number]     Payor Plan Address Payor Plan Phone Number Effective From Effective To    [address] [phone] [address]     [place] [address]       Subscriber Name Subscriber Birth Date Member ID       ASHELY ROLDAN 1960 [id number]                 Emergency Contacts      (Rel.) Home Phone Work Phone Mobile Phone    Floyd Roldan (Son) -- -- [phone]    Janina Teran (Daughter) -- -- [phone]    Cherry Shepard (Significant Other) [phone] -- --          [place] [address] [facility] [address]  Phone:  [phone]  Fax:   Date Ordered: 2018         Patient:  Ashely Roldan MRN:  [id number]   [address]  [place] [id number] :  1960  SSN:    Phone: [phone] Sex:  F     Weight: 71.7 kg (158 lb)         Ht Readings from Last 1 Encounters:   18 175.3 cm (69\")    "      Commode Chair                    (Order ID: [id number])    Diagnosis:  Impaired functional mobility, balance, gait, and endurance (Z74.09 [ICD-10-CM] V49.89 [ICD-9-CM])  Acute systolic heart failure (I50.21 [ICD-10-CM] 428.21 [ICD-9-CM])  Acute on chronic respiratory failure with hypoxia (J96.21 [ICD-10-CM] 518.84,799.02 [ICD-9-CM])   Quantity:  1     Equipment:  Bedside Commode Chair w/Fixed Arms  Length of Need (99 Months = Lifetime): 99 Months = Lifetime            Verbal Order Mode: Telephone with readback   Authorizing Provider: Darlene Mcpherson II, DO  Authorizing Provider's NPI: [id number]     Order Entered By: Darlene Byrd RN 2018  2:41 PM                     Physician Progress Notes (most recent note)      Darlene Mcpherson II, DO at 2018  1:20 PM  Version 1 of 1             [facility] Medicine Services  PROGRESS NOTE    Patient Name: Ashely Roldan  : 1960  MRN: [id number]    Date of Admission: 2018  Length of Stay: 10  Primary Care Physician: Peter Rdz MD    Subjective   Subjective     CC: anxiousness, edema    HPI: Sitting up in bed being bathed by nurses. Has a myriad of complaints mostly revolving around anxiety and LE edema.     Review of Systems  Gen- No fevers, chills  CV- No chest pain, palpitations  Resp- No cough, dyspnea  GI- No N/V/D, abd pain    Otherwise ROS is negative except as mentioned in the HPI.    Objective   Objective     Vital Signs:   Temp:  [97.7 °F (36.5 °C)-98.8 °F (37.1 °C)] 97.7 °F (36.5 °C)  Heart Rate:  [64-73] 73  Resp:  [15-20] 18  BP: (118-145)/(73-85) 145/78        Physical Exam:  Constitutional: No acute distress, awake, alert, sitting up in bed  HENT: NCAT, mucous membranes moist  Respiratory: Clear to auscultation bilaterally, respiratory effort normal   Cardiovascular: RRR, loud mechanical click  Gastrointestinal: Positive bowel sounds, soft, nontender, nondistended  Musculoskeletal: Tight LE edema  bilaterally  Psychiatric: Appropriate affect, cooperative  Neurologic: Oriented x 3, strength symmetric in all extremities, Cranial Nerves grossly intact to confrontation, speech clear  Skin: No rashes    Results Reviewed:  I have personally reviewed current lab, radiology, and data and agree.      Results from last 7 days  Lab Units 02/02/18  0621 02/01/18  2321 02/01/18  0625 01/31/18  0606 01/30/18  0211   WBC 10*3/mm3  --  7.15  --  11.42* 8.59   HEMOGLOBIN g/dL  --  9.0*  --  10.7* 9.2*   HEMATOCRIT %  --  28.4*  --  33.2* 28.0*   PLATELETS 10*3/mm3  --  92*  --  105* 84*   INR  1.63  --  1.51 1.51 1.48       Results from last 7 days  Lab Units 02/02/18  0621 02/01/18  0625 01/31/18  0606  01/29/18  0708 01/28/18  0503   SODIUM mmol/L 129* 131* 131*  < > 135 141   POTASSIUM mmol/L 3.4* 4.1 4.5  < > 3.6 4.3   CHLORIDE mmol/L 96* 101 104  < > 107 115*   CO2 mmol/L 20.0 23.0 16.0*  < > 20.0 19.0*   BUN mg/dL 62* 67* 61*  < > 73* 76*   CREATININE mg/dL 2.20* 2.60* 2.80*  < > 3.30* 3.70*   GLUCOSE mg/dL 100 93 113*  < > 126* 136*   CALCIUM mg/dL 8.3* 8.9 8.8  < > 8.4* 8.0*   ALT (SGPT) U/L  --   --  822*  --  1211* 1490*   AST (SGOT) U/L  --   --  191*  --  486* 883*   < > = values in this interval not displayed.  Estimated Creatinine Clearance: 31.5 mL/min (by C-G formula based on Cr of 2.2).  BNP   Date Value Ref Range Status   02/01/2018 3350.0 (H) 0.0 - 100.0 pg/mL Final     Comment:     Results may be falsely decreased if patient taking Biotin.     No results found for: PHART    Microbiology Results Abnormal     Procedure Component Value - Date/Time    Blood Culture - Blood, [[id number]]  (Normal) Collected:  01/23/18 1645    Lab Status:  Final result Specimen:  Blood from Arm, Left Updated:  01/28/18 1716     Blood Culture No growth at 5 days    Blood Culture - Blood, [[id number]]  (Normal) Collected:  01/23/18 1645    Lab Status:  Final result Specimen:  Blood from Arm, Right Updated:  01/28/18 1716     Blood  Culture No growth at 5 days    Stool Culture - Stool, Per Rectum [[id number]]  (Abnormal) Collected:  01/24/18 1037    Lab Status:  Final result Specimen:  Stool from Per Rectum Updated:  01/26/18 1039     Stool Culture No Salmonella, Shigella, Campylobacter, E. coli O157, or Vibrio isolated      Light growth (2+) Yeast isolated (A)      No normal enteric coliforms present       Narrative:         Not cultured for Yersinia.    Not cultured for Yersinia.    Eosinophil Smear - Urine, Urine, Clean Catch [[id number]]  (Normal) Collected:  01/26/18 0226    Lab Status:  Final result Specimen:  Urine from Urine, Clean Catch Updated:  01/26/18 0323     Eosinophil Smear 0 % EOS/100 Cells     Narrative:       No eosinophil seen    Clostridium Difficile Toxin - Stool, Per Rectum [[id number]] Collected:  01/24/18 1037    Lab Status:  Final result Specimen:  Stool from Per Rectum Updated:  01/24/18 1223    Narrative:       The following orders were created for panel order Clostridium Difficile Toxin - Stool, Per Rectum.  Procedure                               Abnormality         Status                     ---------                               -----------         ------                     Clostridium Difficile To...[[id number]]  Normal              Final result                 Please view results for these tests on the individual orders.    Clostridium Difficile Toxin, PCR - Stool, Per Rectum [[id number]]  (Normal) Collected:  01/24/18 1037    Lab Status:  Final result Specimen:  Stool from Per Rectum Updated:  01/24/18 1223     C. Difficile Toxins by PCR Not Detected    Narrative:         Performance characteristics of test not established for patients <2 years of age.  Negative for Toxigenic C. Difficile          Personally reviewed cxr with bibasilar effusions. Agree with interpretation.    Results for orders placed during the hospital encounter of 02/04/17   Adult Transthoracic Echo Complete With Contrast    Narrative · Left  ventricular function is severely decreased. Estimated EF = 18%.  · The left ventricular cavity is borderline dilated.  · Left atrial cavity size is mild-to-moderately dilated.  · Mild to moderate aortic valve regurgitation is present.  · Moderate tricuspid valve regurgitation is present.  · Left ventricular wall thickness is consistent with mild concentric   hypertrophy.  · Left ventricular wall segments contract abnormally. Refer to wall   scoring diagram for more information.  · Right ventricular cavity is borderline dilated.  · There is no evidence of pericardial effusion.  · The prosthetic mitral valve is grossly normal.  · Mild pulmonary hypertension is present.          I have reviewed the medications.    Assessment/Plan   Assessment / Plan     Hospital Problem List     Anxiety    Essential hypertension (Chronic)    Overview Signed 10/19/2015  8:56 AM by Digna Santana     benign essential         Ischemic cardiomyopathy (Chronic)    Overview Signed 2/6/2017  8:44 AM by MAN Quesada     A. History of MI December 2009: s/p stent to RCA and LAD, EF 40%  B. CABGx2 March, 2010: SVG to OMB-1, SVG to PDA  C. EF 30% post-operatively, s/p [place] ICD placement, 2011  D. Echo July 2014: Severe global hypokinesis with EF 12%, moderate AI, mechanical mitral valve, moderate to severe TR, RVSP 43mmHg   E. Echo 2/4/17: EF 18%, mild to mod AI, mod TR, grossly normal prosthetic mitral valve         Chronic renal failure, stage 2 (mild)    Lupus (systemic lupus erythematosus) (Chronic)    H/O mitral valve replacement    Overview Signed 2/6/2017  8:53 AM by MAN Quesada     VHD:  A. Mitral valve replacement with 27mm ATS mechanical valve March 2010            Anemia of chronic kidney failure    Tobacco abuse (Chronic)    AICD (automatic cardioverter/defibrillator) present (Chronic)    FAHEEM (acute kidney injury)    Overview Signed 2/6/2017  8:46 AM by MAN Quesada     Nephrology following: Hyperkalemia  and Hyponatremia         Chronic anticoagulation    Overview Signed 7/16/2016 10:32 PM by IAIN Gabriel     Coumadin (Mechanical AVR)         Supratherapeutic INR    UTI (urinary tract infection)    Hyperkalemia    Colitis    Nausea and vomiting    Diarrhea    Acute kidney injury superimposed on chronic kidney disease    Lactic acidosis    Liver injury             Brief Hospital Course to date:  Ashely Roldan is a 57 y.o. female with systolic CHF/ICM s/p ICD and mechanical mitral valve who presented with N/V/D, abdominal pain and found to have FAHEEM, hyperkalemia and CT A/P revealed colitis.     Assessment & Plan:  FAHEEM  Hyperkalemia  -Improving. Nearing baseline of 2.0.  -ATN, likely due to hypotension, hypovolemia,   -Has underlying lupus nephritis with low C3, C4.  Renal biopsy from last year confirmed lupus.  -Nephrology following. Lasix today for edema. Monitor creatine closely while receiving lasix.     Acute liver injury  -Suspect ischemic due to hypotension  -Acute hepatitis panel negative  -LFTs improving     E. Coli UTI  -Completed 5 days of antibiotics  -Other infectious workup negative; d/c'd Zosyn 1/26     Lupus  -On Plaquenil  -Anti-DS DNA WNL  -Previously had negative DAWN and DS DNA in September 2017  -Anticardiolipin negative     Encephalopathy  -Resolved.  -Delirium vs. Metabolic encephalopathy vs. Medication induced     Chronic systolic CHF  Mechanical mitral valve  -Lasix today with increased edema, and B effusions  -Remains on IV heparin bridging to coumadin.  -Severe CM on ECHO    DVT Prophylaxis: IV heparin.    CODE STATUS: Full Code    Disposition: I expect the patient to be discharged TBD.    Darlene Mcpherson II, DO  02/02/18  1:21 PM         Electronically signed by Darlene Mcpherson II, DO at 2/2/2018  1:36 PM

## 2018-02-02 NOTE — PLAN OF CARE
Problem: Patient Care Overview (Adult)  Goal: Plan of Care Review  Outcome: Ongoing (interventions implemented as appropriate)   02/02/18 0407   Coping/Psychosocial Response Interventions   Plan Of Care Reviewed With patient   Patient Care Overview   Progress progress toward functional goals is gradual     Goal: Adult Individualization and Mutuality  Outcome: Ongoing (interventions implemented as appropriate)    Goal: Discharge Needs Assessment   01/24/18 1340 01/29/18 1330 02/01/18 0213   Discharge Needs Assessment   Concerns To Be Addressed --  --  discharge planning concerns;decision making concerns;home safety concerns   Readmission Within The Last 30 Days --  --  no previous admission in last 30 days   Equipment Needed After Discharge --  --  none   Discharge Facility/Level Of Care Needs --  --  nursing facility, skilled   Current Discharge Risk lives alone --  --    Discharge Disposition still a patient --  --    Current Health   Anticipated Changes Related to Illness --  --  inability to care for self   Living Environment   Transportation Available car;family or friend will provide --  --    Self-Care   Equipment Currently Used at Home --  cane, straight;walker, rolling  (pt was not currently using her walker) --        Problem: Fall Risk (Adult)  Goal: Absence of Falls  Outcome: Ongoing (interventions implemented as appropriate)   02/02/18 0407   Fall Risk (Adult)   Absence of Falls making progress toward outcome       Problem: Confusion, Acute (Adult)  Goal: Cognitive/Functional Impairments Minimized  Outcome: Ongoing (interventions implemented as appropriate)   02/02/18 0407   Confusion, Acute (Adult)   Cognitive/Functional Impairments Minimized making progress toward outcome     Goal: Safety  Outcome: Ongoing (interventions implemented as appropriate)   02/02/18 0407   Confusion, Acute (Adult)   Safety making progress toward outcome       Problem: Skin Integrity Impairment, Risk/Actual (Adult)  Goal: Skin  Integrity/Wound Healing  Outcome: Ongoing (interventions implemented as appropriate)   02/02/18 5741   Skin Integrity Impairment, Risk/Actual (Adult)   Skin Integrity/Wound Healing making progress toward outcome

## 2018-02-02 NOTE — PLAN OF CARE
Problem: Patient Care Overview (Adult)  Goal: Plan of Care Review  Outcome: Ongoing (interventions implemented as appropriate)   02/02/18 1643   Coping/Psychosocial Response Interventions   Plan Of Care Reviewed With patient   Patient Care Overview   Progress improving   Outcome Evaluation   Outcome Summary/Follow up Plan VSS, pt. has zero complaints of pain. Up to chair multiple times today. Eating well. New IV.        Problem: Fall Risk (Adult)  Goal: Absence of Falls  Outcome: Ongoing (interventions implemented as appropriate)   02/02/18 1643   Fall Risk (Adult)   Absence of Falls making progress toward outcome       Problem: Confusion, Acute (Adult)  Goal: Cognitive/Functional Impairments Minimized  Outcome: Ongoing (interventions implemented as appropriate)   02/02/18 1643   Confusion, Acute (Adult)   Cognitive/Functional Impairments Minimized making progress toward outcome     Goal: Safety  Outcome: Ongoing (interventions implemented as appropriate)   02/02/18 1643   Confusion, Acute (Adult)   Safety making progress toward outcome       Problem: Skin Integrity Impairment, Risk/Actual (Adult)  Goal: Skin Integrity/Wound Healing  Outcome: Ongoing (interventions implemented as appropriate)   02/02/18 1643   Skin Integrity Impairment, Risk/Actual (Adult)   Skin Integrity/Wound Healing making progress toward outcome

## 2018-02-02 NOTE — PROGRESS NOTES
"  [facility]  PROGRESS NOTE    Date of Admission: 1/23/2018  Length of Stay: 10  Primary Care Physician: Peter Rdz MD    Chief Complaint: f/u SHF, NSVT  Problem List:   1. Structural heart disease of mixed etiology:  A. History of MI December 2009: s/p stent to RCA and LAD, EF 40%  B. CABGx2 March, 2010: SVG to OMB-1, SVG to PDA  C. EF 30% post-operatively, s/p [place] ICD placement, 2011  D. Echo July 2014: Severe global hypokinesis with EF 12%, moderate AI, mechanical mitral valve, moderate to severe TR, RVSP 43mmHg   E. Echo 2/4/17: EF 18%, mild to mod AI, mod TR, grossly normal prosthetic mitral valve  F. Trivial Troponin elevation Feb. 2017.  2. VHD:  A. Mitral valve replacement with 27mm ATS mechanical valve March 2010, on chronic coumadin  3. COPD with ongoing tobacco abuse   4. Systemic Lupus Erythematosus  5. Hypertension  6. Hyperlipidemia   7. 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 PVD     Subjective      Patient lying in bed, ambulated with PT. Feels \"worn out.\" Has dyspnea and edema. No chest pain.       Objective   Vitals: /78 (BP Location: Left arm, Patient Position: Sitting)  Pulse 73  Temp 97.7 °F (36.5 °C) (Oral)   Resp 18  Ht 175.3 cm (69\")  Wt 71.7 kg (158 lb)  SpO2 95%  BMI 23.33 kg/m2    Physical Exam:  GENERAL: Alert, cooperative, in no acute distress.   HEENT: Fundoscopic deferred, otherwise unremarkable.  NECK: No Jugular venous distention, adenopathy, or thyromegaly noted.   HEART: No discrete PMI is noted. Regular rhythm, normal rate, and no murmur. Prosthetic valve clicks are \"OK\".  LUNGS: Diminished breath sounds bilaterally with rales in bases. No wheezing, or ronchi.  ABDOMEN: Flat without evidence of organomegaly, masses, or tenderness.  NEUROLOGIC: No focal abnormalities involving strength or sensation are noted.   EXTREMITIES: No clubbing, cyanosis. 2+ edema     Results:    Results from last 7 days  Lab Units 02/01/18  2321 " 01/31/18  0606 01/30/18  0211   WBC 10*3/mm3 7.15 11.42* 8.59   HEMOGLOBIN g/dL 9.0* 10.7* 9.2*   HEMATOCRIT % 28.4* 33.2* 28.0*   PLATELETS 10*3/mm3 92* 105* 84*       Results from last 7 days  Lab Units 02/02/18  0621 02/01/18  0625 01/31/18  0606   SODIUM mmol/L 129* 131* 131*   POTASSIUM mmol/L 3.4* 4.1 4.5   CHLORIDE mmol/L 96* 101 104   CO2 mmol/L 20.0 23.0 16.0*   BUN mg/dL 62* 67* 61*   CREATININE mg/dL 2.20* 2.60* 2.80*   GLUCOSE mg/dL 100 93 113*      Lab Results   Component Value Date    CHOL 157 02/04/2017    TRIG 141 02/04/2017    HDL 58 02/04/2017    LDLDIRECT 56 02/04/2017     (H) 01/31/2018     (H) 01/31/2018       Results from last 7 days  Lab Units 02/01/18  1505   BNP pg/mL 3350.0*       Results from last 7 days  Lab Units 02/02/18  0621 02/01/18  1505 02/01/18  0625  01/31/18  0606   PROTIME Seconds 18.0*  --  16.7*  --  16.7*   INR  1.63  --  1.51  --  1.51   APTT seconds 45.3* 63.2 76.2*  < > 79.3*   < > = values in this interval not displayed.        Intake/Output Summary (Last 24 hours) at 02/02/18 1121  Last data filed at 02/02/18 0532   Gross per 24 hour   Intake              500 ml   Output             1000 ml   Net             -500 ml     I personally reviewed the patient's EKG/Telemetry data    Radiology Data:   CXR 2/1/18:  FINDINGS: Cardiac size is enlarged. Pulmonary vascularity within normal  limits. Persistent bibasilar opacifications and effusions similar to  prior with small to moderate right pleural effusion and trace left. No  new parenchymal disease.  Left chest wall pacing device with lead  intact.      IMPRESSION:  Persistent bibasilar opacifications and effusions from prior  with small moderate right and trace left pleural effusions.    Current Medications:    budesonide-formoterol 2 puff Inhalation BID - RT   carvedilol 12.5 mg Oral BID With Meals   epoetin bob 20,000 Units Subcutaneous Weekly   famotidine 20 mg Oral Daily   furosemide 20 mg Oral Daily    hydrALAZINE 10 mg Oral Q8H   hydroxychloroquine 200 mg Oral Daily   nicotine 1 patch Transdermal Q24H   sodium bicarbonate 1,300 mg Oral BID   [START ON 2/3/2018] warfarin 2 mg Oral Daily   warfarin 3 mg Oral Once       heparin 10 Units/kg/hr Last Rate: 10 Units/kg/hr (02/02/18 0820)   Pharmacy to Dose Heparin     Pharmacy to dose warfarin         Assessment and Plan:     1. Ischemic cardiomyopathy/NSVT:  - s/p BSC ICD 2011 with last EF 18% per echocardiogram 02/2017  - Device interrogation showing episodes of NSVT, 1 episode of VT that broke with ATP earlier this month, patient asymptomatic   - repeat echo today  - On BBL, and Hydralazine; no ACEI/Entresto due to acute on chronic kidney disease  - RESUME home Crestor 10 and ASA  - volume overloaded following IV fluids administration for a few days since admission - now receiving IV Lasix and PO      2. Valvular heart disease:  - S/p mitral valve replacement with mechanical valve 03/2010  - On chronic coumadin therapy, was supratherapeutic upon admission and is s/p vitamin K administration, currently bridging with heparin with pharmacy to dose accordingly     3. Colitis:  - Per primary team     4. FAHEEM on CKD:  - Nephrology following    LONG-TERM COMPLIANCE HAS BEEN AN ISSUE.   Appreciate consultants' help.    Bouts asymptomatic NSVT in a patient with a device do not require further attention.    I, Yomi Ramirez MD, personally performed the services described as documented by the above named individual. I have made any necessary edits and it is both accurate and complete 2/2/2018  6:27 PM        Scribed for Yomi Ramirez MD by Sandhya Lynch PA-C.

## 2018-02-02 NOTE — PLAN OF CARE
Problem: Patient Care Overview (Adult)  Goal: Plan of Care Review  Outcome: Ongoing (interventions implemented as appropriate)   02/02/18 1052   Coping/Psychosocial Response Interventions   Plan Of Care Reviewed With patient   Patient Care Overview   Progress improving   Outcome Evaluation   Outcome Summary/Follow up Plan pt alert,follows commands.still needs minim. assist for transfers and gait.ambulat 250 ft today.sat up in chair       Problem: Inpatient Physical Therapy  Goal: Bed Mobility Goal LTG- PT  Outcome: Ongoing (interventions implemented as appropriate)   01/29/18 1420 02/02/18 1052   Bed Mobility PT LTG   Bed Mobility PT LTG, Date Established 01/29/18 --    Bed Mobility PT LTG, Time to Achieve 2 wks --    Bed Mobility PT LTG, Activity Type supine to sit/sit to supine --    Bed Mobility PT LTG, [place] Level independent --    Bed Mobility PT LTG, Outcome --  goal ongoing     Goal: Transfer Training Goal 1 LTG- PT  Outcome: Ongoing (interventions implemented as appropriate)   01/29/18 1420 02/02/18 1052   Transfer Training PT LTG   Transfer Training PT LTG, Date Established 01/29/18 --    Transfer Training PT LTG, Time to Achieve 2 wks --    Transfer Training PT LTG, Activity Type sit to stand/stand to sit --    Transfer Training PT LTG, [place] Level conditional independence --    Transfer Training PT LTG, Assist Device walker, rolling --    Transfer Training PT LTG, Outcome --  goal ongoing

## 2018-02-02 NOTE — PROGRESS NOTES
"NAL Progress Note  Ashely Roldan  [id number]  1960 1/23/2018    Reason for visit:  FAHEEM on CKD    ROS:    Pulm:  No SOA  CV:  No CP  Edema  Eating better    I&O:    Intake/Output Summary (Last 24 hours) at 02/02/18 1100  Last data filed at 02/02/18 0532   Gross per 24 hour   Intake              500 ml   Output             1000 ml   Net             -500 ml       PE:   Blood pressure 145/78, pulse 73, temperature 97.7 °F (36.5 °C), temperature source Oral, resp. rate 18, height 175.3 cm (69\"), weight 71.7 kg (158 lb), SpO2 95 %, not currently breastfeeding.    Constitutional: No acute distress  HENT: No signs of trauma nausea or face or head  Head: Normocephalic and atraumatic.   Mouth/Throat: Mucous membranes are moist  Eyes: Pupils are equal, round, and reactive to light.   Neck: Neck supple. No JVD present.   Cardiovascular: Normal rate, regular rhythm and normal heart sounds.  Exam reveals no gallop and no friction rub.    No murmur heard.  Pulmonary/Chest: Effort normal and breath sounds normal. She has no wheezes. She has no rales.   Abdominal: Soft. Bowel sounds are normal. She exhibits no distension. There is no tenderness.   Musculoskeletal: She exhibits no edema or tenderness.   Lymphadenopathy:     She has no cervical adenopathy.   Skin: Skin is warm and dry. No rash noted.     Medications:    Current Facility-Administered Medications:   •  acetaminophen (TYLENOL) tablet 650 mg, 650 mg, Oral, Q6H PRN, Alanna Dayanara, APRN, 650 mg at 01/31/18 1736  •  albuterol (PROVENTIL) nebulizer solution 0.5% 2.5 mg/0.5mL, 2.5 mg, Nebulization, PRN, Mary FRANCIS MD  •  budesonide-formoterol (SYMBICORT) 160-4.5 MCG/ACT inhaler 2 puff, 2 puff, Inhalation, BID - RT, Mary FRANCIS MD, 2 puff at 02/02/18 0846  •  carvedilol (COREG) tablet 12.5 mg, 12.5 mg, Oral, BID With Meals, Scott Reina MD, 12.5 mg at 02/02/18 0820  •  epoetin bob (EPOGEN,PROCRIT) injection 20,000 Units, 20,000 Units, Subcutaneous, " Weekly, Scott Reina MD, 20,000 Units at 02/02/18 0820  •  famotidine (PEPCID) tablet 20 mg, 20 mg, Oral, Daily, Trini Fitzgerald MD, 20 mg at 02/02/18 0820  •  furosemide (LASIX) tablet 20 mg, 20 mg, Oral, Daily, Scott Reina MD, 20 mg at 02/02/18 0820  •  heparin 85027 units/250 mL (100 units/mL) in 0.45 % NaCl infusion, 10 Units/kg/hr, Intravenous, Titrated, Manjit Lynn [facility], Last Rate: 7.03 mL/hr at 02/02/18 0820, 10 Units/kg/hr at 02/02/18 0820  •  hydrALAZINE (APRESOLINE) tablet 10 mg, 10 mg, Oral, Q8H, Jaleel Schafer MD, 10 mg at 02/02/18 0535  •  hydroxychloroquine (PLAQUENIL) tablet 200 mg, 200 mg, Oral, Daily, Mary FRANCIS MD, 200 mg at 02/02/18 0820  •  loperamide (IMODIUM) capsule 2 mg, 2 mg, Oral, 4x Daily PRN, Trini Fitzgerald MD, 2 mg at 01/29/18 1530  •  LORazepam (ATIVAN) tablet 0.5 mg, 0.5 mg, Oral, Q6H PRN, Trini Fitzgerald MD, 0.5 mg at 02/01/18 0830  •  melatonin sublingual tablet 5 mg, 5 mg, Sublingual, Nightly PRN, Alanna Dayanara, APRN, 5 mg at 02/01/18 2037  •  nicotine (NICODERM CQ) 21 MG/24HR patch 1 patch, 1 patch, Transdermal, Q24H, Mary FRANCIS MD, 1 patch at 02/02/18 0820  •  ondansetron (ZOFRAN) tablet 4 mg, 4 mg, Oral, Q6H PRN, 4 mg at 01/23/18 2318 **OR** ondansetron (ZOFRAN) injection 4 mg, 4 mg, Intravenous, Q6H PRN, Mary FRANCIS MD, 4 mg at 01/30/18 9732  •  Pharmacy to Dose Heparin, , Does not apply, Continuous PRN, Trini Fitzgerald MD  •  Pharmacy to dose warfarin, , Does not apply, Continuous PRN, Manjit Lynn RPH  •  sodium bicarbonate tablet 1,300 mg, 1,300 mg, Oral, BID, Scott Reina MD, 1,300 mg at 02/02/18 0819  •  sodium chloride 0.9 % flush 1-10 mL, 1-10 mL, Intravenous, PRN, Mary FRANCIS MD  •  [START ON 2/3/2018] warfarin (COUMADIN) tablet 2 mg, 2 mg, Oral, Daily, Manjit Lynn RPH  •  warfarin (COUMADIN) tablet 3 mg, 3 mg, Oral, Once, Manjit Lynn RPH    Meds reviewed and doses adjusted for renal function    Labs:    Results from last 7  days  Lab Units 02/01/18  2321 01/31/18  0606 01/30/18  0211   WBC 10*3/mm3 7.15 11.42* 8.59   HEMOGLOBIN g/dL 9.0* 10.7* 9.2*   HEMATOCRIT % 28.4* 33.2* 28.0*   PLATELETS 10*3/mm3 92* 105* 84*       Results from last 7 days  Lab Units 02/02/18  0621 02/01/18  0625 01/31/18  0606 01/30/18  0211  01/27/18  0607   SODIUM mmol/L 129* 131* 131* 134  < > 142   POTASSIUM mmol/L 3.4* 4.1 4.5 3.9  < > 4.4   CHLORIDE mmol/L 96* 101 104 110*  < > 116*   CO2 mmol/L 20.0 23.0 16.0* 19.0*  < > 14.0*   BUN mg/dL 62* 67* 61* 65*  < > 75*   CREATININE mg/dL 2.20* 2.60* 2.80* 2.90*  < > 3.60*   GLUCOSE mg/dL 100 93 113* 95  < > 78   CALCIUM mg/dL 8.3* 8.9 8.8 8.6*  < > 8.3*   PHOSPHORUS mg/dL  --   --   --   --   --  5.6*   < > = values in this interval not displayed.    Results from last 7 days  Lab Units 01/31/18  0606  01/28/18  0503   ALK PHOS U/L 152*  < > 139*   BILIRUBIN mg/dL 1.0  < > 0.5   BILIRUBIN DIRECT mg/dL  --   --  0.2   ALT (SGPT) U/L 822*  < > 1490*   AST (SGOT) U/L 191*  < > 883*   < > = values in this interval not displayed.  Invalid input(s): UCOL, UCLR, UPH, USG, UPRO, UBLD, UNITR, ELEU, URBC, UFC, UBACT    Estimated Creatinine Clearance: 31.5 mL/min (by C-G formula based on Cr of 2.2).    Radiology:  Imaging Results (last 72 hours)     Procedure Component Value Units Date/Time    CT Head Without Contrast [[id number]] Collected:  01/23/18 1605     Updated:  01/23/18 1655    Narrative:       EXAMINATION: CT HEAD WO CONTRAST- 01/23/2018     INDICATION: Headache, SAH suspected      TECHNIQUE: Axial CT of the head without intravenous contrast  administration     The radiation dose reduction device was turned on for each scan per the  ALARA (As Low as Reasonably Achievable) protocol.     COMPARISON: 07/16/2016     FINDINGS: Midline structures are symmetric without evidence of mass,  mass effect or midline shift. No intra-axial hemorrhage or extra-axial  fluid collection. Ventricles and sulci within normal limits.  Basal  cisterns are patent. Globes and orbits are unremarkable. Visualized  paranasal sinuses and mastoid air cells are grossly clear and  well-pneumatized. Calvarium intact.       Impression:       No acute intracranial abnormality.     D:  01/23/2018  E:  01/23/2018        This report was finalized on 1/23/2018 4:53 PM by Dr. Cuate Vick.       CT Abdomen Pelvis Without Contrast [[id number]] Collected:  01/23/18 1611     Updated:  01/24/18 0929    Narrative:       EXAMINATION: CT ABDOMEN AND PELVIS WO CONTRAST-      INDICATION: Abdominal pain, gastroenteritis or colitis suspected.      TECHNIQUE: CT abdomen and pelvis without intravenous contrast.     The radiation dose reduction device was turned on for each scan per the  ALARA (As Low as Reasonably Achievable) protocol.     COMPARISON: None.     FINDINGS: Lung bases demonstrate subsegmental atelectasis and/or  scarring within the right lung base. No focal consolidation. Liver  without focal lesion. Gallbladder surgically absent. Pancreas and spleen  within normal limits. Splenule at the splenic column. Adrenal glands  without distinct nodule. Kidneys without hydronephrosis or hydroureter.  No bulky retroperitoneal adenopathy. Atherosclerotic nonaneurysmal  abdominal aorta. GI tract evaluation demonstrates small hiatal hernia.  There is limited visualization of the right hemipelvis due to right hip  arthroplasty however, within the visualized portions there is mural  thickening and pericolonic stranding associated with redundant sigmoid  colon and adjacent free fluid in the pelvis consistent with colitis. No  diverticula are identified to suggest diverticulitis. Pelvic viscera  otherwise unremarkable and partially visualized due to significant beam  hardening artifact from the arthroplasty. Multilevel degenerative  changes of the spine without aggressive osseous lesion. No soft tissue  body wall findings of concern.       Impression:       There is questionable  mural thickening of the redundant  sigmoid within the pelvis with adjacent small volume likely reactive  free fluid. No loculated intraabdominal fluid collection. No free air.  This is most consistent with colitis. No diverticula are identified to  suggest diverticulitis as an underlying etiology.     D:  01/23/2018  E:  01/24/2018     This report was finalized on 1/24/2018 9:27 AM by Dr. Cuate Vick.       XR Chest 1 View [[id number]] Collected:  01/23/18 1700     Updated:  01/24/18 0930    Narrative:       EXAMINATION: XR CHEST 1 VW- 01/23/2018     INDICATION: hypoxemia; N17.9-Acute kidney failure, unspecified;  N18.9-Chronic kidney disease, unspecified; M50-Mdgknqchpt  gastroenteritis and colitis, unspecified; E86.9-Volume depletion,  unspecified; R79.1-Abnormal coagulation profile; E87.5-Hyperkalemia      COMPARISON: Chest x-ray 01/21/2018     FINDINGS: Cardiac size enlarged. Pulmonary vascularity within normal  limits. No focal consolidation. Left chest wall pacing device with lead  intact. No pneumothorax or pleural effusion.           Impression:       Chronic lung changes without acute cardiopulmonary process.     D:  01/23/2018  E:  01/24/2018     This report was finalized on 1/24/2018 9:27 AM by Dr. Cuate Vick.             Renal Imaging:  reviewed      IMPRESSION:  1. 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 Baseline creatinine per previously obtained labs appears to be in the 1.5-2.0 range. At admission to [facility] creatinine was documented to be 4.0 with a continuing elevation to 4.2 today. She has been hypotensive overnight. Cr is stable at 4.2- bun slowly declining2. Hypotension - Patient with SBP in the 90s. Hydralazine has been held and klonopin orders only PRN due to lethargy. Goal MAP >60mmHg. IV hydration ordered.    2. Low plt and high protime; Need to evaluate for ttp - send millan t 13   3. Anemia - + Occult blood in stool. Transfuse for Hgb <  8.0.      4. Lupus - Patient currently taking plaquenil. Low c3c4 but dsdna low     5. Hypocalcemia - PTH and ionized calcium to be drawn.     6. Nephrolithiasis - History of. Last episode 2015               RECOMMENDATIONS:  Continue feel better  She is still on IV heparin  Waiting for past medical record from primary care including kidney biopsy, History of lupus has seen a rheumatologist she doesn't remember the name has seen a nephrologist long time ago she does not remember the name  Kidney function stable and improving  For edema I will give 20 mg of IV Lasix continue by mouth Lasix               Scott Reina MD  2/2/2018  11:00 AM

## 2018-02-03 LAB
ANION GAP SERPL CALCULATED.3IONS-SCNC: 7 MMOL/L (ref 3–11)
APTT PPP: 50.9 SECONDS (ref 55–70)
APTT PPP: 68.9 SECONDS (ref 55–70)
APTT PPP: 87.8 SECONDS (ref 55–70)
ARTICHOKE IGE QN: 96 MG/DL (ref 0–130)
BUN BLD-MCNC: 60 MG/DL (ref 9–23)
BUN/CREAT SERPL: 30 (ref 7–25)
CALCIUM SPEC-SCNC: 8 MG/DL (ref 8.7–10.4)
CHLORIDE SERPL-SCNC: 99 MMOL/L (ref 99–109)
CHOLEST SERPL-MCNC: 142 MG/DL (ref 0–200)
CO2 SERPL-SCNC: 25 MMOL/L (ref 20–31)
CREAT BLD-MCNC: 2 MG/DL (ref 0.6–1.3)
GFR SERPL CREATININE-BSD FRML MDRD: 26 ML/MIN/1.73
GLUCOSE BLD-MCNC: 97 MG/DL (ref 70–100)
HDLC SERPL-MCNC: 42 MG/DL (ref 40–60)
INR PPP: 1.78
POTASSIUM BLD-SCNC: 3.3 MMOL/L (ref 3.5–5.5)
PROTHROMBIN TIME: 19.7 SECONDS (ref 9.6–11.5)
SODIUM BLD-SCNC: 131 MMOL/L (ref 132–146)
TRIGL SERPL-MCNC: 81 MG/DL (ref 0–150)

## 2018-02-03 PROCEDURE — 99233 SBSQ HOSP IP/OBS HIGH 50: CPT | Performed by: INTERNAL MEDICINE

## 2018-02-03 PROCEDURE — 94640 AIRWAY INHALATION TREATMENT: CPT

## 2018-02-03 PROCEDURE — 94799 UNLISTED PULMONARY SVC/PX: CPT

## 2018-02-03 PROCEDURE — 99232 SBSQ HOSP IP/OBS MODERATE 35: CPT | Performed by: INTERNAL MEDICINE

## 2018-02-03 PROCEDURE — 80061 LIPID PANEL: CPT | Performed by: PHYSICIAN ASSISTANT

## 2018-02-03 PROCEDURE — 85730 THROMBOPLASTIN TIME PARTIAL: CPT

## 2018-02-03 PROCEDURE — 85610 PROTHROMBIN TIME: CPT

## 2018-02-03 PROCEDURE — 25010000002 HEPARIN (PORCINE) PER 1000 UNITS

## 2018-02-03 PROCEDURE — 80048 BASIC METABOLIC PNL TOTAL CA: CPT | Performed by: INTERNAL MEDICINE

## 2018-02-03 PROCEDURE — 63710000001 DIPHENHYDRAMINE PER 50 MG: Performed by: INTERNAL MEDICINE

## 2018-02-03 RX ORDER — WARFARIN SODIUM 3 MG/1
3 TABLET ORAL
Status: DISCONTINUED | OUTPATIENT
Start: 2018-02-03 | End: 2018-02-05

## 2018-02-03 RX ORDER — HEPARIN SODIUM 1000 [USP'U]/ML
2000 INJECTION, SOLUTION INTRAVENOUS; SUBCUTANEOUS ONCE
Status: COMPLETED | OUTPATIENT
Start: 2018-02-03 | End: 2018-02-03

## 2018-02-03 RX ORDER — FUROSEMIDE 20 MG/1
20 TABLET ORAL
Status: DISCONTINUED | OUTPATIENT
Start: 2018-02-03 | End: 2018-02-05 | Stop reason: HOSPADM

## 2018-02-03 RX ORDER — DIPHENHYDRAMINE HCL 25 MG
25 CAPSULE ORAL EVERY 6 HOURS PRN
Status: DISCONTINUED | OUTPATIENT
Start: 2018-02-03 | End: 2018-02-05 | Stop reason: HOSPADM

## 2018-02-03 RX ORDER — POTASSIUM CHLORIDE 750 MG/1
30 CAPSULE, EXTENDED RELEASE ORAL ONCE
Status: COMPLETED | OUTPATIENT
Start: 2018-02-03 | End: 2018-02-03

## 2018-02-03 RX ADMIN — NICOTINE 1 PATCH: 21 PATCH, EXTENDED RELEASE TRANSDERMAL at 09:45

## 2018-02-03 RX ADMIN — POTASSIUM CHLORIDE 30 MEQ: 750 CAPSULE, EXTENDED RELEASE ORAL at 14:07

## 2018-02-03 RX ADMIN — SODIUM BICARBONATE 650 MG TABLET 1300 MG: at 22:12

## 2018-02-03 RX ADMIN — HYDROXYCHLOROQUINE SULFATE 200 MG: 200 TABLET, FILM COATED ORAL at 09:46

## 2018-02-03 RX ADMIN — FUROSEMIDE 20 MG: 20 TABLET ORAL at 11:00

## 2018-02-03 RX ADMIN — ROSUVASTATIN CALCIUM 10 MG: 10 TABLET ORAL at 22:11

## 2018-02-03 RX ADMIN — HEPARIN SODIUM 2000 UNITS: 1000 INJECTION, SOLUTION INTRAVENOUS; SUBCUTANEOUS at 12:36

## 2018-02-03 RX ADMIN — HYDRALAZINE HYDROCHLORIDE 10 MG: 10 TABLET ORAL at 05:09

## 2018-02-03 RX ADMIN — CARVEDILOL 12.5 MG: 12.5 TABLET, FILM COATED ORAL at 09:46

## 2018-02-03 RX ADMIN — ASPIRIN 81 MG: 81 TABLET, COATED ORAL at 09:46

## 2018-02-03 RX ADMIN — DIPHENHYDRAMINE HYDROCHLORIDE 25 MG: 25 CAPSULE ORAL at 18:14

## 2018-02-03 RX ADMIN — CARVEDILOL 12.5 MG: 12.5 TABLET, FILM COATED ORAL at 18:14

## 2018-02-03 RX ADMIN — LORAZEPAM 0.5 MG: 0.5 TABLET ORAL at 05:09

## 2018-02-03 RX ADMIN — Medication 5 MG: at 22:12

## 2018-02-03 RX ADMIN — LORAZEPAM 0.5 MG: 0.5 TABLET ORAL at 22:12

## 2018-02-03 RX ADMIN — HYDRALAZINE HYDROCHLORIDE 10 MG: 10 TABLET ORAL at 22:12

## 2018-02-03 RX ADMIN — FAMOTIDINE 20 MG: 20 TABLET, FILM COATED ORAL at 09:46

## 2018-02-03 RX ADMIN — BUDESONIDE AND FORMOTEROL FUMARATE DIHYDRATE 2 PUFF: 160; 4.5 AEROSOL RESPIRATORY (INHALATION) at 09:11

## 2018-02-03 RX ADMIN — SODIUM BICARBONATE 650 MG TABLET 1300 MG: at 09:46

## 2018-02-03 RX ADMIN — HYDRALAZINE HYDROCHLORIDE 10 MG: 10 TABLET ORAL at 14:07

## 2018-02-03 RX ADMIN — BUDESONIDE AND FORMOTEROL FUMARATE DIHYDRATE 2 PUFF: 160; 4.5 AEROSOL RESPIRATORY (INHALATION) at 19:51

## 2018-02-03 RX ADMIN — LORAZEPAM 0.5 MG: 0.5 TABLET ORAL at 11:08

## 2018-02-03 RX ADMIN — WARFARIN SODIUM 3 MG: 3 TABLET ORAL at 18:14

## 2018-02-03 RX ADMIN — FUROSEMIDE 20 MG: 20 TABLET ORAL at 18:14

## 2018-02-03 NOTE — PROGRESS NOTES
"    [facility] Medicine Services  PROGRESS NOTE    Patient Name: Ashely Roldan  : 1960  MRN: [id number]    Date of Admission: 2018  Length of Stay: 11  Primary Care Physician: Peter Rzd MD    Subjective   Subjective     CC: f/u anxiety    HPI: Doing \"wonderfully\" today. No complaints. Thinks she is doing great.    Review of Systems  Gen- No fevers, chills  CV- No chest pain, palpitations  Resp- No cough, dyspnea  GI- No N/V/D, abd pain    Otherwise ROS is negative except as mentioned in the HPI.    Objective   Objective     Vital Signs:   Temp:  [97.3 °F (36.3 °C)-97.7 °F (36.5 °C)] 97.7 °F (36.5 °C)  Heart Rate:  [65-74] 73  Resp:  [16-20] 18  BP: (122-130)/(69-83) 130/69        Physical Exam:  Constitutional: No acute distress, awake, alert  HENT: NCAT, mucous membranes moist  Respiratory: Clear to auscultation bilaterally, respiratory effort normal   Cardiovascular: RRR, loud mechanical click  Gastrointestinal: Positive bowel sounds, soft, nontender, nondistended  Musculoskeletal: 2+ LE edema bilaterally  Psychiatric: Appropriate affect, cooperative  Neurologic: Oriented x 3, strength symmetric in all extremities, Cranial Nerves grossly intact to confrontation, speech clear  Skin: No rashes    Results Reviewed:  I have personally reviewed current lab, radiology, and data and agree.      Results from last 7 days  Lab Units 18  0606 18  0211   WBC 10*3/mm3  --   --  7.15  --  11.42* 8.59   HEMOGLOBIN g/dL  --   --  9.0*  --  10.7* 9.2*   HEMATOCRIT %  --   --  28.4*  --  33.2* 28.0*   PLATELETS 10*3/mm3  --   --  92*  --  105* 84*   INR  1.78 1.63  --  1.51 1.51 1.48       Results from last 7 days  Lab Units 18  0303 02/02/18  0621 02/01/18  0625 01/31/18  0606  18  0708 18  0503   SODIUM mmol/L 131* 129* 131* 131*  < > 135 141   POTASSIUM mmol/L 3.3* 3.4* 4.1 4.5  < > 3.6 4.3 "   CHLORIDE mmol/L 99 96* 101 104  < > 107 115*   CO2 mmol/L 25.0 20.0 23.0 16.0*  < > 20.0 19.0*   BUN mg/dL 60* 62* 67* 61*  < > 73* 76*   CREATININE mg/dL 2.00* 2.20* 2.60* 2.80*  < > 3.30* 3.70*   GLUCOSE mg/dL 97 100 93 113*  < > 126* 136*   CALCIUM mg/dL 8.0* 8.3* 8.9 8.8  < > 8.4* 8.0*   ALT (SGPT) U/L  --   --   --  822*  --  1211* 1490*   AST (SGOT) U/L  --   --   --  191*  --  486* 883*   < > = values in this interval not displayed.  Estimated Creatinine Clearance: 34.9 mL/min (by C-G formula based on Cr of 2).  BNP   Date Value Ref Range Status   02/01/2018 3350.0 (H) 0.0 - 100.0 pg/mL Final     Comment:     Results may be falsely decreased if patient taking Biotin.     No results found for: PHART    Microbiology Results Abnormal     Procedure Component Value - Date/Time    Blood Culture - Blood, [[id number]]  (Normal) Collected:  01/23/18 1645    Lab Status:  Final result Specimen:  Blood from Arm, Left Updated:  01/28/18 1716     Blood Culture No growth at 5 days    Blood Culture - Blood, [[id number]]  (Normal) Collected:  01/23/18 1645    Lab Status:  Final result Specimen:  Blood from Arm, Right Updated:  01/28/18 1716     Blood Culture No growth at 5 days    Stool Culture - Stool, Per Rectum [[id number]]  (Abnormal) Collected:  01/24/18 1037    Lab Status:  Final result Specimen:  Stool from Per Rectum Updated:  01/26/18 1039     Stool Culture No Salmonella, Shigella, Campylobacter, E. coli O157, or Vibrio isolated      Light growth (2+) Yeast isolated (A)      No normal enteric coliforms present       Narrative:         Not cultured for Yersinia.    Not cultured for Yersinia.    Eosinophil Smear - Urine, Urine, Clean Catch [[id number]]  (Normal) Collected:  01/26/18 0226    Lab Status:  Final result Specimen:  Urine from Urine, Clean Catch Updated:  01/26/18 0323     Eosinophil Smear 0 % EOS/100 Cells     Narrative:       No eosinophil seen    Clostridium Difficile Toxin - Stool, Per Rectum [[id number]]  Collected:  01/24/18 1037    Lab Status:  Final result Specimen:  Stool from Per Rectum Updated:  01/24/18 1223    Narrative:       The following orders were created for panel order Clostridium Difficile Toxin - Stool, Per Rectum.  Procedure                               Abnormality         Status                     ---------                               -----------         ------                     Clostridium Difficile To...[[id number]]  Normal              Final result                 Please view results for these tests on the individual orders.    Clostridium Difficile Toxin, PCR - Stool, Per Rectum [[id number]]  (Normal) Collected:  01/24/18 1037    Lab Status:  Final result Specimen:  Stool from Per Rectum Updated:  01/24/18 1223     C. Difficile Toxins by PCR Not Detected    Narrative:         Performance characteristics of test not established for patients <2 years of age.  Negative for Toxigenic C. Difficile          Results for orders placed during the hospital encounter of 02/04/17   Adult Transthoracic Echo Complete With Contrast    Narrative · Left ventricular function is severely decreased. Estimated EF = 18%.  · The left ventricular cavity is borderline dilated.  · Left atrial cavity size is mild-to-moderately dilated.  · Mild to moderate aortic valve regurgitation is present.  · Moderate tricuspid valve regurgitation is present.  · Left ventricular wall thickness is consistent with mild concentric   hypertrophy.  · Left ventricular wall segments contract abnormally. Refer to wall   scoring diagram for more information.  · Right ventricular cavity is borderline dilated.  · There is no evidence of pericardial effusion.  · The prosthetic mitral valve is grossly normal.  · Mild pulmonary hypertension is present.          I have reviewed the medications.    Assessment/Plan   Assessment / Plan     Hospital Problem List     Anxiety    Essential hypertension (Chronic)    Overview Signed 10/19/2015  8:56 AM  by Digna Santana     benign essential         Ischemic cardiomyopathy (Chronic)    Overview Signed 2/6/2017  8:44 AM by MAN Quesada     A. History of MI December 2009: s/p stent to RCA and LAD, EF 40%  B. CABGx2 March, 2010: SVG to OMB-1, SVG to PDA  C. EF 30% post-operatively, s/p [place] ICD placement, 2011  D. Echo July 2014: Severe global hypokinesis with EF 12%, moderate AI, mechanical mitral valve, moderate to severe TR, RVSP 43mmHg   E. Echo 2/4/17: EF 18%, mild to mod AI, mod TR, grossly normal prosthetic mitral valve         Chronic renal failure, stage 2 (mild)    Lupus (systemic lupus erythematosus) (Chronic)    H/O mitral valve replacement    Overview Signed 2/6/2017  8:53 AM by MAN Quesada     VHD:  A. Mitral valve replacement with 27mm ATS mechanical valve March 2010            Anemia of chronic kidney failure    Tobacco abuse (Chronic)    AICD (automatic cardioverter/defibrillator) present (Chronic)    FAHEEM (acute kidney injury)    Overview Signed 2/6/2017  8:46 AM by MAN Quesada     Nephrology following: Hyperkalemia and Hyponatremia         Chronic anticoagulation    Overview Signed 7/16/2016 10:32 PM by IAIN Gabriel     Coumadin (Mechanical AVR)         Supratherapeutic INR    UTI (urinary tract infection)    Hyperkalemia    Colitis    Nausea and vomiting    Diarrhea    Acute kidney injury superimposed on chronic kidney disease    Lactic acidosis    Liver injury          Brief Hospital Course to date:  Ashely Roldan is a 57 y.o. female with systolic CHF/ICM s/p ICD and mechanical mitral valve who presented with N/V/D, abdominal pain and found to have FAHEEM, hyperkalemia and CT A/P revealed colitis.      Assessment & Plan:  FAHEEM  Hyperkalemia  -Improving. Back to prior baseline of 2.0.  -ATN, likely due to hypotension, hypovolemia from acute illness.  -Has underlying lupus nephritis with low C3, C4.  Renal biopsy from last year confirmed lupus.  -Nephrology  following. Increase lasix today.  -Okay to d/c mixon today.      Acute liver injury  -Suspect ischemic due to hypotension  -Acute hepatitis panel negative  -LFTs improving      E. Coli UTI  -Completed 5 days of antibiotics  -Other infectious workup negative; d/c'd Zosyn 1/26      Lupus  -On Plaquenil  -Anti-DS DNA WNL  -Previously had negative DAWN and DS DNA in September 2017  -Anticardiolipin negative      Encephalopathy  -Resolved.  -Delirium vs. Metabolic encephalopathy vs. Medication induced      Chronic systolic CHF  Mechanical mitral valve  -Lasix today with increased edema, and B effusions  -Remains on IV heparin bridging to coumadin. Still subtherapeutic.  -Severe CM on ECHO     DVT Prophylaxis: IV heparin.     CODE STATUS: Full Code     Disposition: I expect the patient to be discharged home once INR therapeutic.    Darlene Mcpherson II,   02/03/18  12:29 PM

## 2018-02-03 NOTE — PROGRESS NOTES
"NAL Progress Note  Ashely Roldan  [id number]  1960 1/23/2018    Reason for visit:  FAHEEM on CKD    ROS:    No new complaints, still have edema.  Feeling much better.    I&O:    Intake/Output Summary (Last 24 hours) at 02/03/18 1158  Last data filed at 02/03/18 0500   Gross per 24 hour   Intake              240 ml   Output             1725 ml   Net            -1485 ml       PE:   Blood pressure 130/69, pulse 73, temperature 97.7 °F (36.5 °C), temperature source Oral, resp. rate 18, height 175.3 cm (69\"), weight 72.1 kg (159 lb), SpO2 97 %, not currently breastfeeding.    Constitutional: Awake alert no obvious distress.  HENT: Pupils reactive, EOMI, oral mucosa moist.   Neck: Neck supple. No JVD present.   Cardiovascular: No gallop murmur or rub.  Pulmonary/Chest: Clear on auscultation no obvious distress equal chest movement.  Abdominal: Soft. Bowel sounds are normal. She exhibits no distension. There is no tenderness.   Musculoskeletal: Positive dependent edema is noted    neuro: Grossly intact no focal deficit.  Skin: Skin is warm and dry. No rash noted.     Medications:    Current Facility-Administered Medications:   •  acetaminophen (TYLENOL) tablet 650 mg, 650 mg, Oral, Q6H PRN, Alanna Dayanara, APRN, 650 mg at 01/31/18 1736  •  albuterol (PROVENTIL) nebulizer solution 0.5% 2.5 mg/0.5mL, 2.5 mg, Nebulization, PRN, Mary FRANCIS MD  •  aspirin EC tablet 81 mg, 81 mg, Oral, Daily, Sandhya Lynch PA-C, 81 mg at 02/03/18 0946  •  budesonide-formoterol (SYMBICORT) 160-4.5 MCG/ACT inhaler 2 puff, 2 puff, Inhalation, BID - RT, Mary FRANCIS MD, 2 puff at 02/03/18 0911  •  carvedilol (COREG) tablet 12.5 mg, 12.5 mg, Oral, BID With Meals, Scott Reina MD, 12.5 mg at 02/03/18 0946  •  epoetin bob (EPOGEN,PROCRIT) injection 20,000 Units, 20,000 Units, Subcutaneous, Weekly, Scott Reina MD, 20,000 Units at 02/02/18 0820  •  famotidine (PEPCID) tablet 20 mg, 20 mg, Oral, Daily, Trini Fitzgerald MD, " 20 mg at 02/03/18 0946  •  furosemide (LASIX) tablet 20 mg, 20 mg, Oral, Daily, Scott Reina MD, 20 mg at 02/03/18 1100  •  heparin (porcine) injection 2,000 Units, 2,000 Units, Intravenous, Once, Pedrito Pan [facility]  •  heparin 71996 units/250 mL (100 units/mL) in 0.45 % NaCl infusion, 10 Units/kg/hr, Intravenous, Titrated, Pedrito Pan [facility], Last Rate: 6.32 mL/hr at 02/02/18 2211, 9 Units/kg/hr at 02/02/18 2211  •  hydrALAZINE (APRESOLINE) tablet 10 mg, 10 mg, Oral, Q8H, Jaleel Schafer MD, 10 mg at 02/03/18 0509  •  hydroxychloroquine (PLAQUENIL) tablet 200 mg, 200 mg, Oral, Daily, Mary FRANCIS MD, 200 mg at 02/03/18 0946  •  loperamide (IMODIUM) capsule 2 mg, 2 mg, Oral, 4x Daily PRN, Trini Fitzgerald MD, 2 mg at 01/29/18 1530  •  LORazepam (ATIVAN) tablet 0.5 mg, 0.5 mg, Oral, Q6H PRN, Trini Fitzgerald MD, 0.5 mg at 02/03/18 1108  •  melatonin sublingual tablet 5 mg, 5 mg, Sublingual, Nightly PRN, Alanna Dayanara, APRN, 5 mg at 02/02/18 2036  •  nicotine (NICODERM CQ) 21 MG/24HR patch 1 patch, 1 patch, Transdermal, Q24H, Mary FRANCIS MD, 1 patch at 02/03/18 0945  •  ondansetron (ZOFRAN) tablet 4 mg, 4 mg, Oral, Q6H PRN, 4 mg at 01/23/18 2318 **OR** ondansetron (ZOFRAN) injection 4 mg, 4 mg, Intravenous, Q6H PRN, Mary FRANCIS MD, 4 mg at 01/30/18 0332  •  Pharmacy to Dose Heparin, , Does not apply, Continuous PRN, Trini Fitzgerald MD  •  Pharmacy to dose warfarin, , Does not apply, Continuous PRN, Manjit Lynn, [facility]  •  rosuvastatin (CRESTOR) tablet 10 mg, 10 mg, Oral, Nightly, Sandhya Lynch PA-C, 10 mg at 02/02/18 2035  •  sodium bicarbonate tablet 1,300 mg, 1,300 mg, Oral, BID, Scott Reina MD, 1,300 mg at 02/03/18 0946  •  sodium chloride 0.9 % flush 1-10 mL, 1-10 mL, Intravenous, PRN, Mary FRANCIS MD  •  warfarin (COUMADIN) tablet 3 mg, 3 mg, Oral, Daily, Pedrito Pan [facility]    Meds reviewed and doses adjusted for renal function    Labs:    Results from last 7 days  Lab  Units 02/01/18  2321 01/31/18  0606 01/30/18  0211   WBC 10*3/mm3 7.15 11.42* 8.59   HEMOGLOBIN g/dL 9.0* 10.7* 9.2*   HEMATOCRIT % 28.4* 33.2* 28.0*   PLATELETS 10*3/mm3 92* 105* 84*       Results from last 7 days  Lab Units 02/03/18  0303 02/02/18  0621 02/01/18  0625 01/31/18  0606   SODIUM mmol/L 131* 129* 131* 131*   POTASSIUM mmol/L 3.3* 3.4* 4.1 4.5   CHLORIDE mmol/L 99 96* 101 104   CO2 mmol/L 25.0 20.0 23.0 16.0*   BUN mg/dL 60* 62* 67* 61*   CREATININE mg/dL 2.00* 2.20* 2.60* 2.80*   GLUCOSE mg/dL 97 100 93 113*   CALCIUM mg/dL 8.0* 8.3* 8.9 8.8       Results from last 7 days  Lab Units 01/31/18  0606  01/28/18  0503   ALK PHOS U/L 152*  < > 139*   BILIRUBIN mg/dL 1.0  < > 0.5   BILIRUBIN DIRECT mg/dL  --   --  0.2   ALT (SGPT) U/L 822*  < > 1490*   AST (SGOT) U/L 191*  < > 883*   < > = values in this interval not displayed.  Invalid input(s): UCOL, UCLR, UPH, USG, UPRO, UBLD, UNITR, ELEU, URBC, UFC, UBACT    Estimated Creatinine Clearance: 34.9 mL/min (by C-G formula based on Cr of 2).    Radiology:  Imaging Results (last 72 hours)     Procedure Component Value Units Date/Time    CT Head Without Contrast [[id number]] Collected:  01/23/18 1605     Updated:  01/23/18 1655    Narrative:       EXAMINATION: CT HEAD WO CONTRAST- 01/23/2018     INDICATION: Headache, SAH suspected      TECHNIQUE: Axial CT of the head without intravenous contrast  administration     The radiation dose reduction device was turned on for each scan per the  ALARA (As Low as Reasonably Achievable) protocol.     COMPARISON: 07/16/2016     FINDINGS: Midline structures are symmetric without evidence of mass,  mass effect or midline shift. No intra-axial hemorrhage or extra-axial  fluid collection. Ventricles and sulci within normal limits. Basal  cisterns are patent. Globes and orbits are unremarkable. Visualized  paranasal sinuses and mastoid air cells are grossly clear and  well-pneumatized. Calvarium intact.       Impression:       No  acute intracranial abnormality.     D:  01/23/2018  E:  01/23/2018        This report was finalized on 1/23/2018 4:53 PM by Dr. Cuate Vick.       CT Abdomen Pelvis Without Contrast [[id number]] Collected:  01/23/18 1611     Updated:  01/24/18 0929    Narrative:       EXAMINATION: CT ABDOMEN AND PELVIS WO CONTRAST-      INDICATION: Abdominal pain, gastroenteritis or colitis suspected.      TECHNIQUE: CT abdomen and pelvis without intravenous contrast.     The radiation dose reduction device was turned on for each scan per the  ALARA (As Low as Reasonably Achievable) protocol.     COMPARISON: None.     FINDINGS: Lung bases demonstrate subsegmental atelectasis and/or  scarring within the right lung base. No focal consolidation. Liver  without focal lesion. Gallbladder surgically absent. Pancreas and spleen  within normal limits. Splenule at the splenic column. Adrenal glands  without distinct nodule. Kidneys without hydronephrosis or hydroureter.  No bulky retroperitoneal adenopathy. Atherosclerotic nonaneurysmal  abdominal aorta. GI tract evaluation demonstrates small hiatal hernia.  There is limited visualization of the right hemipelvis due to right hip  arthroplasty however, within the visualized portions there is mural  thickening and pericolonic stranding associated with redundant sigmoid  colon and adjacent free fluid in the pelvis consistent with colitis. No  diverticula are identified to suggest diverticulitis. Pelvic viscera  otherwise unremarkable and partially visualized due to significant beam  hardening artifact from the arthroplasty. Multilevel degenerative  changes of the spine without aggressive osseous lesion. No soft tissue  body wall findings of concern.       Impression:       There is questionable mural thickening of the redundant  sigmoid within the pelvis with adjacent small volume likely reactive  free fluid. No loculated intraabdominal fluid collection. No free air.  This is most consistent  with colitis. No diverticula are identified to  suggest diverticulitis as an underlying etiology.     D:  01/23/2018  E:  01/24/2018     This report was finalized on 1/24/2018 9:27 AM by Dr. Cuate Vick.       XR Chest 1 View [[id number]] Collected:  01/23/18 1700     Updated:  01/24/18 0930    Narrative:       EXAMINATION: XR CHEST 1 VW- 01/23/2018     INDICATION: hypoxemia; N17.9-Acute kidney failure, unspecified;  N18.9-Chronic kidney disease, unspecified; A42-[place]  gastroenteritis and colitis, unspecified; E86.9-Volume depletion,  unspecified; R79.1-Abnormal coagulation profile; E87.5-Hyperkalemia      COMPARISON: Chest x-ray 01/21/2018     FINDINGS: Cardiac size enlarged. Pulmonary vascularity within normal  limits. No focal consolidation. Left chest wall pacing device with lead  intact. No pneumothorax or pleural effusion.           Impression:       Chronic lung changes without acute cardiopulmonary process.     D:  01/23/2018  E:  01/24/2018     This report was finalized on 1/24/2018 9:27 AM by Dr. Cuate Vick.                  IMPRESSION:  1. 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 Baseline creatinine per previously obtained labs appears to be in the 1.5-2.0 range. At admission to [facility] creatinine was documented to be 4.0 with a continuing elevation to 4.2 today. She has been hypotensive  Hepatitis panel negative,   2. Low plt and high protime; Liver enzymes high Anticardiolipin negative Need to evaluate for ttp adamts 13 negative less than 2 serum protein electrophoresis no M spike  3. Anemia - + Occult blood in stool. Transfuse for Hgb < 8.0.      4. Lupus - Patient currently taking plaquenil. Low c3c4 but dsdna low, Anti-cardiolipin negative     5. Hypocalcemia - PTH and ionized calcium to be drawn.     6. Nephrolithiasis - History of. Last episode 2015    7.  Hyponatremia: Monitor.  8.  Hypokalemia: Replace when  necessary               RECOMMENDATIONS:  Continue feel better  She is still on IV heparin  Waiting for past medical record from primary care including kidney biopsy, History of lupus has seen a rheumatologist she doesn't remember the name has seen a nephrologist long time ago she does not remember the name  Kidney function stable and improving  For edema and crease Lasix to 20 mg twice daily               Anni Romero MD  2/3/2018  11:58 AM

## 2018-02-03 NOTE — PLAN OF CARE
Problem: Patient Care Overview (Adult)  Goal: Plan of Care Review  Outcome: Ongoing (interventions implemented as appropriate)   02/03/18 0248   Coping/Psychosocial Response Interventions   Plan Of Care Reviewed With patient   Patient Care Overview   Progress improving      02/03/18 0248   Coping/Psychosocial Response Interventions   Plan Of Care Reviewed With patient   Patient Care Overview   Progress improving     Goal: Adult Individualization and Mutuality  Outcome: Ongoing (interventions implemented as appropriate)    Goal: Discharge Needs Assessment  Outcome: Ongoing (interventions implemented as appropriate)   01/24/18 1340 01/29/18 1330 02/01/18 0213   Discharge Needs Assessment   Concerns To Be Addressed --  --  discharge planning concerns;decision making concerns;home safety concerns   Readmission Within The Last 30 Days --  --  no previous admission in last 30 days   Equipment Needed After Discharge --  --  none   Discharge Facility/Level Of Care Needs --  --  nursing facility, skilled   Current Discharge Risk lives alone --  --    Discharge Disposition still a patient --  --    Current Health   Anticipated Changes Related to Illness --  --  inability to care for self   Living Environment   Transportation Available car;family or friend will provide --  --    Self-Care   Equipment Currently Used at Home --  cane, straight;walker, rolling  (pt was not currently using her walker) --        Problem: Fall Risk (Adult)  Goal: Absence of Falls  Outcome: Ongoing (interventions implemented as appropriate)   02/03/18 0248   Fall Risk (Adult)   Absence of Falls making progress toward outcome       Problem: Confusion, Acute (Adult)  Goal: Cognitive/Functional Impairments Minimized  Outcome: Ongoing (interventions implemented as appropriate)   02/03/18 0248   Confusion, Acute (Adult)   Cognitive/Functional Impairments Minimized making progress toward outcome     Goal: Safety  Outcome: Ongoing (interventions implemented  as appropriate)   02/03/18 0248   Confusion, Acute (Adult)   Safety making progress toward outcome       Problem: Skin Integrity Impairment, Risk/Actual (Adult)  Goal: Skin Integrity/Wound Healing  Outcome: Ongoing (interventions implemented as appropriate)   02/03/18 0248   Skin Integrity Impairment, Risk/Actual (Adult)   Skin Integrity/Wound Healing making progress toward outcome

## 2018-02-03 NOTE — PROGRESS NOTES
"  [facility]  PROGRESS NOTE    Date of Admission: 1/23/2018  Length of Stay: 11  Primary Care Physician: Peter Rdz MD    Chief Complaint: f/u SHF, NSVT  Problem List:   1. Structural heart disease of mixed etiology:  A. History of MI December 2009: s/p stent to RCA and LAD, EF 40%  B. CABGx2 March, 2010: SVG to OMB-1, SVG to PDA  C. EF 30% post-operatively, s/p [place] ICD placement, 2011  D. Echo July 2014: Severe global hypokinesis with EF 12%, moderate AI, mechanical mitral valve, moderate to severe TR, RVSP 43mmHg   E. Echo 2/4/17: EF 18%, mild to mod AI, mod TR, grossly normal prosthetic mitral valve  F. Trivial Troponin elevation Feb. 2017.  2. VHD:  A. Mitral valve replacement with 27mm ATS mechanical valve March 2010, on chronic coumadin  3. COPD with ongoing tobacco abuse   4. Systemic Lupus Erythematosus  5. Hypertension  6. Hyperlipidemia   7. 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 PVD     Subjective      Patient states she feels much better today. Seems a little confused       Objective   Vitals: /69 (BP Location: Right arm, Patient Position: Lying)  Pulse 73  Temp 97.7 °F (36.5 °C) (Oral)   Resp 18  Ht 175.3 cm (69\")  Wt 72.1 kg (159 lb)  SpO2 97%  BMI 23.48 kg/m2    Physical Exam:  GENERAL: Alert, cooperative, in no acute distress.   HEENT: Fundoscopic deferred, otherwise unremarkable.  NECK: No Jugular venous distention, adenopathy, or thyromegaly noted.   HEART: No discrete PMI is noted. Regular rhythm, normal rate, and no murmur  LUNGS: Clear to auscultation bilaterally. No wheezing, rales or ronchi.  ABDOMEN: Flat without evidence of organomegaly, masses, or tenderness.  NEUROLOGIC: No focal abnormalities involving strength or sensation are noted.   EXTREMITIES: No clubbing, cyanosis. 1-2+ edema     Results:    Results from last 7 days  Lab Units 02/01/18  2321 01/31/18  0606 01/30/18  0211   WBC 10*3/mm3 7.15 11.42* 8.59   HEMOGLOBIN g/dL " 9.0* 10.7* 9.2*   HEMATOCRIT % 28.4* 33.2* 28.0*   PLATELETS 10*3/mm3 92* 105* 84*       Results from last 7 days  Lab Units 02/03/18  0303 02/02/18  0621 02/01/18  0625   SODIUM mmol/L 131* 129* 131*   POTASSIUM mmol/L 3.3* 3.4* 4.1   CHLORIDE mmol/L 99 96* 101   CO2 mmol/L 25.0 20.0 23.0   BUN mg/dL 60* 62* 67*   CREATININE mg/dL 2.00* 2.20* 2.60*   GLUCOSE mg/dL 97 100 93      Lab Results   Component Value Date    CHOL 142 02/03/2018    TRIG 81 02/03/2018    HDL 42 02/03/2018    LDLDIRECT 96 02/03/2018     (H) 01/31/2018     (H) 01/31/2018           Results from last 7 days  Lab Units 02/03/18  0303   CHOLESTEROL mg/dL 142   TRIGLYCERIDES mg/dL 81   HDL CHOL mg/dL 42   LDL CHOL mg/dL 96           Results from last 7 days  Lab Units 02/01/18  1505   BNP pg/mL 3350.0*       Results from last 7 days  Lab Units 02/03/18  1057 02/03/18  0303 02/02/18  1941  02/02/18  0621  02/01/18  0625   PROTIME Seconds  --  19.7*  --   --  18.0*  --  16.7*   INR   --  1.78  --   --  1.63  --  1.51   APTT seconds 50.9* 68.9 77.4*  < > 45.3*  < > 76.2*   < > = values in this interval not displayed.        Intake/Output Summary (Last 24 hours) at 02/03/18 1140  Last data filed at 02/03/18 0500   Gross per 24 hour   Intake              240 ml   Output             1725 ml   Net            -1485 ml     I personally reviewed the patient's EKG/Telemetry data    Radiology Data:   Echo pending    Current Medications:    aspirin 81 mg Oral Daily   budesonide-formoterol 2 puff Inhalation BID - RT   carvedilol 12.5 mg Oral BID With Meals   epoetin bob 20,000 Units Subcutaneous Weekly   famotidine 20 mg Oral Daily   furosemide 20 mg Oral Daily   heparin (porcine) 2,000 Units Intravenous Once   hydrALAZINE 10 mg Oral Q8H   hydroxychloroquine 200 mg Oral Daily   nicotine 1 patch Transdermal Q24H   rosuvastatin 10 mg Oral Nightly   sodium bicarbonate 1,300 mg Oral BID   warfarin 2 mg Oral Daily       heparin 10 Units/kg/hr Last Rate:  9 Units/kg/hr (02/02/18 5432)   Pharmacy to Dose Heparin     Pharmacy to dose warfarin         Assessment and Plan:     1. Ischemic cardiomyopathy/NSVT:  - s/p BSC ICD 2011 with last EF 18% per echocardiogram 02/2017  - Device interrogation showing episodes of NSVT, 1 episode of VT that broke with ATP earlier this month, patient asymptomatic   - On BBL, and Hydralazine; no ACEI/Entresto due to acute on chronic kidney disease  - volume overloaded following IV fluids administration for a few days since admission - now receiving Lasix PO  - echo pending       2. Valvular heart disease:  - S/p mitral valve replacement with mechanical valve 03/2010  - On chronic coumadin therapy, was supratherapeutic upon admission and is s/p vitamin K administration, currently bridging with heparin with pharmacy to dose accordingly      3. Colitis:  - Per primary team      4. FAHEEM on CKD:  - Nephrology following  - Cr continues to improve     IYomi MD, personally performed the services described as documented by the above named individual. I have made any necessary edits and it is both accurate and complete 2/3/2018  4:21 PM        Scribed for Yomi Ramirez MD by Sandhya Lynch PA-C.

## 2018-02-04 LAB
ALBUMIN SERPL-MCNC: 3.2 G/DL (ref 3.2–4.8)
ANION GAP SERPL CALCULATED.3IONS-SCNC: 7 MMOL/L (ref 3–11)
APTT PPP: 49.8 SECONDS (ref 55–70)
APTT PPP: 59.2 SECONDS (ref 55–70)
APTT PPP: 63.3 SECONDS (ref 55–70)
APTT PPP: 64.6 SECONDS (ref 55–70)
BASOPHILS # BLD AUTO: 0 10*3/MM3 (ref 0–0.2)
BASOPHILS NFR BLD AUTO: 0 % (ref 0–1)
BUN BLD-MCNC: 52 MG/DL (ref 9–23)
BUN/CREAT SERPL: 27.4 (ref 7–25)
CALCIUM SPEC-SCNC: 8.4 MG/DL (ref 8.7–10.4)
CHLORIDE SERPL-SCNC: 103 MMOL/L (ref 99–109)
CO2 SERPL-SCNC: 26 MMOL/L (ref 20–31)
CREAT BLD-MCNC: 1.9 MG/DL (ref 0.6–1.3)
DEPRECATED RDW RBC AUTO: 68.7 FL (ref 37–54)
DEPRECATED RDW RBC AUTO: 78.7 FL (ref 37–54)
EOSINOPHIL # BLD AUTO: 0.08 10*3/MM3 (ref 0–0.3)
EOSINOPHIL NFR BLD AUTO: 1.6 % (ref 0–3)
ERYTHROCYTE [DISTWIDTH] IN BLOOD BY AUTOMATED COUNT: 21.2 % (ref 11.3–14.5)
ERYTHROCYTE [DISTWIDTH] IN BLOOD BY AUTOMATED COUNT: 21.5 % (ref 11.3–14.5)
GFR SERPL CREATININE-BSD FRML MDRD: 27 ML/MIN/1.73
GLUCOSE BLD-MCNC: 86 MG/DL (ref 70–100)
HCT VFR BLD AUTO: 27.7 % (ref 34.5–44)
HCT VFR BLD AUTO: 29.3 % (ref 34.5–44)
HGB BLD-MCNC: 8.8 G/DL (ref 11.5–15.5)
HGB BLD-MCNC: 9.2 G/DL (ref 11.5–15.5)
IMM GRANULOCYTES # BLD: 0.02 10*3/MM3 (ref 0–0.03)
IMM GRANULOCYTES NFR BLD: 0.4 % (ref 0–0.6)
INR PPP: 2.21
LYMPHOCYTES # BLD AUTO: 1.03 10*3/MM3 (ref 0.6–4.8)
LYMPHOCYTES NFR BLD AUTO: 20.7 % (ref 24–44)
MCH RBC QN AUTO: 32.5 PG (ref 27–31)
MCH RBC QN AUTO: 33.1 PG (ref 27–31)
MCHC RBC AUTO-ENTMCNC: 31.4 G/DL (ref 32–36)
MCHC RBC AUTO-ENTMCNC: 31.8 G/DL (ref 32–36)
MCV RBC AUTO: 103.5 FL (ref 80–99)
MCV RBC AUTO: 104.1 FL (ref 80–99)
MONOCYTES # BLD AUTO: 0.81 10*3/MM3 (ref 0–1)
MONOCYTES NFR BLD AUTO: 16.3 % (ref 0–12)
NEUTROPHILS # BLD AUTO: 3.04 10*3/MM3 (ref 1.5–8.3)
NEUTROPHILS NFR BLD AUTO: 61 % (ref 41–71)
PHOSPHATE SERPL-MCNC: 4.3 MG/DL (ref 2.4–5.1)
PLATELET # BLD AUTO: 107 10*3/MM3 (ref 150–450)
PLATELET # BLD AUTO: 115 10*3/MM3 (ref 150–450)
PMV BLD AUTO: 11.3 FL (ref 6–12)
PMV BLD AUTO: 12.5 FL (ref 6–12)
POTASSIUM BLD-SCNC: 3.3 MMOL/L (ref 3.5–5.5)
PROTHROMBIN TIME: 24.7 SECONDS (ref 9.6–11.5)
RBC # BLD AUTO: 2.66 10*6/MM3 (ref 3.89–5.14)
RBC # BLD AUTO: 2.83 10*6/MM3 (ref 3.89–5.14)
SODIUM BLD-SCNC: 136 MMOL/L (ref 132–146)
WBC NRBC COR # BLD: 4.98 10*3/MM3 (ref 3.5–10.8)
WBC NRBC COR # BLD: 5.38 10*3/MM3 (ref 3.5–10.8)

## 2018-02-04 PROCEDURE — 85025 COMPLETE CBC W/AUTO DIFF WBC: CPT

## 2018-02-04 PROCEDURE — 97116 GAIT TRAINING THERAPY: CPT

## 2018-02-04 PROCEDURE — 85027 COMPLETE CBC AUTOMATED: CPT | Performed by: INTERNAL MEDICINE

## 2018-02-04 PROCEDURE — 94640 AIRWAY INHALATION TREATMENT: CPT

## 2018-02-04 PROCEDURE — 99233 SBSQ HOSP IP/OBS HIGH 50: CPT | Performed by: INTERNAL MEDICINE

## 2018-02-04 PROCEDURE — 63710000001 DIPHENHYDRAMINE PER 50 MG: Performed by: INTERNAL MEDICINE

## 2018-02-04 PROCEDURE — 99232 SBSQ HOSP IP/OBS MODERATE 35: CPT | Performed by: INTERNAL MEDICINE

## 2018-02-04 PROCEDURE — 85610 PROTHROMBIN TIME: CPT

## 2018-02-04 PROCEDURE — 85730 THROMBOPLASTIN TIME PARTIAL: CPT | Performed by: INTERNAL MEDICINE

## 2018-02-04 PROCEDURE — 85730 THROMBOPLASTIN TIME PARTIAL: CPT

## 2018-02-04 PROCEDURE — 94799 UNLISTED PULMONARY SVC/PX: CPT

## 2018-02-04 PROCEDURE — 80069 RENAL FUNCTION PANEL: CPT | Performed by: INTERNAL MEDICINE

## 2018-02-04 PROCEDURE — 97110 THERAPEUTIC EXERCISES: CPT

## 2018-02-04 PROCEDURE — 25010000002 HEPARIN (PORCINE) PER 1000 UNITS

## 2018-02-04 RX ORDER — POTASSIUM CHLORIDE 750 MG/1
30 CAPSULE, EXTENDED RELEASE ORAL ONCE
Status: COMPLETED | OUTPATIENT
Start: 2018-02-04 | End: 2018-02-04

## 2018-02-04 RX ORDER — CLONAZEPAM 1 MG/1
2 TABLET ORAL 3 TIMES DAILY PRN
Status: DISCONTINUED | OUTPATIENT
Start: 2018-02-04 | End: 2018-02-05 | Stop reason: HOSPADM

## 2018-02-04 RX ADMIN — HYDRALAZINE HYDROCHLORIDE 10 MG: 10 TABLET ORAL at 13:59

## 2018-02-04 RX ADMIN — SODIUM BICARBONATE 650 MG TABLET 1300 MG: at 20:15

## 2018-02-04 RX ADMIN — HYDRALAZINE HYDROCHLORIDE 10 MG: 10 TABLET ORAL at 20:15

## 2018-02-04 RX ADMIN — SODIUM BICARBONATE 650 MG TABLET 1300 MG: at 08:53

## 2018-02-04 RX ADMIN — WARFARIN SODIUM 3 MG: 3 TABLET ORAL at 17:19

## 2018-02-04 RX ADMIN — CARVEDILOL 12.5 MG: 12.5 TABLET, FILM COATED ORAL at 08:53

## 2018-02-04 RX ADMIN — DIPHENHYDRAMINE HYDROCHLORIDE 25 MG: 25 CAPSULE ORAL at 01:51

## 2018-02-04 RX ADMIN — Medication 5 MG: at 21:13

## 2018-02-04 RX ADMIN — HEPARIN SODIUM 9.5 UNITS/KG/HR: 10000 INJECTION, SOLUTION INTRAVENOUS at 09:03

## 2018-02-04 RX ADMIN — POTASSIUM CHLORIDE 30 MEQ: 750 CAPSULE, EXTENDED RELEASE ORAL at 11:13

## 2018-02-04 RX ADMIN — LORAZEPAM 0.5 MG: 0.5 TABLET ORAL at 05:35

## 2018-02-04 RX ADMIN — CLONAZEPAM 2 MG: 1 TABLET ORAL at 15:43

## 2018-02-04 RX ADMIN — NICOTINE 1 PATCH: 21 PATCH, EXTENDED RELEASE TRANSDERMAL at 08:53

## 2018-02-04 RX ADMIN — CLONAZEPAM 2 MG: 1 TABLET ORAL at 20:19

## 2018-02-04 RX ADMIN — CARVEDILOL 12.5 MG: 12.5 TABLET, FILM COATED ORAL at 17:19

## 2018-02-04 RX ADMIN — BUDESONIDE AND FORMOTEROL FUMARATE DIHYDRATE 2 PUFF: 160; 4.5 AEROSOL RESPIRATORY (INHALATION) at 20:31

## 2018-02-04 RX ADMIN — ASPIRIN 81 MG: 81 TABLET, COATED ORAL at 08:53

## 2018-02-04 RX ADMIN — HYDROXYCHLOROQUINE SULFATE 200 MG: 200 TABLET, FILM COATED ORAL at 08:53

## 2018-02-04 RX ADMIN — FAMOTIDINE 20 MG: 20 TABLET, FILM COATED ORAL at 08:53

## 2018-02-04 RX ADMIN — ROSUVASTATIN CALCIUM 10 MG: 10 TABLET ORAL at 20:15

## 2018-02-04 RX ADMIN — FUROSEMIDE 20 MG: 20 TABLET ORAL at 08:53

## 2018-02-04 RX ADMIN — HYDRALAZINE HYDROCHLORIDE 10 MG: 10 TABLET ORAL at 05:35

## 2018-02-04 RX ADMIN — LORAZEPAM 0.5 MG: 0.5 TABLET ORAL at 13:59

## 2018-02-04 RX ADMIN — FUROSEMIDE 20 MG: 20 TABLET ORAL at 17:19

## 2018-02-04 RX ADMIN — BUDESONIDE AND FORMOTEROL FUMARATE DIHYDRATE 2 PUFF: 160; 4.5 AEROSOL RESPIRATORY (INHALATION) at 09:27

## 2018-02-04 NOTE — PLAN OF CARE
Problem: Patient Care Overview (Adult)  Goal: Plan of Care Review  Outcome: Ongoing (interventions implemented as appropriate)   02/04/18 1530   Coping/Psychosocial Response Interventions   Plan Of Care Reviewed With patient   Patient Care Overview   Progress progress toward functional goals is gradual   Outcome Evaluation   Outcome Summary/Follow up Plan Pt demo impaired endurance and reports dyspnea and generalized edema, which inhibits independence. She needs freq cueing for safety awareness throughout, multiple sitting rest breaks needed. She would be a good candidate for rollator walker.        Problem: Inpatient Physical Therapy  Goal: Bed Mobility Goal LTG- PT  Outcome: Ongoing (interventions implemented as appropriate)   02/04/18 1530   Bed Mobility PT LTG   Bed Mobility PT LTG, Date Goal Reviewed 02/04/18     Goal: Transfer Training Goal 1 LTG- PT  Outcome: Ongoing (interventions implemented as appropriate)   02/04/18 1530   Transfer Training PT LTG   Transfer Training PT LTG, Date Goal Reviewed 02/04/18

## 2018-02-04 NOTE — PROGRESS NOTES
"  [facility] at [facility]  PROGRESS NOTE    Date of Admission: 1/23/2018  Length of Stay: 12  Primary Care Physician: Peter Rdz MD    Chief Complaint: f/u SHF, NSVT  Problem List:   1. Structural heart disease of mixed etiology:  A. History of MI December 2009: s/p stent to RCA and LAD, EF 40%  B. CABGx2 March, 2010: SVG to OMB-1, SVG to PDA  C. EF 30% post-operatively, s/p [place] ICD placement, 2011  D. Echo July 2014: Severe global hypokinesis with EF 12%, moderate AI, mechanical mitral valve, moderate to severe TR, RVSP 43mmHg   E. Echo 2/4/17: EF 18%, mild to mod AI, mod TR, grossly normal prosthetic mitral valve  F. Trivial Troponin elevation Feb. 2017.  2. VHD:  A. Mitral valve replacement with 27mm ATS mechanical valve March 2010, on chronic coumadin  3. COPD with ongoing tobacco abuse   4. Systemic Lupus Erythematosus  5. Hypertension  6. Hyperlipidemia   7. 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 PVD        Subjective      She feels well, is excited about going home soon. No chest pain or shortness of breath. Her LE edema is improved.       Objective   Vitals: /89 (BP Location: Right arm, Patient Position: Lying)  Pulse 77  Temp 97.4 °F (36.3 °C) (Oral)   Resp 20  Ht 175.3 cm (69\")  Wt 72.2 kg (159 lb 3.2 oz)  SpO2 97%  BMI 23.51 kg/m2    Physical Exam:  GENERAL: Alert, cooperative, in no acute distress.   HEENT: Fundoscopic deferred, otherwise unremarkable.  NECK: No Jugular venous distention, adenopathy, or thyromegaly noted.   HEART: No discrete PMI is noted. Regular rhythm, normal rate, and no murmur, prosthetic valve clicks  LUNGS: Diminished breath sounds. No wheezing, rales or ronchi.  ABDOMEN: Flat without evidence of organomegaly, masses, or tenderness.  NEUROLOGIC: No focal abnormalities involving strength or sensation are noted.   EXTREMITIES: No clubbing, cyanosis, or edema noted.     Results:    Results from last 7 days  Lab Units 02/04/18  0721 " 02/01/18  2321 01/31/18  0606   WBC 10*3/mm3 5.38 7.15 11.42*   HEMOGLOBIN g/dL 9.2* 9.0* 10.7*   HEMATOCRIT % 29.3* 28.4* 33.2*   PLATELETS 10*3/mm3 115* 92* 105*       Results from last 7 days  Lab Units 02/04/18  0721 02/03/18  0303 02/02/18  0621   SODIUM mmol/L 136 131* 129*   POTASSIUM mmol/L 3.3* 3.3* 3.4*   CHLORIDE mmol/L 103 99 96*   CO2 mmol/L 26.0 25.0 20.0   BUN mg/dL 52* 60* 62*   CREATININE mg/dL 1.90* 2.00* 2.20*   GLUCOSE mg/dL 86 97 100      Lab Results   Component Value Date    CHOL 142 02/03/2018    TRIG 81 02/03/2018    HDL 42 02/03/2018    LDLDIRECT 96 02/03/2018     (H) 01/31/2018     (H) 01/31/2018           Results from last 7 days  Lab Units 02/03/18  0303   CHOLESTEROL mg/dL 142   TRIGLYCERIDES mg/dL 81   HDL CHOL mg/dL 42   LDL CHOL mg/dL 96           Results from last 7 days  Lab Units 02/01/18  1505   BNP pg/mL 3350.0*       Results from last 7 days  Lab Units 02/04/18  0721 02/04/18  0100 02/03/18  1728  02/03/18  0303  02/02/18  0621   PROTIME Seconds 24.7*  --   --   --  19.7*  --  18.0*   INR  2.21  --   --   --  1.78  --  1.63   APTT seconds 49.8* 63.3 87.8*  < > 68.9  < > 45.3*   < > = values in this interval not displayed.        Intake/Output Summary (Last 24 hours) at 02/04/18 1422  Last data filed at 02/04/18 0600   Gross per 24 hour   Intake              720 ml   Output              576 ml   Net              144 ml     I personally reviewed the patient's EKG/Telemetry data    Current Medications:    aspirin 81 mg Oral Daily   budesonide-formoterol 2 puff Inhalation BID - RT   carvedilol 12.5 mg Oral BID With Meals   epoetin bob 20,000 Units Subcutaneous Weekly   famotidine 20 mg Oral Daily   furosemide 20 mg Oral BID   hydrALAZINE 10 mg Oral Q8H   hydroxychloroquine 200 mg Oral Daily   nicotine 1 patch Transdermal Q24H   rosuvastatin 10 mg Oral Nightly   sodium bicarbonate 1,300 mg Oral BID   warfarin 3 mg Oral Daily       heparin 9.5 Units/kg/hr Last Rate: 9.5  Units/kg/hr (02/04/18 0903)   Pharmacy to Dose Heparin  Last Rate: Stopped (02/03/18 1911)   Pharmacy to dose warfarin         Assessment and Plan:     1. Ischemic cardiomyopathy/NSVT:  - s/p [facility] ICD 2011 with last EF 18% per echocardiogram 02/2017  - Device interrogation showing episodes of NSVT, 1 episode of VT that broke with ATP earlier this month, patient asymptomatic   - On BBL, and Hydralazine; no ACEI/Entresto due to acute on chronic kidney disease  - volume overloaded following IV fluids administration for a few days since admission - now receiving Lasix PO  - echo with very low EF; will study valve function AM.      2. Valvular heart disease:  - S/p mitral valve replacement with mechanical valve 03/2010  - On chronic coumadin therapy, was supratherapeutic upon admission and is s/p vitamin K administration, currently bridging with heparin with pharmacy to dose accordingly      3. Colitis:  - Per primary team      4. FAHEEM on CKD:  - Nephrology following  - Cr continues to improve     IYomi MD, personally performed the services described as documented by the above named individual. I have made any necessary edits and it is both accurate and complete 2/4/2018  5:46 PM        Scribed for Yomi Ramirez MD by Sandhya Lynch PA-C.

## 2018-02-04 NOTE — PROGRESS NOTES
"    [facility] Medicine Services  PROGRESS NOTE    Patient Name: Ashely Roldan  : 1960  MRN: [id number]    Date of Admission: 2018  Length of Stay: 12  Primary Care Physician: Peter Rdz MD    Subjective   Subjective     CC: f/u FAHEEM    HPI: Up in bed. Feels \"wonderful\" this am. No complaints. Hopes to go home soon.    Review of Systems  Gen- No fevers, chills  CV- No chest pain, palpitations  Resp- No cough, dyspnea  GI- No N/V/D, abd pain    Otherwise ROS is negative except as mentioned in the HPI.    Objective   Objective     Vital Signs:   Temp:  [97.4 °F (36.3 °C)-97.6 °F (36.4 °C)] 97.4 °F (36.3 °C)  Heart Rate:  [67-85] 77  Resp:  [16-20] 20  BP: (117-158)/(80-92) 158/89        Physical Exam:  Constitutional: No acute distress, awake, alert, looks well  HENT: NCAT, mucous membranes moist  Respiratory: Clear to auscultation bilaterally, respiratory effort normal   Cardiovascular: RRR, loud mechanical click  Gastrointestinal: Positive bowel sounds, soft, nontender, nondistended  Musculoskeletal: 2+ LE edema bilaterally  Psychiatric: Appropriate affect, cooperative  Neurologic: Oriented x 3, strength symmetric in all extremities, Cranial Nerves grossly intact to confrontation, speech clear  Skin: No rashes    Results Reviewed:  I have personally reviewed current lab, radiology, and data and agree.      Results from last 7 days  Lab Units 18  0721 18  0303 18  0621 18  2321  18  0606   WBC 10*3/mm3 5.38  --   --  7.15  --  11.42*   HEMOGLOBIN g/dL 9.2*  --   --  9.0*  --  10.7*   HEMATOCRIT % 29.3*  --   --  28.4*  --  33.2*   PLATELETS 10*3/mm3 115*  --   --  92*  --  105*   INR  2.21 1.78 1.63  --   < > 1.51   < > = values in this interval not displayed.    Results from last 7 days  Lab Units 18  0721 18  0303 18  0621  18  0606  18  0708   SODIUM mmol/L 136 131* 129*  < > 131*  < > 135   POTASSIUM mmol/L " 3.3* 3.3* 3.4*  < > 4.5  < > 3.6   CHLORIDE mmol/L 103 99 96*  < > 104  < > 107   CO2 mmol/L 26.0 25.0 20.0  < > 16.0*  < > 20.0   BUN mg/dL 52* 60* 62*  < > 61*  < > 73*   CREATININE mg/dL 1.90* 2.00* 2.20*  < > 2.80*  < > 3.30*   GLUCOSE mg/dL 86 97 100  < > 113*  < > 126*   CALCIUM mg/dL 8.4* 8.0* 8.3*  < > 8.8  < > 8.4*   ALT (SGPT) U/L  --   --   --   --  822*  --  1211*   AST (SGOT) U/L  --   --   --   --  191*  --  486*   < > = values in this interval not displayed.  Estimated Creatinine Clearance: 36.8 mL/min (by C-G formula based on Cr of 1.9).  BNP   Date Value Ref Range Status   02/01/2018 3350.0 (H) 0.0 - 100.0 pg/mL Final     Comment:     Results may be falsely decreased if patient taking Biotin.     No results found for: PHART    Microbiology Results Abnormal     Procedure Component Value - Date/Time    Blood Culture - Blood, [[id number]]  (Normal) Collected:  01/23/18 1645    Lab Status:  Final result Specimen:  Blood from Arm, Left Updated:  01/28/18 1716     Blood Culture No growth at 5 days    Blood Culture - Blood, [[id number]]  (Normal) Collected:  01/23/18 1645    Lab Status:  Final result Specimen:  Blood from Arm, Right Updated:  01/28/18 1716     Blood Culture No growth at 5 days    Stool Culture - Stool, Per Rectum [[id number]]  (Abnormal) Collected:  01/24/18 1037    Lab Status:  Final result Specimen:  Stool from Per Rectum Updated:  01/26/18 1039     Stool Culture No Salmonella, Shigella, Campylobacter, E. coli O157, or Vibrio isolated      Light growth (2+) Yeast isolated (A)      No normal enteric coliforms present       Narrative:         Not cultured for Yersinia.    Not cultured for Yersinia.    Eosinophil Smear - Urine, Urine, Clean Catch [[id number]]  (Normal) Collected:  01/26/18 0226    Lab Status:  Final result Specimen:  Urine from Urine, Clean Catch Updated:  01/26/18 0323     Eosinophil Smear 0 % EOS/100 Cells     Narrative:       No eosinophil seen    Clostridium Difficile Toxin  - Stool, Per Rectum [[id number]] Collected:  01/24/18 1037    Lab Status:  Final result Specimen:  Stool from Per Rectum Updated:  01/24/18 1223    Narrative:       The following orders were created for panel order Clostridium Difficile Toxin - Stool, Per Rectum.  Procedure                               Abnormality         Status                     ---------                               -----------         ------                     Clostridium Difficile To...[[id number]]  Normal              Final result                 Please view results for these tests on the individual orders.    Clostridium Difficile Toxin, PCR - Stool, Per Rectum [[id number]]  (Normal) Collected:  01/24/18 1037    Lab Status:  Final result Specimen:  Stool from Per Rectum Updated:  01/24/18 1223     C. Difficile Toxins by PCR Not Detected    Narrative:         Performance characteristics of test not established for patients <2 years of age.  Negative for Toxigenic C. Difficile          Imaging Results (last 24 hours)     ** No results found for the last 24 hours. **        Results for orders placed during the hospital encounter of 02/04/17   Adult Transthoracic Echo Complete With Contrast    Narrative · Left ventricular function is severely decreased. Estimated EF = 18%.  · The left ventricular cavity is borderline dilated.  · Left atrial cavity size is mild-to-moderately dilated.  · Mild to moderate aortic valve regurgitation is present.  · Moderate tricuspid valve regurgitation is present.  · Left ventricular wall thickness is consistent with mild concentric   hypertrophy.  · Left ventricular wall segments contract abnormally. Refer to wall   scoring diagram for more information.  · Right ventricular cavity is borderline dilated.  · There is no evidence of pericardial effusion.  · The prosthetic mitral valve is grossly normal.  · Mild pulmonary hypertension is present.          I have reviewed the medications.    Assessment/Plan   Assessment  / Plan     Hospital Problem List     Anxiety    Essential hypertension (Chronic)    Overview Signed 10/19/2015  8:56 AM by Digna Santana     benign essential         Ischemic cardiomyopathy (Chronic)    Overview Signed 2/6/2017  8:44 AM by MAN Quesada     A. History of MI December 2009: s/p stent to RCA and LAD, EF 40%  B. CABGx2 March, 2010: SVG to OMB-1, SVG to PDA  C. EF 30% post-operatively, s/p [place] ICD placement, 2011  D. Echo July 2014: Severe global hypokinesis with EF 12%, moderate AI, mechanical mitral valve, moderate to severe TR, RVSP 43mmHg   E. Echo 2/4/17: EF 18%, mild to mod AI, mod TR, grossly normal prosthetic mitral valve         Chronic renal failure, stage 2 (mild)    Lupus (systemic lupus erythematosus) (Chronic)    H/O mitral valve replacement    Overview Signed 2/6/2017  8:53 AM by MAN Queasda     VHD:  A. Mitral valve replacement with 27mm ATS mechanical valve March 2010            Anemia of chronic kidney failure    Tobacco abuse (Chronic)    AICD (automatic cardioverter/defibrillator) present (Chronic)    FAHEEM (acute kidney injury)    Overview Signed 2/6/2017  8:46 AM by MAN Quesada     Nephrology following: Hyperkalemia and Hyponatremia         Chronic anticoagulation    Overview Signed 7/16/2016 10:32 PM by IAIN Gabriel     Coumadin (Mechanical AVR)         Supratherapeutic INR    UTI (urinary tract infection)    Hyperkalemia    Colitis    Nausea and vomiting    Diarrhea    Acute kidney injury superimposed on chronic kidney disease    Lactic acidosis    Liver injury             Brief Hospital Course to date:  Ashely Roldan is a 57 y.o. female with systolic CHF/ICM s/p ICD and mechanical mitral valve who presented with N/V/D, abdominal pain and found to have FAHEEM, hyperkalemia and CT A/P revealed colitis.       Assessment & Plan:  FAHEEM  Hyperkalemia  -Improving. Back to prior baseline of 2.0.  -ATN, likely due to hypotension, hypovolemia from  acute illness.  -Has underlying lupus nephritis with low C3, C4.  Renal biopsy from last year confirmed lupus.  -Nephrology following. Increase lasix today.  -Okay to d/c mixon today.      Acute liver injury  -Suspect ischemic due to hypotension  -Acute hepatitis panel negative  -LFTs improving      E. Coli UTI  -Completed 5 days of antibiotics  -Other infectious workup negative; d/c'd Zosyn 1/26      Lupus  -On Plaquenil  -Anti-DS DNA WNL  -Previously had negative DAWN and DS DNA in September 2017  -Anticardiolipin negative      Encephalopathy  -Resolved.  -Delirium vs. Metabolic encephalopathy vs. Medication induced      Chronic systolic CHF  Mechanical mitral valve  -Lasix today with increased edema, and B effusions  -Remains on IV heparin bridging to coumadin. Still subtherapeutic but INR increasing.  -Severe CM on ECHO      DVT Prophylaxis: IV heparin.      CODE STATUS: Full Code      Disposition: I expect the patient to be discharged home once INR therapeutic.    Darlene Mcpherson II, DO  02/04/18  12:13 PM

## 2018-02-04 NOTE — PROGRESS NOTES
"Pharmacy Consult  -  Warfarin    Ashely Roldan is a  57 y.o. female   Height - 175.3 cm (69\")  Weight - 72.2 kg (159 lb 3.2 oz)    Consulting Provider: - Mari TIMMONS  Indication: mechanical mitral valve  Goal INR: 2.5-3.5  Home Regimen:   -  2 mg daily for 2 days,    - then 3 mg x 1 day, then repeat    Bridge Therapy: Heparin drip    Drug-Drug Interactions with current regimen:   Heparin drip--bridge therapy---increases bleeding risk    Warfarin Dosing During Admission:    Date  1/23 1/24 1/25 1/26 1/27 1/28 1/29 1/30 1/31   INR  4.55 5.66 8.03 PT >100 2.64 1.72 1.52 1.48 1.51   Dose  HOLD HOLD HOLD HOLD  -vitamin K 15mg HOLD per MD 1 mg 1 mg 2 mg 2 mg     Date  2/1 2/2 2/3 2/4        INR  1.51 1.63 1.78 2.21        Dose  2 mg 3mg 3mg 3mg          Total of 15 mg IV vitamin K received on 1/26.    Education Provided: Discussed warfarin with patient and how it is normally monitored     Labs:  Results from last 7 days   Lab Units 02/04/18  0721 02/04/18  0100 02/03/18  1728 02/03/18  1057 02/03/18  0303 02/02/18  1941 02/02/18  1225 02/02/18  0621 02/01/18  2321  02/01/18  0625  01/31/18  0606  01/30/18  0211  01/29/18  0708   INR  2.21 --  --  --  1.78 --  --  1.63 --  --  1.51 --  1.51 --  1.48 --  1.52   APTT seconds 49.8* 63.3 87.8* 50.9* 68.9 77.4* 53.7* 45.3* --  < > 76.2* < > 79.3* < > 49.9* < > 67.8   HEMOGLOBIN g/dL 9.2* --  --  --  --  --  --  --  9.0* --  --  --  10.7* --  9.2* --  9.1*   HEMATOCRIT % 29.3* --  --  --  --  --  --  --  28.4* --  --  --  33.2* --  28.0* --  27.6*   PLATELETS 10*3/mm3 115* --  --  --  --  --  --  --  92* --  --  --  105* --  84* --  94*   < > = values in this interval not displayed.   Results from last 7 days   Lab Units 02/04/18  0721 02/03/18  0303 02/02/18  0621  01/31/18  0606  01/29/18  0708   SODIUM mmol/L 136 131* 129* < > 131* < > 135   POTASSIUM mmol/L 3.3* 3.3* 3.4* < > 4.5 < > 3.6   CHLORIDE mmol/L 103 99 96* < > 104 < > 107   CO2 mmol/L 26.0 25.0 20.0 < > " 16.0* < > 20.0   BUN mg/dL 52* 60* 62* < > 61* < > 73*   CREATININE mg/dL 1.90* 2.00* 2.20* < > 2.80* < > 3.30*   CALCIUM mg/dL 8.4* 8.0* 8.3* < > 8.8 < > 8.4*   BILIRUBIN mg/dL --  --  --  --  1.0 --  0.7   ALK PHOS U/L --  --  --  --  152* --  146*   ALT (SGPT) U/L --  --  --  --  822* --  1211*   AST (SGOT) U/L --  --  --  --  191* --  486*   GLUCOSE mg/dL 86 97 100 < > 113* < > 126*   < > = values in this interval not displayed.     Diet Order   Procedures   • Diet Regular; Renal   Ate 0-25% of documented meals yesterday.     Assessment/Plan:     -INR was SUPRAtherapeutic on admission at 4.55 and continued to increase to a level that was undetectable.   received a total of 15 mg of IV Vitamin K on 1/26 (same day INR was undetectable).   Expect Vitamin K effect for 5-7 days  Warfarin restarted 1/28 with gradual dose increase  acute liver injury with elevated LFTs - improving per note    2/4 INR - 2.21, increased from 1.78  Heparin bridge until INR>2.5  Will continue Warfarin 3mg daily  Follow daily PT/INR and adjust dose accordingly    Thanks,  Pedrito Pan [facility]  2/4/2018  9:39 AM

## 2018-02-04 NOTE — THERAPY TREATMENT NOTE
Acute Care - Physical Therapy Treatment Note  [facility]     Patient Name: Ashely Roldan  : 1960  MRN: [id number]  Today's Date: 2018  Onset of Illness/Injury or Date of Surgery Date: 18  Date of Referral to PT: 18  Referring Physician: MD Amilcar    Admit Date: 2018    Visit Dx:    ICD-10-CM ICD-9-CM   1. Acute kidney injury superimposed on chronic kidney disease N17.9 866.00    N18.9 585.9   2. Acute infective gastroenteritis A09 009.0   3. Volume depletion E86.9 276.50   4. Supratherapeutic INR R79.1 790.92   5. Hyperkalemia E87.5 276.7   6. Impaired functional mobility, balance, gait, and endurance Z74.09 V49.89   7. FAHEEM (acute kidney injury) N17.9 584.9   8. COPD exacerbation J44.1 491.21   9. Acute systolic (congestive) heart failure I50.21 428.21     428.0   10. CKD (chronic kidney disease), stage IV N18.4 585.4   11. Acute systolic heart failure and valvular disease I50.21 428.21   12. Acute on chronic respiratory failure with hypoxia J96.21 518.84     799.02     Patient Active Problem List   Diagnosis   • Anxiety   • Arthritis   • Panlobular emphysema   • Reactive depression   • Gastritis   • Heart attack   • Heart murmur   • Mixed hyperlipidemia   • Essential hypertension   • Ischemic cardiomyopathy   • VHD (valvular heart disease)   • Osteoporosis   • PVD (peripheral vascular disease)   • Chronic renal failure, stage 2 (mild)   • Allergic rhinitis   • Lupus (systemic lupus erythematosus)   • TIA (transient ischemic attack)   • Chronic coronary artery disease   • Cough   • H/O mitral valve replacement   • Anemia of chronic kidney failure   • Insomnia   • COPD exacerbation   • Tobacco abuse   • Acute systolic heart failure and valvular disease   • AICD (automatic cardioverter/defibrillator) present   • FAHEEM (acute kidney injury)   • Noncompliance   • Blunt trauma of multiple sites   • Chronic anticoagulation   • Closed fracture of rib with flail chest   • Supratherapeutic  INR   • Fracture of left clavicle   • Chest pain   • Hypotension   • Acute systolic (congestive) heart failure   • Acute on chronic respiratory failure with hypoxia   • CKD (chronic kidney disease), stage IV   • UTI (urinary tract infection)   • Elevated troponin   • Hyperkalemia   • Colitis   • Nausea and vomiting   • Diarrhea   • Acute kidney injury superimposed on chronic kidney disease   • Lactic acidosis   • Liver injury               Adult Rehabilitation Note       02/04/18 1325 02/02/18 0945       Rehab Assessment/Intervention    Discipline physical therapist  -SR physical therapy assistant  -UD     Document Type therapy note (daily note)  -SR therapy note (daily note)  -UD     Subjective Information agree to therapy;complains of;fatigue;weakness;dyspnea  -SR agree to therapy;complains of;weakness  -UD     Patient Effort, Rehab Treatment good  -SR good  -UD     Symptoms Noted During/After Treatment fatigue;shortness of breath  -SR fatigue;shortness of breath  -UD     Precautions/Limitations fall precautions  -SR fall precautions;oxygen therapy device and L/min  -UD     Patient Response to Treatment pt needs several sitting and standing rest breaks d/t weakness and dyspnea, vitals are stable throughout  -SR      Recorded by [SR] Xi Bernal, PT [UD] Rhiannon Montalvo PTA     Vital Signs    Pre Systolic BP Rehab --   VSS  -SR      O2 Delivery Post Treatment  supplemental O2  -UD     Pre Patient Position  Supine  -UD     Intra Patient Position  Standing  -UD     Post Patient Position  Sitting  -UD     Recorded by [SR] Xi Bernal, PT [UD] Rhiannon Montalvo PTA     Pain Assessment    Pain Assessment No/denies pain  -SR No/denies pain  -UD     Recorded by [SR] Xi Bernal, PT [UD] Rhiannon Montalvo PTA     Cognitive Assessment/Intervention    Current Cognitive/Communication Assessment functional  -SR      Orientation Status oriented x 4  -SR oriented x 4  -UD     Follows Commands/Answers Questions 100% of the time   -% of the time  -UD     Personal Safety mild impairment  -SR      Personal Safety Interventions fall prevention program maintained;gait belt;nonskid shoes/slippers when out of bed  -SR fall prevention program maintained  -UD     Recorded by [SR] Xi Bernal, PT [UD] Rhiannon Montalvo PTA     Bed Mobility, Assessment/Treatment    Bed Mobility, Assistive Device bed rails;head of bed elevated  -SR      Bed Mob, Supine to Sit, [place] minimum assist (75% patient effort);verbal cues required  -SR contact guard assist  -UD     Recorded by [SR] Xi Bernal, PT [UD] Rhiannon Montalvo PTA     Transfer Assessment/Treatment    Transfers, Sit-Stand [place] contact guard assist;verbal cues required  -SR contact guard assist  -UD     Transfers, Stand-Sit [place] verbal cues required;contact guard assist  -SR contact guard assist  -UD     Transfers, Sit-Stand-Sit, Assist Device rolling walker  -SR rolling walker  -UD     Toilet Transfer, [place] minimum assist (75% patient effort);verbal cues required  -SR      Toilet Transfer, Assistive Device rolling walker  -SR      Transfer, Comment cues for hand placement and safety awareness  -SR      Recorded by [SR] Xi Bernal, PT [UD] Rhiannon Montalvo, PTA     Gait Assessment/Treatment    Gait, [place] Level contact guard assist;verbal cues required  -SR minimum assist (75% patient effort);1 person + 1 person to manage equipment  -UD     Gait, Assistive Device rolling walker  -SR rolling walker  -UD     Gait, Distance (Feet) 60  -   several rest breaks  -UD     Gait, Gait Deviations anni decreased;double stance time increased;forward flexed posture  -SR anni decreased  -UD     Gait, Safety Issues step length decreased  -SR supplemental O2;step length decreased  -UD     Gait, Impairments impaired balance;strength decreased  -SR strength decreased  -UD     Gait, Comment 60 ft max distance acheived before sitting rest break required, pt able to amb  another 45 and 30 ft with sitting rest breaks between. pt would be good candidate for rollator walker  -SR --   cues for walker   -UD     Recorded by [SR] Xi Bernal, PT [UD] Rhiannon Montalvo PTA     Therapy Exercises    Bilateral Lower Extremities AROM:;10 reps;sitting;ankle pumps/circles;LAQ;supine;heel slides  -SR AROM:;10 reps;sitting  -UD     Bilateral Upper Extremity  AROM:;10 reps;sitting  -UD     Recorded by [SR] Xi Bernal, PT [UD] Rhiannon Montalvo PTA     Positioning and Restraints    Pre-Treatment Position in bed  -SR in bed  -UD     Post Treatment Position bed  -SR chair  -UD     In Bed sitting EOB;notified nsg;call light within reach;encouraged to call for assist;with nsg  -SR      In Chair  notified nsg;reclined;sitting;call light within reach;exit alarm on;legs elevated  -UD     Recorded by [SR] Xi Bernal, PT [UD] Rhiannon Montalvo PTA       User Key  (r) = Recorded By, (t) = Taken By, (c) = Cosigned By    Initials Name Effective Dates    UD Rhiannon Montalvo, QUANG 06/22/15 -     SR Xi Bernal PT 06/19/15 -                 IP PT Goals       02/04/18 1530 02/02/18 1052 01/31/18 1012    Bed Mobility PT LTG    Bed Mobility PT LTG, Date Goal Reviewed 02/04/18  -SR      Bed Mobility PT LTG, Outcome  goal ongoing  -UD goal ongoing  -UD    Transfer Training PT LTG    Transfer Training PT  LTG, Date Goal Reviewed 02/04/18  -SR      Transfer Training PT LTG, Outcome  goal ongoing  -UD goal ongoing  -UD    Gait Training PT LTG    Gait Training Goal PT LTG, Outcome   goal met  -UD      01/29/18 1420          Bed Mobility PT LTG    Bed Mobility PT LTG, Date Established 01/29/18  -EH      Bed Mobility PT LTG, Time to Achieve 2 wks  -EH      Bed Mobility PT LTG, Activity Type supine to sit/sit to supine  -EH      Bed Mobility PT LTG, [place] Level independent  -EH      Transfer Training PT LTG    Transfer Training PT LTG, Date Established 01/29/18  -EH      Transfer Training PT LTG, Time to Achieve 2 wks   -EH      Transfer Training PT LTG, Activity Type sit to stand/stand to sit  -EH      Transfer Training PT LTG, [employer] Level conditional independence  -EH      Transfer Training PT LTG, Assist Device walker, rolling  -EH      Gait Training PT LTG    Gait Training Goal PT LTG, Date Established 01/29/18  -EH      Gait Training Goal PT LTG, Time to Achieve 2 wks  -EH      Gait Training Goal PT LTG, [employer] Level contact guard assist  -EH      Gait Training Goal PT LTG, Assist Device walker, rolling  -EH      Gait Training Goal PT LTG, Distance to Achieve 150  -EH        User Key  (r) = Recorded By, (t) = Taken By, (c) = Cosigned By    Initials Name Provider Type    UD Rhiannon Montalvo, PTA Physical Therapy Assistant     Emilia Lopez, PT Physical Therapist    SR Xi Bernal, PT Physical Therapist          Physical Therapy Education     Title: PT OT SLP Therapies (Done)     Topic: Physical Therapy (Done)     Point: Mobility training (Done)    Learning Progress Summary    Learner Readiness Method Response Comment Documented by Status   Patient Acceptance E,D VU PT encourages pt to cont with ther ex on her own to improve strength and endurance.  02/04/18 1529 Done    Acceptance E,D DU,NR   02/02/18 1052 Done    Acceptance E,D DU,NR   01/31/18 1012 Done    Acceptance E VU,[facility] 01/29/18 1420 Done               Point: Home exercise program (Done)    Learning Progress Summary    Learner Readiness Method Response Comment Documented by Status   Patient Acceptance E,D VU PT encourages pt to cont with ther ex on her own to improve strength and endurance.  02/04/18 1529 Done    Acceptance E,D CEE,NR   02/02/18 1052 Done    Acceptance E,D DU,NR   01/31/18 1012 Done               Point: Body mechanics (Done)    Learning Progress Summary    Learner Readiness Method Response Comment Documented by Status   Patient Acceptance E,D VU PT encourages pt to cont with ther ex on her own to improve strength and  endurance.  02/04/18 1529 Done    Acceptance E,D DU,NR   02/02/18 1052 Done    Acceptance E,D DU,NR   01/31/18 1012 Done    Acceptance E VU,[facility] 01/29/18 1420 Done               Point: Precautions (Done)    Learning Progress Summary    Learner Readiness Method Response Comment Documented by Status   Patient Acceptance E,D VU PT encourages pt to cont with ther ex on her own to improve strength and endurance.  02/04/18 1529 Done    Acceptance E,D DU,NR   02/02/18 1052 Done    Acceptance E,D DU,[facility] 01/31/18 1012 Done    Acceptance E VU,[facility] 01/29/18 1420 Done                      User Key     Initials Effective Dates Name Provider Type Discipline     06/22/15 -  Rhiannon Montalvo, PTA Physical Therapy Assistant PT     06/19/15 -  Emilia Lopez, PT Physical Therapist PT     06/19/15 -  Xi Bernal, PT Physical Therapist PT                    PT Recommendation and Plan  Anticipated Discharge Disposition: skilled nursing facility (Vs [facility])  Planned Therapy Interventions: balance training, bed mobility training, gait training, strengthening, transfer training, stretching, ROM (Range of Motion)  PT Frequency: daily  Plan of Care Review  Plan Of Care Reviewed With: patient  Progress: progress toward functional goals is gradual  Outcome Summary/Follow up Plan: Pt demo impaired endurance and reports dyspnea and generalized edema, which inhibits independence. She needs freq cueing for safety awareness throughout, multiple sitting rest breaks needed. She would be a good candidate for rollator walker.           Outcome Measures       02/04/18 1325 02/02/18 0945       How much help from another person do you currently need...    Turning from your back to your side while in flat bed without using bedrails? 3  -SR 4  -UD     Moving from lying on back to sitting on the side of a flat bed without bedrails? 3  -SR 3  -UD     Moving to and from a bed to a chair (including a wheelchair)? 3  -SR 3  -UD      Standing up from a chair using your arms (e.g., wheelchair, bedside chair)? 3  -SR 3  -UD     Climbing 3-5 steps with a railing? 2  -SR 2  -UD     To walk in hospital room? 3  -SR 3  -UD     AM-PAC 6 Clicks Score 17  -SR 18  -UD     Functional Assessment    Outcome Measure Options AM-PAC 6 Clicks Basic Mobility (PT)  -SR        User Key  (r) = Recorded By, (t) = Taken By, (c) = Cosigned By    Initials Name Provider Type    UD Rhiannon Montalvo, PTA Physical Therapy Assistant    SR Xi Bernal, PT Physical Therapist           Time Calculation:         PT Charges       02/04/18 1533          Time Calculation    Start Time 1325  -SR      PT Received On 02/04/18  -SR      Time Calculation- PT    Total Timed Code Minutes- PT 39 minute(s)  -SR        User Key  (r) = Recorded By, (t) = Taken By, (c) = Cosigned By    Initials Name Provider Type    SR Xi Bernal, PT Physical Therapist          Therapy Charges for Today     Code Description Service Date Service Provider Modifiers Qty    62474187419 HC GAIT TRAINING EA 15 MIN 2/4/2018 Xi Bernal, PT GP 1    85673974725 HC PT THER PROC EA 15 MIN 2/4/2018 Xi Bernal, PT GP 2          PT G-Codes  Outcome Measure Options: AM-PAC 6 Clicks Basic Mobility (PT)    Xi Bernal, PT  2/4/2018

## 2018-02-04 NOTE — PROGRESS NOTES
"NAL Progress Note  Ashely Roldan  [id number]  1960 1/23/2018    Reason for visit:  FAHEEM on CKD    ROS:    No new complaints, denies any nausea vomiting chest pain or shortness of breath.  Dysuria or hematuria.    I&O:    Intake/Output Summary (Last 24 hours) at 02/04/18 1041  Last data filed at 02/04/18 0600   Gross per 24 hour   Intake              960 ml   Output              976 ml   Net              -16 ml       PE:   Blood pressure 158/89, pulse 83, temperature 97.4 °F (36.3 °C), temperature source Oral, resp. rate 20, height 175.3 cm (69\"), weight 72.2 kg (159 lb 3.2 oz), SpO2 97 %, not currently breastfeeding.    Constitutional: Trouble in bed no obvious distress, awake alert oriented ×3.  HENT: Pupils reactive, EOMI, oral mucosa moist.   Neck: Neck supple. No JVD present.   Cardiovascular: No gallop murmur or rub.  Pulmonary/Chest: Clear auscultation no obvious distress, nonlabored  Abdominal: Soft. Bowel sounds are normal. She exhibits no distension. There is no tenderness.   Musculoskeletal: Positive dependent edema is noted    neuro: Grossly intact no focal deficit.  Skin: Skin is warm and dry. No rash noted.     Medications:    Current Facility-Administered Medications:   •  acetaminophen (TYLENOL) tablet 650 mg, 650 mg, Oral, Q6H PRN, Alanna Dayanara, APRN, 650 mg at 01/31/18 1736  •  albuterol (PROVENTIL) nebulizer solution 0.5% 2.5 mg/0.5mL, 2.5 mg, Nebulization, PRN, Mary FRANCIS MD  •  aspirin EC tablet 81 mg, 81 mg, Oral, Daily, Sandhya Lynch PA-C, 81 mg at 02/04/18 0853  •  budesonide-formoterol (SYMBICORT) 160-4.5 MCG/ACT inhaler 2 puff, 2 puff, Inhalation, BID - RT, Mary FRANCIS MD, 2 puff at 02/04/18 0927  •  carvedilol (COREG) tablet 12.5 mg, 12.5 mg, Oral, BID With Meals, Scott Reina MD, 12.5 mg at 02/04/18 0853  •  diphenhydrAMINE (BENADRYL) capsule 25 mg, 25 mg, Oral, Q6H PRN, Darlene Mcpherson II, DO, 25 mg at 02/04/18 0151  •  epoetin bob (EPOGEN,PROCRIT) " injection 20,000 Units, 20,000 Units, Subcutaneous, Weekly, Scott Reina MD, 20,000 Units at 02/02/18 0820  •  famotidine (PEPCID) tablet 20 mg, 20 mg, Oral, Daily, Trini Fitzgerald MD, 20 mg at 02/04/18 0853  •  furosemide (LASIX) tablet 20 mg, 20 mg, Oral, BID, Anni Romero MD, 20 mg at 02/04/18 0853  •  heparin 77495 units/250 mL (100 units/mL) in 0.45 % NaCl infusion, 9.5 Units/kg/hr, Intravenous, Titrated, Pedrito Pan [facility], Last Rate: 6.67 mL/hr at 02/04/18 0903, 9.5 Units/kg/hr at 02/04/18 0903  •  hydrALAZINE (APRESOLINE) tablet 10 mg, 10 mg, Oral, Q8H, Jaleel Schafer MD, 10 mg at 02/04/18 0535  •  hydroxychloroquine (PLAQUENIL) tablet 200 mg, 200 mg, Oral, Daily, Mary FRANCIS MD, 200 mg at 02/04/18 0853  •  loperamide (IMODIUM) capsule 2 mg, 2 mg, Oral, 4x Daily PRN, Trini Fitzgerald MD, 2 mg at 01/29/18 1530  •  LORazepam (ATIVAN) tablet 0.5 mg, 0.5 mg, Oral, Q6H PRN, Trini Fitzgerald MD, 0.5 mg at 02/04/18 0535  •  melatonin sublingual tablet 5 mg, 5 mg, Sublingual, Nightly PRN, Alanna Dayanara, APRN, 5 mg at 02/03/18 2212  •  nicotine (NICODERM CQ) 21 MG/24HR patch 1 patch, 1 patch, Transdermal, Q24H, Mary FRANCIS MD, 1 patch at 02/04/18 0853  •  ondansetron (ZOFRAN) tablet 4 mg, 4 mg, Oral, Q6H PRN, 4 mg at 01/23/18 2318 **OR** ondansetron (ZOFRAN) injection 4 mg, 4 mg, Intravenous, Q6H PRN, Mary FRANCIS MD, 4 mg at 01/30/18 0332  •  Pharmacy to Dose Heparin, , Does not apply, Continuous PRN, Trini Fitzgerald MD, Stopped at 02/03/18 1911  •  Pharmacy to dose warfarin, , Does not apply, Continuous PRN, Manjit Lynn, [facility]  •  rosuvastatin (CRESTOR) tablet 10 mg, 10 mg, Oral, Nightly, Sandhya Lynch PA-C, 10 mg at 02/03/18 2211  •  sodium bicarbonate tablet 1,300 mg, 1,300 mg, Oral, BID, Scott Reina MD, 1,300 mg at 02/04/18 0853  •  sodium chloride 0.9 % flush 1-10 mL, 1-10 mL, Intravenous, PRN, Mary FRANCIS MD  •  warfarin (COUMADIN) tablet 3 mg, 3 mg, Oral, Daily, Pedrito MARTINEZ  AMPARO Pan, 3 mg at 02/03/18 1814    Meds reviewed and doses adjusted for renal function    Labs:    Results from last 7 days  Lab Units 02/04/18  0721 02/01/18  2321 01/31/18  0606   WBC 10*3/mm3 5.38 7.15 11.42*   HEMOGLOBIN g/dL 9.2* 9.0* 10.7*   HEMATOCRIT % 29.3* 28.4* 33.2*   PLATELETS 10*3/mm3 115* 92* 105*       Results from last 7 days  Lab Units 02/04/18  0721 02/03/18  0303 02/02/18  0621 02/01/18  0625   SODIUM mmol/L 136 131* 129* 131*   POTASSIUM mmol/L 3.3* 3.3* 3.4* 4.1   CHLORIDE mmol/L 103 99 96* 101   CO2 mmol/L 26.0 25.0 20.0 23.0   BUN mg/dL 52* 60* 62* 67*   CREATININE mg/dL 1.90* 2.00* 2.20* 2.60*   GLUCOSE mg/dL 86 97 100 93   CALCIUM mg/dL 8.4* 8.0* 8.3* 8.9   PHOSPHORUS mg/dL 4.3  --   --   --        Results from last 7 days  Lab Units 01/31/18  0606   ALK PHOS U/L 152*   BILIRUBIN mg/dL 1.0   ALT (SGPT) U/L 822*   AST (SGOT) U/L 191*     Invalid input(s): UCOL, UCLR, UPH, USG, UPRO, UBLD, UNITR, ELEU, URBC, UFC, UBACT    Estimated Creatinine Clearance: 36.8 mL/min (by C-G formula based on Cr of 1.9).    Radiology:  Imaging Results (last 72 hours)     Procedure Component Value Units Date/Time    CT Head Without Contrast [[id number]] Collected:  01/23/18 1605     Updated:  01/23/18 1655    Narrative:       EXAMINATION: CT HEAD WO CONTRAST- 01/23/2018     INDICATION: Headache, SAH suspected      TECHNIQUE: Axial CT of the head without intravenous contrast  administration     The radiation dose reduction device was turned on for each scan per the  ALARA (As Low as Reasonably Achievable) protocol.     COMPARISON: 07/16/2016     FINDINGS: Midline structures are symmetric without evidence of mass,  mass effect or midline shift. No intra-axial hemorrhage or extra-axial  fluid collection. Ventricles and sulci within normal limits. Basal  cisterns are patent. Globes and orbits are unremarkable. Visualized  paranasal sinuses and mastoid air cells are grossly clear and  well-pneumatized. Calvarium  intact.       Impression:       No acute intracranial abnormality.     D:  01/23/2018  E:  01/23/2018        This report was finalized on 1/23/2018 4:53 PM by Dr. Cuate Vick.       CT Abdomen Pelvis Without Contrast [[id number]] Collected:  01/23/18 1611     Updated:  01/24/18 0929    Narrative:       EXAMINATION: CT ABDOMEN AND PELVIS WO CONTRAST-      INDICATION: Abdominal pain, gastroenteritis or colitis suspected.      TECHNIQUE: CT abdomen and pelvis without intravenous contrast.     The radiation dose reduction device was turned on for each scan per the  ALARA (As Low as Reasonably Achievable) protocol.     COMPARISON: None.     FINDINGS: Lung bases demonstrate subsegmental atelectasis and/or  scarring within the right lung base. No focal consolidation. Liver  without focal lesion. Gallbladder surgically absent. Pancreas and spleen  within normal limits. Splenule at the splenic column. Adrenal glands  without distinct nodule. Kidneys without hydronephrosis or hydroureter.  No bulky retroperitoneal adenopathy. Atherosclerotic nonaneurysmal  abdominal aorta. GI tract evaluation demonstrates small hiatal hernia.  There is limited visualization of the right hemipelvis due to right hip  arthroplasty however, within the visualized portions there is mural  thickening and pericolonic stranding associated with redundant sigmoid  colon and adjacent free fluid in the pelvis consistent with colitis. No  diverticula are identified to suggest diverticulitis. Pelvic viscera  otherwise unremarkable and partially visualized due to significant beam  hardening artifact from the arthroplasty. Multilevel degenerative  changes of the spine without aggressive osseous lesion. No soft tissue  body wall findings of concern.       Impression:       There is questionable mural thickening of the redundant  sigmoid within the pelvis with adjacent small volume likely reactive  free fluid. No loculated intraabdominal fluid collection. No  free air.  This is most consistent with colitis. No diverticula are identified to  suggest diverticulitis as an underlying etiology.     D:  01/23/2018  E:  01/24/2018     This report was finalized on 1/24/2018 9:27 AM by Dr. Cuate Vick.       XR Chest 1 View [[id number]] Collected:  01/23/18 1700     Updated:  01/24/18 0930    Narrative:       EXAMINATION: XR CHEST 1 VW- 01/23/2018     INDICATION: hypoxemia; N17.9-Acute kidney failure, unspecified;  N18.9-Chronic kidney disease, unspecified; N82-Lunyjvizrk  gastroenteritis and colitis, unspecified; E86.9-Volume depletion,  unspecified; R79.1-Abnormal coagulation profile; E87.5-Hyperkalemia      COMPARISON: Chest x-ray 01/21/2018     FINDINGS: Cardiac size enlarged. Pulmonary vascularity within normal  limits. No focal consolidation. Left chest wall pacing device with lead  intact. No pneumothorax or pleural effusion.           Impression:       Chronic lung changes without acute cardiopulmonary process.     D:  01/23/2018  E:  01/24/2018     This report was finalized on 1/24/2018 9:27 AM by Dr. Cuate Vick.                  IMPRESSION:  1. 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 Baseline creatinine per previously obtained labs appears to be in the 1.5-2.0 range. At [facility] creatinine was documented to be 4.0 with a continuing elevation to 4.2 today. She has been hypotensive  Hepatitis panel negative,   2. Low plt and high protime; Liver enzymes high Anticardiolipin negative Need to evaluate for ttp adamts 13 negative less than 2 serum protein electrophoresis no M spike  3. Anemia - + Occult blood in stool. Transfuse for Hgb < 8.0.      4. Lupus - Patient currently taking plaquenil. Low c3c4 but dsdna low, Anti-cardiolipin negative     5. Hypocalcemia - PTH and ionized calcium to be drawn.     6. Nephrolithiasis -  History of. Last episode 2015    7.  Hyponatremia: Monitor.  Normal  8.  Hypokalemia: Replace when necessary      RECOMMENDATIONS:  Repeat protein creatinine ratio, renal function is back to worst the baseline.  She is still on IV heparin  Waiting for past medical record from primary care including kidney biopsy, History of lupus has seen a rheumatologist she doesn't remember the name has seen a nephrologist long time ago   Kidney function stable and improving  For edema and increase Lasix to 20 mg twice daily       Anni Romero MD  2/4/2018  10:41 AM

## 2018-02-04 NOTE — PLAN OF CARE
Problem: Patient Care Overview (Adult)  Goal: Plan of Care Review  Outcome: Ongoing (interventions implemented as appropriate)   02/04/18 0251   Coping/Psychosocial Response Interventions   Plan Of Care Reviewed With patient   Patient Care Overview   Progress progress toward functional goals as expected     Goal: Adult Individualization and Mutuality  Outcome: Ongoing (interventions implemented as appropriate)    Goal: Discharge Needs Assessment  Outcome: Ongoing (interventions implemented as appropriate)   01/24/18 1340 01/29/18 1330 02/01/18 0213   Discharge Needs Assessment   Concerns To Be Addressed --  --  discharge planning concerns;decision making concerns;home safety concerns   Readmission Within The Last 30 Days --  --  no previous admission in last 30 days   Equipment Needed After Discharge --  --  none   Discharge Facility/Level Of Care Needs --  --  nursing facility, skilled   Current Discharge Risk lives alone --  --    Discharge Disposition still a patient --  --    Current Health   Anticipated Changes Related to Illness --  --  inability to care for self   Living Environment   Transportation Available car;family or friend will provide --  --    Self-Care   Equipment Currently Used at Home --  cane, straight;walker, rolling  (pt was not currently using her walker) --        Problem: Fall Risk (Adult)  Goal: Absence of Falls  Outcome: Ongoing (interventions implemented as appropriate)   02/04/18 0251   Fall Risk (Adult)   Absence of Falls making progress toward outcome       Problem: Confusion, Acute (Adult)  Goal: Cognitive/Functional Impairments Minimized  Outcome: Ongoing (interventions implemented as appropriate)   02/04/18 0251   Confusion, Acute (Adult)   Cognitive/Functional Impairments Minimized making progress toward outcome     Goal: Safety  Outcome: Ongoing (interventions implemented as appropriate)   02/04/18 0251   Confusion, Acute (Adult)   Safety making progress toward outcome        Problem: Skin Integrity Impairment, Risk/Actual (Adult)  Goal: Skin Integrity/Wound Healing  Outcome: Ongoing (interventions implemented as appropriate)   02/04/18 0251   Skin Integrity Impairment, Risk/Actual (Adult)   Skin Integrity/Wound Healing making progress toward outcome

## 2018-02-05 VITALS
TEMPERATURE: 97.8 F | BODY MASS INDEX: 23.43 KG/M2 | RESPIRATION RATE: 18 BRPM | OXYGEN SATURATION: 97 % | DIASTOLIC BLOOD PRESSURE: 84 MMHG | HEIGHT: 69 IN | HEART RATE: 76 BPM | SYSTOLIC BLOOD PRESSURE: 151 MMHG | WEIGHT: 158.2 LBS

## 2018-02-05 LAB
ANION GAP SERPL CALCULATED.3IONS-SCNC: 7 MMOL/L (ref 3–11)
APTT PPP: 80.7 SECONDS (ref 55–70)
BH CV ECHO MEAS - AI DEC SLOPE: 190.1 CM/SEC^2
BH CV ECHO MEAS - AI MAX PG: 61.5 MMHG
BH CV ECHO MEAS - AI MAX VEL: 392.1 CM/SEC
BH CV ECHO MEAS - AI P1/2T: 604.3 MSEC
BH CV ECHO MEAS - AO ROOT AREA: 5.8 CM^2
BH CV ECHO MEAS - AO ROOT DIAM: 3.3 CM
BH CV ECHO MEAS - BSA(HAYCOCK): 1.9 M^2
BH CV ECHO MEAS - BSA: 1.9 M^2
BH CV ECHO MEAS - BZI_BMI: 23.3 KILOGRAMS/M^2
BH CV ECHO MEAS - BZI_METRIC_HEIGHT: 175.3 CM
BH CV ECHO MEAS - BZI_METRIC_WEIGHT: 71.7 KG
BH CV ECHO MEAS - CONTRAST EF (2CH): 18.9 ML/M^2
BH CV ECHO MEAS - CONTRAST EF 4CH: 19.6 ML/M^2
BH CV ECHO MEAS - EDV(MOD-SP2): 206 ML
BH CV ECHO MEAS - EDV(MOD-SP4): 204 ML
BH CV ECHO MEAS - EDV(TEICH): 91.8 ML
BH CV ECHO MEAS - EF(CUBED): 16 %
BH CV ECHO MEAS - EF(MOD-SP2): 18.9 %
BH CV ECHO MEAS - EF(MOD-SP4): 19.6 %
BH CV ECHO MEAS - EF(TEICH): 12.8 %
BH CV ECHO MEAS - ESV(MOD-SP2): 167 ML
BH CV ECHO MEAS - ESV(MOD-SP4): 164 ML
BH CV ECHO MEAS - ESV(TEICH): 80.1 ML
BH CV ECHO MEAS - IVSD: 1.3 CM
BH CV ECHO MEAS - LA DIMENSION: 3.8 CM
BH CV ECHO MEAS - LA/AO: 1.4
BH CV ECHO MEAS - LAT PEAK E' VEL: 7.3 CM/SEC
BH CV ECHO MEAS - LV MASS(C)D: 205.2 GRAMS
BH CV ECHO MEAS - LV MASS(C)DI: 109.8 GRAMS/M^2
BH CV ECHO MEAS - LV MAX PG: 3.2 MMHG
BH CV ECHO MEAS - LV MEAN PG: 1.7 MMHG
BH CV ECHO MEAS - LV V1 MAX: 88.8 CM/SEC
BH CV ECHO MEAS - LV V1 MEAN: 60.1 CM/SEC
BH CV ECHO MEAS - LV V1 VTI: 15.7 CM
BH CV ECHO MEAS - LVIDD: 5.8 CM
BH CV ECHO MEAS - LVIDS: 5.2 CM
BH CV ECHO MEAS - LVLD AP2: 7.9 CM
BH CV ECHO MEAS - LVLD AP4: 8.4 CM
BH CV ECHO MEAS - LVLS AP2: 8.2 CM
BH CV ECHO MEAS - LVLS AP4: 7.8 CM
BH CV ECHO MEAS - LVOT AREA (M): 3.1 CM^2
BH CV ECHO MEAS - LVOT DIAM: 2 CM
BH CV ECHO MEAS - LVPWD: 1.2 CM
BH CV ECHO MEAS - MED PEAK E' VEL: 6.4 CM/SEC
BH CV ECHO MEAS - MV A MAX VEL: 80 CM/SEC
BH CV ECHO MEAS - MV DEC SLOPE: 537.9 CM/SEC^2
BH CV ECHO MEAS - MV DEC TIME: 0.2 SEC
BH CV ECHO MEAS - MV E MAX VEL: 113 CM/SEC
BH CV ECHO MEAS - MV E/A: 1.4
BH CV ECHO MEAS - MV MAX PG: 6.3 MMHG
BH CV ECHO MEAS - MV MEAN PG: 2.4 MMHG
BH CV ECHO MEAS - MV P1/2T MAX VEL: 136.8 CM/SEC
BH CV ECHO MEAS - MV P1/2T: 74.5 MSEC
BH CV ECHO MEAS - MV V2 MAX: 124.9 CM/SEC
BH CV ECHO MEAS - MV V2 MEAN: 71.5 CM/SEC
BH CV ECHO MEAS - MV V2 VTI: 33.2 CM
BH CV ECHO MEAS - MVA P1/2T LCG: 1.6 CM^2
BH CV ECHO MEAS - MVA(P1/2T): 3 CM^2
BH CV ECHO MEAS - MVA(VTI): 1.5 CM^2
BH CV ECHO MEAS - PA ACC SLOPE: 305.3 CM/SEC^2
BH CV ECHO MEAS - PA ACC TIME: 0.16 SEC
BH CV ECHO MEAS - PA PR(ACCEL): 7.7 MMHG
BH CV ECHO MEAS - RAP SYSTOLE: 8 MMHG
BH CV ECHO MEAS - RV MAX PG: 2.4 MMHG
BH CV ECHO MEAS - RV V1 MAX: 78 CM/SEC
BH CV ECHO MEAS - RVSP: 29 MMHG
BH CV ECHO MEAS - SI(CUBED): 7.8 ML/M^2
BH CV ECHO MEAS - SI(LVOT): 27.3 ML/M^2
BH CV ECHO MEAS - SI(MOD-SP2): 20.9 ML/M^2
BH CV ECHO MEAS - SI(MOD-SP4): 21.4 ML/M^2
BH CV ECHO MEAS - SI(TEICH): 6.3 ML/M^2
BH CV ECHO MEAS - SV(CUBED): 14.5 ML
BH CV ECHO MEAS - SV(LVOT): 51 ML
BH CV ECHO MEAS - SV(MOD-SP4): 40 ML
BH CV ECHO MEAS - SV(TEICH): 11.8 ML
BH CV ECHO MEAS - TAPSE (>1.6): 1.6 CM2
BH CV ECHO MEAS - TR MAX V: 21 MMHG
BH CV ECHO MEAS - TR MAX VEL: 228 CM/SEC
BH CV XLRA - RV BASE: 3.9 CM
BH CV XLRA - RV LENGTH: 7.3 CM
BH CV XLRA - RV MID: 3.3 CM
BH CV XLRA - TDI S': 9.37 CM/SEC
BUN BLD-MCNC: 44 MG/DL (ref 9–23)
BUN/CREAT SERPL: 24.4 (ref 7–25)
CALCIUM SPEC-SCNC: 8.4 MG/DL (ref 8.7–10.4)
CHLORIDE SERPL-SCNC: 102 MMOL/L (ref 99–109)
CO2 SERPL-SCNC: 27 MMOL/L (ref 20–31)
CREAT BLD-MCNC: 1.8 MG/DL (ref 0.6–1.3)
CREAT UR-MCNC: 35.6 MG/DL
E/E' RATIO: 16.5
GFR SERPL CREATININE-BSD FRML MDRD: 29 ML/MIN/1.73
GLUCOSE BLD-MCNC: 86 MG/DL (ref 70–100)
INR PPP: 2.45
LEFT ATRIUM VOLUME INDEX: 23.5 ML/M2
LEFT ATRIUM VOLUME: 44 CM3
MAXIMAL PREDICTED HEART RATE: 162 BPM
POTASSIUM BLD-SCNC: 3.7 MMOL/L (ref 3.5–5.5)
PROT UR-MCNC: 12 MG/DL (ref 1–14)
PROTHROMBIN TIME: 27.4 SECONDS (ref 9.6–11.5)
REF LAB TEST METHOD: NORMAL
SODIUM BLD-SCNC: 136 MMOL/L (ref 132–146)
STRESS TARGET HR: 138 BPM

## 2018-02-05 PROCEDURE — 97116 GAIT TRAINING THERAPY: CPT

## 2018-02-05 PROCEDURE — 85730 THROMBOPLASTIN TIME PARTIAL: CPT | Performed by: INTERNAL MEDICINE

## 2018-02-05 PROCEDURE — 82570 ASSAY OF URINE CREATININE: CPT | Performed by: INTERNAL MEDICINE

## 2018-02-05 PROCEDURE — 99233 SBSQ HOSP IP/OBS HIGH 50: CPT | Performed by: INTERNAL MEDICINE

## 2018-02-05 PROCEDURE — 80048 BASIC METABOLIC PNL TOTAL CA: CPT | Performed by: INTERNAL MEDICINE

## 2018-02-05 PROCEDURE — 85610 PROTHROMBIN TIME: CPT

## 2018-02-05 PROCEDURE — 97110 THERAPEUTIC EXERCISES: CPT

## 2018-02-05 PROCEDURE — 63710000001 DIPHENHYDRAMINE PER 50 MG: Performed by: INTERNAL MEDICINE

## 2018-02-05 PROCEDURE — 84156 ASSAY OF PROTEIN URINE: CPT | Performed by: INTERNAL MEDICINE

## 2018-02-05 PROCEDURE — 94640 AIRWAY INHALATION TREATMENT: CPT

## 2018-02-05 RX ORDER — HYDROXYCHLOROQUINE SULFATE 200 MG/1
200 TABLET, FILM COATED ORAL EVERY MORNING
Qty: 30 TABLET | Refills: 0 | Status: SHIPPED | OUTPATIENT
Start: 2018-02-05 | End: 2018-02-14 | Stop reason: HOSPADM

## 2018-02-05 RX ORDER — CARVEDILOL 12.5 MG/1
12.5 TABLET ORAL 2 TIMES DAILY WITH MEALS
Qty: 60 TABLET | Refills: 0 | Status: ON HOLD | OUTPATIENT
Start: 2018-02-05 | End: 2018-02-14

## 2018-02-05 RX ORDER — HYDRALAZINE HYDROCHLORIDE 25 MG/1
25 TABLET, FILM COATED ORAL 3 TIMES DAILY
Qty: 90 TABLET | Refills: 0 | Status: SHIPPED | OUTPATIENT
Start: 2018-02-05 | End: 2018-02-14 | Stop reason: HOSPADM

## 2018-02-05 RX ORDER — WARFARIN SODIUM 2 MG/1
2 TABLET ORAL SEE ADMIN INSTRUCTIONS
Qty: 30 TABLET | Refills: 0 | Status: SHIPPED | OUTPATIENT
Start: 2018-02-05 | End: 2018-02-14 | Stop reason: HOSPADM

## 2018-02-05 RX ORDER — WARFARIN SODIUM 3 MG/1
3 TABLET ORAL
Qty: 30 TABLET | Refills: 0 | Status: SHIPPED | OUTPATIENT
Start: 2018-02-05 | End: 2018-02-14 | Stop reason: HOSPADM

## 2018-02-05 RX ORDER — WARFARIN SODIUM 2 MG/1
2 TABLET ORAL
Status: DISCONTINUED | OUTPATIENT
Start: 2018-02-05 | End: 2018-02-05 | Stop reason: HOSPADM

## 2018-02-05 RX ORDER — FUROSEMIDE 20 MG/1
20 TABLET ORAL 2 TIMES DAILY
Qty: 60 TABLET | Refills: 0 | Status: SHIPPED | OUTPATIENT
Start: 2018-02-05 | End: 2018-02-14 | Stop reason: HOSPADM

## 2018-02-05 RX ORDER — ROSUVASTATIN CALCIUM 10 MG/1
10 TABLET, COATED ORAL NIGHTLY
Qty: 30 TABLET | Refills: 0 | Status: SHIPPED | OUTPATIENT
Start: 2018-02-05 | End: 2019-02-05

## 2018-02-05 RX ORDER — WARFARIN SODIUM 3 MG/1
3 TABLET ORAL
Status: DISCONTINUED | OUTPATIENT
Start: 2018-02-07 | End: 2018-02-05 | Stop reason: HOSPADM

## 2018-02-05 RX ORDER — WARFARIN SODIUM 2 MG/1
2 TABLET ORAL
Status: DISCONTINUED | OUTPATIENT
Start: 2018-02-06 | End: 2018-02-05 | Stop reason: HOSPADM

## 2018-02-05 RX ADMIN — FUROSEMIDE 20 MG: 20 TABLET ORAL at 08:39

## 2018-02-05 RX ADMIN — FAMOTIDINE 20 MG: 20 TABLET, FILM COATED ORAL at 08:39

## 2018-02-05 RX ADMIN — CLONAZEPAM 2 MG: 1 TABLET ORAL at 08:43

## 2018-02-05 RX ADMIN — HYDRALAZINE HYDROCHLORIDE 10 MG: 10 TABLET ORAL at 05:25

## 2018-02-05 RX ADMIN — ASPIRIN 81 MG: 81 TABLET, COATED ORAL at 08:39

## 2018-02-05 RX ADMIN — HYDROXYCHLOROQUINE SULFATE 200 MG: 200 TABLET, FILM COATED ORAL at 08:43

## 2018-02-05 RX ADMIN — DIPHENHYDRAMINE HYDROCHLORIDE 25 MG: 25 CAPSULE ORAL at 11:55

## 2018-02-05 RX ADMIN — CARVEDILOL 12.5 MG: 12.5 TABLET, FILM COATED ORAL at 08:39

## 2018-02-05 RX ADMIN — BUDESONIDE AND FORMOTEROL FUMARATE DIHYDRATE 2 PUFF: 160; 4.5 AEROSOL RESPIRATORY (INHALATION) at 07:18

## 2018-02-05 RX ADMIN — SODIUM BICARBONATE 650 MG TABLET 1300 MG: at 08:39

## 2018-02-05 NOTE — PROGRESS NOTES
[facility] Medicine Services  PROGRESS NOTE    Patient Name: Ashely Roldan  : 1960  MRN: [id number]    Date of Admission: 2018  Length of Stay: 13  Primary Care Physician: Peter Rdz MD    Subjective   Subjective     CC: leg swelling    HPI: Sitting up on edge of bed eating breakfast. Wants to go home. No complaints but did inform me today that she has not been taking her plaquenil for over 1 year.     Review of Systems  Gen- No fevers, chills  CV- No chest pain, palpitations  Resp- No cough, dyspnea  GI- No N/V/D, abd pain    Otherwise ROS is negative except as mentioned in the HPI.    Objective   Objective     Vital Signs:   Temp:  [97.7 °F (36.5 °C)-97.8 °F (36.6 °C)] 97.8 °F (36.6 °C)  Heart Rate:  [70-78] 78  Resp:  [18-20] 18  BP: (125-151)/(83-88) 151/84        Physical Exam:  Constitutional: No acute distress, awake, alert, well appearing  HENT: NCAT, mucous membranes moist  Respiratory: Clear to auscultation bilaterally, respiratory effort normal   Cardiovascular: RRR, loud mechanical click  Gastrointestinal: Positive bowel sounds, soft, nontender, nondistended  Musculoskeletal: 1+ LE edema bilaterally  Psychiatric: Appropriate affect, cooperative  Neurologic: Oriented x 3, strength symmetric in all extremities, Cranial Nerves grossly intact to confrontation, speech clear  Skin: No rashes    Results Reviewed:  I have personally reviewed current lab, radiology, and data and agree.      Results from last 7 days  Lab Units 18  0540 18  2103 18  0721 18  0303  18  2321   WBC 10*3/mm3  --  4.98 5.38  --   --  7.15   HEMOGLOBIN g/dL  --  8.8* 9.2*  --   --  9.0*   HEMATOCRIT %  --  27.7* 29.3*  --   --  28.4*   PLATELETS 10*3/mm3  --  107* 115*  --   --  92*   INR  2.45  --  2.21 1.78  < >  --    < > = values in this interval not displayed.    Results from last 7 days  Lab Units 18  0540 18  0721 18  0301   01/31/18  0606   SODIUM mmol/L 136 136 131*  < > 131*   POTASSIUM mmol/L 3.7 3.3* 3.3*  < > 4.5   CHLORIDE mmol/L 102 103 99  < > 104   CO2 mmol/L 27.0 26.0 25.0  < > 16.0*   BUN mg/dL 44* 52* 60*  < > 61*   CREATININE mg/dL 1.80* 1.90* 2.00*  < > 2.80*   GLUCOSE mg/dL 86 86 97  < > 113*   CALCIUM mg/dL 8.4* 8.4* 8.0*  < > 8.8   ALT (SGPT) U/L  --   --   --   --  822*   AST (SGOT) U/L  --   --   --   --  191*   < > = values in this interval not displayed.  Estimated Creatinine Clearance: 38.6 mL/min (by C-G formula based on Cr of 1.8).  No results found for: BNP  No results found for: PHART    Microbiology Results Abnormal     Procedure Component Value - Date/Time    Blood Culture - Blood, [[id number]]  (Normal) Collected:  01/23/18 1645    Lab Status:  Final result Specimen:  Blood from Arm, Left Updated:  01/28/18 1716     Blood Culture No growth at 5 days    Blood Culture - Blood, [[id number]]  (Normal) Collected:  01/23/18 1645    Lab Status:  Final result Specimen:  Blood from Arm, Right Updated:  01/28/18 1716     Blood Culture No growth at 5 days    Stool Culture - Stool, Per Rectum [[id number]]  (Abnormal) Collected:  01/24/18 1037    Lab Status:  Final result Specimen:  Stool from Per Rectum Updated:  01/26/18 1039     Stool Culture No Salmonella, Shigella, Campylobacter, E. coli O157, or Vibrio isolated      Light growth (2+) Yeast isolated (A)      No normal enteric coliforms present       Narrative:         Not cultured for Yersinia.    Not cultured for Yersinia.    Eosinophil Smear - Urine, Urine, Clean Catch [[id number]]  (Normal) Collected:  01/26/18 0226    Lab Status:  Final result Specimen:  Urine from Urine, Clean Catch Updated:  01/26/18 0323     Eosinophil Smear 0 % EOS/100 Cells     Narrative:       No eosinophil seen    Clostridium Difficile Toxin - Stool, Per Rectum [[id number]] Collected:  01/24/18 1037    Lab Status:  Final result Specimen:  Stool from Per Rectum Updated:  01/24/18 1229     Narrative:       The following orders were created for panel order Clostridium Difficile Toxin - Stool, Per Rectum.  Procedure                               Abnormality         Status                     ---------                               -----------         ------                     Clostridium Difficile To...[[id number]]  Normal              Final result                 Please view results for these tests on the individual orders.    Clostridium Difficile Toxin, PCR - Stool, Per Rectum [[id number]]  (Normal) Collected:  01/24/18 1037    Lab Status:  Final result Specimen:  Stool from Per Rectum Updated:  01/24/18 1223     C. Difficile Toxins by PCR Not Detected    Narrative:         Performance characteristics of test not established for patients <2 years of age.  Negative for Toxigenic C. Difficile          Imaging Results (last 24 hours)     ** No results found for the last 24 hours. **        Results for orders placed during the hospital encounter of 02/04/17   Adult Transthoracic Echo Complete With Contrast    Narrative · Left ventricular function is severely decreased. Estimated EF = 18%.  · The left ventricular cavity is borderline dilated.  · Left atrial cavity size is mild-to-moderately dilated.  · Mild to moderate aortic valve regurgitation is present.  · Moderate tricuspid valve regurgitation is present.  · Left ventricular wall thickness is consistent with mild concentric   hypertrophy.  · Left ventricular wall segments contract abnormally. Refer to wall   scoring diagram for more information.  · Right ventricular cavity is borderline dilated.  · There is no evidence of pericardial effusion.  · The prosthetic mitral valve is grossly normal.  · Mild pulmonary hypertension is present.          I have reviewed the medications.    Assessment/Plan   Assessment / Plan     Hospital Problem List     Anxiety    Essential hypertension (Chronic)    Overview Signed 10/19/2015  8:56 AM by Digna Santana      benign essential         Ischemic cardiomyopathy (Chronic)    Overview Signed 2/6/2017  8:44 AM by MAN Quesada     A. History of MI December 2009: s/p stent to RCA and LAD, EF 40%  B. CABGx2 March, 2010: SVG to OMB-1, SVG to PDA  C. EF 30% post-operatively, s/p [place] ICD placement, 2011  D. Echo July 2014: Severe global hypokinesis with EF 12%, moderate AI, mechanical mitral valve, moderate to severe TR, RVSP 43mmHg   E. Echo 2/4/17: EF 18%, mild to mod AI, mod TR, grossly normal prosthetic mitral valve         Chronic renal failure, stage 2 (mild)    Lupus (systemic lupus erythematosus) (Chronic)    H/O mitral valve replacement    Overview Signed 2/6/2017  8:53 AM by MAN Quesada     VHD:  A. Mitral valve replacement with 27mm ATS mechanical valve March 2010            Anemia of chronic kidney failure    Tobacco abuse (Chronic)    AICD (automatic cardioverter/defibrillator) present (Chronic)    FAHEEM (acute kidney injury)    Overview Signed 2/6/2017  8:46 AM by MAN Quesada     Nephrology following: Hyperkalemia and Hyponatremia         Chronic anticoagulation    Overview Signed 7/16/2016 10:32 PM by IAIN Gabriel     Coumadin (Mechanical AVR)         Supratherapeutic INR    UTI (urinary tract infection)    Hyperkalemia    Colitis    Nausea and vomiting    Diarrhea    Acute kidney injury superimposed on chronic kidney disease    Lactic acidosis    Liver injury             Brief Hospital Course to date:  Ashely Roldan is a 57 y.o. female with systolic CHF/ICM s/p ICD and mechanical mitral valve who presented with N/V/D, abdominal pain and found to have FAHEEM, hyperkalemia and CT A/P revealed colitis.       Assessment & Plan:  FAHEEM  Hyperkalemia  -Improving. Back to prior baseline of 2.0.  -ATN, likely due to hypotension, hypovolemia from acute illness.  -Has underlying lupus nephritis with low C3, C4.  Renal biopsy from last year confirmed lupus.  -Nephrology following. Okay to  d/c on BID lasix from their standpoint with f/u in 3 weeks.      Acute liver injury  -Suspect ischemic due to hypotension  -Acute hepatitis panel negative  -LFTs improving      E. Coli UTI  -Completed 5 days of antibiotics  -Other infectious workup negative; d/c'd Zosyn 1/26      Lupus  -Apparently has not taken Plaquenil for over 1 year. Will restart Plaquenil upon d/c. Needs to establish as new patient at [facility] at d/c.  -Anti-DS DNA WNL  -Previously had negative DAWN and DS DNA in September 2017  -Anticardiolipin negative      Encephalopathy  -Resolved.  -Delirium vs. Metabolic encephalopathy vs. Medication induced      Chronic systolic CHF w EF < 20%  Mechanical mitral valve  -Lasix today with increased edema, and B effusions  -Remains on IV heparin bridging to coumadin. Nearly therapeutic.  -Severe CM on ECHO prelim with final read pending.      DVT Prophylaxis: IV heparin.      CODE STATUS: Full Code      Disposition: I expect the patient to be discharged home once INR therapeutic and okay with cardiology    Darlene Mcpherson II, DO  02/05/18  11:37 AM

## 2018-02-05 NOTE — PROGRESS NOTES
"Adult Nutrition  Assessment/PES    Patient Name:  Ashely Roldan  YOB: 1960  MRN: [id number]  Admit Date:  1/23/2018    Assessment Date:  2/5/2018        Reason for Assessment       02/05/18 1116    Reason for Assessment    Reason For Assessment/Visit follow up protocol    Time Spent (min) 20    Diagnosis Diagnosis   per notes this admission              Nutrition/Diet History       02/05/18 1117    Nutrition/Diet History    Reported/Observed By Patient    Appetite Good    Other Patient reports her appetite is great. States that she is \"clearing all her plates.\" Reports ate 100% of breakfast this AM. States [employer] have been giving her options for meals. Has no preferences at this time            Anthropometrics       02/05/18 1119    Anthropometrics (Special Considerations)    Height Used for Calculations 1.753 m (5' 9\")    Weight Used for Calculations 71.8 kg (158 lb 4.6 oz)   wt from charting 2/5; method obtained not listed            Labs/Tests/Procedures/Meds       02/05/18 1119    Labs/Tests/Procedures/Meds    Labs/Tests Review Reviewed                Nutrition Prescription Ordered       02/05/18 1120    Nutrition Prescription PO    Current PO Diet Regular    Common Modifiers Renal            Evaluation of Received Nutrient/Fluid Intake       02/05/18 1120    PO Evaluation    Number of Days PO Intake Evaluated --   PO intake recorded from (2/3 - 2/5)    Number of Meals 4    % PO Intake 56%          Problem/Interventions:        Problem 1       02/05/18 1121    Nutrition Diagnoses Problem 1    Problem 1 Inadequate Intake/Infusion    Inadequate Intake Type Oral    Etiology (related to) --   clinical condition    Signs/Symptoms (evidenced by) PO Intake    Percent (%) intake recorded 56 %    Over number of meals 4                Intervention Goal       02/05/18 1121    Intervention Goal    General Nutrition support treatment    PO Increase intake            Nutrition Intervention     "   02/05/18 1121    Nutrition Intervention    RD/Tech Action Advise alternate selection;Interview for preference;Encourage intake;Follow Tx progress;Care plan reviewd              Education/Evaluation       02/05/18 1121    Monitor/Evaluation    Monitor Per protocol;PO intake        Electronically signed by:  Jody Tejeda  02/05/18 11:21 AM

## 2018-02-05 NOTE — THERAPY TREATMENT NOTE
Acute Care - Physical Therapy Treatment Note  [facility]     Patient Name: Ashely Roldan  : 1960  MRN: [id number]  Today's Date: 2018  Onset of Illness/Injury or Date of Surgery Date: 18  Date of Referral to PT: 18  Referring Physician: MD Amilcar    Admit Date: 2018    Visit Dx:    ICD-10-CM ICD-9-CM   1. Acute kidney injury superimposed on chronic kidney disease N17.9 866.00    N18.9 585.9   2. Acute infective gastroenteritis A09 009.0   3. Volume depletion E86.9 276.50   4. Supratherapeutic INR R79.1 790.92   5. Hyperkalemia E87.5 276.7   6. Impaired functional mobility, balance, gait, and endurance Z74.09 V49.89   7. FAHEME (acute kidney injury) N17.9 584.9   8. COPD exacerbation J44.1 491.21   9. Acute systolic (congestive) heart failure I50.21 428.21     428.0   10. CKD (chronic kidney disease), stage IV N18.4 585.4   11. Acute systolic heart failure and valvular disease I50.21 428.21   12. Acute on chronic respiratory failure with hypoxia J96.21 518.84     799.02   13. Hyperlipemia E78.5 272.4     Patient Active Problem List   Diagnosis   • Anxiety   • Arthritis   • Panlobular emphysema   • Reactive depression   • Gastritis   • Heart attack   • Heart murmur   • Mixed hyperlipidemia   • Essential hypertension   • Ischemic cardiomyopathy   • VHD (valvular heart disease)   • Osteoporosis   • PVD (peripheral vascular disease)   • Chronic renal failure, stage 2 (mild)   • Allergic rhinitis   • Lupus (systemic lupus erythematosus)   • TIA (transient ischemic attack)   • Chronic coronary artery disease   • Cough   • H/O mitral valve replacement   • Anemia of chronic kidney failure   • Insomnia   • COPD exacerbation   • Tobacco abuse   • Acute systolic heart failure and valvular disease   • AICD (automatic cardioverter/defibrillator) present   • FAHEEM (acute kidney injury)   • Noncompliance   • Blunt trauma of multiple sites   • Chronic anticoagulation   • Closed fracture of rib with  flail chest   • Supratherapeutic INR   • Fracture of left clavicle   • Chest pain   • Hypotension   • Acute systolic (congestive) heart failure   • Acute on chronic respiratory failure with hypoxia   • CKD (chronic kidney disease), stage IV   • UTI (urinary tract infection)   • Elevated troponin   • Hyperkalemia   • Colitis   • Nausea and vomiting   • Diarrhea   • Acute kidney injury superimposed on chronic kidney disease   • Lactic acidosis   • Liver injury               Adult Rehabilitation Note       02/05/18 1130 02/04/18 1325       Rehab Assessment/Intervention    Discipline physical therapy assistant  -UD physical therapist  -SR     Document Type therapy note (daily note)  -UD therapy note (daily note)  -SR     Subjective Information agree to therapy;no complaints  -UD agree to therapy;complains of;fatigue;weakness;dyspnea  -SR     Patient Effort, Rehab Treatment good  -UD good  -SR     Symptoms Noted During/After Treatment fatigue  -UD fatigue;shortness of breath  -SR     Precautions/Limitations fall precautions  -UD fall precautions  -SR     Patient Response to Treatment  pt needs several sitting and standing rest breaks d/t weakness and dyspnea, vitals are stable throughout  -SR     Recorded by [UD] Rhiannon Montalvo PTA [SR] Xi Bernal, PT     Vital Signs    Pre Systolic BP Rehab  --   VSS  -SR     Pre Patient Position Sitting  -UD      Intra Patient Position Standing  -UD      Post Patient Position Sitting  -UD      Recorded by [UD] Rhiannon Montalvo PTA [SR] Xi Bernal, PT     Pain Assessment    Pain Assessment No/denies pain  -UD No/denies pain  -SR     Recorded by [UD] Rhiannon Montalvo PTA [SR] Xi Bernal, PT     Cognitive Assessment/Intervention    Current Cognitive/Communication Assessment  functional  -SR     Orientation Status oriented x 4  -UD oriented x 4  -SR     Follows Commands/Answers Questions 100% of the time  -% of the time  -SR     Personal Safety  mild impairment  -SR     Personal  Safety Interventions fall prevention program maintained  -UD fall prevention program maintained;gait belt;nonskid shoes/slippers when out of bed  -SR     Recorded by [UD] Rhiannon Montalvo PTA [SR] Xi Bernal, PT     Bed Mobility, Assessment/Treatment    Bed Mobility, Assistive Device  bed rails;head of bed elevated  -SR     Bed Mob, Supine to Sit, [place] conditional independence  -UD minimum assist (75% patient effort);verbal cues required  -SR     Recorded by [UD] Rhiannon Montalvo PTA [SR] Xi Bernal, PT     Transfer Assessment/Treatment    Transfers, Sit-Stand [place] stand by assist  -UD contact guard assist;verbal cues required  -SR     Transfers, Stand-Sit [place] stand by assist  -UD verbal cues required;contact guard assist  -SR     Transfers, Sit-Stand-Sit, Assist Device rolling walker  -UD rolling walker  -SR     Toilet Transfer, [place]  minimum assist (75% patient effort);verbal cues required  -SR     Toilet Transfer, Assistive Device  rolling walker  -SR     Transfer, Comment  cues for hand placement and safety awareness  -SR     Recorded by [UD] Rhiannon Montalvo PTA [SR] Xi Bernal, PT     Gait Assessment/Treatment    Gait, [place] Level contact guard assist  -UD contact guard assist;verbal cues required  -SR     Gait, Assistive Device rolling walker  -UD rolling walker  -SR     Gait, Distance (Feet) 250  -UD 60  -SR     Gait, Gait Deviations anni decreased  -UD anni decreased;double stance time increased;forward flexed posture  -SR     Gait, Safety Issues step length decreased  -UD step length decreased  -SR     Gait, Impairments strength decreased  -UD impaired balance;strength decreased  -SR     Gait, Comment  60 ft max distance acheived before sitting rest break required, pt able to amb another 45 and 30 ft with sitting rest breaks between. pt would be good candidate for rollator walker  -SR     Recorded by [UD] Rhiannon Montalvo PTA [SR] Xi Bernal, PT     Therapy  Exercises    Bilateral Lower Extremities AROM:;10 reps;sitting  -UD AROM:;10 reps;sitting;ankle pumps/circles;LAQ;supine;heel slides  -SR     Bilateral Upper Extremity AROM:;10 reps;sitting  -UD      Recorded by [UD] Rhiannon Montalvo, PTA [SR] Xi Bernal, PT     Positioning and Restraints    Pre-Treatment Position in bed  -UD in bed  -SR     Post Treatment Position chair  -UD bed  -SR     In Bed  sitting EOB;notified nsg;call light within reach;encouraged to call for assist;with nsg  -SR     In Chair notified nsg;reclined;sitting;call light within reach;legs elevated  -UD      Recorded by [UD] Rhiannon Montalvo, PTA [SR] Xi Bernal PT       User Key  (r) = Recorded By, (t) = Taken By, (c) = Cosigned By    Initials Name Effective Dates    UD Rhiannon Montalvo, PTA 06/22/15 -     SR Xi Bernal, PT 06/19/15 -                 IP PT Goals       02/05/18 1222 02/04/18 1530 02/02/18 1052    Bed Mobility PT LTG    Bed Mobility PT LTG, Date Goal Reviewed  02/04/18  -SR     Bed Mobility PT LTG, Outcome goal met  -UD  goal ongoing  -UD    Transfer Training PT LTG    Transfer Training PT  LTG, Date Goal Reviewed  02/04/18  -SR     Transfer Training PT LTG, Outcome goal ongoing  -UD  goal ongoing  -UD      01/31/18 1012 01/29/18 1420       Bed Mobility PT LTG    Bed Mobility PT LTG, Date Established  01/29/18  -EH     Bed Mobility PT LTG, Time to Achieve  2 wks  -EH     Bed Mobility PT LTG, Activity Type  supine to sit/sit to supine  -EH     Bed Mobility PT LTG, [place] Level  independent  -EH     Bed Mobility PT LTG, Outcome goal ongoing  -UD      Transfer Training PT LTG    Transfer Training PT LTG, Date Established  01/29/18  -EH     Transfer Training PT LTG, Time to Achieve  2 wks  -EH     Transfer Training PT LTG, Activity Type  sit to stand/stand to sit  -EH     Transfer Training PT LTG, [place] Level  conditional independence  -EH     Transfer Training PT LTG, Assist Device  walker, rolling  -EH     Transfer  Training PT LTG, Outcome goal ongoing  -UD      Gait Training PT LTG    Gait Training Goal PT LTG, Date Established  01/29/18  -     Gait Training Goal PT LTG, Time to Achieve  2 wks  -     Gait Training Goal PT LTG, [place] Level  contact guard assist  -     Gait Training Goal PT LTG, Assist Device  walker, rolling  -     Gait Training Goal PT LTG, Distance to Achieve  150  -EH     Gait Training Goal PT LTG, Outcome goal met  -UD        User Key  (r) = Recorded By, (t) = Taken By, (c) = Cosigned By    Initials Name Provider Type    UD Rhiannon Montalvo, PTA Physical Therapy Assistant     Emilia Lopez, PT Physical Therapist    SR Xi Bernal, PT Physical Therapist          Physical Therapy Education     Title: PT OT SLP Therapies (Done)     Topic: Physical Therapy (Done)     Point: Mobility training (Done)    Learning Progress Summary    Learner Readiness Method Response Comment Documented by Status   Patient Acceptance E,D DU,NR   02/05/18 1221 Done    Acceptance E,D VU PT encourages pt to cont with ther ex on her own to improve strength and endurance.  02/04/18 1529 Done    Acceptance E,D DU,NR   02/02/18 1052 Done    Acceptance E,D DU,[facility] 01/31/18 1012 Done    Acceptance E VU,[facility] 01/29/18 1420 Done               Point: Home exercise program (Done)    Learning Progress Summary    Learner Readiness Method Response Comment Documented by Status   Patient Acceptance E,D DU,NR   02/05/18 1221 Done    Acceptance E,D VU PT encourages pt to cont with ther ex on her own to improve strength and endurance.  02/04/18 1529 Done    Acceptance E,D DU,NR   02/02/18 1052 Done    Acceptance E,D DU,NR   01/31/18 1012 Done               Point: Body mechanics (Done)    Learning Progress Summary    Learner Readiness Method Response Comment Documented by Status   Patient Acceptance E,D DU,NR   02/05/18 1221 Done    Acceptance E,D VU PT encourages pt to cont with ther ex on her own to improve  strength and endurance.  02/04/18 1529 Done    Acceptance E,D DU,NR   02/02/18 1052 Done    Acceptance E,D DU,[facility] 01/31/18 1012 Done    Acceptance E VU,[facility] 01/29/18 1420 Done               Point: Precautions (Done)    Learning Progress Summary    Learner Readiness Method Response Comment Documented by Status   Patient Acceptance E,D DU,NR   02/05/18 1221 Done    Acceptance E,D VU PT encourages pt to cont with ther ex on her own to improve strength and endurance.  02/04/18 1529 Done    Acceptance E,D DU,[facility] 02/02/18 1052 Done    Acceptance E,D DU,[facility] 01/31/18 1012 Done    Acceptance E VU,[facility] 01/29/18 1420 Done                      User Key     Initials Effective Dates Name Provider Type Discipline     06/22/15 -  Rhiannon Montalvo, PTA Physical Therapy Assistant PT     06/19/15 -  Emilia Lopez, PT Physical Therapist PT     06/19/15 -  Xi Bernal, PT Physical Therapist PT                    PT Recommendation and Plan  Anticipated Discharge Disposition: skilled nursing facility (Vs [facility])  Planned Therapy Interventions: balance training, bed mobility training, gait training, strengthening, transfer training, stretching, ROM (Range of Motion)  PT Frequency: daily  Plan of Care Review  Plan Of Care Reviewed With: patient  Progress: improving  Outcome Summary/Follow up Plan: pt anxious wants to go home.only needs CG assist for gait.ambulat 250 ft today with only 1 standing rest          Outcome Measures       02/05/18 1130 02/04/18 1325       How much help from another person do you currently need...    Turning from your back to your side while in flat bed without using bedrails? 4  -UD 3  -SR     Moving from lying on back to sitting on the side of a flat bed without bedrails? 4  -UD 3  -SR     Moving to and from a bed to a chair (including a wheelchair)? 3  -UD 3  -SR     Standing up from a chair using your arms (e.g., wheelchair, bedside chair)? 3  -UD 3  -SR     Climbing 3-5 steps with  a railing? 3  -UD 2  -SR     To walk in hospital room? 3  -UD 3  -SR     AM-PAC 6 Clicks Score 20  -UD 17  -SR     Functional Assessment    Outcome Measure Options  AM-PAC 6 Clicks Basic Mobility (PT)  -SR       User Key  (r) = Recorded By, (t) = Taken By, (c) = Cosigned By    Initials Name Provider Type    ZEE Montalvo PTA Physical Therapy Assistant    SR Xi Bernal, PT Physical Therapist           Time Calculation:         PT Charges       02/05/18 1223          Time Calculation    PT Received On 02/05/18  -UD      PT Goal Re-Cert Due Date 02/08/18  -UD      Time Calculation- PT    Total Timed Code Minutes- PT 24 minute(s)  -UD        User Key  (r) = Recorded By, (t) = Taken By, (c) = Cosigned By    Initials Name Provider Type    ZEE Montalvo PTA Physical Therapy Assistant          Therapy Charges for Today     Code Description Service Date Service Provider Modifiers Qty    83399064139 HC PT THER PROC EA 15 MIN 2/5/2018 Rhiannon Montalvo PTA GP 1    84582470078 HC GAIT TRAINING EA 15 MIN 2/5/2018 Rhiannon Montalvo PTA GP 1          PT G-Codes  Outcome Measure Options: AM-PAC 6 Clicks Basic Mobility (PT)    Rhiannon Montalvo PTA  2/5/2018

## 2018-02-05 NOTE — PROGRESS NOTES
"Pharmacy Consult  -  Warfarin    Ashely Roldan is a  57 y.o. female   Height - 175.3 cm (69\")  Weight - 72.2 kg (159 lb 3.2 oz)    Consulting Provider: - Mari TIMMONS  Indication: mechanical mitral valve  Goal INR: 2.5-3.5  Home Regimen:   -  2 mg daily for 2 days,    - then 3 mg x 1 day, then repeat    Bridge Therapy: Heparin drip    Drug-Drug Interactions with current regimen:   Heparin drip--bridge therapy---increases bleeding risk    Warfarin Dosing During Admission:    Date  1/23 1/24 1/25 1/26 1/27 1/28 1/29 1/30 1/31   INR  4.55 5.66 8.03 PT >100 2.64 1.72 1.52 1.48 1.51   Dose  HOLD HOLD HOLD HOLD  -vitamin K 15mg HOLD per MD 1 mg 1 mg 2 mg 2 mg     Date  2/1 2/2 2/3 2/4 2/5       INR  1.51 1.63 1.78 2.21 2.45       Dose  2 mg 3mg 3mg 3mg 2mg         Total of 15 mg IV vitamin K received on 1/26.    Education Provided: Discussed warfarin with patient and how it is normally monitored     Labs:  Results from last 7 days   Lab Units 02/05/18  0540 02/04/18  2103 02/04/18  1503 02/04/18  0721 02/04/18  0100 02/03/18  1728 02/03/18  1057 02/03/18  0303  02/02/18  0621 02/01/18  2321  02/01/18  0625  01/31/18  0606  01/30/18  0211   INR  2.45 --  --  2.21 --  --  --  1.78 --  1.63 --  --  1.51 --  1.51 --  1.48   APTT seconds 80.7* 59.2 64.6 49.8* 63.3 87.8* 50.9* 68.9 < > 45.3* --  < > 76.2* < > 79.3* < > 49.9*   HEMOGLOBIN g/dL --  8.8* --  9.2* --  --  --  --  --  --  9.0* --  --  --  10.7* --  9.2*   HEMATOCRIT % --  27.7* --  29.3* --  --  --  --  --  --  28.4* --  --  --  33.2* --  28.0*   PLATELETS 10*3/mm3 --  107* --  115* --  --  --  --  --  --  92* --  --  --  105* --  84*   < > = values in this interval not displayed.   Results from last 7 days   Lab Units 02/05/18  0540 02/04/18  0721 02/03/18  0303  01/31/18  0606   SODIUM mmol/L 136 136 131* < > 131*   POTASSIUM mmol/L 3.7 3.3* 3.3* < > 4.5   CHLORIDE mmol/L 102 103 99 < > 104   CO2 mmol/L 27.0 26.0 25.0 < > 16.0*   BUN mg/dL 44* 52* 60* < " > 61*   CREATININE mg/dL 1.80* 1.90* 2.00* < > 2.80*   CALCIUM mg/dL 8.4* 8.4* 8.0* < > 8.8   BILIRUBIN mg/dL --  --  --  --  1.0   ALK PHOS U/L --  --  --  --  152*   ALT (SGPT) U/L --  --  --  --  822*   AST (SGOT) U/L --  --  --  --  191*   GLUCOSE mg/dL 86 86 97 < > 113*   < > = values in this interval not displayed.     Diet Order   Procedures   • Diet Regular; Renal   No meals documented on 2/4.     Assessment/Plan:     -INR was SUPRAtherapeutic on admission at 4.55 and continued to increase to a level that was undetectable.   received a total of 15 mg of IV Vitamin K on 1/26 (same day INR was undetectable).   Expect Vitamin K effect for 5-7 days  Warfarin restarted 1/28 with gradual dose increase  acute liver injury with elevated LFTs - improving per note  1. Resume Home dose of warfarin at 2mg daily for 2 days then 3mg on the 3rd day and then repeat.  2. Continue heparin bridge until INR is 2.5 or greater.    Fantasma Mireles, [facility]  2/5/2018  8:32 AM

## 2018-02-05 NOTE — PROGRESS NOTES
Continued Stay Note  [facility]     Patient Name: Ashely Roldan  MRN: [id number]  Today's Date: 2/5/2018    Admit Date: 1/23/2018          Discharge Plan       02/05/18 1456    Case Management/Social Work Plan    Plan Home with family    Additional Comments Met with patient at bedside and she states that she is going to leave today whether she has been discharged or not because she is tired of waiting for the doctors to release her. CM explained that home health may not be able to be arranged if she left the hospital against medical advice. Patient verbalized understanding and states that she already knows the PT exercises anyway and will do them on her own because she wants to get better. She plans to go to her home when she leaves and says her adult son will transport her and will stay with her at her home. CM discussed our conversation with Dr. Mcpherson and he is agreeable to patient having home health under these circumstances as it would be beneficial to the patient. Called Angel at [facility] and explained situation and they will put patient on their schedule. Darlene Byrd RN [phone]              Discharge Codes     None        Expected Discharge Date and Time     Expected Discharge Date Expected Discharge Time    Feb 6, 2018             Darlene Byrd RN

## 2018-02-05 NOTE — PLAN OF CARE
Problem: Patient Care Overview (Adult)  Goal: Plan of Care Review  Outcome: Ongoing (interventions implemented as appropriate)    Goal: Adult Individualization and Mutuality  Outcome: Ongoing (interventions implemented as appropriate)    Goal: Discharge Needs Assessment  Outcome: Ongoing (interventions implemented as appropriate)      Problem: Fall Risk (Adult)  Goal: Absence of Falls  Outcome: Ongoing (interventions implemented as appropriate)      Problem: Confusion, Acute (Adult)  Goal: Cognitive/Functional Impairments Minimized  Outcome: Ongoing (interventions implemented as appropriate)    Goal: Safety  Outcome: Ongoing (interventions implemented as appropriate)      Problem: Skin Integrity Impairment, Risk/Actual (Adult)  Goal: Skin Integrity/Wound Healing  Outcome: Ongoing (interventions implemented as appropriate)

## 2018-02-05 NOTE — PROGRESS NOTES
"NAL Progress Note  Ashely Roldan  [id number]  1960 1/23/2018    Reason for visit:  FAHEEM on CKD    ROS:    No new complaints, denies any nausea vomiting chest pain or shortness of breath.  Dysuria or hematuria.    I&O:    Intake/Output Summary (Last 24 hours) at 02/05/18 1135  Last data filed at 02/05/18 0800   Gross per 24 hour   Intake              120 ml   Output             1500 ml   Net            -1380 ml       PE:   Blood pressure 151/84, pulse 78, temperature 97.8 °F (36.6 °C), temperature source Oral, resp. rate 18, height 175.3 cm (69\"), weight 71.8 kg (158 lb 3.2 oz), SpO2 97 %, not currently breastfeeding.    Constitutional: Trouble in bed no obvious distress, awake alert oriented ×3.  HENT: Pupils reactive, EOMI, oral mucosa moist.   Neck: Neck supple. No JVD present.   Cardiovascular: No gallop murmur or rub.  Pulmonary/Chest: Clear auscultation no obvious distress, nonlabored  Abdominal: Soft. Bowel sounds are normal. She exhibits no distension. There is no tenderness.   Musculoskeletal: Positive dependent edema is noted    neuro: Grossly intact no focal deficit.  Skin: Skin is warm and dry. No rash noted.     Medications:    Current Facility-Administered Medications:   •  acetaminophen (TYLENOL) tablet 650 mg, 650 mg, Oral, Q6H PRN, Alanna Dayanara, APRN, 650 mg at 01/31/18 1736  •  albuterol (PROVENTIL) nebulizer solution 0.5% 2.5 mg/0.5mL, 2.5 mg, Nebulization, PRN, Mary FRANCIS MD  •  aspirin EC tablet 81 mg, 81 mg, Oral, Daily, Sandhya Lynch PA-C, 81 mg at 02/05/18 0839  •  budesonide-formoterol (SYMBICORT) 160-4.5 MCG/ACT inhaler 2 puff, 2 puff, Inhalation, BID - RT, Mary FRANCIS MD, 2 puff at 02/05/18 0718  •  carvedilol (COREG) tablet 12.5 mg, 12.5 mg, Oral, BID With Meals, Scott Reina MD, 12.5 mg at 02/05/18 0839  •  clonazePAM (KlonoPIN) tablet 2 mg, 2 mg, Oral, TID PRN, Darlene Mcpherson II, DO, 2 mg at 02/05/18 0843  •  diphenhydrAMINE (BENADRYL) capsule 25 mg, 25 " mg, Oral, Q6H PRN, Darlene Mcpherson II, DO, 25 mg at 02/04/18 0151  •  epoetin bob (EPOGEN,PROCRIT) injection 20,000 Units, 20,000 Units, Subcutaneous, Weekly, Scott Reina MD, 20,000 Units at 02/02/18 0820  •  famotidine (PEPCID) tablet 20 mg, 20 mg, Oral, Daily, Trini Fitzgerald MD, 20 mg at 02/05/18 0839  •  furosemide (LASIX) tablet 20 mg, 20 mg, Oral, BID, Anni Romero MD, 20 mg at 02/05/18 0839  •  heparin 17688 units/250 mL (100 units/mL) in 0.45 % NaCl infusion, 8 Units/kg/hr, Intravenous, Titrated, Fantasma Mireles [facility], Last Rate: 5.62 mL/hr at 02/05/18 0840, 8 Units/kg/hr at 02/05/18 0840  •  hydrALAZINE (APRESOLINE) tablet 10 mg, 10 mg, Oral, Q8H, Jaleel Schafer MD, 10 mg at 02/05/18 0525  •  hydroxychloroquine (PLAQUENIL) tablet 200 mg, 200 mg, Oral, Daily, Mary FRANCIS MD, 200 mg at 02/05/18 0843  •  loperamide (IMODIUM) capsule 2 mg, 2 mg, Oral, 4x Daily PRN, Trini Fitzgerald MD, 2 mg at 01/29/18 1530  •  melatonin sublingual tablet 5 mg, 5 mg, Sublingual, Nightly PRN, Alanna Dayanara, APRN, 5 mg at 02/04/18 2113  •  nicotine (NICODERM CQ) 21 MG/24HR patch 1 patch, 1 patch, Transdermal, Q24H, Mary FRANCIS MD, 1 patch at 02/04/18 0853  •  ondansetron (ZOFRAN) tablet 4 mg, 4 mg, Oral, Q6H PRN, 4 mg at 01/23/18 2318 **OR** ondansetron (ZOFRAN) injection 4 mg, 4 mg, Intravenous, Q6H PRN, Mary FRANCIS MD, 4 mg at 01/30/18 0332  •  Pharmacy to Dose Heparin, , Does not apply, Continuous PRN, Trini Fitzgerald MD, Stopped at 02/03/18 1911  •  Pharmacy to dose warfarin, , Does not apply, Continuous PRN, Manjit Lynn, [facility]  •  rosuvastatin (CRESTOR) tablet 10 mg, 10 mg, Oral, Nightly, Sandhya Lynch PA-C, 10 mg at 02/04/18 2015  •  sodium bicarbonate tablet 1,300 mg, 1,300 mg, Oral, BID, Scott Reina MD, 1,300 mg at 02/05/18 0839  •  sodium chloride 0.9 % flush 1-10 mL, 1-10 mL, Intravenous, PRN, Mary FRANCIS MD  •  [START ON 2/6/2018] warfarin (COUMADIN) tablet 2 mg, 2 mg, Oral, Q72H,  Fantasma Mireles [facility]  •  warfarin (COUMADIN) tablet 2 mg, 2 mg, Oral, Q72H, Fantasma Mireles [facility]  •  [START ON 2/7/2018] warfarin (COUMADIN) tablet 3 mg, 3 mg, Oral, Q72H, Fantasma Mireles [facility]    Meds reviewed and doses adjusted for renal function    Labs:    Results from last 7 days  Lab Units 02/04/18  2103 02/04/18  0721 02/01/18  2321   WBC 10*3/mm3 4.98 5.38 7.15   HEMOGLOBIN g/dL 8.8* 9.2* 9.0*   HEMATOCRIT % 27.7* 29.3* 28.4*   PLATELETS 10*3/mm3 107* 115* 92*       Results from last 7 days  Lab Units 02/05/18  0540 02/04/18  0721 02/03/18  0303 02/02/18  0621   SODIUM mmol/L 136 136 131* 129*   POTASSIUM mmol/L 3.7 3.3* 3.3* 3.4*   CHLORIDE mmol/L 102 103 99 96*   CO2 mmol/L 27.0 26.0 25.0 20.0   BUN mg/dL 44* 52* 60* 62*   CREATININE mg/dL 1.80* 1.90* 2.00* 2.20*   GLUCOSE mg/dL 86 86 97 100   CALCIUM mg/dL 8.4* 8.4* 8.0* 8.3*   PHOSPHORUS mg/dL  --  4.3  --   --        Results from last 7 days  Lab Units 01/31/18  0606   ALK PHOS U/L 152*   BILIRUBIN mg/dL 1.0   ALT (SGPT) U/L 822*   AST (SGOT) U/L 191*     Invalid input(s): UCOL, UCLR, UPH, USG, UPRO, UBLD, UNITR, ELEU, URBC, UFC, UBACT    Estimated Creatinine Clearance: 38.6 mL/min (by C-G formula based on Cr of 1.8).    Radiology:  Imaging Results (last 72 hours)     Procedure Component Value Units Date/Time    CT Head Without Contrast [[id number]] Collected:  01/23/18 1605     Updated:  01/23/18 1655    Narrative:       EXAMINATION: CT HEAD WO CONTRAST- 01/23/2018     INDICATION: Headache, SAH suspected      TECHNIQUE: Axial CT of the head without intravenous contrast  administration     The radiation dose reduction device was turned on for each scan per the  ALARA (As Low as Reasonably Achievable) protocol.     COMPARISON: 07/16/2016     FINDINGS: Midline structures are symmetric without evidence of mass,  mass effect or midline shift. No intra-axial hemorrhage or extra-axial  fluid collection. Ventricles and sulci within normal limits. Basal  cisterns  are patent. Globes and orbits are unremarkable. Visualized  paranasal sinuses and mastoid air cells are grossly clear and  well-pneumatized. Calvarium intact.       Impression:       No acute intracranial abnormality.     D:  01/23/2018  E:  01/23/2018        This report was finalized on 1/23/2018 4:53 PM by Dr. Cuate Vick.       CT Abdomen Pelvis Without Contrast [[id number]] Collected:  01/23/18 1611     Updated:  01/24/18 0929    Narrative:       EXAMINATION: CT ABDOMEN AND PELVIS WO CONTRAST-      INDICATION: Abdominal pain, gastroenteritis or colitis suspected.      TECHNIQUE: CT abdomen and pelvis without intravenous contrast.     The radiation dose reduction device was turned on for each scan per the  ALARA (As Low as Reasonably Achievable) protocol.     COMPARISON: None.     FINDINGS: Lung bases demonstrate subsegmental atelectasis and/or  scarring within the right lung base. No focal consolidation. Liver  without focal lesion. Gallbladder surgically absent. Pancreas and spleen  within normal limits. Splenule at the splenic column. Adrenal glands  without distinct nodule. Kidneys without hydronephrosis or hydroureter.  No bulky retroperitoneal adenopathy. Atherosclerotic nonaneurysmal  abdominal aorta. GI tract evaluation demonstrates small hiatal hernia.  There is limited visualization of the right hemipelvis due to right hip  arthroplasty however, within the visualized portions there is mural  thickening and pericolonic stranding associated with redundant sigmoid  colon and adjacent free fluid in the pelvis consistent with colitis. No  diverticula are identified to suggest diverticulitis. Pelvic viscera  otherwise unremarkable and partially visualized due to significant beam  hardening artifact from the arthroplasty. Multilevel degenerative  changes of the spine without aggressive osseous lesion. No soft tissue  body wall findings of concern.       Impression:       There is questionable mural thickening  of the redundant  sigmoid within the pelvis with adjacent small volume likely reactive  free fluid. No loculated intraabdominal fluid collection. No free air.  This is most consistent with colitis. No diverticula are identified to  suggest diverticulitis as an underlying etiology.     D:  01/23/2018  E:  01/24/2018     This report was finalized on 1/24/2018 9:27 AM by Dr. Cuate Vick.       XR Chest 1 View [[id number]] Collected:  01/23/18 1700     Updated:  01/24/18 0930    Narrative:       EXAMINATION: XR CHEST 1 VW- 01/23/2018     INDICATION: hypoxemia; N17.9-Acute kidney failure, unspecified;  N18.9-Chronic kidney disease, unspecified; E42-Lnvcfznwci  gastroenteritis and colitis, unspecified; E86.9-Volume depletion,  unspecified; R79.1-Abnormal coagulation profile; E87.5-Hyperkalemia      COMPARISON: Chest x-ray 01/21/2018     FINDINGS: Cardiac size enlarged. Pulmonary vascularity within normal  limits. No focal consolidation. Left chest wall pacing device with lead  intact. No pneumothorax or pleural effusion.           Impression:       Chronic lung changes without acute cardiopulmonary process.     D:  01/23/2018  E:  01/24/2018     This report was finalized on 1/24/2018 9:27 AM by Dr. Cuate Vick.                  IMPRESSION:  1. 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 Baseline creatinine per previously obtained labs appears to be in the 1.5-2.0 range. At admission to [facility] creatinine was documented to be 4.0 with a continuing elevation to 4.2 today. She has been hypotensive  Hepatitis panel negative,   2. Low plt and high protime; Liver enzymes high Anticardiolipin negative Need to evaluate for ttp adamts 13 negative less than 2 serum protein electrophoresis no M spike  3. Anemia - + Occult blood in stool. Transfuse for Hgb < 8.0.      4. Lupus - Patient currently  taking plaquenil. Low c3c4 but dsdna low, Anti-cardiolipin negative     5. Hypocalcemia - PTH and ionized calcium to be drawn.     6. Nephrolithiasis - History of. Last episode 2015    7.  Hyponatremia: Monitor.  Normal  8.  Hypokalemia: Replace when necessary      RECOMMENDATIONS:  Repeat protein creatinine ratio, renal function is back to worst the baseline.  She is still on IV heparin  Past record shows-- her cr was 2 in 2015, and 1.5-1.8 in 2017  Renal biopsy shows-- focal glomerulosclerosis and cresents that was done in 2010   In [place]   Cr is stable for now   Kidney function stable and improving  For edema and increase Lasix to 20 mg twice daily  Ok for discharge from renal  Fu in 3 weeks with labs please     Scott Reina MD  2/5/2018  11:35 AM

## 2018-02-06 ENCOUNTER — TRANSITIONAL CARE MANAGEMENT TELEPHONE ENCOUNTER (OUTPATIENT)
Dept: INTERNAL MEDICINE | Facility: CLINIC | Age: 58
End: 2018-02-06

## 2018-02-06 NOTE — DISCHARGE SUMMARY
"Patient admitted for severe sepsis due to E. Coli UTI with acute renal failure and acute liver injury. Patient was improving and was nearing discharge however was awaiting final cardiology recommendations regarding her mechanical mitral valve. Apparently patient became \"tired of waiting\" and left against medical advice.  "

## 2018-02-07 ENCOUNTER — TELEPHONE (OUTPATIENT)
Dept: INTERNAL MEDICINE | Facility: CLINIC | Age: 58
End: 2018-02-07

## 2018-02-08 ENCOUNTER — APPOINTMENT (OUTPATIENT)
Dept: GENERAL RADIOLOGY | Facility: HOSPITAL | Age: 58
End: 2018-02-08

## 2018-02-08 ENCOUNTER — HOSPITAL ENCOUNTER (INPATIENT)
Facility: HOSPITAL | Age: 58
LOS: 6 days | Discharge: HOSPICE | End: 2018-02-14
Attending: EMERGENCY MEDICINE | Admitting: INTERNAL MEDICINE

## 2018-02-08 DIAGNOSIS — Z78.9 IMPAIRED MOBILITY AND ADLS: ICD-10-CM

## 2018-02-08 DIAGNOSIS — Z95.2 HISTORY OF MITRAL VALVE REPLACEMENT WITH MECHANICAL VALVE: ICD-10-CM

## 2018-02-08 DIAGNOSIS — Z74.09 IMPAIRED MOBILITY AND ADLS: ICD-10-CM

## 2018-02-08 DIAGNOSIS — J44.1 COPD EXACERBATION (HCC): ICD-10-CM

## 2018-02-08 DIAGNOSIS — Z74.09 IMPAIRED FUNCTIONAL MOBILITY, BALANCE, GAIT, AND ENDURANCE: ICD-10-CM

## 2018-02-08 DIAGNOSIS — J96.90 RESPIRATORY FAILURE, UNSPECIFIED CHRONICITY, UNSPECIFIED WHETHER WITH HYPOXIA OR HYPERCAPNIA (HCC): Primary | ICD-10-CM

## 2018-02-08 DIAGNOSIS — I50.9 ACUTE ON CHRONIC CONGESTIVE HEART FAILURE, UNSPECIFIED CONGESTIVE HEART FAILURE TYPE: ICD-10-CM

## 2018-02-08 DIAGNOSIS — Z72.0 TOBACCO ABUSE: ICD-10-CM

## 2018-02-08 DIAGNOSIS — Z91.199 MEDICAL NON-COMPLIANCE: ICD-10-CM

## 2018-02-08 PROBLEM — J96.01 ACUTE RESPIRATORY FAILURE WITH HYPOXIA AND HYPERCAPNIA (HCC): Status: ACTIVE | Noted: 2018-02-08

## 2018-02-08 PROBLEM — I50.21 ACUTE SYSTOLIC CONGESTIVE HEART FAILURE (HCC): Status: ACTIVE | Noted: 2018-02-08

## 2018-02-08 PROBLEM — J96.02 ACUTE RESPIRATORY FAILURE WITH HYPOXIA AND HYPERCAPNIA (HCC): Status: ACTIVE | Noted: 2018-02-08

## 2018-02-08 PROBLEM — R79.89 ELEVATED LFTS: Status: ACTIVE | Noted: 2018-02-08

## 2018-02-08 LAB
ALBUMIN SERPL-MCNC: 3.5 G/DL (ref 3.2–4.8)
ALBUMIN/GLOB SERPL: 1.1 G/DL (ref 1.5–2.5)
ALP SERPL-CCNC: 119 U/L (ref 25–100)
ALT SERPL W P-5'-P-CCNC: 181 U/L (ref 7–40)
AMORPH URATE CRY URNS QL MICRO: NORMAL /HPF
ANION GAP SERPL CALCULATED.3IONS-SCNC: 11 MMOL/L (ref 3–11)
ARTERIAL PATENCY WRIST A: ABNORMAL
ARTERIAL PATENCY WRIST A: ABNORMAL
AST SERPL-CCNC: 48 U/L (ref 0–33)
ATMOSPHERIC PRESS: ABNORMAL MMHG
ATMOSPHERIC PRESS: ABNORMAL MMHG
BACTERIA UR QL AUTO: NORMAL /HPF
BASE EXCESS BLDA CALC-SCNC: 0.5 MMOL/L (ref 0–2)
BASE EXCESS BLDA CALC-SCNC: 2.2 MMOL/L (ref 0–2)
BASOPHILS # BLD AUTO: 0.01 10*3/MM3 (ref 0–0.2)
BASOPHILS NFR BLD AUTO: 0.1 % (ref 0–1)
BDY SITE: ABNORMAL
BDY SITE: ABNORMAL
BILIRUB SERPL-MCNC: 0.6 MG/DL (ref 0.3–1.2)
BILIRUB UR QL STRIP: NEGATIVE
BNP SERPL-MCNC: 3156 PG/ML (ref 0–100)
BUN BLD-MCNC: 38 MG/DL (ref 9–23)
BUN/CREAT SERPL: 22.4 (ref 7–25)
CALCIUM SPEC-SCNC: 9.1 MG/DL (ref 8.7–10.4)
CHLORIDE SERPL-SCNC: 103 MMOL/L (ref 99–109)
CLARITY UR: ABNORMAL
CO2 BLDA-SCNC: 32 MMOL/L (ref 22–33)
CO2 BLDA-SCNC: 32.1 MMOL/L (ref 22–33)
CO2 SERPL-SCNC: 25 MMOL/L (ref 20–31)
COHGB MFR BLD: 3.5 % (ref 0–2)
COHGB MFR BLD: 3.6 % (ref 0–2)
COLOR UR: YELLOW
CREAT BLD-MCNC: 1.7 MG/DL (ref 0.6–1.3)
D-LACTATE SERPL-SCNC: 1.4 MMOL/L (ref 0.5–2)
DEPRECATED RDW RBC AUTO: 81 FL (ref 37–54)
EOSINOPHIL # BLD AUTO: 0.1 10*3/MM3 (ref 0–0.3)
EOSINOPHIL NFR BLD AUTO: 1.1 % (ref 0–3)
ERYTHROCYTE [DISTWIDTH] IN BLOOD BY AUTOMATED COUNT: 20.8 % (ref 11.3–14.5)
FLUAV SUBTYP SPEC NAA+PROBE: NOT DETECTED
FLUBV RNA ISLT QL NAA+PROBE: NOT DETECTED
GFR SERPL CREATININE-BSD FRML MDRD: 31 ML/MIN/1.73
GLOBULIN UR ELPH-MCNC: 3.1 GM/DL
GLUCOSE BLD-MCNC: 126 MG/DL (ref 70–100)
GLUCOSE UR STRIP-MCNC: NEGATIVE MG/DL
HCO3 BLDA-SCNC: 29.7 MMOL/L (ref 20–26)
HCO3 BLDA-SCNC: 30.1 MMOL/L (ref 20–26)
HCT VFR BLD AUTO: 34.4 % (ref 34.5–44)
HCT VFR BLD CALC: 29.2 %
HCT VFR BLD CALC: 30.5 %
HGB BLD-MCNC: 10.3 G/DL (ref 11.5–15.5)
HGB BLDA-MCNC: 10 G/DL (ref 14–18)
HGB BLDA-MCNC: 9.5 G/DL (ref 14–18)
HGB UR QL STRIP.AUTO: NEGATIVE
HOLD SPECIMEN: NORMAL
HOLD SPECIMEN: NORMAL
HOROWITZ INDEX BLD+IHG-RTO: 30 %
HOROWITZ INDEX BLD+IHG-RTO: 36 %
HYALINE CASTS UR QL AUTO: NORMAL /LPF
IMM GRANULOCYTES # BLD: 0.03 10*3/MM3 (ref 0–0.03)
IMM GRANULOCYTES NFR BLD: 0.3 % (ref 0–0.6)
KETONES UR QL STRIP: NEGATIVE
LEUKOCYTE ESTERASE UR QL STRIP.AUTO: NEGATIVE
LYMPHOCYTES # BLD AUTO: 0.45 10*3/MM3 (ref 0.6–4.8)
LYMPHOCYTES NFR BLD AUTO: 4.8 % (ref 24–44)
MAGNESIUM SERPL-MCNC: 1.9 MG/DL (ref 1.3–2.7)
MCH RBC QN AUTO: 32.6 PG (ref 27–31)
MCHC RBC AUTO-ENTMCNC: 29.9 G/DL (ref 32–36)
MCV RBC AUTO: 108.9 FL (ref 80–99)
METHGB BLD QL: 0.8 % (ref 0–1.5)
METHGB BLD QL: 1.1 % (ref 0–1.5)
MODALITY: ABNORMAL
MODALITY: ABNORMAL
MONOCYTES # BLD AUTO: 1.28 10*3/MM3 (ref 0–1)
MONOCYTES NFR BLD AUTO: 13.6 % (ref 0–12)
NEUTROPHILS # BLD AUTO: 7.57 10*3/MM3 (ref 1.5–8.3)
NEUTROPHILS NFR BLD AUTO: 80.1 % (ref 41–71)
NITRITE UR QL STRIP: NEGATIVE
OXYHGB MFR BLDV: 85.9 % (ref 94–99)
OXYHGB MFR BLDV: 91.2 % (ref 94–99)
PCO2 BLDA: 65.3 MM HG (ref 35–45)
PCO2 BLDA: 75.4 MM HG (ref 35–45)
PH BLDA: 7.2 PH UNITS (ref 7.35–7.45)
PH BLDA: 7.27 PH UNITS (ref 7.35–7.45)
PH UR STRIP.AUTO: 5.5 [PH] (ref 5–8)
PLATELET # BLD AUTO: 173 10*3/MM3 (ref 150–450)
PMV BLD AUTO: 11.4 FL (ref 6–12)
PO2 BLDA: 65 MM HG (ref 83–108)
PO2 BLDA: 90.2 MM HG (ref 83–108)
POTASSIUM BLD-SCNC: 3.9 MMOL/L (ref 3.5–5.5)
PROT SERPL-MCNC: 6.6 G/DL (ref 5.7–8.2)
PROT UR QL STRIP: ABNORMAL
RBC # BLD AUTO: 3.16 10*6/MM3 (ref 3.89–5.14)
RBC # UR: NORMAL /HPF
REF LAB TEST METHOD: NORMAL
SODIUM BLD-SCNC: 139 MMOL/L (ref 132–146)
SP GR UR STRIP: 1.01 (ref 1–1.03)
SQUAMOUS #/AREA URNS HPF: NORMAL /HPF
TROPONIN I SERPL-MCNC: 0.03 NG/ML (ref 0–0.07)
UROBILINOGEN UR QL STRIP: ABNORMAL
WBC NRBC COR # BLD: 9.44 10*3/MM3 (ref 3.5–10.8)
WBC UR QL AUTO: NORMAL /HPF
WHOLE BLOOD HOLD SPECIMEN: NORMAL
WHOLE BLOOD HOLD SPECIMEN: NORMAL

## 2018-02-08 PROCEDURE — 82805 BLOOD GASES W/O2 SATURATION: CPT | Performed by: EMERGENCY MEDICINE

## 2018-02-08 PROCEDURE — 87502 INFLUENZA DNA AMP PROBE: CPT | Performed by: EMERGENCY MEDICINE

## 2018-02-08 PROCEDURE — 99233 SBSQ HOSP IP/OBS HIGH 50: CPT | Performed by: HOSPITALIST

## 2018-02-08 PROCEDURE — 93005 ELECTROCARDIOGRAM TRACING: CPT | Performed by: EMERGENCY MEDICINE

## 2018-02-08 PROCEDURE — 71045 X-RAY EXAM CHEST 1 VIEW: CPT

## 2018-02-08 PROCEDURE — 36600 WITHDRAWAL OF ARTERIAL BLOOD: CPT | Performed by: EMERGENCY MEDICINE

## 2018-02-08 PROCEDURE — 94640 AIRWAY INHALATION TREATMENT: CPT

## 2018-02-08 PROCEDURE — 83735 ASSAY OF MAGNESIUM: CPT | Performed by: EMERGENCY MEDICINE

## 2018-02-08 PROCEDURE — 84484 ASSAY OF TROPONIN QUANT: CPT

## 2018-02-08 PROCEDURE — 83735 ASSAY OF MAGNESIUM: CPT | Performed by: HOSPITALIST

## 2018-02-08 PROCEDURE — 99285 EMERGENCY DEPT VISIT HI MDM: CPT

## 2018-02-08 PROCEDURE — 83880 ASSAY OF NATRIURETIC PEPTIDE: CPT | Performed by: EMERGENCY MEDICINE

## 2018-02-08 PROCEDURE — 80053 COMPREHEN METABOLIC PANEL: CPT | Performed by: EMERGENCY MEDICINE

## 2018-02-08 PROCEDURE — 94660 CPAP INITIATION&MGMT: CPT

## 2018-02-08 PROCEDURE — 87040 BLOOD CULTURE FOR BACTERIA: CPT | Performed by: NURSE PRACTITIONER

## 2018-02-08 PROCEDURE — 25010000002 FUROSEMIDE PER 20 MG: Performed by: EMERGENCY MEDICINE

## 2018-02-08 PROCEDURE — 83605 ASSAY OF LACTIC ACID: CPT | Performed by: HOSPITALIST

## 2018-02-08 PROCEDURE — 94799 UNLISTED PULMONARY SVC/PX: CPT

## 2018-02-08 PROCEDURE — 81001 URINALYSIS AUTO W/SCOPE: CPT | Performed by: EMERGENCY MEDICINE

## 2018-02-08 PROCEDURE — 85025 COMPLETE CBC W/AUTO DIFF WBC: CPT | Performed by: EMERGENCY MEDICINE

## 2018-02-08 RX ORDER — BUMETANIDE 0.25 MG/ML
2 INJECTION INTRAMUSCULAR; INTRAVENOUS ONCE
Status: DISCONTINUED | OUTPATIENT
Start: 2018-02-08 | End: 2018-02-08

## 2018-02-08 RX ORDER — IPRATROPIUM BROMIDE AND ALBUTEROL SULFATE 2.5; .5 MG/3ML; MG/3ML
3 SOLUTION RESPIRATORY (INHALATION) ONCE
Status: CANCELLED | OUTPATIENT
Start: 2018-02-08 | End: 2018-02-08

## 2018-02-08 RX ORDER — MAGNESIUM SULFATE HEPTAHYDRATE 40 MG/ML
4 INJECTION, SOLUTION INTRAVENOUS AS NEEDED
Status: DISCONTINUED | OUTPATIENT
Start: 2018-02-08 | End: 2018-02-14 | Stop reason: HOSPADM

## 2018-02-08 RX ORDER — BUDESONIDE AND FORMOTEROL FUMARATE DIHYDRATE 160; 4.5 UG/1; UG/1
2 AEROSOL RESPIRATORY (INHALATION)
Status: DISCONTINUED | OUTPATIENT
Start: 2018-02-08 | End: 2018-02-14 | Stop reason: HOSPADM

## 2018-02-08 RX ORDER — FUROSEMIDE 10 MG/ML
20 INJECTION INTRAMUSCULAR; INTRAVENOUS ONCE
Status: COMPLETED | OUTPATIENT
Start: 2018-02-08 | End: 2018-02-08

## 2018-02-08 RX ORDER — MAGNESIUM SULFATE HEPTAHYDRATE 40 MG/ML
2 INJECTION, SOLUTION INTRAVENOUS AS NEEDED
Status: DISCONTINUED | OUTPATIENT
Start: 2018-02-08 | End: 2018-02-14 | Stop reason: HOSPADM

## 2018-02-08 RX ORDER — ONDANSETRON 2 MG/ML
4 INJECTION INTRAMUSCULAR; INTRAVENOUS EVERY 6 HOURS PRN
Status: DISCONTINUED | OUTPATIENT
Start: 2018-02-08 | End: 2018-02-14 | Stop reason: HOSPADM

## 2018-02-08 RX ORDER — BUMETANIDE 0.25 MG/ML
2 INJECTION INTRAMUSCULAR; INTRAVENOUS EVERY 8 HOURS
Status: DISCONTINUED | OUTPATIENT
Start: 2018-02-09 | End: 2018-02-12

## 2018-02-08 RX ORDER — IPRATROPIUM BROMIDE AND ALBUTEROL SULFATE 2.5; .5 MG/3ML; MG/3ML
3 SOLUTION RESPIRATORY (INHALATION)
Status: DISCONTINUED | OUTPATIENT
Start: 2018-02-08 | End: 2018-02-14 | Stop reason: HOSPADM

## 2018-02-08 RX ORDER — POTASSIUM CHLORIDE 750 MG/1
40 CAPSULE, EXTENDED RELEASE ORAL AS NEEDED
Status: DISCONTINUED | OUTPATIENT
Start: 2018-02-08 | End: 2018-02-09

## 2018-02-08 RX ORDER — IPRATROPIUM BROMIDE AND ALBUTEROL SULFATE 2.5; .5 MG/3ML; MG/3ML
3 SOLUTION RESPIRATORY (INHALATION) ONCE
Status: DISCONTINUED | OUTPATIENT
Start: 2018-02-08 | End: 2018-02-08

## 2018-02-08 RX ORDER — SODIUM CHLORIDE 0.9 % (FLUSH) 0.9 %
1-10 SYRINGE (ML) INJECTION AS NEEDED
Status: DISCONTINUED | OUTPATIENT
Start: 2018-02-08 | End: 2018-02-14 | Stop reason: HOSPADM

## 2018-02-08 RX ORDER — SODIUM CHLORIDE 0.9 % (FLUSH) 0.9 %
10 SYRINGE (ML) INJECTION AS NEEDED
Status: DISCONTINUED | OUTPATIENT
Start: 2018-02-08 | End: 2018-02-08

## 2018-02-08 RX ORDER — POTASSIUM CHLORIDE 1.5 G/1.77G
40 POWDER, FOR SOLUTION ORAL AS NEEDED
Status: DISCONTINUED | OUTPATIENT
Start: 2018-02-08 | End: 2018-02-09

## 2018-02-08 RX ORDER — ROSUVASTATIN CALCIUM 10 MG/1
10 TABLET, COATED ORAL NIGHTLY
Status: DISCONTINUED | OUTPATIENT
Start: 2018-02-08 | End: 2018-02-14 | Stop reason: HOSPADM

## 2018-02-08 RX ORDER — SODIUM CHLORIDE 0.9 % (FLUSH) 0.9 %
10 SYRINGE (ML) INJECTION AS NEEDED
Status: CANCELLED | OUTPATIENT
Start: 2018-02-08

## 2018-02-08 RX ORDER — IPRATROPIUM BROMIDE AND ALBUTEROL SULFATE 2.5; .5 MG/3ML; MG/3ML
3 SOLUTION RESPIRATORY (INHALATION) ONCE
Status: COMPLETED | OUTPATIENT
Start: 2018-02-08 | End: 2018-02-08

## 2018-02-08 RX ADMIN — IPRATROPIUM BROMIDE AND ALBUTEROL SULFATE 3 ML: .5; 3 SOLUTION RESPIRATORY (INHALATION) at 18:54

## 2018-02-08 RX ADMIN — Medication 10 ML: at 20:47

## 2018-02-08 RX ADMIN — ROSUVASTATIN CALCIUM 10 MG: 10 TABLET ORAL at 23:16

## 2018-02-08 RX ADMIN — FUROSEMIDE 20 MG: 10 INJECTION, SOLUTION INTRAMUSCULAR; INTRAVENOUS at 20:47

## 2018-02-08 RX ADMIN — MAGNESIUM SULFATE IN WATER 4 G: 40 INJECTION, SOLUTION INTRAVENOUS at 23:17

## 2018-02-08 RX ADMIN — BUMETANIDE 2 MG: 0.25 INJECTION INTRAMUSCULAR; INTRAVENOUS at 23:17

## 2018-02-08 NOTE — ED PROVIDER NOTES
Subjective   HPI Comments: Ashely Roldan is a 57 y.o.female with hx of COPD and CHF who presents to the ED with c/o fatigue and lethargy with onset a couple of days ago. She reports that she gradually developed worsening fatigue and lethargy which prompted her call to EMS. She states she left [facility] AMA recently after being treated for 12 days. She notes that she was hospitalized for rectal bleeding and an infection. The pt also complains of nausea, abd pain, and HA but denies SOA, or any other complaints at this time. The pt denies home O2 use.    Patient is a 57 y.o. female presenting with weakness.   History provided by:  Patient  Weakness - Generalized   Severity:  Moderate  Onset quality:  Sudden  Timing:  Constant  Progression:  Worsening  Chronicity:  Recurrent  Relieved by:  None tried  Worsened by:  Nothing  Ineffective treatments:  None tried  Associated symptoms: abdominal pain, headaches, lethargy and nausea    Associated symptoms: no shortness of breath    Risk factors: congestive heart failure        Review of Systems   Constitutional: Positive for fatigue.   Respiratory: Negative for shortness of breath.    Gastrointestinal: Positive for abdominal pain and nausea.   Neurological: Positive for weakness and headaches.   All other systems reviewed and are negative.      Past Medical History:   Diagnosis Date   • Acute respiratory failure with hypoxia 9/5/2017   • Allergic rhinitis 08/01/2014   • Anemia    • Anxiety    • Arthritis    • CAD (coronary artery disease)    • CHF (congestive heart failure)    • CKD (chronic kidney disease)    • CKD (chronic kidney disease), stage IV 9/5/2017   • Colitis, Clostridium difficile    • COPD (chronic obstructive pulmonary disease)    • Coronary atherosclerosis    • Depression    • Emphysema of lung    • GERD (gastroesophageal reflux disease)    • Heart attack    • Heart murmur    • Hematoma of hip    • Hip fracture    • Hyperlipidemia    • Hypertension     benign  essential   • Ischemic cardiomyopathy 08/01/2014    ef 29% s/p ICD   • Knee injury    • Lung disease    • Mitral valve disorder 03/28/2013   • Mitral valve insufficiency     s/p prosthetic ATS valve replacement   • Osteoporosis    • Postmenopausal     natural   • PVD (peripheral vascular disease)    • Pyelonephritis    • Renal failure    • Renal failure    • Renal failure, acute    • Syncope    • Systemic lupus erythematosus    • TIA (transient ischemic attack) 04/04/2012   • UTI (urinary tract infection)    • Valvular heart disease    • Wrist fracture        Allergies   Allergen Reactions   • Morphine And Related GI Intolerance and Irritability   • Sulfa Antibiotics        Past Surgical History:   Procedure Laterality Date   • CARDIAC DEFIBRILLATOR PLACEMENT     • CHOLECYSTECTOMY  2010   • CORONARY ARTERY BYPASS GRAFT  2011    4 aortic bypasses, abdominal aorta; 2011-mitral valve; 2010-triple bypass   • ENDOSCOPY  10/23/2014    Dr. Brand; retained food in stomach; he dilated her esophagus; 5-30-14 hiatus hernia, gastritis   • JOINT REPLACEMENT Right 06/25/2015    Dr. Fitzgerald; first surgery 1-29-14; redo 5-4-15   • MITRAL VALVE REPLACEMENT      MECHANICAL   • TOTAL HIP ARTHROPLASTY Right     3 times within 8 months       Family History   Problem Relation Age of Onset   • Arthritis Mother      rheumatoid   • Heart attack Father    • Alcohol abuse Brother    • Heart disease Other      uncle   • Diabetes Other      uncle-type 2   • Hypertension Other      uncle   • Stroke Other      uncle   • Cancer Other      grandfather-lung    • Heart disease Maternal Uncle    • Lung cancer Paternal Grandfather        Social History     Social History   • Marital status:      Spouse name: N/A   • Number of children: N/A   • Years of education: N/A     Social History Main Topics   • Smoking status: Current Every Day Smoker     Packs/day: 0.50     Types: Cigarettes   • Smokeless tobacco: Never Used      Comment: Down to 0.5 PPD  from 1 PPD now consistently. Started as a teenager.   • Alcohol use No   • Drug use: No   • Sexual activity: Defer     Other Topics Concern   • None     Social History Narrative    Ms. Roldan is a 56 year old white female. Pt recently  after 30 years. Just moved from [facility] into her son's home in [place].          Objective   Physical Exam   Constitutional: She is oriented to person, place, and time. She appears well-developed and well-nourished. No distress.   Appears very somnolent.    HENT:   Head: Normocephalic and atraumatic.   Nose: Nose normal.   Eyes: Conjunctivae are normal. Pupils are equal, round, and reactive to light. No scleral icterus.   PERRL 2 mm.   Neck: Normal range of motion. Neck supple.   Cardiovascular: Normal rate, regular rhythm and normal heart sounds.    No murmur heard.  Pulmonary/Chest: Effort normal. No respiratory distress. She has decreased breath sounds.   Diminished breathe sounds in all lung fields posteriorly. Unable to auscultate wheezes or rales. O2 sat when off o2 dropped to 84-86 percent.   Abdominal: Soft. Bowel sounds are normal. There is no tenderness.   Musculoskeletal: She exhibits edema.   Moves all 4 extremities weakly. Lower extremities moderately tense. 1+ edema with bolus draining lesions to bilateral distal lower extremities. No signs of cellulitis.   Neurological: She is alert and oriented to person, place, and time.   Skin: Skin is warm and dry.   Nursing note and vitals reviewed.      Critical Care  Performed by: BERTA RIVER  Authorized by: BERTA RIVER     Critical care provider statement:     Critical care time (minutes):  35    Critical care time was exclusive of:  Separately billable procedures and treating other patients    Critical care was necessary to treat or prevent imminent or life-threatening deterioration of the following conditions:  Respiratory failure    Critical care was time spent personally by me on the following  activities:  Evaluation of patient's response to treatment, examination of patient, ordering and performing treatments and interventions, ordering and review of laboratory studies, ordering and review of radiographic studies, re-evaluation of patient's condition, development of treatment plan with patient or surrogate, pulse oximetry, discussions with consultants, review of old charts and obtaining history from patient or surrogate                   ED Course  ED Course   Comment By Time   Paged Dr. Fregoso, Hospitalist.-BILLY Palmer 02/08 1935   Spoke to Dr. Fregoso who will admit to telemetry.-BILLY Palmer 02/08 2014   Patient is tolerating BiPAP.  She is more alert.  Her arterial blood gas is improving on recheck. Josue Armenta MD 02/08 2055     Recent Results (from the past 24 hour(s))   Light Blue Top    Collection Time: 02/08/18  6:43 PM   Result Value Ref Range    Extra Tube hold for add-on    Gold Top - SST    Collection Time: 02/08/18  6:43 PM   Result Value Ref Range    Extra Tube Hold for add-ons.    Comprehensive Metabolic Panel    Collection Time: 02/08/18  6:44 PM   Result Value Ref Range    Glucose 126 (H) 70 - 100 mg/dL    BUN 38 (H) 9 - 23 mg/dL    Creatinine 1.70 (H) 0.60 - 1.30 mg/dL    Sodium 139 132 - 146 mmol/L    Potassium 3.9 3.5 - 5.5 mmol/L    Chloride 103 99 - 109 mmol/L    CO2 25.0 20.0 - 31.0 mmol/L    Calcium 9.1 8.7 - 10.4 mg/dL    Total Protein 6.6 5.7 - 8.2 g/dL    Albumin 3.50 3.20 - 4.80 g/dL    ALT (SGPT) 181 (H) 7 - 40 U/L    AST (SGOT) 48 (H) 0 - 33 U/L    Alkaline Phosphatase 119 (H) 25 - 100 U/L    Total Bilirubin 0.6 0.3 - 1.2 mg/dL    eGFR Non African Amer 31 (L) >60 mL/min/1.73    Globulin 3.1 gm/dL    A/G Ratio 1.1 (L) 1.5 - 2.5 g/dL    BUN/Creatinine Ratio 22.4 7.0 - 25.0    Anion Gap 11.0 3.0 - 11.0 mmol/L   Magnesium    Collection Time: 02/08/18  6:44 PM   Result Value Ref Range    Magnesium 1.9 1.3 - 2.7 mg/dL   BNP    Collection Time: 02/08/18   6:44 PM   Result Value Ref Range    BNP 3156.0 (H) 0.0 - 100.0 pg/mL   Green Top (Gel)    Collection Time: 02/08/18  6:44 PM   Result Value Ref Range    Extra Tube Hold for add-ons.    Lavender Top    Collection Time: 02/08/18  6:44 PM   Result Value Ref Range    Extra Tube hold for add-on    CBC Auto Differential    Collection Time: 02/08/18  6:44 PM   Result Value Ref Range    WBC 9.44 3.50 - 10.80 10*3/mm3    RBC 3.16 (L) 3.89 - 5.14 10*6/mm3    Hemoglobin 10.3 (L) 11.5 - 15.5 g/dL    Hematocrit 34.4 (L) 34.5 - 44.0 %    .9 (H) 80.0 - 99.0 fL    MCH 32.6 (H) 27.0 - 31.0 pg    MCHC 29.9 (L) 32.0 - 36.0 g/dL    RDW 20.8 (H) 11.3 - 14.5 %    RDW-SD 81.0 (H) 37.0 - 54.0 fl    MPV 11.4 6.0 - 12.0 fL    Platelets 173 150 - 450 10*3/mm3    Neutrophil % 80.1 (H) 41.0 - 71.0 %    Lymphocyte % 4.8 (L) 24.0 - 44.0 %    Monocyte % 13.6 (H) 0.0 - 12.0 %    Eosinophil % 1.1 0.0 - 3.0 %    Basophil % 0.1 0.0 - 1.0 %    Immature Grans % 0.3 0.0 - 0.6 %    Neutrophils, Absolute 7.57 1.50 - 8.30 10*3/mm3    Lymphocytes, Absolute 0.45 (L) 0.60 - 4.80 10*3/mm3    Monocytes, Absolute 1.28 (H) 0.00 - 1.00 10*3/mm3    Eosinophils, Absolute 0.10 0.00 - 0.30 10*3/mm3    Basophils, Absolute 0.01 0.00 - 0.20 10*3/mm3    Immature Grans, Absolute 0.03 0.00 - 0.03 10*3/mm3   POC Troponin, Rapid    Collection Time: 02/08/18  6:48 PM   Result Value Ref Range    Troponin I 0.03 0.00 - 0.07 ng/mL   Blood Gas, Arterial    Collection Time: 02/08/18  6:59 PM   Result Value Ref Range    Site Arterial: right radial     [employer]j's Test N/A     pH, Arterial 7.203 (C) 7.350 - 7.450 pH units    pCO2, Arterial 75.4 (C) 35.0 - 45.0 mm Hg    pO2, Arterial 90.2 83.0 - 108.0 mm Hg    HCO3, Arterial 29.7 (H) 20.0 - 26.0 mmol/L    Base Excess, Arterial 0.5 0.0 - 2.0 mmol/L    Hemoglobin, Blood Gas 10.0 (L) 14 - 18 g/dL    Hematocrit, Blood Gas 30.5 %    Oxyhemoglobin 91.2 (L) 94 - 99 %    Methemoglobin 0.80 0.00 - 1.50 %    Carboxyhemoglobin 3.5 (H) 0 - 2 %     CO2 Content 32.0 22 - 33    Barometric Pressure for Blood Gas  mmHg    Modality Nasal Cannula     FIO2 36 %   Blood Gas, Arterial    Collection Time: 02/08/18  8:37 PM   Result Value Ref Range    Site Arterial: right radial     [employer]j's Test N/A     pH, Arterial 7.272 (L) 7.350 - 7.450 pH units    pCO2, Arterial 65.3 (H) 35.0 - 45.0 mm Hg    pO2, Arterial 65.0 (L) 83.0 - 108.0 mm Hg    HCO3, Arterial 30.1 (H) 20.0 - 26.0 mmol/L    Base Excess, Arterial 2.2 (H) 0.0 - 2.0 mmol/L    Hemoglobin, Blood Gas 9.5 (L) 14 - 18 g/dL    Hematocrit, Blood Gas 29.2 %    Oxyhemoglobin 85.9 (L) 94 - 99 %    Methemoglobin 1.10 0.00 - 1.50 %    Carboxyhemoglobin 3.6 (H) 0 - 2 %    CO2 Content 32.1 22 - 33    Barometric Pressure for Blood Gas  mmHg    Modality BiPap     FIO2 30 %     Note: In addition to lab results from this visit, the labs listed above may include labs taken at another facility or during a different encounter within the last 24 hours. Please correlate lab times with ED admission and discharge times for further clarification of the services performed during this visit.    XR Chest 1 View   Final Result     Chronic cardiopulmonary changes with decreased bilateral pleural effusions    without any other significant change.      THIS DOCUMENT HAS BEEN ELECTRONICALLY SIGNED BY IVETTE OGLESBY MD        Vitals:    02/08/18 2000 02/08/18 2047 02/08/18 2059 02/08/18 2100   BP: 109/69 100/60  113/69   Patient Position:       Pulse:  70 73    Resp: 18   14   Temp:       TempSrc:       SpO2:   100%    Weight:       Height:         Medications   sodium chloride 0.9 % flush 10 mL (10 mL Intravenous Given 2/8/18 2047)   ipratropium-albuterol (DUO-NEB) nebulizer solution 3 mL ( Nebulization Canceled Entry 2/8/18 1923)   ipratropium-albuterol (DUO-NEB) nebulizer solution 3 mL (3 mL Nebulization Given 2/8/18 1854)   furosemide (LASIX) injection 20 mg (20 mg Intravenous Given 2/8/18 2047)     ECG/EMG Results (last 24 hours)      Procedure Component Value Units Date/Time    ECG 12 Lead [[id number]] Collected:  02/08/18 1900     Updated:  02/08/18 1901        ECG 12 Lead           ECG/EMG Results (last 24 hours)     Procedure Component Value Units Date/Time    ECG 12 Lead [[id number]] Collected:  02/08/18 1900     Updated:  02/08/18 1901        ECG 12 Lead   Final Result   Test Reason : Weak/Dizzy/AMS protocol   Blood Pressure : **/** mmHG   Vent. Rate : 081 BPM     Atrial Rate : 081 BPM      P-R Int : 152 ms          QRS Dur : 102 ms       QT Int : 420 ms       P-R-T Axes : 039 -30 146 degrees      QTc Int : 487 ms      Normal sinus rhythm   Possible Left atrial enlargement   Left axis deviation   Possible Anterior infarct , age undetermined   T wave abnormality, consider lateral ischemia   Abnormal ECG   When compared with ECG of 23-JAN-2018 16:45,   No significant change was found   Confirmed by BERTA RIVER MD ([id number]) on 2/8/2018 11:56:28 PM      Referred By:  ED MD           Confirmed By:BERTA RIVER MD                        [facility]    Final diagnoses:   Respiratory failure, unspecified chronicity, unspecified whether with hypoxia or hypercapnia   COPD exacerbation   Acute on chronic congestive heart failure, unspecified congestive heart failure type   Tobacco abuse   Medical non-compliance       Documentation assistance provided by brigid Palmer.  Information recorded by the scribe was done at my direction and has been verified and validated by me.     Anastacio Palmer  02/08/18 1918       Anastacio Palmer  02/08/18 2052       Anastacio Palmer  02/08/18 2114       Berta River MD  02/08/18 4283

## 2018-02-09 ENCOUNTER — APPOINTMENT (OUTPATIENT)
Dept: CARDIOLOGY | Facility: HOSPITAL | Age: 58
End: 2018-02-09
Attending: HOSPITALIST

## 2018-02-09 LAB
ALBUMIN SERPL-MCNC: 3.2 G/DL (ref 3.2–4.8)
ANION GAP SERPL CALCULATED.3IONS-SCNC: 4 MMOL/L (ref 3–11)
ATMOSPHERIC PRESS: ABNORMAL MMHG
BASE EXCESS BLDV CALC-SCNC: 3 MMOL/L (ref -2–2)
BDY SITE: ABNORMAL
BH CV ECHO MEAS - AI DEC SLOPE: 182.7 CM/SEC^2
BH CV ECHO MEAS - AI MAX PG: 52.9 MMHG
BH CV ECHO MEAS - AI MAX VEL: 363.8 CM/SEC
BH CV ECHO MEAS - AI P1/2T: 583.2 MSEC
BH CV ECHO MEAS - AO ROOT AREA (BSA CORRECTED): 1.4
BH CV ECHO MEAS - AO ROOT AREA: 5.2 CM^2
BH CV ECHO MEAS - AO ROOT DIAM: 2.6 CM
BH CV ECHO MEAS - BSA(HAYCOCK): 1.9 M^2
BH CV ECHO MEAS - BSA: 1.9 M^2
BH CV ECHO MEAS - BZI_BMI: 22.9 KILOGRAMS/M^2
BH CV ECHO MEAS - BZI_METRIC_HEIGHT: 175.3 CM
BH CV ECHO MEAS - BZI_METRIC_WEIGHT: 70.3 KG
BH CV ECHO MEAS - CONTRAST EF (2CH): 30.1 ML/M^2
BH CV ECHO MEAS - CONTRAST EF 4CH: 37.6 ML/M^2
BH CV ECHO MEAS - EDV(CUBED): 121.7 ML
BH CV ECHO MEAS - EDV(MOD-SP2): 123 ML
BH CV ECHO MEAS - EDV(MOD-SP4): 133 ML
BH CV ECHO MEAS - EDV(TEICH): 115.8 ML
BH CV ECHO MEAS - EF(CUBED): 32.7 %
BH CV ECHO MEAS - EF(MOD-SP2): 30.1 %
BH CV ECHO MEAS - EF(MOD-SP4): 0 %
BH CV ECHO MEAS - EF(TEICH): 26.6 %
BH CV ECHO MEAS - ESV(CUBED): 81.9 ML
BH CV ECHO MEAS - ESV(MOD-SP2): 86 ML
BH CV ECHO MEAS - ESV(MOD-SP4): 83 ML
BH CV ECHO MEAS - ESV(TEICH): 85 ML
BH CV ECHO MEAS - FS: 12.4 %
BH CV ECHO MEAS - IVS/LVPW: 1
BH CV ECHO MEAS - IVSD: 1.2 CM
BH CV ECHO MEAS - LA DIMENSION: 3.5 CM
BH CV ECHO MEAS - LA/AO: 1.3
BH CV ECHO MEAS - LV DIASTOLIC VOL/BSA (35-75): 71.8 ML/M^2
BH CV ECHO MEAS - LV MASS(C)D: 240.9 GRAMS
BH CV ECHO MEAS - LV MASS(C)DI: 130 GRAMS/M^2
BH CV ECHO MEAS - LV MAX PG: 4.1 MMHG
BH CV ECHO MEAS - LV MEAN PG: 2.4 MMHG
BH CV ECHO MEAS - LV SYSTOLIC VOL/BSA (12-30): 44.8 ML/M^2
BH CV ECHO MEAS - LV V1 MAX: 101.7 CM/SEC
BH CV ECHO MEAS - LV V1 MEAN: 71.7 CM/SEC
BH CV ECHO MEAS - LV V1 VTI: 16.7 CM
BH CV ECHO MEAS - LVIDD: 5 CM
BH CV ECHO MEAS - LVIDS: 4.3 CM
BH CV ECHO MEAS - LVLD AP2: 7.2 CM
BH CV ECHO MEAS - LVLD AP4: 7.4 CM
BH CV ECHO MEAS - LVLS AP2: 7.3 CM
BH CV ECHO MEAS - LVLS AP4: 7.1 CM
BH CV ECHO MEAS - LVOT AREA (M): 3.1 CM^2
BH CV ECHO MEAS - LVOT AREA: 3 CM^2
BH CV ECHO MEAS - LVOT DIAM: 2 CM
BH CV ECHO MEAS - LVPWD: 1.2 CM
BH CV ECHO MEAS - MV A MAX VEL: 105.1 CM/SEC
BH CV ECHO MEAS - MV DEC SLOPE: 489.2 CM/SEC^2
BH CV ECHO MEAS - MV DEC TIME: 0.24 SEC
BH CV ECHO MEAS - MV E MAX VEL: 100.4 CM/SEC
BH CV ECHO MEAS - MV E/A: 0.96
BH CV ECHO MEAS - MV MAX PG: 7.4 MMHG
BH CV ECHO MEAS - MV MEAN PG: 3.8 MMHG
BH CV ECHO MEAS - MV P1/2T MAX VEL: 153.8 CM/SEC
BH CV ECHO MEAS - MV P1/2T: 92.1 MSEC
BH CV ECHO MEAS - MV V2 MAX: 135.4 CM/SEC
BH CV ECHO MEAS - MV V2 MEAN: 92.5 CM/SEC
BH CV ECHO MEAS - MV V2 VTI: 35.9 CM
BH CV ECHO MEAS - MVA P1/2T LCG: 1.4 CM^2
BH CV ECHO MEAS - MVA(P1/2T): 2.4 CM^2
BH CV ECHO MEAS - MVA(VTI): 1.4 CM^2
BH CV ECHO MEAS - PA ACC SLOPE: 447.1 CM/SEC^2
BH CV ECHO MEAS - PA ACC TIME: 0.13 SEC
BH CV ECHO MEAS - PA PR(ACCEL): 20.4 MMHG
BH CV ECHO MEAS - RAP SYSTOLE: 8 MMHG
BH CV ECHO MEAS - RV MAX PG: 2.2 MMHG
BH CV ECHO MEAS - RV V1 MAX: 74 CM/SEC
BH CV ECHO MEAS - RVSP: 29 MMHG
BH CV ECHO MEAS - SI(CUBED): 21.5 ML/M^2
BH CV ECHO MEAS - SI(LVOT): 27.2 ML/M^2
BH CV ECHO MEAS - SI(MOD-SP2): 20 ML/M^2
BH CV ECHO MEAS - SI(MOD-SP4): 27 ML/M^2
BH CV ECHO MEAS - SI(TEICH): 16.6 ML/M^2
BH CV ECHO MEAS - SV(CUBED): 39.8 ML
BH CV ECHO MEAS - SV(LVOT): 50.4 ML
BH CV ECHO MEAS - SV(MOD-SP2): 37 ML
BH CV ECHO MEAS - SV(MOD-SP4): 50 ML
BH CV ECHO MEAS - SV(TEICH): 30.8 ML
BH CV ECHO MEAS - TAPSE (>1.6): 1.3 CM2
BH CV ECHO MEAS - TR MAX VEL: 220.3 CM/SEC
BH CV ECHO MEAS - TV MAX PG: 4.7 MMHG
BH CV ECHO MEAS - TV MEAN PG: 2.5 MMHG
BH CV ECHO MEAS - TV V2 MAX: 108.6 CM/SEC
BH CV ECHO MEAS - TV V2 MEAN: 74.5 CM/SEC
BH CV ECHO MEAS - TV V2 VTI: 33.6 CM
BH CV UPPER VENOUS LEFT AXILLARY AUGMENT: NORMAL
BH CV UPPER VENOUS LEFT AXILLARY COMPRESS: NORMAL
BH CV UPPER VENOUS LEFT AXILLARY PHASIC: NORMAL
BH CV UPPER VENOUS LEFT AXILLARY SPONT: NORMAL
BH CV UPPER VENOUS LEFT BASILIC UPPER COMPRESS: NORMAL
BH CV UPPER VENOUS LEFT BRACHIAL COMPRESS: NORMAL
BH CV UPPER VENOUS LEFT CEPHALIC UPPER COMPRESS: NORMAL
BH CV UPPER VENOUS LEFT INTERNAL JUGULAR AUGMENT: NORMAL
BH CV UPPER VENOUS LEFT INTERNAL JUGULAR COMPRESS: NORMAL
BH CV UPPER VENOUS LEFT INTERNAL JUGULAR PHASIC: NORMAL
BH CV UPPER VENOUS LEFT INTERNAL JUGULAR SPONT: NORMAL
BH CV UPPER VENOUS LEFT RADIAL COMPRESS: NORMAL
BH CV UPPER VENOUS LEFT SUBCLAVIAN AUGMENT: NORMAL
BH CV UPPER VENOUS LEFT SUBCLAVIAN COMPRESS: NORMAL
BH CV UPPER VENOUS LEFT SUBCLAVIAN PHASIC: NORMAL
BH CV UPPER VENOUS LEFT SUBCLAVIAN SPONT: NORMAL
BH CV UPPER VENOUS LEFT ULNAR COMPRESS: NORMAL
BH CV UPPER VENOUS RIGHT INTERNAL JUGULAR AUGMENT: NORMAL
BH CV UPPER VENOUS RIGHT INTERNAL JUGULAR COMPRESS: NORMAL
BH CV UPPER VENOUS RIGHT INTERNAL JUGULAR PHASIC: NORMAL
BH CV UPPER VENOUS RIGHT INTERNAL JUGULAR SPONT: NORMAL
BH CV XLRA - RV BASE: 4.8 CM
BH CV XLRA - RV LENGTH: 7.3 CM
BH CV XLRA - RV MID: 4 CM
BH CV XLRA - TDI S': 11.2 CM/SEC
BNP SERPL-MCNC: 2919 PG/ML (ref 0–100)
BUN BLD-MCNC: 40 MG/DL (ref 9–23)
BUN/CREAT SERPL: 22.2 (ref 7–25)
CALCIUM SPEC-SCNC: 8.8 MG/DL (ref 8.7–10.4)
CHLORIDE SERPL-SCNC: 104 MMOL/L (ref 99–109)
CO2 BLDA-SCNC: 34.4 MMOL/L (ref 22–33)
CO2 SERPL-SCNC: 31 MMOL/L (ref 20–31)
COHGB MFR BLD: 2.3 % (ref 0–2)
CREAT BLD-MCNC: 1.8 MG/DL (ref 0.6–1.3)
DEPRECATED RDW RBC AUTO: 83.6 FL (ref 37–54)
ERYTHROCYTE [DISTWIDTH] IN BLOOD BY AUTOMATED COUNT: 21.2 % (ref 11.3–14.5)
GFR SERPL CREATININE-BSD FRML MDRD: 29 ML/MIN/1.73
GLUCOSE BLD-MCNC: 89 MG/DL (ref 70–100)
HCO3 BLDV-SCNC: 32 MMOL/L (ref 22–28)
HCT VFR BLD AUTO: 33.6 % (ref 34.5–44)
HGB BLD-MCNC: 9.8 G/DL (ref 11.5–15.5)
HGB BLDA-MCNC: 9.8 G/DL (ref 14–18)
HOROWITZ INDEX BLD+IHG-RTO: 24 %
INR PPP: 4.28 (ref 0.91–1.09)
LEFT ATRIUM VOLUME INDEX: 59.1 ML/M2
LEFT ATRIUM VOLUME: 96 CM3
LV EF 2D ECHO EST: 20 %
MAGNESIUM SERPL-MCNC: 1.9 MG/DL (ref 1.3–2.7)
MAGNESIUM SERPL-MCNC: 2.9 MG/DL (ref 1.3–2.7)
MCH RBC QN AUTO: 32.6 PG (ref 27–31)
MCHC RBC AUTO-ENTMCNC: 29.2 G/DL (ref 32–36)
MCV RBC AUTO: 111.6 FL (ref 80–99)
METHGB BLD QL: 1 % (ref 0–1.5)
MODALITY: ABNORMAL
OXYHGB MFR BLDV: 52.2 % (ref 94–99)
PCO2 BLDV: 76.9 MM HG (ref 41–51)
PH BLDV: 7.23 PH UNITS (ref 7.25–7.5)
PHOSPHATE SERPL-MCNC: 5.1 MG/DL (ref 2.4–5.1)
PLATELET # BLD AUTO: 149 10*3/MM3 (ref 150–450)
PMV BLD AUTO: 11.8 FL (ref 6–12)
PO2 BLDV: 35.2 MM HG (ref 27–53)
POTASSIUM BLD-SCNC: 4.1 MMOL/L (ref 3.5–5.5)
PROTHROMBIN TIME: 48.8 SECONDS (ref 9.6–11.5)
RBC # BLD AUTO: 3.01 10*6/MM3 (ref 3.89–5.14)
SODIUM BLD-SCNC: 139 MMOL/L (ref 132–146)
WBC NRBC COR # BLD: 6.12 10*3/MM3 (ref 3.5–10.8)

## 2018-02-09 PROCEDURE — 94640 AIRWAY INHALATION TREATMENT: CPT

## 2018-02-09 PROCEDURE — 80069 RENAL FUNCTION PANEL: CPT | Performed by: HOSPITALIST

## 2018-02-09 PROCEDURE — 94799 UNLISTED PULMONARY SVC/PX: CPT

## 2018-02-09 PROCEDURE — 83880 ASSAY OF NATRIURETIC PEPTIDE: CPT | Performed by: HOSPITALIST

## 2018-02-09 PROCEDURE — 82805 BLOOD GASES W/O2 SATURATION: CPT | Performed by: HOSPITALIST

## 2018-02-09 PROCEDURE — 25010000002 SULFUR HEXAFLUORIDE MICROSPH 60.7-25 MG RECONSTITUTED SUSPENSION: Performed by: INTERNAL MEDICINE

## 2018-02-09 PROCEDURE — 93971 EXTREMITY STUDY: CPT

## 2018-02-09 PROCEDURE — 94660 CPAP INITIATION&MGMT: CPT

## 2018-02-09 PROCEDURE — 99221 1ST HOSP IP/OBS SF/LOW 40: CPT | Performed by: INTERNAL MEDICINE

## 2018-02-09 PROCEDURE — 93971 EXTREMITY STUDY: CPT | Performed by: INTERNAL MEDICINE

## 2018-02-09 PROCEDURE — 85610 PROTHROMBIN TIME: CPT | Performed by: INTERNAL MEDICINE

## 2018-02-09 PROCEDURE — 94760 N-INVAS EAR/PLS OXIMETRY 1: CPT

## 2018-02-09 PROCEDURE — 93306 TTE W/DOPPLER COMPLETE: CPT

## 2018-02-09 PROCEDURE — 85027 COMPLETE CBC AUTOMATED: CPT | Performed by: HOSPITALIST

## 2018-02-09 PROCEDURE — 87086 URINE CULTURE/COLONY COUNT: CPT | Performed by: NURSE PRACTITIONER

## 2018-02-09 PROCEDURE — 93306 TTE W/DOPPLER COMPLETE: CPT | Performed by: INTERNAL MEDICINE

## 2018-02-09 PROCEDURE — 25010000002 DOBUTAMINE PER 250 MG: Performed by: INTERNAL MEDICINE

## 2018-02-09 PROCEDURE — 83735 ASSAY OF MAGNESIUM: CPT | Performed by: HOSPITALIST

## 2018-02-09 RX ORDER — METOLAZONE 5 MG/1
5 TABLET ORAL ONCE
Status: DISCONTINUED | OUTPATIENT
Start: 2018-02-09 | End: 2018-02-12

## 2018-02-09 RX ORDER — NYSTATIN 100000 [USP'U]/G
POWDER TOPICAL EVERY 12 HOURS SCHEDULED
Status: DISCONTINUED | OUTPATIENT
Start: 2018-02-09 | End: 2018-02-14 | Stop reason: HOSPADM

## 2018-02-09 RX ORDER — TRAMADOL HYDROCHLORIDE 50 MG/1
25 TABLET ORAL EVERY 8 HOURS PRN
Status: DISCONTINUED | OUTPATIENT
Start: 2018-02-09 | End: 2018-02-11

## 2018-02-09 RX ORDER — SPIRONOLACTONE 25 MG/1
25 TABLET ORAL DAILY
Status: DISCONTINUED | OUTPATIENT
Start: 2018-02-09 | End: 2018-02-12

## 2018-02-09 RX ORDER — DOBUTAMINE HYDROCHLORIDE 100 MG/100ML
2.5 INJECTION INTRAVENOUS CONTINUOUS
Status: DISCONTINUED | OUTPATIENT
Start: 2018-02-09 | End: 2018-02-13

## 2018-02-09 RX ORDER — ASPIRIN 81 MG/1
81 TABLET ORAL DAILY
Status: DISCONTINUED | OUTPATIENT
Start: 2018-02-09 | End: 2018-02-14 | Stop reason: HOSPADM

## 2018-02-09 RX ORDER — BUMETANIDE 0.25 MG/ML
2 INJECTION INTRAMUSCULAR; INTRAVENOUS ONCE
Status: DISCONTINUED | OUTPATIENT
Start: 2018-02-09 | End: 2018-02-12

## 2018-02-09 RX ORDER — CLONAZEPAM 1 MG/1
2 TABLET ORAL 3 TIMES DAILY
Status: DISCONTINUED | OUTPATIENT
Start: 2018-02-09 | End: 2018-02-14 | Stop reason: HOSPADM

## 2018-02-09 RX ORDER — METOLAZONE 5 MG/1
5 TABLET ORAL ONCE
Status: COMPLETED | OUTPATIENT
Start: 2018-02-09 | End: 2018-02-09

## 2018-02-09 RX ORDER — LORAZEPAM 1 MG/1
1 TABLET ORAL EVERY 6 HOURS PRN
Status: DISCONTINUED | OUTPATIENT
Start: 2018-02-09 | End: 2018-02-14 | Stop reason: HOSPADM

## 2018-02-09 RX ORDER — FERROUS SULFATE 325(65) MG
325 TABLET ORAL DAILY
Status: DISCONTINUED | OUTPATIENT
Start: 2018-02-09 | End: 2018-02-14 | Stop reason: HOSPADM

## 2018-02-09 RX ADMIN — METOLAZONE 5 MG: 5 TABLET ORAL at 06:17

## 2018-02-09 RX ADMIN — SULFUR HEXAFLUORIDE 3 ML: KIT at 09:00

## 2018-02-09 RX ADMIN — NYSTATIN: 100000 POWDER TOPICAL at 20:04

## 2018-02-09 RX ADMIN — IPRATROPIUM BROMIDE AND ALBUTEROL SULFATE 3 ML: .5; 3 SOLUTION RESPIRATORY (INHALATION) at 09:44

## 2018-02-09 RX ADMIN — BUDESONIDE AND FORMOTEROL FUMARATE DIHYDRATE 2 PUFF: 160; 4.5 AEROSOL RESPIRATORY (INHALATION) at 09:44

## 2018-02-09 RX ADMIN — ROSUVASTATIN CALCIUM 10 MG: 10 TABLET ORAL at 20:05

## 2018-02-09 RX ADMIN — IPRATROPIUM BROMIDE AND ALBUTEROL SULFATE 3 ML: .5; 3 SOLUTION RESPIRATORY (INHALATION) at 16:37

## 2018-02-09 RX ADMIN — NYSTATIN 1 APPLICATION: 100000 POWDER TOPICAL at 09:58

## 2018-02-09 RX ADMIN — TRAMADOL HYDROCHLORIDE 25 MG: 50 TABLET, COATED ORAL at 18:12

## 2018-02-09 RX ADMIN — DOBUTAMINE HYDROCHLORIDE 2.5 MCG/KG/MIN: 100 INJECTION INTRAVENOUS at 18:57

## 2018-02-09 RX ADMIN — CLONAZEPAM 2 MG: 1 TABLET ORAL at 20:04

## 2018-02-09 RX ADMIN — SPIRONOLACTONE 25 MG: 25 TABLET, FILM COATED ORAL at 20:04

## 2018-02-09 RX ADMIN — IPRATROPIUM BROMIDE AND ALBUTEROL SULFATE 3 ML: .5; 3 SOLUTION RESPIRATORY (INHALATION) at 19:42

## 2018-02-09 RX ADMIN — IPRATROPIUM BROMIDE AND ALBUTEROL SULFATE 3 ML: .5; 3 SOLUTION RESPIRATORY (INHALATION) at 13:33

## 2018-02-09 RX ADMIN — Medication 325 MG: at 18:12

## 2018-02-09 RX ADMIN — BUMETANIDE 2 MG: 0.25 INJECTION INTRAMUSCULAR; INTRAVENOUS at 03:19

## 2018-02-09 RX ADMIN — BUDESONIDE AND FORMOTEROL FUMARATE DIHYDRATE 2 PUFF: 160; 4.5 AEROSOL RESPIRATORY (INHALATION) at 19:53

## 2018-02-09 RX ADMIN — ASPIRIN 81 MG: 81 TABLET, COATED ORAL at 18:12

## 2018-02-09 RX ADMIN — BUMETANIDE 2 MG: 0.25 INJECTION INTRAMUSCULAR; INTRAVENOUS at 09:52

## 2018-02-09 NOTE — CONSULTS
"[facility]  CARDIOLOGY CONSULTATION NOTE    Ashely Roldan  : 1960  MRN:[id number]  Home Phone:[phone]    Date of Admission:2018  Date of Consultation: 18    PCP: Peter Rdz MD    IDENTIFICATION: A 57 y.o. female resident of [place], [place]     Chief Complaint   Patient presents with   • Fatigue     PROBLEM LIST:   1. Structural heart disease of mixed etiology:  A. History of MI 2009: s/p stent to RCA and LAD, EF 40%  B. CABGx2 : SVG to OMB-1, SVG to PDA  C. EF 30% post-operatively, s/p [place] ICD placement,   D. Echo 2014: Severe global hypokinesis with EF 12%, moderate AI, mechanical mitral valve, moderate to severe TR, RVSP 43mmHg   E. Echo 17: EF 18%, mild to mod AI, mod TR, grossly normal prosthetic mitral valve  F. Trivial Troponin elevation 2017, patient refused further ischemic evaluation.  G. Echo 18: \"borderline\" LV and RV enlargement, severe global hypokinesia with LVEF <20%, mild AI, normally functioning bileaflet mitral valve present  H. Re-admitted for A/C [facility] 2018 after patient left AMA   2. VHD:  A. Mitral valve replacement with 27mm ATS mechanical valve 2010, on chronic coumadin  3. COPD with ongoing tobacco abuse   4. Systemic Lupus Erythematosus  5. Hypertension  6. Hyperlipidemia   7. 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 PVD   9. Medical noncompliance    ALLERGIES:   Allergies   Allergen Reactions   • Morphine And Related GI Intolerance and Irritability   • Sulfa Antibiotics      HPI: Mrs. Roldan is a pleasant 58 y/o WF with history as noted above who was recently admitted in our hospital from - and left AMA. She was treated at the time for severe sepsis secondary to E Coli UTI and acute/chronic renal failure with peak Cr as high as 4.2, which trended down to 1.8 on day of discharge. She also had \"shock liver\" due to probable hypotension. She was treated with IV " "antibiotics and fluids and towards the end of her admission her BNP had risen to 3000 and she was carefully diuresed. She was followed by Nephrology throughout her previous admission. Unfortunately she left AMA and presented to the hospital on 2/8 due to nausea and weakness, and patient reports \"she thought she was getting the flu.\" Denies dyspnea, chest pain, syncope or near-syncope. Denies cough, fever, chills, vomiting or diarrhea. She states she took lasix 20mg daily when she went home, and urine output was adequate per patient. On admission her BNP is near 3000, and she was hypoxic, and lethargic. ABG shows pH of 7.2, CO2 of 75, and O2 of 90, with HCO3 29.7. She was placed on BiPAP overnight and treated for acute CHF exacerbation with parenteral diuretics as well as treated for acute COPD exacerbation with nebulizer treatments. Her Cr is 1.8 on admission. Today she is more alert and conversing, and feels better.       ROS: All systems have been reviewed and are negative with the exception of those mentioned in the HPI and problem list above.    Surgical History:   Past Surgical History:   Procedure Laterality Date   • CARDIAC DEFIBRILLATOR PLACEMENT     • CHOLECYSTECTOMY  2010   • CORONARY ARTERY BYPASS GRAFT  2011    4 aortic bypasses, abdominal aorta; 2011-mitral valve; 2010-triple bypass   • ENDOSCOPY  10/23/2014    Dr. Brand; retained food in stomach; he dilated her esophagus; 5-30-14 hiatus hernia, gastritis   • JOINT REPLACEMENT Right 06/25/2015    Dr. Fitzgerald; first surgery 1-29-14; redo 5-4-15   • MITRAL VALVE REPLACEMENT      MECHANICAL   • TOTAL HIP ARTHROPLASTY Right     3 times within 8 months     Social History:   Social History     Social History   • Marital status:      Spouse name: N/A   • Number of children: N/A   • Years of education: N/A     Occupational History   • Not on file.     Social History Main Topics   • Smoking status: Current Every Day Smoker     Packs/day: 0.50     Types: " "Cigarettes   • Smokeless tobacco: Never Used      Comment: Down to 0.5 PPD from 1 PPD now consistently. Started as a teenager.   • Alcohol use No   • Drug use: No   • Sexual activity: Defer     Other Topics Concern   • Not on file     Social History Narrative    Ms. Roldan is a 56 year old white female. Pt recently  after 30 years. Just moved from [facility] into her son's home in [place].        Family History:   Family History   Problem Relation Age of Onset   • Arthritis Mother      rheumatoid   • Heart attack Father    • Alcohol abuse Brother    • Heart disease Other      uncle   • Diabetes Other      uncle-type 2   • Hypertension Other      uncle   • Stroke Other      uncle   • Cancer Other      grandfather-lung    • Heart disease Maternal Uncle    • Lung cancer Paternal Grandfather        Objective     BP 91/73 (BP Location: Right arm, Patient Position: Lying)  Pulse 72  Temp 98 °F (36.7 °C) (Axillary)   Resp 16  Ht 175.3 cm (69\")  Wt 70.6 kg (155 lb 9.6 oz)  SpO2 100%  BMI 22.98 kg/m2    Intake/Output Summary (Last 24 hours) at 02/09/18 1336  Last data filed at 02/09/18 1249   Gross per 24 hour   Intake                0 ml   Output              300 ml   Net             -300 ml       PHYSICAL EXAM:  Constitutional:  Well-nourished, cooperative, in no acute distress.   Head:  Normocephalic, without obvious abnormality, atraumatic.   Neck: No adenopathy, supple, trachea midline   Respiratory:   Diminished breath sounds, respirations regular, even and unlabored. No wheezes, rales or ronchi. 91% on room air    Cardiovascular:  Regular rhythm and normal rate, normal S1 and S2, prosthetic valve clicks, no murmur or rub   Pulses: Peripheral pulses are not palpable, patient has wraps on bilateral LE for blisters   GI:   Soft, non-distended. Bowel sounds heard throughout.  Non-tender to palpation, no guarding.   Extremities: 1-2+ edema, no clubbing or cyanosis. LUE generalized edema   Skin: Skin " is warm and dry. Bilateral LE open and closed blisters per Wound care documentation in [facility]. Currently bilateral LE wraps on    Neurological: Alert, oriented to time, person and place. No focal deficits.     Labs/Diagnostic Data    Results from last 7 days  Lab Units 02/09/18  0611 02/08/18  1844 02/05/18  0540   SODIUM mmol/L 139 139 136   POTASSIUM mmol/L 4.1 3.9 3.7   CHLORIDE mmol/L 104 103 102   CO2 mmol/L 31.0 25.0 27.0   BUN mg/dL 40* 38* 44*   CREATININE mg/dL 1.80* 1.70* 1.80*   GLUCOSE mg/dL 89 126* 86   CALCIUM mg/dL 8.8 9.1 8.4*           Results from last 7 days  Lab Units 02/09/18  0611 02/08/18  1844 02/04/18  2103   WBC 10*3/mm3 6.12 9.44 4.98   HEMOGLOBIN g/dL 9.8* 10.3* 8.8*   HEMATOCRIT % 33.6* 34.4* 27.7*   PLATELETS 10*3/mm3 149* 173 107*     Lab Results   Component Value Date    GLUCOSE 89 02/09/2018    BUN 40 (H) 02/09/2018    CREATININE 1.80 (H) 02/09/2018    EGFRIFNONA 29 (L) 02/09/2018    BCR 22.2 02/09/2018    K 4.1 02/09/2018    CO2 31.0 02/09/2018    CALCIUM 8.8 02/09/2018    PROTENTOTREF 4.9 (L) 01/26/2018    ALBUMIN 3.20 02/09/2018    LABIL2 1.1 (L) 02/08/2018    AST 48 (H) 02/08/2018     (H) 02/08/2018       Results from last 7 days  Lab Units 02/09/18  0611   MAGNESIUM mg/dL 2.9*       Results from last 7 days  Lab Units 02/03/18  0303   CHOLESTEROL mg/dL 142   TRIGLYCERIDES mg/dL 81   HDL CHOL mg/dL 42   LDL CHOL mg/dL 96     Results from last 7 days  Lab Units 02/09/18  0851 02/05/18  0540  02/04/18  0721   PROTIME Seconds 48.8* 27.4*  --  24.7*   INR  4.28* 2.45  --  2.21   APTT seconds  --  80.7*  < > 49.8*   < > = values in this interval not displayed.    BNP: 3156-->2919    I personally reviewed the patient's EKG/Telemetry data    Radiology Data:   CXR 2/8/18:  FINDINGS:    Prominent lung markings/peribronchial thickening without definite evidence of   consolidation.  Patchy atelectasis and scarring in the lungs bilaterally most   notable in the LEFT upper lobe.   "Blunting of the costophrenic angles suggestive   of small pleural effusion/thickening.  Cardiomegaly with normal lung   vascularity.  Tortuous calcific aortic arch and descending thoracic aorta.    LEFT chest wall pacemaker.  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      IMPRESSION:    Chronic cardiopulmonary changes with decreased bilateral pleural effusions   without any other significant change.    Echo 2/2/18:  Interpretation Summary      · There is \"borderline\" LV and RV enlargement.  · There is severe global hypokinesia with an estimated LV ejection fraction of <20%.  · The aortic valve is not well visualized; there is mild aortic insufficiency.  · A normally functioning bileaflet mitral valve is present.         Current Medications:      aspirin 81 mg Oral Daily   budesonide-formoterol 2 puff Inhalation BID - RT   bumetanide 2 mg Intravenous Q8H   bumetanide 2 mg Intravenous Once   ipratropium-albuterol 3 mL Nebulization 4x Daily - RT   metOLazone 5 mg Oral Once   nystatin  Topical Q12H   rosuvastatin 10 mg Oral Nightly   warfarin (COUMADIN) (dosing per levels)  Does not apply Daily       Pharmacy to dose warfarin        Assessment and Plan:     1. A/C SHF  - treated with IV Bumex 2mg q 8H- urine output not adequate in last 24 hours  - BB discontinued on admission due to hypotension   - EF <20% per echo 2/2  - her hypotension precludes use of vasodilators at this time  - about 250ml UOP today  - will try aldosterone antagonist (with caution) and LD dobutamine    2. Ischemic CM  - s/p BSC ICD - recently interrogated during last admission, although records not scanned into EPIC  - re-start ASA and statin     3. Valvular heart disease  - s/p mechanical mitral valve 2010  - normal function last week  - INR 4.28 on admission    4. COPD    5. CKD with recent FAHEEM  - Cr stable at 1.8 on admission  - followed by [facility] during recent admission     6. Hypotension    NON-COMPLIANT patient with " end-stage myocardial disease, now cardiorenal and with marginal BPs.  She is not a candidate for LVAD or transplantation due to prior compliance issues. Will start low-dose dobutamine and aldosterone antagonist.    I, Yomi Ramirez MD, personally performed the services described as documented by the above named individual. I have made any necessary edits and it is both accurate and complete 2/9/2018  6:11 PM        Scribed for Yomi Ramirez MD by Sandhya Lynch PA-C. 2/9/2018  1:36 PM      Thank you for allowing me to participate in the care of Ashely Roldan. Feel free to contact me directly with any further questions or concerns.

## 2018-02-09 NOTE — PLAN OF CARE
Problem: Patient Care Overview (Adult)  Goal: Plan of Care Review  Outcome: Ongoing (interventions implemented as appropriate)   02/09/18 0830   Coping/Psychosocial Response Interventions   Plan Of Care Reviewed With patient   Patient Care Overview   Progress no change   Outcome Evaluation   Outcome Summary/Follow up Plan WOC nurse consulted to evaluate blisters BLE. Pt has 2+ pitting edema bilateral feet; multiple open and closed blisters scattered BLE. Open blisters cleansed with N/S, Xeroform applied, covered with gauze roll. PT Wound Care consulted to evaluate fro light compression therapy once pt has been evaluated by cardiology. Heels boggy, but blanching. Black heel boots applied. Pt has yeast rash bilateral groin; cleansed with blue skin wipes; Inter-dry applied. Nystatin powder ordered. See LDA and orders. Turn Q2H. Continue waffle mattress and use waffle cushion when sitting. WOC nurse will f/u. Please contact WOC nurse as needed for concerns.

## 2018-02-09 NOTE — PROGRESS NOTES
[facility] Medicine Services  PROGRESS NOTE    Patient Name: Ashely Roldan  : 1960  MRN: [id number]    Date of Admission: 2018  Length of Stay: 1  Primary Care Physician: Peter Rdz MD    Subjective   Subjective     CC:  Respiratory failure, leg edema    HPI:  Complains of leg pain today, says both are very tender where fluid filled blisters have developed    Review of Systems   Constitutional: Negative.    Respiratory: Positive for shortness of breath.    Cardiovascular: Positive for leg swelling.   Gastrointestinal: Negative.    Skin: Positive for wound.   Neurological: Negative.    Psychiatric/Behavioral: Negative.          Otherwise ROS is negative except as mentioned in the HPI.    Objective   Objective     Vital Signs:   Temp:  [97.6 °F (36.4 °C)-98.2 °F (36.8 °C)] 98 °F (36.7 °C)  Heart Rate:  [65-87] 78  Resp:  [14-18] 18  BP: ()/(52-73) 77/54        Physical Exam:    Constitutional - frail for age, non toxic, in bed  HEENT-NCAT, mucous membranes moist  CV-RRR, S1 S2 normal, no m/r/g  Resp-somewhat diminished throughout, bibasilar rales  Abd-soft, non-tender, non-distended, normo active bowel sounds  Ext-1+ edema LE bilat with fluid filled blisters  Neuro-alert and oriented x 3, speech clear, moves all extremities   Psych-normal affect   Skin- No rash on exposed UE or LE bilaterally      Results Reviewed:  I have personally reviewed current lab, radiology, and data and agree.      Results from last 7 days  Lab Units 18  0851 18  0611 18  1844 18  0540 18  2103 18  0721   WBC 10*3/mm3  --  6.12 9.44  --  4.98 5.38   HEMOGLOBIN g/dL  --  9.8* 10.3*  --  8.8* 9.2*   HEMATOCRIT %  --  33.6* 34.4*  --  27.7* 29.3*   PLATELETS 10*3/mm3  --  149* 173  --  107* 115*   INR  4.28*  --   --  2.45  --  2.21       Results from last 7 days  Lab Units 18  0611 18  1844 18  0540   SODIUM mmol/L 139 139 136    POTASSIUM mmol/L 4.1 3.9 3.7   CHLORIDE mmol/L 104 103 102   CO2 mmol/L 31.0 25.0 27.0   BUN mg/dL 40* 38* 44*   CREATININE mg/dL 1.80* 1.70* 1.80*   GLUCOSE mg/dL 89 126* 86   CALCIUM mg/dL 8.8 9.1 8.4*   ALT (SGPT) U/L  --  181*  --    AST (SGOT) U/L  --  48*  --      Estimated Creatinine Clearance: 38 mL/min (by C-G formula based on Cr of 1.8).  BNP   Date Value Ref Range Status   02/09/2018 2919.0 (H) 0.0 - 100.0 pg/mL Final     Comment:     Results may be falsely decreased if patient taking Biotin.     pH, Arterial   Date Value Ref Range Status   02/08/2018 7.272 (L) 7.350 - 7.450 pH units Final       Microbiology Results Abnormal     Procedure Component Value - Date/Time    Blood Culture - Blood, [[id number]]  (Normal) Collected:  02/08/18 2259    Lab Status:  Preliminary result Specimen:  Blood from Hand, Left Updated:  02/09/18 1346     Blood Culture No growth at less than 24 hours    Blood Culture - Blood, [[id number]]  (Normal) Collected:  02/08/18 2304    Lab Status:  Preliminary result Specimen:  Blood from Arm, Left Updated:  02/09/18 1346     Blood Culture No growth at less than 24 hours    Narrative:       Aerobic Only    Influenza A & B, RT PCR - Swab, Nasopharynx [[id number]]  (Normal) Collected:  02/08/18 2113    Lab Status:  Final result Specimen:  Swab from Nasopharynx Updated:  02/08/18 2209     Influenza A PCR Not Detected     Influenza B PCR Not Detected          Imaging Results (last 24 hours)     Procedure Component Value Units Date/Time    XR Chest 1 View [[id number]] Collected:  02/08/18 1840     Updated:  02/08/18 1957    Narrative:       EXAM:    XR Chest, 1 View    CLINICAL HISTORY:    57 years old, female; Signs and symptoms; Other: Weak/dizzy/ams; Additional   info: Weak/dizzy/ams triage protocol    TECHNIQUE:    Frontal view of the chest.    COMPARISON:    CR - XR CHEST 1 VW 2018-02-01 05:50    FINDINGS:    Prominent lung markings/peribronchial thickening without definite evidence of  "  consolidation.  Patchy atelectasis and scarring in the lungs bilaterally most   notable in the LEFT upper lobe.  Blunting of the costophrenic angles suggestive   of small pleural effusion/thickening.  Cardiomegaly with normal lung   vascularity.  Tortuous calcific aortic arch and descending thoracic aorta.    LEFT chest wall pacemaker.  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       Impression:         Chronic cardiopulmonary changes with decreased bilateral pleural effusions   without any other significant change.    THIS DOCUMENT HAS BEEN ELECTRONICALLY SIGNED BY IVETTE OGLESBY MD        Results for orders placed during the hospital encounter of 01/23/18   Adult Transthoracic Echo Complete W/ Cont if Necessary Per Protocol    Narrative · There is \"borderline\" LV and RV enlargement.  · There is severe global hypokinesia with an estimated LV ejection   fraction of <20%.  · The aortic valve is not well visualized; there is mild aortic   insufficiency.  · A normally functioning bileaflet mitral valve is present.          I have reviewed the medications.    aspirin 81 mg Oral Daily   budesonide-formoterol 2 puff Inhalation BID - RT   bumetanide 2 mg Intravenous Q8H   bumetanide 2 mg Intravenous Once   clonazePAM 2 mg Oral TID   ferrous sulfate 325 mg Oral Daily   ipratropium-albuterol 3 mL Nebulization 4x Daily - RT   metOLazone 5 mg Oral Once   nystatin  Topical Q12H   rosuvastatin 10 mg Oral Nightly   spironolactone 25 mg Oral Daily   warfarin (COUMADIN) (dosing per levels)  Does not apply Daily         Assessment/Plan   Assessment / Plan     Hospital Problem List     * (Principal)Acute respiratory failure with hypoxia and hypercapnia    Anxiety    Essential hypertension (Chronic)    Overview Signed 10/19/2015  8:56 AM by Digna Santana     benign essential         Ischemic cardiomyopathy (Chronic)    Overview Signed 2/6/2017  8:44 AM by MAN Quesada. History of MI December " "2009: s/p stent to RCA and LAD, EF 40%  B. CABGx2 March, 2010: SVG to OMB-1, SVG to PDA  C. EF 30% post-operatively, s/p Coy ICD placement, 2011  D. Echo July 2014: Severe global hypokinesis with EF 12%, moderate AI, mechanical mitral valve, moderate to severe TR, RVSP 43mmHg   E. Echo 2/4/17: EF 18%, mild to mod AI, mod TR, grossly normal prosthetic mitral valve         PVD (peripheral vascular disease) (Chronic)    Lupus (systemic lupus erythematosus) (Chronic)    Chronic coronary artery disease (Chronic)    H/O mitral valve replacement    Overview Signed 2/6/2017  8:53 AM by MAN Quesada     VHD:  A. Mitral valve replacement with 27mm ATS mechanical valve March 2010            Anemia of chronic kidney failure    COPD exacerbation    Tobacco abuse (Chronic)    Chronic anticoagulation    Overview Signed 7/16/2016 10:32 PM by IAIN Gabriel     Coumadin (Mechanical AVR)         CKD (chronic kidney disease), stage IV (Chronic)    Acute systolic congestive heart failure    Elevated LFTs    Respiratory failure             Brief Hospital Course to date:  Ashely Roldan is a 57 y.o. female with history of CHF, EF<20%, recent hospitalization for FAHEEM, shock liver, E coli UTI, hypotension and volume overload presents with lethargy and hypercarbic respiratory failure      Assessment & Plan:    Encephalopathy, secondary to hypercapnea  - resolved    Acute respiratory failure with hypoxia and hypercapnea  - lethargy resolved  - continue bipap hs and prn  - repeat abg in am    CKD II  - recent faheem  - creatinine near baseline, renal panel am    Elevated LFT's  - recent \"shock liver\" felt secondary to hypotension  - repeat hepatic panel am    A/C systolic CHF  - EF <20%  - may require dobutamine to assist with diuresis    VHD  - mechanical MV  - supratherapeutic INR, PharmD follows for dosing    COPD    Tobacco abuse  - attempting bumex IV with metolazone, but marginal UOP    Lupus  - apparently not taking " plaquenil at home    Medical Noncompliance    DVT Prophylaxis: coumadin     CODE STATUS: Full Code    Disposition: I expect the patient to be discharged home? in 2-3 days.    Charles Deras MD  02/09/18  6:10 PM

## 2018-02-09 NOTE — PAYOR COMM NOTE
"Jaylyn Gonsalez RN    [facility] [phone]  Fax [phone]  MRN [id number]      Ashely Roldan (57 y.o. 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)   Code Status:  FULL    Ht:  175.3 cm (69\")   Wt:  70.6 kg (155 lb 9.6 oz)    Admission Cmt:  None   Principal Problem:  Acute respiratory failure with hypoxia and hypercapnia [J96.01,J96.02]                 Active Insurance as of 2018     Primary Coverage     Payor Plan Insurance Group Employer/Plan Group    ANTHEM MEDICARE REPLACEMENT ANTHEM MEDICARE ADVANTAGE [id number]     Payor Plan Address Payor Plan Phone Number Effective From Effective To    [address] [phone] 2016     [place] [address]       Subscriber Name Subscriber Birth Date Member ID       ASHELY ROLDAN 1960 [id number]                 Emergency Contacts      (Rel.) Home Phone Work Phone Mobile Phone    Floyd Roldan (Son) -- -- [phone]    Janina Teran (Daughter) -- -- [phone]    Cherry Shepard (Significant Other) [phone] -- --    Abimbola Escalear (Sister) -- -- [phone]               History & Physical      Jelena Sanchez MD at 2018 10:08 PM              [facility] Medicine Services  HISTORY AND PHYSICAL    Patient Name: Ashely Roldan  : 1960  MRN: [id number]  Primary Care Physician: Peter Rdz, " MD    Subjective   Subjective     Chief Complaint:  FU LE edema    HPI:  Ashely Roldan is a 57 y.o. female with a history of CAD, ICM with AICD, hypertension, PVD, COPD, CKD stage II, chronic anticoagulation, anxiety, HLD, presents to the ED tonight with complaints of shortness of air, fatigue and lethargy that started yesterday.  Patient was here 1/23/18-2/5-18 with sepsis secondary to UTI, liver injury, chet, and left ama before being cleared by cardiology for mechanical mitral valve.  She was brought in per EMS tonight and abg revealed acute respiratory failure with hypoxia and hypercapnea at which time she was placed on bipap with an improvement in her repeat abg.  She reports having soa, nonproductive cough, bilateral lower extremity edema, and nausea.  Denies fever, chest pain, abdominal pain, vomiting, diarrhea, or dysuria.  Chest xray was negative for acute disease.  Review of progress note on 2/5/18 showed she had 1+ bilateral lower extremity with plans to increase her lasix dose to BID at that time.  Labs tonight revealed an elevated bnp and she was given lasix 20mg in the ED.  Patient is being admitted to the Hospitalist for further evaluation and management.    Review of Systems   Constitutional: Positive for fatigue.   Respiratory: Positive for cough (nonproductive) and shortness of breath.    Cardiovascular: Positive for leg swelling (bilateral).   Gastrointestinal: Positive for nausea.   All other systems reviewed and are negative.         Otherwise 10-system ROS reviewed and is negative except as mentioned in the HPI.    Personal History     Past Medical History:   Diagnosis Date   • Acute respiratory failure with hypoxia 9/5/2017   • Allergic rhinitis 08/01/2014   • Anemia    • Anxiety    • Arthritis    • CAD (coronary artery disease)    • CHF (congestive heart failure)    • CKD (chronic kidney disease)    • CKD (chronic kidney disease), stage IV 9/5/2017   • Colitis, Clostridium difficile    •  COPD (chronic obstructive pulmonary disease)    • Coronary atherosclerosis    • Depression    • Emphysema of lung    • GERD (gastroesophageal reflux disease)    • Heart attack    • Heart murmur    • Hematoma of hip    • Hip fracture    • Hyperlipidemia    • Hypertension     benign essential   • Ischemic cardiomyopathy 08/01/2014    ef 29% s/p ICD   • Knee injury    • Lung disease    • Mitral valve disorder 03/28/2013   • Mitral valve insufficiency     s/p prosthetic ATS valve replacement   • Osteoporosis    • Postmenopausal     natural   • PVD (peripheral vascular disease)    • Pyelonephritis    • Renal failure    • Renal failure    • Renal failure, acute    • Syncope    • Systemic lupus erythematosus    • TIA (transient ischemic attack) 04/04/2012   • UTI (urinary tract infection)    • Valvular heart disease    • Wrist fracture        Past Surgical History:   Procedure Laterality Date   • CARDIAC DEFIBRILLATOR PLACEMENT     • CHOLECYSTECTOMY  2010   • CORONARY ARTERY BYPASS GRAFT  2011 4 aortic bypasses, abdominal aorta; 2011-mitral valve; 2010-triple bypass   • ENDOSCOPY  10/23/2014    Dr. Brand; retained food in stomach; he dilated her esophagus; 5-30-14 hiatus hernia, gastritis   • JOINT REPLACEMENT Right 06/25/2015    Dr. Fitzgerald; first surgery 1-29-14; redo 5-4-15   • MITRAL VALVE REPLACEMENT      MECHANICAL   • TOTAL HIP ARTHROPLASTY Right     3 times within 8 months       Family History: family history includes Alcohol abuse in her brother; Arthritis in her mother; Cancer in her other; Diabetes in her other; Heart attack in her father; Heart disease in her maternal uncle and other; Hypertension in her other; Lung cancer in her paternal grandfather; Stroke in her other.     Social History:  reports that she has been smoking Cigarettes.  She has been smoking about 0.50 packs per day. She has never used smokeless tobacco. She reports that she does not drink alcohol or use illicit drugs.  Social History      Social History Narrative    Ms. Roldan is a 56 year old white female. Pt recently  after 30 years. Just moved from [facility] into her son's home in [place].        Medications:    (Not in a hospital admission)    Allergies   Allergen Reactions   • Morphine And Related GI Intolerance and Irritability   • Sulfa Antibiotics        Objective   Objective     Vital Signs:   Temp:  [97.6 °F (36.4 °C)] 97.6 °F (36.4 °C)  Heart Rate:  [70-87] 72  Resp:  [14-18] 14  BP: (100-113)/(60-69) 107/64        Physical Exam   Constitutional: She appears well-developed.   HENT:   Head: Normocephalic.   Eyes: Pupils are equal, round, and reactive to light.   Neck: Normal range of motion. Neck supple.   Cardiovascular: Normal rate, regular rhythm, normal heart sounds and intact distal pulses.  Exam reveals no gallop and no friction rub.    No murmur heard.  Click present   Pulmonary/Chest:   Decreased air movement throughout, faint scattered rhonchi, currently on bipap   Abdominal: Soft. Bowel sounds are normal. She exhibits no distension. There is no tenderness. There is no rebound and no guarding.   Musculoskeletal: She exhibits edema (LUE AND BLE edema). She exhibits no tenderness or deformity.   +3 BLE edema, from knees down cool to touch, unable to dopple dp or pt pulses   Neurological:   , difficult to obtain neuro exam secondary to patient falling back to sleep.  Answers simple yes/no question, follows simple commands.   Skin: No rash noted. She is not diaphoretic. No erythema. No pallor.   BLE from knees down cool to touch, with diffuse blisters some intact and weeping serous drainage.  Plantar surface mottled bilaterally with cap refill 7 seconds        Results Reviewed:  I have personally reviewed current lab, radiology, and data and agree.      Results from last 7 days  Lab Units 02/08/18  1844 02/05/18  0540   WBC 10*3/mm3 9.44  --    HEMOGLOBIN g/dL 10.3*  --    HEMATOCRIT % 34.4*  --    PLATELETS  10*3/mm3 173  --    INR   --  2.45       Results from last 7 days  Lab Units 02/08/18  1844   SODIUM mmol/L 139   POTASSIUM mmol/L 3.9   CHLORIDE mmol/L 103   CO2 mmol/L 25.0   BUN mg/dL 38*   CREATININE mg/dL 1.70*   GLUCOSE mg/dL 126*   CALCIUM mg/dL 9.1   ALT (SGPT) U/L 181*   AST (SGOT) U/L 48*     Estimated Creatinine Clearance: 35.1 mL/min (by C-G formula based on Cr of 1.7).  Brief Urine Lab Results  (Last result in the past 365 days)      Color   Clarity   Blood   Leuk Est   Nitrite   Protein   CREAT   Urine HCG        02/08/18 2113 Yellow Cloudy(A) Negative Negative Negative 100 mg/dL (2+)(A)             BNP   Date Value Ref Range Status   02/08/2018 3156.0 (H) 0.0 - 100.0 pg/mL Final     Comment:     Results may be falsely decreased if patient taking Biotin.     pH, Arterial   Date Value Ref Range Status   02/08/2018 7.272 (L) 7.350 - 7.450 pH units Final     Imaging Results (last 24 hours)     Procedure Component Value Units Date/Time    XR Chest 1 View [[id number]] Collected:  02/08/18 1840     Updated:  02/08/18 1957    Narrative:       EXAM:    XR Chest, 1 View    CLINICAL HISTORY:    57 years old, female; Signs and symptoms; Other: Weak/dizzy/ams; Additional   info: Weak/dizzy/ams triage protocol    TECHNIQUE:    Frontal view of the chest.    COMPARISON:    CR - XR CHEST 1 VW 2018-02-01 05:50    FINDINGS:    Prominent lung markings/peribronchial thickening without definite evidence of   consolidation.  Patchy atelectasis and scarring in the lungs bilaterally most   notable in the LEFT upper lobe.  Blunting of the costophrenic angles suggestive   of small pleural effusion/thickening.  Cardiomegaly with normal lung   vascularity.  Tortuous calcific aortic arch and descending thoracic aorta.    LEFT chest wall pacemaker.  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       Impression:         Chronic cardiopulmonary changes with decreased bilateral pleural effusions   without any other  "significant change.    THIS DOCUMENT HAS BEEN ELECTRONICALLY SIGNED BY IVETTE OGLESBY MD        Results for orders placed during the hospital encounter of 01/23/18   Adult Transthoracic Echo Complete W/ Cont if Necessary Per Protocol    Narrative · There is \"borderline\" LV and RV enlargement.  · There is severe global hypokinesia with an estimated LV ejection   fraction of <20%.  · The aortic valve is not well visualized; there is mild aortic   insufficiency.  · A normally functioning bileaflet mitral valve is present.          Assessment/Plan   Assessment / Plan     Hospital Problem List     * (Principal)Acute respiratory failure with hypoxia and hypercapnia    COPD exacerbation    Acute systolic congestive heart failure    CKD (chronic kidney disease), stage IV (Chronic)    Anxiety    Essential hypertension (Chronic)    Overview Signed 10/19/2015  8:56 AM by Digna Santana     benign essential         Ischemic cardiomyopathy (Chronic)    Overview Signed 2/6/2017  8:44 AM by MAN Quesada. History of MI December 2009: s/p stent to RCA and LAD, EF 40%  B. CABGx2 March, 2010: SVG to OMB-1, SVG to PDA  C. EF 30% post-operatively, s/p [place] ICD placement, 2011  D. Echo July 2014: Severe global hypokinesis with EF 12%, moderate AI, mechanical mitral valve, moderate to severe TR, RVSP 43mmHg   E. Echo 2/4/17: EF 18%, mild to mod AI, mod TR, grossly normal prosthetic mitral valve         PVD (peripheral vascular disease) (Chronic)    Lupus (systemic lupus erythematosus) (Chronic)    Chronic coronary artery disease (Chronic)    H/O mitral valve replacement    Overview Signed 2/6/2017  8:53 AM by MAN Quesada     VHD:  A. Mitral valve replacement with 27mm ATS mechanical valve March 2010            Anemia of chronic kidney failure    Tobacco abuse (Chronic)    Chronic anticoagulation    Overview Signed 7/16/2016 10:32 PM by IAIN Gabriel     Coumadin (Mechanical AVR)         Elevated LFTs    "         Assessment & Plan:    1.  Acute on chronic respiratory failure with hypoxia and hypercapnia   -continue bipap   -continuous pulse ox    2.  Acute on chronic systolic CHF   -echo   -strict I&O's   -daily weights   -bumex 2mg IV q8 hours   -consult cardiology in the am   -Coreg   -bmp, cbc in the am    3.  Acute COPD exacerbation   -sputum culture   -duonebs   -BC x 2   -symbicort    4.  CKD baseline creatinine 1.8-2.0, stable   -bmp in the am and monitor    5.  Mechanical mitral valve on coumadin with a therapeutic INR   -continue coumadin   -daily INR    6.  PVD    7.  LUE edema   -duplex LUE    8.  CAD  9.  Ischemic cardiomyopathy  10.  Anxiety  11.  Anemia secondary to CKD, stable   -cbc in the am and monitor    12.  Lupus  *has not taken plaquenil in over a year, was going to restart prior to discharge on previous admission    13.  Elevated LFT's, improved  *had acute liver injury on previous admission secondary to hypotension  *hepatitis panel negative   -monitor    14.  Tobacco abuse   -nicotine patch   -smoking cessation education    DVT prophylaxis:  Coumadin    CODE STATUS:  Prior    Viki Ambriz, APRN  02/08/18   10:08 PM      Brief Attending Admission Attestation     I have seen and examined the patient, performing an independent face-to-face diagnostic evaluation with plan of care reviewed and developed with the advanced practice clinician (APC).      Brief Summary Statement/HPI:   Ashely Roldan is a 57 y.o. female with ICM s/p ICD, CAD s/p CABG and PCI, mechanical mitral valve, CKD presented with complaints of worsening LE edema, fatigue. Found to have respiratory acidosis and ED and placed on BiPAP with improving ABGs. Pt denies CP.     Attending Physical Exam:  Constitutional: No acute distress, somnolent but wakes up easily on BiPAP  Eyes: PERRLA, sclerae anicteric, no conjunctival injection  HENT: NCAT, mucous membranes moist  Neck: Supple, no thyromegaly, no lymphadenopathy,  trachea midline  Respiratory: Decreased BS diffusely, nonlabored respirations   Cardiovascular: RRR, no murmurs, rubs, or gallops, mechanical click of mitral valve, 4+ pitting edema up to thigh, slightly cyanotic toes with delayed capillary return, LUE more edematous than RUE, 2+ pitting edema in LUE  Gastrointestinal: Positive bowel sounds, soft, nontender, nondistended  Psychiatric: Appropriate affect, cooperative  Neurologic: Somnolent but wakes up to voice, answers questions appropriately, strength symmetric in all extremities, Cranial Nerves grossly intact to confrontation, speech clear  Skin: No rashes    Brief Assessment/Plan :  See above for further detailed assessment and plan developed with APC which I have reviewed and/or edited.    - Acute hypercapnic respiratory failure: Improved with BiPAP. BiPAP PRN. NPO while on BiPAP  - Acute on chronic systolic and diastolic CHF: ECHO 2/1 EF <20%. Very much volume overloaded. Diurese aggressively with Bumex 2mg q8hrs. Monitor UOP closely. Strict Is/Os, daily weights. STAT ECHO for mitral valve evaluation and CO. Cold bilateral LE concerning for reduced CO. LE edema documented to be minimal upon on 1/4 when pt left AMA. Will consider afterload reduction with ACEi if worse  - ICM s/p ICD: Holding home meds due to borderline BP  - Mechanical mitral valve: Warfarin  - CKD: Baseline Cr 1.7   - Obtain Duplex LUE for unilateral pitting edema    I believe this patient meets INPATIENT status due to the need for care which can only be reasonably provided in an hospital setting such as aggressive/expedited ancillary services and/or consultation services, the necessity for IV medications, close physician monitoring and/or the possible need for procedures.  In such, I feel patient’s risk for adverse outcomes and need for care warrant INPATIENT evaluation and predict the patient’s care encounter to likely last beyond 2 midnights.    Jelena Sanchez MD  02/08/18  11:14 PM               Electronically signed by Jelena Sanchez MD at 2/9/2018 12:04 AM           Emergency Department Notes      Josue Armenta MD at 2/8/2018  6:06 PM      Procedure Orders:    1. Critical Care [[id number]] ordered by Hector Fregoso MD at 02/08/18 2041                Subjective   HPI Comments: Ashely Roldan is a 57 y.o.female with hx of COPD and CHF who presents to the ED with c/o fatigue and lethargy with onset a couple of days ago. She reports that she gradually developed worsening fatigue and lethargy which prompted her call to EMS. She states she left [facility] AMA recently after being treated for 12 days. She notes that she was hospitalized for rectal bleeding and an infection. The pt also complains of nausea, abd pain, and HA but denies SOA, or any other complaints at this time. The pt denies home O2 use.    Patient is a 57 y.o. female presenting with weakness.   History provided by:  Patient  Weakness - Generalized   Severity:  Moderate  Onset quality:  Sudden  Timing:  Constant  Progression:  Worsening  Chronicity:  Recurrent  Relieved by:  None tried  Worsened by:  Nothing  Ineffective treatments:  None tried  Associated symptoms: abdominal pain, headaches, lethargy and nausea    Associated symptoms: no shortness of breath    Risk factors: congestive heart failure        Review of Systems   Constitutional: Positive for fatigue.   Respiratory: Negative for shortness of breath.    Gastrointestinal: Positive for abdominal pain and nausea.   Neurological: Positive for weakness and headaches.   All other systems reviewed and are negative.      Past Medical History:   Diagnosis Date   • Acute respiratory failure with hypoxia 9/5/2017   • Allergic rhinitis 08/01/2014   • Anemia    • Anxiety    • Arthritis    • CAD (coronary artery disease)    • CHF (congestive heart failure)    • CKD (chronic kidney disease)    • CKD (chronic kidney disease), stage IV 9/5/2017   • Colitis, Clostridium difficile    • COPD  (chronic obstructive pulmonary disease)    • Coronary atherosclerosis    • Depression    • Emphysema of lung    • GERD (gastroesophageal reflux disease)    • Heart attack    • Heart murmur    • Hematoma of hip    • Hip fracture    • Hyperlipidemia    • Hypertension     benign essential   • Ischemic cardiomyopathy 08/01/2014    ef 29% s/p ICD   • Knee injury    • Lung disease    • Mitral valve disorder 03/28/2013   • Mitral valve insufficiency     s/p prosthetic ATS valve replacement   • Osteoporosis    • Postmenopausal     natural   • PVD (peripheral vascular disease)    • Pyelonephritis    • Renal failure    • Renal failure    • Renal failure, acute    • Syncope    • Systemic lupus erythematosus    • TIA (transient ischemic attack) 04/04/2012   • UTI (urinary tract infection)    • Valvular heart disease    • Wrist fracture        Allergies   Allergen Reactions   • Morphine And Related GI Intolerance and Irritability   • Sulfa Antibiotics        Past Surgical History:   Procedure Laterality Date   • CARDIAC DEFIBRILLATOR PLACEMENT     • CHOLECYSTECTOMY  2010   • CORONARY ARTERY BYPASS GRAFT  2011 4 aortic bypasses, abdominal aorta; 2011-mitral valve; 2010-triple bypass   • ENDOSCOPY  10/23/2014    Dr. Brand; retained food in stomach; he dilated her esophagus; 5-30-14 hiatus hernia, gastritis   • JOINT REPLACEMENT Right 06/25/2015    Dr. Fitzgerald; first surgery 1-29-14; redo 5-4-15   • MITRAL VALVE REPLACEMENT      MECHANICAL   • TOTAL HIP ARTHROPLASTY Right     3 times within 8 months       Family History   Problem Relation Age of Onset   • Arthritis Mother      rheumatoid   • Heart attack Father    • Alcohol abuse Brother    • Heart disease Other      uncle   • Diabetes Other      uncle-type 2   • Hypertension Other      uncle   • Stroke Other      uncle   • Cancer Other      grandfather-lung    • Heart disease Maternal Uncle    • Lung cancer Paternal Grandfather        Social History     Social History   •  Marital status:      Spouse name: N/A   • Number of children: N/A   • Years of education: N/A     Social History Main Topics   • Smoking status: Current Every Day Smoker     Packs/day: 0.50     Types: Cigarettes   • Smokeless tobacco: Never Used      Comment: Down to 0.5 PPD from 1 PPD now consistently. Started as a teenager.   • Alcohol use No   • Drug use: No   • Sexual activity: Defer     Other Topics Concern   • None     Social History Narrative    Ms. Roldan is a 56 year old white female. Pt recently  after 30 years. Just moved from [facility] into her son's home in [place].          Objective   Physical Exam   Constitutional: She is oriented to person, place, and time. She appears well-developed and well-nourished. No distress.   Appears very somnolent.    HENT:   Head: Normocephalic and atraumatic.   Nose: Nose normal.   Eyes: Conjunctivae are normal. Pupils are equal, round, and reactive to light. No scleral icterus.   PERRL 2 mm.   Neck: Normal range of motion. Neck supple.   Cardiovascular: Normal rate, regular rhythm and normal heart sounds.    No murmur heard.  Pulmonary/Chest: Effort normal. No respiratory distress. She has decreased breath sounds.   Diminished breathe sounds in all lung fields posteriorly. Unable to auscultate wheezes or rales. O2 sat when off o2 dropped to 84-86 percent.   Abdominal: Soft. Bowel sounds are normal. There is no tenderness.   Musculoskeletal: She exhibits edema.   Moves all 4 extremities weakly. Lower extremities moderately tense. 1+ edema with bolus draining lesions to bilateral distal lower extremities. No signs of cellulitis.   Neurological: She is alert and oriented to person, place, and time.   Skin: Skin is warm and dry.   Nursing note and vitals reviewed.      Critical Care  Performed by: BERTA RIVER  Authorized by: BERTA RIVER     Critical care provider statement:     Critical care time (minutes):  35    Critical care time was  exclusive of:  Separately billable procedures and treating other patients    Critical care was necessary to treat or prevent imminent or life-threatening deterioration of the following conditions:  Respiratory failure    Critical care was time spent personally by me on the following activities:  Evaluation of patient's response to treatment, examination of patient, ordering and performing treatments and interventions, ordering and review of laboratory studies, ordering and review of radiographic studies, re-evaluation of patient's condition, development of treatment plan with patient or surrogate, pulse oximetry, discussions with consultants, review of old charts and obtaining history from patient or surrogate                  ED Course  ED Course   Comment By Time   Paged Dr. Fregoso, Hospitalist.-BILLY Palmer 02/08 1935   Spoke to Dr. Fregoso who will admit to telemetry.-BILLY Palmer 02/08 2014   Patient is tolerating BiPAP.  She is more alert.  Her arterial blood gas is improving on recheck. Josue Armenta MD 02/08 2055     Recent Results (from the past 24 hour(s))   Light Blue Top    Collection Time: 02/08/18  6:43 PM   Result Value Ref Range    Extra Tube hold for add-on    Gold Top - SST    Collection Time: 02/08/18  6:43 PM   Result Value Ref Range    Extra Tube Hold for add-ons.    Comprehensive Metabolic Panel    Collection Time: 02/08/18  6:44 PM   Result Value Ref Range    Glucose 126 (H) 70 - 100 mg/dL    BUN 38 (H) 9 - 23 mg/dL    Creatinine 1.70 (H) 0.60 - 1.30 mg/dL    Sodium 139 132 - 146 mmol/L    Potassium 3.9 3.5 - 5.5 mmol/L    Chloride 103 99 - 109 mmol/L    CO2 25.0 20.0 - 31.0 mmol/L    Calcium 9.1 8.7 - 10.4 mg/dL    Total Protein 6.6 5.7 - 8.2 g/dL    Albumin 3.50 3.20 - 4.80 g/dL    ALT (SGPT) 181 (H) 7 - 40 U/L    AST (SGOT) 48 (H) 0 - 33 U/L    Alkaline Phosphatase 119 (H) 25 - 100 U/L    Total Bilirubin 0.6 0.3 - 1.2 mg/dL    eGFR Non  Amer 31 (L) >60  mL/min/1.73    Globulin 3.1 gm/dL    A/G Ratio 1.1 (L) 1.5 - 2.5 g/dL    BUN/Creatinine Ratio 22.4 7.0 - 25.0    Anion Gap 11.0 3.0 - 11.0 mmol/L   Magnesium    Collection Time: 02/08/18  6:44 PM   Result Value Ref Range    Magnesium 1.9 1.3 - 2.7 mg/dL   BNP    Collection Time: 02/08/18  6:44 PM   Result Value Ref Range    BNP 3156.0 (H) 0.0 - 100.0 pg/mL   Green Top (Gel)    Collection Time: 02/08/18  6:44 PM   Result Value Ref Range    Extra Tube Hold for add-ons.    Lavender Top    Collection Time: 02/08/18  6:44 PM   Result Value Ref Range    Extra Tube hold for add-on    CBC Auto Differential    Collection Time: 02/08/18  6:44 PM   Result Value Ref Range    WBC 9.44 3.50 - 10.80 10*3/mm3    RBC 3.16 (L) 3.89 - 5.14 10*6/mm3    Hemoglobin 10.3 (L) 11.5 - 15.5 g/dL    Hematocrit 34.4 (L) 34.5 - 44.0 %    .9 (H) 80.0 - 99.0 fL    MCH 32.6 (H) 27.0 - 31.0 pg    MCHC 29.9 (L) 32.0 - 36.0 g/dL    RDW 20.8 (H) 11.3 - 14.5 %    RDW-SD 81.0 (H) 37.0 - 54.0 fl    MPV 11.4 6.0 - 12.0 fL    Platelets 173 150 - 450 10*3/mm3    Neutrophil % 80.1 (H) 41.0 - 71.0 %    Lymphocyte % 4.8 (L) 24.0 - 44.0 %    Monocyte % 13.6 (H) 0.0 - 12.0 %    Eosinophil % 1.1 0.0 - 3.0 %    Basophil % 0.1 0.0 - 1.0 %    Immature Grans % 0.3 0.0 - 0.6 %    Neutrophils, Absolute 7.57 1.50 - 8.30 10*3/mm3    Lymphocytes, Absolute 0.45 (L) 0.60 - 4.80 10*3/mm3    Monocytes, Absolute 1.28 (H) 0.00 - 1.00 10*3/mm3    Eosinophils, Absolute 0.10 0.00 - 0.30 10*3/mm3    Basophils, Absolute 0.01 0.00 - 0.20 10*3/mm3    Immature Grans, Absolute 0.03 0.00 - 0.03 10*3/mm3   POC Troponin, Rapid    Collection Time: 02/08/18  6:48 PM   Result Value Ref Range    Troponin I 0.03 0.00 - 0.07 ng/mL   Blood Gas, Arterial    Collection Time: 02/08/18  6:59 PM   Result Value Ref Range    Site Arterial: right radial     Pj's Test N/A     pH, Arterial 7.203 (C) 7.350 - 7.450 pH units    pCO2, Arterial 75.4 (C) 35.0 - 45.0 mm Hg    pO2, Arterial 90.2 83.0 -  108.0 mm Hg    HCO3, Arterial 29.7 (H) 20.0 - 26.0 mmol/L    Base Excess, Arterial 0.5 0.0 - 2.0 mmol/L    Hemoglobin, Blood Gas 10.0 (L) 14 - 18 g/dL    Hematocrit, Blood Gas 30.5 %    Oxyhemoglobin 91.2 (L) 94 - 99 %    Methemoglobin 0.80 0.00 - 1.50 %    Carboxyhemoglobin 3.5 (H) 0 - 2 %    CO2 Content 32.0 22 - 33    Barometric Pressure for Blood Gas  mmHg    Modality Nasal Cannula     FIO2 36 %   Blood Gas, Arterial    Collection Time: 02/08/18  8:37 PM   Result Value Ref Range    Site Arterial: right radial     Pj's Test N/A     pH, Arterial 7.272 (L) 7.350 - 7.450 pH units    pCO2, Arterial 65.3 (H) 35.0 - 45.0 mm Hg    pO2, Arterial 65.0 (L) 83.0 - 108.0 mm Hg    HCO3, Arterial 30.1 (H) 20.0 - 26.0 mmol/L    Base Excess, Arterial 2.2 (H) 0.0 - 2.0 mmol/L    Hemoglobin, Blood Gas 9.5 (L) 14 - 18 g/dL    Hematocrit, Blood Gas 29.2 %    Oxyhemoglobin 85.9 (L) 94 - 99 %    Methemoglobin 1.10 0.00 - 1.50 %    Carboxyhemoglobin 3.6 (H) 0 - 2 %    CO2 Content 32.1 22 - 33    Barometric Pressure for Blood Gas  mmHg    Modality BiPap     FIO2 30 %     Note: In addition to lab results from this visit, the labs listed above may include labs taken at another facility or during a different encounter within the last 24 hours. Please correlate lab times with ED admission and discharge times for further clarification of the services performed during this visit.    XR Chest 1 View   Final Result     Chronic cardiopulmonary changes with decreased bilateral pleural effusions    without any other significant change.      THIS DOCUMENT HAS BEEN ELECTRONICALLY SIGNED BY IVETTE OGLESBY MD        Vitals:    02/08/18 2000 02/08/18 2047 02/08/18 2059 02/08/18 2100   BP: 109/69 100/60  113/69   Patient Position:       Pulse:  70 73    Resp: 18   14   Temp:       TempSrc:       SpO2:   100%    Weight:       Height:         Medications   sodium chloride 0.9 % flush 10 mL (10 mL Intravenous Given 2/8/18 2047)   ipratropium-albuterol  (DUO-NEB) nebulizer solution 3 mL ( Nebulization Canceled Entry 2/8/18 1923)   ipratropium-albuterol (DUO-NEB) nebulizer solution 3 mL (3 mL Nebulization Given 2/8/18 1854)   furosemide (LASIX) injection 20 mg (20 mg Intravenous Given 2/8/18 2047)     ECG/EMG Results (last 24 hours)     Procedure Component Value Units Date/Time    ECG 12 Lead [[id number]] Collected:  02/08/18 1900     Updated:  02/08/18 1901        ECG 12 Lead           ECG/EMG Results (last 24 hours)     Procedure Component Value Units Date/Time    ECG 12 Lead [[id number]] Collected:  02/08/18 1900     Updated:  02/08/18 1901        ECG 12 Lead   Final Result   Test Reason : Weak/Dizzy/AMS protocol   Blood Pressure : **/** mmHG   Vent. Rate : 081 BPM     Atrial Rate : 081 BPM      P-R Int : 152 ms          QRS Dur : 102 ms       QT Int : 420 ms       P-R-T Axes : 039 -30 146 degrees      QTc Int : 487 ms      Normal sinus rhythm   Possible Left atrial enlargement   Left axis deviation   Possible Anterior infarct , age undetermined   T wave abnormality, consider lateral ischemia   Abnormal ECG   When compared with ECG of 23-JAN-2018 16:45,   No significant change was found   Confirmed by ALETA ARROYO, BERTA ([id number]) on 2/8/2018 11:56:28 PM      Referred By:  ED MD           Confirmed By:BERTA ARMENTA MD                        [facility] [place]    Final diagnoses:   Respiratory failure, unspecified chronicity, unspecified whether with hypoxia or hypercapnia   COPD exacerbation   Acute on chronic congestive heart failure, unspecified congestive heart failure type   Tobacco abuse   Medical non-compliance       Documentation assistance provided by brigid Palmer.  Information recorded by the scribe was done at my direction and has been verified and validated by me.     Anastacio Palmer  02/08/18 1918       Anastacio Palmer  02/08/18 2052       Anastacio Palmer  02/08/18 2114       Berta Armenta MD  02/08/18 5972       Electronically signed by Berta THORPE  MD Geovany at 2/8/2018 11:56 PM        Vital Signs (last 72 hrs)       02/06 0700  -  02/07 0659 02/07 0700  -  02/08 0659 02/08 0700  -  02/09 0659 02/09 0700  -  02/09 1113   Most Recent    Temp (°F)     97.6 -  98.2      97.7     97.7 (36.5)    Heart Rate     65 -  87    69 -  71     69    Resp     14 -  18    16 -  18     16    BP     (!)82/61 -  113/69      96/61     96/61    SpO2 (%)     (!)84 -  100      100     100          Hospital Medications (all)       Dose Frequency Start End    budesonide-formoterol (SYMBICORT) 160-4.5 MCG/ACT inhaler 2 puff 2 puff 2 Times Daily - RT 2/8/2018     Sig - Route: Inhale 2 puffs 2 (Two) Times a Day. - Inhalation    bumetanide (BUMEX) injection 2 mg 2 mg Every 8 Hours 2/9/2018     Sig - Route: Infuse 8 mL into a venous catheter Every 8 (Eight) Hours. - Intravenous    bumetanide (BUMEX) injection 2 mg 2 mg Once 2/9/2018     Sig - Route: Infuse 8 mL into a venous catheter 1 (One) Time. - Intravenous    furosemide (LASIX) injection 20 mg 20 mg Once 2/8/2018 2/8/2018    Sig - Route: Infuse 2 mL into a venous catheter 1 (One) Time. - Intravenous    ipratropium-albuterol (DUO-NEB) nebulizer solution 3 mL 3 mL Once 2/8/2018 2/8/2018    Sig - Route: Take 3 mL by nebulization 1 (One) Time. - Nebulization    Cosign for Ordering: Accepted by Josue Armenta MD on 2/9/2018 12:31 AM    ipratropium-albuterol (DUO-NEB) nebulizer solution 3 mL 3 mL 4 Times Daily - RT 2/8/2018     Sig - Route: Take 3 mL by nebulization 4 (Four) Times a Day. - Nebulization    LORazepam (ATIVAN) tablet 1 mg 1 mg Every 6 Hours PRN 2/9/2018 2/19/2018    Sig - Route: Take 1 tablet by mouth Every 6 (Six) Hours As Needed for Anxiety. - Oral    Magnesium Sulfate 2 gram Bolus, followed by 8 gram infusion (total Mg dose 10 grams)- Mg less than or equal to 1mg/dL 2 g As Needed 2/8/2018     Sig - Route: Infuse 50 mL into a venous catheter As Needed (Mg less than or equal to 1mg/dL). - Intravenous    Linked Group 1:   "\"Or\" Linked Group Details        magnesium sulfate 4 gram infusion- Mg 1.6-1.9 mg/dL 4 g As Needed 2/8/2018     Sig - Route: Infuse 100 mL into a venous catheter As Needed (Mg 1.6-1.9 mg/dL). - Intravenous    Linked Group 1:  \"Or\" Linked Group Details        Magnesium Sulfate 6 gram Infusion (2 gm x 3) -Mg 1.1 -1.5 mg/dL 2 g As Needed 2/8/2018     Sig - Route: Infuse 50 mL into a venous catheter As Needed (Mg 1.1 -1.5 mg/dL). - Intravenous    Linked Group 1:  \"Or\" Linked Group Details        metOLazone (ZAROXOLYN) tablet 5 mg 5 mg Once 2/9/2018     Sig - Route: Take 1 tablet by mouth 1 (One) Time. - Oral    metOLazone (ZAROXOLYN) tablet 5 mg 5 mg Once 2/9/2018 2/9/2018    Sig - Route: Take 1 tablet by mouth 1 (One) Time. - Oral    nystatin (MYCOSTATIN) powder  Every 12 Hours Scheduled 2/9/2018     Sig - Route: Apply  topically Every 12 (Twelve) Hours. - Topical    Cosign for Ordering: Required by Charles Deras MD    ondansetron (ZOFRAN) injection 4 mg 4 mg Every 6 Hours PRN 2/8/2018     Sig - Route: Infuse 2 mL into a venous catheter Every 6 (Six) Hours As Needed for Nausea or Vomiting. - Intravenous    Pharmacy to dose warfarin  Continuous PRN 2/9/2018     Sig - Route: Continuous As Needed for Consult. - Does not apply    potassium chloride (KLOR-CON) packet 40 mEq 40 mEq As Needed 2/8/2018     Sig - Route: Take 40 mEq by mouth As Needed (potassium replacement, see admin instructions). - Oral    potassium chloride (MICRO-K) CR capsule 40 mEq 40 mEq As Needed 2/8/2018     Sig - Route: Take 4 capsules by mouth As Needed (potassium replacement.  see admin instructions). - Oral    potassium phosphate 15 mmol in sodium chloride 0.9 % 100 mL infusion 15 mmol As Needed 2/8/2018     Sig - Route: Infuse 15 mmol into a venous catheter As Needed (Peripheral IV - Phosphorus 1.8 - 2.5). - Intravenous    Linked Group 2:  \"Or\" Linked Group Details        potassium phosphate 30 mmol in sodium chloride 0.9 % 250 mL infusion 30 " "mmol As Needed 2/8/2018     Sig - Route: Infuse 30 mmol into a venous catheter As Needed (Peripheral IV - Phosphorus 1.3 - 1.7). - Intravenous    Linked Group 2:  \"Or\" Linked Group Details        potassium phosphate 45 mmol in sodium chloride 0.9 % 500 mL infusion 45 mmol As Needed 2/8/2018     Sig - Route: Infuse 45 mmol into a venous catheter As Needed (Peripheral IV - Phosphorus Less Than 1.3). - Intravenous    Linked Group 2:  \"Or\" Linked Group Details        rosuvastatin (CRESTOR) tablet 10 mg 10 mg Nightly 2/8/2018 2/5/2019    Sig - Route: Take 1 tablet by mouth Every Night. - Oral    sodium chloride 0.9 % flush 1-10 mL 1-10 mL As Needed 2/8/2018     Sig - Route: Infuse 1-10 mL into a venous catheter As Needed for Line Care. - Intravenous    sodium phosphates 15 mmol in sodium chloride 0.9 % 250 mL IVPB 15 mmol As Needed 2/8/2018     Sig - Route: Infuse 15 mmol into a venous catheter As Needed (Peripheral IV - Phosphorus 1.8 - 2.5 & Potassium Greater Than 4). - Intravenous    Linked Group 2:  \"Or\" Linked Group Details        sodium phosphates 30 mmol in sodium chloride 0.9 % 250 mL IVPB 30 mmol As Needed 2/8/2018     Sig - Route: Infuse 30 mmol into a venous catheter As Needed (Peripheral IV - Phosphorus 1.3-1.7 & Potassium Greater Than 4). - Intravenous    Linked Group 2:  \"Or\" Linked Group Details        sodium phosphates 45 mmol in sodium chloride 0.9 % 500 mL IVPB 45 mmol As Needed 2/8/2018     Sig - Route: Infuse 45 mmol into a venous catheter As Needed (Peripheral IV - Phosphorus Less Than 1.3 & Potassium Greater Than 4). - Intravenous    Linked Group 2:  \"Or\" Linked Group Details        Sulfur Hexafluoride Microsph 60.7-25 MG reconstituted suspension 3 mL 3 mL Once 2/9/2018 2/9/2018    Sig - Route: Infuse 3 mL into a venous catheter 1 (One) Time. - Intravenous    warfarin (COUMADIN) - no scheduled doses (Pharmacy dosing per daily INR)  Daily Warfarin 2/9/2018     Sig - Route: Daily. - Does not apply    " albuterol (PROVENTIL) nebulizer solution 0.5% 2.5 mg/0.5mL (Discontinued) 2.5 mg Every 4 Hours PRN 2/8/2018 2/8/2018    Sig - Route: Take 0.5 mL by nebulization Every 4 (Four) Hours As Needed for Wheezing or Shortness of Air. - Nebulization    bumetanide (BUMEX) injection 2 mg (Discontinued) 2 mg Once 2/8/2018 2/8/2018    Sig - Route: Infuse 8 mL into a venous catheter 1 (One) Time. - Intravenous    ipratropium (ATROVENT) nebulizer solution 0.5 mg (Discontinued) 0.5 mg 4 Times Daily - RT 2/9/2018 2/8/2018    Sig - Route: Take 2.5 mL by nebulization 4 (Four) Times a Day. - Nebulization    ipratropium-albuterol (DUO-NEB) nebulizer solution 3 mL (Discontinued) 3 mL Once 2/8/2018 2/8/2018    Sig - Route: Take 3 mL by nebulization 1 (One) Time. - Nebulization    sodium chloride 0.9 % flush 10 mL (Discontinued) 10 mL As Needed 2/8/2018 2/8/2018    Sig - Route: Infuse 10 mL into a venous catheter As Needed for Line Care. - Intravenous    Cosign for Ordering: Accepted by Josue Armenta MD on 2/9/2018 12:31 AM          Lab Results (last 72 hours)     Procedure Component Value Units Date/Time    CBC & Differential [[id number]] Collected:  02/08/18 1844    Specimen:  Blood Updated:  02/08/18 1853    Narrative:       The following orders were created for panel order CBC & Differential.  Procedure                               Abnormality         Status                     ---------                               -----------         ------                     CBC Auto Differential[[id number]]        Abnormal            Final result                 Please view results for these tests on the individual orders.    CBC Auto Differential [[id number]]  (Abnormal) Collected:  02/08/18 1844    Specimen:  Blood Updated:  02/08/18 1853     WBC 9.44 10*3/mm3      RBC 3.16 (L) 10*6/mm3      Hemoglobin 10.3 (L) g/dL      Hematocrit 34.4 (L) %      .9 (H) fL      MCH 32.6 (H) pg      MCHC 29.9 (L) g/dL      RDW 20.8 (H) %      RDW-SD 81.0  (H) fl      MPV 11.4 fL      Platelets 173 10*3/mm3      Neutrophil % 80.1 (H) %      Lymphocyte % 4.8 (L) %      Monocyte % 13.6 (H) %      Eosinophil % 1.1 %      Basophil % 0.1 %      Immature Grans % 0.3 %      Neutrophils, Absolute 7.57 10*3/mm3      Lymphocytes, Absolute 0.45 (L) 10*3/mm3      Monocytes, Absolute 1.28 (H) 10*3/mm3      Eosinophils, Absolute 0.10 10*3/mm3      Basophils, Absolute 0.01 10*3/mm3      Immature Grans, Absolute 0.03 10*3/mm3     Blood Gas, Arterial [[id number]]  (Abnormal) Collected:  02/08/18 1859    Specimen:  Arterial Blood Updated:  02/08/18 1902     Site Arterial: right radial     Pj's Test N/A     pH, Arterial 7.203 (C) pH units      pCO2, Arterial 75.4 (C) mm Hg      pO2, Arterial 90.2 mm Hg      HCO3, Arterial 29.7 (H) mmol/L      Base Excess, Arterial 0.5 mmol/L      Hemoglobin, Blood Gas 10.0 (L) g/dL      Hematocrit, Blood Gas 30.5 %      Oxyhemoglobin 91.2 (L) %      Methemoglobin 0.80 %      Carboxyhemoglobin 3.5 (H) %      CO2 Content 32.0     Barometric Pressure for Blood Gas -- mmHg       N/A        Modality Nasal Cannula     FIO2 36 %     POC Troponin, Rapid [[id number]]  (Normal) Collected:  02/08/18 1848    Specimen:  Blood Updated:  02/08/18 1905     Troponin I 0.03 ng/mL       Serial Number: [id number]:  [id number]       Magnesium [[id number]]  (Normal) Collected:  02/08/18 1844    Specimen:  Blood Updated:  02/08/18 1917     Magnesium 1.9 mg/dL     Comprehensive Metabolic Panel [[id number]]  (Abnormal) Collected:  02/08/18 1844    Specimen:  Blood Updated:  02/08/18 1917     Glucose 126 (H) mg/dL      BUN 38 (H) mg/dL      Creatinine 1.70 (H) mg/dL      Sodium 139 mmol/L      Potassium 3.9 mmol/L      Chloride 103 mmol/L      CO2 25.0 mmol/L      Calcium 9.1 mg/dL      Total Protein 6.6 g/dL      Albumin 3.50 g/dL      ALT (SGPT) 181 (H) U/L      AST (SGOT) 48 (H) U/L      Alkaline Phosphatase 119 (H) U/L      Total Bilirubin 0.6 mg/dL      eGFR Non   Amer 31 (L) mL/min/1.73      Globulin 3.1 gm/dL      A/G Ratio 1.1 (L) g/dL      BUN/Creatinine Ratio 22.4     Anion Gap 11.0 mmol/L     Narrative:       National Kid[employer] Guidelines    Stage     Description        GFR  1         Normal or High     90+  2         Mild decrease      60-89  3         Moderate decrease  30-59  4         Severe decrease    15-29  5         Kidney failure     <15    BNP [[id number]]  (Abnormal) Collected:  02/08/18 1844    Specimen:  Blood Updated:  02/08/18 1931     BNP 3156.0 (H) pg/mL       Results may be falsely decreased if patient taking Biotin.       Light Blue Top [[id number]] Collected:  02/08/18 1843    Specimen:  Blood Updated:  02/08/18 1946     Extra Tube hold for add-on      Auto resulted       Green Top (Gel) [[id number]] Collected:  02/08/18 1844    Specimen:  Blood Updated:  02/08/18 1946     Extra Tube Hold for add-ons.      Auto resulted.       Lavender Top [[id number]] Collected:  02/08/18 1844    Specimen:  Blood Updated:  02/08/18 1946     Extra Tube hold for add-on      Auto resulted       Gold Top - SST [[id number]] Collected:  02/08/18 1843    Specimen:  Blood Updated:  02/08/18 1946     Extra Tube Hold for add-ons.      Auto resulted.       Blood Gas, Arterial [[id number]]  (Abnormal) Collected:  02/08/18 2037    Specimen:  Arterial Blood Updated:  02/08/18 2047     Site Arterial: right radial     Pj's Test N/A     pH, Arterial 7.272 (L) pH units      pCO2, Arterial 65.3 (H) mm Hg      pO2, Arterial 65.0 (L) mm Hg      HCO3, Arterial 30.1 (H) mmol/L      Base Excess, Arterial 2.2 (H) mmol/L      Hemoglobin, Blood Gas 9.5 (L) g/dL      Hematocrit, Blood Gas 29.2 %      Oxyhemoglobin 85.9 (L) %      Methemoglobin 1.10 %      Carboxyhemoglobin 3.6 (H) %      CO2 Content 32.1     Barometric Pressure for Blood Gas -- mmHg       N/A        Modality BiPap     FIO2 30 %     [facility] Draw [[id number]] Collected:  02/08/18 1843    Specimen:  Blood Updated:  02/08/18  2055    Narrative:       The following orders were created for panel order [facility] Draw.  Procedure                               Abnormality         Status                     ---------                               -----------         ------                     Light Blue Top[[id number]]                                   Final result               Green Top (Gel)[[id number]]                                  Final result               Lavender Top[[id number]]                                     Final result               Gold Top - SST[[id number]]                                   Final result               Green Top (No Gel)[[id number]]                                                            Please view results for these tests on the individual orders.    Urinalysis With / Culture If Indicated - Urine, Catheter [[id number]]  (Abnormal) Collected:  02/08/18 2113    Specimen:  Urine from Urine, Catheter Updated:  02/08/18 2133     Color, UA Yellow     Appearance, UA Cloudy (A)     pH, UA 5.5     Specific Gravity, UA 1.015     Glucose, UA Negative     Ketones, UA Negative     Bilirubin, UA Negative     Blood, UA Negative     Protein,  mg/dL (2+) (A)     Leuk Esterase, UA Negative     Nitrite, UA Negative     Urobilinogen, UA 0.2 E.U./dL    Urinalysis, Microscopic Only - Urine, Clean Catch [[id number]] Collected:  02/08/18 2113    Specimen:  Urine from Urine, Catheter Updated:  02/08/18 2146     RBC, UA None Seen /HPF      WBC, UA None Seen /HPF      Bacteria, UA None Seen /HPF      Squamous Epithelial Cells, UA 0-2 /HPF      Hyaline Casts, UA None Seen /LPF      Amorphous Crystals, UA Large/3+ /HPF      Methodology Manual Light Microscopy    Influenza A & B, RT PCR - Swab, Nasopharynx [[id number]]  (Normal) Collected:  02/08/18 2113    Specimen:  Swab from Nasopharynx Updated:  02/08/18 2209     Influenza A PCR Not Detected     Influenza B PCR Not Detected    Lactic Acid, Plasma [[id number]]  (Normal) Collected:   02/08/18 2314    Specimen:  Blood Updated:  02/08/18 2358     Lactate 1.4 mmol/L       Falsely depressed results may occur on samples drawn from patients receiving N-Acetylcysteine (NAC) or Metamizole.       Magnesium [[id number]]  (Normal) Collected:  02/08/18 2314    Specimen:  Blood Updated:  02/09/18 0006     Magnesium 1.9 mg/dL     Blood Culture - Blood, [[id number]] Collected:  02/08/18 2259    Specimen:  Blood from Hand, Left Updated:  02/09/18 0143    Blood Culture - Blood, [[id number]] Collected:  02/08/18 2304    Specimen:  Blood from Arm, Left Updated:  02/09/18 0143    Urine Culture - Urine, Urine, Clean Catch [[id number]] Collected:  02/09/18 0330    Specimen:  Urine from Urine, Clean Catch Updated:  02/09/18 0330    Blood Gas, Venous [[id number]]  (Abnormal) Collected:  02/09/18 0601    Specimen:  Venous Blood Updated:  02/09/18 0619     Site Venous     pH, Venous 7.228 (C) pH Units      pCO2, Venous 76.9 (H) mm Hg      pO2, Venous 35.2 mm Hg      HCO3, Venous 32.0 (H) mmol/L      Base Excess, Venous 3.0 (H) mmol/L      Hemoglobin, Blood Gas 9.8 (L) g/dL      Oxyhemoglobin 52.2 (L) %      Methemoglobin 1.00 %      Carboxyhemoglobin 2.3 (H) %      CO2 Content 34.4 (H)     Barometric Pressure for Blood Gas -- mmHg       N/A        Modality Nasal Cannula     FIO2 24 %     CBC (No Diff) [[id number]]  (Abnormal) Collected:  02/09/18 0611    Specimen:  Blood Updated:  02/09/18 0653     WBC 6.12 10*3/mm3      RBC 3.01 (L) 10*6/mm3      Hemoglobin 9.8 (L) g/dL      Hematocrit 33.6 (L) %      .6 (H) fL      MCH 32.6 (H) pg      MCHC 29.2 (L) g/dL      RDW 21.2 (H) %      RDW-SD 83.6 (H) fl      MPV 11.8 fL      Platelets 149 (L) 10*3/mm3     Renal Function Panel [[id number]]  (Abnormal) Collected:  02/09/18 0611    Specimen:  Blood Updated:  02/09/18 0703     Glucose 89 mg/dL      BUN 40 (H) mg/dL      Creatinine 1.80 (H) mg/dL      Sodium 139 mmol/L      Potassium 4.1 mmol/L      Chloride 104 mmol/L      CO2  31.0 mmol/L      Calcium 8.8 mg/dL      Albumin 3.20 g/dL      Phosphorus 5.1 mg/dL      Anion Gap 4.0 mmol/L      BUN/Creatinine Ratio 22.2     eGFR Non African Amer 29 (L) mL/min/1.73     Narrative:       National Kidney Foundation Guidelines    Stage     Description        GFR  1         Normal or High     90+  2         Mild decrease      60-89  3         Moderate decrease  30-59  4         Severe decrease    15-29  5         Kidney failure     <15    Magnesium [[id number]]  (Abnormal) Collected:  02/09/18 0611    Specimen:  Blood Updated:  02/09/18 0703     Magnesium 2.9 (H) mg/dL     BNP [[id number]]  (Abnormal) Collected:  02/09/18 0611    Specimen:  Blood Updated:  02/09/18 0709     BNP 2919.0 (H) pg/mL       Results may be falsely decreased if patient taking Biotin.       Protime-INR [[id number]]  (Abnormal) Collected:  02/09/18 0851    Specimen:  Blood Updated:  02/09/18 0944     Protime 48.8 (H) Seconds      INR 4.28 (H)          Imaging Results (last 72 hours)     Procedure Component Value Units Date/Time    XR Chest 1 View [[id number]] Collected:  02/08/18 1840     Updated:  02/08/18 1957    Narrative:       EXAM:    XR Chest, 1 View    CLINICAL HISTORY:    57 years old, female; Signs and symptoms; Other: Weak/dizzy/ams; Additional   info: Weak/dizzy/ams triage protocol    TECHNIQUE:    Frontal view of the chest.    COMPARISON:    CR - XR CHEST 1 VW 2018-02-01 05:50    FINDINGS:    Prominent lung markings/peribronchial thickening without definite evidence of   consolidation.  Patchy atelectasis and scarring in the lungs bilaterally most   notable in the LEFT upper lobe.  Blunting of the costophrenic angles suggestive   of small pleural effusion/thickening.  Cardiomegaly with normal lung   vascularity.  Tortuous calcific aortic arch and descending thoracic aorta.    LEFT chest wall pacemaker.  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       Impression:         Chronic cardiopulmonary  changes with decreased bilateral pleural effusions   without any other significant change.    THIS DOCUMENT HAS BEEN ELECTRONICALLY SIGNED BY IVETTE OGLESBY MD

## 2018-02-09 NOTE — H&P
[facility] Medicine Services  HISTORY AND PHYSICAL    Patient Name: Ashely Roldan  : 1960  MRN: [id number]  Primary Care Physician: Peter Rdz MD    Subjective   Subjective     Chief Complaint:  FU LE edema    HPI:  Ashely Roldan is a 57 y.o. female with a history of CAD, ICM with AICD, hypertension, PVD, COPD, CKD stage II, chronic anticoagulation, anxiety, HLD, presents to the ED tonight with complaints of shortness of air, fatigue and lethargy that started yesterday.  Patient was here 18- with sepsis secondary to UTI, liver injury, chet, and left ama before being cleared by cardiology for mechanical mitral valve.  She was brought in per EMS tonight and abg revealed acute respiratory failure with hypoxia and hypercapnea at which time she was placed on bipap with an improvement in her repeat abg.  She reports having soa, nonproductive cough, bilateral lower extremity edema, and nausea.  Denies fever, chest pain, abdominal pain, vomiting, diarrhea, or dysuria.  Chest xray was negative for acute disease.  Review of progress note on 18 showed she had 1+ bilateral lower extremity with plans to increase her lasix dose to BID at that time.  Labs tonight revealed an elevated bnp and she was given lasix 20mg in the ED.  Patient is being admitted to the Hospitalist for further evaluation and management.    Review of Systems   Constitutional: Positive for fatigue.   Respiratory: Positive for cough (nonproductive) and shortness of breath.    Cardiovascular: Positive for leg swelling (bilateral).   Gastrointestinal: Positive for nausea.   All other systems reviewed and are negative.         Otherwise 10-system ROS reviewed and is negative except as mentioned in the HPI.    Personal History     Past Medical History:   Diagnosis Date   • Acute respiratory failure with hypoxia 2017   • Allergic rhinitis 2014   • Anemia    • Anxiety    • Arthritis    • CAD  (coronary artery disease)    • CHF (congestive heart failure)    • CKD (chronic kidney disease)    • CKD (chronic kidney disease), stage IV 9/5/2017   • Colitis, Clostridium difficile    • COPD (chronic obstructive pulmonary disease)    • Coronary atherosclerosis    • Depression    • Emphysema of lung    • GERD (gastroesophageal reflux disease)    • Heart attack    • Heart murmur    • Hematoma of hip    • Hip fracture    • Hyperlipidemia    • Hypertension     benign essential   • Ischemic cardiomyopathy 08/01/2014    ef 29% s/p ICD   • Knee injury    • Lung disease    • Mitral valve disorder 03/28/2013   • Mitral valve insufficiency     s/p prosthetic ATS valve replacement   • Osteoporosis    • Postmenopausal     natural   • PVD (peripheral vascular disease)    • Pyelonephritis    • Renal failure    • Renal failure    • Renal failure, acute    • Syncope    • Systemic lupus erythematosus    • TIA (transient ischemic attack) 04/04/2012   • UTI (urinary tract infection)    • Valvular heart disease    • Wrist fracture        Past Surgical History:   Procedure Laterality Date   • CARDIAC DEFIBRILLATOR PLACEMENT     • CHOLECYSTECTOMY  2010   • CORONARY ARTERY BYPASS GRAFT  2011 4 aortic bypasses, abdominal aorta; 2011-mitral valve; 2010-triple bypass   • ENDOSCOPY  10/23/2014    Dr. Brand; retained food in stomach; he dilated her esophagus; 5-30-14 hiatus hernia, gastritis   • JOINT REPLACEMENT Right 06/25/2015    Dr. Fitzgerald; first surgery 1-29-14; redo 5-4-15   • MITRAL VALVE REPLACEMENT      MECHANICAL   • TOTAL HIP ARTHROPLASTY Right     3 times within 8 months       Family History: family history includes Alcohol abuse in her brother; Arthritis in her mother; Cancer in her other; Diabetes in her other; Heart attack in her father; Heart disease in her maternal uncle and other; Hypertension in her other; Lung cancer in her paternal grandfather; Stroke in her other.     Social History:  reports that she has been  smoking Cigarettes.  She has been smoking about 0.50 packs per day. She has never used smokeless tobacco. She reports that she does not drink alcohol or use illicit drugs.  Social History     Social History Narrative    Ms. Roldan is a 56 year old white female. Pt recently  after 30 years. Just moved from [facility] into her son's home in [place].        Medications:    (Not in a hospital admission)    Allergies   Allergen Reactions   • Morphine And Related GI Intolerance and Irritability   • Sulfa Antibiotics        Objective   Objective     Vital Signs:   Temp:  [97.6 °F (36.4 °C)] 97.6 °F (36.4 °C)  Heart Rate:  [70-87] 72  Resp:  [14-18] 14  BP: (100-113)/(60-69) 107/64        Physical Exam   Constitutional: She appears well-developed.   HENT:   Head: Normocephalic.   Eyes: Pupils are equal, round, and reactive to light.   Neck: Normal range of motion. Neck supple.   Cardiovascular: Normal rate, regular rhythm, normal heart sounds and intact distal pulses.  Exam reveals no gallop and no friction rub.    No murmur heard.  Click present   Pulmonary/Chest:   Decreased air movement throughout, faint scattered rhonchi, currently on bipap   Abdominal: Soft. Bowel sounds are normal. She exhibits no distension. There is no tenderness. There is no rebound and no guarding.   Musculoskeletal: She exhibits edema (LUE AND BLE edema). She exhibits no tenderness or deformity.   +3 BLE edema, from knees down cool to touch, unable to dopple dp or pt pulses   Neurological:   , difficult to obtain neuro exam secondary to patient falling back to sleep.  Answers simple yes/no question, follows simple commands.   Skin: No rash noted. She is not diaphoretic. No erythema. No pallor.   BLE from knees down cool to touch, with diffuse blisters some intact and weeping serous drainage.  Plantar surface mottled bilaterally with cap refill 7 seconds        Results Reviewed:  I have personally reviewed current lab, radiology,  and data and agree.      Results from last 7 days  Lab Units 02/08/18  1844 02/05/18  0540   WBC 10*3/mm3 9.44  --    HEMOGLOBIN g/dL 10.3*  --    HEMATOCRIT % 34.4*  --    PLATELETS 10*3/mm3 173  --    INR   --  2.45       Results from last 7 days  Lab Units 02/08/18  1844   SODIUM mmol/L 139   POTASSIUM mmol/L 3.9   CHLORIDE mmol/L 103   CO2 mmol/L 25.0   BUN mg/dL 38*   CREATININE mg/dL 1.70*   GLUCOSE mg/dL 126*   CALCIUM mg/dL 9.1   ALT (SGPT) U/L 181*   AST (SGOT) U/L 48*     Estimated Creatinine Clearance: 35.1 mL/min (by C-G formula based on Cr of 1.7).  Brief Urine Lab Results  (Last result in the past 365 days)      Color   Clarity   Blood   Leuk Est   Nitrite   Protein   CREAT   Urine HCG        02/08/18 2113 Yellow Cloudy(A) Negative Negative Negative 100 mg/dL (2+)(A)             BNP   Date Value Ref Range Status   02/08/2018 3156.0 (H) 0.0 - 100.0 pg/mL Final     Comment:     Results may be falsely decreased if patient taking Biotin.     pH, Arterial   Date Value Ref Range Status   02/08/2018 7.272 (L) 7.350 - 7.450 pH units Final     Imaging Results (last 24 hours)     Procedure Component Value Units Date/Time    XR Chest 1 View [[id number]] Collected:  02/08/18 1840     Updated:  02/08/18 1957    Narrative:       EXAM:    XR Chest, 1 View    CLINICAL HISTORY:    57 years old, female; Signs and symptoms; Other: Weak/dizzy/ams; Additional   info: Weak/dizzy/ams triage protocol    TECHNIQUE:    Frontal view of the chest.    COMPARISON:    CR - XR CHEST 1 VW 2018-02-01 05:50    FINDINGS:    Prominent lung markings/peribronchial thickening without definite evidence of   consolidation.  Patchy atelectasis and scarring in the lungs bilaterally most   notable in the LEFT upper lobe.  Blunting of the costophrenic angles suggestive   of small pleural effusion/thickening.  Cardiomegaly with normal lung   vascularity.  Tortuous calcific aortic arch and descending thoracic aorta.    LEFT chest wall pacemaker.   "Prosthetic cardiac valve with evidence of   sternotomy sutures.       Impression:         Chronic cardiopulmonary changes with decreased bilateral pleural effusions   without any other significant change.    THIS DOCUMENT HAS BEEN ELECTRONICALLY SIGNED BY IVETTE OGLESBY MD        Results for orders placed during the hospital encounter of 01/23/18   Adult Transthoracic Echo Complete W/ Cont if Necessary Per Protocol    Narrative · There is \"borderline\" LV and RV enlargement.  · There is severe global hypokinesia with an estimated LV ejection   fraction of <20%.  · The aortic valve is not well visualized; there is mild aortic   insufficiency.  · A normally functioning bileaflet mitral valve is present.          Assessment/Plan   Assessment / Plan     Hospital Problem List     * (Principal)Acute respiratory failure with hypoxia and hypercapnia    COPD exacerbation    Acute systolic congestive heart failure    CKD (chronic kidney disease), stage IV (Chronic)    Anxiety    Essential hypertension (Chronic)    Overview Signed 10/19/2015  8:56 AM by Digna Santana     benign essential         Ischemic cardiomyopathy (Chronic)    Overview Signed 2/6/2017  8:44 AM by MAN Quesada     A. History of MI December 2009: s/p stent to RCA and LAD, EF 40%  B. CABGx2 March, 2010: SVG to OMB-1, SVG to PDA  C. EF 30% post-operatively, s/p [place] ICD placement, 2011  D. Echo July 2014: Severe global hypokinesis with EF 12%, moderate AI, mechanical mitral valve, moderate to severe TR, RVSP 43mmHg   E. Echo 2/4/17: EF 18%, mild to mod AI, mod TR, grossly normal prosthetic mitral valve         PVD (peripheral vascular disease) (Chronic)    Lupus (systemic lupus erythematosus) (Chronic)    Chronic coronary artery disease (Chronic)    H/O mitral valve replacement    Overview Signed 2/6/2017  8:53 AM by MAN Quesada     VHD:  A. Mitral valve replacement with 27mm ATS mechanical valve March 2010            Anemia of chronic " kidney failure    Tobacco abuse (Chronic)    Chronic anticoagulation    Overview Signed 7/16/2016 10:32 PM by IAIN Gabriel     Coumadin (Mechanical AVR)         Elevated LFTs            Assessment & Plan:    1.  Acute on chronic respiratory failure with hypoxia and hypercapnia   -continue bipap   -continuous pulse ox    2.  Acute on chronic systolic CHF   -echo   -strict I&O's   -daily weights   -bumex 2mg IV q8 hours   -consult cardiology in the am   -Coreg   -bmp, cbc in the am    3.  Acute COPD exacerbation   -sputum culture   -duonebs   -BC x 2   -symbicort    4.  CKD baseline creatinine 1.8-2.0, stable   -bmp in the am and monitor    5.  Mechanical mitral valve on coumadin with a therapeutic INR   -continue coumadin   -daily INR    6.  PVD    7.  LUE edema   -duplex LUE    8.  CAD  9.  Ischemic cardiomyopathy  10.  Anxiety  11.  Anemia secondary to CKD, stable   -cbc in the am and monitor    12.  Lupus  *has not taken plaquenil in over a year, was going to restart prior to discharge on previous admission    13.  Elevated LFT's, improved  *had acute liver injury on previous admission secondary to hypotension  *hepatitis panel negative   -monitor    14.  Tobacco abuse   -nicotine patch   -smoking cessation education    DVT prophylaxis:  Coumadin    CODE STATUS:  Prior    IAIN Sher  02/08/18   10:08 PM      Brief Attending Admission Attestation     I have seen and examined the patient, performing an independent face-to-face diagnostic evaluation with plan of care reviewed and developed with the advanced practice clinician (APC).      Brief Summary Statement/HPI:   Ashely Roldan is a 57 y.o. female with ICM s/p ICD, CAD s/p CABG and PCI, mechanical mitral valve, CKD presented with complaints of worsening LE edema, fatigue. Found to have respiratory acidosis and ED and placed on BiPAP with improving ABGs. Pt denies CP.     Attending Physical Exam:  Constitutional: No acute distress,  somnolent but wakes up easily on BiPAP  Eyes: PERRLA, sclerae anicteric, no conjunctival injection  HENT: NCAT, mucous membranes moist  Neck: Supple, no thyromegaly, no lymphadenopathy, trachea midline  Respiratory: Decreased BS diffusely, nonlabored respirations   Cardiovascular: RRR, no murmurs, rubs, or gallops, mechanical click of mitral valve, 4+ pitting edema up to thigh, slightly cyanotic toes with delayed capillary return, LUE more edematous than RUE, 2+ pitting edema in LUE  Gastrointestinal: Positive bowel sounds, soft, nontender, nondistended  Psychiatric: Appropriate affect, cooperative  Neurologic: Somnolent but wakes up to voice, answers questions appropriately, strength symmetric in all extremities, Cranial Nerves grossly intact to confrontation, speech clear  Skin: No rashes    Brief Assessment/Plan :  See above for further detailed assessment and plan developed with APC which I have reviewed and/or edited.    - Acute hypercapnic respiratory failure: Improved with BiPAP. BiPAP PRN. NPO while on BiPAP  - Acute on chronic systolic and diastolic CHF: ECHO 2/1 EF <20%. Very much volume overloaded. Diurese aggressively with Bumex 2mg q8hrs. Monitor UOP closely. Strict Is/Os, daily weights. STAT ECHO for mitral valve evaluation and CO. Cold bilateral LE concerning for reduced CO. LE edema documented to be minimal upon on 1/4 when pt left AMA. Will consider afterload reduction with ACEi if worse  - ICM s/p ICD: Holding home meds due to borderline BP  - Mechanical mitral valve: Warfarin  - CKD: Baseline Cr 1.7   - Obtain Duplex LUE for unilateral pitting edema    I believe this patient meets INPATIENT status due to the need for care which can only be reasonably provided in an hospital setting such as aggressive/expedited ancillary services and/or consultation services, the necessity for IV medications, close physician monitoring and/or the possible need for procedures.  In such, I feel patient’s risk for  adverse outcomes and need for care warrant INPATIENT evaluation and predict the patient’s care encounter to likely last beyond 2 midnights.    Jelena Sanchez MD  02/08/18  11:14 PM

## 2018-02-09 NOTE — PLAN OF CARE
Problem: Respiratory Insufficiency (Adult)  Goal: Identify Related Risk Factors and Signs and Symptoms  Outcome: Ongoing (interventions implemented as appropriate)    Goal: Acid/Base Balance  Outcome: Ongoing (interventions implemented as appropriate)    Goal: Effective Ventilation  Outcome: Ongoing (interventions implemented as appropriate)      Problem: Cardiac: Heart Failure (Adult)  Intervention: Prevent/Manage DVT/VTE Risk  Due to skin breakdown and blisters on bilateral lower extremities TEDS are not appropriate at this time. WOC has advised wraps until Cardiology has evaluated.     Goal: Signs and Symptoms of Listed Potential Problems Will be Absent or Manageable (Cardiac: Heart Failure)  Outcome: Ongoing (interventions implemented as appropriate)

## 2018-02-09 NOTE — PROGRESS NOTES
"Pharmacy Consult  -  Warfarin    Ashely Roldan is a  57 y.o. female   Height - 175.3 cm (69\")  Weight - 70.6 kg (155 lb 9.6 oz)    Consulting Provider: - Hospitalist Group  Indication: - Mechanical Mitral Valve  Goal INR: - 2.5-3.5  Home Regimen:   - 2 mg daily except every third day   - 3 mg every third day; repeating    Bridge Therapy: No     Drug-Drug Interactions with current regimen:   None    Warfarin Dosing During Admission:    Date  2/9           INR  4.28           Dose  Hold                Education Provided:      Labs:    Results from last 7 days   Lab Units 02/09/18  0851 02/09/18  0611 02/08/18  1844 02/05/18  0540 02/04/18  2103 02/04/18  1503 02/04/18  0721 02/04/18  0100 02/03/18  1728 02/03/18  1057 02/03/18  0303   INR  4.28* --  --  2.45 --  --  2.21 --  --  --  1.78   APTT seconds --  --  --  80.7* 59.2 64.6 49.8* 63.3 87.8* 50.9* 68.9   HEMOGLOBIN g/dL --  9.8* 10.3* --  8.8* --  9.2* --  --  --  --    HEMATOCRIT % --  33.6* 34.4* --  27.7* --  29.3* --  --  --  --    PLATELETS 10*3/mm3 --  149* 173 --  107* --  115* --  --  --  --      Results from last 7 days   Lab Units 02/09/18  0611 02/08/18  1844 02/05/18  0540   SODIUM mmol/L 139 139 136   POTASSIUM mmol/L 4.1 3.9 3.7   CHLORIDE mmol/L 104 103 102   CO2 mmol/L 31.0 25.0 27.0   BUN mg/dL 40* 38* 44*   CREATININE mg/dL 1.80* 1.70* 1.80*   CALCIUM mg/dL 8.8 9.1 8.4*   BILIRUBIN mg/dL --  0.6 --    ALK PHOS U/L --  119* --    ALT (SGPT) U/L --  181* --    AST (SGOT) U/L --  48* --    GLUCOSE mg/dL 89 126* 86     BNP   Date/Time Value Ref Range Status   02/09/2018 0611 2919.0 (H) 0.0 - 100.0 pg/mL Final     Comment:     Results may be falsely decreased if patient taking Biotin.         Current dietary intake:   Diet Order   Procedures   • NPO Diet       Assessment/Plan:      Supratherapeutic INR. Pharmacy has managed warfarin for Ms. Roldan before, most recently January 23rd-Feb 5th of this year when she left [facility]. She presented with a " similarly elevated INR at time of last admission which continued to climb until it was beyond a calculable reading - she then received a total of 15 mg of phytonadione and was resumed on her home regimen prior to leaving [facility].  INR prior to leaving was 2.45, trending towards therapeutic with relatively stable kinetics.     Profoundly elevated BNP and rising serum creatinine may be indicative of hypoperfusion given history of ICMP; hepatic congestion from this may contribute to decreased warfarin metabolism and subsequently increase systemic exposure to antithrombotic effects of warfarin.    Hold warfarin for now. Pharmacy will follow daily PT/INR and resume dosing when appropriate. With recent history would expect a lower dose will be necessary at discharge.     Thank you for this consult.  Minseh Gomez IV, PharmD, BCPS  2/9/2018  10:17 AM

## 2018-02-09 NOTE — PROGRESS NOTES
Discharge Planning Assessment  [facility]     Patient Name: Ashely Roldan  MRN: [id number]  Today's Date: 2/9/2018    Admit Date: 2/8/2018          Discharge Needs Assessment       02/09/18 1229    Living Environment    Lives With alone;other (see comments)   Her son has been staying with pt. prior to admission    Living Arrangements apartment    Transportation Available car;family or friend will provide    Living Environment Comment Pt. lives in an apartment in [facility].  Pt. stated she normally lives alone, however her son has been staying with her recently.    Living Environment    Provides Primary Care For no one    Quality Of Family Relationships unable to assess    Able to Return to Prior Living Arrangements yes    Living Arrangement Comments Pt. is able to return to her apartment at discharge, however she is not yet certain what her discharge needs will be.    Discharge Needs Assessment    Concerns To Be Addressed discharge planning concerns    Concerns Comments Pt. is uncertain what her discharge needs will be at time of admission.    Readmission Within The Last 30 Days other (see comments)   Pt. left [facility] AMA on 2/5/18.    Equipment Currently Used at Home oxygen;walker, rolling    Equipment Needed After Discharge other (see comments)   to be determined    Discharge Facility/Level Of Care Needs other (see comments)   to be determined    Current Discharge Risk physical impairment    Discharge Disposition other (see comments)   Home with  vs SNF    Discharge Contact Information if Applicable [phone]    Discharge Planning Comments Pt. left [facility] AMA on 2/5/18, and returned on 2/8/18.  Pt. understands she is ill, but isn't certain what her discharge needs will be at this time.  Pt. has a rolling walker and home oxygen at home. She was uncertain what agency she gets her O2 through and stated she uses it very infrequently.  She suspected it may have been provided by [employer] [facility].  Pt. is not followed  by a home health provider.  Pt. stated her family can assist with transportation.            Discharge Plan       02/09/18 1240    Case Management/Social Work Plan    Plan Pt uncertain of discharge needs at this time    Patient/Family In Agreement With Plan yes    Additional Comments SW met with pt. at bedside to discuss discharge planning. Pt. is unsure what she will need at discharge, but seems willing to consider home health services to be provided by [facility] and is also considering the possible need for skilled rehab services/SNF.  Pt. wishes to wait and see how she progresses before making a decision.  SW will remain available to assist with D/C planning.        Discharge Placement     No information found                Demographic Summary       02/09/18 1218    Referral Information    Admission Type inpatient    Arrived From home or self-care    Referral Source admission list    Primary Care Physician Information    Name Renny Rdz MD            Functional Status       02/09/18 1224    Employment/Financial    Employment/Finance Comments Pt. has insurance and prescription coverage through [employer] [facility].            Psychosocial     None            Abuse/Neglect     None            Legal     None            Substance Abuse     None            Patient Forms     None          RICARDO Paris

## 2018-02-09 NOTE — PLAN OF CARE
Problem: Patient Care Overview (Adult)  Goal: Plan of Care Review  Outcome: Ongoing (interventions implemented as appropriate)   02/09/18 0607   Coping/Psychosocial Response Interventions   Plan Of Care Reviewed With patient   Patient Care Overview   Progress improving   Outcome Evaluation   Outcome Summary/Follow up Plan VSS, except for hypotension at times. Pt had no c/o pain throughout night. Pt diuresed during night with minimal urine output. Pt on BIPAP on arrival, now on 1L NC satting >92%. Continue to monitor, no other new concerns at this time.      Goal: Discharge Needs Assessment  Outcome: Ongoing (interventions implemented as appropriate)      Problem: Skin Integrity Impairment, Risk/Actual (Adult)  Goal: Identify Related Risk Factors and Signs and Symptoms  Outcome: Ongoing (interventions implemented as appropriate)   02/09/18 0607   Skin Integrity Impairment, Risk/Actual   Skin Integrity Impairment, Risk/Actual: Related Risk Factors edema;moisture;tissue perfusion impaired   Signs and Symptoms (Skin Integrity Impairment) bulla/blister/vesicle;edema     Goal: Skin Integrity/Wound Healing  Outcome: Ongoing (interventions implemented as appropriate)   02/09/18 0607   Skin Integrity Impairment, Risk/Actual (Adult)   Skin Integrity/Wound Healing making progress toward outcome       Problem: Fall Risk (Adult)  Goal: Identify Related Risk Factors and Signs and Symptoms  Outcome: Ongoing (interventions implemented as appropriate)   02/09/18 0607   Fall Risk   Fall Risk: Related Risk Factors fatigue/slow reaction;gait/mobility problems;environment unfamiliar   Fall Risk: Signs and Symptoms presence of risk factors     Goal: Absence of Falls  Outcome: Ongoing (interventions implemented as appropriate)   02/09/18 0607   Fall Risk (Adult)   Absence of Falls making progress toward outcome       Problem: Respiratory Insufficiency (Adult)  Goal: Identify Related Risk Factors and Signs and Symptoms  Outcome: Ongoing  (interventions implemented as appropriate)   02/09/18 0607   Respiratory Insufficiency   Related Risk Factors (Respiratory Insufficiency) activity intolerance;medication effects   Signs and Symptoms (Respiratory Insufficiency) abnormal ABGs;decreased oxygen saturation;level of consciousness change;sleeplessness/fatigue     Goal: Acid/Base Balance  Outcome: Ongoing (interventions implemented as appropriate)   02/09/18 0607   Respiratory Insufficiency (Adult)   Acid/Base Balance making progress toward outcome     Goal: Effective Ventilation  Outcome: Ongoing (interventions implemented as appropriate)   02/09/18 0607   Respiratory Insufficiency (Adult)   Effective Ventilation making progress toward outcome       Problem: Cardiac: Heart Failure (Adult)  Goal: Signs and Symptoms of Listed Potential Problems Will be Absent or Manageable (Cardiac: Heart Failure)  Outcome: Ongoing (interventions implemented as appropriate)   02/09/18 0607   Cardiac: Heart Failure   Problems Assessed (Heart Failure) all   Problems Present (Heart Failure) respiratory compromise;situational response

## 2018-02-10 PROBLEM — R19.7 DIARRHEA: Status: RESOLVED | Noted: 2018-01-23 | Resolved: 2018-02-10

## 2018-02-10 PROBLEM — N39.0 UTI (URINARY TRACT INFECTION): Status: RESOLVED | Noted: 2017-09-05 | Resolved: 2018-02-10

## 2018-02-10 PROBLEM — I50.21 ACUTE SYSTOLIC CONGESTIVE HEART FAILURE (HCC): Status: RESOLVED | Noted: 2017-03-02 | Resolved: 2018-02-10

## 2018-02-10 PROBLEM — J96.90 RESPIRATORY FAILURE (HCC): Status: RESOLVED | Noted: 2018-02-08 | Resolved: 2018-02-10

## 2018-02-10 PROBLEM — R77.8 ELEVATED TROPONIN: Status: RESOLVED | Noted: 2017-09-06 | Resolved: 2018-02-10

## 2018-02-10 PROBLEM — R07.9 CHEST PAIN: Status: RESOLVED | Noted: 2017-02-04 | Resolved: 2018-02-10

## 2018-02-10 PROBLEM — S36.119A LIVER INJURY: Status: RESOLVED | Noted: 2018-01-26 | Resolved: 2018-02-10

## 2018-02-10 PROBLEM — E87.5 HYPERKALEMIA: Status: RESOLVED | Noted: 2018-01-23 | Resolved: 2018-02-10

## 2018-02-10 PROBLEM — E87.20 LACTIC ACIDOSIS: Status: RESOLVED | Noted: 2018-01-23 | Resolved: 2018-02-10

## 2018-02-10 PROBLEM — I95.9 HYPOTENSION: Status: RESOLVED | Noted: 2017-02-06 | Resolved: 2018-02-10

## 2018-02-10 PROBLEM — N17.9 ACUTE KIDNEY INJURY SUPERIMPOSED ON CHRONIC KIDNEY DISEASE (HCC): Status: RESOLVED | Noted: 2018-01-23 | Resolved: 2018-02-10

## 2018-02-10 PROBLEM — J96.21 ACUTE ON CHRONIC RESPIRATORY FAILURE WITH HYPOXIA (HCC): Status: RESOLVED | Noted: 2017-09-05 | Resolved: 2018-02-10

## 2018-02-10 PROBLEM — N18.9 ACUTE KIDNEY INJURY SUPERIMPOSED ON CHRONIC KIDNEY DISEASE (HCC): Status: RESOLVED | Noted: 2018-01-23 | Resolved: 2018-02-10

## 2018-02-10 PROBLEM — K52.9 COLITIS: Status: RESOLVED | Noted: 2018-01-23 | Resolved: 2018-02-10

## 2018-02-10 PROBLEM — R11.2 NAUSEA AND VOMITING: Status: RESOLVED | Noted: 2018-01-23 | Resolved: 2018-02-10

## 2018-02-10 LAB
ALBUMIN SERPL-MCNC: 2.8 G/DL (ref 3.2–4.8)
ALBUMIN/GLOB SERPL: 1.2 G/DL (ref 1.5–2.5)
ALP SERPL-CCNC: 97 U/L (ref 25–100)
ALT SERPL W P-5'-P-CCNC: 117 U/L (ref 7–40)
ANION GAP SERPL CALCULATED.3IONS-SCNC: 4 MMOL/L (ref 3–11)
ARTERIAL PATENCY WRIST A: ABNORMAL
AST SERPL-CCNC: 37 U/L (ref 0–33)
ATMOSPHERIC PRESS: ABNORMAL MMHG
BASE EXCESS BLDA CALC-SCNC: 3.7 MMOL/L (ref 0–2)
BDY SITE: ABNORMAL
BILIRUB SERPL-MCNC: 0.3 MG/DL (ref 0.3–1.2)
BUN BLD-MCNC: 45 MG/DL (ref 9–23)
BUN/CREAT SERPL: 19.6 (ref 7–25)
CALCIUM SPEC-SCNC: 8 MG/DL (ref 8.7–10.4)
CHLORIDE SERPL-SCNC: 101 MMOL/L (ref 99–109)
CO2 BLDA-SCNC: 32.4 MMOL/L (ref 22–33)
CO2 SERPL-SCNC: 28 MMOL/L (ref 20–31)
COHGB MFR BLD: 2.2 % (ref 0–2)
CREAT BLD-MCNC: 2.3 MG/DL (ref 0.6–1.3)
GFR SERPL CREATININE-BSD FRML MDRD: 22 ML/MIN/1.73
GLOBULIN UR ELPH-MCNC: 2.3 GM/DL
GLUCOSE BLD-MCNC: 111 MG/DL (ref 70–100)
HCO3 BLDA-SCNC: 30.6 MMOL/L (ref 20–26)
HCT VFR BLD CALC: 26.1 %
HGB BLDA-MCNC: 8.5 G/DL (ref 14–18)
HOROWITZ INDEX BLD+IHG-RTO: 40 %
INR PPP: 5.94 (ref 0.91–1.09)
METHGB BLD QL: 0.7 % (ref 0–1.5)
MODALITY: ABNORMAL
OXYHGB MFR BLDV: 97 % (ref 94–99)
PCO2 BLDA: 59.3 MM HG (ref 35–45)
PH BLDA: 7.32 PH UNITS (ref 7.35–7.45)
PO2 BLDA: 127 MM HG (ref 83–108)
POTASSIUM BLD-SCNC: 4.1 MMOL/L (ref 3.5–5.5)
PROT SERPL-MCNC: 5.1 G/DL (ref 5.7–8.2)
PROTHROMBIN TIME: 62.4 SECONDS (ref 9.6–11.5)
SODIUM BLD-SCNC: 133 MMOL/L (ref 132–146)

## 2018-02-10 PROCEDURE — 25010000002 DOBUTAMINE PER 250 MG: Performed by: INTERNAL MEDICINE

## 2018-02-10 PROCEDURE — 85610 PROTHROMBIN TIME: CPT | Performed by: INTERNAL MEDICINE

## 2018-02-10 PROCEDURE — 80053 COMPREHEN METABOLIC PANEL: CPT | Performed by: INTERNAL MEDICINE

## 2018-02-10 PROCEDURE — 94640 AIRWAY INHALATION TREATMENT: CPT

## 2018-02-10 PROCEDURE — 94799 UNLISTED PULMONARY SVC/PX: CPT

## 2018-02-10 PROCEDURE — 94760 N-INVAS EAR/PLS OXIMETRY 1: CPT

## 2018-02-10 PROCEDURE — 94660 CPAP INITIATION&MGMT: CPT

## 2018-02-10 PROCEDURE — 99232 SBSQ HOSP IP/OBS MODERATE 35: CPT | Performed by: INTERNAL MEDICINE

## 2018-02-10 PROCEDURE — 82805 BLOOD GASES W/O2 SATURATION: CPT | Performed by: INTERNAL MEDICINE

## 2018-02-10 PROCEDURE — 36600 WITHDRAWAL OF ARTERIAL BLOOD: CPT | Performed by: INTERNAL MEDICINE

## 2018-02-10 RX ADMIN — SPIRONOLACTONE 25 MG: 25 TABLET, FILM COATED ORAL at 08:01

## 2018-02-10 RX ADMIN — BUDESONIDE AND FORMOTEROL FUMARATE DIHYDRATE 2 PUFF: 160; 4.5 AEROSOL RESPIRATORY (INHALATION) at 19:54

## 2018-02-10 RX ADMIN — CLONAZEPAM 2 MG: 1 TABLET ORAL at 20:26

## 2018-02-10 RX ADMIN — IPRATROPIUM BROMIDE AND ALBUTEROL SULFATE 3 ML: .5; 3 SOLUTION RESPIRATORY (INHALATION) at 15:16

## 2018-02-10 RX ADMIN — NYSTATIN: 100000 POWDER TOPICAL at 20:26

## 2018-02-10 RX ADMIN — ROSUVASTATIN CALCIUM 10 MG: 10 TABLET ORAL at 20:26

## 2018-02-10 RX ADMIN — DOBUTAMINE HYDROCHLORIDE 2.5 MCG/KG/MIN: 100 INJECTION INTRAVENOUS at 08:01

## 2018-02-10 RX ADMIN — CLONAZEPAM 2 MG: 1 TABLET ORAL at 17:30

## 2018-02-10 RX ADMIN — BUMETANIDE 2 MG: 0.25 INJECTION INTRAMUSCULAR; INTRAVENOUS at 18:18

## 2018-02-10 RX ADMIN — IPRATROPIUM BROMIDE AND ALBUTEROL SULFATE 3 ML: .5; 3 SOLUTION RESPIRATORY (INHALATION) at 19:42

## 2018-02-10 RX ADMIN — TRAMADOL HYDROCHLORIDE 25 MG: 50 TABLET, COATED ORAL at 05:57

## 2018-02-10 RX ADMIN — TRAMADOL HYDROCHLORIDE 25 MG: 50 TABLET, COATED ORAL at 23:58

## 2018-02-10 RX ADMIN — TRAMADOL HYDROCHLORIDE 25 MG: 50 TABLET, COATED ORAL at 15:03

## 2018-02-10 RX ADMIN — LORAZEPAM 1 MG: 1 TABLET ORAL at 08:00

## 2018-02-10 RX ADMIN — CLONAZEPAM 2 MG: 1 TABLET ORAL at 08:00

## 2018-02-10 RX ADMIN — BUMETANIDE 2 MG: 0.25 INJECTION INTRAMUSCULAR; INTRAVENOUS at 08:02

## 2018-02-10 RX ADMIN — Medication 325 MG: at 08:01

## 2018-02-10 RX ADMIN — NYSTATIN: 100000 POWDER TOPICAL at 08:01

## 2018-02-10 RX ADMIN — BUMETANIDE 2 MG: 0.25 INJECTION INTRAMUSCULAR; INTRAVENOUS at 23:59

## 2018-02-10 RX ADMIN — ASPIRIN 81 MG: 81 TABLET, COATED ORAL at 08:00

## 2018-02-10 RX ADMIN — BUDESONIDE AND FORMOTEROL FUMARATE DIHYDRATE 2 PUFF: 160; 4.5 AEROSOL RESPIRATORY (INHALATION) at 07:50

## 2018-02-10 RX ADMIN — IPRATROPIUM BROMIDE AND ALBUTEROL SULFATE 3 ML: .5; 3 SOLUTION RESPIRATORY (INHALATION) at 07:43

## 2018-02-10 NOTE — PROGRESS NOTES
"Pharmacy Consult  -  Warfarin    Ashely Roldan is a  57 y.o. female   Height - 175.3 cm (69\")  Weight - 70.6 kg (155 lb 9.6 oz)    Consulting Provider: - Hospitalist Group  Indication: - Mechanical Mitral Valve  Goal INR: - 2.5-3.5  Home Regimen:   - 2 mg daily except every third day   - 3 mg every third day; repeating    Bridge Therapy: No     Drug-Drug Interactions with current regimen:   None    Warfarin Dosing During Admission:    Date  2/9 2/10          INR  4.28 5.94          Dose  Hold Hold               Education Provided:      Labs:    Results from last 7 days   Lab Units 02/10/18  0607 02/09/18  0851 02/09/18  0611 02/08/18  1844 02/05/18  0540 02/04/18  2103 02/04/18  1503 02/04/18  0721 02/04/18  0100 02/03/18  1728 02/03/18  1057   INR  5.94* 4.28* --  --  2.45 --  --  2.21 --  --  --    APTT seconds --  --  --  --  80.7* 59.2 64.6 49.8* 63.3 87.8* 50.9*   HEMOGLOBIN g/dL --  --  9.8* 10.3* --  8.8* --  9.2* --  --  --    HEMATOCRIT % --  --  33.6* 34.4* --  27.7* --  29.3* --  --  --    PLATELETS 10*3/mm3 --  --  149* 173 --  107* --  115* --  --  --      Results from last 7 days   Lab Units 02/10/18  0607 02/09/18  0611 02/08/18  1844   SODIUM mmol/L 133 139 139   POTASSIUM mmol/L 4.1 4.1 3.9   CHLORIDE mmol/L 101 104 103   CO2 mmol/L 28.0 31.0 25.0   BUN mg/dL 45* 40* 38*   CREATININE mg/dL 2.30* 1.80* 1.70*   CALCIUM mg/dL 8.0* 8.8 9.1   BILIRUBIN mg/dL 0.3 --  0.6   ALK PHOS U/L 97 --  119*   ALT (SGPT) U/L 117* --  181*   AST (SGOT) U/L 37* --  48*   GLUCOSE mg/dL 111* 89 126*       Current dietary intake: Ate 100% of lunch and 25% of dinner on 2/9  Diet Order   Procedures   • Diet Regular; Cardiac       Assessment/Plan:      Supratherapeutic INR. Pharmacy has managed warfarin for Ms. Roldan before, most recently January 23rd-Feb 5th of this year when she left AMA. She presented with a similarly elevated INR at time of last admission which continued to climb until it was beyond a calculable " reading - she then received a total of 15 mg of phytonadione and was resumed on her home regimen prior to leaving [place].  INR prior to leaving was 2.45, trending towards therapeutic with relatively stable kinetics.     Profoundly elevated BNP and rising serum creatinine may be indicative of hypoperfusion given history of ICMP; hepatic congestion from this may contribute to decreased warfarin metabolism and subsequently increase systemic exposure to antithrombotic effects of warfarin.    Hold warfarin for now. Pharmacy will follow daily PT/INR and resume dosing when appropriate. With recent history would expect a lower dose will be necessary at discharge.     Fantasma Mireles [facility]  2/10/2018  8:50 AM

## 2018-02-10 NOTE — PLAN OF CARE
Problem: Respiratory Insufficiency (Adult)  Intervention: Provide Oxygenation/Ventilation/Perfusion Support   02/09/18 1041 02/10/18 0200 02/10/18 0400   Safety Interventions   Medication Review/Management --  --  medications reviewed;high risk medications identified   Activity   Activity Type --  --  activity adjusted per tolerance   Positioning   Head Of Bed (HOB) Position --  HOB at 20-30 degrees --    Respiratory Interventions   Airway/Ventilation Management airway patency maintained --  --      Intervention: Optimize/Manage Energy Expenditure   02/09/18 1041 02/09/18 2000   Pain/Comfort/Sleep Interventions   Sleep/Rest Enhancement --  awakenings minimized;relaxation techniques promoted;regular sleep/rest pattern promoted   Nutrition Interventions   Oral Nutrition Promotion other (see comments) --    Coping/Psychosocial Interventions   Environmental Support --  calm environment promoted       Goal: Identify Related Risk Factors and Signs and Symptoms  Outcome: Ongoing (interventions implemented as appropriate)   02/09/18 1041   Respiratory Insufficiency   Related Risk Factors (Respiratory Insufficiency) activity intolerance;bedrest;knowledge deficit;motivation lacking;smoking   Signs and Symptoms (Respiratory Insufficiency) abnormal ABGs;blood pressure/heart rate changes;breathing pattern changes     Goal: Acid/Base Balance  Outcome: Ongoing (interventions implemented as appropriate)   02/09/18 1041   Respiratory Insufficiency (Adult)   Acid/Base Balance making progress toward outcome     Goal: Effective Ventilation  Outcome: Ongoing (interventions implemented as appropriate)   02/09/18 1041   Respiratory Insufficiency (Adult)   Effective Ventilation making progress toward outcome

## 2018-02-10 NOTE — PROGRESS NOTES
[facility]  Cardiology Progress Note      Chief Complaint/Reason for service:    · End-stage cardiomyopathy  · Mechanical mitral valve  · Possible LV thrombus         Patient is lying in bed and is somewhat lethargic. She denies any chest pain. She is laying flat and breathing easily.    Past medical, surgical, social and family history reviewed in the patient's electronic medical record.         Vital Sign Min/Max for last 24 hours  Temp  Min: 97.4 °F (36.3 °C)  Max: 98 °F (36.7 °C)   BP  Min: 72/52  Max: 122/67   Pulse  Min: 66  Max: 82   Resp  Min: 16  Max: 21   SpO2  Min: 63 %  Max: 100 %   Flow (L/min)  Min: 2  Max: 2      Intake/Output Summary (Last 24 hours) at 02/10/18 1055  Last data filed at 02/10/18 0411   Gross per 24 hour   Intake              360 ml   Output              525 ml   Net             -165 ml           Physical Exam   Constitutional: She appears lethargic. She has a sickly appearance.   Cardiovascular: Normal rate and regular rhythm.    Mechanical S1 and S2 crisp sounds.   Pulmonary/Chest: Breath sounds normal.   Neurological: She appears lethargic.       Results Review:   I reviewed the patient's recent labs in the electronic medical record.        Results from last 7 days  Lab Units 02/10/18  0607 02/09/18  0611 02/08/18  2314 02/08/18 1844 02/05/18  0540 02/04/18  0721   SODIUM mmol/L 133 139  --  139 136 136   POTASSIUM mmol/L 4.1 4.1  --  3.9 3.7 3.3*   CHLORIDE mmol/L 101 104  --  103 102 103   BUN mg/dL 45* 40*  --  38* 44* 52*   CREATININE mg/dL 2.30* 1.80*  --  1.70* 1.80* 1.90*   MAGNESIUM mg/dL  --  2.9* 1.9 1.9  --   --            Results from last 7 days  Lab Units 02/09/18  0611 02/08/18  1844 02/04/18  2103   WBC 10*3/mm3 6.12 9.44 4.98   HEMOGLOBIN g/dL 9.8* 10.3* 8.8*   HEMATOCRIT % 33.6* 34.4* 27.7*   PLATELETS 10*3/mm3 149* 173 107*       Lab Results   Component Value Date    HGBA1C 5.00 02/04/2017       Lab Results   Component Value  Date    CHOL 142 02/03/2018    TRIG 81 02/03/2018    HDL 42 02/03/2018    LDLDIRECT 96 02/03/2018    AST 37 (H) 02/10/2018     (H) 02/10/2018                Hospital Problem List     * (Principal)Acute respiratory failure with hypoxia and hypercapnia    CKD (chronic kidney disease), stage IV (Chronic)    Acute systolic congestive heart failure    Overview Signed 2/10/2018 10:45 AM by Baldomreo Gusman IV, MD     · Echo (2/9/18): LVEF 20%. Normal functioning mechanical mitral valve. Cannot exclude LV thrombus.         Anxiety    Ischemic cardiomyopathy (Chronic)    Overview Addendum 2/10/2018 10:50 AM by Baldomero Gusman IV, MD     · History of MI December 2009: s/p stent to RCA and LAD, EF 40%  · CABGx2 March, 2010: SVG to OMB-1, SVG to PDA  · EF 30% post-operatively  · [place] ICD placement, 2011  · Echo (July 2014): Severe global hypokinesis with EF 12%, moderate AI, mechanical mitral valve, moderate to severe TR, RVSP 43mmHg   · Echo (2/4/17): EF 18%, mild to mod AI, mod TR, grossly normal prosthetic mitral valve  · Echo (2/9/18): LVEF 20%. Normal functioning mechanical mitral valve. Cannot exclude LV thrombus.         Lupus (systemic lupus erythematosus) (Chronic)    Coronary artery disease involving native coronary artery of native heart with angina pectoris    Overview Signed 2/10/2018 10:47 AM by Baldomero Gusman IV, MD     · History of MI December 2009: s/p stent to RCA and LAD, EF 40%  · CABG (March, 2010): SVG to OMB-1, SVG to PDA         History of mitral valve replacement with mechanical valve    Overview Addendum 2/10/2018 10:55 AM by Baldomero Gusman IV, MD     · Mitral valve replacement with 27mm ATS mechanical valve March 2010         Anemia of chronic kidney failure    COPD exacerbation    Tobacco abuse (Chronic)    Noncompliance    Chronic anticoagulation    Overview Signed 7/16/2016 10:32 PM by IAIN Gabriel     Coumadin (Mechanical AVR)          Elevated LFTs        The patient has end-stage heart failure and history of medical noncompliance. Low-dose dobutamine best far has not resulted in any any improvement in her renal function.         · Continued dobutamine  · Appreciate nephrology input    Baldomero Gusman IV, MD  2/10/2018

## 2018-02-11 PROBLEM — J96.21 ACUTE ON CHRONIC RESPIRATORY FAILURE WITH HYPOXIA AND HYPERCAPNIA (HCC): Status: ACTIVE | Noted: 2018-02-08

## 2018-02-11 PROBLEM — J96.22 ACUTE ON CHRONIC RESPIRATORY FAILURE WITH HYPOXIA AND HYPERCAPNIA (HCC): Status: ACTIVE | Noted: 2018-02-08

## 2018-02-11 LAB
ANION GAP SERPL CALCULATED.3IONS-SCNC: 6 MMOL/L (ref 3–11)
ARTERIAL PATENCY WRIST A: ABNORMAL
ATMOSPHERIC PRESS: ABNORMAL MMHG
BACTERIA SPEC AEROBE CULT: NORMAL
BASE EXCESS BLDA CALC-SCNC: 2.2 MMOL/L (ref 0–2)
BDY SITE: ABNORMAL
BUN BLD-MCNC: 45 MG/DL (ref 9–23)
BUN/CREAT SERPL: 16.7 (ref 7–25)
CALCIUM SPEC-SCNC: 8.5 MG/DL (ref 8.7–10.4)
CHLORIDE SERPL-SCNC: 97 MMOL/L (ref 99–109)
CO2 BLDA-SCNC: 31.4 MMOL/L (ref 22–33)
CO2 SERPL-SCNC: 30 MMOL/L (ref 20–31)
COHGB MFR BLD: 2.3 % (ref 0–2)
CREAT BLD-MCNC: 2.7 MG/DL (ref 0.6–1.3)
CREAT UR-MCNC: 63.8 MG/DL
GFR SERPL CREATININE-BSD FRML MDRD: 18 ML/MIN/1.73
GLUCOSE BLD-MCNC: 96 MG/DL (ref 70–100)
HCO3 BLDA-SCNC: 29.5 MMOL/L (ref 20–26)
HCT VFR BLD CALC: 27.2 %
HGB BLDA-MCNC: 8.9 G/DL (ref 14–18)
HOROWITZ INDEX BLD+IHG-RTO: 30 %
INR PPP: 4.43 (ref 0.91–1.09)
METHGB BLD QL: 1 % (ref 0–1.5)
MODALITY: ABNORMAL
OXYHGB MFR BLDV: 85.3 % (ref 94–99)
PCO2 BLDA: 61.3 MM HG (ref 35–45)
PH BLDA: 7.29 PH UNITS (ref 7.35–7.45)
PO2 BLDA: 56 MM HG (ref 83–108)
POTASSIUM BLD-SCNC: 3.9 MMOL/L (ref 3.5–5.5)
PROT UR-MCNC: 12 MG/DL (ref 1–14)
PROTHROMBIN TIME: 46.5 SECONDS (ref 9.6–11.5)
SODIUM BLD-SCNC: 133 MMOL/L (ref 132–146)

## 2018-02-11 PROCEDURE — 82570 ASSAY OF URINE CREATININE: CPT | Performed by: INTERNAL MEDICINE

## 2018-02-11 PROCEDURE — 94799 UNLISTED PULMONARY SVC/PX: CPT

## 2018-02-11 PROCEDURE — 36600 WITHDRAWAL OF ARTERIAL BLOOD: CPT | Performed by: INTERNAL MEDICINE

## 2018-02-11 PROCEDURE — 85610 PROTHROMBIN TIME: CPT | Performed by: INTERNAL MEDICINE

## 2018-02-11 PROCEDURE — 99232 SBSQ HOSP IP/OBS MODERATE 35: CPT | Performed by: NURSE PRACTITIONER

## 2018-02-11 PROCEDURE — 94640 AIRWAY INHALATION TREATMENT: CPT

## 2018-02-11 PROCEDURE — 25010000002 ALBUMIN HUMAN 25% PER 50 ML: Performed by: INTERNAL MEDICINE

## 2018-02-11 PROCEDURE — 84156 ASSAY OF PROTEIN URINE: CPT | Performed by: INTERNAL MEDICINE

## 2018-02-11 PROCEDURE — 99223 1ST HOSP IP/OBS HIGH 75: CPT | Performed by: INTERNAL MEDICINE

## 2018-02-11 PROCEDURE — 97165 OT EVAL LOW COMPLEX 30 MIN: CPT

## 2018-02-11 PROCEDURE — P9046 ALBUMIN (HUMAN), 25%, 20 ML: HCPCS | Performed by: INTERNAL MEDICINE

## 2018-02-11 PROCEDURE — 94760 N-INVAS EAR/PLS OXIMETRY 1: CPT

## 2018-02-11 PROCEDURE — 25010000002 DOBUTAMINE PER 250 MG: Performed by: INTERNAL MEDICINE

## 2018-02-11 PROCEDURE — 97162 PT EVAL MOD COMPLEX 30 MIN: CPT

## 2018-02-11 PROCEDURE — 99233 SBSQ HOSP IP/OBS HIGH 50: CPT | Performed by: INTERNAL MEDICINE

## 2018-02-11 PROCEDURE — 94660 CPAP INITIATION&MGMT: CPT

## 2018-02-11 PROCEDURE — 82805 BLOOD GASES W/O2 SATURATION: CPT | Performed by: INTERNAL MEDICINE

## 2018-02-11 PROCEDURE — 80048 BASIC METABOLIC PNL TOTAL CA: CPT | Performed by: INTERNAL MEDICINE

## 2018-02-11 PROCEDURE — 97602 WOUND(S) CARE NON-SELECTIVE: CPT

## 2018-02-11 RX ORDER — TRAMADOL HYDROCHLORIDE 50 MG/1
50 TABLET ORAL EVERY 12 HOURS PRN
Status: DISCONTINUED | OUTPATIENT
Start: 2018-02-11 | End: 2018-02-14

## 2018-02-11 RX ORDER — ALBUMIN (HUMAN) 12.5 G/50ML
25 SOLUTION INTRAVENOUS ONCE
Status: COMPLETED | OUTPATIENT
Start: 2018-02-11 | End: 2018-02-11

## 2018-02-11 RX ADMIN — BUMETANIDE 2 MG: 0.25 INJECTION INTRAMUSCULAR; INTRAVENOUS at 09:17

## 2018-02-11 RX ADMIN — CLONAZEPAM 2 MG: 1 TABLET ORAL at 20:25

## 2018-02-11 RX ADMIN — DOBUTAMINE HYDROCHLORIDE 2.5 MCG/KG/MIN: 100 INJECTION INTRAVENOUS at 00:06

## 2018-02-11 RX ADMIN — IPRATROPIUM BROMIDE AND ALBUTEROL SULFATE 3 ML: .5; 3 SOLUTION RESPIRATORY (INHALATION) at 08:34

## 2018-02-11 RX ADMIN — IPRATROPIUM BROMIDE AND ALBUTEROL SULFATE 3 ML: .5; 3 SOLUTION RESPIRATORY (INHALATION) at 13:30

## 2018-02-11 RX ADMIN — NYSTATIN: 100000 POWDER TOPICAL at 09:16

## 2018-02-11 RX ADMIN — BUDESONIDE AND FORMOTEROL FUMARATE DIHYDRATE 2 PUFF: 160; 4.5 AEROSOL RESPIRATORY (INHALATION) at 19:29

## 2018-02-11 RX ADMIN — TRAMADOL HYDROCHLORIDE 25 MG: 50 TABLET, COATED ORAL at 09:14

## 2018-02-11 RX ADMIN — Medication 325 MG: at 09:13

## 2018-02-11 RX ADMIN — IPRATROPIUM BROMIDE AND ALBUTEROL SULFATE 3 ML: .5; 3 SOLUTION RESPIRATORY (INHALATION) at 19:29

## 2018-02-11 RX ADMIN — ASPIRIN 81 MG: 81 TABLET, COATED ORAL at 09:14

## 2018-02-11 RX ADMIN — NYSTATIN: 100000 POWDER TOPICAL at 20:27

## 2018-02-11 RX ADMIN — ROSUVASTATIN CALCIUM 10 MG: 10 TABLET ORAL at 20:25

## 2018-02-11 RX ADMIN — BUMETANIDE 2 MG: 0.25 INJECTION INTRAMUSCULAR; INTRAVENOUS at 15:53

## 2018-02-11 RX ADMIN — SPIRONOLACTONE 25 MG: 25 TABLET, FILM COATED ORAL at 09:13

## 2018-02-11 RX ADMIN — CLONAZEPAM 2 MG: 1 TABLET ORAL at 15:53

## 2018-02-11 RX ADMIN — TRAMADOL HYDROCHLORIDE 50 MG: 50 TABLET, COATED ORAL at 14:31

## 2018-02-11 RX ADMIN — ALBUMIN HUMAN 25 G: 0.25 SOLUTION INTRAVENOUS at 13:15

## 2018-02-11 RX ADMIN — BUDESONIDE AND FORMOTEROL FUMARATE DIHYDRATE 2 PUFF: 160; 4.5 AEROSOL RESPIRATORY (INHALATION) at 08:35

## 2018-02-11 RX ADMIN — CLONAZEPAM 2 MG: 1 TABLET ORAL at 09:13

## 2018-02-11 NOTE — CONSULTS
Referring Provider: Dr. Deras  Reason for Consultation: Respiratory failure    Patient Care Team:  Don Bah MD as PCP - General (Pulmonary Disease)  IAIN Neville as PCP - Claims Attributed    Chief complaint:  Shortness of breath    Subjective .     History of present illness:  57-year-old female admitted on 2/8 with shorts of breath, fatigue, and lethargy for 1-2 days.  She was recently admitted here for sepsis due to UTI, shock liver, and acute on chronic kidney injury and left AGAINST MEDICAL ADVICE.    By arterial blood gases she had acute on chronic hypoxic and hypercapnic respiratory failure on admission was treated with BiPAP therapy with improvement.  She has an ischemic cardiomyopathy with an ejection fraction of 20%.  She also has acute on chronic kidney injury.  She has been placed on dobutamine and diuretics.    She carries a diagnosis of COPD.  She is still smoking.  She uses oxygen at home purely on and as-needed basis.  She does not use oxygen at night.  She is not use any type of CPAP or BiPAP therapy at night.  She prefers to not use the BiPAP as it makes her feel more short of breath.    I was asked to evaluate her as she has not made much progress in regards to fully correcting her arterial blood gas.  However, at the time of my interview she is awake and alert on 2 L nasal cannula with oxygen saturations 97% and asking when she can go home.    Review of Systems  Pertinent items are noted in HPI, all other systems reviewed and negative    History  Past Medical History:   Diagnosis Date   • Acute respiratory failure with hypoxia 9/5/2017   • Allergic rhinitis 08/01/2014   • Anemia    • Anxiety    • Arthritis    • CAD (coronary artery disease)    • CHF (congestive heart failure)    • CKD (chronic kidney disease)    • CKD (chronic kidney disease), stage IV 9/5/2017   • Colitis, Clostridium difficile    • COPD (chronic obstructive pulmonary disease)    • Coronary  atherosclerosis    • Depression    • Emphysema of lung    • GERD (gastroesophageal reflux disease)    • Heart attack    • Heart murmur    • Hematoma of hip    • Hip fracture    • Hyperlipidemia    • Hypertension     benign essential   • Ischemic cardiomyopathy 08/01/2014    ef 29% s/p ICD   • Knee injury    • Lung disease    • Mitral valve disorder 03/28/2013   • Mitral valve insufficiency     s/p prosthetic ATS valve replacement   • Osteoporosis    • Postmenopausal     natural   • PVD (peripheral vascular disease)    • Pyelonephritis    • Renal failure    • Renal failure    • Renal failure, acute    • Syncope    • Systemic lupus erythematosus    • TIA (transient ischemic attack) 04/04/2012   • UTI (urinary tract infection)    • Valvular heart disease    • Wrist fracture    ,   Past Surgical History:   Procedure Laterality Date   • CARDIAC DEFIBRILLATOR PLACEMENT     • CHOLECYSTECTOMY  2010   • CORONARY ARTERY BYPASS GRAFT  2011 4 aortic bypasses, abdominal aorta; 2011-mitral valve; 2010-triple bypass   • ENDOSCOPY  10/23/2014    Dr. Brand; retained food in stomach; he dilated her esophagus; 5-30-14 hiatus hernia, gastritis   • JOINT REPLACEMENT Right 06/25/2015    Dr. Fitzgerald; first surgery 1-29-14; redo 5-4-15   • MITRAL VALVE REPLACEMENT      MECHANICAL   • TOTAL HIP ARTHROPLASTY Right     3 times within 8 months   ,   Family History   Problem Relation Age of Onset   • Arthritis Mother      rheumatoid   • Heart attack Father    • Alcohol abuse Brother    • Heart disease Other      uncle   • Diabetes Other      uncle-type 2   • Hypertension Other      uncle   • Stroke Other      uncle   • Cancer Other      grandfather-lung    • Heart disease Maternal Uncle    • Lung cancer Paternal Grandfather    ,   Social History   Substance Use Topics   • Smoking status: Current Every Day Smoker     Packs/day: 0.50     Types: Cigarettes   • Smokeless tobacco: Never Used      Comment: Down to 0.5 PPD from 1 PPD now  consistently. Started as a teenager.   • Alcohol use No   ,   Prescriptions Prior to Admission   Medication Sig Dispense Refill Last Dose   • albuterol (PROVENTIL) (2.5 MG/3ML) 0.083% nebulizer solution Take 2.5 mg by nebulization As Needed for wheezing or shortness of air.   Taking   • budesonide-formoterol (SYMBICORT) 160-4.5 MCG/ACT inhaler Inhale 1-2 puffs 2 (Two) Times a Day. In morning and evening for 90 days    Taking   • Calcium Carb-Cholecalciferol (CALCIUM + D3) 600-200 MG-UNIT tablet Take 1 tablet by mouth Daily.   Taking   • carvedilol (COREG) 12.5 MG tablet Take 1 tablet by mouth 2 (Two) Times a Day With Meals for 30 days. 60 tablet 0    • clonazePAM (KlonoPIN) 2 MG tablet Take 1 tablet by mouth 3 (Three) Times a Day. 90 tablet 2    • Ferrous Sulfate (IRON) 325 (65 FE) MG tablet Take 325 mg by mouth Daily.   Taking   • furosemide (LASIX) 20 MG tablet Take 1 tablet by mouth 2 (Two) Times a Day. 60 tablet 0    • hydrALAZINE (APRESOLINE) 25 MG tablet Take 1 tablet by mouth 3 (Three) Times a Day. 90 tablet 0    • HYDROcodone-acetaminophen (NORCO) 5-325 MG per tablet Take 1 tablet by mouth Every 6 (Six) Hours As Needed for Severe Pain  for up to 8 doses. 8 tablet 0    • hydroxychloroquine (PLAQUENIL) 200 MG tablet Take 1 tablet by mouth Every Morning. 30 tablet 0    • nitroglycerin (NITROSTAT) 0.4 MG SL tablet 1 under the tongue as needed for angina, may repeat q5mins for up three doses 30 tablet 3 Taking   • Probiotic Product (PROBIOTIC PO) Take 1 capsule by mouth daily. 20 billion cell; for 30 days   Taking   • rosuvastatin (CRESTOR) 10 MG tablet Take 1 tablet by mouth Every Night. For 90 days 30 tablet 0    • tiotropium (SPIRIVA) 18 MCG per inhalation capsule Place 2 puffs (1 capsule total) into inhaler and inhale once daily. For 90 days 90 capsule 3 Taking   • warfarin (COUMADIN) 2 MG tablet Take 1 tablet (2 mg) daily for 2 days, then 3mg tablet for 1 day, then repeat 30 tablet 0    • warfarin (COUMADIN)  "3 MG tablet Take 1 tablet by mouth Every 3 days. Take 2mg tablet daily for 2 days then 3mg tablet for 1 day then repeat. 30 tablet 0     and Allergies:  Morphine and related and Sulfa antibiotics    Objective     Vital Signs   Temp:  [97.5 °F (36.4 °C)-97.7 °F (36.5 °C)] 97.7 °F (36.5 °C)  Heart Rate:  [] 75  Resp:  [16-20] 16  BP: ()/(52-85) 108/65    Physical Exam:   Objective:  General Appearance:  In no acute distress.    Vital signs: (most recent): Blood pressure 108/65, pulse 75, temperature 97.7 °F (36.5 °C), temperature source Oral, resp. rate 16, height 175.3 cm (69\"), weight 74.9 kg (165 lb 3.2 oz), SpO2 96 %, not currently breastfeeding.    HEENT: Normal HEENT exam.  (Nasal cannula O2)    Lungs:  Normal respiratory rate and normal effort.  She is not in respiratory distress.  There are decreased breath sounds.  No wheezes, rales or rhonchi.    Heart: Normal rate.  Regular rhythm.  S1 normal and S2 normal.  No murmur, gallop or friction rub.   Chest: Symmetric chest wall expansion.   Abdomen: Abdomen is soft and non-distended.  Bowel sounds are normal.   There is no abdominal tenderness.   There is no mass. There is no splenomegaly. There is no hepatomegaly.   Extremities: There is dependent edema.  There is no deformity.    Neurological: Patient is alert and oriented to person, place and time.    Pupils:  Pupils are equal, round, and reactive to light.    Skin:  Warm and dry.              Results Review:   I reviewed the patient's new clinical results.  I reviewed the patient's new imaging results and agree with the interpretation.      Assessment/Plan     Hospital Problem List     * (Principal)Acute on chronic respiratory failure with hypoxia and hypercapnia    COPD exacerbation    Anxiety    Ischemic cardiomyopathy (Chronic)    Overview Addendum 2/10/2018 10:50 AM by Baldomero Gusman IV, MD     · History of MI December 2009: s/p stent to RCA and LAD, EF 40%  · CABGx2 March, 2010: SVG " to OMB-1, SVG to PDA  · EF 30% post-operatively  · [place] ICD placement, 2011  · Echo (July 2014): Severe global hypokinesis with EF 12%, moderate AI, mechanical mitral valve, moderate to severe TR, RVSP 43mmHg   · Echo (2/4/17): EF 18%, mild to mod AI, mod TR, grossly normal prosthetic mitral valve  · Echo (2/9/18): LVEF 20%. Normal functioning mechanical mitral valve. Cannot exclude LV thrombus.         Lupus (systemic lupus erythematosus) (Chronic)    Coronary artery disease involving native coronary artery of native heart with angina pectoris    Overview Signed 2/10/2018 10:47 AM by Baldomero Gusman IV, MD     · History of MI December 2009: s/p stent to RCA and LAD, EF 40%  · CABG (March, 2010): SVG to OMB-1, SVG to PDA         History of mitral valve replacement with mechanical valve    Overview Addendum 2/11/2018 10:07 AM by IAIN Allred     · Mitral valve replacement with 27mm ATS mechanical valve March 2010  · Echo (02/09/2018): There is right-sided chamber enlargement. Global RV and LV hypokinesia is present. LVEF 20%. Cannot exclude LV thrombus. Normal functioning mechanical mitral valve present. Small pericardial effusion.         Anemia of chronic kidney failure    Tobacco abuse (Chronic)    Noncompliance    Chronic anticoagulation    Overview Signed 7/16/2016 10:32 PM by IAIN Gabriel     Coumadin (Mechanical AVR)         CKD (chronic kidney disease), stage IV (Chronic)    Acute systolic congestive heart failure    Overview Signed 2/10/2018 10:45 AM by Baldomero Gusman IV, MD     · Echo (2/9/18): LVEF 20%. Normal functioning mechanical mitral valve. Cannot exclude LV thrombus.         Elevated LFTs          Difficult situation in regards to what appears to be a cardiorenal syndrome.  She is on aggressive treatment for that with inotropic agents, loop diuretics, and spironolactone.  I have nothing to add to the aggressive therapy she is on.    In regards to her COPD  she is being treated with Symbicort as well as DuoNeb's and does not have any active wheezing currently.  She is on appropriate supplemental oxygen and should probably continue that at home though, unfortunately, continues to smoke at home.    In regards to the BiPAP, she does not like using this and does not want to use this at home.  Therefore, I would use the BiPAP purely as needed based upon her clinical status.  As long as her oxygen saturations are acceptable on low-flow O2 and she is comfortable I would not use the BiPAP solely based upon her arterial blood gas as her blood gas is mostly compensated at this point.    Discussed with patient and Dr. Deras.  I will follow with you.    I discussed the patients findings and my recommendations with patient, nursing staff and primary care team    Don Bah MD  Pulmonary and Critical Care Medicine  02/11/18  2:33 PM

## 2018-02-11 NOTE — PLAN OF CARE
Problem: Patient Care Overview (Adult)  Goal: Plan of Care Review  Outcome: Ongoing (interventions implemented as appropriate)   02/11/18 1505   Coping/Psychosocial Response Interventions   Plan Of Care Reviewed With patient   Outcome Evaluation   Outcome Summary/Follow up Plan Pt presents with multiple open wounds to BLE s/p burst bullae. Several bullae still intact, but pt reports she has not noticed any more areas, and her BLE are not swollen at this time. Pt will benefit from debridement and advanced dressings management to manage open wounds and promote healing. D/t the pt's evolving wound symptoms, pt will benefit from skilled PT wound care to promote healing. Once areas are debrided and dressings are seen to be appropriate, PT will likely be able to sign off on the pt's care.       Problem: Inpatient Physical Therapy  Goal: Wound Care Goal 1 LTG- PT  Outcome: Ongoing (interventions implemented as appropriate)   02/11/18 1505   Wound Care PT LTG   Wound Care PT LTG 1, Date Established 02/11/18   Wound Care PT LTG 1, Time to Achieve 1 wk   Wound Care PT LTG 1, Location BLE   Wound Care PT LTG 1, No S&S of Infection yes   Wound Care PT LTG 1, Decrease Wound Size 25%   Wound Care PT LTG 1, Decrease Exudate scant   Wound Care PT LTG 1, No New Skin Break Down yes   Wound Care PT LTG 1, Education wound care   Wound Care PT LTG 1, Education Understanding verbalize understanding

## 2018-02-11 NOTE — CONSULTS
NAL Consult Note    Ashely Roldan  1960  [id number]    Date of Admit:  2/8/2018    Date of Consult: 2/11/2018        Requesting Provider: No ref. provider found  Evaluating Physician: Minesh Nichole MD        Reason for Consultation:  FAHEEM ON CKD  History of present illness:    Patient is a 57 y.o.  Yr old female KNOWN TO [facility] WITH PREVIOUS FAHEEM, STAGE 3 CKD, ICM, CARDIORENAL SYNDROME, MVR, CAD, COPD, PVD ADMITTED 3 DAYS AGO WITH CHF AND WE ARE ASKED TO SEE FOR WORSENING RENAL FXN. CREAT 1.7-2.7 OVER 3 DAYS. HAS BEEN DIURESED AND PLACED ON DOBUTAMINE. RECENTLY LEFT HERE AMA. CREAT: 2.2 2/2/18, 2.9 1/30/18, 1.7 1/21/18. NO NSAIA USE. RENAL U/S: 2/6/18. R KIDNEY 8.6 CM. L KIDNEY 8.5 CM. NO HYDRO. HAS BEEN HYPOTENSIVE AT TIMES WITH SYS BP 80-90'S.    Past Medical History:   Diagnosis Date   • Acute respiratory failure with hypoxia 9/5/2017   • Allergic rhinitis 08/01/2014   • Anemia    • Anxiety    • Arthritis    • CAD (coronary artery disease)    • CHF (congestive heart failure)    • CKD (chronic kidney disease)    • CKD (chronic kidney disease), stage IV 9/5/2017   • Colitis, Clostridium difficile    • COPD (chronic obstructive pulmonary disease)    • Coronary atherosclerosis    • Depression    • Emphysema of lung    • GERD (gastroesophageal reflux disease)    • Heart attack    • Heart murmur    • Hematoma of hip    • Hip fracture    • Hyperlipidemia    • Hypertension     benign essential   • Ischemic cardiomyopathy 08/01/2014    ef 29% s/p ICD   • Knee injury    • Lung disease    • Mitral valve disorder 03/28/2013   • Mitral valve insufficiency     s/p prosthetic ATS valve replacement   • Osteoporosis    • Postmenopausal     natural   • PVD (peripheral vascular disease)    • Pyelonephritis    • Renal failure    • Renal failure    • Renal failure, acute    • Syncope    • Systemic lupus erythematosus    • TIA (transient ischemic attack) 04/04/2012   • UTI (urinary tract infection)    • Valvular heart  disease    • Wrist fracture        Past Surgical History:   Procedure Laterality Date   • CARDIAC DEFIBRILLATOR PLACEMENT     • CHOLECYSTECTOMY  2010   • CORONARY ARTERY BYPASS GRAFT  2011    4 aortic bypasses, abdominal aorta; 2011-mitral valve; 2010-triple bypass   • ENDOSCOPY  10/23/2014    Dr. Brand; retained food in stomach; he dilated her esophagus; 5-30-14 hiatus hernia, gastritis   • JOINT REPLACEMENT Right 06/25/2015    Dr. Fitzgerald; first surgery 1-29-14; redo 5-4-15   • MITRAL VALVE REPLACEMENT      MECHANICAL   • TOTAL HIP ARTHROPLASTY Right     3 times within 8 months       Social History     Social History   • Marital status:      Spouse name: N/A   • Number of children: N/A   • Years of education: N/A     Social History Main Topics   • Smoking status: Current Every Day Smoker     Packs/day: 0.50     Types: Cigarettes   • Smokeless tobacco: Never Used      Comment: Down to 0.5 PPD from 1 PPD now consistently. Started as a teenager.   • Alcohol use No   • Drug use: No   • Sexual activity: Defer     Other Topics Concern   • None     Social History Narrative    Ms. Roldan is a 56 year old white female. Pt recently  after 30 years. Just moved from [facility] into her son's home in [place].        family history includes Alcohol abuse in her brother; Arthritis in her mother; Cancer in her other; Diabetes in her other; Heart attack in her father; Heart disease in her maternal uncle and other; Hypertension in her other; Lung cancer in her paternal grandfather; Stroke in her other. NO FH OF RENAL DZ.    Allergies   Allergen Reactions   • Morphine And Related GI Intolerance and Irritability   • Sulfa Antibiotics        Medication:    Current Facility-Administered Medications:   •  albumin human 25 % IV SOLN 25 g, 25 g, Intravenous, Once, Minesh Nichole MD  •  aspirin EC tablet 81 mg, 81 mg, Oral, Daily, Sandhya Lynch PA-C, 81 mg at 02/11/18 0914  •  budesonide-formoterol  (SYMBICORT) 160-4.5 MCG/ACT inhaler 2 puff, 2 puff, Inhalation, BID - RT, Jelena Sanchez MD, 2 puff at 02/11/18 0835  •  bumetanide (BUMEX) injection 2 mg, 2 mg, Intravenous, Q8H, Jelena Sanchez MD, 2 mg at 02/11/18 0917  •  bumetanide (BUMEX) injection 2 mg, 2 mg, Intravenous, Once, Jelena Sanchez MD  •  clonazePAM (KlonoPIN) tablet 2 mg, 2 mg, Oral, TID, Charles Deras MD, 2 mg at 02/11/18 0913  •  DOBUTamine (DOBUTREX) 1 mg/mL infusion, 2.5 mcg/kg/min, Intravenous, Continuous, Yomi Ramirez MD, Last Rate: 10.59 mL/hr at 02/11/18 0006, 2.5 mcg/kg/min at 02/11/18 0006  •  ferrous sulfate tablet 325 mg, 325 mg, Oral, Daily, Charles Deras MD, 325 mg at 02/11/18 0913  •  ipratropium-albuterol (DUO-NEB) nebulizer solution 3 mL, 3 mL, Nebulization, 4x Daily - RT, Jelena Sanchez MD, 3 mL at 02/11/18 0834  •  LORazepam (ATIVAN) tablet 1 mg, 1 mg, Oral, Q6H PRN, Jelena Sanchez MD, 1 mg at 02/10/18 0800  •  Magnesium Sulfate 2 gram Bolus, followed by 8 gram infusion (total Mg dose 10 grams)- Mg less than or equal to 1mg/dL, 2 g, Intravenous, PRN **OR** Magnesium Sulfate 6 gram Infusion (2 gm x 3) -Mg 1.1 -1.5 mg/dL, 2 g, Intravenous, PRN **OR** magnesium sulfate 4 gram infusion- Mg 1.6-1.9 mg/dL, 4 g, Intravenous, PRN, Jelena Sanchez MD, Last Rate: 25 mL/hr at 02/08/18 2317, 4 g at 02/08/18 2317  •  metOLazone (ZAROXOLYN) tablet 5 mg, 5 mg, Oral, Once, Jelena Sanchez MD  •  nystatin (MYCOSTATIN) powder, , Topical, Q12H, Charles Deras MD  •  ondansetron (ZOFRAN) injection 4 mg, 4 mg, Intravenous, Q6H PRN, Jelena Sanchez MD  •  Pharmacy to dose warfarin, , Does not apply, Continuous PRN, Charles Deras MD  •  rosuvastatin (CRESTOR) tablet 10 mg, 10 mg, Oral, Nightly, Jelena Sanchez MD, 10 mg at 02/10/18 2026  •  sodium chloride 0.9 % flush 1-10 mL, 1-10 mL, Intravenous, PRN, Jelena Sanchez MD  •  [DISCONTINUED] potassium phosphate 45 mmol in sodium chloride 0.9 % 500 mL infusion, 45 mmol, Intravenous, PRN **OR**  [DISCONTINUED] potassium phosphate 30 mmol in sodium chloride 0.9 % 250 mL infusion, 30 mmol, Intravenous, PRN **OR** [DISCONTINUED] potassium phosphate 15 mmol in sodium chloride 0.9 % 100 mL infusion, 15 mmol, Intravenous, PRN **OR** sodium phosphates 45 mmol in sodium chloride 0.9 % 500 mL IVPB, 45 mmol, Intravenous, PRN **OR** sodium phosphates 30 mmol in sodium chloride 0.9 % 250 mL IVPB, 30 mmol, Intravenous, PRN **OR** sodium phosphates 15 mmol in sodium chloride 0.9 % 250 mL IVPB, 15 mmol, Intravenous, PRN, Jelena Sanchez MD  •  spironolactone (ALDACTONE) tablet 25 mg, 25 mg, Oral, Daily, Yomi Ramirez MD, 25 mg at 02/11/18 0913  •  traMADol (ULTRAM) tablet 25 mg, 25 mg, Oral, Q8H PRN, Charles Deras MD, 25 mg at 02/11/18 0914  •  warfarin (COUMADIN) - no scheduled doses (Pharmacy dosing per daily INR), , Does not apply, Daily, Minesh Gomez IV, [facility], Stopped at 02/10/18 1811    Prescriptions Prior to Admission   Medication Sig Dispense Refill Last Dose   • albuterol (PROVENTIL) (2.5 MG/3ML) 0.083% nebulizer solution Take 2.5 mg by nebulization As Needed for wheezing or shortness of air.   Taking   • budesonide-formoterol (SYMBICORT) 160-4.5 MCG/ACT inhaler Inhale 1-2 puffs 2 (Two) Times a Day. In morning and evening for 90 days    Taking   • Calcium Carb-Cholecalciferol (CALCIUM + D3) 600-200 MG-UNIT tablet Take 1 tablet by mouth Daily.   Taking   • carvedilol (COREG) 12.5 MG tablet Take 1 tablet by mouth 2 (Two) Times a Day With Meals for 30 days. 60 tablet 0    • clonazePAM (KlonoPIN) 2 MG tablet Take 1 tablet by mouth 3 (Three) Times a Day. 90 tablet 2    • Ferrous Sulfate (IRON) 325 (65 FE) MG tablet Take 325 mg by mouth Daily.   Taking   • furosemide (LASIX) 20 MG tablet Take 1 tablet by mouth 2 (Two) Times a Day. 60 tablet 0    • hydrALAZINE (APRESOLINE) 25 MG tablet Take 1 tablet by mouth 3 (Three) Times a Day. 90 tablet 0    • HYDROcodone-acetaminophen (NORCO) 5-325 MG per tablet Take 1  tablet by mouth Every 6 (Six) Hours As Needed for Severe Pain  for up to 8 doses. 8 tablet 0    • hydroxychloroquine (PLAQUENIL) 200 MG tablet Take 1 tablet by mouth Every Morning. 30 tablet 0    • nitroglycerin (NITROSTAT) 0.4 MG SL tablet 1 under the tongue as needed for angina, may repeat q5mins for up three doses 30 tablet 3 Taking   • Probiotic Product (PROBIOTIC PO) Take 1 capsule by mouth daily. 20 billion cell; for 30 days   Taking   • rosuvastatin (CRESTOR) 10 MG tablet Take 1 tablet by mouth Every Night. For 90 days 30 tablet 0    • tiotropium (SPIRIVA) 18 MCG per inhalation capsule Place 2 puffs (1 capsule total) into inhaler and inhale once daily. For 90 days 90 capsule 3 Taking   • warfarin (COUMADIN) 2 MG tablet Take 1 tablet (2 mg) daily for 2 days, then 3mg tablet for 1 day, then repeat 30 tablet 0    • warfarin (COUMADIN) 3 MG tablet Take 1 tablet by mouth Every 3 days. Take 2mg tablet daily for 2 days then 3mg tablet for 1 day then repeat. 30 tablet 0        Review of Systems:    Constitutional-- No Fever, chills or sweats. + significant change in weight  Eye-- no diplopia, no conjunctivitis  ENT-- No new hearing or throat complaints.  No epistaxis or oral sores. No odynophagia or dysphagia. No headache, photophobia or neck stiffness.  CV-- No chest pain/palpitations. + soa & edema  Resp-- + SOB/cough. NO  Hemoptysis  GI- + nausea. NO vomiting or diarrhea.  No hematochezia, melena, or hematemesis.  -- No dysuria, hematuria, or flank pain. No lower tract obstructive symptoms  Skin-- No rash, warm and dry  Lymph- no painful or swollen lymph nodes in neck/axilla or groin.   Heme- No active bruising or bleeding; no Hx of DVT or PE.  MS-- no swelling or pain in the joints  Neuro-- No acute focal weakness or numbness in the arms or legs.  No seizures. + FATIGUE/WEAKNESS.  Psych--No anxiety or depression  Endo--No cold or heat intolerance.  No polyuria, polydipsia, or polyphagia    Full review of  "systems reviewed and negative otherwise for acute complaints    Physical Exam:   Vital Signs   Blood pressure 110/57, pulse 79, temperature 97.7 °F (36.5 °C), temperature source Oral, resp. rate 18, height 175.3 cm (69\"), weight 74.9 kg (165 lb 3.2 oz), SpO2 95 %, not currently breastfeeding.     GENERAL: Awake and alert, in no acute distress. LOOKS OLDER THAN AGE. APPEARS CHRONICALLY ILL.   HEENT: Normocephalic, atraumatic.  PER.  No conjunctivitis. No icterus. Oropharynx clear without evidence of thrush or exudate. No evidence of peridontal disease.    NECK: Supple without nuchal rigidity. No mass.  LYMPH: No painful cervical, axillary or inguinal lymphadenopathy.  HEART: RRR; + murmur, rubs, gallops. No bruits in neck, abdomen, or groins. 2+ edema  LUNGS: DULLNESS BASES. FEW RHONCHI.  ABDOMEN: Soft, nontender, nondistended. Positive bowel sounds. No rebound or guarding. NO mass or HSM.  JOINTS:  Full range of motion, no redness or tenderness.  EXT:  No cyanosis/clubbing.   :  BLADDER NOT DISTENDED.  SKIN: Warm and dry without rash  NEURO: Oriented to PPT. No focal neurological deficits. Strength equal bilateral  PSYCHIATRIC: Normal insight and judgement. Cooperative with PE    Laboratory Data    Results from last 7 days  Lab Units 02/09/18  0611 02/08/18  1844 02/04/18  2103   HEMOGLOBIN g/dL 9.8* 10.3* 8.8*   HEMATOCRIT % 33.6* 34.4* 27.7*       Results from last 7 days  Lab Units 02/11/18  0456 02/10/18  0607 02/09/18  0611 02/08/18  2314 02/08/18  1844   SODIUM mmol/L 133 133 139  --  139   POTASSIUM mmol/L 3.9 4.1 4.1  --  3.9   CHLORIDE mmol/L 97* 101 104  --  103   CO2 mmol/L 30.0 28.0 31.0  --  25.0   BUN mg/dL 45* 45* 40*  --  38*   CREATININE mg/dL 2.70* 2.30* 1.80*  --  1.70*   CALCIUM mg/dL 8.5* 8.0* 8.8  --  9.1   PHOSPHORUS mg/dL  --   --  5.1  --   --    MAGNESIUM mg/dL  --   --  2.9* 1.9 1.9   ALBUMIN g/dL  --  2.80* 3.20  --  3.50       Results from last 7 days  Lab Units 02/11/18  0456 "   GLUCOSE mg/dL 96       Results from last 7 days  Lab Units 02/08/18  2113   COLOR UA  Yellow   CLARITY UA  Cloudy*   PH, URINE  5.5   SPECIFIC GRAVITY, URINE  1.015   GLUCOSE UA  Negative   KETONES UA  Negative   BILIRUBIN UA  Negative   PROTEIN UA  100 mg/dL (2+)*   BLOOD UA  Negative   LEUKOCYTES UA  Negative   NITRITE UA  Negative       Results from last 7 days  Lab Units 02/10/18  0607   ALK PHOS U/L 97   BILIRUBIN mg/dL 0.3   ALT (SGPT) U/L 117*   AST (SGOT) U/L 37*     Estimated Creatinine Clearance: 26.9 mL/min (by C-G formula based on Cr of 2.7).    Radiology:    Impression: FAHEEM LIKELY SECONDARY TO DIURESIS AND CARDIORENAL SYNDROME WITH HYPOTENSION. STAGE 3 CKD. ANEMIA. EDEMA WITH DECREASED ONCOTIC PRESSURE.      PLAN: Thank you for asking us to see Ashely Roldan, I recommend the following: PRESENT DIURETIC AND DOBUTAMINE. IV ALBUMIN X 1. CHECK P/C RATIO AND CPK. WILL FOLLOW. PROGNOSIS POOR.       Minesh Nichole MD  2/11/2018  9:20 AM

## 2018-02-11 NOTE — THERAPY EVALUATION
Acute Care - Occupational Therapy Initial Evaluation  [facility]     Patient Name: Ashely Roldan  : 1960  MRN: [id number]  Today's Date: 2018  Onset of Illness/Injury or Date of Surgery Date: 02/10/18  Date of Referral to OT: 02/10/18  Referring Physician: MD Braeden    Admit Date: 2018       ICD-10-CM ICD-9-CM   1. Respiratory failure, unspecified chronicity, unspecified whether with hypoxia or hypercapnia J96.90 518.81   2. COPD exacerbation J44.1 491.21   3. Acute on chronic congestive heart failure, unspecified congestive heart failure type I50.9 428.0   4. Tobacco abuse Z72.0 305.1   5. Medical non-compliance Z91.19 V15.81   6. Impaired mobility and ADLs Z74.09 799.89     Patient Active Problem List   Diagnosis   • Anxiety   • Arthritis   • Panlobular emphysema   • Reactive depression   • Gastritis   • Heart attack   • Ischemic cardiomyopathy   • VHD (valvular heart disease)   • Osteoporosis   • PVD (peripheral vascular disease)   • Allergic rhinitis   • Lupus (systemic lupus erythematosus)   • TIA (transient ischemic attack)   • Coronary artery disease involving native coronary artery of native heart with angina pectoris   • Cough   • History of mitral valve replacement with mechanical valve   • Anemia of chronic kidney failure   • Insomnia   • COPD exacerbation   • Tobacco abuse   • AICD (automatic cardioverter/defibrillator) present   • Noncompliance   • Blunt trauma of multiple sites   • Chronic anticoagulation   • Closed fracture of rib with flail chest   • CKD (chronic kidney disease), stage IV   • Acute systolic congestive heart failure   • Acute respiratory failure with hypoxia and hypercapnia   • Elevated LFTs     Past Medical History:   Diagnosis Date   • Acute respiratory failure with hypoxia 2017   • Allergic rhinitis 2014   • Anemia    • Anxiety    • Arthritis    • CAD (coronary artery disease)    • CHF (congestive heart failure)    • CKD (chronic kidney disease)     • CKD (chronic kidney disease), stage IV 9/5/2017   • Colitis, Clostridium difficile    • COPD (chronic obstructive pulmonary disease)    • Coronary atherosclerosis    • Depression    • Emphysema of lung    • GERD (gastroesophageal reflux disease)    • Heart attack    • Heart murmur    • Hematoma of hip    • Hip fracture    • Hyperlipidemia    • Hypertension     benign essential   • Ischemic cardiomyopathy 08/01/2014    ef 29% s/p ICD   • Knee injury    • Lung disease    • Mitral valve disorder 03/28/2013   • Mitral valve insufficiency     s/p prosthetic ATS valve replacement   • Osteoporosis    • Postmenopausal     natural   • PVD (peripheral vascular disease)    • Pyelonephritis    • Renal failure    • Renal failure    • Renal failure, acute    • Syncope    • Systemic lupus erythematosus    • TIA (transient ischemic attack) 04/04/2012   • UTI (urinary tract infection)    • Valvular heart disease    • Wrist fracture      Past Surgical History:   Procedure Laterality Date   • CARDIAC DEFIBRILLATOR PLACEMENT     • CHOLECYSTECTOMY  2010   • CORONARY ARTERY BYPASS GRAFT  2011 4 aortic bypasses, abdominal aorta; 2011-mitral valve; 2010-triple bypass   • ENDOSCOPY  10/23/2014    Dr. Brand; retained food in stomach; he dilated her esophagus; 5-30-14 hiatus hernia, gastritis   • JOINT REPLACEMENT Right 06/25/2015    Dr. Fitzgerald; first surgery 1-29-14; redo 5-4-15   • MITRAL VALVE REPLACEMENT      MECHANICAL   • TOTAL HIP ARTHROPLASTY Right     3 times within 8 months          OT ASSESSMENT FLOWSHEET (last 72 hours)      OT Evaluation       02/11/18 1143 02/10/18 1550 02/09/18 1229 02/08/18 2218       Rehab Evaluation    Document Type evaluation  -HANNAH        Subjective Information agree to therapy;complains of;pain  -HANNAH        Evaluation Not Performed  other (see comments)   RN just changed the pt's BLE dressings, recommended follow up tomorrow. PT agreed.  -MC       Patient Effort, Rehab Treatment adequate  -HANNAH         Symptoms Noted Comment Pt. declined to stand or transfer due to foot pain.  -HANNAH        General Information    Patient Profile Review yes  -HANNAH        Onset of Illness/Injury or Date of Surgery Date 02/10/18  -HANNAH        Referring Physician MD Braeden  -HANNAH        General Observations Noted blisters on pt. toes. Pt. supine in bed with legs elevated and in wraps.  IV/catheter and 02.  -HANNAH        Pertinent History Of Current Problem Pt. admitted with SOA, lethargy and fatigue.  Pt. on arrival noted with acute respitory failure with hypoxia and hypercapnea, BLE edema.  COPD exacerbation and A on C HF.    -HANNAH        Precautions/Limitations fall precautions;oxygen therapy device and L/min  -HANNAH        Prior Level of Function independent:;all household mobility;ADL's;cooking;mod assist:;cleaning;dependent:;driving;shopping   Prior to last admit, recently not mobilizing or HM task  -HANNAH        Equipment Currently Used at Home shower chair;commode;oxygen;walker, rolling  -HANNAH  oxygen;walker, rolling  -EE walker, rolling  -AC     Plans/Goals Discussed With patient;agreed upon  -HANNAH        Risks Reviewed patient:;LOB;increased discomfort;change in vital signs;lines disloged  -HANNAH        Benefits Reviewed patient:;improve function;increase independence;increase strength;increase balance;increase knowledge  -HANNAH        Barriers to Rehab previous functional deficit  -HANNAH        Living Environment    Lives With alone;child(kraig), dependent   Typically alone, but pt.'s son living with her at this time.  -HANNAH  alone;other (see comments)   Her son has been staying with pt. prior to admission  -EE alone  -AC     Living Arrangements apartment  -HANNAH  apartment  -EE apartment  -AC     Home Accessibility tub/shower is not walk in  -HANNAH   bed and bath on same level  -AC     Stair Railings at Home    none  -     Type of Financial/Environmental Concern    none  -AC     Transportation Available   car;family or friend will provide  -EE car;family or friend  will provide  -AC     Living Environment Comment   Pt. lives in an apartment in [facility].  Pt. stated she normally lives alone, however her son has been staying with her recently.  -EE      Clinical Impression    Date of Referral to OT 02/10/18  -HANNAH        OT Diagnosis Impaired ADL and mobility indep. due to wounds on LE's with edema and pain along with weakness and limited activity tolerance.  -HANNAH        Patient/Family Goals Statement Pt. wants LE's to heel and lessen pain so can be more indep.  -HANNAH        Criteria for Skilled Therapeutic Interventions Met yes;treatment indicated  -HANNAH        Rehab Potential fair, will monitor progress closely   fair until pt. tolerates WB  -HANNAH        Therapy Frequency daily  -HANNAH        Anticipated Equipment Needs At Discharge --   none at this time, may need dressing AE  -HANNAH        Anticipated Discharge Disposition skilled nursing facility;home with home health;home with assist   pending progress and ability to mobilize  -HANNAH        Functional Level Prior    Ambulation    1-->assistive equipment  -AC     Transferring    1-->assistive equipment  -AC     Toileting    0-->independent  -AC     Bathing    0-->independent  -AC     Dressing    0-->independent  -AC     Eating    0-->independent  -AC     Communication    0-->understands/communicates without difficulty  -AC     Swallowing    0-->swallows foods/liquids without difficulty  -AC     Prior Functional Level Comment    walks with rolling walker  -AC     Vital Signs    Pre Systolic BP Rehab 108  -HANNAH        Pre Treatment Diastolic BP 65  -HANNAH        Post Systolic BP Rehab 116  -HANNAH        Post Treatment Diastolic BP 80  -HANNAH        Pretreatment Heart Rate (beats/min) 79  -HANNAH        Posttreatment Heart Rate (beats/min) 80  -HANNAH        Pre SpO2 (%) 96  -HANNAH        O2 Delivery Pre Treatment supplemental O2  -HANNAH        Intra SpO2 (%) --   pt. kept taking 02 off and would drop to 85-88%, cues love  -HANNAH        Post SpO2 (%) 94  -HANNAH         O2 Delivery Post Treatment supplemental O2  -HANNAH        Pre Patient Position Supine  -HANNAH        Intra Patient Position Sitting  -HANNAH        Post Patient Position Supine  -HANNAH        Pain Assessment    Pain Assessment Encarnacion-Rebolledo FACES  -HANNAH        Encarnacion-Baker FACES Pain Rating 4   pt. appears to overrate pain  -HANNAH        Pain Score 8  -HANNAH        Post Pain Score 8  -HANNAH        Pain Type Acute pain  -HANNAH        Pain Location Leg  -HANNAH        Pain Orientation Right;Left  -HANNAH        Pain Intervention(s) Ambulation/increased activity;Repositioned   premedicated, alerted nurse  -HANNAH        Response to Interventions tolerated  -AHNNAH        Vision Assessment/Intervention    Visual Impairment WFL   for eval purposes  -HANNAH        Cognitive Assessment/Intervention    Current Cognitive/Communication Assessment impaired   very drowsy intially difficulty giving history  -HANNAH        Orientation Status oriented x 4  -HANNAH        Follows Commands/Answers Questions 100% of the time;able to follow single-step instructions;needs cueing;needs increased time;needs repetition  -        Personal Safety mild impairment  -HANNAH        Personal Safety Interventions fall prevention program maintained;gait belt;nonskid shoes/slippers when out of bed   when up  -HANNAH        ROM (Range of Motion)    General ROM upper extremity range of motion deficits identified  -        General ROM Detail AROM intact, PROM 50% shoulders.  -HANNAH        MMT (Manual Muscle Testing)    General MMT Assessment upper extremity strength deficits identified  -        General MMT Assessment Detail sh. 3+/5, distally 4+/5  -HANNAH        Bed Mobility, Assessment/Treatment    Bed Mobility, Assistive Device bed rails;head of bed elevated  -HANNAH        Bed Mobility, Scoot/Bridge, Antelope supervision required;verbal cues required  -HANNAH        Bed Mob, Supine to Sit, Antelope minimum assist (75% patient effort);verbal cues required  -HANNAH        Bed Mob, Sit to Supine, Antelope minimum  assist (75% patient effort);verbal cues required  -HANNAH        Bed Mobility, Safety Issues decreased use of arms for pushing/pulling;decreased use of legs for bridging/pushing  -HANNAH        Bed Mobility, Impairments pain;strength decreased  -HANNAH        Bed Mobility, Comment extra time needed to complete, cues for hand placement.  -HANNAH        Transfer Assessment/Treatment    Transfer, Comment Pt. declined due to LE pain.  -HANNAH        Lower Body Dressing Assessment/Training    LB Dressing Assess/Train, Comment Pt. was unable to cross legs up or reach down post mid shin area.  Max assist demonstrated at this time.  -HANNAH        Motor Skills/Interventions    Additional Documentation Balance Skills Training (Group)  -HANNAH        Balance Skills Training    Sitting-Level of Assistance Close supervision  -HANNAH        Sitting-Balance Support Feet unsupported;No upper extremity supported  -HANNAH        Positioning and Restraints    Pre-Treatment Position in bed  -HANNAH        Post Treatment Position bed  -HANNAH        In Bed sitting;call light within reach;encouraged to call for assist;legs elevated   did not change bed alarm  -HANNAH          User Key  (r) = Recorded By, (t) = Taken By, (c) = Cosigned By    Initials Name Effective Dates    HANNAH Cardenas, OT 06/22/15 -     CAITIE Mcfarlane, PT 03/14/16 -     KIM Brewer MSW 03/14/16 -     CARYN Carrera, RN 07/03/17 -            Occupational Therapy Education     Title: PT OT SLP Therapies (Active)     Topic: Occupational Therapy (Active)     Point: ADL training (Done)    Description: Instruct learner(s) on proper safety adaptation and remediation techniques during self care or transfers.   Instruct in proper use of assistive devices.    Learning Progress Summary    Learner Readiness Method Response Comment Documented by Status   Patient Acceptance E VU,NR Role of OT, bed mobility, benefits activity, need to leave O2 due stats dropping and benefit using wash cloth to scratch skin over  nails due pt. scratching self all over and making arm bleed.  02/11/18 1325 Done               Point: Precautions (Done)    Description: Instruct learner(s) on prescribed precautions during self-care and functional transfers.    Learning Progress Summary    Learner Readiness Method Response Comment Documented by Status   Patient Acceptance E VU,NR Role of OT, bed mobility, benefits activity, need to leave O2 due stats dropping and benefit using wash cloth to scratch skin over nails due pt. scratching self all over and making arm bleed.  02/11/18 1325 Done                      User Key     Initials Effective Dates Name Provider Type Discipline     06/22/15 -  Mi Cardenas OT Occupational Therapist OT                  OT Recommendation and Plan  Anticipated Equipment Needs At Discharge:  (none at this time, may need dressing AE)  Anticipated Discharge Disposition: skilled nursing facility, home with home health, home with assist (pending progress and ability to mobilize)  Therapy Frequency: daily  Plan of Care Review  Plan Of Care Reviewed With: patient  Progress: progress towards functional goals is fair  Outcome Summary/Follow up Plan: OT evaluation complete as pt. tolerated.  Due to LE pain pt. refused to stand or transfer today.  Pt. min assist with bed mobility.  Noted shouder and LE weakness.  Pt. limited historian.  Skilled OT appriopriate to address weakness and limited activity tolerance to increase ADL and transfer indep.          OT Goals       02/11/18 1327          Bed Mobility OT LTG    Bed Mobility OT LTG, Date Established 02/11/18  -HANNAH      Bed Mobility OT LTG, Time to Achieve 1 wk  -HANNAH      Bed Mobility OT LTG, Activity Type all bed mobility  -HANNAH      Bed Mobility OT LTG, [place] Level conditional independence  -HANNAH      Bed Mobility OT LTG, Assist Device bed rails  -HANNAH      Bed Mobility OT LTG, Outcome goal ongoing  -HANNAH      Transfer Training OT LTG    Transfer Training OT LTG, Date  Established 02/11/18  -HANNAH      Transfer Training OT LTG, Time to Achieve 1 wk  -HANNAH      Transfer Training OT LTG, Activity Type sit to stand/stand to sit;toilet  -HANNAH      Transfer Training OT LTG, [place] Level minimum assist (75% patient effort)  -HANNAH      Transfer Training OT LTG, Assist Device commode, bedside;walker, rolling  -HANNAH      Transfer Training OT LTG, Outcome goal ongoing  -HANNAH      Patient Education OT LTG    Patient Education OT LTG, Date Established 02/11/18  -HANNAH      Patient Education OT LTG, Time to Achieve 1 wk  -HANNAH      Patient Education OT LTG, Education Type written program;HEP;home safety;adaptive equipment mgmt  -HANNAH      Patient Education OT LTG Outcome goal ongoing  -HANNAH      Toileting OT LTG    Toileting Goal OT LTG, Date Established 02/11/18  -HANNAH      Toileting Goal OT LTG, Time to Achieve 1 wk  -HANNAH      Toileting Goal OT LTG, Activity Type toileting  -HANNAH      Toileting Goal OT LTG, [place] Level minimum assist (75% patient effort)  -HANNAH      Toileting Goal OT LTG, Outcome goal ongoing  -HANNAH        User Key  (r) = Recorded By, (t) = Taken By, (c) = Cosigned By    Initials Name Provider Type    HANNAH Cardenas, OT Occupational Therapist                Outcome Measures       02/11/18 1143          How much help from another is currently needed...    Putting on and taking off regular lower body clothing? 2  -HANNAH      Bathing (including washing, rinsing, and drying) 3  -HANNAH      Toileting (which includes using toilet bed pan or urinal) 2  -HANNAH      Putting on and taking off regular upper body clothing 3  -HANNAH      Taking care of personal grooming (such as brushing teeth) 4   sitting post setup  -HANNAH      Eating meals 4  -HANNAH      Score 18  -HANNAH      Functional Assessment    Outcome Measure Options AM-PAC 6 Clicks Daily Activity (OT)  -HANNAH        User Key  (r) = Recorded By, (t) = Taken By, (c) = Cosigned By    Initials Name Provider Type    HANNAH Cardenas, OT Occupational Therapist           Time Calculation:   OT Start Time: 1143    Therapy Charges for Today     Code Description Service Date Service Provider Modifiers Qty    [id number] HC OT EVAL LOW COMPLEXITY 4 2/11/2018 Mi Cardenas OT GO 1               Mi Cardenas OT  2/11/2018

## 2018-02-11 NOTE — THERAPY WOUND CARE TREATMENT
Acute Care - Wound/Debridement Initial Evaluation  [facility]     Patient Name: Ashely Roldan  : 1960  MRN: [id number]  Today's Date: 2018  Onset of Illness/Injury or Date of Surgery Date: 18  Date of Referral to PT: 18   Referring Physician: MD Braeden      Admit Date: 2018    Visit Dx:    ICD-10-CM ICD-9-CM   1. Respiratory failure, unspecified chronicity, unspecified whether with hypoxia or hypercapnia J96.90 518.81   2. COPD exacerbation J44.1 491.21   3. Acute on chronic congestive heart failure, unspecified congestive heart failure type I50.9 428.0   4. Tobacco abuse Z72.0 305.1   5. Medical non-compliance Z91.19 V15.81   6. Impaired mobility and ADLs Z74.09 799.89       Patient Active Problem List   Diagnosis   • Anxiety   • Arthritis   • Panlobular emphysema   • Reactive depression   • Gastritis   • Heart attack   • Ischemic cardiomyopathy   • VHD (valvular heart disease)   • Osteoporosis   • PVD (peripheral vascular disease)   • Allergic rhinitis   • Lupus (systemic lupus erythematosus)   • TIA (transient ischemic attack)   • Coronary artery disease involving native coronary artery of native heart with angina pectoris   • Cough   • History of mitral valve replacement with mechanical valve   • Anemia of chronic kidney failure   • Insomnia   • COPD exacerbation   • Tobacco abuse   • AICD (automatic cardioverter/defibrillator) present   • Noncompliance   • Blunt trauma of multiple sites   • Chronic anticoagulation   • Closed fracture of rib with flail chest   • CKD (chronic kidney disease), stage IV   • Acute systolic congestive heart failure   • Acute on chronic respiratory failure with hypoxia and hypercapnia   • Elevated LFTs        Past Medical History:   Diagnosis Date   • Acute respiratory failure with hypoxia 2017   • Allergic rhinitis 2014   • Anemia    • Anxiety    • Arthritis    • CAD (coronary artery disease)    • CHF (congestive heart failure)    • CKD  (chronic kidney disease)    • CKD (chronic kidney disease), stage IV 9/5/2017   • Colitis, Clostridium difficile    • COPD (chronic obstructive pulmonary disease)    • Coronary atherosclerosis    • Depression    • Emphysema of lung    • GERD (gastroesophageal reflux disease)    • Heart attack    • Heart murmur    • Hematoma of hip    • Hip fracture    • Hyperlipidemia    • Hypertension     benign essential   • Ischemic cardiomyopathy 08/01/2014    ef 29% s/p ICD   • Knee injury    • Lung disease    • Mitral valve disorder 03/28/2013   • Mitral valve insufficiency     s/p prosthetic ATS valve replacement   • Osteoporosis    • Postmenopausal     natural   • PVD (peripheral vascular disease)    • Pyelonephritis    • Renal failure    • Renal failure    • Renal failure, acute    • Syncope    • Systemic lupus erythematosus    • TIA (transient ischemic attack) 04/04/2012   • UTI (urinary tract infection)    • Valvular heart disease    • Wrist fracture         Past Surgical History:   Procedure Laterality Date   • CARDIAC DEFIBRILLATOR PLACEMENT     • CHOLECYSTECTOMY  2010   • CORONARY ARTERY BYPASS GRAFT  2011 4 aortic bypasses, abdominal aorta; 2011-mitral valve; 2010-triple bypass   • ENDOSCOPY  10/23/2014    Dr. Brand; retained food in stomach; he dilated her esophagus; 5-30-14 hiatus hernia, gastritis   • JOINT REPLACEMENT Right 06/25/2015    Dr. Fitzgerlad; first surgery 1-29-14; redo 5-4-15   • MITRAL VALVE REPLACEMENT      MECHANICAL   • TOTAL HIP ARTHROPLASTY Right     3 times within 8 months               LDA Wound       02/11/18 1505          Rash 02/09/18 0830 Bilateral medial groin macular    Rash - Properties Group Date first assessed: 02/09/18  -CP Time first assessed: 0830  -CP Side: Bilateral  -CP Orientation: medial  -CP Location: groin  -CP Rash Type: macular  -CP    Wound 02/09/18 0830 Bilateral lower leg blister(s)    Wound - Properties Group Date first assessed: 02/09/18  -CP Time first assessed: 0830   -CP Side: Bilateral  -CP Orientation: lower  -CP Location: leg  -CP Type: blister(s)  -CP    Wound WDL ex   periwound redness  -      Dressing Appearance dried drainage;moist drainage;intact  -MC      Base debrided;moist;pink;reddened;slough  -      Periwound Area intact;pink;dry;redness  -MC      Edges irregular;open  -MC      Length (cm) --   scattered open areas, some intact bullae  -      Drainage Characteristics/Odor serosanguineous;no odor  -      Drainage Amount small  -      Wound Cleaning cleansed with;other (see comments)   blue wipes  -      Wound Interventions debrided  -      Dressing Dressing applied;silver impregnated dressing;low-adherent;foam;gauze   mepilex Ag foam, kerlix, tape, spandage  -      Periwound Care cleansed with pH balanced cleanser;dry periwound area maintained  -        User Key  (r) = Recorded By, (t) = Taken By, (c) = Cosigned By    Initials Name Provider Type     IAIN Langley Nurse Practitioner     Nina Mcfarlane, PT Physical Therapist              WOUND DEBRIDEMENT  Debridement Site 1  Location- Site 1: BLE  Selective Debridement- Site 1: Wound Surface >20cmsq  Instruments- Site 1: other (comment) (4x4 gauze pads)  Excised Tissue Description- Site 1: minimum, slough, moderate, other (comment) (mod nonviable skin along edges of previous bullae)  Bleeding- Site 1: none                  PT ASSESSMENT (last 72 hours)      PT Evaluation       02/11/18 1505 02/11/18 1143    Rehab Evaluation    Document Type evaluation;therapy note (daily note)   wound care  - evaluation  -    Subjective Information agree to therapy;complains of;pain  - agree to therapy;complains of;pain  -HANNAH    Patient Effort, Rehab Treatment  adequate  -HANNAH    Symptoms Noted Comment  Pt. declined to stand or transfer due to foot pain.  -HANNAH    General Information    Patient Profile Review yes  -MC yes  -HANNAH    Onset of Illness/Injury or Date of Surgery Date 02/08/18  -  02/10/18  -    Referring Physician MD Braeden  - MD Braeden  -HANNAH    General Observations  Noted blisters on pt. toes. Pt. supine in bed with legs elevated and in wraps.  IV/catheter and 02.  -HANNAH    Pertinent History Of Current Problem Pt admit with SOA, lethargy, fatigue. Pt with acute respiratory failure, ESRD, CHF, and COPD exacerbation. Pt with c/o BLE edema and blistering.  - Pt. admitted with SOA, lethargy and fatigue.  Pt. on arrival noted with acute respitory failure with hypoxia and hypercapnea, BLE edema.  COPD exacerbation and A on C HF.    -HANNAH    Precautions/Limitations  fall precautions;oxygen therapy device and L/min  -HANNAH    Prior Level of Function  independent:;all household mobility;ADL's;cooking;mod assist:;cleaning;dependent:;driving;shopping   Prior to last admit, recently not mobilizing or HM task  -    Equipment Currently Used at Home  shower chair;commode;oxygen;walker, rolling  -    Plans/Goals Discussed With patient;agreed upon  - patient;agreed upon  -    Risks Reviewed patient:;increased discomfort  - patient:;LOB;increased discomfort;change in vital signs;lines disloged  -    Benefits Reviewed patient:;improve skin integrity  - patient:;improve function;increase independence;increase strength;increase balance;increase knowledge  -    Barriers to Rehab medically complex;previous functional deficit  - previous functional deficit  -    Living Environment    Lives With  alone;child(kraig), dependent   Typically alone, but pt.'s son living with her at this time.  -    Living Arrangements  apartment  -HANNAH    Home Accessibility  tub/shower is not walk in  -HANNAH    Clinical Impression    Date of Referral to PT 02/09/18  -     PT Diagnosis impaired skin integrity  -     Patient/Family Goals Statement Pt wants her legs to feel better.  -     Criteria for Skilled Therapeutic Interventions Met yes;treatment indicated  -     Rehab Potential fair, will monitor progress  closely  -     Vital Signs    Pre Systolic BP Rehab  108  -HANNAH    Pre Treatment Diastolic BP  65  -HANNAH    Post Systolic BP Rehab  116  -HANNAH    Post Treatment Diastolic BP  80  -HANNAH    Pretreatment Heart Rate (beats/min)  79  -HANNAH    Posttreatment Heart Rate (beats/min)  80  -HANNAH    Pre SpO2 (%)  96  -HANNAH    O2 Delivery Pre Treatment  supplemental O2  -HANNAH    Intra SpO2 (%)  --   pt. kept taking 02 off and would drop to 85-88%, cues love  -HANNAH    Post SpO2 (%)  94  -HANNAH    O2 Delivery Post Treatment  supplemental O2  -HANNAH    Pre Patient Position  Supine  -HANNAH    Intra Patient Position  Sitting  -HANNAH    Post Patient Position  Supine  -HANNAH    Pain Assessment    Pain Assessment Encarnacion-Rebolledo FACES  - Encarnacion-Cheshire FACES  -    Encarnacion-Baker FACES Pain Rating 6  -MC 4   pt. appears to overrate pain  -HANNAH    Pain Score  8  -HANNAH    Post Pain Score  8  -HANNAH    Pain Type Acute pain  - Acute pain  -    Pain Location Leg  - Leg  -    Pain Orientation Right;Left  - Right;Left  -    Pain Intervention(s)  Ambulation/increased activity;Repositioned   premedicated, alerted nurse  -    Response to Interventions  tolerated  -    Vision Assessment/Intervention    Visual Impairment  WFL   for eval purposes  -    Cognitive Assessment/Intervention    Current Cognitive/Communication Assessment  impaired   very drowsy intially difficulty giving history  -    Orientation Status  oriented x 4  -HANNAH    Follows Commands/Answers Questions  100% of the time;able to follow single-step instructions;needs cueing;needs increased time;needs repetition  -    Personal Safety  mild impairment  -    Personal Safety Interventions  fall prevention program maintained;gait belt;nonskid shoes/slippers when out of bed   when up  -HANNAH    ROM (Range of Motion)    General ROM  upper extremity range of motion deficits identified  -    General ROM Detail  AROM intact, PROM 50% shoulders.  -HANNAH    MMT (Manual Muscle Testing)    General MMT Assessment  upper  extremity strength deficits identified  -    General MMT Assessment Detail  sh. 3+/5, distally 4+/5  -    Bed Mobility, Assessment/Treatment    Bed Mobility, Assistive Device  bed rails;head of bed elevated  -    Bed Mobility, Scoot/Bridge, [place]  supervision required;verbal cues required  -    Bed Mob, Supine to Sit, [place]  minimum assist (75% patient effort);verbal cues required  -HANNAH    Bed Mob, Sit to Supine, [place]  minimum assist (75% patient effort);verbal cues required  -    Bed Mobility, Safety Issues  decreased use of arms for pushing/pulling;decreased use of legs for bridging/pushing  -    Bed Mobility, Impairments  pain;strength decreased  -    Bed Mobility, Comment  extra time needed to complete, cues for hand placement.  -    Transfer Assessment/Treatment    Transfer, Comment  Pt. declined due to LE pain.  -    Motor Skills/Interventions    Additional Documentation  Balance Skills Training (Group)  -    Balance Skills Training    Sitting-Level of Assistance  Close supervision  -    Sitting-Balance Support  Feet unsupported;No upper extremity supported  -    Positioning and Restraints    Pre-Treatment Position in bed  - in bed  -    Post Treatment Position bed  - bed  -HANNAH    In Bed supine;call light within reach;encouraged to call for assist;heels elevated  - sitting;call light within reach;encouraged to call for assist;legs elevated   did not change bed alarm  -      02/10/18 1550 02/09/18 1229    Rehab Evaluation    Evaluation Not Performed other (see comments)   RN just changed the pt's BLE dressings, recommended follow up tomorrow. PT agreed.  -     General Information    Equipment Currently Used at Home  oxygen;walker, rolling  -    Living Environment    Lives With  alone;other (see comments)   Her son has been staying with pt. prior to admission  -    Living Arrangements  apartment  -    Transportation Available  car;family or friend  will provide  -    Living Environment Comment  Pt. lives in an apartment in [facility].  Pt. stated she normally lives alone, however her son has been staying with her recently.  -EE      02/08/18 4992       General Information    Equipment Currently Used at Home walker, rolling  -AC     Living Environment    Lives With alone  -AC     Living Arrangements apartment  -AC     Home Accessibility bed and bath on same level  -AC     Stair Railings at Home none  -AC     Type of Financial/Environmental Concern none  -AC     Transportation Available car;family or friend will provide  -       User Key  (r) = Recorded By, (t) = Taken By, (c) = Cosigned By    Initials Name Provider Type    HANNAH Mi Cardenas, OT Occupational Therapist    MC Nina Mcfarlane, PT Physical Therapist    EE Martina Brewer, MSW     AC Beatrice Carrera, RN Registered Nurse                Recommendation and Plan  Anticipated Discharge Disposition: skilled nursing facility    Plan Of Care Reviewed With: patient       Outcome Summary/Follow up Plan: Pt presents with multiple open wounds to BLE s/p burst bullae. Several bullae still intact, but pt reports she has not noticed any more areas, and her BLE are not swollen at this time. Pt will benefit from debridement and advanced dressings management to manage open wounds and promote healing. D/t the pt's evolving wound symptoms, pt will benefit from skilled PT wound care to promote healing. Once areas are debrided and dressings are seen to be appropriate, PT will likely be able to sign off on the pt's care.             IP PT Goals       02/11/18 1505          Wound Care PT LTG    Wound Care PT LTG 1, Date Established 02/11/18  -      Wound Care PT LTG 1, Time to Achieve 1 wk  -      Wound Care PT LTG 1, Location BLE  -      Wound Care PT LTG 1, No S&S of Infection yes  -      Wound Care PT LTG 1, Decrease Wound Size 25%  -      Wound Care PT LTG 1, Decrease Exudate scant  -       Wound Care PT LTG 1, No New Skin Break Down yes  -      Wound Care PT LTG 1, Education wound care  -      Wound Care PT LTG 1, Education Understanding verbalize understanding  -        User Key  (r) = Recorded By, (t) = Taken By, (c) = Cosigned By    Initials Name Provider Type     Nina Mcfarlane, PT Physical Therapist                Outcome Measures       02/11/18 1143          How much help from another is currently needed...    Putting on and taking off regular lower body clothing? 2  -HANNAH      Bathing (including washing, rinsing, and drying) 3  -HANNAH      Toileting (which includes using toilet bed pan or urinal) 2  -HANNAH      Putting on and taking off regular upper body clothing 3  -HANNAH      Taking care of personal grooming (such as brushing teeth) 4   sitting post setup  -HANNAH      Eating meals 4  -HANNAH      Score 18  -HANNAH      Functional Assessment    Outcome Measure Options AM-PAC 6 Clicks Daily Activity (OT)  -        User Key  (r) = Recorded By, (t) = Taken By, (c) = Cosigned By    Initials Name Provider Type     Mi Cardenas, OT Occupational Therapist              Time Calculation        PT Charges       02/11/18 1505          Time Calculation    Start Time 1505  -      PT Goal Re-Cert Due Date 02/21/18  -        User Key  (r) = Recorded By, (t) = Taken By, (c) = Cosigned By    Initials Name Provider Type     Nina Mcfarlane, PT Physical Therapist            Therapy Charges for Today     Code Description Service Date Service Provider Modifiers Qty    22069432357 HC PT EVAL MOD COMPLEXITY 4 2/11/2018 Nina Mcfarlane, PT GP 1    15989527978 HC NONSELECTIVE DEBRIDEMENT 2/11/2018 Nina Mcfarlane, PT GP 1            PT G-Codes  Outcome Measure Options: AM-PAC 6 Clicks Daily Activity (OT)       Nina Mcfarlane, PT  2/11/2018

## 2018-02-11 NOTE — PROGRESS NOTES
"[facility]  Cardiology Progress Note      Chief Complaint/Reason for service:      · End-stage cardiomyopathy  · Mechanical mitral valve  · Possible LV thrombus       Patient sitting up in bed without complaints.  Reports breathing is better but still \"not great\". She appears more alert today    Past medical, surgical, social and family history reviewed in the patient's electronic medical record.           Vital Sign Min/Max for last 24 hours  Temp  Min: 97.5 °F (36.4 °C)  Max: 97.7 °F (36.5 °C)   BP  Min: 88/61  Max: 117/68   Pulse  Min: 65  Max: 100   Resp  Min: 18  Max: 20   SpO2  Min: 86 %  Max: 100 %   Flow (L/min)  Min: 2  Max: 3      Intake/Output Summary (Last 24 hours) at 02/11/18 1007  Last data filed at 02/11/18 0600   Gross per 24 hour   Intake           509.08 ml   Output              100 ml   Net           409.08 ml           Physical Exam   Constitutional: She is oriented to person, place, and time.   HENT:   Head: Normocephalic.   Neck: No JVD present. Carotid bruit is not present.   Cardiovascular: Normal rate, regular rhythm and intact distal pulses.  Exam reveals no gallop and no friction rub.    Murmur heard.  Pulmonary/Chest: Effort normal and breath sounds normal.   Abdominal: Soft.   Musculoskeletal: She exhibits no edema.   Neurological: She is alert and oriented to person, place, and time.   Skin: Skin is warm and dry. No cyanosis. Nails show no clubbing.       Results Review:   I reviewed the patient's recent labs in the electronic medical record.        Results from last 7 days  Lab Units 02/11/18  0456 02/10/18  0607 02/09/18  0611 02/08/18  2314 02/08/18  1844 02/05/18  0540   SODIUM mmol/L 133 133 139  --  139 136   POTASSIUM mmol/L 3.9 4.1 4.1  --  3.9 3.7   CHLORIDE mmol/L 97* 101 104  --  103 102   BUN mg/dL 45* 45* 40*  --  38* 44*   CREATININE mg/dL 2.70* 2.30* 1.80*  --  1.70* 1.80*   MAGNESIUM mg/dL  --   --  2.9* 1.9 1.9  --        "     Results from last 7 days  Lab Units 02/09/18  0611 02/08/18  1844 02/04/18  2103   WBC 10*3/mm3 6.12 9.44 4.98   HEMOGLOBIN g/dL 9.8* 10.3* 8.8*   HEMATOCRIT % 33.6* 34.4* 27.7*   PLATELETS 10*3/mm3 149* 173 107*       Lab Results   Component Value Date    HGBA1C 5.00 02/04/2017       Lab Results   Component Value Date    CHOL 142 02/03/2018    CHLPL 133 03/02/2016    TRIG 81 02/03/2018    HDL 42 02/03/2018    LDL 54 03/02/2016    LDLDIRECT 96 02/03/2018              Hospital Problem List     * (Principal)Acute respiratory failure with hypoxia and hypercapnia    Anxiety    Ischemic cardiomyopathy (Chronic)    Overview Addendum 2/10/2018 10:50 AM by Baldomero Gusman IV, MD     · History of MI December 2009: s/p stent to RCA and LAD, EF 40%  · CABGx2 March, 2010: SVG to OMB-1, SVG to PDA  · EF 30% post-operatively  · [place] ICD placement, 2011  · Echo (July 2014): Severe global hypokinesis with EF 12%, moderate AI, mechanical mitral valve, moderate to severe TR, RVSP 43mmHg   · Echo (2/4/17): EF 18%, mild to mod AI, mod TR, grossly normal prosthetic mitral valve  · Echo (2/9/18): LVEF 20%. Normal functioning mechanical mitral valve. Cannot exclude LV thrombus.         Lupus (systemic lupus erythematosus) (Chronic)    Coronary artery disease involving native coronary artery of native heart with angina pectoris    Overview Signed 2/10/2018 10:47 AM by Baldomero Gusman IV, MD     · History of MI December 2009: s/p stent to RCA and LAD, EF 40%  · CABG (March, 2010): SVG to OMB-1, SVG to PDA         History of mitral valve replacement with mechanical valve    Overview Addendum 2/11/2018 10:07 AM by IAIN Allred     · Mitral valve replacement with 27mm ATS mechanical valve March 2010  · Echo (02/09/2018): There is right-sided chamber enlargement. Global RV and LV hypokinesia is present. LVEF 20%. Cannot exclude LV thrombus. Normal functioning mechanical mitral valve present. Small pericardial  effusion.         Anemia of chronic kidney failure    COPD exacerbation    Tobacco abuse (Chronic)    Noncompliance    Chronic anticoagulation    Overview Signed 7/16/2016 10:32 PM by IANI Gabriel     Coumadin (Mechanical AVR)         CKD (chronic kidney disease), stage IV (Chronic)    Acute systolic congestive heart failure    Overview Signed 2/10/2018 10:45 AM by Baldomero Gusman IV, MD     · Echo (2/9/18): LVEF 20%. Normal functioning mechanical mitral valve. Cannot exclude LV thrombus.         Elevated LFTs        Patient in + fluid balance.  Not sure strict I & O's being performed as urine output marginal. Creatinine increased form 2.3 to 2.7         · Continue Dobutamine drip  · Strict I & O's  · Daily weights  · Nephrology consulting and to make recommendations on diuretic therapy  · Dr Ramirez to assume care in the am    IIAN Monique  2/11/2018

## 2018-02-11 NOTE — PLAN OF CARE
Problem: Patient Care Overview (Adult)  Goal: Plan of Care Review  Outcome: Ongoing (interventions implemented as appropriate)   02/11/18 1327   Coping/Psychosocial Response Interventions   Plan Of Care Reviewed With patient   Patient Care Overview   Progress progress towards functional goals is fair   Outcome Evaluation   Outcome Summary/Follow up Plan OT evaluation complete as pt. tolerated. Due to LE pain pt. refused to stand or transfer today. Pt. min assist with bed mobility. Noted shouder and LE weakness. Pt. limited historian. Skilled OT appriopriate to address weakness and limited activity tolerance to increase ADL and transfer indep.       Problem: Inpatient Occupational Therapy  Goal: Bed Mobility Goal LTG- OT  Outcome: Ongoing (interventions implemented as appropriate)   02/11/18 1327   Bed Mobility OT LTG   Bed Mobility OT LTG, Date Established 02/11/18   Bed Mobility OT LTG, Time to Achieve 1 wk   Bed Mobility OT LTG, Activity Type all bed mobility   Bed Mobility OT LTG, [place] Level conditional independence   Bed Mobility OT LTG, Assist Device bed rails   Bed Mobility OT LTG, Outcome goal ongoing     Goal: Transfer Training Goal 1 LTG- OT  Outcome: Ongoing (interventions implemented as appropriate)   02/11/18 1327   Transfer Training OT LTG   Transfer Training OT LTG, Date Established 02/11/18   Transfer Training OT LTG, Time to Achieve 1 wk   Transfer Training OT LTG, Activity Type sit to stand/stand to sit;toilet   Transfer Training OT LTG, [place] Level minimum assist (75% patient effort)   Transfer Training OT LTG, Assist Device commode, bedside;walker, rolling   Transfer Training OT LTG, Outcome goal ongoing     Goal: Patient Education Goal LTG- OT  Outcome: Ongoing (interventions implemented as appropriate)   02/11/18 1327   Patient Education OT LTG   Patient Education OT LTG, Date Established 02/11/18   Patient Education OT LTG, Time to Achieve 1 wk   Patient Education OT LTG,  Education Type written program;HEP;home safety;adaptive equipment mgmt   Patient Education OT LTG Outcome goal ongoing     Goal: Toileting Goal LTG- OT  Outcome: Ongoing (interventions implemented as appropriate)   02/11/18 1327   Toileting OT LTG   Toileting Goal OT LTG, Date Established 02/11/18   Toileting Goal OT LTG, Time to Achieve 1 wk   Toileting Goal OT LTG, Activity Type toileting   Toileting Goal OT LTG, [place] Level minimum assist (75% patient effort)   Toileting Goal OT LTG, Outcome goal ongoing

## 2018-02-11 NOTE — PROGRESS NOTES
"Pharmacy Consult  -  Warfarin    Ashely Roldan is a  57 y.o. female   Height - 175.3 cm (69\")  Weight - 70.6 kg (155 lb 9.6 oz)    Consulting Provider: - Hospitalist Group  Indication: - Mechanical Mitral Valve  Goal INR: - 2.5-3.5  Home Regimen:   - 2 mg daily except every third day   - 3 mg every third day; repeating    Bridge Therapy: No     Drug-Drug Interactions with current regimen:   None    Warfarin Dosing During Admission:    Date  2/9 2/10 2/11         INR  4.28 5.94 4.43         Dose  Hold Hold Hold              Education Provided:      Labs:    Results from last 7 days   Lab Units 02/11/18  0456 02/10/18  0607 02/09/18  0851 02/09/18  0611 02/08/18  1844 02/05/18  0540 02/04/18  2103 02/04/18  1503   INR  4.43* 5.94* 4.28* --  --  2.45 --  --    APTT seconds --  --  --  --  --  80.7* 59.2 64.6   HEMOGLOBIN g/dL --  --  --  9.8* 10.3* --  8.8* --    HEMATOCRIT % --  --  --  33.6* 34.4* --  27.7* --    PLATELETS 10*3/mm3 --  --  --  149* 173 --  107* --      Results from last 7 days   Lab Units 02/11/18  0456 02/10/18  0607 02/09/18  0611 02/08/18  1844   SODIUM mmol/L 133 133 139 139   POTASSIUM mmol/L 3.9 4.1 4.1 3.9   CHLORIDE mmol/L 97* 101 104 103   CO2 mmol/L 30.0 28.0 31.0 25.0   BUN mg/dL 45* 45* 40* 38*   CREATININE mg/dL 2.70* 2.30* 1.80* 1.70*   CALCIUM mg/dL 8.5* 8.0* 8.8 9.1   BILIRUBIN mg/dL --  0.3 --  0.6   ALK PHOS U/L --  97 --  119*   ALT (SGPT) U/L --  117* --  181*   AST (SGOT) U/L --  37* --  48*   GLUCOSE mg/dL 96 111* 89 126*       Current dietary intake: 75% to 100% of meal eaten on 2/10  Diet Order   Procedures   • Diet Regular; Cardiac       Assessment/Plan:      Supratherapeutic INR. Pharmacy has managed warfarin for Ms. Roldan before, most recently January 23rd-Feb 5th of this year when she left AMA. She presented with a similarly elevated INR at time of last admission which continued to climb until it was beyond a calculable reading - she then received a total of 15 mg " of phytonadione and was resumed on her home regimen prior to leaving [place].  INR prior to leaving was 2.45, trending towards therapeutic with relatively stable kinetics.     Profoundly elevated BNP and rising serum creatinine may be indicative of hypoperfusion given history of ICMP; hepatic congestion from this may contribute to decreased warfarin metabolism and subsequently increase systemic exposure to antithrombotic effects of warfarin.    Hold warfarin for now. Pharmacy will follow daily PT/INR and resume dosing when appropriate. With recent history would expect a lower dose will be necessary at discharge.     Fantasma Mireles MICHELLE  2/11/2018  7:21 AM

## 2018-02-11 NOTE — PROGRESS NOTES
Psychiatric Medicine Services  PROGRESS NOTE    Patient Name: Ashely Roldan  : 1960  MRN: [id number]    Date of Admission: 2018  Length of Stay: 2  Primary Care Physician: Peter Rdz MD    Subjective   Subjective     CC:  Respiratory failure, leg edema    HPI:  Leg pain improved. Tolerated bipap last night    Review of Systems   Constitutional: Negative.    Respiratory: Positive for shortness of breath.    Cardiovascular: Positive for leg swelling.   Gastrointestinal: Negative.    Skin: Positive for wound.   Neurological: Negative.    Psychiatric/Behavioral: Negative.          Otherwise ROS is negative except as mentioned in the HPI.    Objective   Objective     Vital Signs:   Temp:  [97.4 °F (36.3 °C)-97.9 °F (36.6 °C)] 97.5 °F (36.4 °C)  Heart Rate:  [65-77] 69  Resp:  [18-21] 18  BP: ()/(52-90) 93/52        Physical Exam:    Constitutional -frail, non toxic and in bed  HEENT-NCAT, mucous membranes moist  CV-RRR, S1 S2 normal, no m/r/g  Resp-somewhat diminished throughout, bibasilar rales  Abd-soft, non-tender, non-distended, normo active bowel sounds  Ext-1+ edema LE bilat with fluid filled blisters  Neuro-alert and oriented x 3, speech clear, moves all extremities   Psych-normal affect   Skin- No rash on exposed UE or LE bilaterally      Results Reviewed:  I have personally reviewed current lab, radiology, and data and agree.      Results from last 7 days  Lab Units 02/10/18  0607 18  0851 18  0611 18  1844 18  0540 18  2103   WBC 10*3/mm3  --   --  6.12 9.44  --  4.98   HEMOGLOBIN g/dL  --   --  9.8* 10.3*  --  8.8*   HEMATOCRIT %  --   --  33.6* 34.4*  --  27.7*   PLATELETS 10*3/mm3  --   --  149* 173  --  107*   INR  5.94* 4.28*  --   --  2.45  --        Results from last 7 days  Lab Units 02/10/18  0607 18  0611 18  1844   SODIUM mmol/L 133 139 139   POTASSIUM mmol/L 4.1 4.1 3.9   CHLORIDE mmol/L 101 104 103    CO2 mmol/L 28.0 31.0 25.0   BUN mg/dL 45* 40* 38*   CREATININE mg/dL 2.30* 1.80* 1.70*   GLUCOSE mg/dL 111* 89 126*   CALCIUM mg/dL 8.0* 8.8 9.1   ALT (SGPT) U/L 117*  --  181*   AST (SGOT) U/L 37*  --  48*     Estimated Creatinine Clearance: 31.9 mL/min (by C-G formula based on Cr of 2.3).  BNP   Date Value Ref Range Status   02/09/2018 2919.0 (H) 0.0 - 100.0 pg/mL Final     Comment:     Results may be falsely decreased if patient taking Biotin.     pH, Arterial   Date Value Ref Range Status   02/10/2018 7.321 (L) 7.350 - 7.450 pH units Final       Microbiology Results Abnormal     Procedure Component Value - Date/Time    Urine Culture - Urine, Urine, Clean Catch [[id number]]  (Normal) Collected:  02/09/18 0330    Lab Status:  Preliminary result Specimen:  Urine from Urine, Clean Catch Updated:  02/10/18 0751     Urine Culture No growth    Blood Culture - Blood, [[id number]]  (Normal) Collected:  02/08/18 2259    Lab Status:  Preliminary result Specimen:  Blood from Hand, Left Updated:  02/10/18 0146     Blood Culture No growth at 24 hours    Blood Culture - Blood, [[id number]]  (Normal) Collected:  02/08/18 2304    Lab Status:  Preliminary result Specimen:  Blood from Arm, Left Updated:  02/10/18 0146     Blood Culture No growth at 24 hours    Narrative:       Aerobic Only    Influenza A & B, RT PCR - Swab, Nasopharynx [[id number]]  (Normal) Collected:  02/08/18 2113    Lab Status:  Final result Specimen:  Swab from Nasopharynx Updated:  02/08/18 2209     Influenza A PCR Not Detected     Influenza B PCR Not Detected          Imaging Results (last 24 hours)     ** No results found for the last 24 hours. **        Results for orders placed during the hospital encounter of 02/08/18   STAT Adult Transthoracic Echo Complete W/ Cont if Necessary Per Protocol    Narrative · There is right-sided chamber enlargement.  · Global RV and LV hypokinesia is present.  · The estimated LV ejection fraction is 20%.  · I cannot exclude  LV apical thrombus with certainty.  · The aortic valve is not well visualized. Mild AI is seen.  · A normally functioning mechanical mitral valve is present.  · A small pericardial effusion is suted.          I have reviewed the medications.    aspirin 81 mg Oral Daily   budesonide-formoterol 2 puff Inhalation BID - RT   bumetanide 2 mg Intravenous Q8H   bumetanide 2 mg Intravenous Once   clonazePAM 2 mg Oral TID   ferrous sulfate 325 mg Oral Daily   ipratropium-albuterol 3 mL Nebulization 4x Daily - RT   metOLazone 5 mg Oral Once   nystatin  Topical Q12H   rosuvastatin 10 mg Oral Nightly   spironolactone 25 mg Oral Daily   warfarin (COUMADIN) (dosing per levels)  Does not apply Daily         Assessment/Plan   Assessment / Plan     Hospital Problem List     * (Principal)Acute respiratory failure with hypoxia and hypercapnia    Anxiety    Ischemic cardiomyopathy (Chronic)    Overview Addendum 2/10/2018 10:50 AM by Baldomero Gusman IV, MD     · History of MI December 2009: s/p stent to RCA and LAD, EF 40%  · CABGx2 March, 2010: SVG to OMB-1, SVG to PDA  · EF 30% post-operatively  · [place] ICD placement, 2011  · Echo (July 2014): Severe global hypokinesis with EF 12%, moderate AI, mechanical mitral valve, moderate to severe TR, RVSP 43mmHg   · Echo (2/4/17): EF 18%, mild to mod AI, mod TR, grossly normal prosthetic mitral valve  · Echo (2/9/18): LVEF 20%. Normal functioning mechanical mitral valve. Cannot exclude LV thrombus.         Lupus (systemic lupus erythematosus) (Chronic)    Coronary artery disease involving native coronary artery of native heart with angina pectoris    Overview Signed 2/10/2018 10:47 AM by Baldomero Gusman IV, MD     · History of MI December 2009: s/p stent to RCA and LAD, EF 40%  · CABG (March, 2010): SVG to OMB-1, SVG to PDA         History of mitral valve replacement with mechanical valve    Overview Addendum 2/10/2018 10:55 AM by Baldomero Gusman IV, MD     · Mitral  "valve replacement with 27mm ATS mechanical valve March 2010         Anemia of chronic kidney failure    COPD exacerbation    Tobacco abuse (Chronic)    Noncompliance    Chronic anticoagulation    Overview Signed 7/16/2016 10:32 PM by IAIN Gabriel     Coumadin (Mechanical AVR)         CKD (chronic kidney disease), stage IV (Chronic)    Acute systolic congestive heart failure    Overview Signed 2/10/2018 10:45 AM by Baldomero Gusman IV, MD     · Echo (2/9/18): LVEF 20%. Normal functioning mechanical mitral valve. Cannot exclude LV thrombus.         Elevated LFTs             Brief Hospital Course to date:  Ashely Roldan is a 57 y.o. female with history of CHF, EF<20%, recent hospitalization for FAHEEM, shock liver, E coli UTI, hypotension and volume overload presents with lethargy and hypercarbic respiratory failure      Assessment & Plan:    Encephalopathy, secondary to hypercapnea  - resolved    Acute respiratory failure with hypoxia and hypercapnea  - lethargy resolved  - continue bipap hs and prn  - repeat abg in am    CKD II  - recent faheem  - creatinine near baseline, renal panel am    Elevated LFT's  - recent \"shock liver\" felt secondary to hypotension  - repeat hepatic panel am    A/C systolic CHF  - EF <20%  -  dobutamine to assist with diuresis    Intake/Output Summary (Last 24 hours) at 02/10/18 2046  Last data filed at 02/10/18 1800   Gross per 24 hour   Intake              502 ml   Output              425 ml   Net               77 ml       VHD  - mechanical MV  - supratherapeutic INR, PharmD follows for dosing    COPD    Tobacco abuse  - attempting bumex IV with metolazone, but marginal UOP    Lupus  - apparently not taking plaquenil at home    Medical Noncompliance    DVT Prophylaxis: coumadin     CODE STATUS: Full Code    Disposition: I expect the patient to be discharged home? in 2-3 days.    Charles Deras MD  02/10/18  8:43 PM        "

## 2018-02-11 NOTE — PROGRESS NOTES
[facility] Medicine Services  PROGRESS NOTE    Patient Name: Ashely Roldan  : 1960  MRN: [id number]    Date of Admission: 2018  Length of Stay: 3  Primary Care Physician: Don Bah MD    Subjective   Subjective     CC:    Respiratory failure, leg edema, CHF    HPI:  Feet and legs tender. Good appetite.    Review of Systems   Constitutional: Negative.    Respiratory: Positive for shortness of breath.    Cardiovascular: Positive for leg swelling.   Gastrointestinal: Negative.    Skin: Positive for wound.   Neurological: Negative.    Psychiatric/Behavioral: Negative.          Otherwise ROS is negative except as mentioned in the HPI.    Objective   Objective     Vital Signs:   Temp:  [97.5 °F (36.4 °C)-97.7 °F (36.5 °C)] 97.7 °F (36.5 °C)  Heart Rate:  [] 77  Resp:  [18-20] 20  BP: ()/(52-85) 108/65        Physical Exam:    Constitutional -frail, non toxic, in bed eating lunch  HEENT-NCAT, mucous membranes moist  CV-RRR, S1 S2 normal, no m/r/g  Resp-diminished bilaterally  Abd-soft, non-tender, non-distended, normo active bowel sounds  Ext-1+ edema LE bilat with fluid filled blisters, lower legs and feet with soft wraps  Neuro-alert and oriented x 3, speech clear, moves all extremities   Psych-normal affect   Skin- No rash on exposed UE or LE bilaterally      Results Reviewed:  I have personally reviewed current lab, radiology, and data and agree.      Results from last 7 days  Lab Units 18  0456 02/10/18  0607 18  0851 18  0611 18  1844  18  2103   WBC 10*3/mm3  --   --   --  6.12 9.44  --  4.98   HEMOGLOBIN g/dL  --   --   --  9.8* 10.3*  --  8.8*   HEMATOCRIT %  --   --   --  33.6* 34.4*  --  27.7*   PLATELETS 10*3/mm3  --   --   --  149* 173  --  107*   INR  4.43* 5.94* 4.28*  --   --   < >  --    < > = values in this interval not displayed.    Results from last 7 days  Lab Units 18  0456 02/10/18  0607  02/09/18  0611 02/08/18  1844   SODIUM mmol/L 133 133 139 139   POTASSIUM mmol/L 3.9 4.1 4.1 3.9   CHLORIDE mmol/L 97* 101 104 103   CO2 mmol/L 30.0 28.0 31.0 25.0   BUN mg/dL 45* 45* 40* 38*   CREATININE mg/dL 2.70* 2.30* 1.80* 1.70*   GLUCOSE mg/dL 96 111* 89 126*   CALCIUM mg/dL 8.5* 8.0* 8.8 9.1   ALT (SGPT) U/L  --  117*  --  181*   AST (SGOT) U/L  --  37*  --  48*     Estimated Creatinine Clearance: 26.9 mL/min (by C-G formula based on Cr of 2.7).  BNP   Date Value Ref Range Status   02/09/2018 2919.0 (H) 0.0 - 100.0 pg/mL Final     Comment:     Results may be falsely decreased if patient taking Biotin.     pH, Arterial   Date Value Ref Range Status   02/11/2018 7.291 (L) 7.350 - 7.450 pH units Final       Microbiology Results Abnormal     Procedure Component Value - Date/Time    Urine Culture - Urine, Urine, Clean Catch [[id number]]  (Normal) Collected:  02/09/18 0330    Lab Status:  Final result Specimen:  Urine from Urine, Clean Catch Updated:  02/11/18 0727     Urine Culture No growth at 2 days    Blood Culture - Blood, [[id number]]  (Normal) Collected:  02/08/18 2259    Lab Status:  Preliminary result Specimen:  Blood from Hand, Left Updated:  02/11/18 0146     Blood Culture No growth at 2 days    Blood Culture - Blood, [[id number]]  (Normal) Collected:  02/08/18 2304    Lab Status:  Preliminary result Specimen:  Blood from Arm, Left Updated:  02/11/18 0146     Blood Culture No growth at 2 days    Narrative:       Aerobic Only    Influenza A & B, RT PCR - Swab, Nasopharynx [[id number]]  (Normal) Collected:  02/08/18 2113    Lab Status:  Final result Specimen:  Swab from Nasopharynx Updated:  02/08/18 2209     Influenza A PCR Not Detected     Influenza B PCR Not Detected          Imaging Results (last 24 hours)     ** No results found for the last 24 hours. **        Results for orders placed during the hospital encounter of 02/08/18   STAT Adult Transthoracic Echo Complete W/ Cont if Necessary Per Protocol     Narrative · There is right-sided chamber enlargement.  · Global RV and LV hypokinesia is present.  · The estimated LV ejection fraction is 20%.  · I cannot exclude LV apical thrombus with certainty.  · The aortic valve is not well visualized. Mild AI is seen.  · A normally functioning mechanical mitral valve is present.  · A small pericardial effusion is suted.          I have reviewed the medications.    albumin human 25 g Intravenous Once   aspirin 81 mg Oral Daily   budesonide-formoterol 2 puff Inhalation BID - RT   bumetanide 2 mg Intravenous Q8H   bumetanide 2 mg Intravenous Once   clonazePAM 2 mg Oral TID   ferrous sulfate 325 mg Oral Daily   ipratropium-albuterol 3 mL Nebulization 4x Daily - RT   metOLazone 5 mg Oral Once   nystatin  Topical Q12H   rosuvastatin 10 mg Oral Nightly   spironolactone 25 mg Oral Daily   warfarin (COUMADIN) (dosing per levels)  Does not apply Daily         Assessment/Plan   Assessment / Plan     Hospital Problem List     * (Principal)Acute respiratory failure with hypoxia and hypercapnia    Anxiety    Ischemic cardiomyopathy (Chronic)    Overview Addendum 2/10/2018 10:50 AM by Baldomero Gusman IV, MD     · History of MI December 2009: s/p stent to RCA and LAD, EF 40%  · CABGx2 March, 2010: SVG to OMB-1, SVG to PDA  · EF 30% post-operatively  · [place] ICD placement, 2011  · Echo (July 2014): Severe global hypokinesis with EF 12%, moderate AI, mechanical mitral valve, moderate to severe TR, RVSP 43mmHg   · Echo (2/4/17): EF 18%, mild to mod AI, mod TR, grossly normal prosthetic mitral valve  · Echo (2/9/18): LVEF 20%. Normal functioning mechanical mitral valve. Cannot exclude LV thrombus.         Lupus (systemic lupus erythematosus) (Chronic)    Coronary artery disease involving native coronary artery of native heart with angina pectoris    Overview Signed 2/10/2018 10:47 AM by Baldomero Gusman IV, MD     · History of MI December 2009: s/p stent to RCA and LAD, EF  40%  · CABG (March, 2010): SVG to OMB-1, SVG to PDA         History of mitral valve replacement with mechanical valve    Overview Addendum 2/11/2018 10:07 AM by IAIN Allred     · Mitral valve replacement with 27mm ATS mechanical valve March 2010  · Echo (02/09/2018): There is right-sided chamber enlargement. Global RV and LV hypokinesia is present. LVEF 20%. Cannot exclude LV thrombus. Normal functioning mechanical mitral valve present. Small pericardial effusion.         Anemia of chronic kidney failure    COPD exacerbation    Tobacco abuse (Chronic)    Noncompliance    Chronic anticoagulation    Overview Signed 7/16/2016 10:32 PM by IAIN Gabriel     Coumadin (Mechanical AVR)         CKD (chronic kidney disease), stage IV (Chronic)    Acute systolic congestive heart failure    Overview Signed 2/10/2018 10:45 AM by Baldomero Gusman IV, MD     · Echo (2/9/18): LVEF 20%. Normal functioning mechanical mitral valve. Cannot exclude LV thrombus.         Elevated LFTs             Brief Hospital Course to date:  Ashely Roldan is a 57 y.o. female with history of CHF, EF<20%, recent hospitalization for FAHEEM, shock liver, E coli UTI, hypotension and volume overload presents with lethargy and hypercarbic respiratory failure      Assessment & Plan:    Encephalopathy, secondary to hypercapnea  - resolved    FAHEEM on CKI  - poor UOP despite bumex and dobutamine  - trial albumin today  - possible cardiorenal synrome    Intake/Output Summary (Last 24 hours) at 02/11/18 1305  Last data filed at 02/11/18 0600   Gross per 24 hour   Intake           389.08 ml   Output              100 ml   Net           289.08 ml     A/C systolic CHF  - EF </=20%  - dobutamine to assist with diuresis    Acute respiratory failure with hypoxia and hypercapnea  - lethargy resolved  - continue bipap hs and prn (not on home bipap)  - patient with ongoing (but compensated) respiratory acidosis with retained CO2  - asked  "Pulmonary to evaluate    Elevated LFT's  - recent \"shock liver\" felt secondary to hypotension  - improved    VHD  - mechanical MV  - supratherapeutic INR (4.4), PharmD follows for dosing    COPD    Tobacco abuse  - patient says she quit smoking last year    Lupus  - apparently not taking plaquenil at home    Medical Noncompliance    DVT Prophylaxis: coumadin     CODE STATUS: Full Code    Disposition: I expect the patient to be discharged home? in 2-3 days.    Charles Deras MD  02/11/18  12:58 PM        "

## 2018-02-11 NOTE — PLAN OF CARE
Problem: Patient Care Overview (Adult)  Goal: Plan of Care Review   02/10/18 2000 02/11/18 0449   Coping/Psychosocial Response Interventions   Plan Of Care Reviewed With patient --    Patient Care Overview   Progress --  progress toward functional goals as expected   Outcome Evaluation   Outcome Summary/Follow up Plan --  VSS, coutinue on dobutamine drip 10.6ml/hrs SBP >90. mixon coutinue. pain under control. Spo2 95% with o2 2lit NC and BIPAP at night .       Problem: Skin Integrity Impairment, Risk/Actual (Adult)  Goal: Identify Related Risk Factors and Signs and Symptoms   02/09/18 0607   Skin Integrity Impairment, Risk/Actual   Skin Integrity Impairment, Risk/Actual: Related Risk Factors edema;moisture;tissue perfusion impaired   Signs and Symptoms (Skin Integrity Impairment) bulla/blister/vesicle;edema     Goal: Skin Integrity/Wound Healing   02/11/18 0449   Skin Integrity Impairment, Risk/Actual (Adult)   Skin Integrity/Wound Healing making progress toward outcome       Problem: Fall Risk (Adult)  Goal: Absence of Falls   02/11/18 0449   Fall Risk (Adult)   Absence of Falls making progress toward outcome       Problem: Respiratory Insufficiency (Adult)  Goal: Identify Related Risk Factors and Signs and Symptoms   02/09/18 1041   Respiratory Insufficiency   Related Risk Factors (Respiratory Insufficiency) activity intolerance;bedrest;knowledge deficit;motivation lacking;smoking   Signs and Symptoms (Respiratory Insufficiency) abnormal ABGs;blood pressure/heart rate changes;breathing pattern changes

## 2018-02-12 LAB
ALBUMIN SERPL-MCNC: 3.3 G/DL (ref 3.2–4.8)
ANION GAP SERPL CALCULATED.3IONS-SCNC: 5 MMOL/L (ref 3–11)
BUN BLD-MCNC: 47 MG/DL (ref 9–23)
BUN/CREAT SERPL: 16.2 (ref 7–25)
CALCIUM SPEC-SCNC: 8.3 MG/DL (ref 8.7–10.4)
CHLORIDE SERPL-SCNC: 92 MMOL/L (ref 99–109)
CK SERPL-CCNC: 53 U/L (ref 26–174)
CO2 SERPL-SCNC: 31 MMOL/L (ref 20–31)
CREAT BLD-MCNC: 2.9 MG/DL (ref 0.6–1.3)
DEPRECATED RDW RBC AUTO: 73.7 FL (ref 37–54)
ERYTHROCYTE [DISTWIDTH] IN BLOOD BY AUTOMATED COUNT: 18.4 % (ref 11.3–14.5)
FERRITIN SERPL-MCNC: 325 NG/ML (ref 10–291)
GFR SERPL CREATININE-BSD FRML MDRD: 17 ML/MIN/1.73
GLUCOSE BLD-MCNC: 84 MG/DL (ref 70–100)
HCT VFR BLD AUTO: 30.7 % (ref 34.5–44)
HGB BLD-MCNC: 9 G/DL (ref 11.5–15.5)
INR PPP: 3.82 (ref 0.91–1.09)
IRON 24H UR-MRATE: 28 MCG/DL (ref 50–175)
IRON SATN MFR SERPL: 10 % (ref 15–50)
MCH RBC QN AUTO: 32.5 PG (ref 27–31)
MCHC RBC AUTO-ENTMCNC: 29.3 G/DL (ref 32–36)
MCV RBC AUTO: 110.8 FL (ref 80–99)
PHOSPHATE SERPL-MCNC: 5 MG/DL (ref 2.4–5.1)
PLATELET # BLD AUTO: 135 10*3/MM3 (ref 150–450)
PMV BLD AUTO: 11.8 FL (ref 6–12)
POTASSIUM BLD-SCNC: 4.4 MMOL/L (ref 3.5–5.5)
PROTHROMBIN TIME: 40.1 SECONDS (ref 9.6–11.5)
RBC # BLD AUTO: 2.77 10*6/MM3 (ref 3.89–5.14)
SODIUM BLD-SCNC: 128 MMOL/L (ref 132–146)
TIBC SERPL-MCNC: 271 MCG/DL (ref 250–450)
WBC NRBC COR # BLD: 5.98 10*3/MM3 (ref 3.5–10.8)

## 2018-02-12 PROCEDURE — 94799 UNLISTED PULMONARY SVC/PX: CPT

## 2018-02-12 PROCEDURE — 99232 SBSQ HOSP IP/OBS MODERATE 35: CPT | Performed by: INTERNAL MEDICINE

## 2018-02-12 PROCEDURE — 85610 PROTHROMBIN TIME: CPT | Performed by: INTERNAL MEDICINE

## 2018-02-12 PROCEDURE — 97164 PT RE-EVAL EST PLAN CARE: CPT

## 2018-02-12 PROCEDURE — 82550 ASSAY OF CK (CPK): CPT | Performed by: INTERNAL MEDICINE

## 2018-02-12 PROCEDURE — 83540 ASSAY OF IRON: CPT | Performed by: INTERNAL MEDICINE

## 2018-02-12 PROCEDURE — 83550 IRON BINDING TEST: CPT | Performed by: INTERNAL MEDICINE

## 2018-02-12 PROCEDURE — 82728 ASSAY OF FERRITIN: CPT | Performed by: INTERNAL MEDICINE

## 2018-02-12 PROCEDURE — 94640 AIRWAY INHALATION TREATMENT: CPT

## 2018-02-12 PROCEDURE — 97110 THERAPEUTIC EXERCISES: CPT

## 2018-02-12 PROCEDURE — 63710000001 DIPHENHYDRAMINE PER 50 MG: Performed by: INTERNAL MEDICINE

## 2018-02-12 PROCEDURE — 85027 COMPLETE CBC AUTOMATED: CPT | Performed by: INTERNAL MEDICINE

## 2018-02-12 PROCEDURE — 80069 RENAL FUNCTION PANEL: CPT | Performed by: INTERNAL MEDICINE

## 2018-02-12 PROCEDURE — 99233 SBSQ HOSP IP/OBS HIGH 50: CPT | Performed by: INTERNAL MEDICINE

## 2018-02-12 PROCEDURE — 97116 GAIT TRAINING THERAPY: CPT

## 2018-02-12 PROCEDURE — 25010000002 DOBUTAMINE PER 250 MG: Performed by: INTERNAL MEDICINE

## 2018-02-12 RX ORDER — WARFARIN SODIUM 1 MG/1
1 TABLET ORAL
Status: COMPLETED | OUTPATIENT
Start: 2018-02-12 | End: 2018-02-12

## 2018-02-12 RX ORDER — DIPHENHYDRAMINE HCL 25 MG
25 CAPSULE ORAL EVERY 6 HOURS PRN
Status: DISCONTINUED | OUTPATIENT
Start: 2018-02-12 | End: 2018-02-14 | Stop reason: HOSPADM

## 2018-02-12 RX ADMIN — BUMETANIDE 2 MG: 0.25 INJECTION INTRAMUSCULAR; INTRAVENOUS at 11:36

## 2018-02-12 RX ADMIN — WARFARIN SODIUM 1 MG: 1 TABLET ORAL at 17:12

## 2018-02-12 RX ADMIN — DIPHENHYDRAMINE HYDROCHLORIDE 25 MG: 25 CAPSULE ORAL at 17:12

## 2018-02-12 RX ADMIN — ROSUVASTATIN CALCIUM 10 MG: 10 TABLET ORAL at 20:56

## 2018-02-12 RX ADMIN — NYSTATIN: 100000 POWDER TOPICAL at 09:38

## 2018-02-12 RX ADMIN — IPRATROPIUM BROMIDE AND ALBUTEROL SULFATE 3 ML: .5; 3 SOLUTION RESPIRATORY (INHALATION) at 11:00

## 2018-02-12 RX ADMIN — Medication 325 MG: at 09:32

## 2018-02-12 RX ADMIN — BUDESONIDE AND FORMOTEROL FUMARATE DIHYDRATE 2 PUFF: 160; 4.5 AEROSOL RESPIRATORY (INHALATION) at 20:29

## 2018-02-12 RX ADMIN — IPRATROPIUM BROMIDE AND ALBUTEROL SULFATE 3 ML: .5; 3 SOLUTION RESPIRATORY (INHALATION) at 14:53

## 2018-02-12 RX ADMIN — SPIRONOLACTONE 25 MG: 25 TABLET, FILM COATED ORAL at 09:32

## 2018-02-12 RX ADMIN — BUDESONIDE AND FORMOTEROL FUMARATE DIHYDRATE 2 PUFF: 160; 4.5 AEROSOL RESPIRATORY (INHALATION) at 07:06

## 2018-02-12 RX ADMIN — IPRATROPIUM BROMIDE AND ALBUTEROL SULFATE 3 ML: .5; 3 SOLUTION RESPIRATORY (INHALATION) at 07:06

## 2018-02-12 RX ADMIN — CLONAZEPAM 2 MG: 1 TABLET ORAL at 20:56

## 2018-02-12 RX ADMIN — CLONAZEPAM 2 MG: 1 TABLET ORAL at 09:32

## 2018-02-12 RX ADMIN — CLONAZEPAM 2 MG: 1 TABLET ORAL at 14:55

## 2018-02-12 RX ADMIN — IPRATROPIUM BROMIDE AND ALBUTEROL SULFATE 3 ML: .5; 3 SOLUTION RESPIRATORY (INHALATION) at 20:29

## 2018-02-12 RX ADMIN — BUMETANIDE 2 MG: 0.25 INJECTION INTRAMUSCULAR; INTRAVENOUS at 00:07

## 2018-02-12 RX ADMIN — TRAMADOL HYDROCHLORIDE 50 MG: 50 TABLET, COATED ORAL at 11:42

## 2018-02-12 RX ADMIN — ASPIRIN 81 MG: 81 TABLET, COATED ORAL at 09:32

## 2018-02-12 RX ADMIN — NYSTATIN: 100000 POWDER TOPICAL at 20:56

## 2018-02-12 RX ADMIN — DOBUTAMINE HYDROCHLORIDE 2.5 MCG/KG/MIN: 100 INJECTION INTRAVENOUS at 05:15

## 2018-02-12 NOTE — PLAN OF CARE
Problem: Patient Care Overview (Adult)  Goal: Plan of Care Review  Outcome: Ongoing (interventions implemented as appropriate)   02/12/18 1311   Coping/Psychosocial Response Interventions   Plan Of Care Reviewed With patient   Outcome Evaluation   Outcome Summary/Follow up Plan PT PRESENTS WITH EVOLVING SYMPTOMS TO INCLUDE, PAIN B LE'S,  DECREASED ANKLE ROM, GENERALIZED WEAKNESS, IMPAIRED BALANCE, DECREASED ENDURANCE AND DECLINE IN FUNCTIONAL MOBILITY. PT AMBULATED 150 FEET WITH R WALKER AND CGA OF 2 AND MIN ASSIST OF 2 FOT TRANSFERS. PT REQUIRED MAX ENCOURAGMENT TO PARTICIPATE WITH THERAPY. RECOMMEND IRF AT D/C BUT PT PREFERS TO GO HOME WITH HHPT. PT'S LIVES WITH HER SON BUT HE WORKS DURING THE DAY.        Problem: Inpatient Physical Therapy  Goal: Bed Mobility Goal LTG- PT  Outcome: Ongoing (interventions implemented as appropriate)   02/12/18 1311   Bed Mobility PT LTG   Bed Mobility PT LTG, Time to Achieve 2 wks   Bed Mobility PT LTG, Activity Type all bed mobility   Bed Mobility PT LTG, [place] Level independent     Goal: Transfer Training Goal 1 LTG- PT  Outcome: Outcome(s) achieved Date Met: 02/12/18 02/12/18 1311   Transfer Training PT LTG   Transfer Training PT LTG, Activity Type all transfers   Transfer Training PT LTG, [place] Level independent   Transfer Training PT LTG, Assist Device walker, rolling     Goal: Gait Training Goal LTG- PT  Outcome: Ongoing (interventions implemented as appropriate)   02/12/18 1311   Gait Training PT LTG   Gait Training Goal PT LTG, Time to Achieve 2 wks   Gait Training Goal PT LTG, [place] Level independent   Gait Training Goal PT LTG, Assist Device walker, rolling   Gait Training Goal PT LTG, Distance to Achieve 400

## 2018-02-12 NOTE — PROGRESS NOTES
Continued Stay Note  [facility]     Patient Name: Ashely Roldan  MRN: [id number]  Today's Date: 2/12/2018    Admit Date: 2/8/2018          Discharge Plan       02/12/18 1149    Case Management/Social Work Plan    Plan discharge planning    Patient/Family In Agreement With Plan yes    Additional Comments SW met with pt. at bedside.  Pt. was in a pleasant mood and was exicted to be getting a massage later today.  Pt. stated her plan is to go home with home health at discharge.  She would like  services to be provided by [facility].  Pt. stated her son will provide her with transportation at discharge.  SW will continue to follow for discharge planning.              Discharge Codes     None            RICARDO Paris

## 2018-02-12 NOTE — THERAPY EVALUATION
Acute Care - Physical Therapy Initial Evaluation  [facility]     Patient Name: Ashely Roldan  : 1960  MRN: [id number]  Today's Date: 2018   Onset of Illness/Injury or Date of Surgery Date: 18  Date of Referral to PT: 02/10/18  Referring Physician: MD CINDY      Admit Date: 2018     Visit Dx:    ICD-10-CM ICD-9-CM   1. Respiratory failure, unspecified chronicity, unspecified whether with hypoxia or hypercapnia J96.90 518.81   2. COPD exacerbation J44.1 491.21   3. Acute on chronic congestive heart failure, unspecified congestive heart failure type I50.9 428.0   4. Tobacco abuse Z72.0 305.1   5. Medical non-compliance Z91.19 V15.81   6. Impaired mobility and ADLs Z74.09 799.89   7. Impaired functional mobility, balance, gait, and endurance Z74.09 V49.89     Patient Active Problem List   Diagnosis   • Anxiety   • Arthritis   • Panlobular emphysema   • Reactive depression   • Gastritis   • Heart attack   • Ischemic cardiomyopathy   • VHD (valvular heart disease)   • Osteoporosis   • PVD (peripheral vascular disease)   • Allergic rhinitis   • Lupus (systemic lupus erythematosus)   • TIA (transient ischemic attack)   • Coronary artery disease involving native coronary artery of native heart with angina pectoris   • Cough   • History of mitral valve replacement with mechanical valve   • Anemia of chronic kidney failure   • Insomnia   • COPD exacerbation   • Tobacco abuse   • AICD (automatic cardioverter/defibrillator) present   • Noncompliance   • Blunt trauma of multiple sites   • Chronic anticoagulation   • Closed fracture of rib with flail chest   • CKD (chronic kidney disease), stage IV   • Acute systolic congestive heart failure   • Acute on chronic respiratory failure with hypoxia and hypercapnia   • Elevated LFTs     Past Medical History:   Diagnosis Date   • Acute respiratory failure with hypoxia 2017   • Allergic rhinitis 2014   • Anemia    • Anxiety    • Arthritis    • CAD  (coronary artery disease)    • CHF (congestive heart failure)    • CKD (chronic kidney disease)    • CKD (chronic kidney disease), stage IV 9/5/2017   • Colitis, Clostridium difficile    • COPD (chronic obstructive pulmonary disease)    • Coronary atherosclerosis    • Depression    • Emphysema of lung    • GERD (gastroesophageal reflux disease)    • Heart attack    • Heart murmur    • Hematoma of hip    • Hip fracture    • Hyperlipidemia    • Hypertension     benign essential   • Ischemic cardiomyopathy 08/01/2014    ef 29% s/p ICD   • Knee injury    • Lung disease    • Mitral valve disorder 03/28/2013   • Mitral valve insufficiency     s/p prosthetic ATS valve replacement   • Osteoporosis    • Postmenopausal     natural   • PVD (peripheral vascular disease)    • Pyelonephritis    • Renal failure    • Renal failure    • Renal failure, acute    • Syncope    • Systemic lupus erythematosus    • TIA (transient ischemic attack) 04/04/2012   • UTI (urinary tract infection)    • Valvular heart disease    • Wrist fracture      Past Surgical History:   Procedure Laterality Date   • CARDIAC DEFIBRILLATOR PLACEMENT     • CHOLECYSTECTOMY  2010   • CORONARY ARTERY BYPASS GRAFT  2011 4 aortic bypasses, abdominal aorta; 2011-mitral valve; 2010-triple bypass   • ENDOSCOPY  10/23/2014    Dr. Brand; retained food in stomach; he dilated her esophagus; 5-30-14 hiatus hernia, gastritis   • JOINT REPLACEMENT Right 06/25/2015    Dr. Fitzgerald; first surgery 1-29-14; redo 5-4-15   • MITRAL VALVE REPLACEMENT      MECHANICAL   • TOTAL HIP ARTHROPLASTY Right     3 times within 8 months          PT ASSESSMENT (last 72 hours)      PT Evaluation       02/12/18 0955 02/11/18 1505    Rehab Evaluation    Document Type evaluation;therapy note (daily note)  -CD evaluation;therapy note (daily note)   wound care  -    Subjective Information agree to therapy;complains of;pain;fatigue;weakness   INITIALLY DECLINED DUE TO PAIN IN B LE'S.  -CD agree to  therapy;complains of;pain  -    Patient Effort, Rehab Treatment good  -CD     Symptoms Noted During/After Treatment fatigue;increased pain  -CD     Symptoms Noted Comment PT REQUIRED MAX ENCOURAGEMENT TO WORK WITH THERAPY. NSG REPORTS HAS BEED REFUSING OOB AND USING BEDPAN. TIME SPENT ENCOURAGING PT TO PARTICIPATE WITH OOB ACTIVITY WITH EOB.   -CD     General Information    Patient Profile Review yes  -CD yes  -MC    Onset of Illness/Injury or Date of Surgery Date 02/08/18  -CD 02/08/18  -    Referring Physician MD CINDY  -CD MD Cindy  -    General Observations PT SUPINE UPON ARRIVAL ON RA AND WITH IV. B LE'S WRAPPED. PT SATS 88% AND NSG REQUESTED PT PUT O2 BACK ON. (2L)   -CD     Pertinent History Of Current Problem   Pt admit with SOA, lethargy, fatigue. Pt with acute respiratory failure, ESRD, CHF, and COPD exacerbation. Pt with c/o BLE edema and blistering.   -CD Pt admit with SOA, lethargy, fatigue. Pt with acute respiratory failure, ESRD, CHF, and COPD exacerbation. Pt with c/o BLE edema and blistering.  -    Precautions/Limitations fall precautions;oxygen therapy device and L/min  -CD     Prior Level of Function independent:;all household mobility;community mobility;ADL's;dependent:;cleaning;driving  -CD     Equipment Currently Used at Home none   BUT HAS A R WALKER AND CANE IF NEEDED.   -CD     Plans/Goals Discussed With patient;agreed upon  -CD patient;agreed upon  -    Risks Reviewed patient:;LOB;change in vital signs;increased discomfort  -CD patient:;increased discomfort  -    Benefits Reviewed patient:;improve function;increase independence;increase strength;increase balance;decrease pain;increase knowledge  -CD patient:;improve skin integrity  -    Barriers to Rehab medically complex  -CD medically complex;previous functional deficit  -    Living Environment    Lives With child(kraig), adult   REPORTS SON WORKS NIGHTS. HAS A NEIGHBOR THAT CAN ASSIST.   -CD     Living Arrangements  apartment  -CD     Home Accessibility tub/shower is not walk in;bed and bath on same level  -CD     Living Environment Comment PT RECENTLY  AND MOVED IN WITH SON. SON WORKS AT NIGHT.   -CD     Clinical Impression    Date of Referral to PT 02/10/18  -CD 02/09/18  -    PT Diagnosis IMPAIRED FUNCTIONAL MOBILITY,   - impaired skin integrity  -    Patient/Family Goals Statement TO RESOLVE LEG PAIN. TO GO HOME. PT IS RELUCTANT TO CONSIDER SHORT TERM REHAB PLACEMENT. WANTS TO GO HOME WITH HHPT IF NEEDED.   -CD Pt wants her legs to feel better.  -    Criteria for Skilled Therapeutic Interventions Met yes  -CD yes;treatment indicated  -    Rehab Potential good, to achieve stated therapy goals  - fair, will monitor progress closely  -    Vital Signs    Pre Systolic BP Rehab 126  -CD     Pre Treatment Diastolic BP 66  -CD     Post Systolic BP Rehab 140  -CD     Post Treatment Diastolic BP 61  -CD     Pretreatment Heart Rate (beats/min) 83  -CD     Posttreatment Heart Rate (beats/min) 93  -CD     Pre SpO2 (%) 88   PT INSTRUCTED TO PUT HER O2 BACK ON. PER NSG, PT TAKES OFF.   -CD     O2 Delivery Pre Treatment room air  -CD     Intra SpO2 (%) 93  -CD     O2 Delivery Intra Treatment supplemental O2  -CD     Post SpO2 (%) 94  -CD     O2 Delivery Post Treatment supplemental O2  -CD     Pre Patient Position Supine  -CD     Intra Patient Position --   AMBULATING,   -CD     Post Patient Position Sitting  -CD     Pain Assessment    Pain Assessment 0-10  -CD Encarnacion-Rebolledo FACES  -    Encarnacion-Rebolledo FACES Pain Rating 4   INTRA 8/10, POST 4/10  -CD 6  -MC    Pain Type Acute pain  -CD Acute pain  -    Pain Location Leg  -CD Leg  -    Pain Orientation Left;Right  -CD Right;Left  -    Pain Intervention(s) Ambulation/increased activity;Repositioned   NOTIFIED NSG.   -CD     Response to Interventions TOLERATED.   -CD     Vision Assessment/Intervention    Visual Impairment WFL  -CD     Cognitive Assessment/Intervention     Current Cognitive/Communication Assessment impaired  -CD     Orientation Status oriented x 4  -CD     Follows Commands/Answers Questions 100% of the time;able to follow single-step instructions  -CD     Personal Safety mild impairment;decreased awareness, need for assist;decreased awareness, need for safety;decreased insight to deficits  -CD     Personal Safety Interventions fall prevention program maintained;gait belt;muscle strengthening facilitated;nonskid shoes/slippers when out of bed;supervised activity  -CD     ROM (Range of Motion)    General ROM lower extremity range of motion deficits identified   B HIP/KNEE WNL'S, B ANKLE DF LIMITED TO APPROX NEUTRAL (PAIN  -CD     MMT (Manual Muscle Testing)    General MMT Assessment lower extremity strength deficits identified   GROSSLY 3+ TO 4/5. FATIGUES QUICKLY.   -CD     Bed Mobility, Assessment/Treatment    Bed Mobility, Assistive Device bed rails;head of bed elevated;draw sheet  -CD     Bed Mob, Supine to Sit, [place] minimum assist (75% patient effort)  -CD     Bed Mob, Sit to Supine, [place] verbal cues required  -CD     Bed Mobility, Safety Issues decreased use of arms for pushing/pulling;decreased use of legs for bridging/pushing  -CD     Bed Mobility, Impairments strength decreased;pain  -CD     Bed Mobility, Comment CUES FOR HAND PLACEMENT. INCREASED TIME AND EFFORT.   -CD     Transfer Assessment/Treatment    Transfers, Sit-Stand [place] minimum assist (75% patient effort);2 person assist required;verbal cues required  -CD     Transfers, Stand-Sit [place] minimum assist (75% patient effort);2 person assist required;verbal cues required  -CD     Transfers, Sit-Stand-Sit, Assist Device rolling walker  -CD     Transfer, Safety Issues weight-shifting ability decreased;step length decreased  -CD     Transfer, Impairments strength decreased;impaired balance  -CD     Transfer, Comment PERFORMED DF B LE' FOR STRETCHING PRIOR TO STANDING.   -CD      Gait Assessment/Treatment    Gait, [place] Level contact guard assist;verbal cues required  -     Gait, Assistive Device rolling walker  -     Gait, Distance (Feet) 150   RECLINER IN TOW.   -     Gait, Gait Deviations anni decreased;decreased heel strike;step length decreased  -     Gait, Safety Issues weight-shifting ability decreased;step length decreased  -     Gait, Impairments impaired balance;strength decreased;ROM decreased  -     Gait, Comment VERY SLOW AND GUARDED GAIT, RELYING HEAVILY ON WALKER.   -     Motor Skills/Interventions    Additional Documentation Balance Skills Training (Group);Gross Motor Coordination Training (Group)  -     Balance Skills Training    Sitting-Level of Assistance Close supervision  -     Sitting-Balance Support Feet supported  -     Standing-Level of Assistance Contact guard;x2  -CD     Static Standing Balance Support assistive device  -     Standing-Balance Activities Forward lean   UPRIGHT POSTURE.   -     Therapy Exercises    Bilateral Lower Extremities AROM:;10 reps;ankle pumps/circles;heel slides;hip abduction/adduction;hip flexion;sitting;supine  -     Positioning and Restraints    Pre-Treatment Position in bed  - in bed  -    Post Treatment Position chair  - bed  -    In Bed  supine;call light within reach;encouraged to call for assist;heels elevated  -    In Chair reclined;call light within reach;encouraged to call for assist;notified nsg;with family/caregiver;legs elevated;LUE elevated  -       02/11/18 1143 02/10/18 1550    Rehab Evaluation    Document Type evaluation  -HANNAH     Subjective Information agree to therapy;complains of;pain  -HANNAH     Evaluation Not Performed  other (see comments)   RN just changed the pt's BLE dressings, recommended follow up tomorrow. PT agreed.  -    Patient Effort, Rehab Treatment adequate  -HANNAH     Symptoms Noted Comment Pt. declined to stand or transfer due to foot pain.  -HANNAH     General  Information    Patient Profile Review yes  -HANNAH     Onset of Illness/Injury or Date of Surgery Date 02/10/18  -HANNAH     Referring Physician MD Braeden  -HANNAH     General Observations Noted blisters on pt. toes. Pt. supine in bed with legs elevated and in wraps.  IV/catheter and 02.  -HANNAH     Pertinent History Of Current Problem Pt. admitted with SOA, lethargy and fatigue.  Pt. on arrival noted with acute respitory failure with hypoxia and hypercapnea, BLE edema.  COPD exacerbation and A on C HF.    -HANNAH     Precautions/Limitations fall precautions;oxygen therapy device and L/min  -HANNAH     Prior Level of Function independent:;all household mobility;ADL's;cooking;mod assist:;cleaning;dependent:;driving;shopping   Prior to last admit, recently not mobilizing or HM task  -HANNAH     Equipment Currently Used at Home shower chair;commode;oxygen;walker, rolling  -HANNAH     Plans/Goals Discussed With patient;agreed upon  -HANNAH     Risks Reviewed patient:;LOB;increased discomfort;change in vital signs;lines disloged  -HANNAH     Benefits Reviewed patient:;improve function;increase independence;increase strength;increase balance;increase knowledge  -HANNAH     Barriers to Rehab previous functional deficit  -HANNAH     Living Environment    Lives With alone;child(kraig), dependent   Typically alone, but pt.'s son living with her at this time.  -HANNAH     Living Arrangements apartment  -HANNAH     Home Accessibility tub/shower is not walk in  -HANNAH     Vital Signs    Pre Systolic BP Rehab 108  -HANNAH     Pre Treatment Diastolic BP 65  -HANNAH     Post Systolic BP Rehab 116  -HANNAH     Post Treatment Diastolic BP 80  -HANNAH     Pretreatment Heart Rate (beats/min) 79  -HANNAH     Posttreatment Heart Rate (beats/min) 80  -HANNAH     Pre SpO2 (%) 96  -HANNAH     O2 Delivery Pre Treatment supplemental O2  -HANNAH     Intra SpO2 (%) --   pt. kept taking 02 off and would drop to 85-88%, cues love  -HANNAH     Post SpO2 (%) 94  -HANNAH     O2 Delivery Post Treatment supplemental O2  -HANNAH     Pre Patient Position  Supine  -HANNAH     Intra Patient Position Sitting  -HANNAH     Post Patient Position Supine  -HANNAH     Pain Assessment    Pain Assessment Encarnacion-Rebolledo FACES  -HANNAH     Encarnacion-Baker FACES Pain Rating 4   pt. appears to overrate pain  -HANNAH     Pain Score 8  -HANNAH     Post Pain Score 8  -HANNAH     Pain Type Acute pain  -HANNAH     Pain Location Leg  -HANNAH     Pain Orientation Right;Left  -HANNAH     Pain Intervention(s) Ambulation/increased activity;Repositioned   premedicated, alerted nurse  -HANNAH     Response to Interventions tolerated  -HANNAH     Vision Assessment/Intervention    Visual Impairment WFL   for eval purposes  -HANNAH     Cognitive Assessment/Intervention    Current Cognitive/Communication Assessment impaired   very drowsy intially difficulty giving history  -HANNAH     Orientation Status oriented x 4  -HANNAH     Follows Commands/Answers Questions 100% of the time;able to follow single-step instructions;needs cueing;needs increased time;needs repetition  -     Personal Safety mild impairment  -     Personal Safety Interventions fall prevention program maintained;gait belt;nonskid shoes/slippers when out of bed   when up  -HANNAH     ROM (Range of Motion)    General ROM upper extremity range of motion deficits identified  -HANNAH     General ROM Detail AROM intact, PROM 50% shoulders.  -HANNAH     MMT (Manual Muscle Testing)    General MMT Assessment upper extremity strength deficits identified  -     General MMT Assessment Detail sh. 3+/5, distally 4+/5  -HANNAH     Bed Mobility, Assessment/Treatment    Bed Mobility, Assistive Device bed rails;head of bed elevated  -HANNAH     Bed Mobility, Scoot/Bridge, [place] supervision required;verbal cues required  -HANNAH     Bed Mob, Supine to Sit, [place] minimum assist (75% patient effort);verbal cues required  -HANNAH     Bed Mob, Sit to Supine, [place] minimum assist (75% patient effort);verbal cues required  -HANNAH     Bed Mobility, Safety Issues decreased use of arms for pushing/pulling;decreased use of legs for  bridging/pushing  -HANNAH     Bed Mobility, Impairments pain;strength decreased  -HANNAH     Bed Mobility, Comment extra time needed to complete, cues for hand placement.  -HANNAH     Transfer Assessment/Treatment    Transfer, Comment Pt. declined due to LE pain.  -HANNAH     Motor Skills/Interventions    Additional Documentation Balance Skills Training (Group)  -HANNAH     Balance Skills Training    Sitting-Level of Assistance Close supervision  -HANNAH     Sitting-Balance Support Feet unsupported;No upper extremity supported  -HANNAH     Positioning and Restraints    Pre-Treatment Position in bed  -HANNAH     Post Treatment Position bed  -HANNAH     In Bed sitting;call light within reach;encouraged to call for assist;legs elevated   did not change bed alarm  -HANNAH       User Key  (r) = Recorded By, (t) = Taken By, (c) = Cosigned By    Initials Name Provider Type    HANNAH Mi Cardenas, OT Occupational Therapist    GRETA Willams, PT Physical Therapist    CAITIE Mcfarlane, PT Physical Therapist          Physical Therapy Education     Title: PT OT SLP Therapies (Active)     Topic: Physical Therapy (Done)     Point: Mobility training (Done)    Learning Progress Summary    Learner Readiness Method Response Comment Documented by Status   Patient Acceptance E VU,NR BENEFITS OF OOB ACTIVITY, SAFETY WITH MOBILITY, THER EX, D/C PLANNING.  02/12/18 1311 Done               Point: Home exercise program (Done)    Learning Progress Summary    Learner Readiness Method Response Comment Documented by Status   Patient Acceptance E VU,NR BENEFITS OF OOB ACTIVITY, SAFETY WITH MOBILITY, THER EX, D/C PLANNING.  02/12/18 1311 Done               Point: Body mechanics (Done)    Learning Progress Summary    Learner Readiness Method Response Comment Documented by Status   Patient Acceptance E VU,NR BENEFITS OF OOB ACTIVITY, SAFETY WITH MOBILITY, THER EX, D/C PLANNING.  02/12/18 1311 Done               Point: Precautions (Done)    Learning Progress Summary    Learner  Readiness Method Response Comment Documented by Status   Patient Acceptance E VU,NR BENEFITS OF OOB ACTIVITY, SAFETY WITH MOBILITY, THER EX, D/C PLANNING.  02/12/18 1311 Done                      User Key     Initials Effective Dates Name Provider Type Discipline     06/19/15 -  Estefany Willams, PT Physical Therapist PT                PT Recommendation and Plan  Anticipated Discharge Disposition: inpatient rehabilitation facility (PT PREFERS HOME WITH HHPT. WILL NOT HAVE 24/7 ASSIST. )  Planned Therapy Interventions: bed mobility training, gait training, home exercise program, strengthening, transfer training  PT Frequency: daily  Plan of Care Review  Plan Of Care Reviewed With: patient  Outcome Summary/Follow up Plan: PT PRESENTS WITH EVOLVING SYMPTOMS TO INCLUDE, DECREASED ROM, GENERALIZED WEAKNESS, IMPAIRED BALANCE, DECREASED ENDURANCE AND DECLINE IN FUNCTIONAL MOBILITY. PT AMBULATED 150 FEET WITH R WALKER AND CGA OF 2 AND MIN ASSIST OF 2 FOT TRANSFERS. PT REQUIRED MAX ENCOURAGMENT TO PARTICIPATE WITH THERAPY. RECOMMEND IRF AT D/C BUT PT PREFERS TO GO HOME WITH HHPT. PT'S LIVES WITH HER SON BUT HE WORKS DURING THE DAY.           IP PT Goals       02/12/18 1311 02/11/18 1505       Bed Mobility PT LTG    Bed Mobility PT LTG, Time to Achieve 2 wks  -CD      Bed Mobility PT LTG, Activity Type all bed mobility  -CD      Bed Mobility PT LTG, [place] Level independent  -CD      Transfer Training PT LTG    Transfer Training PT LTG, Activity Type all transfers  -CD      Transfer Training PT LTG, [place] Level independent  -CD      Transfer Training PT LTG, Assist Device walker, rolling  -CD      Gait Training PT LTG    Gait Training Goal PT LTG, Time to Achieve 2 wks  -CD      Gait Training Goal PT LTG, [place] Level independent  -CD      Gait Training Goal PT LTG, Assist Device walker, rolling  -CD      Gait Training Goal PT LTG, Distance to Achieve 400  -CD      Wound Care PT LTG    Wound Care PT LTG 1,  Date Established  02/11/18  -     Wound Care PT LTG 1, Time to Achieve  1 wk  -     Wound Care PT LTG 1, Location  BLE  -     Wound Care PT LTG 1, No S&S of Infection  yes  -     Wound Care PT LTG 1, Decrease Wound Size  25%  -     Wound Care PT LTG 1, Decrease Exudate  scant  -     Wound Care PT LTG 1, No New Skin Break Down  yes  -     Wound Care PT LTG 1, Education  wound care  -     Wound Care PT LTG 1, Education Understanding  verbalize understanding  -       User Key  (r) = Recorded By, (t) = Taken By, (c) = Cosigned By    Initials Name Provider Type    CD Estefany Willams, PT Physical Therapist    CAITIE Mcfarlane, PT Physical Therapist                Outcome Measures       02/12/18 0955 02/11/18 1143       How much help from another person do you currently need...    Turning from your back to your side while in flat bed without using bedrails? 2  -CD      Moving from lying on back to sitting on the side of a flat bed without bedrails? 3  -CD      Moving to and from a bed to a chair (including a wheelchair)? 3  -CD      Standing up from a chair using your arms (e.g., wheelchair, bedside chair)? 2  -CD      Climbing 3-5 steps with a railing? 2  -CD      To walk in hospital room? 3  -CD      AM-PAC 6 Clicks Score 15  -CD      How much help from another is currently needed...    Putting on and taking off regular lower body clothing?  2  -HANNAH     Bathing (including washing, rinsing, and drying)  3  -HANNAH     Toileting (which includes using toilet bed pan or urinal)  2  -HANNAH     Putting on and taking off regular upper body clothing  3  -HANNAH     Taking care of personal grooming (such as brushing teeth)  4   sitting post setup  -HANNAH     Eating meals  4  -HANNAH     Score  18  -HANNAH     Functional Assessment    Outcome Measure Options AM-PAC 6 Clicks Basic Mobility (PT)  -CD AM-PAC 6 Clicks Daily Activity (OT)  -HANNAH       User Key  (r) = Recorded By, (t) = Taken By, (c) = Cosigned By    Initials Name Provider  Type    HANNAH Mi Cardenas, OT Occupational Therapist    CD Estefany Willams, PT Physical Therapist           Time Calculation:         PT Charges       02/12/18 1316          Time Calculation    Start Time 0955  -CD      PT Received On 02/12/18  -      PT Goal Re-Cert Due Date 02/22/18  -      Time Calculation- PT    Total Timed Code Minutes- PT 49 minute(s)  -CD        User Key  (r) = Recorded By, (t) = Taken By, (c) = Cosigned By    Initials Name Provider Type    CD Estefany Willams, PT Physical Therapist          Therapy Charges for Today     Code Description Service Date Service Provider Modifiers Qty    19523475238 HC GAIT TRAINING EA 15 MIN 2/12/2018 Estefany Willams, PT GP 1    26236377852 HC PT THER PROC EA 15 MIN 2/12/2018 Estefany Willams, PT GP 2    75671236156 HC PT THER SUPP EA 15 MIN 2/12/2018 Esteafny Willams, PT GP 3    30656274715 HC PT RE-EVAL ESTABLISHED PLAN 2 2/12/2018 Estefany Willams, PT GP 1          PT G-Codes  Outcome Measure Options: AM-PAC 6 Clicks Basic Mobility (PT)      Estefany Willams, PT  2/12/2018

## 2018-02-12 NOTE — PROGRESS NOTES
"   LOS: 4 days    Patient Care Team:  Don Bah MD as PCP - General (Pulmonary Disease)  IAIN Neville as PCP - Claims Attributed    Reason For Visit:    chet on ckd 3 2/2 cardio-renal syndrome    Subjective     Feeling much better wants to go home      Review of Systems:    Pulm: improved soa   CV:  No CP      Objective       aspirin 81 mg Oral Daily   budesonide-formoterol 2 puff Inhalation BID - RT   bumetanide 2 mg Intravenous Q8H   bumetanide 2 mg Intravenous Once   clonazePAM 2 mg Oral TID   ferrous sulfate 325 mg Oral Daily   ipratropium-albuterol 3 mL Nebulization 4x Daily - RT   metOLazone 5 mg Oral Once   nystatin  Topical Q12H   rosuvastatin 10 mg Oral Nightly   spironolactone 25 mg Oral Daily   warfarin (COUMADIN) (dosing per levels)  Does not apply Daily       DOBUTamine 2.5 mcg/kg/min Last Rate: 2.5 mcg/kg/min (02/12/18 0515)   Pharmacy to dose warfarin           Vital Signs:  Blood pressure 122/80, pulse 82, temperature 97.4 °F (36.3 °C), temperature source Oral, resp. rate 18, height 175.3 cm (69\"), weight 73.8 kg (162 lb 12.8 oz), SpO2 92 %, not currently breastfeeding.    Flowsheet Rows         First Filed Value    Admission Height  162.6 cm (64\") Documented at 02/08/2018 1812    Admission Weight  72.1 kg (159 lb) Documented at 02/08/2018 1812          02/11 0701 - 02/12 0700  In: -   Out: 300 [Urine:300]    Physical Exam:    General Appearance: NAD, alert and cooperative, Ox3  Eyes: PER, conjunctivae and sclerae normal, no icterus  Lungs: respirations regular and unlabored, no crepitus, clear to auscultation  Heart/CV: regular rhythm & normal rate, pos sys murmur, no gallop, no rub and pos edema 2+  Abdomen: not distended, soft, non-tender, no masses,  bowel sounds present  Skin: No rash, Warm and dry    Radiology:      Renal Imaging:        Labs:    Results from last 7 days  Lab Units 02/12/18  0625 02/09/18  0611 02/08/18  1844   WBC 10*3/mm3 5.98 6.12 9.44 "   HEMOGLOBIN g/dL 9.0* 9.8* 10.3*   HEMATOCRIT % 30.7* 33.6* 34.4*   PLATELETS 10*3/mm3 135* 149* 173       Results from last 7 days  Lab Units 02/12/18  0625 02/11/18  0456 02/10/18  0607 02/09/18  0611 02/08/18  2314 02/08/18  1844   SODIUM mmol/L 128* 133 133 139  --  139   POTASSIUM mmol/L 4.4 3.9 4.1 4.1  --  3.9   CHLORIDE mmol/L 92* 97* 101 104  --  103   CO2 mmol/L 31.0 30.0 28.0 31.0  --  25.0   BUN mg/dL 47* 45* 45* 40*  --  38*   CREATININE mg/dL 2.90* 2.70* 2.30* 1.80*  --  1.70*   CALCIUM mg/dL 8.3* 8.5* 8.0* 8.8  --  9.1   PHOSPHORUS mg/dL 5.0  --   --  5.1  --   --    MAGNESIUM mg/dL  --   --   --  2.9* 1.9 1.9   ALBUMIN g/dL 3.30  --  2.80* 3.20  --  3.50       Results from last 7 days  Lab Units 02/12/18  0625   GLUCOSE mg/dL 84         Results from last 7 days  Lab Units 02/10/18  0607   ALK PHOS U/L 97   BILIRUBIN mg/dL 0.3   ALT (SGPT) U/L 117*   AST (SGOT) U/L 37*       Results from last 7 days  Lab Units 02/11/18  0419   PH, ARTERIAL pH units 7.291*   PO2 ART mm Hg 56.0*   PCO2, ARTERIAL mm Hg 61.3*   HCO3 ART mmol/L 29.5*         Results from last 7 days  Lab Units 02/08/18  2113   COLOR UA  Yellow   CLARITY UA  Cloudy*   PH, URINE  5.5   SPECIFIC GRAVITY, URINE  1.015   GLUCOSE UA  Negative   KETONES UA  Negative   BILIRUBIN UA  Negative   PROTEIN UA  100 mg/dL (2+)*   BLOOD UA  Negative   LEUKOCYTES UA  Negative   NITRITE UA  Negative   Results for VERENICE MARCIO K (MRN [id number]) as of 2/12/2018 12:08   Ref. Range 2/7/2017 16:15   Protein, 24H Urine Latest Ref Range: 42.0 - 225.0 mg/24hours 900.0 (H)       Estimated Creatinine Clearance: 24.6 mL/min (by C-G formula based on Cr of 2.9).      Assessment     Principal Problem:    Acute on chronic respiratory failure with hypoxia and hypercapnia  Active Problems:    Anxiety    Ischemic cardiomyopathy    Lupus (systemic lupus erythematosus)    Coronary artery disease involving native coronary artery of native heart with angina pectoris     History of mitral valve replacement with mechanical valve    Anemia of chronic kidney failure    COPD exacerbation    Tobacco abuse    Noncompliance    Chronic anticoagulation    CKD (chronic kidney disease), stage IV    Acute systolic congestive heart failure    Elevated LFTs            Impression:     chet on ckd 3 worse, cardio renal syndrome  chf improved on dobutamine and diuretics  Hyponatremia 2/2 #1 and #2  Ischemic and valvular hd lvef less than 15%  H/o lupus nephritis with residual proteinuria less than 1gm/24h        Recommendations:   Fluid restrict and hold diuretics  May be able to d/c tomorrow if na improved and cr stable    Gabriel Sy MD  02/12/18  11:58 AM

## 2018-02-12 NOTE — PROGRESS NOTES
" PULMONARY NOTE     Hospital Day: 4    Ms. Ashely Roldan, 57 y.o. female is followed for:   Respiratory failure and COPD         SUBJECTIVE     57-year-old female admitted on 2/8 with shorts of breath, fatigue, and lethargy for 1-2 days.  She was recently admitted here for sepsis due to UTI, shock liver, and acute on chronic kidney injury and left AGAINST MEDICAL ADVICE.     By arterial blood gases she had acute on chronic hypoxic and hypercapnic respiratory failure on admission was treated with BiPAP therapy with improvement.  She has an ischemic cardiomyopathy with an ejection fraction of 20%.  She also has acute on chronic kidney injury.  She has been placed on dobutamine and diuretics.     She carries a diagnosis of COPD.  She is still smoking.  She uses oxygen at home purely on and as-needed basis.  She does not use oxygen at night.  She is not use any type of CPAP or BiPAP therapy at night.  She prefers to not use the BiPAP as it makes her feel more short of breath.    Interval history:    Awake and alert.  Sitting in her chair.  Continue to state she wants to go home.  Oxygen saturations in the mid 90s and she is not even wearing her nasal cannula currently    The patient's relevant past medical, surgical and social history were reviewed and updated in Epic as appropriate.        OBJECTIVE     Vital Sign Min/Max for last 24 hours  Temp  Min: 97.4 °F (36.3 °C)  Max: 98.5 °F (36.9 °C)   BP  Min: 108/58  Max: 127/65   Pulse  Min: 77  Max: 88   Resp  Min: 16  Max: 20   SpO2  Min: 90 %  Max: 99 %   Flow (L/min)  Min: 2  Max: 2   Weight  Min: 73.8 kg (162 lb 12.8 oz)  Max: 73.8 kg (162 lb 12.8 oz)      Intake/Output Summary (Last 24 hours) at 02/12/18 1518  Last data filed at 02/12/18 0932   Gross per 24 hour   Intake              200 ml   Output             1300 ml   Net            -1100 ml      Flowsheet Rows         First Filed Value    Admission Height  162.6 cm (64\") Documented at 02/08/2018 1812    " "Admission Weight  72.1 kg (159 lb) Documented at 02/08/2018 1812        Body mass index is 24.04 kg/(m^2). Last 3 weights    02/10/18  0559 02/11/18  0508 02/12/18  0600   Weight: 75.9 kg (167 lb 6.4 oz) 74.9 kg (165 lb 3.2 oz) 73.8 kg (162 lb 12.8 oz)         DOBUTamine 2.5 mcg/kg/min Last Rate: 2.5 mcg/kg/min (02/12/18 0515)   Pharmacy to dose warfarin          Objective:  General Appearance:  In no acute distress.    Vital signs: (most recent): Blood pressure 122/80, pulse 84, temperature 97.7 °F (36.5 °C), temperature source Oral, resp. rate 18, height 175.3 cm (69\"), weight 73.8 kg (162 lb 12.8 oz), SpO2 92 %, not currently breastfeeding.    HEENT: Normal HEENT exam.    Lungs:  Normal respiratory rate and normal effort.  She is not in respiratory distress.  There are decreased breath sounds.  No wheezes, rales or rhonchi.    Heart: Normal rate.  Regular rhythm.  S1 normal and S2 normal.  No murmur, gallop or friction rub.   Chest: Symmetric chest wall expansion.   Abdomen: Abdomen is soft and non-distended.  Bowel sounds are normal.   There is no abdominal tenderness.   There is no mass. There is no splenomegaly. There is no hepatomegaly.   Extremities: There is dependent edema.  There is no deformity.    Neurological: Patient is alert and oriented to person, place and time.    Pupils:  Pupils are equal, round, and reactive to light.    Skin:  Warm and dry.                      I reviewed the patient's results and images, including reviewing images and reports.    Medications reviewed.      Scheduled Meds:  aspirin 81 mg Oral Daily   budesonide-formoterol 2 puff Inhalation BID - RT   clonazePAM 2 mg Oral TID   ferrous sulfate 325 mg Oral Daily   ipratropium-albuterol 3 mL Nebulization 4x Daily - RT   nystatin  Topical Q12H   rosuvastatin 10 mg Oral Nightly   warfarin (COUMADIN) (dosing per levels)  Does not apply Daily         Assessment/Plan   ASSESSMENT      Hospital Problem List     * (Principal)Acute on " chronic respiratory failure with hypoxia and hypercapnia    COPD exacerbation    Anxiety    Ischemic cardiomyopathy (Chronic)    Overview Addendum 2/10/2018 10:50 AM by Baldomero Gusman IV, MD     · History of MI December 2009: s/p stent to RCA and LAD, EF 40%  · CABGx2 March, 2010: SVG to OMB-1, SVG to PDA  · EF 30% post-operatively  · [place] ICD placement, 2011  · Echo (July 2014): Severe global hypokinesis with EF 12%, moderate AI, mechanical mitral valve, moderate to severe TR, RVSP 43mmHg   · Echo (2/4/17): EF 18%, mild to mod AI, mod TR, grossly normal prosthetic mitral valve  · Echo (2/9/18): LVEF 20%. Normal functioning mechanical mitral valve. Cannot exclude LV thrombus.         Lupus (systemic lupus erythematosus) (Chronic)    Coronary artery disease involving native coronary artery of native heart with angina pectoris    Overview Signed 2/10/2018 10:47 AM by Baldomero Gusman IV, MD     · History of MI December 2009: s/p stent to RCA and LAD, EF 40%  · CABG (March, 2010): SVG to OMB-1, SVG to PDA         History of mitral valve replacement with mechanical valve    Overview Addendum 2/11/2018 10:07 AM by IAIN Allred     · Mitral valve replacement with 27mm ATS mechanical valve March 2010  · Echo (02/09/2018): There is right-sided chamber enlargement. Global RV and LV hypokinesia is present. LVEF 20%. Cannot exclude LV thrombus. Normal functioning mechanical mitral valve present. Small pericardial effusion.         Anemia of chronic kidney failure    Tobacco abuse (Chronic)    Noncompliance    Chronic anticoagulation    Overview Signed 7/16/2016 10:32 PM by IAIN Gabriel     Coumadin (Mechanical AVR)         CKD (chronic kidney disease), stage IV (Chronic)    Acute systolic congestive heart failure    Overview Signed 2/10/2018 10:45 AM by Baldomero Gusman IV, MD     · Echo (2/9/18): LVEF 20%. Normal functioning mechanical mitral valve. Cannot exclude LV  thrombus.         Elevated LFTs            Primary acute issue remains cardiorenal syndrome.  She has COPD and chronic hypoxic and hypercapnic respiratory failure but her arterial blood gases are mostly compensated.  She does not like to use BiPAP at night and did not use it last night.  She currently is quite comfortable and when I was in was not wearing her nasal cannula oxygen with saturations above 90%.          RECOMMENDATIONS     1. Would use BiPAP on a purely as-needed basis based upon her clinical condition   2. Continue Symbicort and Spiriva on discharge   3. I recommended to her that she use her oxygen at night as well as as needed during the day with exercise at the very least when she goes home.    4. Recommended smoking cessation.    5. Pulmonary will see as needed in the hospital          I discussed the patient's findings and my recommendations with patient         Don Bah MD  Pulmonary and Critical Care Medicine

## 2018-02-12 NOTE — PROGRESS NOTES
"  [facility]  PROGRESS NOTE    Date of Admission: 2/8/2018  Length of Stay: 4  Primary Care Physician: Don Bah MD    Chief Complaint: f/u end-stage CM, SHF  Problem List:   1. Structural heart disease of mixed etiology:  A. History of MI December 2009: s/p stent to RCA and LAD, EF 40%  B. CABGx2 March, 2010: SVG to OMB-1, SVG to PDA  C. EF 30% post-operatively, s/p [place] ICD placement, 2011  D. Echo July 2014: Severe global hypokinesis with EF 12%, moderate AI, mechanical mitral valve, moderate to severe TR, RVSP 43mmHg   E. Echo 2/4/17: EF 18%, mild to mod AI, mod TR, grossly normal prosthetic mitral valve  F. Trivial Troponin elevation Feb. 2017, patient refused further ischemic evaluation.  G. Echo 2/2/18: \"borderline\" LV and RV enlargement, severe global hypokinesia with LVEF <20%, mild AI, normally functioning bileaflet mitral valve present  H. Re-admitted for A/C SHF 2/2018 after patient left AMA    i. Echo 2/9/18: EF 20%, global RV and LV hypokinesia, normally functioning mechanical mitral valve, aortic valve not well visualized, mild AI; LV apical thrombus cannot be excluded with certainty  2. VHD:  A. Mitral valve replacement with 27mm ATS mechanical valve March 2010, on chronic coumadin  3. COPD with ongoing tobacco abuse   4. Systemic Lupus Erythematosus  5. Hypertension  6. Hyperlipidemia   7. 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 PVD   9. Medical noncompliance    Subjective      She is sitting in chair, ambulated with PT. States she feels better, no dyspnea, although she is mildly hypoxic on room air at rest.       Objective   Vitals: /80  Pulse 82  Temp 97.7 °F (36.5 °C) (Oral)   Resp 18  Ht 175.3 cm (69\")  Wt 73.8 kg (162 lb 12.8 oz)  SpO2 92%  BMI 24.04 kg/m2    Physical Exam:  GENERAL: Alert, cooperative, in no acute distress.   HEENT: Fundoscopic deferred, otherwise unremarkable.  NECK: No Jugular venous distention, " adenopathy  HEART: No discrete PMI is noted. Regular rhythm, normal rate, and no murmur; prosthetic valve clicks  LUNGS: Diminished breath sounds. No wheezing, rales or ronchi. 87-90% on room air  ABDOMEN: Flat without evidence of organomegaly, masses, or tenderness.  NEUROLOGIC: No focal abnormalities involving strength or sensation are noted.   EXTREMITIES: No clubbing, cyanosis. LE edema with bilateral wraps in place.     Results:    Results from last 7 days  Lab Units 02/12/18  0625 02/09/18  0611 02/08/18  1844   WBC 10*3/mm3 5.98 6.12 9.44   HEMOGLOBIN g/dL 9.0* 9.8* 10.3*   HEMATOCRIT % 30.7* 33.6* 34.4*   PLATELETS 10*3/mm3 135* 149* 173       Results from last 7 days  Lab Units 02/12/18  0625 02/11/18  0456 02/10/18  0607   SODIUM mmol/L 128* 133 133   POTASSIUM mmol/L 4.4 3.9 4.1   CHLORIDE mmol/L 92* 97* 101   CO2 mmol/L 31.0 30.0 28.0   BUN mg/dL 47* 45* 45*   CREATININE mg/dL 2.90* 2.70* 2.30*   GLUCOSE mg/dL 84 96 111*      Lab Results   Component Value Date    CHOL 142 02/03/2018    TRIG 81 02/03/2018    HDL 42 02/03/2018    LDLDIRECT 96 02/03/2018    AST 37 (H) 02/10/2018     (H) 02/10/2018       Results from last 7 days  Lab Units 02/09/18  0611 02/08/18  1844   BNP pg/mL 2919.0* 3156.0*       Results from last 7 days  Lab Units 02/12/18  0625 02/11/18  0456 02/10/18  0607   PROTIME Seconds 40.1* 46.5* 62.4*   INR  3.82* 4.43* 5.94*       Results from last 7 days  Lab Units 02/12/18  0625   CK TOTAL U/L 53       Intake/Output Summary (Last 24 hours) at 02/12/18 1404  Last data filed at 02/12/18 0932   Gross per 24 hour   Intake              200 ml   Output             1300 ml   Net            -1100 ml     I personally reviewed the patient's EKG/Telemetry data    Radiology Data:   Echo 2/9/2018:  Interpretation Summary      · There is right-sided chamber enlargement.  · Global RV and LV hypokinesia is present.  · The estimated LV ejection fraction is 20%.  · I cannot exclude LV apical thrombus  with certainty.  · The aortic valve is not well visualized. Mild AI is seen.  · A normally functioning mechanical mitral valve is present.  · A small pericardial effusion is suted.     Current Medications:    aspirin 81 mg Oral Daily   budesonide-formoterol 2 puff Inhalation BID - RT   clonazePAM 2 mg Oral TID   ferrous sulfate 325 mg Oral Daily   ipratropium-albuterol 3 mL Nebulization 4x Daily - RT   nystatin  Topical Q12H   rosuvastatin 10 mg Oral Nightly   warfarin (COUMADIN) (dosing per levels)  Does not apply Daily       DOBUTamine 2.5 mcg/kg/min Last Rate: 2.5 mcg/kg/min (02/12/18 0515)   Pharmacy to dose warfarin         Assessment and Plan:   1. End stage cardiomyopathy  - EF 20%, s/p ICD  - on dobutamine, aldosterone antagonist and diuretics held per nephrology  - not a candidate for LVAD due to prior compliance issues as well as bi-ventricular failure  - now cardiorenal    2. Valvular heart disease, s/p mechanical mitral valve 2010  - normal function by echo  - Warfarin per INR dosing     3. Cardiorenal syndrome and FAHEEM/CKD  - NAL following    4. COPD  - Pulmonary has seen     5. I readdressed palliative with her and she would like to see them tomorrow. Consult put in.    I, Yomi Ramirez MD, personally performed the services described as documented by the above named individual. I have made any necessary edits and it is both accurate and complete 2/12/2018  8:45 PM        Scribed for Yomi Ramirez MD by Sandhya Lynch PA-C.

## 2018-02-12 NOTE — PROGRESS NOTES
"Pharmacy Consult  -  Warfarin    Ashely Roldan is a  57 y.o. female   Height - 175.3 cm (69\")  Weight - 73.8 kg (162 lb 12.8 oz)    Consulting Provider: - Hospitalist Group  Indication: - Mechanical Mitral Valve  Goal INR: - 2.5-3.5  Home Regimen:   - 2 mg daily except every third day   - 3 mg every third day; repeating    Bridge Therapy: No     Drug-Drug Interactions with current regimen:   None    Warfarin Dosing During Admission:    Date  2/9 2/10 2/11 2/12        INR  4.28 5.94 4.43 3.82        Dose  Hold Hold Hold (1mg)             Education Provided:  On prior to admission; will f/u and answer questions as needed.    Labs:      Results from last 7 days  Lab Units 02/12/18  0625 02/11/18  0456 02/10/18  0607 02/09/18  0851 02/09/18  0611 02/08/18  1844   INR  3.82* 4.43* 5.94* 4.28*  --   --    HEMOGLOBIN g/dL 9.0*  --   --   --  9.8* 10.3*   HEMATOCRIT % 30.7*  --   --   --  33.6* 34.4*   PLATELETS 10*3/mm3 135*  --   --   --  149* 173       Results from last 7 days  Lab Units 02/12/18  0625 02/11/18  0456 02/10/18  0607 02/08/18  1844   SODIUM mmol/L 128* 133 133  < > 139   POTASSIUM mmol/L 4.4 3.9 4.1  < > 3.9   CHLORIDE mmol/L 92* 97* 101  < > 103   CO2 mmol/L 31.0 30.0 28.0  < > 25.0   BUN mg/dL 47* 45* 45*  < > 38*   CREATININE mg/dL 2.90* 2.70* 2.30*  < > 1.70*   CALCIUM mg/dL 8.3* 8.5* 8.0*  < > 9.1   BILIRUBIN mg/dL  --   --  0.3  --  0.6   ALK PHOS U/L  --   --  97  --  119*   ALT (SGPT) U/L  --   --  117*  --  181*   AST (SGOT) U/L  --   --  37*  --  48*   GLUCOSE mg/dL 84 96 111*  < > 126*   < > = values in this interval not displayed.    Current dietary intake: 50% to 100% of meals  Diet Order   Procedures   • Diet Regular; Cardiac       Assessment/Plan:     1. INR is SUPRAtherapeutic today at 3.82, but has been steadily decreasing.  Will give warfarin 1mg tonight in hopes to prevent patient from going subtherapeutic.    2. Continue to watch INR closely, dietary intake, drug-drug interactions " and s/sx of bleeding or clotting.  3. Pharmacy will continue to follow.      Ivonne García, PharmD  2/12/2018  3:18 PM

## 2018-02-12 NOTE — PLAN OF CARE
Problem: Patient Care Overview (Adult)  Goal: Plan of Care Review  Outcome: Ongoing (interventions implemented as appropriate)   02/11/18 1327 02/11/18 2025 02/12/18 0532   Coping/Psychosocial Response Interventions   Plan Of Care Reviewed With --  patient --    Patient Care Overview   Progress progress towards functional goals is fair --  --    Outcome Evaluation   Outcome Summary/Follow up Plan --  --  VSS, pt has tolerated dobutamine drip. Denies pain or discomfort.       Problem: Skin Integrity Impairment, Risk/Actual (Adult)  Goal: Identify Related Risk Factors and Signs and Symptoms  Outcome: Ongoing (interventions implemented as appropriate)    Goal: Skin Integrity/Wound Healing  Outcome: Ongoing (interventions implemented as appropriate)      Problem: Fall Risk (Adult)  Goal: Identify Related Risk Factors and Signs and Symptoms  Outcome: Ongoing (interventions implemented as appropriate)    Goal: Absence of Falls  Outcome: Ongoing (interventions implemented as appropriate)      Problem: Respiratory Insufficiency (Adult)  Goal: Identify Related Risk Factors and Signs and Symptoms  Outcome: Outcome(s) achieved Date Met: 02/12/18    Goal: Acid/Base Balance  Outcome: Ongoing (interventions implemented as appropriate)    Goal: Effective Ventilation  Outcome: Ongoing (interventions implemented as appropriate)      Problem: Cardiac: Heart Failure (Adult)  Goal: Signs and Symptoms of Listed Potential Problems Will be Absent or Manageable (Cardiac: Heart Failure)  Outcome: Ongoing (interventions implemented as appropriate)

## 2018-02-12 NOTE — PROGRESS NOTES
[facility]  PROGRESS NOTE    Patient Name: Ashely Roldan  : 1960  MRN: [id number]    Date of Admission: 2018  Length of Stay: 4  Primary Care Physician: Don Bah MD    Subjective   Subjective     CC:    Respiratory failure, leg edema, CHF    HPI:  Feet less tender but feel itchy. Wants to go home soon.    Review of Systems   Constitutional: Negative.    Respiratory: Positive for shortness of breath.    Cardiovascular: Positive for leg swelling.   Gastrointestinal: Negative.    Skin: Positive for wound.   Neurological: Negative.    Psychiatric/Behavioral: Negative.          Otherwise ROS is negative except as mentioned in the HPI.    Objective   Objective     Vital Signs:   Temp:  [97.4 °F (36.3 °C)-98.5 °F (36.9 °C)] 97.7 °F (36.5 °C)  Heart Rate:  [77-88] 84  Resp:  [16-20] 18  BP: (108-127)/(58-80) 122/80        Physical Exam:    Constitutional -frail, non toxic, up in chair  HEENT-NCAT, mucous membranes moist  CV-RRR, S1 S2 normal, no m/r/g  Resp-diminished bilaterally  Abd-soft, non-tender, non-distended, normo active bowel sounds  Ext-1+ edema LE bilat with fluid filled blisters, lower legs in soft wraps  Neuro-alert and oriented, speech clear, moves all extremities   Psych-normal affect   Skin- No rash on exposed UE or LE bilaterally      Results Reviewed:  I have personally reviewed current lab, radiology, and data and agree.      Results from last 7 days  Lab Units 18  0625 18  0456 02/10/18  0607  18  0611 18  1844   WBC 10*3/mm3 5.98  --   --   --  6.12 9.44   HEMOGLOBIN g/dL 9.0*  --   --   --  9.8* 10.3*   HEMATOCRIT % 30.7*  --   --   --  33.6* 34.4*   PLATELETS 10*3/mm3 135*  --   --   --  149* 173   INR  3.82* 4.43* 5.94*  < >  --   --    < > = values in this interval not displayed.    Results from last 7 days  Lab Units 18  0625 18  0456 02/10/18  0607  18  1844   SODIUM mmol/L 128*  133 133  < > 139   POTASSIUM mmol/L 4.4 3.9 4.1  < > 3.9   CHLORIDE mmol/L 92* 97* 101  < > 103   CO2 mmol/L 31.0 30.0 28.0  < > 25.0   BUN mg/dL 47* 45* 45*  < > 38*   CREATININE mg/dL 2.90* 2.70* 2.30*  < > 1.70*   GLUCOSE mg/dL 84 96 111*  < > 126*   CALCIUM mg/dL 8.3* 8.5* 8.0*  < > 9.1   ALT (SGPT) U/L  --   --  117*  --  181*   AST (SGOT) U/L  --   --  37*  --  48*   < > = values in this interval not displayed.  Estimated Creatinine Clearance: 24.6 mL/min (by C-G formula based on Cr of 2.9).  No results found for: BNP  pH, Arterial   Date Value Ref Range Status   02/11/2018 7.291 (L) 7.350 - 7.450 pH units Final       Microbiology Results Abnormal     Procedure Component Value - Date/Time    Blood Culture - Blood, [[id number]]  (Normal) Collected:  02/08/18 2259    Lab Status:  Preliminary result Specimen:  Blood from Hand, Left Updated:  02/12/18 0146     Blood Culture No growth at 3 days    Blood Culture - Blood, [[id number]]  (Normal) Collected:  02/08/18 2304    Lab Status:  Preliminary result Specimen:  Blood from Arm, Left Updated:  02/12/18 0146     Blood Culture No growth at 3 days    Narrative:       Aerobic Only    Urine Culture - Urine, Urine, Clean Catch [[id number]]  (Normal) Collected:  02/09/18 0330    Lab Status:  Final result Specimen:  Urine from Urine, Clean Catch Updated:  02/11/18 0727     Urine Culture No growth at 2 days    Influenza A & B, RT PCR - Swab, Nasopharynx [[id number]]  (Normal) Collected:  02/08/18 2113    Lab Status:  Final result Specimen:  Swab from Nasopharynx Updated:  02/08/18 2209     Influenza A PCR Not Detected     Influenza B PCR Not Detected          Imaging Results (last 24 hours)     ** No results found for the last 24 hours. **        Results for orders placed during the hospital encounter of 02/08/18   STAT Adult Transthoracic Echo Complete W/ Cont if Necessary Per Protocol    Narrative · There is right-sided chamber enlargement.  · Global RV and LV hypokinesia is  present.  · The estimated LV ejection fraction is 20%.  · I cannot exclude LV apical thrombus with certainty.  · The aortic valve is not well visualized. Mild AI is seen.  · A normally functioning mechanical mitral valve is present.  · A small pericardial effusion is suted.          I have reviewed the medications.    aspirin 81 mg Oral Daily   budesonide-formoterol 2 puff Inhalation BID - RT   clonazePAM 2 mg Oral TID   ferrous sulfate 325 mg Oral Daily   ipratropium-albuterol 3 mL Nebulization 4x Daily - RT   nystatin  Topical Q12H   rosuvastatin 10 mg Oral Nightly   warfarin (COUMADIN) (dosing per levels)  Does not apply Daily   warfarin 1 mg Oral Once         Assessment/Plan   Assessment / Plan     Hospital Problem List     * (Principal)Acute on chronic respiratory failure with hypoxia and hypercapnia    Anxiety    Ischemic cardiomyopathy (Chronic)    Overview Addendum 2/10/2018 10:50 AM by Baldomero Gusman IV, MD     · History of MI December 2009: s/p stent to RCA and LAD, EF 40%  · CABGx2 March, 2010: SVG to OMB-1, SVG to PDA  · EF 30% post-operatively  · [place] ICD placement, 2011  · Echo (July 2014): Severe global hypokinesis with EF 12%, moderate AI, mechanical mitral valve, moderate to severe TR, RVSP 43mmHg   · Echo (2/4/17): EF 18%, mild to mod AI, mod TR, grossly normal prosthetic mitral valve  · Echo (2/9/18): LVEF 20%. Normal functioning mechanical mitral valve. Cannot exclude LV thrombus.         Lupus (systemic lupus erythematosus) (Chronic)    Coronary artery disease involving native coronary artery of native heart with angina pectoris    Overview Signed 2/10/2018 10:47 AM by Baldomero Gusman IV, MD     · History of MI December 2009: s/p stent to RCA and LAD, EF 40%  · CABG (March, 2010): SVG to OMB-1, SVG to PDA         History of mitral valve replacement with mechanical valve    Overview Addendum 2/11/2018 10:07 AM by IAIN Allred     · Mitral valve replacement with  "27mm ATS mechanical valve March 2010  · Echo (02/09/2018): There is right-sided chamber enlargement. Global RV and LV hypokinesia is present. LVEF 20%. Cannot exclude LV thrombus. Normal functioning mechanical mitral valve present. Small pericardial effusion.         Anemia of chronic kidney failure    COPD exacerbation    Tobacco abuse (Chronic)    Noncompliance    Chronic anticoagulation    Overview Signed 7/16/2016 10:32 PM by IAIN Gabriel     Coumadin (Mechanical AVR)         CKD (chronic kidney disease), stage IV (Chronic)    Acute systolic congestive heart failure    Overview Signed 2/10/2018 10:45 AM by Baldomero Gusman IV, MD     · Echo (2/9/18): LVEF 20%. Normal functioning mechanical mitral valve. Cannot exclude LV thrombus.         Elevated LFTs             Brief Hospital Course to date:  Ashely Roldan is a 57 y.o. female with history of CHF, EF<20%, recent hospitalization for FAHEEM, shock liver, E coli UTI, hypotension and volume overload presents with lethargy and hypercarbic respiratory failure      Assessment & Plan:    Encephalopathy, secondary to hypercapnea  - resolved    FAHEEM on CKI  - poor UOP despite bumex and dobutamine  - trial albumin today  - possible cardiorenal synrome    Intake/Output Summary (Last 24 hours) at 02/12/18 1620  Last data filed at 02/12/18 0932   Gross per 24 hour   Intake              200 ml   Output             1300 ml   Net            -1100 ml     A/C systolic CHF  - EF </=20%  - dobutamine to assist with diuresis    Acute respiratory failure with hypoxia and hypercapnea  - lethargy resolved  - patient with ongoing (but compensated) respiratory acidosis with retained CO2  - discussed with Pulmonary, as patient currently asymptomatic, will defer further BiPAP or ABGs    Elevated LFT's  - recent \"shock liver\" felt secondary to hypotension  - improved    VHD  - mechanical MV  - supratherapeutic INR (3.8), PharmD follows for dosing    COPD    Tobacco " abuse  - patient says she quit smoking last year    Lupus  - apparently not taking plaquenil at home    Medical Noncompliance    DVT Prophylaxis: coumadin     CODE STATUS: Full Code    Disposition: I expect the patient to be discharged home? in 2-3 days. Patient very anxious to leave the hospital soon. I did discuss with Ms Roldan that given her current heart and renal failure, her short term prognosis is poor, particularly if she chooses to leave AMA again. Discussed Palliative Care and even Hospice for home should she choose to forgo further medical care, but Ms Roldan declines.    Charles Deras MD  02/12/18  4:18 PM

## 2018-02-13 LAB
ANION GAP SERPL CALCULATED.3IONS-SCNC: 5 MMOL/L (ref 3–11)
BUN BLD-MCNC: 52 MG/DL (ref 9–23)
BUN/CREAT SERPL: 21.7 (ref 7–25)
CALCIUM SPEC-SCNC: 8.8 MG/DL (ref 8.7–10.4)
CHLORIDE SERPL-SCNC: 93 MMOL/L (ref 99–109)
CO2 SERPL-SCNC: 32 MMOL/L (ref 20–31)
CREAT BLD-MCNC: 2.4 MG/DL (ref 0.6–1.3)
GFR SERPL CREATININE-BSD FRML MDRD: 21 ML/MIN/1.73
GLUCOSE BLD-MCNC: 89 MG/DL (ref 70–100)
INR PPP: 2.24 (ref 0.91–1.09)
POTASSIUM BLD-SCNC: 4.2 MMOL/L (ref 3.5–5.5)
PROTHROMBIN TIME: 23.5 SECONDS (ref 9.6–11.5)
SODIUM BLD-SCNC: 130 MMOL/L (ref 132–146)

## 2018-02-13 PROCEDURE — 94640 AIRWAY INHALATION TREATMENT: CPT

## 2018-02-13 PROCEDURE — 97110 THERAPEUTIC EXERCISES: CPT

## 2018-02-13 PROCEDURE — 85610 PROTHROMBIN TIME: CPT | Performed by: INTERNAL MEDICINE

## 2018-02-13 PROCEDURE — 94799 UNLISTED PULMONARY SVC/PX: CPT

## 2018-02-13 PROCEDURE — 99232 SBSQ HOSP IP/OBS MODERATE 35: CPT | Performed by: INTERNAL MEDICINE

## 2018-02-13 PROCEDURE — 80048 BASIC METABOLIC PNL TOTAL CA: CPT | Performed by: INTERNAL MEDICINE

## 2018-02-13 PROCEDURE — 97116 GAIT TRAINING THERAPY: CPT

## 2018-02-13 PROCEDURE — 99233 SBSQ HOSP IP/OBS HIGH 50: CPT | Performed by: HOSPITALIST

## 2018-02-13 PROCEDURE — 25010000002 DOBUTAMINE PER 250 MG: Performed by: INTERNAL MEDICINE

## 2018-02-13 PROCEDURE — 63710000001 DIPHENHYDRAMINE PER 50 MG: Performed by: INTERNAL MEDICINE

## 2018-02-13 RX ORDER — WARFARIN SODIUM 3 MG/1
3 TABLET ORAL
Status: COMPLETED | OUTPATIENT
Start: 2018-02-13 | End: 2018-02-13

## 2018-02-13 RX ADMIN — CLONAZEPAM 2 MG: 1 TABLET ORAL at 08:46

## 2018-02-13 RX ADMIN — NYSTATIN: 100000 POWDER TOPICAL at 20:19

## 2018-02-13 RX ADMIN — TRAMADOL HYDROCHLORIDE 50 MG: 50 TABLET, COATED ORAL at 12:20

## 2018-02-13 RX ADMIN — DOBUTAMINE HYDROCHLORIDE 2.5 MCG/KG/MIN: 100 INJECTION INTRAVENOUS at 00:10

## 2018-02-13 RX ADMIN — Medication 325 MG: at 08:46

## 2018-02-13 RX ADMIN — DIPHENHYDRAMINE HYDROCHLORIDE 25 MG: 25 CAPSULE ORAL at 10:57

## 2018-02-13 RX ADMIN — IPRATROPIUM BROMIDE AND ALBUTEROL SULFATE 3 ML: .5; 3 SOLUTION RESPIRATORY (INHALATION) at 20:15

## 2018-02-13 RX ADMIN — IPRATROPIUM BROMIDE AND ALBUTEROL SULFATE 3 ML: .5; 3 SOLUTION RESPIRATORY (INHALATION) at 16:08

## 2018-02-13 RX ADMIN — CLONAZEPAM 2 MG: 1 TABLET ORAL at 20:19

## 2018-02-13 RX ADMIN — BUDESONIDE AND FORMOTEROL FUMARATE DIHYDRATE 2 PUFF: 160; 4.5 AEROSOL RESPIRATORY (INHALATION) at 07:36

## 2018-02-13 RX ADMIN — IPRATROPIUM BROMIDE AND ALBUTEROL SULFATE 3 ML: .5; 3 SOLUTION RESPIRATORY (INHALATION) at 07:36

## 2018-02-13 RX ADMIN — WARFARIN SODIUM 3 MG: 3 TABLET ORAL at 18:27

## 2018-02-13 RX ADMIN — CLONAZEPAM 2 MG: 1 TABLET ORAL at 16:58

## 2018-02-13 RX ADMIN — BUDESONIDE AND FORMOTEROL FUMARATE DIHYDRATE 2 PUFF: 160; 4.5 AEROSOL RESPIRATORY (INHALATION) at 20:15

## 2018-02-13 RX ADMIN — IPRATROPIUM BROMIDE AND ALBUTEROL SULFATE 3 ML: .5; 3 SOLUTION RESPIRATORY (INHALATION) at 12:09

## 2018-02-13 RX ADMIN — ASPIRIN 81 MG: 81 TABLET, COATED ORAL at 08:46

## 2018-02-13 RX ADMIN — DIPHENHYDRAMINE HYDROCHLORIDE 25 MG: 25 CAPSULE ORAL at 20:19

## 2018-02-13 RX ADMIN — ROSUVASTATIN CALCIUM 10 MG: 10 TABLET ORAL at 20:19

## 2018-02-13 RX ADMIN — NYSTATIN 1 APPLICATION: 100000 POWDER TOPICAL at 08:47

## 2018-02-13 NOTE — THERAPY WOUND CARE TREATMENT
Acute Care - Wound/Debridement Treatment Note  [facility]     Patient Name: Ashley Roldan  : 1960  MRN: [id number]  Today's Date: 2018  Onset of Illness/Injury or Date of Surgery Date: 18   Date of Referral to PT: 02/10/18   Referring Physician: MD CINDY       Admit Date: 2018    Visit Dx:    ICD-10-CM ICD-9-CM   1. Respiratory failure, unspecified chronicity, unspecified whether with hypoxia or hypercapnia J96.90 518.81   2. COPD exacerbation J44.1 491.21   3. Acute on chronic congestive heart failure, unspecified congestive heart failure type I50.9 428.0   4. Tobacco abuse Z72.0 305.1   5. Medical non-compliance Z91.19 V15.81   6. Impaired mobility and ADLs Z74.09 799.89   7. Impaired functional mobility, balance, gait, and endurance Z74.09 V49.89       Patient Active Problem List   Diagnosis   • Anxiety   • Arthritis   • Panlobular emphysema   • Reactive depression   • Gastritis   • Heart attack   • Ischemic cardiomyopathy   • VHD (valvular heart disease)   • Osteoporosis   • PVD (peripheral vascular disease)   • Allergic rhinitis   • Lupus (systemic lupus erythematosus)   • TIA (transient ischemic attack)   • Coronary artery disease involving native coronary artery of native heart with angina pectoris   • Cough   • History of mitral valve replacement with mechanical valve   • Anemia of chronic kidney failure   • Insomnia   • COPD exacerbation   • Tobacco abuse   • AICD (automatic cardioverter/defibrillator) present   • Noncompliance   • Blunt trauma of multiple sites   • Chronic anticoagulation   • Closed fracture of rib with flail chest   • CKD (chronic kidney disease), stage IV   • Acute systolic congestive heart failure   • Acute on chronic respiratory failure with hypoxia and hypercapnia   • Elevated LFTs               LDA Wound       18 1130          Rash 18 0830 Bilateral medial groin macular    Rash - Properties Group Date first assessed: 18  -CP Time first  assessed: 0830  -CP Side: Bilateral  -CP Orientation: medial  -CP Location: groin  -CP Rash Type: macular  -CP    Wound 02/09/18 0830 Bilateral lower leg blister(s)    Wound - Properties Group Date first assessed: 02/09/18  -CP Time first assessed: 0830  -CP Side: Bilateral  -CP Orientation: lower  -CP Location: leg  -CP Type: blister(s)  -CP    Wound WDL WDL  -MF      Dressing Appearance intact;moist drainage   moderate bleeding noted today.   -MF      Base moist;pink;reddened;slough  -      Periwound Area intact;pink;dry;redness  -MF      Edges irregular;open  -MF      Drainage Characteristics/Odor sanguineous;serosanguineous  -MF      Drainage Amount moderate  -MF      Wound Cleaning cleansed with;soap and water  -      Dressing petroleum-based dressing   xeroform with kerlix and spandage to secure  -        User Key  (r) = Recorded By, (t) = Taken By, (c) = Cosigned By    Initials Name Provider Type     Yomi Ruby, PT Physical Therapist     IAIN [place] Nurse Practitioner            WOUND DEBRIDEMENT  Debridement Site 1  Location- Site 1: BLE blisters  Bleeding- Site 1: moderate, held pressure, 3-5 minutes, AgNitrate sticks                  Adult Rehabilitation Note       02/13/18 1130          Rehab Assessment/Intervention    Discipline physical therapist  -      Document Type therapy note (daily note)  -      Subjective Information agree to therapy;complains of;weakness;fatigue;pain  -      Recorded by [MF] Yomi Ruby, PT      Pain Assessment    Pain Assessment Encarnacion-Baker FACES  -      Encarnacion-Baker FACES Pain Rating 6  -      Pain Type Acute pain  -      Pain Location Leg  -      Pain Orientation Right;Left  -      Pain Intervention(s) Repositioned   notified RN   -MF      Recorded by [MF] Yomi Ruby, PT      Positioning and Restraints    Pre-Treatment Position in bed  -MF      Post Treatment Position bed  -MF      In Bed supine;call light within  reach;notified nsg  -MF      Recorded by [MF] Yomi Ruby, PT        User Key  (r) = Recorded By, (t) = Taken By, (c) = Cosigned By    Initials Name Effective Dates     Yomi Ruby, PT 06/19/15 -                 IP PT Goals       02/13/18 1130 02/12/18 1311 02/11/18 1505    Bed Mobility PT LTG    Bed Mobility PT LTG, Time to Achieve  2 wks  -CD     Bed Mobility PT LTG, Activity Type  all bed mobility  -CD     Bed Mobility PT LTG, [place] Level  independent  -CD     Transfer Training PT LTG    Transfer Training PT LTG, Activity Type  all transfers  -CD     Transfer Training PT LTG, [place] Level  independent  -CD     Transfer Training PT LTG, Assist Device  walker, rolling  -CD     Gait Training PT LTG    Gait Training Goal PT LTG, Time to Achieve  2 wks  -CD     Gait Training Goal PT LTG, [place] Level  independent  -CD     Gait Training Goal PT LTG, Assist Device  walker, rolling  -CD     Gait Training Goal PT LTG, Distance to Achieve  400  -CD     Wound Care PT LTG    Wound Care PT LTG 1, Date Established   02/11/18  -MC    Wound Care PT LTG 1, Time to Achieve   1 wk  -MC    Wound Care PT LTG 1, Location   BLE  -MC    Wound Care PT LTG 1, No S&S of Infection   yes  -MC    Wound Care PT LTG 1, Decrease Wound Size   25%  -MC    Wound Care PT LTG 1, Decrease Exudate   scant  -MC    Wound Care PT LTG 1, No New Skin Break Down   yes  -MC    Wound Care PT LTG 1, Education   wound care  -MC    Wound Care PT LTG 1, Education Understanding   verbalize understanding  -MC    Wound Care PT LTG 1, Outcome goal ongoing  -        User Key  (r) = Recorded By, (t) = Taken By, (c) = Cosigned By    Initials Name Provider Type    GRETA Willams, PT Physical Therapist    SHANTI Ruby, PT Physical Therapist    CAITIE Mcfarlane, PT Physical Therapist          Physical Therapy Education     Title: PT OT SLP Therapies (Active)     Topic: Physical Therapy (Done)     Point: Mobility training  (Done)    Learning Progress Summary    Learner Readiness Method Response Comment Documented by Status   Patient Acceptance E VU,NR BENEFITS OF OOB ACTIVITY, SAFETY WITH MOBILITY, THER EX, D/C PLANNING.  02/12/18 1311 Done               Point: Home exercise program (Done)    Learning Progress Summary    Learner Readiness Method Response Comment Documented by Status   Patient Acceptance E VU,NR BENEFITS OF OOB ACTIVITY, SAFETY WITH MOBILITY, THER EX, D/C PLANNING.  02/12/18 1311 Done               Point: Body mechanics (Done)    Learning Progress Summary    Learner Readiness Method Response Comment Documented by Status   Patient Acceptance E VU,NR BENEFITS OF OOB ACTIVITY, SAFETY WITH MOBILITY, THER EX, D/C PLANNING.  02/12/18 1311 Done               Point: Precautions (Done)    Learning Progress Summary    Learner Readiness Method Response Comment Documented by Status   Patient Acceptance E VU,NR BENEFITS OF OOB ACTIVITY, SAFETY WITH MOBILITY, THER EX, D/C PLANNING.  02/12/18 1311 Done                      User Key     Initials Effective Dates Name Provider Type Discipline     06/19/15 -  Estefany Willams, PT Physical Therapist PT                   PT ASSESSMENT (last 72 hours)      PT Evaluation       02/13/18 1130 02/12/18 2000    Rehab Evaluation    Document Type therapy note (daily note)  -     Subjective Information agree to therapy;complains of;weakness;fatigue;pain  -     Pain Assessment    Pain Assessment Encarnacion-Baker FACES  -     Encarnacion-Baker FACES Pain Rating 6  -     Pain Type Acute pain  -     Pain Location Leg  -     Pain Orientation Right;Left  -     Pain Intervention(s) Repositioned   notified RN   -MF     Muscle Tone Assessment    Muscle Tone Assessment  Bilateral Lower Extremities  -PT    Bilateral Lower Extremities Muscle Tone Assessment  moderately decreased tone  -PT    Positioning and Restraints    Pre-Treatment Position in bed  -MF     Post Treatment Position bed  -MF     In Bed  supine;call light within reach;notified nsg  -       02/12/18 0955 02/11/18 1505    Rehab Evaluation    Document Type evaluation;therapy note (daily note)  -CD evaluation;therapy note (daily note)   wound care  -    Subjective Information agree to therapy;complains of;pain;fatigue;weakness   INITIALLY DECLINED DUE TO PAIN IN B LE'S.  -CD agree to therapy;complains of;pain  -    Patient Effort, Rehab Treatment good  -CD     Symptoms Noted During/After Treatment fatigue;increased pain  -CD     Symptoms Noted Comment PT REQUIRED MAX ENCOURAGEMENT TO WORK WITH THERAPY. NSG REPORTS HAS BEED REFUSING OOB AND USING BEDPAN. TIME SPENT ENCOURAGING PT TO PARTICIPATE WITH OOB ACTIVITY WITH EOB.   -CD     General Information    Patient Profile Review yes  -CD yes  -    Onset of Illness/Injury or Date of Surgery Date 02/08/18  -CD 02/08/18  -    Referring Physician MD CINDY  -CD MD Cindy  -    General Observations PT SUPINE UPON ARRIVAL ON RA AND WITH IV. B LE'S WRAPPED. PT SATS 88% AND NSG REQUESTED PT PUT O2 BACK ON. (2L)   -CD     Pertinent History Of Current Problem   Pt admit with SOA, lethargy, fatigue. Pt with acute respiratory failure, ESRD, CHF, and COPD exacerbation. Pt with c/o BLE edema and blistering.   -CD Pt admit with SOA, lethargy, fatigue. Pt with acute respiratory failure, ESRD, CHF, and COPD exacerbation. Pt with c/o BLE edema and blistering.  -    Precautions/Limitations fall precautions;oxygen therapy device and L/min  -CD     Prior Level of Function independent:;all household mobility;community mobility;ADL's;dependent:;cleaning;driving  -CD     Equipment Currently Used at Home none   BUT HAS A R WALKER AND CANE IF NEEDED.   -CD     Plans/Goals Discussed With patient;agreed upon  -CD patient;agreed upon  -    Risks Reviewed patient:;LOB;change in vital signs;increased discomfort  -CD patient:;increased discomfort  -    Benefits Reviewed patient:;improve function;increase  independence;increase strength;increase balance;decrease pain;increase knowledge  - patient:;improve skin integrity  -    Barriers to Rehab medically complex  -CD medically complex;previous functional deficit  -    Living Environment    Lives With child(kraig), adult   REPORTS SON WORKS NIGHTS. HAS A NEIGHBOR THAT CAN ASSIST.   -CD     Living Arrangements apartment  -     Home Accessibility tub/shower is not walk in;bed and bath on same level  -     Living Environment Comment PT RECENTLY  AND MOVED IN WITH SON. SON WORKS AT NIGHT.   -CD     Clinical Impression    Date of Referral to PT 02/10/18  -CD 02/09/18  -    PT Diagnosis IMPAIRED FUNCTIONAL MOBILITY,   -CD impaired skin integrity  -    Patient/Family Goals Statement TO RESOLVE LEG PAIN. TO GO HOME. PT IS RELUCTANT TO CONSIDER SHORT TERM REHAB PLACEMENT. WANTS TO GO HOME WITH HHPT IF NEEDED.   -CD Pt wants her legs to feel better.  -    Criteria for Skilled Therapeutic Interventions Met yes  -CD yes;treatment indicated  -    Rehab Potential good, to achieve stated therapy goals  - fair, will monitor progress closely  -    Vital Signs    Pre Systolic BP Rehab 126  -CD     Pre Treatment Diastolic BP 66  -CD     Post Systolic BP Rehab 140  -CD     Post Treatment Diastolic BP 61  -CD     Pretreatment Heart Rate (beats/min) 83  -CD     Posttreatment Heart Rate (beats/min) 93  -CD     Pre SpO2 (%) 88   PT INSTRUCTED TO PUT HER O2 BACK ON. PER NSG, PT TAKES OFF.   -CD     O2 Delivery Pre Treatment room air  -CD     Intra SpO2 (%) 93  -CD     O2 Delivery Intra Treatment supplemental O2  -CD     Post SpO2 (%) 94  -CD     O2 Delivery Post Treatment supplemental O2  -CD     Pre Patient Position Supine  -CD     Intra Patient Position --   AMBULATING,   -CD     Post Patient Position Sitting  -CD     Pain Assessment    Pain Assessment 0-10  -CD Encarnacion-Rebolledo FACES  -MC    Encarnacion-Rebolledo FACES Pain Rating 4   INTRA 8/10, POST 4/10  -CD 6  -MC    Pain Type  Acute pain  -CD Acute pain  -MC    Pain Location Leg  -CD Leg  -MC    Pain Orientation Left;Right  -CD Right;Left  -MC    Pain Intervention(s) Ambulation/increased activity;Repositioned   NOTIFIED NSG.   -CD     Response to Interventions TOLERATED.   -CD     Vision Assessment/Intervention    Visual Impairment WFL  -CD     Cognitive Assessment/Intervention    Current Cognitive/Communication Assessment impaired  -CD     Orientation Status oriented x 4  -CD     Follows Commands/Answers Questions 100% of the time;able to follow single-step instructions  -CD     Personal Safety mild impairment;decreased awareness, need for assist;decreased awareness, need for safety;decreased insight to deficits  -CD     Personal Safety Interventions fall prevention program maintained;gait belt;muscle strengthening facilitated;nonskid shoes/slippers when out of bed;supervised activity  -CD     ROM (Range of Motion)    General ROM lower extremity range of motion deficits identified   B HIP/KNEE WNL'S, B ANKLE DF LIMITED TO APPROX NEUTRAL (PAIN  -CD     MMT (Manual Muscle Testing)    General MMT Assessment lower extremity strength deficits identified   GROSSLY 3+ TO 4/5. FATIGUES QUICKLY.   -CD     Bed Mobility, Assessment/Treatment    Bed Mobility, Assistive Device bed rails;head of bed elevated;draw sheet  -CD     Bed Mob, Supine to Sit, [place] minimum assist (75% patient effort)  -CD     Bed Mob, Sit to Supine, [place] verbal cues required  -CD     Bed Mobility, Safety Issues decreased use of arms for pushing/pulling;decreased use of legs for bridging/pushing  -CD     Bed Mobility, Impairments strength decreased;pain  -CD     Bed Mobility, Comment CUES FOR HAND PLACEMENT. INCREASED TIME AND EFFORT.   -CD     Transfer Assessment/Treatment    Transfers, Sit-Stand [place] minimum assist (75% patient effort);2 person assist required;verbal cues required  -CD     Transfers, Stand-Sit [place] minimum assist (75% patient  effort);2 person assist required;verbal cues required  -CD     Transfers, Sit-Stand-Sit, Assist Device rolling walker  -CD     Transfer, Safety Issues weight-shifting ability decreased;step length decreased  -CD     Transfer, Impairments strength decreased;impaired balance  -     Transfer, Comment PERFORMED DF ELBA JIMENEZ FOR STRETCHING PRIOR TO STANDING.   -CD     Gait Assessment/Treatment    Gait, [place] Level contact guard assist;verbal cues required  -CD     Gait, Assistive Device rolling walker  -     Gait, Distance (Feet) 150   RECLINER IN TOW.   -CD     Gait, Gait Deviations anni decreased;decreased heel strike;step length decreased  -CD     Gait, Safety Issues weight-shifting ability decreased;step length decreased  -CD     Gait, Impairments impaired balance;strength decreased;ROM decreased  -CD     Gait, Comment VERY SLOW AND GUARDED GAIT, RELYING HEAVILY ON WALKER.   -CD     Motor Skills/Interventions    Additional Documentation Balance Skills Training (Group);Gross Motor Coordination Training (Group)  -CD     Balance Skills Training    Sitting-Level of Assistance Close supervision  -CD     Sitting-Balance Support Feet supported  -CD     Standing-Level of Assistance Contact guard;x2  -CD     Static Standing Balance Support assistive device  -CD     Standing-Balance Activities Forward lean   UPRIGHT POSTURE.   -     Therapy Exercises    Bilateral Lower Extremities AROM:;10 reps;ankle pumps/circles;heel slides;hip abduction/adduction;hip flexion;sitting;supine  -     Positioning and Restraints    Pre-Treatment Position in bed  - in bed  -    Post Treatment Position chair  - bed  -    In Bed  supine;call light within reach;encouraged to call for assist;heels elevated  -    In Chair reclined;call light within reach;encouraged to call for assist;notified nsg;with family/caregiver;legs elevated;LUE elevated  -       02/11/18 1143 02/10/18 1550    Rehab Evaluation    Document Type  evaluation  -HANNAH     Subjective Information agree to therapy;complains of;pain  -HANNAH     Evaluation Not Performed  other (see comments)   RN just changed the pt's BLE dressings, recommended follow up tomorrow. PT agreed.  -    Patient Effort, Rehab Treatment adequate  -HANNAH     Symptoms Noted Comment Pt. declined to stand or transfer due to foot pain.  -HANNAH     General Information    Patient Profile Review yes  -HANNAH     Onset of Illness/Injury or Date of Surgery Date 02/10/18  -HANNAH     Referring Physician MD Braeden  -HANNAH     General Observations Noted blisters on pt. toes. Pt. supine in bed with legs elevated and in wraps.  IV/catheter and 02.  -HANNAH     Pertinent History Of Current Problem Pt. admitted with SOA, lethargy and fatigue.  Pt. on arrival noted with acute respitory failure with hypoxia and hypercapnea, BLE edema.  COPD exacerbation and A on C HF.    -HANNAH     Precautions/Limitations fall precautions;oxygen therapy device and L/min  -HANNAH     Prior Level of Function independent:;all household mobility;ADL's;cooking;mod assist:;cleaning;dependent:;driving;shopping   Prior to last admit, recently not mobilizing or HM task  -HANNAH     Equipment Currently Used at Home shower chair;commode;oxygen;walker, rolling  -HANNAH     Plans/Goals Discussed With patient;agreed upon  -HANNAH     Risks Reviewed patient:;LOB;increased discomfort;change in vital signs;lines disloged  -HANNAH     Benefits Reviewed patient:;improve function;increase independence;increase strength;increase balance;increase knowledge  -HANNAH     Barriers to Rehab previous functional deficit  -HANNAH     Living Environment    Lives With alone;child(kraig), dependent   Typically alone, but pt.'s son living with her at this time.  -HANNAH     Living Arrangements apartment  -HANNAH     Home Accessibility tub/shower is not walk in  -HANNAH     Vital Signs    Pre Systolic BP Rehab 108  -HANNAH     Pre Treatment Diastolic BP 65  -HANNAH     Post Systolic BP Rehab 116  -HANNAH     Post Treatment Diastolic BP 80   -HANNAH     Pretreatment Heart Rate (beats/min) 79  -HANNAH     Posttreatment Heart Rate (beats/min) 80  -HANNAH     Pre SpO2 (%) 96  -HANNAH     O2 Delivery Pre Treatment supplemental O2  -HANNAH     Intra SpO2 (%) --   pt. kept taking 02 off and would drop to 85-88%, cues love  -HANNAH     Post SpO2 (%) 94  -HANNAH     O2 Delivery Post Treatment supplemental O2  -HANNAH     Pre Patient Position Supine  -HANNAH     Intra Patient Position Sitting  -HANNAH     Post Patient Position Supine  -HANNAH     Pain Assessment    Pain Assessment Encarnacion-Rebolledo FACES  -HANNAH     Encarnacion-Baker FACES Pain Rating 4   pt. appears to overrate pain  -HANNAH     Pain Score 8  -HANNAH     Post Pain Score 8  -HANNAH     Pain Type Acute pain  -HANNAH     Pain Location Leg  -HANNAH     Pain Orientation Right;Left  -HANNAH     Pain Intervention(s) Ambulation/increased activity;Repositioned   premedicated, alerted nurse  -HANNAH     Response to Interventions tolerated  -HANNAH     Vision Assessment/Intervention    Visual Impairment WFL   for eval purposes  -HANNAH     Cognitive Assessment/Intervention    Current Cognitive/Communication Assessment impaired   very drowsy intially difficulty giving history  -HANNAH     Orientation Status oriented x 4  -HANNAH     Follows Commands/Answers Questions 100% of the time;able to follow single-step instructions;needs cueing;needs increased time;needs repetition  -HANNAH     Personal Safety mild impairment  -HANNAH     Personal Safety Interventions fall prevention program maintained;gait belt;nonskid shoes/slippers when out of bed   when up  -HANNAH     ROM (Range of Motion)    General ROM upper extremity range of motion deficits identified  -     General ROM Detail AROM intact, PROM 50% shoulders.  -HANNAH     MMT (Manual Muscle Testing)    General MMT Assessment upper extremity strength deficits identified  -     General MMT Assessment Detail sh. 3+/5, distally 4+/5  -HANNAH     Bed Mobility, Assessment/Treatment    Bed Mobility, Assistive Device bed rails;head of bed elevated  -HANNAH     Bed Mobility,  Scoot/Bridge, [place] supervision required;verbal cues required  -HANNAH     Bed Mob, Supine to Sit, [place] minimum assist (75% patient effort);verbal cues required  -HANNAH     Bed Mob, Sit to Supine, [place] minimum assist (75% patient effort);verbal cues required  -HANNAH     Bed Mobility, Safety Issues decreased use of arms for pushing/pulling;decreased use of legs for bridging/pushing  -HANNAH     Bed Mobility, Impairments pain;strength decreased  -HANNAH     Bed Mobility, Comment extra time needed to complete, cues for hand placement.  -HANNAH     Transfer Assessment/Treatment    Transfer, Comment Pt. declined due to LE pain.  -HANNAH     Motor Skills/Interventions    Additional Documentation Balance Skills Training (Group)  -HANNAH     Balance Skills Training    Sitting-Level of Assistance Close supervision  -HANNAH     Sitting-Balance Support Feet unsupported;No upper extremity supported  -HANNAH     Positioning and Restraints    Pre-Treatment Position in bed  -HANNAH     Post Treatment Position bed  -HANNAH     In Bed sitting;call light within reach;encouraged to call for assist;legs elevated   did not change bed alarm  -HANNAH       User Key  (r) = Recorded By, (t) = Taken By, (c) = Cosigned By    Initials Name Provider Type    HANNAH Mi Cardenas, OT Occupational Therapist    GRETA Willams, PT Physical Therapist    MF Yomi Ruby, PT Physical Therapist    MC Nina Mcfarlane, PT Physical Therapist    PT Jami Liu LPN Licensed Nurse            PT Recommendation and Plan  Anticipated Discharge Disposition: inpatient rehabilitation facility (PT PREFERS HOME WITH HHPT. WILL NOT HAVE 24/7 ASSIST. )  Planned Therapy Interventions: bed mobility training, gait training, home exercise program, strengthening, transfer training  PT Frequency: daily    Plan Of Care Reviewed With: patient       Outcome Summary/Follow up Plan: Pt noted to have blisters reepithelializing well, but with increased c/o pain.  PT changed to xeroform and kerlix  to help maintain moisture and limit further potential bleeding with dressing changes.           Outcome Measures       02/12/18 0955 02/11/18 1143       How much help from another person do you currently need...    Turning from your back to your side while in flat bed without using bedrails? 2  -CD      Moving from lying on back to sitting on the side of a flat bed without bedrails? 3  -CD      Moving to and from a bed to a chair (including a wheelchair)? 3  -CD      Standing up from a chair using your arms (e.g., wheelchair, bedside chair)? 2  -CD      Climbing 3-5 steps with a railing? 2  -CD      To walk in hospital room? 3  -CD      AM-PAC 6 Clicks Score 15  -CD      How much help from another is currently needed...    Putting on and taking off regular lower body clothing?  2  -HANNAH     Bathing (including washing, rinsing, and drying)  3  -HANNAH     Toileting (which includes using toilet bed pan or urinal)  2  -HANNAH     Putting on and taking off regular upper body clothing  3  -HANNAH     Taking care of personal grooming (such as brushing teeth)  4   sitting post setup  -HANNAH     Eating meals  4  -HANNAH     Score  18  -HANNAH     Functional Assessment    Outcome Measure Options AM-PAC 6 Clicks Basic Mobility (PT)  -CD AM-PAC 6 Clicks Daily Activity (OT)  -HANNAH       User Key  (r) = Recorded By, (t) = Taken By, (c) = Cosigned By    Initials Name Provider Type    HANNAH Cardenas, OT Occupational Therapist    GRETA Willams, PT Physical Therapist              Time Calculation        PT Charges       02/13/18 1130          Time Calculation    Start Time 1130  -      PT Goal Re-Cert Due Date 02/22/18  -      Time Calculation- PT    Total Timed Code Minutes- PT 25 minute(s)  -        User Key  (r) = Recorded By, (t) = Taken By, (c) = Cosigned By    Initials Name Provider Type     Yomi Ruby, PT Physical Therapist             Therapy Charges for Today     Code Description Service Date Service Provider Modifiers Qty     [id number]  PT THER PROC EA 15 MIN 2/13/2018 Yomi Ruby, PT GP 2            PT G-Codes  Outcome Measure Options: AM-PAC 6 Clicks Basic Mobility (PT)        Yomi Ruby, PT  2/13/2018

## 2018-02-13 NOTE — PROGRESS NOTES
"   LOS: 5 days    Patient Care Team:  Don Bah MD as PCP - General (Pulmonary Disease)  IAIN Neville as PCP - Claims Attributed    Reason For Visit:    chet on ckd 3 2/2 cardio-renal syndrome    Subjective     Feeling much better wants to go home      Review of Systems:    Pulm: improved soa   CV:  No CP      Objective       aspirin 81 mg Oral Daily   budesonide-formoterol 2 puff Inhalation BID - RT   clonazePAM 2 mg Oral TID   ferrous sulfate 325 mg Oral Daily   ipratropium-albuterol 3 mL Nebulization 4x Daily - RT   nystatin  Topical Q12H   rosuvastatin 10 mg Oral Nightly   warfarin (COUMADIN) (dosing per levels)  Does not apply Daily   warfarin 3 mg Oral Once       DOBUTamine 2.5 mcg/kg/min Last Rate: 2.5 mcg/kg/min (02/13/18 0010)   Pharmacy to dose warfarin           Vital Signs:  Blood pressure 140/96, pulse 96, temperature 98 °F (36.7 °C), temperature source Oral, resp. rate 16, height 175.3 cm (69\"), weight 74.5 kg (164 lb 3.2 oz), SpO2 99 %, not currently breastfeeding.    Flowsheet Rows         First Filed Value    Admission Height  162.6 cm (64\") Documented at 02/08/2018 1812    Admission Weight  72.1 kg (159 lb) Documented at 02/08/2018 1812          02/12 0701 - 02/13 0700  In: 960 [P.O.:700; I.V.:260]  Out: 3625 [Urine:3625]    Physical Exam:    General Appearance: NAD, alert and cooperative, Ox3  Eyes: PER, conjunctivae and sclerae normal, no icterus  Lungs: respirations regular and unlabored, no crepitus, bilat rales  Heart/CV: regular rhythm & normal rate, pos sys murmur and valve click, no gallop, no rub and pos edema 2+  Abdomen: not distended, soft, non-tender, no masses,  bowel sounds present  Skin: No rash, Warm and dry    Radiology:      Renal Imaging:        Labs:    Results from last 7 days  Lab Units 02/12/18  0625 02/09/18  0611 02/08/18  1844   WBC 10*3/mm3 5.98 6.12 9.44   HEMOGLOBIN g/dL 9.0* 9.8* 10.3*   HEMATOCRIT % 30.7* 33.6* 34.4*   PLATELETS 10*3/mm3 " 135* 149* 173       Results from last 7 days  Lab Units 02/13/18  0512 02/12/18  0625 02/11/18  0456 02/10/18  0607 02/09/18  0611 02/08/18  2314 02/08/18  1844   SODIUM mmol/L 130* 128* 133 133 139  --  139   POTASSIUM mmol/L 4.2 4.4 3.9 4.1 4.1  --  3.9   CHLORIDE mmol/L 93* 92* 97* 101 104  --  103   CO2 mmol/L 32.0* 31.0 30.0 28.0 31.0  --  25.0   BUN mg/dL 52* 47* 45* 45* 40*  --  38*   CREATININE mg/dL 2.40* 2.90* 2.70* 2.30* 1.80*  --  1.70*   CALCIUM mg/dL 8.8 8.3* 8.5* 8.0* 8.8  --  9.1   PHOSPHORUS mg/dL  --  5.0  --   --  5.1  --   --    MAGNESIUM mg/dL  --   --   --   --  2.9* 1.9 1.9   ALBUMIN g/dL  --  3.30  --  2.80* 3.20  --  3.50       Results from last 7 days  Lab Units 02/13/18  0512   GLUCOSE mg/dL 89         Results from last 7 days  Lab Units 02/10/18  0607   ALK PHOS U/L 97   BILIRUBIN mg/dL 0.3   ALT (SGPT) U/L 117*   AST (SGOT) U/L 37*       Results from last 7 days  Lab Units 02/11/18  0419   PH, ARTERIAL pH units 7.291*   PO2 ART mm Hg 56.0*   PCO2, ARTERIAL mm Hg 61.3*   HCO3 ART mmol/L 29.5*         Results from last 7 days  Lab Units 02/08/18  2113   COLOR UA  Yellow   CLARITY UA  Cloudy*   PH, URINE  5.5   SPECIFIC GRAVITY, URINE  1.015   GLUCOSE UA  Negative   KETONES UA  Negative   BILIRUBIN UA  Negative   PROTEIN UA  100 mg/dL (2+)*   BLOOD UA  Negative   LEUKOCYTES UA  Negative   NITRITE UA  Negative   Results for VERENICE MARCIO K (MRN [id number]) as of 2/12/2018 12:08   Ref. Range 2/7/2017 16:15   Protein, 24H Urine Latest Ref Range: 42.0 - 225.0 mg/24hours 900.0 (H)       Estimated Creatinine Clearance: 30.1 mL/min (by C-G formula based on Cr of 2.4).      Assessment     Principal Problem:    Acute on chronic respiratory failure with hypoxia and hypercapnia  Active Problems:    Anxiety    Ischemic cardiomyopathy    Lupus (systemic lupus erythematosus)    Coronary artery disease involving native coronary artery of native heart with angina pectoris    History of mitral valve  replacement with mechanical valve    Anemia of chronic kidney failure    COPD exacerbation    Tobacco abuse    Noncompliance    Chronic anticoagulation    CKD (chronic kidney disease), stage IV    Acute systolic congestive heart failure    Elevated LFTs            Impression:     chet on ckd 3 improved, cardio renal syndrome on dobutamine off diuretics  chf improved on dobutamine not currently on diuretics  Hyponatremia improved 2/2 #1 and #2  Ischemic and valvular hd lvef less than 15%  H/o lupus nephritis with residual proteinuria less than 1gm/24h        Recommendations:   Fluid restrict and hold diuretics  May be able to d/c tomorrow if na improved and cr stable  Pt may be going home with hospice    Gabriel Sy MD  02/13/18  1:13 PM

## 2018-02-13 NOTE — PLAN OF CARE
Problem: Patient Care Overview (Adult)  Goal: Plan of Care Review  Outcome: Ongoing (interventions implemented as appropriate)    Goal: Adult Individualization and Mutuality  Outcome: Ongoing (interventions implemented as appropriate)    Goal: Discharge Needs Assessment  Outcome: Ongoing (interventions implemented as appropriate)      Problem: Skin Integrity Impairment, Risk/Actual (Adult)  Goal: Identify Related Risk Factors and Signs and Symptoms  Outcome: Ongoing (interventions implemented as appropriate)    Goal: Skin Integrity/Wound Healing  Outcome: Ongoing (interventions implemented as appropriate)      Problem: Fall Risk (Adult)  Goal: Identify Related Risk Factors and Signs and Symptoms  Outcome: Ongoing (interventions implemented as appropriate)    Goal: Absence of Falls  Outcome: Ongoing (interventions implemented as appropriate)      Problem: Respiratory Insufficiency (Adult)  Goal: Acid/Base Balance  Outcome: Ongoing (interventions implemented as appropriate)    Goal: Effective Ventilation  Outcome: Ongoing (interventions implemented as appropriate)      Problem: Cardiac: Heart Failure (Adult)  Goal: Signs and Symptoms of Listed Potential Problems Will be Absent or Manageable (Cardiac: Heart Failure)  Outcome: Ongoing (interventions implemented as appropriate)

## 2018-02-13 NOTE — PROGRESS NOTES
Continued Stay Note  [facility]     Patient Name: Ashely Roldan  MRN: [id number]  Today's Date: 2/13/2018    Admit Date: 2/8/2018          Discharge Plan       02/13/18 1613    Case Management/Social Work Plan    Plan Home with Hospice    Patient/Family In Agreement With Plan yes    Additional Comments SW received update from Nupur Reddy, inpatient Hospice Liaison.  Pt. will discharge home tomorrow, 2/14/18 with Hospice services in [facility].  Pt's son, Floyd will provide transportation at discharge.  Floyd will likely arrive after lunch.  JEFFERSON gave report to Charge RN.              Discharge Codes     None            RICARDO Paris

## 2018-02-13 NOTE — PLAN OF CARE
Problem: Patient Care Overview (Adult)  Goal: Plan of Care Review  Outcome: Ongoing (interventions implemented as appropriate)   02/13/18 1130   Coping/Psychosocial Response Interventions   Plan Of Care Reviewed With patient   Outcome Evaluation   Outcome Summary/Follow up Plan Pt noted to have blisters reepithelializing well, but with increased c/o pain. PT changed to xeroform and kerlix to help maintain moisture and limit further potential bleeding with dressing changes.        Problem: Inpatient Physical Therapy  Goal: Wound Care Goal 1 LTG- PT  Outcome: Ongoing (interventions implemented as appropriate)   02/11/18 1505 02/13/18 1130   Wound Care PT LTG   Wound Care PT LTG 1, Date Established 02/11/18 --    Wound Care PT LTG 1, Time to Achieve 1 wk --    Wound Care PT LTG 1, Location BLE --    Wound Care PT LTG 1, No S&S of Infection yes --    Wound Care PT LTG 1, Decrease Wound Size 25% --    Wound Care PT LTG 1, Decrease Exudate scant --    Wound Care PT LTG 1, No New Skin Break Down yes --    Wound Care PT LTG 1, Education wound care --    Wound Care PT LTG 1, Education Understanding verbalize understanding --    Wound Care PT LTG 1, Outcome --  goal ongoing

## 2018-02-13 NOTE — PROGRESS NOTES
"  [facility]  PROGRESS NOTE    Date of Admission: 2/8/2018  Length of Stay: 5  Primary Care Physician: Peter Rdz MD    Chief Complaint: f/u end-stage CM, SHF  Problem List:   1. Structural heart disease of mixed etiology:  A. History of MI December 2009: s/p stent to RCA and LAD, EF 40%  B. CABGx2 March, 2010: SVG to OMB-1, SVG to PDA  C. EF 30% post-operatively, s/p [place] ICD placement, 2011  D. Echo July 2014: Severe global hypokinesis with EF 12%, moderate AI, mechanical mitral valve, moderate to severe TR, RVSP 43mmHg   E. Echo 2/4/17: EF 18%, mild to mod AI, mod TR, grossly normal prosthetic mitral valve  F. Trivial Troponin elevation Feb. 2017, patient refused further ischemic evaluation.  G. Echo 2/2/18: \"borderline\" LV and RV enlargement, severe global hypokinesia with LVEF <20%, mild AI, normally functioning bileaflet mitral valve present  H. Re-admitted for A/C SHF 2/2018 after patient left AMA                         i. Echo 2/9/18: EF 20%, global RV and LV hypokinesia, normally functioning mechanical mitral valve, aortic valve not well visualized, mild AI; LV apical thrombus cannot be excluded with certainty  2. VHD:  A. Mitral valve replacement with 27mm ATS mechanical valve March 2010, on chronic coumadin  3. COPD with ongoing tobacco abuse   4. Systemic Lupus Erythematosus  5. Hypertension  6. Hyperlipidemia   7. 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 PVD   9. Medical noncompliance    Subjective      Patient sitting in bed, no dyspnea or chest pain at rest. Urine output increased over last day, and renal function is improving. Remains on Dobutamine. Palliative to see     Objective   Vitals: /96 (BP Location: Left arm, Patient Position: Lying)  Pulse 96  Temp 98 °F (36.7 °C) (Oral)   Resp 16  Ht 175.3 cm (69\")  Wt 74.5 kg (164 lb 3.2 oz)  SpO2 99%  BMI 24.25 kg/m2    Physical Exam:  GENERAL: Alert, cooperative, in no acute distress.   HEENT: " Fundoscopic deferred, otherwise unremarkable.  HEART: No discrete PMI is noted. Regular rhythm, normal rate, and no murmur, prosthetic valve clicks.  LUNGS: Diminished breath sounds bilaterally.  No wheezing, rales or ronchi.  ABDOMEN: Flat without evidence of organomegaly, masses, or tenderness.  NEUROLOGIC: No focal abnormalities involving strength or sensation are noted.   EXTREMITIES: No clubbing, cyanosis. Bilateral wraps present  Results:    Results from last 7 days  Lab Units 02/12/18  0625 02/09/18  0611 02/08/18  1844   WBC 10*3/mm3 5.98 6.12 9.44   HEMOGLOBIN g/dL 9.0* 9.8* 10.3*   HEMATOCRIT % 30.7* 33.6* 34.4*   PLATELETS 10*3/mm3 135* 149* 173       Results from last 7 days  Lab Units 02/13/18  0512 02/12/18  0625 02/11/18  0456   SODIUM mmol/L 130* 128* 133   POTASSIUM mmol/L 4.2 4.4 3.9   CHLORIDE mmol/L 93* 92* 97*   CO2 mmol/L 32.0* 31.0 30.0   BUN mg/dL 52* 47* 45*   CREATININE mg/dL 2.40* 2.90* 2.70*   GLUCOSE mg/dL 89 84 96      Lab Results   Component Value Date    CHOL 142 02/03/2018    TRIG 81 02/03/2018    HDL 42 02/03/2018    AST 37 (H) 02/10/2018     (H) 02/10/2018     Results from last 7 days  Lab Units 02/09/18  0611 02/08/18  1844   BNP pg/mL 2919.0* 3156.0*       Results from last 7 days  Lab Units 02/13/18  0512 02/12/18  0625 02/11/18  0456   PROTIME Seconds 23.5* 40.1* 46.5*   INR  2.24* 3.82* 4.43*       Results from last 7 days  Lab Units 02/12/18  0625   CK TOTAL U/L 53       Intake/Output Summary (Last 24 hours) at 02/13/18 1223  Last data filed at 02/13/18 1100   Gross per 24 hour   Intake              760 ml   Output             4025 ml   Net            -3265 ml     I personally reviewed the patient's EKG/Telemetry data    Current Medications:    aspirin 81 mg Oral Daily   budesonide-formoterol 2 puff Inhalation BID - RT   clonazePAM 2 mg Oral TID   ferrous sulfate 325 mg Oral Daily   ipratropium-albuterol 3 mL Nebulization 4x Daily - RT   nystatin  Topical Q12H    rosuvastatin 10 mg Oral Nightly   warfarin (COUMADIN) (dosing per levels)  Does not apply Daily   warfarin 3 mg Oral Once       DOBUTamine 2.5 mcg/kg/min Last Rate: 2.5 mcg/kg/min (02/13/18 0010)   Pharmacy to dose warfarin         Assessment and Plan:     1. End stage cardiomyopathy  - EF 20%, s/p ICD  - on dobutamine, aldosterone antagonist and diuretics held per nephrology  - not a candidate for LVAD due to prior compliance issues as well as bi-ventricular failure  - now cardiorenal  - Palliative to see for end-stage CM to address goals of care  - IF home with hospice, we will offer to turn off device      2. Valvular heart disease, s/p mechanical mitral valve 2010  - normal function by echo  - Warfarin per INR dosing      3. Cardiorenal syndrome and FAHEEM/CKD  - NAL following     4. COPD  - Pulmonary has seen     I, Yomi Ramirez MD, personally performed the services described as documented by the above named individual. I have made any necessary edits and it is both accurate and complete 2/13/2018  7:42 PM    Scribed for Yomi Ramirez MD by Sandhya Lynch PA-C.

## 2018-02-13 NOTE — PLAN OF CARE
Problem: Patient Care Overview (Adult)  Goal: Plan of Care Review  Outcome: Ongoing (interventions implemented as appropriate)   02/13/18 1327   Coping/Psychosocial Response Interventions   Plan Of Care Reviewed With patient   Patient Care Overview   Progress improving   Outcome Evaluation   Outcome Summary/Follow up Plan patient needs encouragement to progress and participate in therapy. Complaints of B LE pain with mobility. F/w chair for safety.       Problem: Inpatient Physical Therapy  Goal: Bed Mobility Goal LTG- PT  Outcome: Ongoing (interventions implemented as appropriate)   02/12/18 1311 02/13/18 1327   Bed Mobility PT LTG   Bed Mobility PT LTG, Time to Achieve 2 wks --    Bed Mobility PT LTG, Activity Type all bed mobility --    Bed Mobility PT LTG, [place] Level independent --    Bed Mobility PT LTG, Date Goal Reviewed --  02/13/18   Bed Mobility PT LTG, Outcome --  goal ongoing     Goal: Gait Training Goal LTG- PT  Outcome: Ongoing (interventions implemented as appropriate)   02/12/18 1311 02/13/18 1327   Gait Training PT LTG   Gait Training Goal PT LTG, Time to Achieve 2 wks --    Gait Training Goal PT LTG, [place] Level independent --    Gait Training Goal PT LTG, Assist Device walker, rolling --    Gait Training Goal PT LTG, Distance to Achieve 400 --    Gait Training Goal PT LTG, Date Goal Reviewed --  02/13/18   Gait Training Goal PT LTG, Outcome --  goal ongoing

## 2018-02-13 NOTE — PLAN OF CARE
Problem: Patient Care Overview (Adult)  Goal: Plan of Care Review  Outcome: Ongoing (interventions implemented as appropriate)      Problem: Skin Integrity Impairment, Risk/Actual (Adult)  Goal: Identify Related Risk Factors and Signs and Symptoms  Outcome: Ongoing (interventions implemented as appropriate)      Problem: Fall Risk (Adult)  Goal: Absence of Falls  Outcome: Ongoing (interventions implemented as appropriate)   02/13/18 1546   Fall Risk (Adult)   Absence of Falls making progress toward outcome       Problem: Respiratory Insufficiency (Adult)  Goal: Effective Ventilation  Outcome: Ongoing (interventions implemented as appropriate)      Problem: Cardiac: Heart Failure (Adult)  Goal: Signs and Symptoms of Listed Potential Problems Will be Absent or Manageable (Cardiac: Heart Failure)  Outcome: Ongoing (interventions implemented as appropriate)   02/13/18 1546   Cardiac: Heart Failure   Problems Assessed (Heart Failure) all   Problems Present (Heart Failure) cardiac pump dysfunction;decreased quality of life;fluid/electrolyte imbalance

## 2018-02-13 NOTE — CONSULTS
Don Bah MD  Consulting physician: Sandhya Lynch PA-C  Reason for referral : end stage cardiomyopathy  Chief Complaint   Patient presents with   • Fatigue     HPI:   57 y.o. female with a history of CAD, ICM with AICD, hypertension, PVD, COPD, CKD stage II, chronic anticoagulation, anxiety, HLD, presents to the ED on 2/8 with complaints of shortness of air, fatigue and lethargy for one day.  Patient was here 1/23/18-2/5/18 with sepsis secondary to UTI, liver injury, FAHEEM, and discharged AMA before being cleared by cardiology for mechanical mitral valve.  She was brought in per EMS on 2/8 and ABG revealed acute respiratory failure with hypoxia and hypercapnea at which time she was placed on bipap with an improvement in her repeat ABG.  She reported having soa, nonproductive cough, bilateral lower extremity edema, and nausea.  Denies fever, chest pain, abdominal pain, vomiting, diarrhea, or dysuria.  Chest xray was negative for acute disease.  Review of progress note on 2/5/18 showed she had 1+ bilateral lower extremity with plans to increase her lasix dose to BID at that time.  Labs revealed an elevated BNP and she was given lasix 20mg in the ED.      Symptoms:   Patient denies any pain, nausea, dyspnea or anxiety currently.   Reports BLE pain intermittently and with dressing changes, managed with prn tramadol.   Anxiety is managed well with clonazepam 2mg tid.   Denies any dyspnea with ambulating with walker or getting up to bathroom.    Advance care planning discussed: yes  Code Status: full code, reviewed with patient and changed to DNR/DNI. Patient knows that she does not want to use BiPap again. Discussed AICD and decision to whether deactivate defibrillator. Patient agreed that she wanted to deactivate it.   Advance Directive: no written document  Surrogate decision maker: Two children: Floyd Roldan, Janina Teran  Past Medical History:   Diagnosis Date   • Acute respiratory failure  with hypoxia 9/5/2017   • Allergic rhinitis 08/01/2014   • Anemia    • Anxiety    • Arthritis    • CAD (coronary artery disease)    • CHF (congestive heart failure)    • CKD (chronic kidney disease)    • CKD (chronic kidney disease), stage IV 9/5/2017   • Colitis, Clostridium difficile    • COPD (chronic obstructive pulmonary disease)    • Coronary atherosclerosis    • Depression    • Emphysema of lung    • GERD (gastroesophageal reflux disease)    • Heart attack    • Heart murmur    • Hematoma of hip    • Hip fracture    • Hyperlipidemia    • Hypertension     benign essential   • Ischemic cardiomyopathy 08/01/2014    ef 29% s/p ICD   • Knee injury    • Lung disease    • Mitral valve disorder 03/28/2013   • Mitral valve insufficiency     s/p prosthetic ATS valve replacement   • Osteoporosis    • Postmenopausal     natural   • PVD (peripheral vascular disease)    • Pyelonephritis    • Renal failure    • Renal failure    • Renal failure, acute    • Syncope    • Systemic lupus erythematosus    • TIA (transient ischemic attack) 04/04/2012   • UTI (urinary tract infection)    • Valvular heart disease    • Wrist fracture      Past Surgical History:   Procedure Laterality Date   • CARDIAC DEFIBRILLATOR PLACEMENT     • CHOLECYSTECTOMY  2010   • CORONARY ARTERY BYPASS GRAFT  2011    4 aortic bypasses, abdominal aorta; 2011-mitral valve; 2010-triple bypass   • ENDOSCOPY  10/23/2014    Dr. Brand; retained food in stomach; he dilated her esophagus; 5-30-14 hiatus hernia, gastritis   • JOINT REPLACEMENT Right 06/25/2015    Dr. Fitzgerald; first surgery 1-29-14; redo 5-4-15   • MITRAL VALVE REPLACEMENT      MECHANICAL   • TOTAL HIP ARTHROPLASTY Right     3 times within 8 months       Reviewed current scheduled and prn medications for route, type, dose and frequency.    Current Facility-Administered Medications   Medication Dose Route Frequency Provider Last Rate Last Dose   • aspirin EC tablet 81 mg  81 mg Oral Daily Sandhya LUGO  EM Lynch   81 mg at 02/13/18 0846   • benzocaine-menthol (CEPACOL) lozenge 1 lozenge  1 lozenge Mouth/Throat Q4H PRN Kizzy Martinez MD       • budesonide-formoterol (SYMBICORT) 160-4.5 MCG/ACT inhaler 2 puff  2 puff Inhalation BID - RT Jelena Sanchez MD   2 puff at 02/13/18 0736   • clonazePAM (KlonoPIN) tablet 2 mg  2 mg Oral TID Charles Deras MD   2 mg at 02/13/18 0846   • diphenhydrAMINE (BENADRYL) capsule 25 mg  25 mg Oral Q6H PRN Charles Deras MD   25 mg at 02/12/18 1712   • DOBUTamine (DOBUTREX) 1 mg/mL infusion  2.5 mcg/kg/min Intravenous Continuous Yomi Ramirez MD 10.59 mL/hr at 02/13/18 0010 2.5 mcg/kg/min at 02/13/18 0010   • ferrous sulfate tablet 325 mg  325 mg Oral Daily Charles Deras MD   325 mg at 02/13/18 0846   • ipratropium-albuterol (DUO-NEB) nebulizer solution 3 mL  3 mL Nebulization 4x Daily - RT Jelena Sanchez MD   3 mL at 02/13/18 0736   • LORazepam (ATIVAN) tablet 1 mg  1 mg Oral Q6H PRN Jelena Sanchez MD   1 mg at 02/10/18 0800   • Magnesium Sulfate 2 gram Bolus, followed by 8 gram infusion (total Mg dose 10 grams)- Mg less than or equal to 1mg/dL  2 g Intravenous PRN Jelena Sanchez MD        Or   • Magnesium Sulfate 6 gram Infusion (2 gm x 3) -Mg 1.1 -1.5 mg/dL  2 g Intravenous PRN Jelena Sanchez MD        Or   • magnesium sulfate 4 gram infusion- Mg 1.6-1.9 mg/dL  4 g Intravenous PRN Jelena Sanchez MD 25 mL/hr at 02/08/18 2317 4 g at 02/08/18 2317   • nystatin (MYCOSTATIN) powder   Topical Q12H Charles Deras MD   1 application at 02/13/18 0847   • ondansetron (ZOFRAN) injection 4 mg  4 mg Intravenous Q6H PRN Jelena Sanchez MD       • Pharmacy to dose warfarin   Does not apply Continuous PRN Charles Deras MD       • rosuvastatin (CRESTOR) tablet 10 mg  10 mg Oral Nightly Jelena Sanchez MD   10 mg at 02/12/18 2056   • sodium chloride 0.9 % flush 1-10 mL  1-10 mL Intravenous PRN Jelena Sanchez MD       • sodium phosphates 45 mmol in sodium chloride 0.9 % 500 mL IVPB  45  mmol Intravenous PRN Jelena Sanchez MD        Or   • sodium phosphates 30 mmol in sodium chloride 0.9 % 250 mL IVPB  30 mmol Intravenous PRN Jelena Sanchez MD        Or   • sodium phosphates 15 mmol in sodium chloride 0.9 % 250 mL IVPB  15 mmol Intravenous PRN Jelena Sanchez MD       • traMADol (ULTRAM) tablet 50 mg  50 mg Oral Q12H PRN Charles Deras MD   50 mg at 02/12/18 1142   • warfarin (COUMADIN) - no scheduled doses (Pharmacy dosing per daily INR)   Does not apply Daily Minesh Gomez IV, [facility]   Stopped at 02/10/18 1811       DOBUTamine 2.5 mcg/kg/min Last Rate: 2.5 mcg/kg/min (02/13/18 0010)   Pharmacy to dose warfarin       benzocaine-menthol  •  diphenhydrAMINE  •  LORazepam  •  magnesium sulfate **OR** magnesium sulfate **OR** magnesium sulfate  •  ondansetron  •  Pharmacy to dose warfarin  •  sodium chloride  •  [DISCONTINUED] potassium phosphate infusion greater than 15 mMoles **OR** [DISCONTINUED] potassium phosphate infusion greater than 15 mMoles **OR** [DISCONTINUED] potassium phosphate **OR** sodium phosphate IVPB **OR** sodium phosphate IVPB **OR** sodium phosphate IVPB  •  traMADol  Allergies   Allergen Reactions   • Morphine And Related GI Intolerance and Irritability   • Sulfa Antibiotics      Family History   Problem Relation Age of Onset   • Arthritis Mother      rheumatoid   • Heart attack Father    • Alcohol abuse Brother    • Heart disease Other      uncle   • Diabetes Other      uncle-type 2   • Hypertension Other      uncle   • Stroke Other      uncle   • Cancer Other      grandfather-lung    • Heart disease Maternal Uncle    • Lung cancer Paternal Grandfather      Social History     Social History   • Marital status:      Spouse name: N/A   • Number of children: N/A   • Years of education: N/A     Occupational History   • Not on file.     Social History Main Topics   • Smoking status: Current Every Day Smoker     Packs/day: 0.50     Types: Cigarettes   • Smokeless tobacco:  "Never Used      Comment: Down to 0.5 PPD from 1 PPD now consistently. Started as a teenager.   • Alcohol use No   • Drug use: No   • Sexual activity: Defer     Other Topics Concern   • Not on file     Social History Narrative    Ms. Roldan is a 56 year old white female. Pt recently  after 30 years. Just moved from [facility] into her son's home in [place].        Review of Systems - +/- per HPI and symptom review.    PPS: 50%  /75 (BP Location: Left arm, Patient Position: Sitting)  Pulse 82  Temp 98 °F (36.7 °C) (Oral)   Resp 16  Ht 175.3 cm (69\")  Wt 74.5 kg (164 lb 3.2 oz)  SpO2 99%  BMI 24.25 kg/m2  74.5 kg (164 lb 3.2 oz) Body mass index is 24.25 kg/(m^2).  Intake & Output (last day)       02/12 0701 - 02/13 0700 02/13 0701 - 02/14 0700    P.O. 700     I.V. (mL/kg) 260 (3.5)     Total Intake(mL/kg) 960 (12.9)     Urine (mL/kg/hr) 3625 (2)     Total Output 3625      Net -2665                Physical Exam:  General Appearance: No acute distress, resting in bed  Head: Normocephalic without obvious abnormality, atraumatic  Eyes: Conjunctivae and sclerae normal, no icterus, SIGIFREDO  Throat: No oral lesions, no thrush, oral mucosa moist  Lungs: Clear to auscultation, respirations regular, even and non-labored  Heart: Regular rhythm and normal rate, normal S1  Abdomen: Normal bowel sounds, soft, non-distended, non-tender  Extremities: Moves all extremities, no redness, no cyanosis, 2+ edema,   Pulses: Distal pulses palpable and equal bilaterally   Skin: Warm and dry, BLE several blisters with skin tears, dressings intact  Neurological: Alert, interactive, appropriate conversation, no myoclonus, equal hand  and strength, able to lift lower extremities off bed    Reviewed labs and diagnostic results.  Lab Results   Component Value Date    HGBA1C 5.00 02/04/2017       Results from last 7 days  Lab Units 02/12/18  0625   WBC 10*3/mm3 5.98   HEMOGLOBIN g/dL 9.0*   HEMATOCRIT % 30.7* "   PLATELETS 10*3/mm3 135*       Results from last 7 days  Lab Units 02/13/18  0512  02/10/18  0607   SODIUM mmol/L 130*  < > 133   POTASSIUM mmol/L 4.2  < > 4.1   CHLORIDE mmol/L 93*  < > 101   CO2 mmol/L 32.0*  < > 28.0   BUN mg/dL 52*  < > 45*   CREATININE mg/dL 2.40*  < > 2.30*   CALCIUM mg/dL 8.8  < > 8.0*   BILIRUBIN mg/dL  --   --  0.3   ALK PHOS U/L  --   --  97   ALT (SGPT) U/L  --   --  117*   AST (SGOT) U/L  --   --  37*   GLUCOSE mg/dL 89  < > 111*   < > = values in this interval not displayed.    Results from last 7 days  Lab Units 02/13/18  0512   SODIUM mmol/L 130*   POTASSIUM mmol/L 4.2   CHLORIDE mmol/L 93*   CO2 mmol/L 32.0*   BUN mg/dL 52*   CREATININE mg/dL 2.40*   GLUCOSE mg/dL 89   CALCIUM mg/dL 8.8     Imaging Results (last 72 hours)     ** No results found for the last 72 hours. **      ECHO:  2/9/2018  Interpretation Summary   · There is right-sided chamber enlargement.  · Global RV and LV hypokinesia is present.  · The estimated LV ejection fraction is 20%.  · I cannot exclude LV apical thrombus with certainty.  · The aortic valve is not well visualized. Mild AI is seen.  · A normally functioning mechanical mitral valve is present.  · A small pericardial effusion is suted.       Impression: 57 y.o. female with acute on chronic systolic HF, ischemic CM EF 20% with AICD, valvular heart disease (2010 mechanical MVR), cardiorenal syndrome, COPD, FAHEEM on CKD  Plan:   Anxiety - continue scheduled anxiolytic    Dyspnea - prn MDI and neb Rx, diuresis    BLE pain - continue wound care, prn tramadol    Goals of care discussion - met with patient for 30 minutes discussed current clinical status, noted Dr Pearson discussion with patient.   Reviewed CODE STATUS and ICD with patient, decision made to change to DNR/DNI and deactivate ICD. Patient knows that she does not want to use NIPPV machine again.   Discussed understanding of her children about her heart disease and function and recognition of being end  stage and at the end of her life.  Reports they are aware and are okay with her decision. She lives at her son's home in [facility]. Discussed different choices about plans of care and sorted out what are her goals and wishes. States that she wants to go home and not be returning to the hospital so much.   Discussed further hospice plan of care and what a hospice plan of care would be.   Agreed to meet with hospice case manager.     Nhi Carlos, APRN  [phone]  02/13/18  10:53 AM      Time: 60  minutes spent reviewing medical and medication records, assessing and examining patient, discussing with patient and nursing staff, answering questions, formulating a plan and documentation of care. > 50% time spent face to face

## 2018-02-13 NOTE — THERAPY TREATMENT NOTE
Acute Care - Physical Therapy Treatment Note  [facility]     Patient Name: Ashely Roldan  : 1960  MRN: [id number]  Today's Date: 2018  Onset of Illness/Injury or Date of Surgery Date: 18  Date of Referral to PT: 02/10/18  Referring Physician: MD CINDY    Admit Date: 2018    Visit Dx:    ICD-10-CM ICD-9-CM   1. Respiratory failure, unspecified chronicity, unspecified whether with hypoxia or hypercapnia J96.90 518.81   2. COPD exacerbation J44.1 491.21   3. Acute on chronic congestive heart failure, unspecified congestive heart failure type I50.9 428.0   4. Tobacco abuse Z72.0 305.1   5. Medical non-compliance Z91.19 V15.81   6. Impaired mobility and ADLs Z74.09 799.89   7. Impaired functional mobility, balance, gait, and endurance Z74.09 V49.89     Patient Active Problem List   Diagnosis   • Anxiety   • Arthritis   • Panlobular emphysema   • Reactive depression   • Gastritis   • Heart attack   • Ischemic cardiomyopathy   • VHD (valvular heart disease)   • Osteoporosis   • PVD (peripheral vascular disease)   • Allergic rhinitis   • Lupus (systemic lupus erythematosus)   • TIA (transient ischemic attack)   • Coronary artery disease involving native coronary artery of native heart with angina pectoris   • Cough   • History of mitral valve replacement with mechanical valve   • Anemia of chronic kidney failure   • Insomnia   • COPD exacerbation   • Tobacco abuse   • AICD (automatic cardioverter/defibrillator) present   • Noncompliance   • Blunt trauma of multiple sites   • Chronic anticoagulation   • Closed fracture of rib with flail chest   • CKD (chronic kidney disease), stage IV   • Acute systolic congestive heart failure   • Acute on chronic respiratory failure with hypoxia and hypercapnia   • Elevated LFTs               Adult Rehabilitation Note       18 1255 18 1130       Rehab Assessment/Intervention    Discipline physical therapy assistant  -AS physical therapist  -      Document Type therapy note (daily note)  -AS therapy note (daily note)  -MF     Subjective Information agree to therapy;complains of;pain;weakness;fatigue  -AS agree to therapy;complains of;weakness;fatigue;pain  -MF     Patient Effort, Rehab Treatment good  -AS      Symptoms Noted During/After Treatment increased pain;fatigue  -AS      Precautions/Limitations fall precautions;oxygen therapy device and L/min;other (see comments)   B LE leg wraps  -AS      Recorded by [AS] Lacie Orellana PTA [MF] Yomi Ruby, PT     Pain Assessment    Pain Assessment No/denies pain  -AS Encarnacion-Baker FACES  -     Encarnacion-Baker FACES Pain Rating  6  -MF     Pain Score 7  -AS      Post Pain Score 7  -AS      Pain Type Acute pain  -AS Acute pain  -MF     Pain Location Foot  -AS Leg  -MF     Pain Orientation Right;Left  -AS Right;Left  -MF     Pain Intervention(s) Repositioned;Ambulation/increased activity  -AS Repositioned   notified RN   -     Response to Interventions tolerated  -AS      Recorded by [AS] Lacie Orellana PTA [MF] Yomi Ruby, PT     Cognitive Assessment/Intervention    Current Cognitive/Communication Assessment functional  -AS      Orientation Status oriented to;person  -AS      Follows Commands/Answers Questions 100% of the time;able to follow single-step instructions;needs cueing  -AS      Personal Safety mild impairment  -AS      Personal Safety Interventions fall prevention program maintained;gait belt;nonskid shoes/slippers when out of bed;other (see comments)   exit alarm  -AS      Recorded by [AS] Lacie Orellana PTA      Bed Mobility, Assessment/Treatment    Bed Mob, Supine to Sit, [place] verbal cues required;minimum assist (75% patient effort)  -AS      Bed Mob, Sit to Supine, [place] not tested  -AS      Bed Mobility, Safety Issues decreased use of legs for bridging/pushing  -AS      Bed Mobility, Impairments strength decreased;pain  -AS      Bed Mobility, Comment verbal cues  for technique, increased time to perform  -AS      Recorded by [AS] Lacie Orellana PTA      Transfer Assessment/Treatment    Transfers, Sit-Stand [place] verbal cues required;minimum assist (75% patient effort);1 person + 1 person to manage equipment  -AS      Transfers, Stand-Sit [place] verbal cues required;minimum assist (75% patient effort);1 person + 1 person to manage equipment  -AS      Transfers, Sit-Stand-Sit, Assist Device rolling walker  -AS      Transfer, Safety Issues step length decreased;weight-shifting ability decreased  -AS      Transfer, Impairments strength decreased;pain  -AS      Transfer, Comment verbal cues for hand placement  -AS      Recorded by [AS] Lacie Orellana PTA      Gait Assessment/Treatment    Gait, [place] Level verbal cues required;minimum assist (75% patient effort);1 person + 1 person to manage equipment  -AS      Gait, Assistive Device rolling walker  -AS      Gait, Distance (Feet) 150  -AS      Gait, Gait Deviations anni decreased;step length decreased;forward flexed posture  -AS      Gait, Safety Issues step length decreased;supplemental O2  -AS      Gait, Impairments strength decreased;pain  -AS      Gait, Comment followed with recliner for safety, patient with bilateral LE pain, verbal cues for posture adn assist to control walker tends to veer to the left.  -AS      Recorded by [AS] Lacie Orellana PTA      Therapy Exercises    Bilateral Lower Extremities AROM:;10 reps;sitting;LAQ;hip flexion  -AS      Recorded by [AS] Lacie Orellana PTA      Positioning and Restraints    Pre-Treatment Position in bed  -AS in bed  -MF     Post Treatment Position chair  -AS bed  -MF     In Bed  supine;call light within reach;notified nsg  -     In Chair reclined;call light within reach;encouraged to call for assist;exit alarm on;waffle cushion  -AS      Recorded by [AS] Lacie Orellana PTA [] Yomi Ruby, PT       User Key  (r) = Recorded  By, (t) = Taken By, (c) = Cosigned By    Initials Name Effective Dates     Yomi Ruby, PT 06/19/15 -     AS Lacei Orellana, PTA 06/22/15 -                 IP PT Goals       02/13/18 1327 02/13/18 1130 02/12/18 1311    Bed Mobility PT LTG    Bed Mobility PT LTG, Time to Achieve   2 wks  -CD    Bed Mobility PT LTG, Activity Type   all bed mobility  -CD    Bed Mobility PT LTG, [place] Level   independent  -CD    Bed Mobility PT LTG, Date Goal Reviewed 02/13/18  -AS      Bed Mobility PT LTG, Outcome goal ongoing  -AS      Transfer Training PT LTG    Transfer Training PT LTG, Activity Type   all transfers  -CD    Transfer Training PT LTG, [place] Level   independent  -CD    Transfer Training PT LTG, Assist Device   walker, rolling  -CD    Gait Training PT LTG    Gait Training Goal PT LTG, Time to Achieve   2 wks  -CD    Gait Training Goal PT LTG, [place] Level   independent  -CD    Gait Training Goal PT LTG, Assist Device   walker, rolling  -CD    Gait Training Goal PT LTG, Distance to Achieve   400  -CD    Gait Training Goal PT LTG, Date Goal Reviewed 02/13/18  -AS      Gait Training Goal PT LTG, Outcome goal ongoing  -AS      Wound Care PT LTG    Wound Care PT LTG 1, Outcome  goal ongoing  -       02/11/18 1505          Wound Care PT LTG    Wound Care PT LTG 1, Date Established 02/11/18  -MC      Wound Care PT LTG 1, Time to Achieve 1 wk  -MC      Wound Care PT LTG 1, Location BLE  -MC      Wound Care PT LTG 1, No S&S of Infection yes  -MC      Wound Care PT LTG 1, Decrease Wound Size 25%  -MC      Wound Care PT LTG 1, Decrease Exudate scant  -MC      Wound Care PT LTG 1, No New Skin Break Down yes  -MC      Wound Care PT LTG 1, Education wound care  -MC      Wound Care PT LTG 1, Education Understanding verbalize understanding  -MC        User Key  (r) = Recorded By, (t) = Taken By, (c) = Cosigned By    Initials Name Provider Type    CD Estefany Willams, PT Physical Therapist    SHANTI IGLESIAS  Tung, PT Physical Therapist    AS Lacie Orellana PTA Physical Therapy Assistant    CAITIE Mcfarlane, PT Physical Therapist          Physical Therapy Education     Title: PT OT SLP Therapies (Active)     Topic: Physical Therapy (Active)     Point: Mobility training (Active)    Learning Progress Summary    Learner Readiness Method Response Comment Documented by Status   Patient Acceptance E NR  AS 02/13/18 1327 Active    Acceptance E VU,NR BENEFITS OF OOB ACTIVITY, SAFETY WITH MOBILITY, THER EX, D/C PLANNING. CD 02/12/18 1311 Done               Point: Home exercise program (Active)    Learning Progress Summary    Learner Readiness Method Response Comment Documented by Status   Patient Acceptance E NR  AS 02/13/18 1327 Active    Acceptance E VU,NR BENEFITS OF OOB ACTIVITY, SAFETY WITH MOBILITY, THER EX, D/C PLANNING. CD 02/12/18 1311 Done               Point: Body mechanics (Active)    Learning Progress Summary    Learner Readiness Method Response Comment Documented by Status   Patient Acceptance E NR  AS 02/13/18 1327 Active    Acceptance E VU,NR BENEFITS OF OOB ACTIVITY, SAFETY WITH MOBILITY, THER EX, D/C PLANNING. CD 02/12/18 1311 Done               Point: Precautions (Active)    Learning Progress Summary    Learner Readiness Method Response Comment Documented by Status   Patient Acceptance E NR  AS 02/13/18 1327 Active    Acceptance E VU,NR BENEFITS OF OOB ACTIVITY, SAFETY WITH MOBILITY, THER EX, D/C PLANNING. CD 02/12/18 1311 Done                      User Key     Initials Effective Dates Name Provider Type Discipline    CD 06/19/15 -  Estefany Willams, PT Physical Therapist PT    AS 06/22/15 -  Lacie Orellana PTA Physical Therapy Assistant PT                    PT Recommendation and Plan  Anticipated Discharge Disposition: inpatient rehabilitation facility (PT PREFERS HOME WITH HHPT. WILL NOT HAVE 24/7 ASSIST. )  Planned Therapy Interventions: bed mobility training, gait training, home exercise  program, strengthening, transfer training  PT Frequency: daily  Plan of Care Review  Plan Of Care Reviewed With: patient  Progress: improving  Outcome Summary/Follow up Plan: patient needs encouragement to progress and participate in therapy. Complaints of B LE pain with mobility. F/w chair for safety.          Outcome Measures       02/13/18 1255 02/12/18 0955 02/11/18 1143    How much help from another person do you currently need...    Turning from your back to your side while in flat bed without using bedrails? 3  -AS 2  -CD     Moving from lying on back to sitting on the side of a flat bed without bedrails? 3  -AS 3  -CD     Moving to and from a bed to a chair (including a wheelchair)? 3  -AS 3  -CD     Standing up from a chair using your arms (e.g., wheelchair, bedside chair)? 2  -AS 2  -CD     Climbing 3-5 steps with a railing? 2  -AS 2  -CD     To walk in hospital room? 3  -AS 3  -CD     AM-PAC 6 Clicks Score 16  -AS 15  -CD     How much help from another is currently needed...    Putting on and taking off regular lower body clothing?   2  -HANNAH    Bathing (including washing, rinsing, and drying)   3  -HANNAH    Toileting (which includes using toilet bed pan or urinal)   2  -HANNAH    Putting on and taking off regular upper body clothing   3  -HANNAH    Taking care of personal grooming (such as brushing teeth)   4   sitting post setup  -HANNAH    Eating meals   4  -HANNAH    Score   18  -HANNAH    Functional Assessment    Outcome Measure Options AM-PAC 6 Clicks Basic Mobility (PT)  -AS AM-PAC 6 Clicks Basic Mobility (PT)  -CD AM-PAC 6 Clicks Daily Activity (OT)  -HANNAH      User Key  (r) = Recorded By, (t) = Taken By, (c) = Cosigned By    Initials Name Provider Type    HANNAH Mi Cardenas, OT Occupational Therapist    CD Estefany Willams, PT Physical Therapist    AS Lacie Orellana PTA Physical Therapy Assistant           Time Calculation:         PT Charges       02/13/18 1329 02/13/18 1130       Time Calculation    Start Time 1255  -AS  1130  -     PT Received On 02/13/18  -AS      PT Goal Re-Cert Due Date 02/22/18  -AS 02/22/18  -     Time Calculation- PT    Total Timed Code Minutes- PT 23 minute(s)  -AS 25 minute(s)  -       User Key  (r) = Recorded By, (t) = Taken By, (c) = Cosigned By    Initials Name Provider Type    MF Yomi Ruby, PT Physical Therapist    AS Lacie Orellana PTA Physical Therapy Assistant          Therapy Charges for Today     Code Description Service Date Service Provider Modifiers Qty    34161401891 HC GAIT TRAINING EA 15 MIN 2/13/2018 Lacie Orellana, QUANG GP 1    97869636727 HC PT THER PROC EA 15 MIN 2/13/2018 Lacie Orellana PTA GP 1    63704412627 HC PT THER SUPP EA 15 MIN 2/13/2018 Lacie Orellana, QUANG GP 2          PT G-Codes  Outcome Measure Options: AM-PAC 6 Clicks Basic Mobility (PT)    Lacie Orellana PTA  2/13/2018

## 2018-02-13 NOTE — PROGRESS NOTES
"    [facility] Medicine Services  PROGRESS NOTE    Patient Name: Ashely Roldan  : 1960  MRN: [id number]    Date of Admission: 2018  Length of Stay: 5  Primary Care Physician: Don Bah MD    Subjective   Subjective     CC:  Follow up heart failure    HPI:  Patient sitting up in bed when seen.  She reports that her breathing is \"okay\" and denies active chest pain or palpitations.  Notes improved peripheral edema since admission.  States that she wants to go home as soon as possible.  Tells me that she wants \"the future that God has planned for me\" and that wants to spend as much time at home as possible.  She clearly understands that her heart disease is end-stage and progressive.    Review of Systems  Gen- No fevers, chills  CV- No chest pain, palpitations  Resp- No cough, mild dyspnea   GI- No N/V/D, abd pain    Otherwise ROS is negative except as mentioned in the HPI.    Objective   Objective     Vital Signs:   Temp:  [97.7 °F (36.5 °C)-98 °F (36.7 °C)] 98 °F (36.7 °C)  Heart Rate:  [81-92] 82  Resp:  [16-18] 16  BP: (122-151)/(66-82) 151/75       Physical Exam:  Constitutional: No acute distress, awake, alert, chronically ill appearing   HENT: NCAT, mucous membranes moist  Respiratory: Decreased breath sounds bilateral bases with trace crackles, respiratory effort normal   Cardiovascular: RRR, no murmurs, rubs, or gallops, palpable pedal pulses bilaterally  Gastrointestinal: Positive bowel sounds, soft, nontender, nondistended, mild dependent edema  Musculoskeletal: Anasarca with trace pitting edema to presacrum, pitting edema to bilateral legs with clear fluid-filled bullae to feet  Psychiatric: Appropriate affect, cooperative  Neurologic: Oriented x 3, strength symmetric in all extremities, Cranial Nerves grossly intact to confrontation, speech clear  Skin: No rashes    Results Reviewed:  I have personally reviewed current lab, radiology, and data and " agree.      Results from last 7 days  Lab Units 02/13/18 0512 02/12/18 0625 02/11/18 0456 02/09/18 0611 02/08/18  1844   WBC 10*3/mm3  --  5.98  --   --  6.12 9.44   HEMOGLOBIN g/dL  --  9.0*  --   --  9.8* 10.3*   HEMATOCRIT %  --  30.7*  --   --  33.6* 34.4*   PLATELETS 10*3/mm3  --  135*  --   --  149* 173   INR  2.24* 3.82* 4.43*  < >  --   --    < > = values in this interval not displayed.    Results from last 7 days  Lab Units 02/13/18  0512 02/12/18  0625 02/11/18  0456 02/10/18  0607 02/08/18  1844   SODIUM mmol/L 130* 128* 133 133  < > 139   POTASSIUM mmol/L 4.2 4.4 3.9 4.1  < > 3.9   CHLORIDE mmol/L 93* 92* 97* 101  < > 103   CO2 mmol/L 32.0* 31.0 30.0 28.0  < > 25.0   BUN mg/dL 52* 47* 45* 45*  < > 38*   CREATININE mg/dL 2.40* 2.90* 2.70* 2.30*  < > 1.70*   GLUCOSE mg/dL 89 84 96 111*  < > 126*   CALCIUM mg/dL 8.8 8.3* 8.5* 8.0*  < > 9.1   ALT (SGPT) U/L  --   --   --  117*  --  181*   AST (SGOT) U/L  --   --   --  37*  --  48*   < > = values in this interval not displayed.  Estimated Creatinine Clearance: 30.1 mL/min (by C-G formula based on Cr of 2.4).  No results found for: BNP  pH, Arterial   Date Value Ref Range Status   02/11/2018 7.291 (L) 7.350 - 7.450 pH units Final       Microbiology Results Abnormal     Procedure Component Value - Date/Time    Blood Culture - Blood, [[id number]]  (Normal) Collected:  02/08/18 5906    Lab Status:  Preliminary result Specimen:  Blood from Hand, Left Updated:  02/13/18 0146     Blood Culture No growth at 4 days    Blood Culture - Blood, [[id number]]  (Normal) Collected:  02/08/18 2304    Lab Status:  Preliminary result Specimen:  Blood from Arm, Left Updated:  02/13/18 0146     Blood Culture No growth at 4 days    Narrative:       Aerobic Only    Urine Culture - Urine, Urine, Clean Catch [[id number]]  (Normal) Collected:  02/09/18 0330    Lab Status:  Final result Specimen:  Urine from Urine, Clean Catch Updated:  02/11/18 0727     Urine Culture No growth at 2  days    Influenza A & B, RT PCR - Swab, Nasopharynx [[id number]]  (Normal) Collected:  02/08/18 2113    Lab Status:  Final result Specimen:  Swab from Nasopharynx Updated:  02/08/18 2209     Influenza A PCR Not Detected     Influenza B PCR Not Detected          Imaging Results (last 24 hours)     ** No results found for the last 24 hours. **        Results for orders placed during the hospital encounter of 02/08/18   STAT Adult Transthoracic Echo Complete W/ Cont if Necessary Per Protocol    Narrative · There is right-sided chamber enlargement.  · Global RV and LV hypokinesia is present.  · The estimated LV ejection fraction is 20%.  · I cannot exclude LV apical thrombus with certainty.  · The aortic valve is not well visualized. Mild AI is seen.  · A normally functioning mechanical mitral valve is present.  · A small pericardial effusion is suted.          I have reviewed the medications.    Assessment/Plan   Assessment / Plan     Hospital Problem List     * (Principal)Acute on chronic respiratory failure with hypoxia and hypercapnia    Anxiety    Ischemic cardiomyopathy (Chronic)    Overview Addendum 2/10/2018 10:50 AM by Baldomero Gusman IV, MD     · History of MI December 2009: s/p stent to RCA and LAD, EF 40%  · CABGx2 March, 2010: SVG to OMB-1, SVG to PDA  · EF 30% post-operatively  · [employer] ICD placement, 2011  · Echo (July 2014): Severe global hypokinesis with EF 12%, moderate AI, mechanical mitral valve, moderate to severe TR, RVSP 43mmHg   · Echo (2/4/17): EF 18%, mild to mod AI, mod TR, grossly normal prosthetic mitral valve  · Echo (2/9/18): LVEF 20%. Normal functioning mechanical mitral valve. Cannot exclude LV thrombus.         Lupus (systemic lupus erythematosus) (Chronic)    Coronary artery disease involving native coronary artery of native heart with angina pectoris    Overview Signed 2/10/2018 10:47 AM by Baldomero Gusman IV, MD     · History of MI December 2009: s/p stent to RCA and  LAD, EF 40%  · CABG (March, 2010): SVG to OMB-1, SVG to PDA         History of mitral valve replacement with mechanical valve    Overview Addendum 2/11/2018 10:07 AM by IAIN Allred     · Mitral valve replacement with 27mm ATS mechanical valve March 2010  · Echo (02/09/2018): There is right-sided chamber enlargement. Global RV and LV hypokinesia is present. LVEF 20%. Cannot exclude LV thrombus. Normal functioning mechanical mitral valve present. Small pericardial effusion.         Anemia of chronic kidney failure    COPD exacerbation    Tobacco abuse (Chronic)    Noncompliance    Chronic anticoagulation    Overview Signed 7/16/2016 10:32 PM by IAIN Gabriel     Coumadin (Mechanical AVR)         CKD (chronic kidney disease), stage IV (Chronic)    Acute systolic congestive heart failure    Overview Signed 2/10/2018 10:45 AM by Baldomero Gusman IV, MD     · Echo (2/9/18): LVEF 20%. Normal functioning mechanical mitral valve. Cannot exclude LV thrombus.         Elevated LFTs             Brief Hospital Course to date:  Ashely Roldan is a 57 y.o. female with a past medical history of chronic systolic HF, recent FAHEEM, CAD, PAD, COPD, CKD III, VHD s/p mechanical mitral valve, anxiety, former tobacco abuse, and HLP who presented to the [facility] ER on 2/8 with dyspnea, fatigue, and lethargy of 1 day onset in the setting of recent admission here from 1/23-12/5 with UTI, liver injury, FAHEEM, and patient leaving AMA. After ER evaluation, Pt admitted with lethargy and acute hypercapneic hypoxemic respiratory failure due to decompensated biventricular heart failure:     Assessment & Plan:    Acute Hypercapneic Hypoxemic Respiratory Failure on Chronic Nocturnal Hypoxia  Associated Metabolic Encephalopathy due to hypercapneic narcosis   COPD with acute exacerbation  - Continue symbicort and duonebs (restart spiriva at dc)  - BiPAP prn (pt does not like to wear at night)  - Continue supplemental O2    Acute  Decompensation of Chronic Biventricular Systolic HF ACC D due to ischemic cardiomyopathy, end-stage  CAD with hx of CABG, MI and PCI  S/P ICD  Possible LV thrombus (unable to be excluded per echo)  - Requiring dobutamine to achieve diuresis (carvedilol on hold)  - Not a candidate for LVAD due to noncompliance and biventricular failure   - Unable to use aldactone, ACEI/ARB, or diuretics due to FAHEEM  - Continue coumadin    Acute Kidney Injury due to Cardiorenal Syndrome on CKD III, Lupus Nephritis  Subnephrotic proteinuria  - renal function slightly better today, urine output has improved  - Repeat CMP in AM     Elevated LFTs, in the setting of recent shock liver and probable congestive hepatopathy from above   - recheck CMP in AM    VHD with hx of Mechanical MV 2010  - Continue coumadin, discussed dosing with pharmacy today    Weakness/Debility  - PT/OT following, pt refused inpatient rehab    Hx of Tobacco Abuse    Hx of SLE Nephritis, not currently on medications    Medical Noncompliance  - Left AMA last admission     Goals of Care  Patient and I had a long discussion regarding her current state of health, her end-stage cardiomyopathy, and her expressed desire to spend as much time at home and with her family as feasible.  We discussed the morbidity from aggressive care of her heart failure, including frequent hospitalization which she would like to stop.  Palliative care consult is pending and I counseled patient that we need to make a durable plan of care at discharge to achieve her goals of care, mainly to allow her to remain at home as long as feasible and to have help in place if she has breakthrough symptoms or develops suffering from her heart failure. I do feel that hospice would be the best option to achieve this and hope she comes to this realization. She remains frustrated that she is still in the hospital.      DVT Prophylaxis: Anticoagulated on coumadin  CODE STATUS: Full Code  Disposition: I expect the  patient to be discharged home in to be determined days depending on plan of care / level of support at discharge.     Edy Pearson MD  02/13/18  10:13 AM

## 2018-02-13 NOTE — PROGRESS NOTES
"Pharmacy Consult  -  Warfarin    Ashely Roldan is a  57 y.o. female   Height - 175.3 cm (69\")  Weight - 74.5 kg (164 lb 3.2 oz)    Consulting Provider: - Hospitalist Group  Indication: - Mechanical Mitral Valve  Goal INR: - 2.5-3.5  Home Regimen:   - 2 mg daily except every third day   - 3 mg every third day; repeating    Bridge Therapy: No     Drug-Drug Interactions with current regimen:   None    Warfarin Dosing During Admission:    Date  2/9 2/10 2/11 2/12 2/13       INR  4.28 5.94 4.43 3.82 2.24       Dose  Hold Hold Hold 1mg (3mg)           Education Provided:  On prior to admission; will f/u and answer questions as needed.    Labs:    Results from last 7 days   Lab Units 02/13/18 0512 02/12/18  0625 02/11/18  0456 02/10/18  0607 02/09/18  0851 02/09/18  0611 02/08/18  1844   INR  2.24* 3.82* 4.43* 5.94* 4.28* --  --    HEMOGLOBIN g/dL --  9.0* --  --  --  9.8* 10.3*   HEMATOCRIT % --  30.7* --  --  --  33.6* 34.4*   PLATELETS 10*3/mm3 --  135* --  --  --  149* 173     Results from last 7 days   Lab Units 02/13/18  0512 02/12/18  0625 02/11/18  0456 02/10/18  0607  02/08/18  1844   SODIUM mmol/L 130* 128* 133 133 < > 139   POTASSIUM mmol/L 4.2 4.4 3.9 4.1 < > 3.9   CHLORIDE mmol/L 93* 92* 97* 101 < > 103   CO2 mmol/L 32.0* 31.0 30.0 28.0 < > 25.0   BUN mg/dL 52* 47* 45* 45* < > 38*   CREATININE mg/dL 2.40* 2.90* 2.70* 2.30* < > 1.70*   CALCIUM mg/dL 8.8 8.3* 8.5* 8.0* < > 9.1   BILIRUBIN mg/dL --  --  --  0.3 --  0.6   ALK PHOS U/L --  --  --  97 --  119*   ALT (SGPT) U/L --  --  --  117* --  181*   AST (SGOT) U/L --  --  --  37* --  48*   GLUCOSE mg/dL 89 84 96 111* < > 126*   < > = values in this interval not displayed.       Current dietary intake: 50% to 100% of meals  Diet Order   Procedures   • Diet Regular; Cardiac       Assessment/Plan:     1. INR is now SUBtherapeutic at 2.24; decreased from 3.82 yesterday.  D/t steep decrease in INR, will give warfarin 3mg tonight.    2. Continue to watch INR " closely, dietary intake, drug-drug interactions and s/sx of bleeding or clotting.  3. Pharmacy will continue to follow.      Ivonne García, PharmD  2/13/2018  12:00 PM

## 2018-02-13 NOTE — PROGRESS NOTES
Continued Stay Note  [facility]     Patient Name: Ashely Roldan  MRN: [id number]  Today's Date: 2/13/2018    Admit Date: 2/8/2018          Discharge Plan       02/13/18 1644    Case Management/Social Work Plan    Plan [facility] home services    Patient/Family In Agreement With Plan yes    Additional Comments Hospice consult received.  Chart reviewed.  Acute/chronic systolic CHF.  BNP ~3000.  Cardiorenal syndrome.  Advanced COPD.  Lupus diagnosed in '09.  Mechanical mitral valve replacement 3/10.  Other co-morbidities.  Awake. Alert, oriented and conversant.  Able to provided detailed medical history.  Denies pain/dyspnea or other distress at this time.  Discussed diagnoses, prognosis, current status and goals of care.  Explained hospice home care.  Given written info re: hospice, [facility] and contact numbers for the Hospice Nurse Liaison.  Agreeable for hospice home care services.  Medical records faxed and update to [facility].  DME has O2 at home - will need nebulizer and bedside table which hospice will provide.  VERY ANXIOUS to go home 2/14/18.  Son will transport per private car.  Update to RICARDO King, Case Management.  Will follow for hospice support and to assist/facilitate access to hospice services.  If can be of further assist please contact.....ext [phone].      02/13/18 1630    Case Management/Social Work Plan    Plan [facility] homes services    Patient/Family In Agreement With Plan yes    Additional Comments Hospice consult received.  Chart reviewed.  Acute/chronic systolic CHF      02/13/18 1613    Case Management/Social Work Plan    Plan Home with Hospice    Patient/Family In Agreement With Plan yes    Additional Comments SW received update from Nupur Reddy, inpatient Hospice Liaison.  Pt. will discharge home tomorrow, 2/14/18 with Hospice services in [facility].  Pt's son, Floyd will provide transportation at discharge.   Floyd will likely arrive after lunch.  SW gave report to Charge RN.              Discharge Codes     None            Nupur Reddy RN

## 2018-02-14 VITALS
RESPIRATION RATE: 18 BRPM | WEIGHT: 163.2 LBS | HEART RATE: 91 BPM | DIASTOLIC BLOOD PRESSURE: 78 MMHG | TEMPERATURE: 98.6 F | HEIGHT: 69 IN | BODY MASS INDEX: 24.17 KG/M2 | SYSTOLIC BLOOD PRESSURE: 137 MMHG | OXYGEN SATURATION: 90 %

## 2018-02-14 LAB
ALBUMIN SERPL-MCNC: 3.2 G/DL (ref 3.2–4.8)
ALBUMIN/GLOB SERPL: 1.4 G/DL (ref 1.5–2.5)
ALP SERPL-CCNC: 98 U/L (ref 25–100)
ALT SERPL W P-5'-P-CCNC: 60 U/L (ref 7–40)
ANION GAP SERPL CALCULATED.3IONS-SCNC: 7 MMOL/L (ref 3–11)
AST SERPL-CCNC: 29 U/L (ref 0–33)
BACTERIA SPEC AEROBE CULT: NORMAL
BACTERIA SPEC AEROBE CULT: NORMAL
BILIRUB SERPL-MCNC: 0.5 MG/DL (ref 0.3–1.2)
BNP SERPL-MCNC: 3242 PG/ML (ref 0–100)
BUN BLD-MCNC: 52 MG/DL (ref 9–23)
BUN/CREAT SERPL: 24.8 (ref 7–25)
CALCIUM SPEC-SCNC: 8.4 MG/DL (ref 8.7–10.4)
CHLORIDE SERPL-SCNC: 94 MMOL/L (ref 99–109)
CO2 SERPL-SCNC: 31 MMOL/L (ref 20–31)
CREAT BLD-MCNC: 2.1 MG/DL (ref 0.6–1.3)
GFR SERPL CREATININE-BSD FRML MDRD: 24 ML/MIN/1.73
GLOBULIN UR ELPH-MCNC: 2.3 GM/DL
GLUCOSE BLD-MCNC: 82 MG/DL (ref 70–100)
INR PPP: 1.72 (ref 0.91–1.09)
PHOSPHATE SERPL-MCNC: 3.9 MG/DL (ref 2.4–5.1)
POTASSIUM BLD-SCNC: 4.3 MMOL/L (ref 3.5–5.5)
PROT SERPL-MCNC: 5.5 G/DL (ref 5.7–8.2)
PROTHROMBIN TIME: 18.1 SECONDS (ref 9.6–11.5)
SODIUM BLD-SCNC: 132 MMOL/L (ref 132–146)

## 2018-02-14 PROCEDURE — 84100 ASSAY OF PHOSPHORUS: CPT | Performed by: INTERNAL MEDICINE

## 2018-02-14 PROCEDURE — 83880 ASSAY OF NATRIURETIC PEPTIDE: CPT | Performed by: INTERNAL MEDICINE

## 2018-02-14 PROCEDURE — 99232 SBSQ HOSP IP/OBS MODERATE 35: CPT | Performed by: INTERNAL MEDICINE

## 2018-02-14 PROCEDURE — 94640 AIRWAY INHALATION TREATMENT: CPT

## 2018-02-14 PROCEDURE — 94799 UNLISTED PULMONARY SVC/PX: CPT

## 2018-02-14 PROCEDURE — 85610 PROTHROMBIN TIME: CPT | Performed by: INTERNAL MEDICINE

## 2018-02-14 PROCEDURE — 99239 HOSP IP/OBS DSCHRG MGMT >30: CPT | Performed by: NURSE PRACTITIONER

## 2018-02-14 PROCEDURE — 97530 THERAPEUTIC ACTIVITIES: CPT

## 2018-02-14 PROCEDURE — 80053 COMPREHEN METABOLIC PANEL: CPT | Performed by: HOSPITALIST

## 2018-02-14 RX ORDER — CARVEDILOL 12.5 MG/1
6.25 TABLET ORAL 2 TIMES DAILY WITH MEALS
Qty: 30 TABLET | Refills: 0
Start: 2018-02-14

## 2018-02-14 RX ORDER — DIPHENHYDRAMINE HCL 25 MG
25 CAPSULE ORAL EVERY 6 HOURS PRN
Start: 2018-02-14

## 2018-02-14 RX ORDER — WARFARIN SODIUM 2 MG/1
2 TABLET ORAL
Status: DISCONTINUED | OUTPATIENT
Start: 2018-02-15 | End: 2018-02-14 | Stop reason: HOSPADM

## 2018-02-14 RX ORDER — FUROSEMIDE 40 MG/1
40 TABLET ORAL DAILY
Status: DISCONTINUED | OUTPATIENT
Start: 2018-02-14 | End: 2018-02-14 | Stop reason: HOSPADM

## 2018-02-14 RX ORDER — OXYCODONE HYDROCHLORIDE 5 MG/1
5 TABLET ORAL EVERY 4 HOURS PRN
Status: DISCONTINUED | OUTPATIENT
Start: 2018-02-14 | End: 2018-02-14 | Stop reason: HOSPADM

## 2018-02-14 RX ORDER — FUROSEMIDE 40 MG/1
40 TABLET ORAL DAILY
Qty: 30 TABLET | Refills: 1 | Status: SHIPPED | OUTPATIENT
Start: 2018-02-14

## 2018-02-14 RX ORDER — WARFARIN SODIUM 2 MG/1
TABLET ORAL
Start: 2018-02-15

## 2018-02-14 RX ORDER — OXYCODONE HYDROCHLORIDE 5 MG/1
TABLET ORAL
Qty: 20 TABLET | Refills: 0
Start: 2018-02-14

## 2018-02-14 RX ORDER — OXYCODONE HYDROCHLORIDE 5 MG/1
2.5 TABLET ORAL EVERY 4 HOURS PRN
Status: DISCONTINUED | OUTPATIENT
Start: 2018-02-14 | End: 2018-02-14 | Stop reason: HOSPADM

## 2018-02-14 RX ORDER — ASPIRIN 81 MG/1
81 TABLET ORAL DAILY
Start: 2018-02-15

## 2018-02-14 RX ORDER — WARFARIN SODIUM 3 MG/1
3 TABLET ORAL ONCE
Status: DISCONTINUED | OUTPATIENT
Start: 2018-02-14 | End: 2018-02-14 | Stop reason: HOSPADM

## 2018-02-14 RX ORDER — LORAZEPAM 1 MG/1
1 TABLET ORAL EVERY 6 HOURS PRN
Qty: 40 TABLET | Refills: 0
Start: 2018-02-14

## 2018-02-14 RX ADMIN — FUROSEMIDE 40 MG: 40 TABLET ORAL at 15:30

## 2018-02-14 RX ADMIN — LORAZEPAM 1 MG: 1 TABLET ORAL at 09:34

## 2018-02-14 RX ADMIN — CLONAZEPAM 2 MG: 1 TABLET ORAL at 08:35

## 2018-02-14 RX ADMIN — Medication 325 MG: at 08:35

## 2018-02-14 RX ADMIN — CLONAZEPAM 2 MG: 1 TABLET ORAL at 15:30

## 2018-02-14 RX ADMIN — ASPIRIN 81 MG: 81 TABLET, COATED ORAL at 08:35

## 2018-02-14 RX ADMIN — IPRATROPIUM BROMIDE AND ALBUTEROL SULFATE 3 ML: .5; 3 SOLUTION RESPIRATORY (INHALATION) at 07:57

## 2018-02-14 RX ADMIN — IPRATROPIUM BROMIDE AND ALBUTEROL SULFATE 3 ML: .5; 3 SOLUTION RESPIRATORY (INHALATION) at 13:05

## 2018-02-14 RX ADMIN — OXYCODONE HYDROCHLORIDE 2.5 MG: 5 TABLET ORAL at 13:20

## 2018-02-14 RX ADMIN — BUDESONIDE AND FORMOTEROL FUMARATE DIHYDRATE 2 PUFF: 160; 4.5 AEROSOL RESPIRATORY (INHALATION) at 07:57

## 2018-02-14 NOTE — PROGRESS NOTES
"Palliative Care Daily Progress Note     C/C: BLE pain    S: Medical record reviewed. Follow up visit for evaluation of palliative needs. Events noted. Pt reports planning for home with hospice today. Verbalizes ICD turned off yesterday and she just wants to go home and be comfortable \"for whatever time I have left\".     ROS: +anxiety-ativan this am effective; +BLE pain-3/10 tolerable- waiting for wound care to come redress; denies SOA, N/V/D; LBM 2/14/18 per pt report; Denies CP, HA, dizziness    O: Code Status: Conditional Code   Advanced Directives: Advance Directive: Patient has advance directive, copy in chart   Goals of Care: Ongoing.   Palliative Performance Scale Score: 40%     /76 (BP Location: Left arm, Patient Position: Lying)  Pulse 85  Temp 98.6 °F (37 °C) (Oral)   Resp 18  Ht 175.3 cm (69\")  Wt 74 kg (163 lb 3.2 oz)  SpO2 90%  BMI 24.1 kg/m2    Intake/Output Summary (Last 24 hours) at 02/14/18 1258  Last data filed at 02/14/18 1045   Gross per 24 hour   Intake              960 ml   Output             3050 ml   Net            -2090 ml       PE:  General Appearance:    Alert, cooperative, NAD, sitting up in bed eating lunch   HEENT:    NC/AT, EOMI, anicteric, MMM   Neck:   supple, trachea midline, no JVD   Lungs:     CTA bilat, respirations regular, even and unlabored    Heart:    RRR, faint murmur noted   Abdomen:     Normal bowel sounds, soft, non-tender, non-distended   Extremities:   Moves all extremities, +BLE edema-dressings noted, +blisters to feet   Pulses:   BUE Pulses palpable and equal bilaterally   Skin:   Warm, blisters to BLE feet noted with dressings damp with serosanguineous colored fluid noted.    Neurologic:   A/Ox3, cooperative   Psych:   Calm, appropriate       Labs:     Results from last 7 days  Lab Units 02/12/18  0625   WBC 10*3/mm3 5.98   HEMOGLOBIN g/dL 9.0*   HEMATOCRIT % 30.7*   PLATELETS 10*3/mm3 135*       Results from last 7 days  Lab Units 02/14/18  0517 "   SODIUM mmol/L 132   POTASSIUM mmol/L 4.3   CHLORIDE mmol/L 94*   CO2 mmol/L 31.0   BUN mg/dL 52*   CREATININE mg/dL 2.10*   GLUCOSE mg/dL 82   CALCIUM mg/dL 8.4*       Results from last 7 days  Lab Units 02/14/18  0517   SODIUM mmol/L 132   POTASSIUM mmol/L 4.3   CHLORIDE mmol/L 94*   CO2 mmol/L 31.0   BUN mg/dL 52*   CREATININE mg/dL 2.10*   CALCIUM mg/dL 8.4*   BILIRUBIN mg/dL 0.5   ALK PHOS U/L 98   ALT (SGPT) U/L 60*   AST (SGOT) U/L 29   GLUCOSE mg/dL 82     Imaging Results (last 72 hours)     ** No results found for the last 72 hours. **          Diagnostics: Reviewed    A:   Patient Active Problem List   Diagnosis   • Anxiety   • Arthritis   • Panlobular emphysema   • Reactive depression   • Gastritis   • Heart attack   • Ischemic cardiomyopathy   • VHD (valvular heart disease)   • Osteoporosis   • PVD (peripheral vascular disease)   • Allergic rhinitis   • Lupus (systemic lupus erythematosus)   • TIA (transient ischemic attack)   • Coronary artery disease involving native coronary artery of native heart with angina pectoris   • Cough   • History of mitral valve replacement with mechanical valve   • Anemia of chronic kidney failure   • Insomnia   • COPD exacerbation   • Tobacco abuse   • AICD (automatic cardioverter/defibrillator) present   • Noncompliance   • Blunt trauma of multiple sites   • Chronic anticoagulation   • Closed fracture of rib with flail chest   • CKD (chronic kidney disease), stage IV   • Acute systolic congestive heart failure   • Acute on chronic respiratory failure with hypoxia and hypercapnia   • Elevated LFTs       S/Sx:  1. Anxiety- ativan prn- pt reports dose this am effective  2. BLE Pain- added prn oxycodone    P: Discussed GOC and pt confirms GOC to go home with hospice. Discussed current INR is low putting pt at higher risk of PE, DVT, CVA also discussed when INR is to high risk is bleeding, bruising. Pt verbalizes understanding and accpets these risks as she just want to be  "home.  She reports she has been dealing with \"all this\" for a long time and she verbalizes understanding that her time is \"short\" and \"is in Gods hands\". Discussed hospice will continue to follow and monitor INR as well as provide continued symptom management and adjust medications as needed.  Discussed with Dr. Pearson and KEY Reddy with Hospice. Scripts provided for oxycodone (added today for BLE pain and anticipated dyspnea with disease progression for home availability) and ativan. Palliative Care Team will continue to follow patient. Please call for needs prior to discharge    Delia Venegas, APRN  2/14/2018  Time spent:30 min    EMR Dragon/Transcription disclaimer:  Much of this encounter note is an electronic transcription/translation of spoken language to printed text. Electronic translation of spoken language may permit erroneous, or at times, nonsensical words or phrases to be inadvertently transcribed. Although I have reviewed the note for such errors, some may still exist.    "

## 2018-02-14 NOTE — NURSING NOTE
PT Wound Care is following pt r/t closed and open bullae on BLE. WOC nurse will resume following pt once PT Wound Care has signed off. Please contact WOC nurse as needed for concerns.

## 2018-02-14 NOTE — SIGNIFICANT NOTE
13:00   Palliative Team Member Meeting  Attendance:  Dr. Ronn Venegas, APRN, ACHPN  Janet Villagran, RN, CHPN  Catia Jennings, RN, CHPN  SHAY JaneW

## 2018-02-14 NOTE — PLAN OF CARE
Problem: Patient Care Overview (Adult)  Goal: Plan of Care Review  Outcome: Ongoing (interventions implemented as appropriate)   02/14/18 1150   Coping/Psychosocial Response Interventions   Plan Of Care Reviewed With patient   Patient Care Overview   Progress no change   Outcome Evaluation   Outcome Summary/Follow up Plan Patient states she is going home today with [facility] support. Son is coming at 1430 per nurse. IAIN Grullon gave scripts to Hospice Nurse Nupur Reddy

## 2018-02-14 NOTE — THERAPY TREATMENT NOTE
Acute Care - Occupational Therapy Treatment Note  [facility]     Patient Name: Ashely Roldan  : 1960  MRN: [id number]  Today's Date: 2018  Onset of Illness/Injury or Date of Surgery Date: 18  Date of Referral to OT: 02/10/18  Referring Physician: MD CINDY      Admit Date: 2018    Visit Dx:     ICD-10-CM ICD-9-CM   1. Respiratory failure, unspecified chronicity, unspecified whether with hypoxia or hypercapnia J96.90 518.81   2. COPD exacerbation J44.1 491.21   3. Acute on chronic congestive heart failure, unspecified congestive heart failure type I50.9 428.0   4. Tobacco abuse Z72.0 305.1   5. Medical non-compliance Z91.19 V15.81   6. Impaired mobility and ADLs Z74.09 799.89   7. Impaired functional mobility, balance, gait, and endurance Z74.09 V49.89   8. History of mitral valve replacement with mechanical valve Z95.2 V43.3     Patient Active Problem List   Diagnosis   • Anxiety   • Arthritis   • Panlobular emphysema   • Reactive depression   • Gastritis   • Heart attack   • Ischemic cardiomyopathy   • VHD (valvular heart disease)   • Osteoporosis   • PVD (peripheral vascular disease)   • Allergic rhinitis   • Lupus (systemic lupus erythematosus)   • TIA (transient ischemic attack)   • Coronary artery disease involving native coronary artery of native heart with angina pectoris   • Cough   • History of mitral valve replacement with mechanical valve   • Anemia of chronic kidney failure   • Insomnia   • COPD exacerbation   • Tobacco abuse   • AICD (automatic cardioverter/defibrillator) present   • Noncompliance   • Blunt trauma of multiple sites   • Chronic anticoagulation   • Closed fracture of rib with flail chest   • CKD (chronic kidney disease), stage IV   • Acute systolic congestive heart failure   • Acute on chronic respiratory failure with hypoxia and hypercapnia   • Elevated LFTs             Adult Rehabilitation Note       18 1320 18 1255 18 1130    Rehab  Assessment/Intervention    Discipline occupational therapist  - physical therapy assistant  -AS physical therapist  -    Document Type therapy note (daily note)  -HK therapy note (daily note)  -AS therapy note (daily note)  -    Subjective Information agree to therapy;complains of;weakness;pain  -HK agree to therapy;complains of;pain;weakness;fatigue  -AS agree to therapy;complains of;weakness;fatigue;pain  -MF    Patient Effort, Rehab Treatment good  -HK good  -AS     Symptoms Noted During/After Treatment none  -HK increased pain;fatigue  -AS     Precautions/Limitations  fall precautions;oxygen therapy device and L/min;other (see comments)   B LE leg wraps  -AS     Recorded by [HK] Jade Haynes, OT [AS] Lacie Orellana, PTA [MF] Yomi Ruby, PT    Vital Signs    Pre Systolic BP Rehab 137  -HK      Pre Treatment Diastolic BP 78  -HK      Pretreatment Heart Rate (beats/min) 96  -HK      Pre Patient Position Supine  -HK      Intra Patient Position Standing  -HK      Post Patient Position Sitting  -HK      Recorded by [HK] Jade Haynes, OT      Pain Assessment    Pain Assessment Encarnacion-Rebolledo FACES  -HK No/denies pain  -AS Encarnacion-Baker FACES  -    Encarnacion-Baker FACES Pain Rating 6  -HK  6  -MF    Pain Score  7  -AS     Post Pain Score 6  -HK 7  -AS     Pain Type Acute pain  -HK Acute pain  -AS Acute pain  -MF    Pain Location Foot  -HK Foot  -AS Leg  -MF    Pain Orientation Right;Left  -HK Right;Left  -AS Right;Left  -MF    Pain Intervention(s) Repositioned;Ambulation/increased activity  -HK Repositioned;Ambulation/increased activity  -AS Repositioned   notified RN   -    Response to Interventions Pt tolerated  -HK tolerated  -AS     Recorded by [HK] Jade Haynes, OT [AS] Lacie Orellana, PTA [MF] Yomi Ruby, PT    Cognitive Assessment/Intervention    Current Cognitive/Communication Assessment functional  -HK functional  -AS     Orientation Status oriented x 4  -HK oriented to;person  -AS      Follows Commands/Answers Questions 100% of the time;able to follow single-step instructions  -% of the time;able to follow single-step instructions;needs cueing  -AS     Personal Safety WNL/WFL  -HK mild impairment  -AS     Personal Safety Interventions fall prevention program maintained;gait belt;nonskid shoes/slippers when out of bed  -HK fall prevention program maintained;gait belt;nonskid shoes/slippers when out of bed;other (see comments)   exit alarm  -AS     Recorded by [HK] Jade Haynes, OT [AS] Lacie Orellana PTA     Bed Mobility, Assessment/Treatment    Bed Mobility, Scoot/Bridge, [place] supervision required;verbal cues required  -HK      Bed Mob, Supine to Sit, [place] minimum assist (75% patient effort);verbal cues required  -HK verbal cues required;minimum assist (75% patient effort)  -AS     Bed Mob, Sit to Supine, [place] not tested  -HK not tested  -AS     Bed Mobility, Safety Issues decreased use of arms for pushing/pulling;decreased use of legs for bridging/pushing  -HK decreased use of legs for bridging/pushing  -AS     Bed Mobility, Impairments strength decreased;impaired balance;pain  -HK strength decreased;pain  -AS     Bed Mobility, Comment Pt advanced to EOB with verbal cues and Alexx  -HK verbal cues for technique, increased time to perform  -AS     Recorded by [HK] Jade Haynes, WINNIE [AS] Lacie Orellana PTA     Transfer Assessment/Treatment    Transfers, Sit-Stand [place] minimum assist (75% patient effort);verbal cues required  -HK verbal cues required;minimum assist (75% patient effort);1 person + 1 person to manage equipment  -AS     Transfers, Stand-Sit [place] minimum assist (75% patient effort);verbal cues required  -HK verbal cues required;minimum assist (75% patient effort);1 person + 1 person to manage equipment  -AS     Transfers, Sit-Stand-Sit, Assist Device rolling walker  -HK rolling walker  -AS     Transfer, Safety Issues step length  decreased;weight-shifting ability decreased;sequencing ability decreased  -HK step length decreased;weight-shifting ability decreased  -AS     Transfer, Impairments strength decreased;impaired balance;pain  -HK strength decreased;pain  -AS     Transfer, Comment Pt completed sit to stand with verbal cues for sequencing and safe hand placement.   -HK verbal cues for hand placement  -AS     Recorded by [HK] Jade Haynes OT [AS] Lacie Orellana PTA     Gait Assessment/Treatment    Gait, [place] Level  verbal cues required;minimum assist (75% patient effort);1 person + 1 person to manage equipment  -AS     Gait, Assistive Device  rolling walker  -AS     Gait, Distance (Feet)  150  -AS     Gait, Gait Deviations  anni decreased;step length decreased;forward flexed posture  -AS     Gait, Safety Issues  step length decreased;supplemental O2  -AS     Gait, Impairments  strength decreased;pain  -AS     Gait, Comment  followed with recliner for safety, patient with bilateral LE pain, verbal cues for posture adn assist to control walker tends to veer to the left.  -AS     Recorded by  [AS] Lacie Orellana PTA     Functional Mobility    Functional Mobility- Comment not assessed  -HK      Recorded by [HK] Jade Haynes OT      Upper Body Dressing Assessment/Training    UB Dressing Assess/Train, Clothing Type donning:;pull over  -HK      UB Dressing Assess/Train, Position sitting  -HK      UB Dressing Assess/Train, [place] set up required  -HK      UB Dressing Assess/Train, Impairments strength decreased;impaired balance;pain  -HK      UB Dressing Assess/Train, Comment Pt donned tank top and sweatshirt with set up  -HK      Recorded by [HK] Jade Haynes OT      Lower Body Dressing Assessment/Training    LB Dressing Assess/Train, Clothing Type donning:;pants;socks  -HK      LB Dressing Assess/Train, Position sitting  -HK      LB Dressing Assess/Train, [place] maximum assist (25% patient effort);verbal  cues required  -HK      LB Dressing Assess/Train, Impairments strength decreased;impaired balance;ROM decreased;decreased flexibility;pain  -HK      LB Dressing Assess/Train, Comment OT donned pants and undewear and pt was able to slightly lift legs to assist. Pt is limited by pain in feet and legs during dressing.   -HK      Recorded by [HK] Jade Haynes, OT      Toileting Assessment/Training    Toileting Assess/Train, Comment pt declined need to void  -HK      Recorded by [HK] Jade Haynes, OT      Grooming Assessment/Training    Grooming Assess/Train, Position sitting  -HK      Grooming Assess/Train, Indepen Level independent  -HK      Grooming Assess/Train, Impairments strength decreased;impaired balance;pain  -HK      Grooming Assess/Train, Comment Pt combed hair sitting EOB   -HK      Recorded by [HK] Jade Haynes, WINNIE      Motor Skills/Interventions    Additional Documentation Balance Skills Training (Group)  -HK      Recorded by [HK] Jade Haynes, WINNIE      Balance Skills Training    Sitting-Level of Assistance Distant supervision  -HK      Sitting-Balance Support Right upper extremity supported;Left upper extremity supported;Feet supported  -HK      Standing-Level of Assistance Contact guard  -HK      Static Standing Balance Support assistive device  -HK      Recorded by [HK] Jade Haynes, OT      Therapy Exercises    Bilateral Lower Extremities  AROM:;10 reps;sitting;LAQ;hip flexion  -AS     Recorded by  [AS] Lacie Orellana PTA     Positioning and Restraints    Pre-Treatment Position in bed  -HK in bed  -AS in bed  -    Post Treatment Position bed  -HK chair  -AS bed  -MF    In Bed sitting EOB;call light within reach;encouraged to call for assist;exit alarm on;notified nsg;waffle boots/both  -HK  supine;call light within reach;notified nsg  -    In Chair  reclined;call light within reach;encouraged to call for assist;exit alarm on;waffle cushion  -AS     Recorded by [HK] Jade Haynes OT [AS] Lacie  Shelli Orellana, PTA [MF] Yomi Ruby, PT      User Key  (r) = Recorded By, (t) = Taken By, (c) = Cosigned By    Initials Name Effective Dates     Yomi Ruby, PT 06/19/15 -     AS Lacie Shelli Orellana, PTA 06/22/15 -     HK Jade YUAN Haynes, OT 09/28/17 -                 OT Goals       02/14/18 1507 02/11/18 1327       Bed Mobility OT LTG    Bed Mobility OT LTG, Date Established  02/11/18  -HANNAH     Bed Mobility OT LTG, Time to Achieve  1 wk  -HANNAH     Bed Mobility OT LTG, Activity Type  all bed mobility  -HANNAH     Bed Mobility OT LTG, [place] Level  conditional independence  -HANNAH     Bed Mobility OT LTG, Assist Device  bed rails  -HANNAH     Bed Mobility OT LTG, Outcome  goal ongoing  -HANNAH     Transfer Training OT LTG    Transfer Training OT LTG, Date Established  02/11/18  -HANNAH     Transfer Training OT LTG, Time to Achieve  1 wk  -HANNAH     Transfer Training OT LTG, Activity Type  sit to stand/stand to sit;toilet  -HANNAH     Transfer Training OT LTG, [place] Level  minimum assist (75% patient effort)  -HANNAH     Transfer Training OT LTG, Assist Device  commode, bedside;walker, rolling  -HANNAH     Transfer Training OT LTG, Outcome goal partially met   goal met sit to stand  -HK goal ongoing  -HANNAH     Patient Education OT LTG    Patient Education OT LTG, Date Established  02/11/18  -HANNAH     Patient Education OT LTG, Time to Achieve  1 wk  -HANNAH     Patient Education OT LTG, Education Type  written program;HEP;home safety;adaptive equipment mgmt  -HANNAH     Patient Education OT LTG Outcome  goal ongoing  -HANNAH     Toileting OT LTG    Toileting Goal OT LTG, Date Established  02/11/18  -HANNAH     Toileting Goal OT LTG, Time to Achieve  1 wk  -HANNAH     Toileting Goal OT LTG, Activity Type  toileting  -HANNAH     Toileting Goal OT LTG, [place] Level  minimum assist (75% patient effort)  -HANNAH     Toileting Goal OT LTG, Outcome  goal ongoing  -HANNAH       User Key  (r) = Recorded By, (t) = Taken By, (c) = Cosigned By    Initials Name Provider Type     HANNAH Cardenas, OT Occupational Therapist     Jade Haynes, OT Occupational Therapist          Occupational Therapy Education     Title: PT OT SLP Therapies (Active)     Topic: Occupational Therapy (Active)     Point: ADL training (Done)    Description: Instruct learner(s) on proper safety adaptation and remediation techniques during self care or transfers.   Instruct in proper use of assistive devices.    Learning Progress Summary    Learner Readiness Method Response Comment Documented by Status   Patient Acceptance E VU Pt educated on ADL retraining with dressing, safety precautions and appropriate body mechanics.  02/14/18 1506 Done    Acceptance E VU,NR Role of OT, bed mobility, benefits activity, need to leave O2 due stats dropping and benefit using wash cloth to scratch skin over nails due pt. scratching self all over and making arm bleed.  02/11/18 1325 Done               Point: Precautions (Done)    Description: Instruct learner(s) on prescribed precautions during self-care and functional transfers.    Learning Progress Summary    Learner Readiness Method Response Comment Documented by Status   Patient Acceptance E VU Pt educated on ADL retraining with dressing, safety precautions and appropriate body mechanics.  02/14/18 1506 Done    Acceptance E VU,NR Role of OT, bed mobility, benefits activity, need to leave O2 due stats dropping and benefit using wash cloth to scratch skin over nails due pt. scratching self all over and making arm bleed.  02/11/18 1325 Done               Point: Body mechanics (Done)    Description: Instruct learner(s) on proper positioning and spine alignment during self-care, functional mobility activities and/or exercises.    Learning Progress Summary    Learner Readiness Method Response Comment Documented by Status   Patient Acceptance E VU Pt educated on ADL retraining with dressing, safety precautions and appropriate body mechanics.  02/14/18 1506 Done                       User Key     Initials Effective Dates Name Provider Type Discipline     06/22/15 -  Mi Cardenas, OT Occupational Therapist OT     09/28/17 -  Jade Haynes, OT Occupational Therapist OT                  OT Recommendation and Plan  Anticipated Equipment Needs At Discharge:  (none at this time, may need dressing AE)  Anticipated Discharge Disposition: skilled nursing facility, home with home health, home with assist (pending progress and ability to mobilize)  Therapy Frequency: daily  Plan of Care Review  Plan Of Care Reviewed With: patient  Progress: improving  Outcome Summary/Follow up Plan: Pt limited by pain in BLE. Pt advanced to EOB with Alexx and requires Alexx for transfers. Pt requires maxA for LB dressing due to pain in BLE. Continue IP OT per POC.         Outcome Measures       02/14/18 1350 02/13/18 1255 02/12/18 0955    How much help from another person do you currently need...    Turning from your back to your side while in flat bed without using bedrails?  3  -AS 2  -CD    Moving from lying on back to sitting on the side of a flat bed without bedrails?  3  -AS 3  -CD    Moving to and from a bed to a chair (including a wheelchair)?  3  -AS 3  -CD    Standing up from a chair using your arms (e.g., wheelchair, bedside chair)?  2  -AS 2  -CD    Climbing 3-5 steps with a railing?  2  -AS 2  -CD    To walk in hospital room?  3  -AS 3  -CD    AM-PAC 6 Clicks Score  16  -AS 15  -CD    How much help from another is currently needed...    Putting on and taking off regular lower body clothing? 1  -HK      Bathing (including washing, rinsing, and drying) 2  -HK      Toileting (which includes using toilet bed pan or urinal) 3  -HK      Putting on and taking off regular upper body clothing 4  -HK      Taking care of personal grooming (such as brushing teeth) 3  -HK      Eating meals 4  -HK      Score 17  -HK      Functional Assessment    Outcome Measure Options AM-PAC 6 Clicks Daily Activity (OT)  -HK  AM-PAC 6 Clicks Basic Mobility (PT)  -AS AM-PAC 6 Clicks Basic Mobility (PT)  -CD      User Key  (r) = Recorded By, (t) = Taken By, (c) = Cosigned By    Initials Name Provider Type    CD Estefany Willams, PT Physical Therapist    AS Lacie Orellana, PTA Physical Therapy Assistant     Jade Haynes OT Occupational Therapist           Time Calculation:         Time Calculation- OT       02/14/18 1509          Time Calculation- OT    OT Start Time 1350  -HK      Total Timed Code Minutes- OT 30 minute(s)  -      OT Received On 02/14/18  -      OT Goal Re-Cert Due Date 02/18/18  -        User Key  (r) = Recorded By, (t) = Taken By, (c) = Cosigned By    Initials Name Provider Type     Jade Haynes OT Occupational Therapist           Therapy Charges for Today     Code Description Service Date Service Provider Modifiers Qty    [id number] HC OT THERAPEUTIC ACT EA 15 MIN 2/14/2018 Jade Haynes OT GO 2               Jade Haynes OT  2/14/2018

## 2018-02-14 NOTE — PROGRESS NOTES
"Pharmacy Consult  -  Warfarin    Ashely Roldan is a  57 y.o. female   Height - 175.3 cm (69\")  Weight - 74 kg (163 lb 3.2 oz)    Consulting Provider: - Hospitalist Group  Indication: - Mechanical Mitral Valve  Goal INR: - 2.5-3.5  Home Regimen:   - 2 mg daily except every third day   - 3 mg every third day; repeating    Bridge Therapy: No     Drug-Drug Interactions with current regimen:   None    Warfarin Dosing During Admission:    Date  2/9 2/10 2/11 2/12 2/13 2/14      INR  4.28 5.94 4.43 3.82 2.24 1.75      Dose  Hold Hold Hold 1mg 3mg (3mg)          Education Provided:  On prior to admission; Discussed warfarin with patient and answered any and all questions. (2/13)      Labs:    Results from last 7 days   Lab Units 02/14/18 0517 02/13/18 0512 02/12/18  0625 02/11/18  0456 02/10/18  0607 02/09/18  0851 02/09/18  0611 02/08/18  1844   INR  1.72* 2.24* 3.82* 4.43* 5.94* 4.28* --  --    HEMOGLOBIN g/dL --  --  9.0* --  --  --  9.8* 10.3*   HEMATOCRIT % --  --  30.7* --  --  --  33.6* 34.4*   PLATELETS 10*3/mm3 --  --  135* --  --  --  149* 173     Results from last 7 days   Lab Units 02/14/18 0517 02/13/18 0512 02/12/18  0625  02/10/18  0607  02/08/18  1844   SODIUM mmol/L 132 130* 128* < > 133 < > 139   POTASSIUM mmol/L 4.3 4.2 4.4 < > 4.1 < > 3.9   CHLORIDE mmol/L 94* 93* 92* < > 101 < > 103   CO2 mmol/L 31.0 32.0* 31.0 < > 28.0 < > 25.0   BUN mg/dL 52* 52* 47* < > 45* < > 38*   CREATININE mg/dL 2.10* 2.40* 2.90* < > 2.30* < > 1.70*   CALCIUM mg/dL 8.4* 8.8 8.3* < > 8.0* < > 9.1   BILIRUBIN mg/dL 0.5 --  --  --  0.3 --  0.6   ALK PHOS U/L 98 --  --  --  97 --  119*   ALT (SGPT) U/L 60* --  --  --  117* --  181*   AST (SGOT) U/L 29 --  --  --  37* --  48*   GLUCOSE mg/dL 82 89 84 < > 111* < > 126*   < > = values in this interval not displayed.       Current dietary intake: 75% to 100% of meals  Diet Order   Procedures   • Diet Regular; Cardiac       Assessment/Plan:     1. INR remains SUBtherapeutic today " at 1.72, possibly still decreasing from held doses x 3 days on admission.  Pt has history of being admitted with supratherapeutic INRs on home dose.  Possibly d/t elevated LFTs and congestive hepatopathy on admission increasing the systemic exposure to antithrombotic effects of warfarin.  Will give warfarin 3mg again tonight, then start warfarin 2mg daily.    2. Continue to watch INR closely, dietary intake, drug-drug interactions and s/sx of bleeding or clotting.  Recommend close follow-up on discharge as a maintenance dose has not yet been established and will likely require further dose adjustments.  3. Pharmacy will continue to follow.      Ivonne García, PharmD  2/14/2018  11:39 AM

## 2018-02-14 NOTE — PLAN OF CARE
Problem: Patient Care Overview (Adult)  Goal: Plan of Care Review  Outcome: Ongoing (interventions implemented as appropriate)   02/14/18 1507   Coping/Psychosocial Response Interventions   Plan Of Care Reviewed With patient   Patient Care Overview   Progress improving   Outcome Evaluation   Outcome Summary/Follow up Plan Pt limited by pain in BLE. Pt advanced to EOB with Alexx and requires Alexx for transfers. Pt requires maxA for LB dressing due to pain in BLE. Continue IP OT per POC.        Problem: Inpatient Occupational Therapy  Goal: Bed Mobility Goal LTG- OT  Outcome: Ongoing (interventions implemented as appropriate)   02/11/18 1327   Bed Mobility OT LTG   Bed Mobility OT LTG, Date Established 02/11/18   Bed Mobility OT LTG, Time to Achieve 1 wk   Bed Mobility OT LTG, Activity Type all bed mobility   Bed Mobility OT LTG, [place] Level conditional independence   Bed Mobility OT LTG, Assist Device bed rails   Bed Mobility OT LTG, Outcome goal ongoing     Goal: Transfer Training Goal 1 LTG- OT  Outcome: Ongoing (interventions implemented as appropriate)   02/11/18 1327 02/14/18 1507   Transfer Training OT LTG   Transfer Training OT LTG, Date Established 02/11/18 --    Transfer Training OT LTG, Time to Achieve 1 wk --    Transfer Training OT LTG, Activity Type sit to stand/stand to sit;toilet --    Transfer Training OT LTG, [place] Level minimum assist (75% patient effort) --    Transfer Training OT LTG, Assist Device commode, bedside;walker, rolling --    Transfer Training OT LTG, Outcome --  goal partially met  (goal met sit to stand)     Goal: Patient Education Goal LTG- OT  Outcome: Ongoing (interventions implemented as appropriate)   02/11/18 1327   Patient Education OT LTG   Patient Education OT LTG, Date Established 02/11/18   Patient Education OT LTG, Time to Achieve 1 wk   Patient Education OT LTG, Education Type written program;HEP;home safety;adaptive equipment mgmt   Patient Education OT LTG  Outcome goal ongoing     Goal: Toileting Goal LTG- OT  Outcome: Ongoing (interventions implemented as appropriate)   02/11/18 1327   Toileting OT LTG   Toileting Goal OT LTG, Date Established 02/11/18   Toileting Goal OT LTG, Time to Achieve 1 wk   Toileting Goal OT LTG, Activity Type toileting   Toileting Goal OT LTG, [place] Level minimum assist (75% patient effort)   Toileting Goal OT LTG, Outcome goal ongoing

## 2018-02-14 NOTE — PROGRESS NOTES
[facility]  IP Progress Note      Chief Complaint/Reason for service:  End-stage cardiomyopathy with renal failure #2 valvular heart disease status post mitral valve replacement    Subjective   Subjective: The patient states she's breathing better her swelling is gone down significantly.  He was to be discharged possible    Past medical, surgical, social and family history reviewed in the patient's electronic medical record.    Objective     Vital Sign Min/Max for last 24 hours  Temp  Min: 98.2 °F (36.8 °C)  Max: 98.4 °F (36.9 °C)   BP  Min: 111/66  Max: 140/96   Pulse  Min: 82  Max: 96   Resp  Min: 16  Max: 18   SpO2  Min: 90 %  Max: 99 %   Flow (L/min)  Min: 2  Max: 2      Intake/Output Summary (Last 24 hours) at 02/14/18 0727  Last data filed at 02/14/18 0554   Gross per 24 hour   Intake              960 ml   Output             3750 ml   Net            -2790 ml             Current Facility-Administered Medications:   •  aspirin EC tablet 81 mg, 81 mg, Oral, Daily, Sandhya Lynch PA-C, 81 mg at 02/13/18 0846  •  benzocaine-menthol (CEPACOL) lozenge 1 lozenge, 1 lozenge, Mouth/Throat, Q4H PRN, Kizzy Martinez MD  •  budesonide-formoterol (SYMBICORT) 160-4.5 MCG/ACT inhaler 2 puff, 2 puff, Inhalation, BID - RT, Jelena Sanchez MD, 2 puff at 02/13/18 2015  •  clonazePAM (KlonoPIN) tablet 2 mg, 2 mg, Oral, TID, Charles Deras MD, 2 mg at 02/13/18 2019  •  diphenhydrAMINE (BENADRYL) capsule 25 mg, 25 mg, Oral, Q6H PRN, Charles Deras MD, 25 mg at 02/13/18 2019  •  ferrous sulfate tablet 325 mg, 325 mg, Oral, Daily, Charles Deras MD, 325 mg at 02/13/18 0846  •  ipratropium-albuterol (DUO-NEB) nebulizer solution 3 mL, 3 mL, Nebulization, 4x Daily - RT, Jelena Sanchez MD, 3 mL at 02/13/18 2015  •  LORazepam (ATIVAN) tablet 1 mg, 1 mg, Oral, Q6H PRN, Jelena Sanchez MD, 1 mg at 02/10/18 0800  •  Magnesium Sulfate 2 gram Bolus, followed by 8 gram infusion (total Mg dose 10  grams)- Mg less than or equal to 1mg/dL, 2 g, Intravenous, PRN **OR** Magnesium Sulfate 6 gram Infusion (2 gm x 3) -Mg 1.1 -1.5 mg/dL, 2 g, Intravenous, PRN **OR** magnesium sulfate 4 gram infusion- Mg 1.6-1.9 mg/dL, 4 g, Intravenous, PRN, Jelena Sanchez MD, Last Rate: 25 mL/hr at 02/08/18 2317, 4 g at 02/08/18 2317  •  nystatin (MYCOSTATIN) powder, , Topical, Q12H, Charles Deras MD  •  ondansetron (ZOFRAN) injection 4 mg, 4 mg, Intravenous, Q6H PRN, Jelena Sanchez MD  •  Pharmacy Consult - [facility], , Does not apply, Daily, Manjit Lynn, [facility]  •  Pharmacy to dose warfarin, , Does not apply, Continuous PRN, Charles Deras MD  •  rosuvastatin (CRESTOR) tablet 10 mg, 10 mg, Oral, Nightly, Jelena Sanchez MD, 10 mg at 02/13/18 2019  •  sodium chloride 0.9 % flush 1-10 mL, 1-10 mL, Intravenous, PRN, Jelena Sanchez MD  •  [DISCONTINUED] potassium phosphate 45 mmol in sodium chloride 0.9 % 500 mL infusion, 45 mmol, Intravenous, PRN **OR** [DISCONTINUED] potassium phosphate 30 mmol in sodium chloride 0.9 % 250 mL infusion, 30 mmol, Intravenous, PRN **OR** [DISCONTINUED] potassium phosphate 15 mmol in sodium chloride 0.9 % 100 mL infusion, 15 mmol, Intravenous, PRN **OR** sodium phosphates 45 mmol in sodium chloride 0.9 % 500 mL IVPB, 45 mmol, Intravenous, PRN **OR** sodium phosphates 30 mmol in sodium chloride 0.9 % 250 mL IVPB, 30 mmol, Intravenous, PRN **OR** sodium phosphates 15 mmol in sodium chloride 0.9 % 250 mL IVPB, 15 mmol, Intravenous, PRN, Jelena Sanchez MD  •  traMADol (ULTRAM) tablet 50 mg, 50 mg, Oral, Q12H PRN, Charles Deras MD, 50 mg at 02/13/18 1220  •  warfarin (COUMADIN) - no scheduled doses (Pharmacy dosing per daily INR), , Does not apply, Daily, Minesh Gomez IV, [facility], Stopped at 02/10/18 1811    Physical Exam: General  thin white female sitting in her bed at a 70° angle with nasal O2 no acute distress        HEENT: She has JVP present sclera have no icterus       Respiratory:  Equal  bilateral  symmetrical expansion with reduced breath sounds bilaterally with E to a changes consistent with effusions and bilateral crackles       Cardiovascular: Regular rate and rhythm with occasional ectopic A S1 click and a soft murmur and 1+ pitting lower extremity edema       GI: Positive bowel sounds       Lower Extremities: Peripheral bruising noted       Neuro: Cranial nerves II through XII appear grossly normal       Skin:  Warm and dry with peripheral ecchymosis       Psych: Pleasant affect    Results Review: Her urine output exceeds intake by 2.7 L.  Her creatinine is down to 2.1 from 2.4 yesterday.  Her INR is now subtherapeutic.    Radiology Results:  Imaging Results (last 72 hours)     ** No results found for the last 72 hours. **          EKG:     ECHO: Severe left ventricular dysfunction with EF 20% but a normal functioning mitral valve prosthesis    Tele:  Sinus rhythm with PVCs    Assessment   Assessment/Plan: 1 this patient has a cardiomyopathy EF 20% and bad heart failure-she improved significantly with dobutamine therapy.  Due to her acute kidney injury diuretics of been held.  Her blood pressure looks well enough that she could support vasodilation but I'm not sure if nephrology would agree to her being on an ACE inhibitor or an arm.  If she has lupus then using hydralazine also may not be an option.  She has JVP today and bilateral effusions and I think she needs to be restarted on her diuretic therapy  2 patient has mitral valve prosthetic valve-she's on warfarin but now has a subtherapeutic INR  3 ischemic heart disease  4 acute kidney injury on chronic kidney disease improved    Jaleel Schafer MD  02/14/18  7:27 AM

## 2018-02-14 NOTE — PROGRESS NOTES
Continued Stay Note  [facility]     Patient Name: Ashely Roldan  MRN: [id number]  Today's Date: 2/14/2018    Admit Date: 2/8/2018          Discharge Plan       02/14/18 1646    Case Management/Social Work Plan    Plan [facility] services    Patient/Family In Agreement With Plan yes    Final Note    Final Note D/C'd per private car to [facility] services.  Update to primary hospice RNLety.  Pt given contact number for hospice on arrival home.                Discharge Codes     None        Expected Discharge Date and Time     Expected Discharge Date Expected Discharge Time    Feb 14, 2018             Nupur Reddy RN

## 2018-02-14 NOTE — PROGRESS NOTES
"   LOS: 6 days    Patient Care Team:  Don Bah MD as PCP - General (Pulmonary Disease)  IAIN Neville as PCP - Claims Attributed    Reason For Visit:    chet on ckd 3 2/2 cardio-renal syndrome    Subjective     Feeling much better wants to go home      Review of Systems:    Pulm: improved soa   CV:  No CP      Objective       aspirin 81 mg Oral Daily   budesonide-formoterol 2 puff Inhalation BID - RT   clonazePAM 2 mg Oral TID   ferrous sulfate 325 mg Oral Daily   ipratropium-albuterol 3 mL Nebulization 4x Daily - RT   nystatin  Topical Q12H   pharmacy consult - MTM  Does not apply Daily   rosuvastatin 10 mg Oral Nightly   [START ON 2/15/2018] warfarin 2 mg Oral Daily   warfarin 3 mg Oral Once       Pharmacy to dose warfarin          Vital Signs:  Blood pressure 133/76, pulse 85, temperature 98.6 °F (37 °C), temperature source Oral, resp. rate 18, height 175.3 cm (69\"), weight 74 kg (163 lb 3.2 oz), SpO2 90 %, not currently breastfeeding.    Flowsheet Rows         First Filed Value    Admission Height  162.6 cm (64\") Documented at 02/08/2018 1812    Admission Weight  72.1 kg (159 lb) Documented at 02/08/2018 1812          02/13 0701 - 02/14 0700  In: 960 [P.O.:960]  Out: 3750 [Urine:3750]    Physical Exam:    General Appearance: NAD, alert and cooperative, Ox3  Eyes: PER, conjunctivae and sclerae normal, no icterus  Lungs: respirations regular and unlabored, no crepitus, bilat rales  Heart/CV: regular rhythm & normal rate, pos sys murmur and valve click, no gallop, no rub and pos edema 2+  Abdomen: not distended, soft, non-tender, no masses,  bowel sounds present  Skin: No rash, Warm and dry    Radiology:      Renal Imaging:        Labs:    Results from last 7 days  Lab Units 02/12/18  0625 02/09/18  0611 02/08/18  1844   WBC 10*3/mm3 5.98 6.12 9.44   HEMOGLOBIN g/dL 9.0* 9.8* 10.3*   HEMATOCRIT % 30.7* 33.6* 34.4*   PLATELETS 10*3/mm3 135* 149* 173       Results from last 7 days  Lab " Units 02/14/18  0517 02/13/18  0512 02/12/18  0625 02/11/18  0456 02/10/18  0607 02/09/18  0611 02/08/18  2314 02/08/18  1844   SODIUM mmol/L 132 130* 128* 133 133 139  --  139   POTASSIUM mmol/L 4.3 4.2 4.4 3.9 4.1 4.1  --  3.9   CHLORIDE mmol/L 94* 93* 92* 97* 101 104  --  103   CO2 mmol/L 31.0 32.0* 31.0 30.0 28.0 31.0  --  25.0   BUN mg/dL 52* 52* 47* 45* 45* 40*  --  38*   CREATININE mg/dL 2.10* 2.40* 2.90* 2.70* 2.30* 1.80*  --  1.70*   CALCIUM mg/dL 8.4* 8.8 8.3* 8.5* 8.0* 8.8  --  9.1   PHOSPHORUS mg/dL 3.9  --  5.0  --   --  5.1  --   --    MAGNESIUM mg/dL  --   --   --   --   --  2.9* 1.9 1.9   ALBUMIN g/dL 3.20  --  3.30  --  2.80* 3.20  --  3.50       Results from last 7 days  Lab Units 02/14/18  0517   GLUCOSE mg/dL 82         Results from last 7 days  Lab Units 02/14/18  0517   ALK PHOS U/L 98   BILIRUBIN mg/dL 0.5   ALT (SGPT) U/L 60*   AST (SGOT) U/L 29       Results from last 7 days  Lab Units 02/11/18  0419   PH, ARTERIAL pH units 7.291*   PO2 ART mm Hg 56.0*   PCO2, ARTERIAL mm Hg 61.3*   HCO3 ART mmol/L 29.5*         Results from last 7 days  Lab Units 02/08/18  2113   COLOR UA  Yellow   CLARITY UA  Cloudy*   PH, URINE  5.5   SPECIFIC GRAVITY, URINE  1.015   GLUCOSE UA  Negative   KETONES UA  Negative   BILIRUBIN UA  Negative   PROTEIN UA  100 mg/dL (2+)*   BLOOD UA  Negative   LEUKOCYTES UA  Negative   NITRITE UA  Negative   Results for MARCIO CALDERA (MRN [id number]) as of 2/12/2018 12:08   Ref. Range 2/7/2017 16:15   Protein, 24H Urine Latest Ref Range: 42.0 - 225.0 mg/24hours 900.0 (H)       Estimated Creatinine Clearance: 34.1 mL/min (by C-G formula based on Cr of 2.1).      Assessment     Principal Problem:    Acute on chronic respiratory failure with hypoxia and hypercapnia  Active Problems:    Anxiety    Ischemic cardiomyopathy    Lupus (systemic lupus erythematosus)    Coronary artery disease involving native coronary artery of native heart with angina pectoris    History of mitral  valve replacement with mechanical valve    Anemia of chronic kidney failure    COPD exacerbation    Tobacco abuse    Noncompliance    Chronic anticoagulation    CKD (chronic kidney disease), stage IV    Acute systolic congestive heart failure    Elevated LFTs            Impression:     chet on ckd 3 improved, cardio renal syndrome on dobutamine off diuretics  chf improved after dobutamine, now off will restart lasix  Hyponatremia improved 2/2 #1 and #2  Ischemic and valvular hd lvef less than 15%  H/o lupus nephritis with residual proteinuria less than 1gm/24h        Recommendations:   Fluid restrict, na restrict, resume lasix, avoid acei/arb for now  More than likely she needs a port and home dobutamine rx mwf    Gabriel Sy MD  02/14/18  11:58 AM

## 2018-02-15 ENCOUNTER — TELEPHONE (OUTPATIENT)
Dept: PHARMACY | Facility: HOSPITAL | Age: 58
End: 2018-02-15

## 2018-02-15 ENCOUNTER — ANTICOAGULATION VISIT (OUTPATIENT)
Dept: PHARMACY | Facility: HOSPITAL | Age: 58
End: 2018-02-15

## 2018-02-15 ENCOUNTER — RESULTS ENCOUNTER (OUTPATIENT)
Dept: TELEMETRY | Facility: HOSPITAL | Age: 58
End: 2018-02-15

## 2018-02-15 DIAGNOSIS — Z95.2 HISTORY OF MITRAL VALVE REPLACEMENT WITH MECHANICAL VALVE: ICD-10-CM

## 2018-02-15 DIAGNOSIS — Z79.01 CHRONIC ANTICOAGULATION: ICD-10-CM

## 2018-02-15 LAB — INR PPP: 1.9

## 2018-02-15 NOTE — PROGRESS NOTES
Patient was previously managed by  in cardiology last known PT/ INR June 2017. At this time, patient is being followed by hospice. They called in PT/INR and stated they are able to continue management of care for anticoagulation.     At this time, HH will plan to take 3mg as scheduled tonight and BOOST dose to 3mg tomorrow night. Then continue alternating days of 2mg and 3mg. HH will plan to retest INR on Monday or Tuesday. In the interim, will determine anticoagulation management. LVM for Katherine to discuss how to proceed.

## 2018-02-15 NOTE — DISCHARGE SUMMARY
[facility] Medicine Services  DISCHARGE SUMMARY    Patient Name: Ashely Roldan  : 1960  MRN: [id number]    Date of Admission: 2018  Date of Discharge:  2018  Primary Care Physician: Don Bah MD    Consults     Date and Time Order Name Status Description    2018 2045 Inpatient Consult to Palliative Care MD Completed     2018 1107 Inpatient Consult to Pulmonology Completed     2018 0645 Inpatient Consult to Cardiology Completed         Hospital Course     Presenting Problem:   Respiratory failure, unspecified chronicity, unspecified whether with hypoxia or hypercapnia [J96.90]    Active Hospital Problems (** Indicates Principal Problem)    Diagnosis Date Noted   • **Acute on chronic respiratory failure with hypoxia and hypercapnia [J96.21, J96.22] 2018   • Acute systolic congestive heart failure [I50.21] 2018   • Elevated LFTs [R79.89] 2018   • CKD (chronic kidney disease), stage IV [N18.4] 2017   • Chronic anticoagulation [Z79.01] 2016   • Tobacco abuse [Z72.0] 2016   • Noncompliance [Z91.19] 2016   • COPD exacerbation [J44.1] 2016   • Anemia of chronic kidney failure [N18.9, D63.1] 2015   • History of mitral valve replacement with mechanical valve [Z95.2] 2015   • Coronary artery disease involving native coronary artery of native heart with angina pectoris [I25.119] 2015   • Anxiety [F41.9]    • Lupus (systemic lupus erythematosus) [M32.9]    • Ischemic cardiomyopathy [I25.5] 2014      Resolved Hospital Problems    Diagnosis Date Noted Date Resolved   • Respiratory failure [J96.90] 2018 02/10/2018   • Essential hypertension [I10]  02/10/2018          Hospital Course:  Ashely Roldan is a 57 y.o. female female with a history of CAD, ICM with AICD, hypertension, PVD, COPD, CKD stage II, chronic anticoagulation, anxiety, HLD, presents to the ED with complaints of  shortness of air, fatigue and lethargy that started on the previous date.  Patient was here 1/23/18-2/5/18 with sepsis secondary to UTI, liver injury, chet, and left ama before being cleared by cardiology for mechanical mitral valve.  She was admitted to the hospitalist service, placed on bipap, dobutamine and diuretics.  Cardiology and nephrology were consulted.  She had improvement in her encephalopathy.  She had slow improvement.  Palliative medicine was consulted as pt reported she wanted to go home as she knew she was end stage and wanted to spend as much time at home as possible.  Arrangements were made for her to be dc'd home.  She will continue her coumadin and hospice will follow her INR and adjust.  Pt is in agreement with plan and anxious for dc.           Day of Discharge     HPI:   No issues overnight  Wants to go home even though INR low.  She understands stroke risk  Defibrillator has been turned off    Review of Systems  Gen- No fevers, chills  CV- No chest pain, palpitations  Resp- No cough, dyspnea improved  GI- No N/V/D, abd pain    Otherwise ROS is negative except as mentioned in the HPI.    Vital Signs:   Temp:  [98.2 °F (36.8 °C)-98.6 °F (37 °C)] 98.6 °F (37 °C)  Heart Rate:  [84-91] 91  Resp:  [16-18] 18  BP: (111-137)/(66-78) 137/78     Physical Exam:  Constitutional: No acute distress, awake, alert  HENT: NCAT, mucous membranes moist  Respiratory: Decreased right base, fine crackles left base, respiratory effort normal   Cardiovascular: RRR, no murmurs, rubs, or gallops, palpable pedal pulses bilaterally  Gastrointestinal: Positive bowel sounds, soft, nontender, nondistended  Musculoskeletal: 1+ edema BLE, wrapped bilaterally  Psychiatric: Appropriate affect, cooperative  Neurologic: Oriented x 3, strength symmetric in all extremities, speech clear  Skin: No rashes      Pertinent  and/or Most Recent Results       Results from last 7 days  Lab Units 02/14/18  0517 02/13/18  0512 02/12/18  0673  02/11/18  0456 02/10/18  0607 02/09/18  0611 02/08/18  1844   WBC 10*3/mm3  --   --  5.98  --   --  6.12 9.44   HEMOGLOBIN g/dL  --   --  9.0*  --   --  9.8* 10.3*   HEMATOCRIT %  --   --  30.7*  --   --  33.6* 34.4*   PLATELETS 10*3/mm3  --   --  135*  --   --  149* 173   SODIUM mmol/L 132 130* 128* 133 133 139 139   POTASSIUM mmol/L 4.3 4.2 4.4 3.9 4.1 4.1 3.9   CHLORIDE mmol/L 94* 93* 92* 97* 101 104 103   CO2 mmol/L 31.0 32.0* 31.0 30.0 28.0 31.0 25.0   BUN mg/dL 52* 52* 47* 45* 45* 40* 38*   CREATININE mg/dL 2.10* 2.40* 2.90* 2.70* 2.30* 1.80* 1.70*   GLUCOSE mg/dL 82 89 84 96 111* 89 126*   CALCIUM mg/dL 8.4* 8.8 8.3* 8.5* 8.0* 8.8 9.1       Results from last 7 days  Lab Units 02/14/18  0517 02/13/18  0512 02/12/18 0625 02/11/18 0456 02/10/18  0607 02/09/18  0851 02/08/18  1844   BILIRUBIN mg/dL 0.5  --   --   --  0.3  --  0.6   ALK PHOS U/L 98  --   --   --  97  --  119*   ALT (SGPT) U/L 60*  --   --   --  117*  --  181*   AST (SGOT) U/L 29  --   --   --  37*  --  48*   PROTIME Seconds 18.1* 23.5* 40.1* 46.5* 62.4* 48.8*  --    INR  1.72* 2.24* 3.82* 4.43* 5.94* 4.28*  --        Results from last 7 days  Lab Units 02/14/18  0758 02/09/18  0611 02/08/18  1844   BNP pg/mL 3242.0* 2919.0* 3156.0*     Brief Urine Lab Results  (Last result in the past 365 days)      Color   Clarity   Blood   Leuk Est   Nitrite   Protein   CREAT   Urine HCG        02/11/18 1556             63.8             Microbiology Results Abnormal     Procedure Component Value - Date/Time    Blood Culture - Blood, [[id number]]  (Normal) Collected:  02/08/18 2259    Lab Status:  Final result Specimen:  Blood from Hand, Left Updated:  02/14/18 0146     Blood Culture No growth at 5 days    Blood Culture - Blood, [[id number]]  (Normal) Collected:  02/08/18 2304    Lab Status:  Final result Specimen:  Blood from Arm, Left Updated:  02/14/18 0146     Blood Culture No growth at 5 days    Narrative:       Aerobic Only    Urine Culture - Urine, Urine,  Clean Catch [[id number]]  (Normal) Collected:  02/09/18 0330    Lab Status:  Final result Specimen:  Urine from Urine, Clean Catch Updated:  02/11/18 0727     Urine Culture No growth at 2 days    Influenza A & B, RT PCR - Swab, Nasopharynx [[id number]]  (Normal) Collected:  02/08/18 2113    Lab Status:  Final result Specimen:  Swab from Nasopharynx Updated:  02/08/18 2209     Influenza A PCR Not Detected     Influenza B PCR Not Detected          Imaging Results (all)     Procedure Component Value Units Date/Time    XR Chest 1 View [[id number]] Collected:  02/08/18 1840     Updated:  02/08/18 1957    Narrative:       EXAM:    XR Chest, 1 View    CLINICAL HISTORY:    57 years old, female; Signs and symptoms; Other: Weak/dizzy/ams; Additional   info: Weak/dizzy/ams triage protocol    TECHNIQUE:    Frontal view of the chest.    COMPARISON:    CR - XR CHEST 1 VW 2018-02-01 05:50    FINDINGS:    Prominent lung markings/peribronchial thickening without definite evidence of   consolidation.  Patchy atelectasis and scarring in the lungs bilaterally most   notable in the LEFT upper lobe.  Blunting of the costophrenic angles suggestive   of small pleural effusion/thickening.  Cardiomegaly with normal lung   vascularity.  Tortuous calcific aortic arch and descending thoracic aorta.    LEFT chest wall pacemaker.  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       Impression:         Chronic cardiopulmonary changes with decreased bilateral pleural effusions   without any other significant change.    THIS DOCUMENT HAS BEEN ELECTRONICALLY SIGNED BY IVETTE OGLESBY MD          Results for orders placed during the hospital encounter of 02/08/18   Duplex Venous Upper Extremity - Left CAR    Narrative · Normal left upper extremity venous duplex scan.          Results for orders placed during the hospital encounter of 02/08/18   Duplex Venous Upper Extremity - Left CAR    Narrative · Normal left upper extremity venous  duplex scan.          Results for orders placed during the hospital encounter of 02/08/18   STAT Adult Transthoracic Echo Complete W/ Cont if Necessary Per Protocol    Narrative · There is right-sided chamber enlargement.  · Global RV and LV hypokinesia is present.  · The estimated LV ejection fraction is 20%.  · I cannot exclude LV apical thrombus with certainty.  · The aortic valve is not well visualized. Mild AI is seen.  · A normally functioning mechanical mitral valve is present.  · A small pericardial effusion is suted.            Discharge Details      Ashely Roldan   Home Medication Instructions JEWEL:[id number]    Printed on:02/14/18 5793   Medication Information                      albuterol (PROVENTIL) (2.5 MG/3ML) 0.083% nebulizer solution  Take 2.5 mg by nebulization As Needed for wheezing or shortness of air.             aspirin 81 MG EC tablet  Take 1 tablet by mouth Daily.             benzocaine-menthol (CEPACOL) 15-3.6 MG lozenge lozenge  Dissolve 1 lozenge in the mouth Every 4 (Four) Hours As Needed (sore throat).             budesonide-formoterol (SYMBICORT) 160-4.5 MCG/ACT inhaler  Inhale 1-2 puffs 2 (Two) Times a Day. In morning and evening for 90 days              Calcium Carb-Cholecalciferol (CALCIUM + D3) 600-200 MG-UNIT tablet  Take 1 tablet by mouth Daily.             carvedilol (COREG) 12.5 MG tablet  Take 0.5 tablets by mouth 2 (Two) Times a Day With Meals.             clonazePAM (KlonoPIN) 2 MG tablet  Take 1 tablet by mouth 3 (Three) Times a Day.             diphenhydrAMINE (BENADRYL) 25 mg capsule  Take 1 capsule by mouth Every 6 (Six) Hours As Needed for Itching.             Ferrous Sulfate (IRON) 325 (65 FE) MG tablet  Take 325 mg by mouth Daily.             furosemide (LASIX) 40 MG tablet  Take 1 tablet by mouth Daily.             LORazepam (ATIVAN) 1 MG tablet  Take 1 tablet by mouth Every 6 (Six) Hours As Needed for Anxiety.             oxyCODONE (ROXICODONE) 5 MG immediate  release tablet  1/2-1 tablet q4h prn pain or dyspnea.  Max of 4 doses in 24h             Probiotic Product (PROBIOTIC PO)  Take 1 capsule by mouth daily. 20 billion cell; for 30 days             rosuvastatin (CRESTOR) 10 MG tablet  Take 1 tablet by mouth Every Night. For 90 days             tiotropium (SPIRIVA) 18 MCG per inhalation capsule  Place 2 puffs (1 capsule total) into inhaler and inhale once daily. For 90 days             warfarin (COUMADIN) 2 MG tablet  3mg tonight (2.14.18), then 2mg daily starting 2.15.18                   Discharge Disposition:  Home or Self Care    Discharge Diet:  Diet Instructions     Diet: Regular, Cardiac, Specialty Diet; Thin Liquids, No Restrictions; Low Sodium       Discharge Diet:   Regular  Cardiac  Specialty Diet      Fluid Consistency:  Thin Liquids, No Restrictions   Specialty Diets:  Low Sodium   Fluid Restriction per day:  Other (See comments)   1200mL fluid restriction                 Discharge Activity:   Activity Instructions     Activity as Tolerated                     Special Instructions:  Hospice to manage coumadin/INR and do leg wraps    Future Appointments  Date Time Provider Department Center   2/21/2018 10:30 AM IAIN Cook [facility]       Additional Instructions for the Follow-ups that You Need to Schedule     Protime-INR    Feb 15, 2018 (Approximate)    Results to hospice   Order Comments:  Results to hospice     Is Patient on anti-coag:  Yes                     Time Spent on Discharge:  >60 minutes    Electronically signed by IAIN Tamez, 02/14/18, 7:06 PM.

## 2018-02-19 ENCOUNTER — ANTICOAGULATION VISIT (OUTPATIENT)
Dept: PHARMACY | Facility: HOSPITAL | Age: 58
End: 2018-02-19

## 2018-02-19 DIAGNOSIS — Z79.01 CHRONIC ANTICOAGULATION: ICD-10-CM

## 2018-02-19 DIAGNOSIS — Z95.2 HISTORY OF MITRAL VALVE REPLACEMENT WITH MECHANICAL VALVE: ICD-10-CM

## 2018-02-19 LAB — INR PPP: 3.8

## 2018-02-19 NOTE — PROGRESS NOTES
Anticoagulation Clinic - Remote Progress Note  HOSPICE CARE  Frequency of Monitoring: weekly    Indication: mechanical MVR  Referring Provider: James  Goal INR: 2.5-3.5  Current Drug Interactions: aspirin, rosuvastatin  Bleed Risk: h/o non-compliance with supratherapeutic INRs  Other: previously non-compliant, discharged from warfarin management under [facility] -- as long as Hospice is seeing pt, will continue to manage  Diet:   Alcohol:   Tobacco:   OTC Pain Medication:     INR History:  Date 2/15 2/19          Total Weekly Dose  18mg          INR 1.9 3.8          Notes              Phone Interview:  Tablet Strength: pt has PLEASE VERIFY  Current Maintenance Dose: variable  Lab Contact Info: Hospice [phone] (Jody)    Plan:  1. INR is supratherapeutic and has increased almost two full points since Thursday. Given recent lability, instructed Jody (Hospice RN) to have Mrs. Roldan take warfarin 2mg daily for now.  2. Repeat INR on Thursday.  3. Verbal information provided over the phone to Hospice RN, Jody. She expresses understanding by teach back and has no further questions at this time.    Linda Laura, [facility]  2/19/2018  3:39 PM

## 2018-02-21 NOTE — PROGRESS NOTES
Received referral on 2/21/2018. Problem list: hx of TIA; mechanical AVR and MVR,  AICD, PVD, Ischemic cardiomyopathy, arthritis, COPD, HF, CAD.

## 2018-02-22 ENCOUNTER — TELEPHONE (OUTPATIENT)
Dept: PHARMACY | Facility: HOSPITAL | Age: 58
End: 2018-02-22

## 2018-02-22 ENCOUNTER — ANTICOAGULATION VISIT (OUTPATIENT)
Dept: PHARMACY | Facility: HOSPITAL | Age: 58
End: 2018-02-22

## 2018-02-22 DIAGNOSIS — Z79.01 CHRONIC ANTICOAGULATION: ICD-10-CM

## 2018-02-22 DIAGNOSIS — Z95.2 HISTORY OF MITRAL VALVE REPLACEMENT WITH MECHANICAL VALVE: ICD-10-CM

## 2018-02-22 LAB — INR PPP: 4

## 2018-02-22 NOTE — PROGRESS NOTES
Anticoagulation Clinic - Remote Progress Note  HOSPICE CARE  Frequency of Monitoring: weekly    Indication: mechanical MVR  Referring Provider: James  Goal INR: 2.5-3.5  Current Drug Interactions: aspirin, rosuvastatin  Bleed Risk: h/o non-compliance with supratherapeutic INRs  Other: previously non-compliant, discharged from warfarin management under [facility] -- as long as Hospice is seeing pt, will continue to manage  Diet:   Alcohol:   Tobacco:   OTC Pain Medication:     INR History:  Date 2/15 2/19 2/22         Total Weekly Dose  18mg 6 mg (over 3 days)         INR 1.9 3.8 4.0         Notes              Phone Interview:  Tablet Strength: pt has PLEASE VERIFY  Current Maintenance Dose: variable  Lab Contact Info: Hospice [phone] (Jody)    Plan:  1. INR remains supratherapeutic at 4.0 today (3.8 on 2/18). Patient appears to be very sensitive to warfarin holds and dose adjustments, so instructed Lety (Hospice RN) to have Mrs. Roldan take warfarin 1mg tonight, then continue warfarin 2mg daily for now.  2. Repeat INR on Monday, 2/26.  3. Of note, per Lety (Hospice RN), patient is moving to [place], [place] to live with her brother. She is being discharged from current hospice care tomorrow (2/23), and warfarin will be managed further by hospice care in [place]. Instructed Lety to  patient on importance of warfarin monitoring in [place].  4. Verbal information provided over the phone to Hospice RN, Lety. She expresses understanding by teach back and has no further questions at this time.    Kristie Spain [facility]  2/22/2018  3:13 PM

## 2018-03-06 ENCOUNTER — TELEPHONE (OUTPATIENT)
Dept: PHARMACY | Facility: HOSPITAL | Age: 58
End: 2018-03-06

## 2018-03-06 NOTE — TELEPHONE ENCOUNTER
Ms. Roldan has planned to move to [place], [place] with her brother. Therefore, she has been discharged from Hospice here with the plan to begin Hospice in [place].

## 2018-03-06 NOTE — TELEPHONE ENCOUNTER
"Spoke with Katherine at [facility]osp[facility] in [place] [phone]. Katherine/James had stated this is \"typically one of the first medications they remove from profile in attempt to decrease the amount of medications they take.\" Discussed that patient will likely clot with mechanical MVR without anticoagulation therapy.    Katherine plans to send message to nurse asap. Recommended that nurse gets an order to reinitiate anticoagulation therapy (lovenox/ warfarin) tonight since we will likely not be able to get in contact with nurse at this time. She will plan to call tomorrow and discuss how they plan to proceed with anticoagulation management.     Once able to determine how future of anticoagulation management is continued with Hospice in [place], will close encounter.  "

## 2018-03-07 NOTE — TELEPHONE ENCOUNTER
Called and spoke with Arminda this morning, who reported they had discussed patient at report. Liza will plan to give me a call back once she is out of a meeting per Arminda.

## 2018-03-09 NOTE — TELEPHONE ENCOUNTER
Liza LEE confirmed that Ms. Roldan continues on warfarin for anticoagulation therapy, however, she did hold warfarin a few days due to elevated PT/INR. They plan to continue managing her anticoagulation therapy at Hospice [phone].     At this time, will discontinue patient from care of [facility] unless otherwise advised.